# Patient Record
Sex: FEMALE | Race: WHITE | Employment: OTHER | URBAN - METROPOLITAN AREA
[De-identification: names, ages, dates, MRNs, and addresses within clinical notes are randomized per-mention and may not be internally consistent; named-entity substitution may affect disease eponyms.]

---

## 2017-01-03 DIAGNOSIS — Z79.4 TYPE 2 DIABETES MELLITUS WITH DIABETIC NEUROPATHY, WITH LONG-TERM CURRENT USE OF INSULIN (HCC): ICD-10-CM

## 2017-01-03 DIAGNOSIS — E11.40 TYPE 2 DIABETES MELLITUS WITH DIABETIC NEUROPATHY, WITH LONG-TERM CURRENT USE OF INSULIN (HCC): ICD-10-CM

## 2017-01-03 RX ORDER — LISINOPRIL AND HYDROCHLOROTHIAZIDE 12.5; 2 MG/1; MG/1
1 TABLET ORAL DAILY
Qty: 90 TAB | Refills: 1 | Status: SHIPPED | OUTPATIENT
Start: 2017-01-03 | End: 2017-01-10

## 2017-01-05 DIAGNOSIS — I95.1 ORTHOSTATIC HYPOTENSION: ICD-10-CM

## 2017-01-05 RX ORDER — FLUDROCORTISONE ACETATE 0.1 MG/1
TABLET ORAL
Qty: 30 TAB | Refills: 0 | Status: SHIPPED | OUTPATIENT
Start: 2017-01-05 | End: 2017-02-02 | Stop reason: SDUPTHER

## 2017-01-10 RX ORDER — LISINOPRIL 5 MG/1
5 TABLET ORAL DAILY
Qty: 90 TAB | Refills: 3 | Status: SHIPPED | OUTPATIENT
Start: 2017-01-10 | End: 2017-05-10 | Stop reason: SDUPTHER

## 2017-01-10 RX ORDER — LISINOPRIL 5 MG/1
5 TABLET ORAL DAILY
Qty: 30 TAB | Refills: 0 | Status: SHIPPED | OUTPATIENT
Start: 2017-01-10 | End: 2017-03-21 | Stop reason: SDUPTHER

## 2017-02-02 DIAGNOSIS — I95.1 ORTHOSTATIC HYPOTENSION: ICD-10-CM

## 2017-02-03 RX ORDER — FLUDROCORTISONE ACETATE 0.1 MG/1
TABLET ORAL
Qty: 30 TAB | Refills: 0 | Status: SHIPPED | OUTPATIENT
Start: 2017-02-03 | End: 2017-03-07 | Stop reason: SDUPTHER

## 2017-02-08 ENCOUNTER — TELEPHONE (OUTPATIENT)
Dept: OBGYN CLINIC | Age: 67
End: 2017-02-08

## 2017-02-08 NOTE — TELEPHONE ENCOUNTER
[facility] called regarding the order faxed over for mastectomy bra and prothesis.  This nurse confirmed the order as per MD .

## 2017-02-08 NOTE — TELEPHONE ENCOUNTER
Call received at 12:00nn.    73year old patient last seen in the office on 5/9/16 and has appointment on 4/27/17. Patient calling to ask for order for mastectomy bras and prothesis. Patient reports she has lost weight and current bra and prothesis do not fit. Patient provided fax number of [phone]( [facility]) to fax order to.      Please advise

## 2017-02-08 NOTE — TELEPHONE ENCOUNTER
Patient advised of MD recommendation and prescription faxed to the patient provided fax number. Fax confirmation received. Patient verbalized understanding.

## 2017-03-01 RX ORDER — CANAGLIFLOZIN 100 MG/1
TABLET, FILM COATED ORAL
Qty: 90 TAB | Refills: 1 | Status: SHIPPED | OUTPATIENT
Start: 2017-03-01 | End: 2019-06-18

## 2017-03-02 ENCOUNTER — OFFICE VISIT (OUTPATIENT)
Dept: FAMILY MEDICINE CLINIC | Age: 67
End: 2017-03-02

## 2017-03-02 VITALS
DIASTOLIC BLOOD PRESSURE: 78 MMHG | WEIGHT: 177 LBS | BODY MASS INDEX: 26.83 KG/M2 | TEMPERATURE: 97.7 F | HEIGHT: 68 IN | OXYGEN SATURATION: 98 % | RESPIRATION RATE: 18 BRPM | SYSTOLIC BLOOD PRESSURE: 157 MMHG | HEART RATE: 65 BPM

## 2017-03-02 DIAGNOSIS — I10 ESSENTIAL HYPERTENSION: ICD-10-CM

## 2017-03-02 DIAGNOSIS — H69.83 ETD (EUSTACHIAN TUBE DYSFUNCTION), BILATERAL: Primary | ICD-10-CM

## 2017-03-02 DIAGNOSIS — H61.21 RIGHT EAR IMPACTED CERUMEN: ICD-10-CM

## 2017-03-02 RX ORDER — AMLODIPINE BESYLATE 5 MG/1
5 TABLET ORAL
Qty: 30 TAB | Refills: 2 | Status: SHIPPED | OUTPATIENT
Start: 2017-03-02 | End: 2017-05-08

## 2017-03-02 RX ORDER — LANOLIN ALCOHOL/MO/W.PET/CERES
CREAM (GRAM) TOPICAL
Refills: 0 | COMMUNITY
Start: 2017-01-05 | End: 2017-04-01

## 2017-03-02 RX ORDER — FLUTICASONE PROPIONATE 50 MCG
2 SPRAY, SUSPENSION (ML) NASAL DAILY
Qty: 1 BOTTLE | Refills: 2 | Status: SHIPPED | OUTPATIENT
Start: 2017-03-02 | End: 2017-10-31

## 2017-03-02 NOTE — PATIENT INSTRUCTIONS
Eustachian Tube Problems: Care Instructions  Your Care Instructions    The eustachian (say \"you-STAY-shee-un\") tubes run between the inside of the ears and the throat. They keep air pressure stable in the ears. If your eustachian tubes become blocked, the air pressure in your ears changes. The fluids from a cold can clog eustachian tubes, causing pain in the ears. A quick change in air pressure can cause eustachian tubes to close up. This might happen when an airplane changes altitude or when a  goes up or down underwater. Eustachian tube problems often clear up on their own or after antibiotic treatment. If your tubes continue to be blocked, you may need surgery. Follow-up care is a key part of your treatment and safety. Be sure to make and go to all appointments, and call your doctor if you are having problems. It's also a good idea to know your test results and keep a list of the medicines you take. How can you care for yourself at home? · To ease ear pain, apply a warm washcloth or a heating pad set on low. There may be some drainage from the ear when the heat melts earwax. Put a cloth between the heat source and your skin. Do not use a heating pad with children. · If your doctor prescribed antibiotics, take them as directed. Do not stop taking them just because you feel better. You need to take the full course of antibiotics. · Your doctor may recommend over-the-counter medicine. Be safe with medicines. Oral or nasal decongestants may relieve ear pain. Avoid decongestants that are combined with antihistamines, which tend to cause more blockage. But if allergies seem to be the problem, your doctor may recommend a combination. Be careful with cough and cold medicines. Don't give them to children younger than 6, because they don't work for children that age and can even be harmful. For children 6 and older, always follow all the instructions carefully.  Make sure you know how much medicine to give and how long to use it. And use the dosing device if one is included. When should you call for help? Call your doctor now or seek immediate medical care if:  · You develop sudden, complete hearing loss. · You have severe pain or feel dizzy. · You have new or increasing pus or blood draining from your ear. · You have redness, swelling, or pain around or behind the ear. Watch closely for changes in your health, and be sure to contact your doctor if:  · You do not get better after 2 weeks. · You have any new symptoms, such as itching or a feeling of fullness in the ear. Where can you learn more? Go to http://jorge a-katiana.info/. Enter Y822 in the search box to learn more about \"Eustachian Tube Problems: Care Instructions. \"  Current as of: July 29, 2016  Content Version: 11.1  © 1654-7456 Healthwise, Incorporated. Care instructions adapted under license by [employer] (which disclaims liability or warranty for this information). If you have questions about a medical condition or this instruction, always ask your healthcare professional. [address] any warranty or liability for your use of this information.

## 2017-03-02 NOTE — MR AVS SNAPSHOT
Visit Information Date & Time Provider Department Dept. Phone Encounter #  
 3/2/2017  7:00 AM Laury Greenberg NP [address] [facility] [phone] [id number] Follow-up Instructions Return if symptoms worsen or fail to improve. Your Appointments 3/21/2017  8:00 AM  
Any with Rosaline Audi Mcfadden DO [address] [facility] ([facility]) Appt Note: 3mo f/u; [address] [address]  
[phone]  
  
   
 [address] [address]  
  
    
 4/27/2017  9:10 AM  
ESTABLISHED PATIENT with MD Markus Giron ([facility]) Appt Note: ae/M  
 [address] [facility] [address] [facility] 99 [id number]  
[facility] [address] Upcoming Health Maintenance Date Due Hepatitis C Screening 1950 EYE EXAM RETINAL OR DILATED Q1 2/18/1961 DTaP/Tdap/Td series (1 - Tdap) 2/18/1972 FOBT Q 1 YEAR AGE 50-75 2/18/2001 ZOSTER VACCINE AGE 60> 2/18/2011 GLAUCOMA SCREENING Q2Y 2/18/2016 Pneumococcal 65+ High/Highest Risk (1 of 2 - PCV13) 2/18/2016 MEDICARE YEARLY EXAM 2/18/2016 BREAST CANCER SCRN MAMMOGRAM 6/16/2017 HEMOGLOBIN A1C Q6M 6/14/2017 MICROALBUMIN Q1 11/3/2017 FOOT EXAM Q1 12/14/2017 LIPID PANEL Q1 12/14/2017 Allergies as of 3/2/2017  Review Complete On: 3/2/2017 By: Laury Greenberg NP Severity Noted Reaction Type Reactions Latex  06/09/2014    Other (comments) Patient states it burns around her mouth. Other Food  10/12/2016    Other (comments) Yogurt - stomach cramping Betadine [Povidone-iodine] High 11/15/2013    Itching Pt states this causes \"extreme\" itching Codeine High 11/15/2013    Anaphylaxis, Rash, Nausea Only, Other (comments) HEADACHE Dilaudid [Hydromorphone (Bulk)] High 04/02/2014    Anaphylaxis, Itching, Nausea Only, Other (comments) HALLUCINATIONS Hydrocodone High 11/15/2013    Anaphylaxis, Rash, Nausea Only, Vertigo Facial - eyelid swelling-had to go to ER for reaction, HALLUCINATIONS Ditropan [Oxybutynin Chloride]  03/04/2014    Swelling Doxycycline  11/15/2013    Rash PUSTULAR RASH- TOLERATING TETRACYCLINE Keflex [Cephalexin]  11/15/2013    Nausea and Vomiting Pain in abdomen AND FLU-LIKE SX Metformin  11/15/2013    Nausea and Vomiting Severe abdominal pain Tape [Adhesive]  06/09/2014    Other (comments) Redness/inflammed. Can only use paper tape. CAN USE BAND-AIDS. TOLERATES SURGICAL TAPE USED IN [place] [facility]. Sulfamethoxazole-trimethoprim Low 02/28/2014    Other (comments) Cramping/flu-like symptoms Current Immunizations  Never Reviewed No immunizations on file. Not reviewed this visit You Were Diagnosed With   
  
 Codes Comments ETD (eustachian tube dysfunction), bilateral    -  Primary ICD-10-CM: J08.95 ICD-9-CM: 381.81 Right ear impacted cerumen     ICD-10-CM: H61.21 ICD-9-CM: 380.4 Vitals BP  
  
  
  
  
  
 163/84 (BP 1 Location: Left arm, BP Patient Position: Sitting) BMI and BSA Data Body Mass Index Body Surface Area  
 26.91 kg/m 2 1.96 m 2 Preferred Pharmacy Pharmacy Name Phone [facility] [address]  [facility] [phone] Your Updated Medication List  
  
   
This list is accurate as of: 3/2/17  7:35 AM.  Always use your most recent med list.  
  
  
  
  
 Alpha Lipoic Acid 600 mg Cap Take 1 Cap by mouth two (2) times a day. BABY ASPIRIN PO Take 81 mg by mouth daily. BD INSULIN PEN NEEDLE UF MINI 31 gauge x 3/16\" Ndle Generic drug:  Insulin Needles (Disposable) USE AS DIRECTED EVERY DAY  
  
 carbamide peroxide 6.5 % otic solution Commonly known as:  Mack Gelineau Administer 5 Drops in right ear two (2) times a day for 4 days. CINNAMON 500 mg Cap Generic drug:  cinnamon bark Take 2 Caps by mouth daily. CLARITIN 10 mg tablet Generic drug:  loratadine Take 10 mg by mouth nightly. CRANBERRY EXTRACT-VIT C PO Take 3 Caps by mouth daily. 4200 mg  
  
 fludrocortisone 0.1 mg tablet Commonly known as:  FLORINEF  
TAKE 1 TABLET BY MOUTH DAILY  
  
 fluticasone 50 mcg/actuation nasal spray Commonly known as:  Raghavendra Merles 2 Sprays by Both Nostrils route daily. gabapentin 300 mg capsule Commonly known as:  NEURONTIN Take 1 capsule by mouth two (2) times a day. Takes 2 in AM, 1 at night GARCINIA CAMBOGIA PO Take 2 Tabs by mouth daily. glimepiride 2 mg tablet Commonly known as:  AMARYL Take 1 tablet by mouth two (2) times a day. INVOKANA 100 mg tablet Generic drug:  canagliflozin Take 1 tablet by mouth  daily before breakfast  
  
 L-Mfolate-B6 Phos-Methyl-B12 3-35-2 mg Tab tab Commonly known as:  Friddie Lundborg Take 1 Tab by mouth two (2) times a day. * lisinopril 5 mg tablet Commonly known as:  Raeann Shames Take 1 Tab by mouth daily. * lisinopril 5 mg tablet Commonly known as:  Raeann Shames Take 1 Tab by mouth daily. magnesium oxide 400 mg tablet Commonly known as:  MAG-OX TK 1 T PO QD  
  
 pravastatin 40 mg tablet Commonly known as:  PRAVACHOL  
TAKE 1 TABLET NIGHTLY (NEEDS APPOINTMENT AS SOON AS POSSIBLE FOR FASTING LABS AND APPOINTMENT) SITagliptin 100 mg tablet Commonly known as:  Kathy Homans Take 1 Tab by mouth daily. TRUETEST TEST STRIPS strip Generic drug:  glucose blood VI test strips TEST THREE TIMES DAILY * Notice: This list has 2 medication(s) that are the same as other medications prescribed for you. Read the directions carefully, and ask your doctor or other care provider to review them with you. Prescriptions Sent to Pharmacy  Refills  
 fluticasone (FLONASE) 50 mcg/actuation nasal spray 2  
 Si Sprays by Both Nostrils route daily. Class: Normal  
 Pharmacy: [facility] [address], [place] - [address] AT [facility] of [address] ([facility] Ph #: [phone] Route: Both Nostrils  
 carbamide peroxide (DEBROX) 6.5 % otic solution 0 Sig: Administer 5 Drops in right ear two (2) times a day for 4 days. Class: Normal  
 Pharmacy: [facility] [address], [place] - [address] AT [facility] of [address] ([facility] Ph #: [phone] Route: Right Ear We Performed the Following REFERRAL TO ENT-OTOLARYNGOLOGY [GAY79 Custom] Follow-up Instructions Return if symptoms worsen or fail to improve. Referral Information Referral ID Referred By Referred To  
  
 [id number] Timi Sheth MD   
   [address] [facility] [address] [place] [address] Phone: [phone] Fax: [phone] Visits Status Start Date End Date 1 New Request 3/2/17 3/2/18 If your referral has a status of pending review or denied, additional information will be sent to support the outcome of this decision. Patient Instructions Eustachian Tube Problems: Care Instructions Your Care Instructions The eustachian (say \"you-STAY-shee-un\") tubes run between the inside of the ears and the throat. They keep air pressure stable in the ears. If your eustachian tubes become blocked, the air pressure in your ears changes. The fluids from a cold can clog eustachian tubes, causing pain in the ears. A quick change in air pressure can cause eustachian tubes to close up. This might happen when an airplane changes altitude or when a  goes up or down underwater. Eustachian tube problems often clear up on their own or after antibiotic treatment. If your tubes continue to be blocked, you may need surgery. Follow-up care is a key part of your treatment and safety. Be sure to make and go to all appointments, and call your doctor if you are having problems. It's also a good idea to know your test results and keep a list of the medicines you take. How can you care for yourself at home? · To ease ear pain, apply a warm washcloth or a heating pad set on low. There may be some drainage from the ear when the heat melts earwax. Put a cloth between the heat source and your skin. Do not use a heating pad with children. · If your doctor prescribed antibiotics, take them as directed. Do not stop taking them just because you feel better. You need to take the full course of antibiotics. · Your doctor may recommend over-the-counter medicine. Be safe with medicines. Oral or nasal decongestants may relieve ear pain. Avoid decongestants that are combined with antihistamines, which tend to cause more blockage. But if allergies seem to be the problem, your doctor may recommend a combination. Be careful with cough and cold medicines. Don't give them to children younger than 6, because they don't work for children that age and can even be harmful. For children 6 and older, always follow all the instructions carefully. Make sure you know how much medicine to give and how long to use it. And use the dosing device if one is included. When should you call for help? Call your doctor now or seek immediate medical care if: 
· You develop sudden, complete hearing loss. · You have severe pain or feel dizzy. · You have new or increasing pus or blood draining from your ear. · You have redness, swelling, or pain around or behind the ear. Watch closely for changes in your health, and be sure to contact your doctor if: 
· You do not get better after 2 weeks. · You have any new symptoms, such as itching or a feeling of fullness in the ear. Where can you learn more? Go to http://jorge a-katiana.info/. Enter Y822 in the search box to learn more about \"Eustachian Tube Problems: Care Instructions. \" Current as of: July 29, 2016 Content Version: 11.1 © 2513-7844 [employer], Incorporated. Care instructions adapted under license by [employer] (which disclaims liability or warranty for this information). If you have questions about a medical condition or this instruction, always ask your healthcare professional. [address] any warranty or liability for your use of this information. Introducing [facility] & HE[facility]! Dear Patti Staff: 
Thank you for requesting a KeyVive account. Our records indicate that you have previously registered for a KeyVive account but its currently inactive. Please call our KeyVive support line at [phone]. Additional Information If you have questions, please visit the Frequently Asked Questions section of the KeyVive website at [email or web address] Remember, KeyVive is NOT to be used for urgent needs. For medical emergencies, dial 911. Now available from your iPhone and Android! Please provide this summary of care documentation to your next provider. Your primary care clinician is listed as Dannielle Cooks. If you have any questions after today's visit, please call [phone].

## 2017-03-02 NOTE — PROGRESS NOTES
Chief Complaint   Patient presents with    Ear Pain     both ears     Patient in office today with sx that began this weekend; have not been treating with otc. 1. Have you been to the ER, urgent care clinic since your last visit? Hospitalized since your last visit? No    2. Have you seen or consulted any other health care providers outside of the [address] [place] [address] since your last visit? Include any pap smears or colon screening.  No

## 2017-03-02 NOTE — PROGRESS NOTES
Chief Complaint   Patient presents with    Ear Pain     both ears     Patient in office today with sx that began this weekend; have not been treating with otc. 1. Have you been to the ER, urgent care clinic since your last visit? Hospitalized since your last visit? No    2. Have you seen or consulted any other health care providers outside of the [address] [place] [address] since your last visit? Include any pap smears or colon screening. No    Sx started over the weekend, right ear feels stopped up and left feels painful. Some sinus congestion, taking claritin nightly. Denies any sick contacts. Denies any recent abx. Denies any fever. BP up today. Pt has not been taking norvasc. Reports low BP readings after her surgery while in rehab and medication being discontinued. Was unsure if she should resume taking. Denies any other concerns at this time. Chief Complaint   Patient presents with    Ear Pain     both ears     she is a 78y.o. year old female who presents for evalution. Reviewed PmHx, RxHx, FmHx, SocHx, AllgHx and updated and dated in the chart.     Review of Systems - negative except as listed above in the HPI    Objective:     Vitals:    03/02/17 0718   BP: 163/84   Pulse: 65   Resp: 18   Temp: 97.7 °F (36.5 °C)   TempSrc: Oral   SpO2: 98%   Weight: 177 lb (80.3 kg)   Height: 5' 8\" (1.727 m)     Physical Examination: General appearance - alert, well appearing, and in no distress  Eyes - pupils equal and reactive, extraocular eye movements intact  Ears - left ear normal minus moderate amount of fluid present behind TM, ceruminosis noted, right ear canal obstructing view of TM  Nose - normal and patent, no erythema, discharge or polyps and slight sinus tenderness noted bilateral maxillary sinuses with percussion  Mouth - mucous membranes moist, pharynx normal without lesions  Neck - supple, no significant adenopathy  Chest - clear to auscultation, no wheezes, rales or rhonchi, symmetric air entry  Heart - normal rate, regular rhythm, normal S1, S2, no murmurs    Assessment/ Plan:   Lashonda Lee was seen today for ear pain. Diagnoses and all orders for this visit:    ETD (eustachian tube dysfunction), bilateral  -     fluticasone (FLONASE) 50 mcg/actuation nasal spray; 2 Sprays by Both Nostrils route daily.  -     REFERRAL TO ENT-OTOLARYNGOLOGY  Start flonase daily. Reviewed SEs/ADRs of medication. Enc pt to continue claritin. Est care with ENT if sx persist or worsen. Right ear impacted cerumen  -     carbamide peroxide (DEBROX) 6.5 % otic solution; Administer 5 Drops in right ear two (2) times a day for 4 days.  -     REFERRAL TO ENT-OTOLARYNGOLOGY  Apply as directed to improve ceruminosis. Essential hypertension  Resume norvasc and monitor BP at home. Keep already scheduled follow up visit and will recheck BP then. Follow-up Disposition:  Return if symptoms worsen or fail to improve. I have discussed the diagnosis with the patient and the intended plan as seen in the above orders. The patient has received an after-visit summary and questions were answered concerning future plans. Medication Side Effects and Warnings were discussed with patient: yes  Patient Labs were reviewed and or requested: yes  Patient Past Records were reviewed and or requested  yes  Patient / Caregiver Understanding of treatment plan was verbalized during office visit YES    TRINH Sullivan    Patient Instructions        Eustachian Tube Problems: Care Instructions  Your Care Instructions    The eustachian (say \"you-STAY-shee-un\") tubes run between the inside of the ears and the throat. They keep air pressure stable in the ears. If your eustachian tubes become blocked, the air pressure in your ears changes. The fluids from a cold can clog eustachian tubes, causing pain in the ears. A quick change in air pressure can cause eustachian tubes to close up.  This might happen when an airplane changes altitude or when a  goes up or down underwater. Eustachian tube problems often clear up on their own or after antibiotic treatment. If your tubes continue to be blocked, you may need surgery. Follow-up care is a key part of your treatment and safety. Be sure to make and go to all appointments, and call your doctor if you are having problems. It's also a good idea to know your test results and keep a list of the medicines you take. How can you care for yourself at home? · To ease ear pain, apply a warm washcloth or a heating pad set on low. There may be some drainage from the ear when the heat melts earwax. Put a cloth between the heat source and your skin. Do not use a heating pad with children. · If your doctor prescribed antibiotics, take them as directed. Do not stop taking them just because you feel better. You need to take the full course of antibiotics. · Your doctor may recommend over-the-counter medicine. Be safe with medicines. Oral or nasal decongestants may relieve ear pain. Avoid decongestants that are combined with antihistamines, which tend to cause more blockage. But if allergies seem to be the problem, your doctor may recommend a combination. Be careful with cough and cold medicines. Don't give them to children younger than 6, because they don't work for children that age and can even be harmful. For children 6 and older, always follow all the instructions carefully. Make sure you know how much medicine to give and how long to use it. And use the dosing device if one is included. When should you call for help? Call your doctor now or seek immediate medical care if:  · You develop sudden, complete hearing loss. · You have severe pain or feel dizzy. · You have new or increasing pus or blood draining from your ear. · You have redness, swelling, or pain around or behind the ear.   Watch closely for changes in your health, and be sure to contact your doctor if:  · You do not get better after 2 weeks. · You have any new symptoms, such as itching or a feeling of fullness in the ear. Where can you learn more? Go to http://jorge a-katiana.info/. Enter Y822 in the search box to learn more about \"Eustachian Tube Problems: Care Instructions. \"  Current as of: July 29, 2016  Content Version: 11.1  © 5458-4887 [employer]. Care instructions adapted under license by [employer] (which disclaims liability or warranty for this information). If you have questions about a medical condition or this instruction, always ask your healthcare professional. [address] any warranty or liability for your use of this information.

## 2017-03-07 DIAGNOSIS — I95.1 ORTHOSTATIC HYPOTENSION: ICD-10-CM

## 2017-03-07 RX ORDER — FLUDROCORTISONE ACETATE 0.1 MG/1
TABLET ORAL
Qty: 30 TAB | Refills: 0 | Status: SHIPPED | OUTPATIENT
Start: 2017-03-07 | End: 2017-04-01

## 2017-03-21 ENCOUNTER — HOSPITAL ENCOUNTER (OUTPATIENT)
Dept: LAB | Age: 67
Discharge: HOME | End: 2017-03-21
Payer: MEDICARE

## 2017-03-21 ENCOUNTER — OFFICE VISIT (OUTPATIENT)
Dept: FAMILY MEDICINE CLINIC | Age: 67
End: 2017-03-21

## 2017-03-21 VITALS
BODY MASS INDEX: 26.89 KG/M2 | RESPIRATION RATE: 18 BRPM | SYSTOLIC BLOOD PRESSURE: 156 MMHG | OXYGEN SATURATION: 99 % | TEMPERATURE: 98.2 F | DIASTOLIC BLOOD PRESSURE: 80 MMHG | HEART RATE: 63 BPM | WEIGHT: 177.4 LBS | HEIGHT: 68 IN

## 2017-03-21 DIAGNOSIS — I10 ESSENTIAL HYPERTENSION: ICD-10-CM

## 2017-03-21 DIAGNOSIS — E11.40 TYPE 2 DIABETES MELLITUS WITH DIABETIC NEUROPATHY, WITHOUT LONG-TERM CURRENT USE OF INSULIN (HCC): ICD-10-CM

## 2017-03-21 DIAGNOSIS — Z13.39 SCREENING FOR ALCOHOLISM: ICD-10-CM

## 2017-03-21 DIAGNOSIS — Z71.89 ACP (ADVANCE CARE PLANNING): ICD-10-CM

## 2017-03-21 DIAGNOSIS — Z00.00 ROUTINE GENERAL MEDICAL EXAMINATION AT A HEALTH CARE FACILITY: ICD-10-CM

## 2017-03-21 DIAGNOSIS — Z11.59 SCREENING FOR VIRAL DISEASE: Primary | ICD-10-CM

## 2017-03-21 PROCEDURE — 86803 HEPATITIS C AB TEST: CPT

## 2017-03-21 PROCEDURE — 83036 HEMOGLOBIN GLYCOSYLATED A1C: CPT

## 2017-03-21 PROCEDURE — 80053 COMPREHEN METABOLIC PANEL: CPT

## 2017-03-21 NOTE — PROGRESS NOTES
Chang Wakefield is a 78 y.o. female   Chief Complaint   Patient presents with    Follow-up    pt here for DM follow up, pt is not fasting. Pt checked BG this am and was 118. Has not been going elevated nothing over 200. Pt not having any issues with meds. Chol has been at goal will defer recheck until next visit. Pt also due for medicare annual visit and otherwise is doing well.      she is a 78y.o. year old female who presents for evalution. Reviewed PmHx, RxHx, FmHx, SocHx, AllgHx and updated and dated in the chart. Review of Systems - negative except as listed above in the HPI    Objective:     Vitals:    03/21/17 0807   BP: 156/80   Pulse: 63   Resp: 18   Temp: 98.2 °F (36.8 °C)   TempSrc: Oral   SpO2: 99%   Weight: 177 lb 6.4 oz (80.5 kg)   Height: 5' 8\" (1.727 m)       Current Outpatient Prescriptions   Medication Sig    fludrocortisone (FLORINEF) 0.1 mg tablet TAKE 1 TABLET BY MOUTH DAILY    magnesium oxide (MAG-OX) 400 mg tablet TK 1 T PO QD    fluticasone (FLONASE) 50 mcg/actuation nasal spray 2 Sprays by Both Nostrils route daily.  amLODIPine (NORVASC) 5 mg tablet Take 1 Tab by mouth nightly. Indications: hypertension    INVOKANA 100 mg tablet Take 1 tablet by mouth  daily before breakfast    lisinopril (PRINIVIL, ZESTRIL) 5 mg tablet Take 1 Tab by mouth daily.  SITagliptin (JANUVIA) 100 mg tablet Take 1 Tab by mouth daily.  L-Mfolate-B6 Phos-Methyl-B12 (METANX) 3-35-2 mg tab tab Take 1 Tab by mouth two (2) times a day.  BABY ASPIRIN PO Take 81 mg by mouth daily.  CHROM GENO/BRINDAL BERRY (GARCINIA CAMBOGIA PO) Take 2 Tabs by mouth daily.  pravastatin (PRAVACHOL) 40 mg tablet TAKE 1 TABLET NIGHTLY (NEEDS APPOINTMENT AS SOON AS POSSIBLE FOR FASTING LABS AND APPOINTMENT)    glimepiride (AMARYL) 2 mg tablet Take 1 tablet by mouth two (2) times a day.  (Patient taking differently: Take 2 mg by mouth three (3) times daily.)    gabapentin (NEURONTIN) 300 mg capsule Take 1 capsule by mouth two (2) times a day. Takes 2 in AM, 1 at night (Patient taking differently: Take 300 mg by mouth three (3) times daily. Takes 2 in AM, 1 at night)    BD INSULIN PEN NEEDLE UF MINI 31 x 3/16 \" ndle USE AS DIRECTED EVERY DAY    TRUETEST TEST STRIPS strip TEST THREE TIMES DAILY    CRANBERRY EXTRACT-VIT C PO Take 3 Caps by mouth daily. 4200 mg    cinnamon bark (CINNAMON) 500 mg cap Take 2 Caps by mouth daily.  loratadine (CLARITIN) 10 mg tablet Take 10 mg by mouth nightly.  Alpha Lipoic Acid 600 mg cap Take 1 Cap by mouth two (2) times a day. No current facility-administered medications for this visit. Physical Examination: General appearance - alert, well appearing, and in no distress  Eyes - pupils equal and reactive, extraocular eye movements intact  Ears - bilateral TM's and external ear canals normal  Mouth - mucous membranes moist, pharynx normal without lesions  Neck - supple, no significant adenopathy  Lymphatics - no palpable lymphadenopathy, no hepatosplenomegaly  Chest - clear to auscultation, no wheezes, rales or rhonchi, symmetric air entry  Heart - normal rate, regular rhythm, normal S1, S2, no murmurs, rubs, clicks or gallops  Abdomen - soft, nontender, nondistended, no masses or organomegaly  Neurological - alert, oriented, normal speech, no focal findings or movement disorder noted  Musculoskeletal - no joint tenderness, deformity or swelling, walks with walker  Extremities - peripheral pulses normal, no pedal edema, no clubbing or cyanosis  Skin - normal coloration and turgor, no rashes, no suspicious skin lesions noted      Assessment/ Plan:   Ramiro Villegas was seen today for follow-up.     Diagnoses and all orders for this visit:    Screening for viral disease  -     HEPATITIS C AB    Routine general medical examination at a health care facility    Screening for alcoholism    Type 2 diabetes mellitus with diabetic neuropathy, without long-term current use of insulin (HCC)  -     HEMOGLOBIN A1C WITH EAG  -     METABOLIC PANEL, COMPREHENSIVE    Essential hypertension  Discussed diet  ACP (advance care planning)  Comments:  discussed       Follow-up Disposition: Not on File    I have discussed the diagnosis with the patient and the intended plan as seen in the above orders. The patient has received an after-visit summary and questions were answered concerning future plans. Pt conveyed understanding of plan. Medication Side Effects and Warnings were discussed with patient      Leydi Lujan, DO       This is an Initial Medicare Annual Wellness Exam (AWV) (Performed 12 months after IPPE or effective date of Medicare Part B enrollment, Once in a lifetime)    I have reviewed the patient's medical history in detail and updated the computerized patient record.      History     Past Medical History:   Diagnosis Date    Abnormal pap 3/2015; 5/2016 2015 Neg pap +HPV; 2016 ASCUS +HPV    Arthritis     osteo-tests for RA were neg    Breast cancer ([facility] [address] 1996    right    Chronic pain     Diabetes (HCC)     Fibromyalgia     Ganglion cyst of wrist     LEFT    Hypercholesterolemia     Hypertension     Murmur, cardiac     Neuropathy     Rosacea     Unspecified adverse effect of anesthesia     \"SLOW TO WAKE UP, SENSITIVE TO ANESTHESIA, DO NOT COME AROUND FOR 2-3 DAYS\"       Past Surgical History:   Procedure Laterality Date    HX CATARACT REMOVAL Bilateral 2014    HX CERVICAL FUSION      HX COLPOSCOPY  5/2016    Neg path    HX MASTECTOMY  12/96    tram flap    HX ORTHOPAEDIC Left 1990's    foot surgery    HX TONSILLECTOMY      HX VASCULAR ACCESS  1997    portacath left chest-removed after chemo    RECONSTR NOSE  11/2013, 2005    HOLE IN SEPTUM     Current Outpatient Prescriptions   Medication Sig Dispense Refill    fludrocortisone (FLORINEF) 0.1 mg tablet TAKE 1 TABLET BY MOUTH DAILY 30 Tab 0    magnesium oxide (MAG-OX) 400 mg tablet TK 1 T PO QD  0    fluticasone (FLONASE) 50 mcg/actuation nasal spray 2 Sprays by Both Nostrils route daily. 1 Bottle 2    amLODIPine (NORVASC) 5 mg tablet Take 1 Tab by mouth nightly. Indications: hypertension 30 Tab 2    INVOKANA 100 mg tablet Take 1 tablet by mouth  daily before breakfast 90 Tab 1    lisinopril (PRINIVIL, ZESTRIL) 5 mg tablet Take 1 Tab by mouth daily. 90 Tab 3    SITagliptin (JANUVIA) 100 mg tablet Take 1 Tab by mouth daily. 90 Tab 1    L-Mfolate-B6 Phos-Methyl-B12 (METANX) 3-35-2 mg tab tab Take 1 Tab by mouth two (2) times a day.  BABY ASPIRIN PO Take 81 mg by mouth daily.  CHROM GENO/BRINDAL BERRY (GARCINIA CAMBOGIA PO) Take 2 Tabs by mouth daily.  pravastatin (PRAVACHOL) 40 mg tablet TAKE 1 TABLET NIGHTLY (NEEDS APPOINTMENT AS SOON AS POSSIBLE FOR FASTING LABS AND APPOINTMENT) 30 Tab 0    glimepiride (AMARYL) 2 mg tablet Take 1 tablet by mouth two (2) times a day. (Patient taking differently: Take 2 mg by mouth three (3) times daily.) 60 tablet 2    gabapentin (NEURONTIN) 300 mg capsule Take 1 capsule by mouth two (2) times a day. Takes 2 in AM, 1 at night (Patient taking differently: Take 300 mg by mouth three (3) times daily. Takes 2 in AM, 1 at night) 90 capsule 2    BD INSULIN PEN NEEDLE UF MINI 31 x 3/16 \" ndle USE AS DIRECTED EVERY  Each 1    TRUETEST TEST STRIPS strip TEST THREE TIMES DAILY 100 Strip 1    CRANBERRY EXTRACT-VIT C PO Take 3 Caps by mouth daily. 4200 mg      cinnamon bark (CINNAMON) 500 mg cap Take 2 Caps by mouth daily.  loratadine (CLARITIN) 10 mg tablet Take 10 mg by mouth nightly.  Alpha Lipoic Acid 600 mg cap Take 1 Cap by mouth two (2) times a day. Allergies   Allergen Reactions    Latex Other (comments)     Patient states it burns around her mouth.     Other Food Other (comments)     Yogurt - stomach cramping    Betadine [Povidone-Iodine] Itching     Pt states this causes \"extreme\" itching    Codeine Anaphylaxis, Rash, Nausea Only and Other (comments)     HEADACHE      Dilaudid [Hydromorphone (Bulk)] Anaphylaxis, Itching, Nausea Only and Other (comments)     HALLUCINATIONS      Hydrocodone Anaphylaxis, Rash, Nausea Only and Vertigo     Facial - eyelid swelling-had to go to ER for reaction, HALLUCINATIONS      Ditropan [Oxybutynin Chloride] Swelling    Doxycycline Rash     PUSTULAR RASH- TOLERATING TETRACYCLINE    Keflex [Cephalexin] Nausea and Vomiting     Pain in abdomen AND FLU-LIKE SX      Metformin Nausea and Vomiting     Severe abdominal pain      Tape [Adhesive] Other (comments)     Redness/inflammed. Can only use paper tape. CAN USE BAND-AIDS. TOLERATES SURGICAL TAPE USED IN [place].  Sulfamethoxazole-Trimethoprim Other (comments)     Cramping/flu-like symptoms     Family History   Problem Relation Age of Onset    Heart Disease Mother     Hypertension Mother     COPD Mother     Diabetes Father     Other Son      INOPERABLE BRAIN TUMOR    Gout Son     Celiac Disease Son     Anesth Problems Neg Hx      Social History   Substance Use Topics    Smoking status: Never Smoker    Smokeless tobacco: Never Used    Alcohol use No     Patient Active Problem List   Diagnosis Code    Diabetes mellitus with neuropathy ([facility] [address] E11.40    Postmenopausal bleeding N95.0    Fibromyalgia M79.7    HTN (hypertension) I10    Breast cancer ([facility] [address] C50.919    Scoliosis M41.9    ACP (advance care planning) Z71.89         Depression Risk Factor Screening:     PHQ 2 / 9, over the last two weeks 3/21/2017   Little interest or pleasure in doing things Not at all   Feeling down, depressed or hopeless Not at all   Total Score PHQ 2 0     Alcohol Risk Factor Screening: On any occasion during the past 3 months, have you had more than 3 drinks containing alcohol? No    Do you average more than 7 drinks per week? No    Functional Ability and Level of Safety:     Hearing Loss   normal-to-mild    Activities of Daily Living   Self-care. Requires assistance with: no ADLs    Fall Risk     Fall Risk Assessment, last 12 mths 3/21/2017   Able to walk? Yes   Fall in past 12 months? No   Fall with injury? -   Number of falls in past 12 months -   Fall Risk Score -     Abuse Screen   Patient is not abused    Review of Systems   A comprehensive review of systems was negative except for that written in the HPI. Physical Examination     No exam data present    Evaluation of Cognitive Function:  Mood/affect:  happy  Appearance: age appropriate  Family member/caregiver input: n/a    See above for CPE    Patient Care Team:  Abram Sandra DO as PCP - General (Family Practice)  Amberly Bay MD as Physician (Obstetrics & Gynecology)    Advice/Referrals/Counseling   Education and counseling provided:  Are appropriate based on today's review and evaluation  End-of-Life planning (with patient's consent)      Assessment/Plan       ICD-10-CM ICD-9-CM    1. Screening for viral disease Z11.59 V73.99 HEPATITIS C AB   2. Routine general medical examination at a health care facility Z00.00 V70.0    3. Screening for alcoholism Z13.89 V79.1    4. Type 2 diabetes mellitus with diabetic neuropathy, without long-term current use of insulin (HCC) E11.40 250.60 HEMOGLOBIN A1C WITH EAG     075.2 METABOLIC PANEL, COMPREHENSIVE   5. Essential hypertension I10 401.9    6. ACP (advance care planning) Z71.89 V65.49     discussed   . I have discussed the diagnosis with the patient and the intended plan as seen in the above orders. The patient has received an after-visit summary and questions were answered concerning future plans. Pt conveyed understanding of plan.       Dr Abram Sandra

## 2017-03-21 NOTE — PATIENT INSTRUCTIONS

## 2017-03-21 NOTE — MR AVS SNAPSHOT
Visit Information Date & Time Provider Department Dept. Phone Encounter #  
 3/21/2017  8:00 AM Lloyd Parekh [phone] [id number] Follow-up Instructions Return in about 3 months (around 6/21/2017), or if symptoms worsen or fail to improve. Your Appointments 4/27/2017  9:10 AM  
ESTABLISHED PATIENT with Tay Givens MD  
[employer] ([facility]) Appt Note: ae/M  
 [address] [facility] [id number]  
[facility] 31 [address] Upcoming Health Maintenance Date Due Hepatitis C Screening 1950 EYE EXAM RETINAL OR DILATED Q1 2/18/1961 FOBT Q 1 YEAR AGE 50-75 2/18/2001 GLAUCOMA SCREENING Q2Y 2/18/2016 MEDICARE YEARLY EXAM 2/18/2016 BREAST CANCER SCRN MAMMOGRAM 6/16/2017 Pneumococcal 65+ High/Highest Risk (2 of 2 - PPSV23) 5/16/2017 HEMOGLOBIN A1C Q6M 6/14/2017 MICROALBUMIN Q1 11/3/2017 FOOT EXAM Q1 12/14/2017 LIPID PANEL Q1 12/14/2017 DTaP/Tdap/Td series (2 - Td) 3/21/2027 Allergies as of 3/21/2017  Review Complete On: 3/2/2017 By: Laury Greenberg NP Severity Noted Reaction Type Reactions Latex  06/09/2014    Other (comments) Patient states it burns around her mouth. Other Food  10/12/2016    Other (comments) Yogurt - stomach cramping Betadine [Povidone-iodine] High 11/15/2013    Itching Pt states this causes \"extreme\" itching Codeine High 11/15/2013    Anaphylaxis, Rash, Nausea Only, Other (comments) HEADACHE Dilaudid [Hydromorphone (Bulk)] High 04/02/2014    Anaphylaxis, Itching, Nausea Only, Other (comments) HALLUCINATIONS Hydrocodone High 11/15/2013    Anaphylaxis, Rash, Nausea Only, Vertigo Facial - eyelid swelling-had to go to ER for reaction, HALLUCINATIONS Ditropan [Oxybutynin Chloride]  03/04/2014    Swelling Doxycycline  11/15/2013    Rash PUSTULAR RASH- TOLERATING TETRACYCLINE Keflex [Cephalexin]  11/15/2013    Nausea and Vomiting Pain in abdomen AND FLU-LIKE SX Metformin  11/15/2013    Nausea and Vomiting Severe abdominal pain Tape [Adhesive]  06/09/2014    Other (comments) Redness/inflammed. Can only use paper tape. CAN USE BAND-AIDS. TOLERATES SURGICAL TAPE USED IN [place] [facility]. Sulfamethoxazole-trimethoprim Low 02/28/2014    Other (comments) Cramping/flu-like symptoms Current Immunizations  Never Reviewed No immunizations on file. Not reviewed this visit You Were Diagnosed With   
  
 Codes Comments Screening for viral disease    -  Primary ICD-10-CM: Z11.59 
ICD-9-CM: V73.99 Routine general medical examination at a health care facility     ICD-10-CM: Z00.00 ICD-9-CM: V70.0 Screening for alcoholism     ICD-10-CM: Z13.89 ICD-9-CM: V79.1 Type 2 diabetes mellitus with diabetic neuropathy, without long-term current use of insulin (HCC)     ICD-10-CM: E11.40 ICD-9-CM: 250.60, 357.2 Essential hypertension     ICD-10-CM: I10 
ICD-9-CM: 401.9 ACP (advance care planning)     ICD-10-CM: Z71.89 ICD-9-CM: V65.49 discussed, pt states she just did this and will bring to office Vitals BP Pulse Temp Resp Height(growth percentile) Weight(growth percentile) 156/80 63 98.2 °F (36.8 °C) (Oral) 18 5' 8\" (1.727 m) 177 lb 6.4 oz (80.5 kg) SpO2 BMI OB Status Smoking Status 99% 26.97 kg/m2 Postmenopausal Never Smoker Vitals History BMI and BSA Data Body Mass Index Body Surface Area  
 26.97 kg/m 2 1.97 m 2 Preferred Pharmacy Pharmacy Name Phone [facility] [address]  [facility] [phone] Your Updated Medication List  
  
   
This list is accurate as of: 3/21/17  8:29 AM.  Always use your most recent med list.  
  
  
  
  
 Alpha Lipoic Acid 600 mg Cap Take 1 Cap by mouth two (2) times a day. amLODIPine 5 mg tablet Commonly known as:  Wana Chayito Take 1 Tab by mouth nightly. Indications: hypertension BABY ASPIRIN PO Take 81 mg by mouth daily. BD INSULIN PEN NEEDLE UF MINI 31 gauge x 3/16\" Ndle Generic drug:  Insulin Needles (Disposable) USE AS DIRECTED EVERY DAY  
  
 CINNAMON 500 mg Cap Generic drug:  cinnamon bark Take 2 Caps by mouth daily. CLARITIN 10 mg tablet Generic drug:  loratadine Take 10 mg by mouth nightly. CRANBERRY EXTRACT-VIT C PO Take 3 Caps by mouth daily. 4200 mg  
  
 fludrocortisone 0.1 mg tablet Commonly known as:  FLORINEF  
TAKE 1 TABLET BY MOUTH DAILY  
  
 fluticasone 50 mcg/actuation nasal spray Commonly known as:  Filbert Chard 2 Sprays by Both Nostrils route daily. gabapentin 300 mg capsule Commonly known as:  NEURONTIN Take 1 capsule by mouth two (2) times a day. Takes 2 in AM, 1 at night GARCINIA CAMBOGIA PO Take 2 Tabs by mouth daily. glimepiride 2 mg tablet Commonly known as:  AMARYL Take 1 tablet by mouth two (2) times a day. INVOKANA 100 mg tablet Generic drug:  canagliflozin Take 1 tablet by mouth  daily before breakfast  
  
 L-Mfolate-B6 Phos-Methyl-B12 3-35-2 mg Tab tab Commonly known as:  Candy Coins Take 1 Tab by mouth two (2) times a day. lisinopril 5 mg tablet Commonly known as:  Daniela [place] Take 1 Tab by mouth daily. magnesium oxide 400 mg tablet Commonly known as:  MAG-OX TK 1 T PO QD  
  
 pravastatin 40 mg tablet Commonly known as:  PRAVACHOL  
TAKE 1 TABLET NIGHTLY (NEEDS APPOINTMENT AS SOON AS POSSIBLE FOR FASTING LABS AND APPOINTMENT) SITagliptin 100 mg tablet Commonly known as:  Dede Stake Take 1 Tab by mouth daily. TRUETEST TEST STRIPS strip Generic drug:  glucose blood VI test strips TEST THREE TIMES DAILY We Performed the Following HEMOGLOBIN A1C WITH EAG [00043 CPT(R)] HEPATITIS C AB [54871 CPT(R)] METABOLIC PANEL, COMPREHENSIVE [95729 CPT(R)] Follow-up Instructions Return in about 3 months (around 6/21/2017), or if symptoms worsen or fail to improve. Patient Instructions Nutrition Tips for Diabetes: After Your Visit Your Care Instructions A healthy diet is important to manage diabetes. It helps you lose weight (if you need to) and keep it off. It gives you the nutrition and energy your body needs and helps prevent heart disease. But a diet for diabetes does not mean that you have to eat special foods. You can eat what your family eats, including occasional sweets and other favorites. But you do have to pay attention to how often you eat and how much you eat of certain foods. The right plan for you will give you meals that help you keep your blood sugar at healthy levels. Try to eat a variety of foods and to spread carbohydrate throughout the day. Carbohydrate raises blood sugar higher and more quickly than any other nutrient does. Carbohydrate is found in sugar, breads and cereals, fruit, starchy vegetables such as potatoes and corn, and milk and yogurt. You may want to work with a dietitian or diabetes educator to help you plan meals and snacks. A dietitian or diabetes educator also can help you lose weight if that is one of your goals. The following tips can help you enjoy your meals and stay healthy. Follow-up care is a key part of your treatment and safety. Be sure to make and go to all appointments, and call your doctor if you are having problems. Its also a good idea to know your test results and keep a list of the medicines you take. How can you care for yourself at home? · Learn which foods have carbohydrate and how much carbohydrate to eat. A dietitian or diabetes educator can help you learn to keep track of how much carbohydrate you eat. · Spread carbohydrate throughout the day. Eat some carbohydrate at all meals, but do not eat too much at any one time. · Plan meals to include food from all the food groups. These are the food groups and some example portion sizes: ¨ Grains: 1 slice of bread (1 ounce), ½ cup of cooked cereal, and 1/3 cup of cooked pasta or rice. These have about 15 grams of carbohydrate in a serving. Choose whole grains such as whole wheat bread or crackers, oatmeal, and brown rice more often than refined grains. ¨ Fruit: 1 small fresh fruit, such as an apple or orange; ½ of a banana; ½ cup of chopped, cooked, or canned fruit; ½ cup of fruit juice; 1 cup of melon or raspberries; and 2 tablespoons of dried fruit. These have about 15 grams of carbohydrate in a serving. ¨ Dairy: 1 cup of nonfat or low-fat milk and 2/3 cup of plain yogurt. These have about 15 grams of carbohydrate in a serving. ¨ Protein foods: Beef, chicken, turkey, fish, eggs, tofu, cheese, cottage cheese, and peanut butter. A serving size of meat is 3 ounces, which is about the size of a deck of cards. Examples of meat substitute serving sizes (equal to 1 ounce of meat) are 1/4 cup of cottage cheese, 1 egg, 1 tablespoon of peanut butter, and ½ cup of tofu. These have very little or no carbohydrate per serving. ¨ Vegetables: Starchy vegetables such as ½ cup of cooked dried beans, peas, potatoes, or corn have about 15 grams of carbohydrate. Nonstarchy vegetables have very little carbohydrate, such as 1 cup of raw leafy vegetables (such as spinach), ½ cup of other vegetables (cooked or chopped), and 3/4 cup of vegetable juice. · Use the plate format to plan meals. It is a good, quick way to make sure that you have a balanced meal. It also helps you spread carbohydrate throughout the day. You divide your plate by types of foods.  Put vegetables on half the plate, meat or meat substitutes on one-quarter of the plate, and a grain or starchy vegetable (such as brown rice or a potato) in the final quarter of the plate. To this you can add a small piece of fruit and 1 cup of milk or yogurt, depending on how much carbohydrate you are supposed to eat at a meal. 
· Talk to your dietitian or diabetes educator about ways to add limited amounts of sweets into your meal plan. You can eat these foods now and then, as long as you include the amount of carbohydrate they have in your daily carbohydrate allowance. · If you drink alcohol, limit it to no more than 1 drink a day for women and 2 drinks a day for men. If you are pregnant, no amount of alcohol is known to be safe. · Protein, fat, and fiber do not raise blood sugar as much as carbohydrate does. If you eat a lot of these nutrients in a meal, your blood sugar will rise more slowly than it would otherwise. · Limit saturated fats, such as those from meat and dairy products. Try to replace it with monounsaturated fat, such as olive oil. This is a healthier choice because people who have diabetes are at higher-than-average risk of heart disease. But use a modest amount of olive oil. A tablespoon of olive oil has 14 grams of fat and 120 calories. · Exercise lowers blood sugar. If you take insulin by shots or pump, you can use less than you would if you were not exercising. Keep in mind that timing matters. If you exercise within 1 hour after a meal, your body may need less insulin for that meal than it would if you exercised 3 hours after the meal. Test your blood sugar to find out how exercise affects your need for insulin. · Exercise on most days of the week. Aim for at least 30 minutes. Exercise helps you stay at a healthy weight and helps your body use insulin. Walking is an easy way to get exercise. Gradually increase the amount you walk every day. You also may want to swim, bike, or do other activities. When you eat out · Learn to estimate the serving sizes of foods that have carbohydrate. If you measure food at home, it will be easier to estimate the amount in a serving of restaurant food. · If the meal you order has too much carbohydrate (such as potatoes, corn, or baked beans), ask to have a low-carbohydrate food instead. Ask for a salad or green vegetables. · If you use insulin, check your blood sugar before and after eating out to help you plan how much to eat in the future. · If you eat more carbohydrate at a meal than you had planned, take a walk or do other exercise. This will help lower your blood sugar. Where can you learn more? Go to ComptTIA.be Enter N868 in the search box to learn more about \"Nutrition Tips for Diabetes: After Your Visit. \"  
© 8848-0975 [employer]. Care instructions adapted under license by Zulema Nguyen (which disclaims liability or warranty for this information). This care instruction is for use with your licensed healthcare professional. If you have questions about a medical condition or this instruction, always ask your healthcare professional. [address] any warranty or liability for your use of this information. Content Version: 59.4.619964; Current as of: June 4, 2014 Introducing [facility]! Dear Michael Meier: 
Thank you for requesting a [employer] account. Our records indicate that you have previously registered for a [employer] account but its currently inactive. Please call our [employer] support line at [phone]. Additional Information If you have questions, please visit the Frequently Asked Questions section of the [employer] website at [email or web address] Remember, [employer] is NOT to be used for urgent needs. For medical emergencies, dial 911. Now available from your iPhone and Android! Please provide this summary of care documentation to your next provider. Your primary care clinician is listed as Melinda Bennett. If you have any questions after today's visit, please call [phone].

## 2017-03-22 LAB
ALBUMIN SERPL-MCNC: 4.2 G/DL (ref 3.6–4.8)
ALBUMIN/GLOB SERPL: 1.9 {RATIO} (ref 1.2–2.2)
ALP SERPL-CCNC: 170 IU/L (ref 39–117)
ALT SERPL-CCNC: 33 IU/L (ref 0–32)
AST SERPL-CCNC: 34 IU/L (ref 0–40)
BILIRUB SERPL-MCNC: 0.5 MG/DL (ref 0–1.2)
BUN SERPL-MCNC: 18 MG/DL (ref 8–27)
BUN/CREAT SERPL: 36 (ref 11–26)
CALCIUM SERPL-MCNC: 9.6 MG/DL (ref 8.7–10.3)
CHLORIDE SERPL-SCNC: 100 MMOL/L (ref 96–106)
CO2 SERPL-SCNC: 25 MMOL/L (ref 18–29)
CREAT SERPL-MCNC: 0.5 MG/DL (ref 0.57–1)
EST. AVERAGE GLUCOSE BLD GHB EST-MCNC: 154 MG/DL
GLOBULIN SER CALC-MCNC: 2.2 G/DL (ref 1.5–4.5)
GLUCOSE SERPL-MCNC: 151 MG/DL (ref 65–99)
HBA1C MFR BLD: 7 % (ref 4.8–5.6)
HCV AB S/CO SERPL IA: 0.1 S/CO RATIO (ref 0–0.9)
POTASSIUM SERPL-SCNC: 5 MMOL/L (ref 3.5–5.2)
PROT SERPL-MCNC: 6.4 G/DL (ref 6–8.5)
SODIUM SERPL-SCNC: 141 MMOL/L (ref 134–144)

## 2017-04-01 ENCOUNTER — APPOINTMENT (OUTPATIENT)
Dept: CT IMAGING | Age: 67
DRG: 099 | End: 2017-04-01
Attending: FAMILY MEDICINE
Payer: MEDICARE

## 2017-04-01 ENCOUNTER — APPOINTMENT (OUTPATIENT)
Dept: MRI IMAGING | Age: 67
DRG: 099 | End: 2017-04-01
Attending: FAMILY MEDICINE
Payer: MEDICARE

## 2017-04-01 ENCOUNTER — HOSPITAL ENCOUNTER (INPATIENT)
Age: 67
LOS: 4 days | Discharge: NURSING, REHAB OR LONG-TERM CARE | DRG: 099 | End: 2017-04-06
Attending: EMERGENCY MEDICINE | Admitting: FAMILY MEDICINE
Payer: MEDICARE

## 2017-04-01 ENCOUNTER — APPOINTMENT (OUTPATIENT)
Dept: CT IMAGING | Age: 67
DRG: 099 | End: 2017-04-01
Attending: NURSE PRACTITIONER
Payer: MEDICARE

## 2017-04-01 DIAGNOSIS — R53.1 LEFT-SIDED WEAKNESS: ICD-10-CM

## 2017-04-01 DIAGNOSIS — R26.9 GAIT DISTURBANCE: Primary | ICD-10-CM

## 2017-04-01 LAB
ALBUMIN SERPL BCP-MCNC: 3.5 G/DL (ref 3.5–5)
ALBUMIN/GLOB SERPL: 1 {RATIO} (ref 1.1–2.2)
ALP SERPL-CCNC: 148 U/L (ref 45–117)
ALT SERPL-CCNC: 37 U/L (ref 12–78)
AMPHET UR QL SCN: NEGATIVE
ANION GAP BLD CALC-SCNC: 9 MMOL/L (ref 5–15)
APTT PPP: 28.2 SEC (ref 22.1–32.5)
AST SERPL W P-5'-P-CCNC: 32 U/L (ref 15–37)
BARBITURATES UR QL SCN: POSITIVE
BASOPHILS # BLD AUTO: 0 K/UL (ref 0–0.1)
BASOPHILS # BLD: 1 % (ref 0–1)
BENZODIAZ UR QL: NEGATIVE
BILIRUB SERPL-MCNC: 0.3 MG/DL (ref 0.2–1)
BUN SERPL-MCNC: 24 MG/DL (ref 6–20)
BUN/CREAT SERPL: 36 (ref 12–20)
CALCIUM SERPL-MCNC: 8.8 MG/DL (ref 8.5–10.1)
CANNABINOIDS UR QL SCN: NEGATIVE
CHLORIDE SERPL-SCNC: 105 MMOL/L (ref 97–108)
CHOLEST SERPL-MCNC: 179 MG/DL
CK SERPL-CCNC: 59 U/L (ref 26–192)
CO2 SERPL-SCNC: 28 MMOL/L (ref 21–32)
COCAINE UR QL SCN: NEGATIVE
CREAT SERPL-MCNC: 0.66 MG/DL (ref 0.55–1.02)
DRUG SCRN COMMENT,DRGCM: ABNORMAL
EOSINOPHIL # BLD: 0.2 K/UL (ref 0–0.4)
EOSINOPHIL NFR BLD: 5 % (ref 0–7)
ERYTHROCYTE [DISTWIDTH] IN BLOOD BY AUTOMATED COUNT: 16.1 % (ref 11.5–14.5)
GLOBULIN SER CALC-MCNC: 3.5 G/DL (ref 2–4)
GLUCOSE BLD STRIP.AUTO-MCNC: 130 MG/DL (ref 65–100)
GLUCOSE BLD STRIP.AUTO-MCNC: 177 MG/DL (ref 65–100)
GLUCOSE SERPL-MCNC: 185 MG/DL (ref 65–100)
HCT VFR BLD AUTO: 41.9 % (ref 35–47)
HDLC SERPL-MCNC: 72 MG/DL
HDLC SERPL: 2.5 {RATIO} (ref 0–5)
HGB BLD-MCNC: 12.7 G/DL (ref 11.5–16)
INR PPP: 1 (ref 0.9–1.1)
LDLC SERPL CALC-MCNC: 77 MG/DL (ref 0–100)
LIPID PROFILE,FLP: ABNORMAL
LYMPHOCYTES # BLD AUTO: 23 % (ref 12–49)
LYMPHOCYTES # BLD: 1.2 K/UL (ref 0.8–3.5)
MAGNESIUM SERPL-MCNC: 2.2 MG/DL (ref 1.6–2.4)
MCH RBC QN AUTO: 25.2 PG (ref 26–34)
MCHC RBC AUTO-ENTMCNC: 30.3 G/DL (ref 30–36.5)
MCV RBC AUTO: 83.1 FL (ref 80–99)
METHADONE UR QL: NEGATIVE
MONOCYTES # BLD: 0.3 K/UL (ref 0–1)
MONOCYTES NFR BLD AUTO: 7 % (ref 5–13)
NEUTS SEG # BLD: 3.2 K/UL (ref 1.8–8)
NEUTS SEG NFR BLD AUTO: 64 % (ref 32–75)
OPIATES UR QL: NEGATIVE
PCP UR QL: NEGATIVE
PHOSPHATE SERPL-MCNC: 4 MG/DL (ref 2.6–4.7)
PLATELET # BLD AUTO: 264 K/UL (ref 150–400)
POTASSIUM SERPL-SCNC: 4.2 MMOL/L (ref 3.5–5.1)
PROT SERPL-MCNC: 7 G/DL (ref 6.4–8.2)
PROTHROMBIN TIME: 10 SEC (ref 9–11.1)
RBC # BLD AUTO: 5.04 M/UL (ref 3.8–5.2)
SERVICE CMNT-IMP: ABNORMAL
SERVICE CMNT-IMP: ABNORMAL
SODIUM SERPL-SCNC: 142 MMOL/L (ref 136–145)
THERAPEUTIC RANGE,PTTT: NORMAL SECS (ref 58–77)
TRIGL SERPL-MCNC: 150 MG/DL (ref ?–150)
TROPONIN I SERPL-MCNC: <0.04 NG/ML
TROPONIN I SERPL-MCNC: <0.04 NG/ML
TSH SERPL DL<=0.05 MIU/L-ACNC: 2.29 UIU/ML (ref 0.36–3.74)
VLDLC SERPL CALC-MCNC: 30 MG/DL
WBC # BLD AUTO: 5 K/UL (ref 3.6–11)

## 2017-04-01 PROCEDURE — 80307 DRUG TEST PRSMV CHEM ANLYZR: CPT

## 2017-04-01 PROCEDURE — 74011250637 HC RX REV CODE- 250/637: Performed by: FAMILY MEDICINE

## 2017-04-01 PROCEDURE — 83036 HEMOGLOBIN GLYCOSYLATED A1C: CPT | Performed by: FAMILY MEDICINE

## 2017-04-01 PROCEDURE — 93005 ELECTROCARDIOGRAM TRACING: CPT

## 2017-04-01 PROCEDURE — 70496 CT ANGIOGRAPHY HEAD: CPT

## 2017-04-01 PROCEDURE — 84484 ASSAY OF TROPONIN QUANT: CPT | Performed by: NURSE PRACTITIONER

## 2017-04-01 PROCEDURE — 83735 ASSAY OF MAGNESIUM: CPT

## 2017-04-01 PROCEDURE — 99218 HC RM OBSERVATION: CPT

## 2017-04-01 PROCEDURE — 85610 PROTHROMBIN TIME: CPT | Performed by: NURSE PRACTITIONER

## 2017-04-01 PROCEDURE — 74011636320 HC RX REV CODE- 636/320: Performed by: FAMILY MEDICINE

## 2017-04-01 PROCEDURE — 99285 EMERGENCY DEPT VISIT HI MDM: CPT

## 2017-04-01 PROCEDURE — 84443 ASSAY THYROID STIM HORMONE: CPT

## 2017-04-01 PROCEDURE — 74011250636 HC RX REV CODE- 250/636: Performed by: NURSE PRACTITIONER

## 2017-04-01 PROCEDURE — 84100 ASSAY OF PHOSPHORUS: CPT

## 2017-04-01 PROCEDURE — 74011250636 HC RX REV CODE- 250/636: Performed by: FAMILY MEDICINE

## 2017-04-01 PROCEDURE — 96360 HYDRATION IV INFUSION INIT: CPT

## 2017-04-01 PROCEDURE — 80061 LIPID PANEL: CPT

## 2017-04-01 PROCEDURE — 80053 COMPREHEN METABOLIC PANEL: CPT | Performed by: NURSE PRACTITIONER

## 2017-04-01 PROCEDURE — 85730 THROMBOPLASTIN TIME PARTIAL: CPT | Performed by: NURSE PRACTITIONER

## 2017-04-01 PROCEDURE — 85025 COMPLETE CBC W/AUTO DIFF WBC: CPT | Performed by: NURSE PRACTITIONER

## 2017-04-01 PROCEDURE — 82962 GLUCOSE BLOOD TEST: CPT

## 2017-04-01 PROCEDURE — 36415 COLL VENOUS BLD VENIPUNCTURE: CPT | Performed by: NURSE PRACTITIONER

## 2017-04-01 PROCEDURE — 70450 CT HEAD/BRAIN W/O DYE: CPT

## 2017-04-01 PROCEDURE — A9585 GADOBUTROL INJECTION: HCPCS | Performed by: FAMILY MEDICINE

## 2017-04-01 PROCEDURE — 96361 HYDRATE IV INFUSION ADD-ON: CPT

## 2017-04-01 PROCEDURE — 82550 ASSAY OF CK (CPK): CPT

## 2017-04-01 PROCEDURE — 70553 MRI BRAIN STEM W/O & W/DYE: CPT

## 2017-04-01 RX ORDER — LORAZEPAM 1 MG/1
1 TABLET ORAL
Status: DISCONTINUED | OUTPATIENT
Start: 2017-04-01 | End: 2017-04-01

## 2017-04-01 RX ORDER — LANOLIN ALCOHOL/MO/W.PET/CERES
400 CREAM (GRAM) TOPICAL DAILY
Status: DISCONTINUED | OUTPATIENT
Start: 2017-04-01 | End: 2017-04-06 | Stop reason: HOSPADM

## 2017-04-01 RX ORDER — PRAVASTATIN SODIUM 20 MG/1
40 TABLET ORAL
Status: DISCONTINUED | OUTPATIENT
Start: 2017-04-01 | End: 2017-04-06 | Stop reason: HOSPADM

## 2017-04-01 RX ORDER — GLIMEPIRIDE 2 MG/1
2 TABLET ORAL 3 TIMES DAILY
COMMUNITY

## 2017-04-01 RX ORDER — CYCLOBENZAPRINE HCL 10 MG
5 TABLET ORAL
Status: DISCONTINUED | OUTPATIENT
Start: 2017-04-01 | End: 2017-04-03

## 2017-04-01 RX ORDER — GUAIFENESIN 100 MG/5ML
81 LIQUID (ML) ORAL DAILY
COMMUNITY
End: 2017-11-02

## 2017-04-01 RX ORDER — GLIMEPIRIDE 2 MG/1
2 TABLET ORAL
COMMUNITY
End: 2017-05-01

## 2017-04-01 RX ORDER — MAGNESIUM SULFATE 100 %
4 CRYSTALS MISCELLANEOUS AS NEEDED
Status: DISCONTINUED | OUTPATIENT
Start: 2017-04-01 | End: 2017-04-06 | Stop reason: HOSPADM

## 2017-04-01 RX ORDER — ACETAMINOPHEN 325 MG/1
650 TABLET ORAL
Status: DISCONTINUED | OUTPATIENT
Start: 2017-04-01 | End: 2017-04-06 | Stop reason: HOSPADM

## 2017-04-01 RX ORDER — DEXTROSE 50 % IN WATER (D50W) INTRAVENOUS SYRINGE
12.5-25 AS NEEDED
Status: DISCONTINUED | OUTPATIENT
Start: 2017-04-01 | End: 2017-04-06 | Stop reason: HOSPADM

## 2017-04-01 RX ORDER — SODIUM CHLORIDE 0.9 % (FLUSH) 0.9 %
5-10 SYRINGE (ML) INJECTION EVERY 8 HOURS
Status: DISCONTINUED | OUTPATIENT
Start: 2017-04-01 | End: 2017-04-06 | Stop reason: HOSPADM

## 2017-04-01 RX ORDER — INSULIN LISPRO 100 [IU]/ML
INJECTION, SOLUTION INTRAVENOUS; SUBCUTANEOUS
Status: DISCONTINUED | OUTPATIENT
Start: 2017-04-01 | End: 2017-04-06

## 2017-04-01 RX ORDER — GABAPENTIN 300 MG/1
600 CAPSULE ORAL 3 TIMES DAILY
COMMUNITY
End: 2019-06-18

## 2017-04-01 RX ORDER — ENOXAPARIN SODIUM 100 MG/ML
40 INJECTION SUBCUTANEOUS EVERY 24 HOURS
Status: DISCONTINUED | OUTPATIENT
Start: 2017-04-01 | End: 2017-04-02

## 2017-04-01 RX ORDER — LANOLIN ALCOHOL/MO/W.PET/CERES
400 CREAM (GRAM) TOPICAL DAILY
COMMUNITY
End: 2019-06-18

## 2017-04-01 RX ORDER — GUAIFENESIN 100 MG/5ML
81 LIQUID (ML) ORAL DAILY
Status: DISCONTINUED | OUTPATIENT
Start: 2017-04-01 | End: 2017-04-06 | Stop reason: HOSPADM

## 2017-04-01 RX ORDER — LORAZEPAM 2 MG/ML
1 INJECTION INTRAMUSCULAR
Status: DISCONTINUED | OUTPATIENT
Start: 2017-04-01 | End: 2017-04-06 | Stop reason: HOSPADM

## 2017-04-01 RX ORDER — GABAPENTIN 300 MG/1
600 CAPSULE ORAL DAILY
Status: DISCONTINUED | OUTPATIENT
Start: 2017-04-02 | End: 2017-04-06 | Stop reason: HOSPADM

## 2017-04-01 RX ORDER — GABAPENTIN 300 MG/1
300 CAPSULE ORAL EVERY EVENING
Status: DISCONTINUED | OUTPATIENT
Start: 2017-04-01 | End: 2017-04-06 | Stop reason: HOSPADM

## 2017-04-01 RX ORDER — FLUDROCORTISONE ACETATE 0.1 MG/1
0.1 TABLET ORAL
COMMUNITY
End: 2017-05-08

## 2017-04-01 RX ORDER — GABAPENTIN 300 MG/1
300 CAPSULE ORAL EVERY EVENING
COMMUNITY
End: 2017-05-08 | Stop reason: SDUPTHER

## 2017-04-01 RX ORDER — SODIUM CHLORIDE 0.9 % (FLUSH) 0.9 %
5-10 SYRINGE (ML) INJECTION AS NEEDED
Status: DISCONTINUED | OUTPATIENT
Start: 2017-04-01 | End: 2017-04-06 | Stop reason: HOSPADM

## 2017-04-01 RX ORDER — ACETAMINOPHEN 325 MG/1
650 TABLET ORAL
Status: COMPLETED | OUTPATIENT
Start: 2017-04-01 | End: 2017-04-01

## 2017-04-01 RX ADMIN — CYCLOBENZAPRINE HYDROCHLORIDE 5 MG: 10 TABLET, FILM COATED ORAL at 17:21

## 2017-04-01 RX ADMIN — PRAVASTATIN SODIUM 40 MG: 20 TABLET ORAL at 21:16

## 2017-04-01 RX ADMIN — ACETAMINOPHEN 650 MG: 325 TABLET ORAL at 17:21

## 2017-04-01 RX ADMIN — GADOBUTROL 8 ML: 604.72 INJECTION INTRAVENOUS at 20:39

## 2017-04-01 RX ADMIN — ENOXAPARIN SODIUM 40 MG: 100 INJECTION SUBCUTANEOUS at 16:42

## 2017-04-01 RX ADMIN — ASPIRIN 81 MG CHEWABLE TABLET 81 MG: 81 TABLET CHEWABLE at 16:42

## 2017-04-01 RX ADMIN — MAGNESIUM GLUCONATE 500 MG ORAL TABLET 400 MG: 500 TABLET ORAL at 16:42

## 2017-04-01 RX ADMIN — SODIUM CHLORIDE 1000 ML: 900 INJECTION, SOLUTION INTRAVENOUS at 12:38

## 2017-04-01 RX ADMIN — LORAZEPAM 1 MG: 2 INJECTION INTRAMUSCULAR; INTRAVENOUS at 19:56

## 2017-04-01 RX ADMIN — Medication 10 ML: at 21:16

## 2017-04-01 RX ADMIN — IOPAMIDOL 100 ML: 755 INJECTION, SOLUTION INTRAVENOUS at 15:54

## 2017-04-01 RX ADMIN — GABAPENTIN 300 MG: 300 CAPSULE ORAL at 17:22

## 2017-04-01 NOTE — PROGRESS NOTES
Patient stated that her entire right side went numb. Nurse went into room. Patient now agreeing to do MRI whereas she said she wanted to wait until tomorrow when asked earlier. Nurse called MRI. MRI on call technician will have to be called in for process due to time of day.

## 2017-04-01 NOTE — IP AVS SNAPSHOT
[address] [facility], [place], operated by [facility] 104 1007 [facility] 
[phone] Patient: Dominic Saunders MRN: [id number] CWU:[id number] You are allergic to the following Allergen Reactions Latex Other (comments) Patient states it burns around her mouth. Other Food Other (comments) Yogurt - stomach cramping Betadine (Povidone-Iodine) Itching Pt states this causes \"extreme\" itching Codeine Anaphylaxis Rash Nausea Only Other (comments) HEADACHE Tolerates tramadol Dilaudid (Hydromorphone (Bulk)) Anaphylaxis Itching Nausea Only Other (comments) HALLUCINATIONS Tolerates tramadol Hydrocodone Anaphylaxis Rash Nausea Only Vertigo Facial - eyelid swelling-had to go to ER for reaction, HALLUCINATIONS Tolerates tramadol Ditropan (Oxybutynin Chloride) Swelling Doxycycline Rash PUSTULAR RASH- TOLERATING TETRACYCLINE Keflex (Cephalexin) Nausea and Vomiting Pain in abdomen AND FLU-LIKE SX Metformin Nausea and Vomiting Severe abdominal pain Tape (Adhesive) Other (comments) Redness/inflammed. Can only use paper tape. CAN USE BAND-AIDS. TOLERATES SURGICAL TAPE USED IN [place] [facility]. Sulfamethoxazole-Trimethoprim Other (comments) Cramping/flu-like symptoms Recent Documentation Height Weight BMI OB Status Smoking Status 1.727 m 79.4 kg 26.61 kg/m2 Postmenopausal Never Smoker Unresulted Labs Order Current Status ANGIOTENSIN CONVERTING ENZYME, CSF In process LYME AB, IGG & IGM BY WB, CSF In process LYME AB, TOTAL, CSF In process MYELIN BASIC PROTEIN, CSF In process CULTURE, CSF W GRAM STAIN Preliminary result Emergency Contacts Name Discharge Info Relation Home Work [facility] CARE [facility] DISCHARGE CAREGIVER [3] Spouse [3] [phone] About your hospitalization You were admitted on:  April 1, 2017 You last received care in the:  [facility]  MED SURG 2 You were discharged on:  April 6, 2017 Unit phone number:  [phone] Why you were hospitalized Your primary diagnosis was:  Left-Sided Weakness Your diagnoses also included:  Diabetes Mellitus With Neuropathy (Hcc), Fibromyalgia, Htn (Hypertension), Breast Cancer (Hcc), Acute Transverse Myelitis (Hcc), Abnormal Mri, Cervical Spine Providers Seen During Your Hospitalizations Provider Role Specialty Primary office phone Gertrude Walls DO Attending Provider Emergency Medicine [phone] Foreign Lambert DO Attending Provider [facility] [phone] Jeanne Michel MD Attending Provider Family Practice [phone] Your Primary Care Physician (PCP) Primary Care Physician Office Phone Office Fax Irene Sophia [phone] Follow-up Information Follow up With Details Comments Contact Info Anand Mcfadden DO Go to once discharged from rehab [address] [address] [place] [address] 
[phone] Garcia Overton NP Go on 4/13/2017 Neurology follow-up appt:  to review lab, arrive at 1030am for 11am appt [place] [address] [address] [facility] [address] [address] 
[phone] Your Appointments Thursday April 13, 2017 11:00 AM EDT  
CONSULT with Garcia Overton NP Neurology [place] [address]) [place] [address] [facility] [address] [address] [phone] Thursday April 27, 2017  9:10 AM EDT  
ESTABLISHED PATIENT with Kamille Muse MD  
[facility] ([address]) [address] [facility]  
[phone] Current Discharge Medication List  
  
START taking these medications Dose & Instructions Dispensing Information Comments Morning Noon Evening Bedtime baclofen 10 mg tablet Commonly known as:  LIORESAL Your last dose was: Your next dose is:    
   
   
 Dose:  10 mg Take 1 Tab by mouth three (3) times daily as needed. Indications: spasticity and cramping Quantity:  30 Tab Refills:  0  
     
   
   
   
  
 * methylPREDNISolone 8 mg tablet Commonly known as:  MEDROL Start taking on:  4/7/2017 Your last dose was: Your next dose is:    
   
   
 Dose:  8 mg Take 1 Tab by mouth once for 1 dose. Due on 4/7/17 at 8AM  
 Quantity:  1 Tab Refills:  0  
     
   
   
   
  
 * methylPREDNISolone 4 mg Tab Commonly known as:  MEDROL Start taking on:  4/7/2017 Your last dose was: Your next dose is:    
   
   
 Dose:  4 mg Take 1 Tab by mouth once for 1 dose. Due on 4/7/17 at 12PM  
 Quantity:  1 Tab Refills:  0  
     
   
   
   
  
 * methylPREDNISolone 4 mg Tab Commonly known as:  MEDROL Start taking on:  4/7/2017 Your last dose was: Your next dose is:    
   
   
 Dose:  4 mg Take 1 Tab by mouth once for 1 dose. Due on 4/7/17 at  5PM  
 Quantity:  1 Tab Refills:  0  
     
   
   
   
  
 * methylPREDNISolone 8 mg tablet Commonly known as:  MEDROL Start taking on:  4/7/2017 Your last dose was: Your next dose is:    
   
   
 Dose:  8 mg Take 1 Tab by mouth once for 1 dose. Due on 4/7/17 at 8pm  
 Quantity:  1 Tab Refills:  0  
     
   
   
   
  
 * methylPREDNISolone 4 mg Tab Commonly known as:  MEDROL Start taking on:  4/8/2017 Your last dose was: Your next dose is:    
   
   
 Dose:  4 mg Take 1 Tab by mouth three (3) times daily (with meals). Due on 4/8/17 TID with meals. Quantity:  3 Tab Refills:  0  
     
   
   
   
  
 * methylPREDNISolone 8 mg tablet Commonly known as:  MEDROL Start taking on:  4/8/2017 Your last dose was: Your next dose is:    
   
   
 Dose:  8 mg Take 1 Tab by mouth once for 1 dose. Due on 4/8/17 at 8PM  
 Quantity:  1 Tab Refills:  0  
     
   
   
   
  
 * methylPREDNISolone 4 mg Tab Commonly known as:  MEDROL Start taking on:  4/9/2017 Your last dose was: Your next dose is:    
   
   
 Dose:  4 mg Take 1 Tab by mouth four (4) times daily (with meals). Due on 4/9/17 QID with meals Quantity:  4 Tab Refills:  0  
     
   
   
   
  
 * methylPREDNISolone 4 mg Tab Commonly known as:  MEDROL Start taking on:  4/10/2017 Your last dose was: Your next dose is:    
   
   
 Dose:  4 mg Take 1 Tab by mouth three (3) times daily (with meals). Due on 4/10/17 TID with meals Quantity:  3 Tab Refills:  0  
     
   
   
   
  
 * methylPREDNISolone 4 mg Tab Commonly known as:  MEDROL Start taking on:  4/11/2017 Your last dose was: Your next dose is:    
   
   
 Dose:  4 mg Take 1 Tab by mouth ACB/HS. Due on 4/11/17 BID Quantity:  2 Tab Refills:  0  
     
   
   
   
  
 * methylPREDNISolone 4 mg Tab Commonly known as:  MEDROL Start taking on:  4/12/2017 Your last dose was: Your next dose is:    
   
   
 Dose:  4 mg Take 1 Tab by mouth Daily (before breakfast). Due on 4/12/17 Quantity:  1 Tab Refills:  0  
     
   
   
   
  
 * Notice: This list has 10 medication(s) that are the same as other medications prescribed for you. Read the directions carefully, and ask your doctor or other care provider to review them with you. CONTINUE these medications which have NOT CHANGED Dose & Instructions Dispensing Information Comments Morning Noon Evening Bedtime Alpha Lipoic Acid 600 mg Cap Your last dose was: Your next dose is:    
   
   
 Dose:  1 Cap Take 1 Cap by mouth two (2) times a day. Refills:  0  
     
   
   
   
  
 amLODIPine 5 mg tablet Commonly known as:  Sudarshan [place] Your last dose was: Your next dose is: Dose:  5 mg Take 1 Tab by mouth nightly. Indications: hypertension Quantity:  30 Tab Refills:  2  
     
   
   
   
  
 aspirin 81 mg chewable tablet Your last dose was: Your next dose is:    
   
   
 Dose:  81 mg Take 81 mg by mouth daily. Refills:  0 CINNAMON 500 mg Cap Generic drug:  cinnamon bark Your last dose was: Your next dose is:    
   
   
 Dose:  2 Cap Take 2 Caps by mouth daily. Refills:  0 CLARITIN 10 mg tablet Generic drug:  loratadine Your last dose was: Your next dose is:    
   
   
 Dose:  10 mg Take 10 mg by mouth nightly. Refills:  0 CRANBERRY EXTRACT-VIT C PO Your last dose was: Your next dose is:    
   
   
 Dose:  3 Cap Take 3 Caps by mouth daily. 4200 mg Refills:  0  
     
   
   
   
  
 fluticasone 50 mcg/actuation nasal spray Commonly known as:  Guss Bumpers Your last dose was: Your next dose is:    
   
   
 Dose:  2 Spray 2 Sprays by Both Nostrils route daily. Quantity:  1 Bottle Refills:  2  
     
   
   
   
  
 * gabapentin 300 mg capsule Commonly known as:  NEURONTIN Your last dose was: Your next dose is:    
   
   
 Dose:  600 mg Take 600 mg by mouth daily. Refills:  0  
     
   
   
   
  
 * gabapentin 300 mg capsule Commonly known as:  NEURONTIN Your last dose was: Your next dose is:    
   
   
 Dose:  300 mg Take 300 mg by mouth every evening. Refills:  0 GARCINIA CAMBOGIA PO Your last dose was: Your next dose is:    
   
   
 Dose:  2 Tab Take 2 Tabs by mouth daily. Refills:  0  
     
   
   
   
  
 * glimepiride 2 mg tablet Commonly known as:  AMARYL Your last dose was: Your next dose is:    
   
   
 Dose:  2 mg Take 2 mg by mouth daily (with breakfast). Refills:  0 * glimepiride 2 mg tablet Commonly known as:  AMARYL Your last dose was: Your next dose is:    
   
   
 Dose:  4 mg Take 4 mg by mouth daily (with dinner). Refills:  0 INVOKANA 100 mg tablet Generic drug:  canagliflozin Your last dose was: Your next dose is: Take 1 tablet by mouth  daily before breakfast  
 Quantity:  90 Tab Refills:  1 L-Mfolate-B6 Phos-Methyl-B12 3-35-2 mg Tab tab Commonly known as:  Tammie Osgood Your last dose was: Your next dose is:    
   
   
 Dose:  1 Tab Take 1 Tab by mouth two (2) times a day. Refills:  0  
     
   
   
   
  
 lisinopril 5 mg tablet Commonly known as:  Alonso Blas Your last dose was: Your next dose is:    
   
   
 Dose:  5 mg Take 1 Tab by mouth daily. Quantity:  90 Tab Refills:  3  
     
   
   
   
  
 magnesium oxide 400 mg tablet Commonly known as:  MAG-OX Your last dose was: Your next dose is:    
   
   
 Dose:  400 mg Take 400 mg by mouth daily. Refills:  0  
     
   
   
   
  
 pravastatin 40 mg tablet Commonly known as:  PRAVACHOL Your last dose was: Your next dose is: TAKE 1 TABLET NIGHTLY (NEEDS APPOINTMENT AS SOON AS POSSIBLE FOR FASTING LABS AND APPOINTMENT) Quantity:  30 Tab Refills:  0 SITagliptin 100 mg tablet Commonly known as:  Rosezena Au Your last dose was: Your next dose is:    
   
   
 Dose:  100 mg Take 100 mg by mouth Daily (before breakfast). Refills:  0  
     
   
   
   
  
 TRUETEST TEST STRIPS strip Generic drug:  glucose blood VI test strips Your last dose was: Your next dose is:    
   
   
 TEST THREE TIMES DAILY Quantity:  100 Strip Refills:  1  
     
   
   
   
  
 * Notice:   This list has 4 medication(s) that are the same as other medications prescribed for you. Read the directions carefully, and ask your doctor or other care provider to review them with you. ASK your doctor about these medications Dose & Instructions Dispensing Information Comments Morning Noon Evening Bedtime * fludrocortisone 0.1 mg tablet Commonly known as:  FLORINEF Ask about: Which instructions should I use? Your last dose was: Your next dose is:    
   
   
 Dose:  0.1 mg Take 0.1 mg by mouth Daily (before breakfast). Refills:  0  
     
   
   
   
  
 * fludrocortisone 0.1 mg tablet Commonly known as:  FLORINEF Ask about: Which instructions should I use? Your last dose was: Your next dose is: TAKE 1 TABLET BY MOUTH DAILY Quantity:  30 Tab Refills:  0  
     
   
   
   
  
 * Notice: This list has 2 medication(s) that are the same as other medications prescribed for you. Read the directions carefully, and ask your doctor or other care provider to review them with you. Where to Get Your Medications These medications were sent to [facility] [address] [place], [place] [address]y [address] [facility]  [address] [address] Hours:  24-hours Phone:  [phone]  
  fludrocortisone 0.1 mg tablet  
 methylPREDNISolone 4 mg Tab  
 methylPREDNISolone 8 mg tablet  
 methylPREDNISolone 8 mg tablet  
 methylPREDNISolone 8 mg tablet Information on where to get these meds will be given to you by the nurse or doctor. ! Ask your nurse or doctor about these medications  
  baclofen 10 mg tablet Discharge Instructions INPATIENT REHAB DISCHARGE INSTRUCTIONS Issa Cool / [id number] : 1950 Admission date: 4/1/2017 Discharge date: 4/6/2017 Primary care provider: Enriqueta Ocampo DO Discharging provider:  Hudson Patel MD  - Family Medicine Resident Sammie Mena MD - Attending, Family Medicine Toney Leyva . . . . . . . . . . . . . . . . . . . . . . . . . . . . . . . . . . . . . . . . . . . . . . . . . . . . . . . . . . . . . . . . . . . . . . Toney Leyva FINAL DIAGNOSES & HOSPITAL COURSE: 
 
Lito Smith is a 78 y.o. female with hx of breast cancer, DM with peripheral neuropathy, fibromyalgia, and HTN who was admitted for Stroke/TIA rule out and now with transverse myelitis.  
   
Left sided Hemiparesis with Parasthesias- Working diagnosis of transverse myelitis based on MRI of cervical spine. Received tx with Medrol 1gm IV for 5 days. Several CSF studies still pending; CSF culture NG x 2d. Improvement in some symptoms-regaining ROM in left fingers and toes. - Discharging to inpatient rehab at [address] [place] [address]. - Medrol taper: total of 24mg on 4/7; total of 22mg on 4/8; total of 16mg 4/9; total of 12mg on 4/10; total of 8mg on 4/11; total of 4mg on 4/12.  
- F/U CSF studies(lyme, myelin basic protein, final culture result).  
  
Diabetes Mellitus T2 w/ Peripheral Neuropathy: Last HgA1c 7.0 (3/2017). Hyperglycemia 2/2 steroids. Glucose not under control: -300. Insulin Naive. On home Januvia, glimipiride, invokana. - Can restart home doses of Januvia, glimipirde and invokana - Will likely need SSI while in steroid taper but anticipate glucoses to improve as steroid dose decreases - Continue home dose of gabapentin.  
  
Muscle Spasms - Acute on chronic issue.  
- Baclofen 10mg TID  
   
Hypertension: BP today 153/76. Appears labile.  
- Continue Norvasc and lisinopril - Will continue to monitor at this time and readjust as BP's trend. 
   
Hyperlipidemia - last lipid panel (12/2016): cholesterol 179, , HDL 72, LDL 77.  
-Continue home pravastatin.   
 
FOLLOW-UP CARE RECOMMENDATIONS: 
 Follow-up Information Follow up With Details Comments Contact Info Regan Mcfadden,  Go to once discharged from rehab [address] [address] [address] [address] 
[phone] Steven Shepherd NP Go on 4/13/2017 Neurology follow-up appt:  to review lab, arrive at 1030am for 11am appt [address] [facility] [address] [phone] It is very important that you keep follow-up appointment(s). Bring discharge papers, medication list (and/or medication bottles) to follow-up appointments for review by outpatient provider(s). FOLLOW-UP TESTS RECOMMENDED:  
· To be determined ONGOING TREATMENT PLAN: As stated above PENDING TEST RESULTS: 
At the time of discharge the following test results are still pending: Final CSF culture, myelin basic protein, CSF lyme Please review these results as they become available. Specific symptoms to watch for: chest pain, shortness of breath, fever, chills, nausea, vomiting, diarrhea, change in mentation, falling, weakness, bleeding. DIET:  {diet:20389} ACTIVITY:  {discharge activity:57311} WOUND CARE: *** {wound care:71746} EQUIPMENT needed:  *** INCIDENTAL FINDINGS:  *** GOALS OF CARE: 
  Eventual return to home/independent/assisted living Long term SNF Hospice No rehospitalization Patient condition at discharge:  
Functional status Poor Deconditioned Independent Cognition Ebbie Drew Forgetful (some sensescence) Dementia Catheters/lines (plus indication) Nguyen PICC   
  PEG Code status Full code DNR Ryan Connell . . . . . . . . . . . . . . . . . . . . . . . . . . . . . . . . . . . . . . . . . . . . . . . . . . . . . . . . . . . . . . . . . . . . . . Ryan Connell CHRONIC MEDICAL CONDITIONS: 
Problem List as of 4/6/2017  Date Reviewed: 4/3/2017 Codes Class Noted - Resolved Abnormal MRI, cervical spine ICD-10-CM: R93.7 ICD-9-CM: 793.7  4/3/2017 - Present * (Principal)Left-sided weakness ICD-10-CM: R53.1 ICD-9-CM: 728.87  4/2/2017 - Present Acute transverse myelitis ([facility].) ICD-10-CM: G37.3 ICD-9-CM: 341.20  4/2/2017 - Present ACP (advance care planning) ICD-10-CM: Z71.89 ICD-9-CM: V65.49  12/14/2016 - Present Overview Signed 12/14/2016  8:08 AM by Yash Mulligan DO  
  discussed Scoliosis ICD-10-CM: M41.9 ICD-9-CM: 737.30  10/5/2016 - Present Breast cancer ([facility] 75.) ICD-10-CM: C50.919 ICD-9-CM: 174.9  3/12/2015 - Present HTN (hypertension) ICD-10-CM: I10 
ICD-9-CM: 401.9  9/15/2014 - Present Fibromyalgia ICD-10-CM: M79.7 ICD-9-CM: 729.1  4/21/2014 - Present Postmenopausal bleeding ICD-10-CM: N95.0 ICD-9-CM: 627.1  4/9/2014 - Present Diabetes mellitus with neuropathy (HCC) ICD-10-CM: E11.40 ICD-9-CM: 250.60, 357.2  3/17/2014 - Present Information obtained by :  
I understand that if any problems occur once I am at home I am to contact my physician. I understand and acknowledge receipt of the instructions indicated above. Physician's or R.N.'s Signature                                                                  Date/Time Patient or Representative Signature                                                          Date/Time Discharge Orders None [facility] Announcement We are excited to announce that we are making your provider's discharge notes available to you in weave energyt. You will see these notes when they are completed and signed by the physician that discharged you from your recent hospital stay.   If you have any questions or concerns about any information you see in [employer], please call the Health Information Department where you were seen or reach out to your Primary Care Provider for more information about your plan of care. Introducing [facility]! Dear Charleen Montgomery: 
Thank you for requesting a Qlika account. Our records indicate that you have previously registered for a Qlika account but its currently inactive. Please call our Qlika support line at [phone]. Additional Information If you have questions, please visit the Frequently Asked Questions section of the Qlika website at [email or web address] Remember, Qlika is NOT to be used for urgent needs. For medical emergencies, dial 911. Now available from your iPhone and Android! General Information Please provide this summary of care documentation to your next provider. Patient Signature:  ____________________________________________________________ Date:  ____________________________________________________________  
  
Krystal Sawant Provider Signature:  ____________________________________________________________ Date:  ____________________________________________________________

## 2017-04-01 NOTE — ED TRIAGE NOTES
Pt states she thinks she may have overworked herself yesterday. Pt was doing overhead work and she \" is not supposed to do that\". Pt states she started feeling sore and weakness in her left arm yesterday around 5 pm. Pt reports she took 2 butabital in a 2 hour period last night. A couple of hours later (1 am) she got up to go to bathroom and was staggering. Pt states she still feels like she is staggering. Pt also reports that she noted her speech to be slowed this am when she woke up.

## 2017-04-01 NOTE — H&P
[address] [facility] [address] [place] [address]   Office [phone]  Fax [phone]       Admission H&P     Name: Carmela Avendano MRN: [id number]  Sex: Female   YOB: 1950  Age: 78 y.o. PCP: Devika Deras DO     Source of Information: patient, medical records    Chief complaint: left-sided pain, weakness. History of Present Illness  Carmela Avendano is a 78 y.o. female with known hx of breast cancer, DM with peripheral neuropathy, fibromyalgia, and HTN who presents to the ER complaining of left-sided pain and weakness. The patient reports that she had a migraine last evening (not unusual for her). She took her \"usual\" dose of butabital (thought this medicine is not listed in her PTA med list), and did not find any relief with it. She then took a second dose of butabital within a 2 hour period of taking the previous dose. She went to bed at this point and woke up approximately 4 hours later. When she awakened, she noted that she was very unstable on her feet and could not use her left side. She was forced to use an old walker in order to get to the bathroom. She also noted at the time that her speech was markedly slowed (though she did not report any issue with her enunciation). Since this event, she has had increasing left-sided weakness and pain in her left shoulder that is shooting down her arm. The patient denies any nausea/vomitting, chest pain, shortness of breath. In the ER, vital signs were remarkable for elevated BP (163/58). Labs were remarkable for elevated blood glucose of 185. CT showed no acute process. Pt was treated with 1L NS bolus. She passed her bedside dysphagia screen. Home Medications   Prior to Admission medications    Medication Sig Start Date End Date Taking? Authorizing Provider   aspirin 81 mg chewable tablet Take 81 mg by mouth daily. Yes Historical Provider   gabapentin (NEURONTIN) 300 mg capsule Take 600 mg by mouth daily.    Yes Historical Provider   gabapentin (NEURONTIN) 300 mg capsule Take 300 mg by mouth every evening. Yes Historical Provider   glimepiride (AMARYL) 2 mg tablet Take 2 mg by mouth daily (with breakfast). Yes Historical Provider   glimepiride (AMARYL) 2 mg tablet Take 4 mg by mouth daily (with dinner). Yes Historical Provider   fludrocortisone (FLORINEF) 0.1 mg tablet Take 0.1 mg by mouth Daily (before breakfast). Yes Historical Provider   magnesium oxide (MAG-OX) 400 mg tablet Take 400 mg by mouth daily. Yes Historical Provider   SITagliptin (JANUVIA) 100 mg tablet Take 100 mg by mouth Daily (before breakfast). Yes Historical Provider   fluticasone (FLONASE) 50 mcg/actuation nasal spray 2 Sprays by Both Nostrils route daily. 3/2/17  Yes Kasia Dumas NP   amLODIPine (NORVASC) 5 mg tablet Take 1 Tab by mouth nightly. Indications: hypertension 3/2/17  Yes Kasia Dumas NP   INVOKANA 100 mg tablet Take 1 tablet by mouth  daily before breakfast 3/1/17  Yes Jennifer Mcfadden,    lisinopril (PRINIVIL, ZESTRIL) 5 mg tablet Take 1 Tab by mouth daily. 1/10/17  Yes Jennifer Mcfadden, DO   Alpha Lipoic Acid 600 mg cap Take 1 Cap by mouth two (2) times a day. Yes Historical Provider   L-Mfolate-B6 Phos-Methyl-B12 (METANX) 3-35-2 mg tab tab Take 1 Tab by mouth two (2) times a day. Yes Historical Provider   CHROM GENO/BRINDAL BERRY (GARCINIA CAM[place]IA PO) Take 2 Tabs by mouth daily. Yes Historical Provider   pravastatin (PRAVACHOL) 40 mg tablet TAKE 1 TABLET NIGHTLY (NEEDS APPOINTMENT AS SOON AS POSSIBLE FOR FASTING LABS AND APPOINTMENT) 7/2/15  Yes Benja Currie MD   CRANBERRY EXTRACT-VIT C PO Take 3 Caps by mouth daily. 4200 mg   Yes Historical Provider   cinnamon bark (CINNAMON) 500 mg cap Take 2 Caps by mouth daily. Yes Historical Provider   loratadine (CLARITIN) 10 mg tablet Take 10 mg by mouth nightly.    Yes Historical Provider   TRUETEST TEST STRIPS strip TEST THREE TIMES DAILY 3/31/14   Benja Currie MD Allergies  Allergies   Allergen Reactions    Latex Other (comments)     Patient states it burns around her mouth.  Other Food Other (comments)     Yogurt - stomach cramping    Betadine [Povidone-Iodine] Itching     Pt states this causes \"extreme\" itching    Codeine Anaphylaxis, Rash, Nausea Only and Other (comments)     HEADACHE  Tolerates tramadol    Dilaudid [Hydromorphone (Bulk)] Anaphylaxis, Itching, Nausea Only and Other (comments)     HALLUCINATIONS  Tolerates tramadol      Hydrocodone Anaphylaxis, Rash, Nausea Only and Vertigo     Facial - eyelid swelling-had to go to ER for reaction, HALLUCINATIONS  Tolerates tramadol      Ditropan [Oxybutynin Chloride] Swelling    Doxycycline Rash     PUSTULAR RASH- TOLERATING TETRACYCLINE    Keflex [Cephalexin] Nausea and Vomiting     Pain in abdomen AND FLU-LIKE SX      Metformin Nausea and Vomiting     Severe abdominal pain      Tape [Adhesive] Other (comments)     Redness/inflammed. Can only use paper tape. CAN USE BAND-AIDS. TOLERATES SURGICAL TAPE USED IN [place] [facility].      Sulfamethoxazole-Trimethoprim Other (comments)     Cramping/flu-like symptoms       Past Medical History:   Diagnosis Date    Abnormal pap 3/2015; 5/2016    2015 Neg pap +HPV; 2016 ASCUS +HPV    Arthritis     osteo-tests for RA were neg    Breast cancer ([address] 1996    right    Chronic pain     Diabetes ([address]     Fibromyalgia     Ganglion cyst of wrist     LEFT    Hypercholesterolemia     Hypertension     Murmur, cardiac     Neuropathy     Rosacea     Unspecified adverse effect of anesthesia     \"SLOW TO WAKE UP, SENSITIVE TO ANESTHESIA, DO NOT COME AROUND FOR 2-3 DAYS\"          Past Surgical History:   Procedure Laterality Date    HX CATARACT REMOVAL Bilateral 2014    HX CERVICAL FUSION      HX COLPOSCOPY  5/2016    Neg path    HX MASTECTOMY  12/96    tram flap    HX ORTHOPAEDIC Left 1990's    foot surgery    HX TONSILLECTOMY      [facility] portacath left chest-removed after chemo    RECONSTR NOSE  11/2013, 2005    HOLE IN SEPTUM       Family History   Problem Relation Age of Onset    Heart Disease Mother     Hypertension Mother     COPD Mother     Diabetes Father     Other Son      INOPERABLE BRAIN TUMOR    Gout Son     Celiac Disease Son     Anesth Problems Neg Hx        Social History  Social History     Social History    Marital status:      Spouse name: N/A    Number of children: N/A    Years of education: N/A     Occupational History    Not on file. Social History Main Topics    Smoking status: Never Smoker    Smokeless tobacco: Never Used    Alcohol use No    Drug use: No    Sexual activity: Yes     Other Topics Concern    Not on file     Social History Narrative       Alcohol history: Not at all  Smoking history: Non-smoker  Illicit drug history: Not at all    Living arrangement: patient lives with their spouse. Ambulates: Independently  at baseline--has required a walker since the onset of her symptoms. Review of Systems  Review of Systems   Constitutional: Positive for activity change. Negative for chills and fever. HENT: Negative for congestion. Respiratory: Negative for apnea, chest tightness and shortness of breath. Cardiovascular: Negative for chest pain and palpitations. Gastrointestinal: Negative for abdominal pain, constipation, diarrhea, nausea and vomiting. Genitourinary: Negative for dysuria. Musculoskeletal: Positive for arthralgias (all joints on left side.) and myalgias (left arm and lower extremity. ). Neurological: Positive for speech difficulty (slow speech.), light-headedness, numbness and headaches. Negative for dizziness, tremors, seizures, syncope, facial asymmetry and weakness. Psychiatric/Behavioral: Negative for agitation, confusion and suicidal ideas. All other systems reviewed and are negative.       Physical Exam  Objective:  General Appearance:  Uncomfortable, in no acute distress and in pain (Patient laying on side on ED stretcher. ). Vital signs: (most recent): Blood pressure 148/61, pulse 75, temperature 98 °F (36.7 °C), resp. rate 14, height 5' 8\" (1.727 m), weight 175 lb 14.8 oz (79.8 kg), SpO2 100 %. No fever. HEENT: Normal HEENT exam.    Lungs:  Normal respiratory rate and normal effort. She is not in respiratory distress. Breath sounds clear to auscultation. No stridor. No wheezes, rales, rhonchi or decreased breath sounds. Heart: Normal rate. Regular rhythm. S1 normal and S2 normal.  No murmur, gallop or friction rub. Extremities: Decreased range of motion. There is no dependent edema. (Decreased ROM from previous back surgery.)  Neurological: Patient is alert and oriented to person, place and time. (Questionable exam.  Decreased strength in LUE and LLE, but waxing and waning. Able to do several tasks when distracted, but when examined--weaknesses are present (though variable when repeated). Full sensation in all extremities. CN II-XII intact. ). Skin:  Warm and dry. Abdomen: Abdomen is soft. Bowel sounds are normal.   There is no abdominal tenderness. Pupils:  Pupils are equal, round, and reactive to light. (Some nystagmus noted on examination of right eye. Fundoscopic exam reveals no AV nicking, torturous vessels. ).         O2 Device: Room air     Laboratory Data  Recent Results (from the past 8 hour(s))   GLUCOSE, POC    Collection Time: 04/01/17 11:27 AM   Result Value Ref Range    Glucose (POC) 177 (H) 65 - 100 mg/dL    Performed by BRUCE HEARN    CBC WITH AUTOMATED DIFF    Collection Time: 04/01/17 11:41 AM   Result Value Ref Range    WBC 5.0 3.6 - 11.0 K/uL    RBC 5.04 3.80 - 5.20 M/uL    HGB 12.7 11.5 - 16.0 g/dL    HCT 41.9 35.0 - 47.0 %    MCV 83.1 80.0 - 99.0 FL    MCH 25.2 (L) 26.0 - 34.0 PG    MCHC 30.3 30.0 - 36.5 g/dL    RDW 16.1 (H) 11.5 - 14.5 %    PLATELET 826 786 - 756 K/uL    NEUTROPHILS 64 32 - 75 % LYMPHOCYTES 23 12 - 49 %    MONOCYTES 7 5 - 13 %    EOSINOPHILS 5 0 - 7 %    BASOPHILS 1 0 - 1 %    ABS. NEUTROPHILS 3.2 1.8 - 8.0 K/UL    ABS. LYMPHOCYTES 1.2 0.8 - 3.5 K/UL    ABS. MONOCYTES 0.3 0.0 - 1.0 K/UL    ABS. EOSINOPHILS 0.2 0.0 - 0.4 K/UL    ABS. BASOPHILS 0.0 0.0 - 0.1 K/UL   METABOLIC PANEL, COMPREHENSIVE    Collection Time: 04/01/17 11:41 AM   Result Value Ref Range    Sodium 142 136 - 145 mmol/L    Potassium 4.2 3.5 - 5.1 mmol/L    Chloride 105 97 - 108 mmol/L    CO2 28 21 - 32 mmol/L    Anion gap 9 5 - 15 mmol/L    Glucose 185 (H) 65 - 100 mg/dL    BUN 24 (H) 6 - 20 MG/DL    Creatinine 0.66 0.55 - 1.02 MG/DL    BUN/Creatinine ratio 36 (H) 12 - 20      GFR est AA >60 >60 ml/min/1.73m2    GFR est non-AA >60 >60 ml/min/1.73m2    Calcium 8.8 8.5 - 10.1 MG/DL    Bilirubin, total 0.3 0.2 - 1.0 MG/DL    ALT (SGPT) 37 12 - 78 U/L    AST (SGOT) 32 15 - 37 U/L    Alk.  phosphatase 148 (H) 45 - 117 U/L    Protein, total 7.0 6.4 - 8.2 g/dL    Albumin 3.5 3.5 - 5.0 g/dL    Globulin 3.5 2.0 - 4.0 g/dL    A-G Ratio 1.0 (L) 1.1 - 2.2     PROTHROMBIN TIME + INR    Collection Time: 04/01/17 11:41 AM   Result Value Ref Range    INR 1.0 0.9 - 1.1      Prothrombin time 10.0 9.0 - 11.1 sec   PTT    Collection Time: 04/01/17 11:41 AM   Result Value Ref Range    aPTT 28.2 22.1 - 32.5 sec    aPTT, therapeutic range     58.0 - 77.0 SECS   TROPONIN I    Collection Time: 04/01/17 11:41 AM   Result Value Ref Range    Troponin-I, Qt. <0.04 <0.05 ng/mL   EKG, 12 LEAD, INITIAL    Collection Time: 04/01/17 12:18 PM   Result Value Ref Range    Ventricular Rate 61 BPM    Atrial Rate 61 BPM    P-R Interval 152 ms    QRS Duration 96 ms    Q-T Interval 436 ms    QTC Calculation (Bezet) 438 ms    Calculated P Axis 42 degrees    Calculated R Axis -18 degrees    Calculated T Axis 28 degrees    Diagnosis       Normal sinus rhythm  Moderate voltage criteria for LVH, may be normal variant  Borderline ECG  When compared with ECG of 03-JUN-2015 12:36,  premature ventricular complexes are no longer present  Minimal criteria for Septal infarct are no longer present  Nonspecific T wave abnormality no longer evident in Lateral leads         Imaging  CXR Results  (Last 48 hours)    None        CT Results  (Last 48 hours)               04/01/17 1308  CT HEAD WO CONT Final result    Impression:  IMPRESSION: No acute intracranial abnormality. Narrative:  EXAM:  CT HEAD WO CONT       INDICATION:   left sided weakness. History of breast cancer. COMPARISON: None. TECHNIQUE: Unenhanced CT of the head was performed using 5 mm images. Brain and   bone windows were generated. CT dose reduction was achieved through use of a   standardized protocol tailored for this examination and automatic exposure   control for dose modulation. FINDINGS:   The ventricles and sulci are normal in size, shape and configuration and   midline. There is no significant white matter disease. There is no intracranial   hemorrhage, extra-axial collection, mass, mass effect or midline shift. The   basilar cisterns are open. No acute infarct is identified. The bone windows   demonstrate no abnormalities. The visualized portions of the paranasal sinuses   and mastoid air cells are clear. EKG:  NSR, ventricular rate is 61. Assessment and Plan     Issa Cool is a 78 y.o. female with hx of breast cancer, DM with peripheral neuropathy, fibromyalgia, and HTN who is admitted for Stroke/TIA rule out. Possible Stroke/TIA - patient presenting with left sided pain/weakness. Questionable physical exam.  Seems to wax and wane as I examine him. I am not sure what to make of this physical exam.  Since admission, patient has refused MRI--2/2 claustrophobia. Ativan has been offered, but patient is still refusing. Possibly a complex migraine, psych explanation for patient's symptoms as well. - Admit to remote telemetry, observation status.   - Consulting neurology, appreciate recs. - MRI brain ordered. Ativan to be given. If patient declines, she will need to sign a refusal to treat form. - CTA head/neck ordered. - Additional labs:  Mg, phos, ck (with reflex CK-MB), trops, TSH, lipid panel.  - TTE ordered for tomorrow. - Permissive HTN for now (holding home antihypertensives, florninef)  - Continue daily ASA. - Q4H neurovascular checks. Shoulder/Arm Pain - POA. Patient reports that it started hurting following her migrane episode yesterday and will not stop. She is insistent that this is treated with tramadol (though this medication is not on her PTA list). No acute findings on physical exam of the left shoulder and elbow joints. -Will continue to monitor for now. -Treating with tylenol flexiril. -PT/OT to see patient. Hypertension  - BP on admission 164/50. At this time, 148/61.  - Holding home antihypertensives for permissive HTN:  Norvasc, lisinopril. - Will continue to monitor at this time and readjust as BP's trend. - Unsure of the need for flornief in the setting of antihypertensives. Diabetes Mellitus T2 w/ Peripheral Neuropathy  - Last HgA1c 7.0 (3/2017). - Discontinued home medications of amaryl, januvia, invokana. - Insulin Sliding Scale normal sensitivity with AC&HS glucose checks. May need to add a long acting formulation if requiring too much SSI.  - Hypoglycemia protocols ordered. - Ordered home dose of gabapentin. Hyperlipidemia - last lipid panel (12/2016): cholesterol 169, , HDL 42, LDL 99.  -Drawing lipid panel with tomorrow's am labs. -Continue home pravastatin. FEN/GI - Diabetic diet. Activity - Out of bed with assistance  DVT prophylaxis - Lovenox  GI prophylaxis - Not indicated at this time  Fall prophylaxis - Fall precautions ordered. Disposition - Admit to Remote Telemetry. Plan to d/c to TBD. Consulting PT/OT/CM. Code Status - Full, discussed with patient / caregivers.     Patient Meredith Mckeon will be discussed Dr. Soto Varma.    4:07 PM, 04/01/17  Lila Avery MD  Family Medicine Resident       For Billing    Chief Complaint   Patient presents with    Gait Problem    Dysarthria   [facility] Problems  Date Reviewed: 3/21/2017    None

## 2017-04-01 NOTE — IP AVS SNAPSHOT
Current Discharge Medication List  
  
START taking these medications Dose & Instructions Dispensing Information Comments Morning Noon Evening Bedtime  
 baclofen 10 mg tablet Commonly known as:  LIORESAL Your last dose was: Your next dose is:    
   
   
 Dose:  10 mg Take 1 Tab by mouth three (3) times daily as needed. Indications: spasticity and cramping Quantity:  30 Tab Refills:  0  
     
   
   
   
  
 * methylPREDNISolone 8 mg tablet Commonly known as:  MEDROL Start taking on:  4/7/2017 Your last dose was: Your next dose is:    
   
   
 Dose:  8 mg Take 1 Tab by mouth once for 1 dose. Due on 4/7/17 at 8AM  
 Quantity:  1 Tab Refills:  0  
     
   
   
   
  
 * methylPREDNISolone 4 mg Tab Commonly known as:  MEDROL Start taking on:  4/7/2017 Your last dose was: Your next dose is:    
   
   
 Dose:  4 mg Take 1 Tab by mouth once for 1 dose. Due on 4/7/17 at 12PM  
 Quantity:  1 Tab Refills:  0  
     
   
   
   
  
 * methylPREDNISolone 4 mg Tab Commonly known as:  MEDROL Start taking on:  4/7/2017 Your last dose was: Your next dose is:    
   
   
 Dose:  4 mg Take 1 Tab by mouth once for 1 dose. Due on 4/7/17 at  5PM  
 Quantity:  1 Tab Refills:  0  
     
   
   
   
  
 * methylPREDNISolone 8 mg tablet Commonly known as:  MEDROL Start taking on:  4/7/2017 Your last dose was: Your next dose is:    
   
   
 Dose:  8 mg Take 1 Tab by mouth once for 1 dose. Due on 4/7/17 at 8pm  
 Quantity:  1 Tab Refills:  0  
     
   
   
   
  
 * methylPREDNISolone 4 mg Tab Commonly known as:  MEDROL Start taking on:  4/8/2017 Your last dose was: Your next dose is:    
   
   
 Dose:  4 mg Take 1 Tab by mouth three (3) times daily (with meals). Due on 4/8/17 TID with meals. Quantity:  3 Tab Refills:  0  
     
   
   
   
  
 * methylPREDNISolone 8 mg tablet Commonly known as:  MEDROL Start taking on:  4/8/2017 Your last dose was: Your next dose is:    
   
   
 Dose:  8 mg Take 1 Tab by mouth once for 1 dose. Due on 4/8/17 at 8PM  
 Quantity:  1 Tab Refills:  0  
     
   
   
   
  
 * methylPREDNISolone 4 mg Tab Commonly known as:  MEDROL Start taking on:  4/9/2017 Your last dose was: Your next dose is:    
   
   
 Dose:  4 mg Take 1 Tab by mouth four (4) times daily (with meals). Due on 4/9/17 QID with meals Quantity:  4 Tab Refills:  0  
     
   
   
   
  
 * methylPREDNISolone 4 mg Tab Commonly known as:  MEDROL Start taking on:  4/10/2017 Your last dose was: Your next dose is:    
   
   
 Dose:  4 mg Take 1 Tab by mouth three (3) times daily (with meals). Due on 4/10/17 TID with meals Quantity:  3 Tab Refills:  0  
     
   
   
   
  
 * methylPREDNISolone 4 mg Tab Commonly known as:  MEDROL Start taking on:  4/11/2017 Your last dose was: Your next dose is:    
   
   
 Dose:  4 mg Take 1 Tab by mouth ACB/HS. Due on 4/11/17 BID Quantity:  2 Tab Refills:  0  
     
   
   
   
  
 * methylPREDNISolone 4 mg Tab Commonly known as:  MEDROL Start taking on:  4/12/2017 Your last dose was: Your next dose is:    
   
   
 Dose:  4 mg Take 1 Tab by mouth Daily (before breakfast). Due on 4/12/17 Quantity:  1 Tab Refills:  0  
     
   
   
   
  
 * Notice: This list has 10 medication(s) that are the same as other medications prescribed for you. Read the directions carefully, and ask your doctor or other care provider to review them with you. CONTINUE these medications which have NOT CHANGED Dose & Instructions Dispensing Information Comments Morning Noon Evening Bedtime Alpha Lipoic Acid 600 mg Cap Your last dose was: Your next dose is:    
   
   
 Dose:  1 Cap Take 1 Cap by mouth two (2) times a day. Refills:  0  
     
   
   
   
  
 amLODIPine 5 mg tablet Commonly known as:  Mercy Rafter Your last dose was: Your next dose is:    
   
   
 Dose:  5 mg Take 1 Tab by mouth nightly. Indications: hypertension Quantity:  30 Tab Refills:  2  
     
   
   
   
  
 aspirin 81 mg chewable tablet Your last dose was: Your next dose is:    
   
   
 Dose:  81 mg Take 81 mg by mouth daily. Refills:  0 CINNAMON 500 mg Cap Generic drug:  cinnamon bark Your last dose was: Your next dose is:    
   
   
 Dose:  2 Cap Take 2 Caps by mouth daily. Refills:  0 CLARITIN 10 mg tablet Generic drug:  loratadine Your last dose was: Your next dose is:    
   
   
 Dose:  10 mg Take 10 mg by mouth nightly. Refills:  0 CRANBERRY EXTRACT-VIT C PO Your last dose was: Your next dose is:    
   
   
 Dose:  3 Cap Take 3 Caps by mouth daily. 4200 mg Refills:  0  
     
   
   
   
  
 fluticasone 50 mcg/actuation nasal spray Commonly known as:  Chales Copa Your last dose was: Your next dose is:    
   
   
 Dose:  2 Spray 2 Sprays by Both Nostrils route daily. Quantity:  1 Bottle Refills:  2  
     
   
   
   
  
 * gabapentin 300 mg capsule Commonly known as:  NEURONTIN Your last dose was: Your next dose is:    
   
   
 Dose:  600 mg Take 600 mg by mouth daily. Refills:  0  
     
   
   
   
  
 * gabapentin 300 mg capsule Commonly known as:  NEURONTIN Your last dose was: Your next dose is:    
   
   
 Dose:  300 mg Take 300 mg by mouth every evening. Refills:  0 GARCINIA CAMBOGIA PO Your last dose was: Your next dose is:    
   
   
 Dose:  2 Tab Take 2 Tabs by mouth daily. Refills:  0  
     
   
   
   
  
 * glimepiride 2 mg tablet Commonly known as:  AMARYL Your last dose was: Your next dose is:    
   
   
 Dose:  2 mg Take 2 mg by mouth daily (with breakfast). Refills:  0  
     
   
   
   
  
 * glimepiride 2 mg tablet Commonly known as:  AMARYL Your last dose was: Your next dose is:    
   
   
 Dose:  4 mg Take 4 mg by mouth daily (with dinner). Refills:  0 INVOKANA 100 mg tablet Generic drug:  canagliflozin Your last dose was: Your next dose is: Take 1 tablet by mouth  daily before breakfast  
 Quantity:  90 Tab Refills:  1 L-Mfolate-B6 Phos-Methyl-B12 3-35-2 mg Tab tab Commonly known as:  Jeremiah rBiseno Your last dose was: Your next dose is:    
   
   
 Dose:  1 Tab Take 1 Tab by mouth two (2) times a day. Refills:  0  
     
   
   
   
  
 lisinopril 5 mg tablet Commonly known as:  Holly Ant Your last dose was: Your next dose is:    
   
   
 Dose:  5 mg Take 1 Tab by mouth daily. Quantity:  90 Tab Refills:  3  
     
   
   
   
  
 magnesium oxide 400 mg tablet Commonly known as:  MAG-OX Your last dose was: Your next dose is:    
   
   
 Dose:  400 mg Take 400 mg by mouth daily. Refills:  0  
     
   
   
   
  
 pravastatin 40 mg tablet Commonly known as:  PRAVACHOL Your last dose was: Your next dose is: TAKE 1 TABLET NIGHTLY (NEEDS APPOINTMENT AS SOON AS POSSIBLE FOR FASTING LABS AND APPOINTMENT) Quantity:  30 Tab Refills:  0 SITagliptin 100 mg tablet Commonly known as:  Sofya Mercer Your last dose was: Your next dose is:    
   
   
 Dose:  100 mg Take 100 mg by mouth Daily (before breakfast). Refills:  0  
     
   
   
   
  
 TRUETEST TEST STRIPS strip Generic drug:  glucose blood VI test strips Your last dose was: Your next dose is:    
   
   
 TEST THREE TIMES DAILY Quantity:  100 Strip Refills:  1  
     
   
   
   
  
 * Notice: This list has 4 medication(s) that are the same as other medications prescribed for you. Read the directions carefully, and ask your doctor or other care provider to review them with you. ASK your doctor about these medications Dose & Instructions Dispensing Information Comments Morning Noon Evening Bedtime * fludrocortisone 0.1 mg tablet Commonly known as:  FLORINEF Ask about: Which instructions should I use? Your last dose was: Your next dose is:    
   
   
 Dose:  0.1 mg Take 0.1 mg by mouth Daily (before breakfast). Refills:  0  
     
   
   
   
  
 * fludrocortisone 0.1 mg tablet Commonly known as:  FLORINEF Ask about: Which instructions should I use? Your last dose was: Your next dose is: TAKE 1 TABLET BY MOUTH DAILY Quantity:  30 Tab Refills:  0  
     
   
   
   
  
 * Notice: This list has 2 medication(s) that are the same as other medications prescribed for you. Read the directions carefully, and ask your doctor or other care provider to review them with you. Where to Get Your Medications These medications were sent to [facility] [address] [place], [place] [address]y [address] [facility] [address]  [address] [id number] Hours:  24-hours Phone:  [phone]  
  fludrocortisone 0.1 mg tablet  
 methylPREDNISolone 4 mg Tab  
 methylPREDNISolone 8 mg tablet  
 methylPREDNISolone 8 mg tablet  
 methylPREDNISolone 8 mg tablet Information on where to get these meds will be given to you by the nurse or doctor. ! Ask your nurse or doctor about these medications  
  baclofen 10 mg tablet

## 2017-04-01 NOTE — ED NOTES
Pt and her  requested to go upstairs to a room with a bed, since the stretcher is making the pt's back hurt. RN spoke with family practice and requested pain medication for pt. Pt does not want to have the MRI until the recent CT is read. Pt appears anxious about MRI d/t clausterphobia, but pt does not want to take ativan (MD made aware of this also). Pt stated that tylenol and gabapentin would not help, and that she would like tramadol and a muscle relaxant. Family practice aware. RN took pt upstairs to 5th floor. Pt is in bed,  at bedside, safety precautions in place.

## 2017-04-01 NOTE — ED NOTES
Family practice was paged by RN; pt says she does not want ativan b/c she is afraid she will have an allergic reaction. MD made aware. MD wants to continue order of ativan.

## 2017-04-01 NOTE — ED PROVIDER NOTES
HPI Comments: The pt is a 78year old female who presents with complaints left sided upper and lower extremity numbness and weakness. Last night she developed left sided neck and head pain with migraine HA. She took two Fioricet and went to bed. This am she felt like she was speaking slowly and she was unable to lift her left arm and ambulation required the dragging of her left leg. Pt denies fevers, chills, night sweats, chest pain, pressure, SOB, abdominal pain, n/v/d, melena, hematuria, dysuria, constipation, dizziness, and syncope. Past Medical History:  3/2015; 5/2016: Abnormal pap      Comment: 2015 Neg pap +HPV; 2016 ASCUS +HPV  No date: Arthritis      Comment: osteo-tests for RA were neg  1996: Breast cancer ([facility] [address]      Comment: right  No date: Chronic pain  No date: Diabetes ([facility] [address]  No date: Fibromyalgia  No date: Ganglion cyst of wrist      Comment: LEFT  No date: Hypercholesterolemia  No date: Hypertension  No date: Murmur, cardiac  No date: Neuropathy  No date: Rosacea  No date: Unspecified adverse effect of anesthesia      Comment: \"SLOW TO WAKE UP, SENSITIVE TO ANESTHESIA, DO                NOT COME AROUND FOR 2-3 DAYS\"     Past Surgical History:  2014: HX CATARACT REMOVAL Bilateral  No date: HX CERVICAL FUSION  5/2016: HX COLPOSCOPY      Comment: Neg path  12/96: HX MASTECTOMY      Comment: tram flap  1990's: HX ORTHOPAEDIC Left      Comment: foot surgery  No date: HX TONSILLECTOMY  1997: HX VASCULAR ACCESS      Comment: portacath left chest-removed after chemo  11/2013, 2005: RECONSTR NOSE      Comment: HOLE IN SEPTUM    PCP:  Josey Wick DO        Patient is a 78 y.o. female presenting with gait problem, context, and numbness. The history is provided by the patient. Gait Problem    This is a new problem. The current episode started 6 to 12 hours ago. The problem occurs constantly. The problem has not changed since onset. Pain location: left leg  Associated symptoms include numbness and stiffness. Pertinent negatives include no back pain and no neck pain. Dysarthria   Pertinent negatives include no agitation. Pertinent negatives include no shortness of breath, no chest pain, no vomiting, no confusion, no headaches, no choking and no nausea. Numbness   Pertinent negatives include no agitation. Pertinent negatives include no shortness of breath, no chest pain, no vomiting, no confusion, no headaches, no choking and no nausea. Past Medical History:   Diagnosis Date    Abnormal pap 3/2015; 5/2016 2015 Neg pap +HPV; 2016 ASCUS +HPV    Arthritis     osteo-tests for RA were neg    Breast cancer ([address] 1996    right    Chronic pain     Diabetes ([facility] [address]     Fibromyalgia     Ganglion cyst of wrist     LEFT    Hypercholesterolemia     Hypertension     Murmur, cardiac     Neuropathy     Rosacea     Unspecified adverse effect of anesthesia     \"SLOW TO WAKE UP, SENSITIVE TO ANESTHESIA, DO NOT COME AROUND FOR 2-3 DAYS\"        Past Surgical History:   Procedure Laterality Date    HX CATARACT REMOVAL Bilateral 2014    HX CERVICAL FUSION      HX COLPOSCOPY  5/2016    Neg path    HX MASTECTOMY  12/96    tram flap    HX ORTHOPAEDIC Left 1990's    foot surgery    HX TONSILLECTOMY      HX VASCULAR ACCESS  1997    portacath left chest-removed after chemo    RECONSTR NOSE  11/2013, 2005    HOLE IN SEPTUM         Family History:   Problem Relation Age of Onset    Heart Disease Mother     Hypertension Mother     COPD Mother     Diabetes Father     Other Son      INOPERABLE BRAIN TUMOR    Gout Son     Celiac Disease Son     Anesth Problems Neg Hx        Social History     Social History    Marital status:      Spouse name: N/A    Number of children: N/A    Years of education: N/A     Occupational History    Not on file.      Social History Main Topics    Smoking status: Never Smoker    Smokeless tobacco: Never Used    Alcohol use No    Drug use: No    Sexual activity: Yes     Other Topics Concern    Not on file     Social History Narrative         ALLERGIES: Latex; Other food; Betadine [povidone-iodine]; Codeine; Dilaudid [hydromorphone (bulk)]; Hydrocodone; Ditropan [oxybutynin chloride]; Doxycycline; Keflex [cephalexin]; Metformin; Tape [adhesive]; and Sulfamethoxazole-trimethoprim    Review of Systems   Constitutional: Negative for activity change, appetite change, chills, diaphoresis, fatigue, fever and unexpected weight change. HENT: Negative for congestion, ear pain, rhinorrhea, sinus pressure, sore throat and tinnitus. Eyes: Negative for photophobia, pain, discharge and visual disturbance. Respiratory: Negative for apnea, cough, choking, chest tightness, shortness of breath, wheezing and stridor. Cardiovascular: Negative for chest pain, palpitations and leg swelling. Gastrointestinal: Negative for abdominal pain, constipation, diarrhea, nausea and vomiting. Endocrine: Negative for polydipsia, polyphagia and polyuria. Genitourinary: Negative for decreased urine volume, dyspareunia, dysuria, enuresis, flank pain, frequency, hematuria and urgency. Musculoskeletal: Positive for gait problem and stiffness. Negative for arthralgias, back pain, myalgias and neck pain. Skin: Negative for color change, pallor, rash and wound. Allergic/Immunologic: Negative for immunocompromised state. Neurological: Positive for weakness and numbness. Negative for dizziness, seizures, syncope, light-headedness and headaches. Hematological: Does not bruise/bleed easily. Psychiatric/Behavioral: Negative for agitation and confusion. The patient is not nervous/anxious.         Vitals:    04/01/17 1233 04/01/17 1300 04/01/17 1430 04/01/17 1530   BP:  137/43 163/58 148/61   Pulse:  60 71 75   Resp:  14 15 14   Temp:       SpO2:  99% 100% 100%   Weight: 79.8 kg (175 lb 14.8 oz)      Height:                Physical Exam   Constitutional: She is oriented to person, place, and time. She appears well-developed and well-nourished. No distress. HENT:   Head: Normocephalic. Right Ear: External ear normal.   Left Ear: External ear normal.   Mouth/Throat: Oropharynx is clear and moist. No oropharyngeal exudate. Eyes: Conjunctivae and EOM are normal. Pupils are equal, round, and reactive to light. Right eye exhibits no discharge. Left eye exhibits no discharge. No scleral icterus. Neck: Normal range of motion. Neck supple. No JVD present. No tracheal deviation present. No thyromegaly present. Cardiovascular: Normal rate, regular rhythm, normal heart sounds and intact distal pulses. Exam reveals no gallop and no friction rub. No murmur heard. Pulmonary/Chest: Effort normal and breath sounds normal. No stridor. No respiratory distress. She has no wheezes. She has no rales. She exhibits no tenderness. Abdominal: Soft. Bowel sounds are normal. She exhibits no distension and no mass. There is no tenderness. There is no rebound and no guarding. Musculoskeletal: Normal range of motion. She exhibits no edema or tenderness. Lymphadenopathy:     She has no cervical adenopathy. Neurological: She is alert and oriented to person, place, and time. She has normal strength. She displays normal reflexes. No cranial nerve deficit. Gait abnormal. Coordination normal.   + pronotor drift  + left sided weakness   + sensation diminished on the LUE    Skin: Skin is warm and dry. No rash noted. She is not diaphoretic. No erythema. No pallor. Psychiatric: She has a normal mood and affect. Her behavior is normal. Judgment and thought content normal.   Nursing note and vitals reviewed.        MDM  Number of Diagnoses or Management Options  Gait disturbance:   Left-sided weakness:   Diagnosis management comments:    * routine laboratory data and UA   * CT head    * IVF       Amount and/or Complexity of Data Reviewed  Clinical lab tests: ordered and reviewed  Tests in the radiology section of CPT®: ordered and reviewed  Review and summarize past medical records: yes  Discuss the patient with other providers: yes    Risk of Complications, Morbidity, and/or Mortality  General comments:    - stable, ambulatory pt in NAD     Patient Progress  Patient progress: stable    ED Course       Procedures        CONSULT NOTE:   [address] PM  Keiko Berry NP spoke with Dr. Quenten Harada, MD,   Specialty: family practice  Discussed pt's hx, disposition, and available diagnostic and imaging results. Reviewed care plans. Consultant agrees with plans as outlined. Admit to inpatient. Keiko Berry NP    5:01 PM  Patient is being admitted to the hospital.  The results of their tests and reasons for their admission have been discussed with them and/or available family. They convey agreement and understanding for the need to be admitted and for their admission diagnosis. Consultation has been made with the inpatient physician specialist for hospitalization. LABORATORY TESTS:  Recent Results (from the past 12 hour(s))   GLUCOSE, POC    Collection Time: 04/01/17 11:27 AM   Result Value Ref Range    Glucose (POC) 177 (H) 65 - 100 mg/dL    Performed by BRUCE HEARN    CBC WITH AUTOMATED DIFF    Collection Time: 04/01/17 11:41 AM   Result Value Ref Range    WBC 5.0 3.6 - 11.0 K/uL    RBC 5.04 3.80 - 5.20 M/uL    HGB 12.7 11.5 - 16.0 g/dL    HCT 41.9 35.0 - 47.0 %    MCV 83.1 80.0 - 99.0 FL    MCH 25.2 (L) 26.0 - 34.0 PG    MCHC 30.3 30.0 - 36.5 g/dL    RDW 16.1 (H) 11.5 - 14.5 %    PLATELET 223 675 - 227 K/uL    NEUTROPHILS 64 32 - 75 %    LYMPHOCYTES 23 12 - 49 %    MONOCYTES 7 5 - 13 %    EOSINOPHILS 5 0 - 7 %    BASOPHILS 1 0 - 1 %    ABS. NEUTROPHILS 3.2 1.8 - 8.0 K/UL    ABS. LYMPHOCYTES 1.2 0.8 - 3.5 K/UL    ABS. MONOCYTES 0.3 0.0 - 1.0 K/UL    ABS. EOSINOPHILS 0.2 0.0 - 0.4 K/UL    ABS.  BASOPHILS 0.0 0.0 - 0.1 K/UL   METABOLIC PANEL, COMPREHENSIVE    Collection Time: 04/01/17 11:41 AM   Result Value Ref Range    Sodium 142 136 - 145 mmol/L    Potassium 4.2 3.5 - 5.1 mmol/L    Chloride 105 97 - 108 mmol/L    CO2 28 21 - 32 mmol/L    Anion gap 9 5 - 15 mmol/L    Glucose 185 (H) 65 - 100 mg/dL    BUN 24 (H) 6 - 20 MG/DL    Creatinine 0.66 0.55 - 1.02 MG/DL    BUN/Creatinine ratio 36 (H) 12 - 20      GFR est AA >60 >60 ml/min/1.73m2    GFR est non-AA >60 >60 ml/min/1.73m2    Calcium 8.8 8.5 - 10.1 MG/DL    Bilirubin, total 0.3 0.2 - 1.0 MG/DL    ALT (SGPT) 37 12 - 78 U/L    AST (SGOT) 32 15 - 37 U/L    Alk.  phosphatase 148 (H) 45 - 117 U/L    Protein, total 7.0 6.4 - 8.2 g/dL    Albumin 3.5 3.5 - 5.0 g/dL    Globulin 3.5 2.0 - 4.0 g/dL    A-G Ratio 1.0 (L) 1.1 - 2.2     PROTHROMBIN TIME + INR    Collection Time: 04/01/17 11:41 AM   Result Value Ref Range    INR 1.0 0.9 - 1.1      Prothrombin time 10.0 9.0 - 11.1 sec   PTT    Collection Time: 04/01/17 11:41 AM   Result Value Ref Range    aPTT 28.2 22.1 - 32.5 sec    aPTT, therapeutic range     58.0 - 77.0 SECS   TROPONIN I    Collection Time: 04/01/17 11:41 AM   Result Value Ref Range    Troponin-I, Qt. <0.04 <0.05 ng/mL   EKG, 12 LEAD, INITIAL    Collection Time: 04/01/17 12:18 PM   Result Value Ref Range    Ventricular Rate 61 BPM    Atrial Rate 61 BPM    P-R Interval 152 ms    QRS Duration 96 ms    Q-T Interval 436 ms    QTC Calculation (Bezet) 438 ms    Calculated P Axis 42 degrees    Calculated R Axis -18 degrees    Calculated T Axis 28 degrees    Diagnosis       Normal sinus rhythm  Moderate voltage criteria for LVH, may be normal variant  Borderline ECG  When compared with ECG of 03-JUN-2015 12:36,  premature ventricular complexes are no longer present  Minimal criteria for Septal infarct are no longer present  Nonspecific T wave abnormality no longer evident in Lateral leads     DRUG SCREEN, URINE    Collection Time: 04/01/17  4:30 PM   Result Value Ref Range    AMPHETAMINE NEGATIVE  NEG      BARBITURATES POSITIVE (A) NEG      BENZODIAZEPINE NEGATIVE  NEG      COCAINE NEGATIVE NEG      METHADONE NEGATIVE  NEG      OPIATES NEGATIVE  NEG      PCP(PHENCYCLIDINE) NEGATIVE  NEG      THC (TH-CANNABINOL) NEGATIVE  NEG      Drug screen comment (NOTE)        IMAGING RESULTS:  CTA HEAD NECK W CONT         CT HEAD WO CONT   Final Result      MRI BRAIN W WO CONT    (Results Pending)     Ct Head Wo Cont    Result Date: 4/1/2017  EXAM:  CT HEAD WO CONT INDICATION:   left sided weakness. History of breast cancer. COMPARISON: None. TECHNIQUE: Unenhanced CT of the head was performed using 5 mm images. Brain and bone windows were generated. CT dose reduction was achieved through use of a standardized protocol tailored for this examination and automatic exposure control for dose modulation. FINDINGS: The ventricles and sulci are normal in size, shape and configuration and midline. There is no significant white matter disease. There is no intracranial hemorrhage, extra-axial collection, mass, mass effect or midline shift. The basilar cisterns are open. No acute infarct is identified. The bone windows demonstrate no abnormalities. The visualized portions of the paranasal sinuses and mastoid air cells are clear. IMPRESSION: No acute intracranial abnormality. Cta Head Neck W Cont    Result Date: 4/1/2017  PRELIMINARY REPORT Exam: CTA Head and Neck. INDICATION: Stroke symptoms, left-sided weakness. Preliminary findings: Atherosclerotic plaque formation at both distal common carotid arteries without flow-limiting stenosis. No aneurysm, dissection or hemorrhage. Dural venous sinuses appear patent. Preliminary report was provided by Dr. Lauren Colin, the on-call radiologist, at 1622 hours Final report to follow.  END PRELIMINARY REPORT       MEDICATIONS GIVEN:  Medications   LORazepam (ATIVAN) injection 1 mg (not administered)   aspirin chewable tablet 81 mg (81 mg Oral Given 4/1/17 1642)   gabapentin (NEURONTIN) capsule 600 mg (not administered)   gabapentin (NEURONTIN) capsule 300 mg (not administered) magnesium oxide (MAG-OX) tablet 400 mg (400 mg Oral Given 4/1/17 1642)   pravastatin (PRAVACHOL) tablet 40 mg (not administered)   sodium chloride (NS) flush 5-10 mL (not administered)   sodium chloride (NS) flush 5-10 mL (not administered)   acetaminophen (TYLENOL) tablet 650 mg (not administered)   enoxaparin (LOVENOX) injection 40 mg (40 mg SubCUTAneous Given 4/1/17 1642)   insulin lispro (HUMALOG) injection (not administered)   glucose chewable tablet 16 g (not administered)   dextrose (D50W) injection syrg 12.5-25 g (not administered)   glucagon (GLUCAGEN) injection 1 mg (not administered)   sodium chloride 0.9 % bolus infusion 1,000 mL (0 mL IntraVENous IV Completed 4/1/17 1616)   iopamidol (ISOVUE-370) 76 % injection 100 mL (100 mL IntraVENous Given 4/1/17 1554)       IMPRESSION:  1. Gait disturbance    2. Left-sided weakness        PLAN:  1.  Admit to [address] [place] [address], NP   5:02 PM

## 2017-04-01 NOTE — ED NOTES
TRANSFER - OUT REPORT:    Verbal report given to Estrada Suarez RN(name) on Miladys Ovens  being transferred to 5th floor(unit) for routine progression of care       Report consisted of patients Situation, Background, Assessment and   Recommendations(SBAR). Information from the following report(s) SBAR, Kardex, ED Summary, [facility] and Recent Results was reviewed with the receiving nurse. Lines:   Peripheral IV 04/01/17 Left Antecubital (Active)   Site Assessment Clean, dry, & intact 4/1/2017 11:38 AM   Phlebitis Assessment 0 4/1/2017 11:38 AM   Infiltration Assessment 0 4/1/2017 11:38 AM   Dressing Status Clean, dry, & intact 4/1/2017 11:38 AM        Opportunity for questions and clarification was provided. All of pt's belongings, valuables, and medications were sent with patient. Patient to be transported with:   Monitor  Tech     Pt is not going upstairs just yet d/t order for stat MRI.

## 2017-04-01 NOTE — PROGRESS NOTES
[facility]: MED RECONCILIATION    Comments/Recommendations:    Last doses of medications were taken last pm  Medications added:     · none    Medications removed:    · none    Medications adjusted:    · Current gabapentin dose is 6m217jm in the am, 300 mg in pm  · Current glimepiride dose is 2 mg with breakfast, 4 mg with dinner    Information obtained from: patient    Significant PMH/Disease States:   Past Medical History:   Diagnosis Date    Abnormal pap 3/2015; 5/2016 2015 Neg pap +HPV; 2016 ASCUS +HPV    Arthritis     osteo-tests for RA were neg    Breast cancer ([facility] [address] 1996    right    Chronic pain     Diabetes (HCC)     Fibromyalgia     Ganglion cyst of wrist     LEFT    Hypercholesterolemia     Hypertension     Murmur, cardiac     Neuropathy     Rosacea     Unspecified adverse effect of anesthesia     \"SLOW TO WAKE UP, SENSITIVE TO ANESTHESIA, DO NOT COME AROUND FOR 2-3 DAYS\"        Chief Complaint for this Admission:   Chief Complaint   Patient presents with    Gait Problem    Dysarthria    Numbness       Allergies: Latex; Other food; Betadine [povidone-iodine]; Codeine; Dilaudid [hydromorphone (bulk)]; Hydrocodone; Ditropan [oxybutynin chloride]; Doxycycline; Keflex [cephalexin]; Metformin; Tape [adhesive]; and Sulfamethoxazole-trimethoprim    Prior to Admission Medications:     Medication Documentation Review Audit       Reviewed by Aubrie Celis [facility] (Pharmacist) on 04/01/17 at 1402         Medication Sig Documenting Provider Last Dose Status Taking? Alpha Lipoic Acid 600 mg cap Take 1 Cap by mouth two (2) times a day. Historical Provider 3/31/2017 pm Active Yes             Med Note [facility], [address]   Sat Apr 1, 2017  1:55 PM):        amLODIPine (NORVASC) 5 mg tablet Take 1 Tab by mouth nightly. Indications: hypertension Madelin Eugene NP 3/31/2017 pm Active Yes    aspirin 81 mg chewable tablet Take 81 mg by mouth daily.  Historical Provider 3/31/2017 am Active Yes    CHROM GENO/BRINDAL JAMES (GARCINIA CAMBOGIA PO) Take 2 Tabs by mouth daily. Historical Provider 3/31/2017 am Active Yes             Med Note [facility], [address]   Sat Apr 1, 2017  1:56 PM):        cinnamon bark (CINNAMON) 500 mg cap Take 2 Caps by mouth daily. Historical Provider 3/31/2017 pm Active Yes             Med Note [facility], [address]   Sat Apr 1, 2017  1:56 PM):        CRANBERRY EXTRACT-VIT C PO Take 3 Caps by mouth daily. 4200 mg Historical Provider 3/31/2017 am Active Yes             Med Note [facility], [address]   Sat Apr 1, 2017  1:56 PM):        fludrocortisone (FLORINEF) 0.1 mg tablet Take 0.1 mg by mouth Daily (before breakfast). Historical Provider 3/31/2017 am Active Yes    fluticasone (FLONASE) 50 mcg/actuation nasal spray 2 Sprays by Both Nostrils route daily. Sherrill Burkitt, NP 3/31/2017 pm Active Yes    gabapentin (NEURONTIN) 300 mg capsule Take 600 mg by mouth daily. Historical Provider 3/31/2017 am Active Yes    gabapentin (NEURONTIN) 300 mg capsule Take 300 mg by mouth every evening. Historical Provider 3/31/2017 pm Active Yes    glimepiride (AMARYL) 2 mg tablet Take 2 mg by mouth daily (with breakfast). Historical Provider 3/31/2017 am Active Yes    glimepiride (AMARYL) 2 mg tablet Take 4 mg by mouth daily (with dinner). Historical Provider 3/31/2017 pm Active Yes    INVOKANA 100 mg tablet Take 1 tablet by mouth  daily before breakfast Michelle Cordova DO 3/31/2017 am Active Yes    L-Mfolate-B6 Phos-Methyl-B12 (METANX) 3-35-2 mg tab tab Take 1 Tab by mouth two (2) times a day. Historical Provider 4/1/2017 pm Active Yes             Med Note (Oscar Fail   Wed Sep 14, 2016  1:27 PM): PT INSTRUCTED TO STOP THIS MED 7 DAYS PRIOR TO SURGERY ACCORDING TO ORTHO PROTOCOL. lisinopril (PRINIVIL, ZESTRIL) 5 mg tablet Take 1 Tab by mouth daily. Adelaide Mcfadden, DO 3/31/2017 am Active Yes    loratadine (CLARITIN) 10 mg tablet Take 10 mg by mouth nightly.  Historical Provider 3/31/2017 pm Active Yes magnesium oxide (MAG-OX) 400 mg tablet Take 400 mg by mouth daily. Historical Provider 3/31/2017 am Active Yes    pravastatin (PRAVACHOL) 40 mg tablet TAKE 1 TABLET NIGHTLY (NEEDS APPOINTMENT AS SOON AS POSSIBLE FOR FASTING LABS AND APPOINTMENT) Troy Coker MD 3/31/2017 pm Active Yes    SITagliptin (JANUVIA) 100 mg tablet Take 100 mg by mouth Daily (before breakfast). Historical Provider 3/31/2017 am Active Yes    TRUETEST TEST STRIPS strip TEST THREE TIMES DAILY Troy Coker MD  Active No                    Thank you,    [facility].  Sharona DicksonOur [facility] of [facility]

## 2017-04-02 ENCOUNTER — APPOINTMENT (OUTPATIENT)
Dept: MRI IMAGING | Age: 67
DRG: 099 | End: 2017-04-02
Attending: PSYCHIATRY & NEUROLOGY
Payer: MEDICARE

## 2017-04-02 PROBLEM — G37.3 ACUTE TRANSVERSE MYELITIS (HCC): Status: ACTIVE | Noted: 2017-04-02

## 2017-04-02 PROBLEM — R53.1 LEFT-SIDED WEAKNESS: Status: ACTIVE | Noted: 2017-04-02

## 2017-04-02 LAB
ALBUMIN SERPL BCP-MCNC: 3.3 G/DL (ref 3.5–5)
ALBUMIN/GLOB SERPL: 1 {RATIO} (ref 1.1–2.2)
ALP SERPL-CCNC: 135 U/L (ref 45–117)
ALT SERPL-CCNC: 33 U/L (ref 12–78)
ANION GAP BLD CALC-SCNC: 12 MMOL/L (ref 5–15)
AST SERPL W P-5'-P-CCNC: 22 U/L (ref 15–37)
ATRIAL RATE: 61 BPM
BILIRUB SERPL-MCNC: 0.4 MG/DL (ref 0.2–1)
BUN SERPL-MCNC: 18 MG/DL (ref 6–20)
BUN/CREAT SERPL: 36 (ref 12–20)
CALCIUM SERPL-MCNC: 8.7 MG/DL (ref 8.5–10.1)
CALCULATED P AXIS, ECG09: 42 DEGREES
CALCULATED R AXIS, ECG10: -18 DEGREES
CALCULATED T AXIS, ECG11: 28 DEGREES
CHLORIDE SERPL-SCNC: 106 MMOL/L (ref 97–108)
CO2 SERPL-SCNC: 24 MMOL/L (ref 21–32)
CREAT SERPL-MCNC: 0.5 MG/DL (ref 0.55–1.02)
DIAGNOSIS, 93000: NORMAL
ERYTHROCYTE [DISTWIDTH] IN BLOOD BY AUTOMATED COUNT: 16.2 % (ref 11.5–14.5)
EST. AVERAGE GLUCOSE BLD GHB EST-MCNC: 163 MG/DL
GLOBULIN SER CALC-MCNC: 3.4 G/DL (ref 2–4)
GLUCOSE BLD STRIP.AUTO-MCNC: 105 MG/DL (ref 65–100)
GLUCOSE BLD STRIP.AUTO-MCNC: 133 MG/DL (ref 65–100)
GLUCOSE BLD STRIP.AUTO-MCNC: 166 MG/DL (ref 65–100)
GLUCOSE BLD STRIP.AUTO-MCNC: 274 MG/DL (ref 65–100)
GLUCOSE SERPL-MCNC: 113 MG/DL (ref 65–100)
HBA1C MFR BLD: 7.3 % (ref 4.2–6.3)
HCT VFR BLD AUTO: 41 % (ref 35–47)
HGB BLD-MCNC: 12.6 G/DL (ref 11.5–16)
MAGNESIUM SERPL-MCNC: 2.1 MG/DL (ref 1.6–2.4)
MCH RBC QN AUTO: 25.3 PG (ref 26–34)
MCHC RBC AUTO-ENTMCNC: 30.7 G/DL (ref 30–36.5)
MCV RBC AUTO: 82.3 FL (ref 80–99)
P-R INTERVAL, ECG05: 152 MS
PHOSPHATE SERPL-MCNC: 3.9 MG/DL (ref 2.6–4.7)
PLATELET # BLD AUTO: 247 K/UL (ref 150–400)
POTASSIUM SERPL-SCNC: 3.8 MMOL/L (ref 3.5–5.1)
PROT SERPL-MCNC: 6.7 G/DL (ref 6.4–8.2)
Q-T INTERVAL, ECG07: 436 MS
QRS DURATION, ECG06: 96 MS
QTC CALCULATION (BEZET), ECG08: 438 MS
RBC # BLD AUTO: 4.98 M/UL (ref 3.8–5.2)
SERVICE CMNT-IMP: ABNORMAL
SODIUM SERPL-SCNC: 142 MMOL/L (ref 136–145)
VENTRICULAR RATE, ECG03: 61 BPM
WBC # BLD AUTO: 5.2 K/UL (ref 3.6–11)

## 2017-04-02 PROCEDURE — 74011000258 HC RX REV CODE- 258: Performed by: PSYCHIATRY & NEUROLOGY

## 2017-04-02 PROCEDURE — 93306 TTE W/DOPPLER COMPLETE: CPT

## 2017-04-02 PROCEDURE — 74011250637 HC RX REV CODE- 250/637: Performed by: FAMILY MEDICINE

## 2017-04-02 PROCEDURE — 65270000029 HC RM PRIVATE

## 2017-04-02 PROCEDURE — 74011250636 HC RX REV CODE- 250/636: Performed by: PSYCHIATRY & NEUROLOGY

## 2017-04-02 PROCEDURE — 80053 COMPREHEN METABOLIC PANEL: CPT

## 2017-04-02 PROCEDURE — 82962 GLUCOSE BLOOD TEST: CPT

## 2017-04-02 PROCEDURE — 77030032490 HC SLV COMPR SCD KNE COVD -B

## 2017-04-02 PROCEDURE — 74011250636 HC RX REV CODE- 250/636: Performed by: FAMILY MEDICINE

## 2017-04-02 PROCEDURE — 99218 HC RM OBSERVATION: CPT

## 2017-04-02 PROCEDURE — 74011636637 HC RX REV CODE- 636/637: Performed by: FAMILY MEDICINE

## 2017-04-02 PROCEDURE — 72156 MRI NECK SPINE W/O & W/DYE: CPT

## 2017-04-02 PROCEDURE — 74011250637 HC RX REV CODE- 250/637: Performed by: HOSPITALIST

## 2017-04-02 PROCEDURE — A9585 GADOBUTROL INJECTION: HCPCS | Performed by: FAMILY MEDICINE

## 2017-04-02 PROCEDURE — 36415 COLL VENOUS BLD VENIPUNCTURE: CPT

## 2017-04-02 PROCEDURE — 84100 ASSAY OF PHOSPHORUS: CPT

## 2017-04-02 PROCEDURE — 83735 ASSAY OF MAGNESIUM: CPT

## 2017-04-02 PROCEDURE — 85027 COMPLETE CBC AUTOMATED: CPT

## 2017-04-02 RX ORDER — LORAZEPAM 2 MG/ML
1 INJECTION INTRAMUSCULAR ONCE
Status: COMPLETED | OUTPATIENT
Start: 2017-04-02 | End: 2017-04-02

## 2017-04-02 RX ORDER — LISINOPRIL 5 MG/1
5 TABLET ORAL DAILY
Status: DISCONTINUED | OUTPATIENT
Start: 2017-04-02 | End: 2017-04-06 | Stop reason: HOSPADM

## 2017-04-02 RX ORDER — AMLODIPINE BESYLATE 5 MG/1
5 TABLET ORAL
Status: DISCONTINUED | OUTPATIENT
Start: 2017-04-02 | End: 2017-04-06 | Stop reason: HOSPADM

## 2017-04-02 RX ADMIN — GADOBUTROL 8 ML: 604.72 INJECTION INTRAVENOUS at 11:23

## 2017-04-02 RX ADMIN — INSULIN LISPRO 3 UNITS: 100 INJECTION, SOLUTION INTRAVENOUS; SUBCUTANEOUS at 22:28

## 2017-04-02 RX ADMIN — LORAZEPAM 1 MG: 2 INJECTION, SOLUTION INTRAMUSCULAR; INTRAVENOUS at 10:28

## 2017-04-02 RX ADMIN — GABAPENTIN 600 MG: 300 CAPSULE ORAL at 10:08

## 2017-04-02 RX ADMIN — GABAPENTIN 300 MG: 300 CAPSULE ORAL at 17:09

## 2017-04-02 RX ADMIN — INSULIN LISPRO 2 UNITS: 100 INJECTION, SOLUTION INTRAVENOUS; SUBCUTANEOUS at 17:09

## 2017-04-02 RX ADMIN — MAGNESIUM GLUCONATE 500 MG ORAL TABLET 400 MG: 500 TABLET ORAL at 10:08

## 2017-04-02 RX ADMIN — ASPIRIN 81 MG CHEWABLE TABLET 81 MG: 81 TABLET CHEWABLE at 10:07

## 2017-04-02 RX ADMIN — LISINOPRIL 5 MG: 5 TABLET ORAL at 13:43

## 2017-04-02 RX ADMIN — CYCLOBENZAPRINE HYDROCHLORIDE 5 MG: 10 TABLET, FILM COATED ORAL at 21:55

## 2017-04-02 RX ADMIN — Medication 10 ML: at 22:31

## 2017-04-02 RX ADMIN — AMLODIPINE BESYLATE 5 MG: 5 TABLET ORAL at 18:56

## 2017-04-02 RX ADMIN — PRAVASTATIN SODIUM 40 MG: 20 TABLET ORAL at 21:55

## 2017-04-02 RX ADMIN — ACETAMINOPHEN 650 MG: 325 TABLET ORAL at 19:27

## 2017-04-02 RX ADMIN — SODIUM CHLORIDE 1000 MG: 900 INJECTION, SOLUTION INTRAVENOUS at 17:31

## 2017-04-02 NOTE — PROGRESS NOTES
Addendum  D/w Dr Lange the MRI c spine result which was suspicious for transverse myelitis  My review on nml monitor shows a clear cord hyperintensity C2 to top of C4  Less clear the lower levels  Will start solumedrol 1g iv qday   She should have LP to eval for potential infectious etiologies however she has had significant instrumentation in her lumbar spine October 2016 and given that I believe it would be more prudent to have this done under flouro with IR vs at the bedside    Will order medrol  Watch BG with high dose steroid--has accuchecks ordered  Await LP  Hold lovenox for procedure and use SCD instead  Team to follow up tomorrow  Dr Tiffanie Cope assumes [facility] Monday    Thanks

## 2017-04-02 NOTE — PROGRESS NOTES
2225: Patient's  came to nurses desk and stated that his wife is on the floor. I went straight to patient's room and upon entering found patient sitting on the floor beside her bed; bed alarm was activated but not signaling nurses station. Despite the fact that patient has required a walker and assistance by personnel with ambulation since her admittance and was previously reminded 10 minutes prior, to call for assistance if needed, patient stated that she attempted to go to the bathroom on her own, without any assistive devices, felt her leg giving out so she lowered herself to the floor. She stated that she did not hit the floor, merely lowered her bottom to the floor and did not sustain any injuries. She did experience an incontinent episode of urine; she states this urinary frequency/urgency and incontinence just began today since coming to the hospital.  Patient's bed alarm is engaged, however it did not alert the nurses station; work-order to be placed on the plug coming from the wall that plugs into the bed, per Supervisor, Severo Contreras. It was difficult to get patient back into bed with the assistance of 3 nurses. Patient states that she has gotten weaker than she was earlier in the day. Advised patient again regarding not getting out of bed without assistance;  is now with patient as well. Looking into moving patient closer to nurse's station when room is available. Moved patient to room 534, closer to nurses desks and nurses station. Patient was previously in room 529 at the very end of the cole. Gume completed on events.

## 2017-04-02 NOTE — PROGRESS NOTES
04/02/17 3:35 PM   gave copy of ETIENNE and observation letters to the patient's family. Family very concerned regarding observation status given patients current condition. I offered to send the utilization review nurse a note to see if inpatient criteria were now met. This was sent via safemail to [facility].     Yury Daniel RN

## 2017-04-02 NOTE — PROGRESS NOTES
Brief Resident Progress Note:    Reviewed preliminary results of MRI cervical spine which showed some hyperintensity throughout spinal cord from C2-C7, most significant in left half of spinal cord at C3-C4. Central stenosis and cord compression most significant at C4-5, C5-6, C6-7. Discussed results with Dr. Bhargavi Prieto, on call neurologist who stated that he is suspicious of transverse myelitis and upon reviewing image personally he would decide on whether or not to add high dose steroids. Consult to interventional radiology placed for lumbar puncture tomorrow to evaluate for any infectious etiology (will hold Lovenox for procedure tomorrow). Will follow up final read of MRI and appreciate greatly appreciate recommendations from Dr. Bhargavi Prieto.      Elmer Dejesus MD  04/02/17  4:09 PM  Family Medicine Resident-PGY1

## 2017-04-02 NOTE — CONSULTS
Celso Reeves sonia [address] [place] [address]   [address] [facility], [facility]   [address] [facility]       Name:  Leah Crawford   MR#:  [id number]   :  1950   Account #:  [de-identified]    Date of Consultation:  2017   Date of Adm:  2017       REQUESTING PHYSICIAN: Dr. Tay Bueno: Thanks for asking us to see the   patient in neurologic consultation regarding left-sided weakness. History is obtained from the patient and her  who is at the   bedside. HISTORY OF PRESENT ILLNESS: She is a 60-year-old lady who   says on the evening prior to admission, she was at home and she had   pain in the left side of her neck and trapezius region. She took her   butalbital after taking 3 Tylenol without relief. She says that the   butalbital did not help the discomfort. She apparently uses this for   migraine, although this was not a migraine. She took a second dose of   butalbital a little while later and then went to bed. When she awakened,   she said she had pins and needles type sensation in the left upper   extremity and felt a little wobbly when walking. She typically uses a   cane. She notes that her walking was unstable, so she started to use a   walker she has at the house. She came to the hospital. She said since   that time, she has had progressive numbness that now encompasses   the left upper extremity, the trunk, as well as the left lower extremity   and she reports some hyperesthesia of the left lower extremity. She   notes that this morning she is now having numbness and tingling in the   right upper extremity as well as the right trunk and the right lower   extremity, although she has not had any weakness in the right upper or   lower. She notes that she took a fall yesterday trying to get to the   bathroom. She has never had this before. No injuries.  She did   have significant surgery back in October with Dr. Karyn Goldstein where she   was said to have undergone what the  calls extensive whole   spine surgery. Looking at the operative report, she had anterior   laminectomy and posterior fusion L4-S1 on 10/05/2016. She also had   a posterior spinal fusion from T9 to S1 with instrumentation. This was   done secondary to kyphoscoliosis and lumbar stenosis. She notes that   she is still recovering from that. Additionally, she says that she is now   unable to tell when she has to urinate. She notes that she just   becomes wet. She has not had a stool. That is new for her. She did have a MRI scan of her brain, which is read as no intracranial   abnormality, specifically no infarct, etc. She had a CTA of her head   and neck that was read out as some intracranial atherosclerotic   disease, but nothing major. CT of her head was unrevealing. PREVIOUS STUDIES: Include back in March of 2016, an MRI of the   cervical spine demonstrating C5-C6 and C4-C5 canal stenosis with   flattening of cord on the left at C4-C5 and flattening of the cord on the   right C5-C6 by report. PAST MEDICAL HISTORY: Significant for that noted above, as well as   breast cancer, diabetes, diabetic peripheral neuropathy, fibromyalgia,   hypertension, chronic pain secondary rosacea, arthritis, cataract   surgery. MEDICATIONS:   1. Aspirin 81 mg.   2. Lovenox. 3. Neurontin 600/300.   4. Insulin. 5. Magnesium oxide. 6. Pravachol. 7. Typical p.r.n.'s. ALLERGIES: MULTIPLE AND ARE LISTED IN CONNECT CARE. SOCIAL HISTORY: She is . She does not smoke, does not use   drugs. FAMILY HISTORY: Significant for heart disease, COPD, diabetes. Her   son had an inoperable brain tumor. REVIEW OF SYSTEMS   As stated. PHYSICAL EXAMINATION   GENERAL: On exam, disheveled lady lying in bed, comfortable   appearing. VITAL SIGNS: Afebrile, pulse 68, blood pressure 150/67, oxygen   saturation 96% on room air. HEENT: Anicteric. Oropharynx clear and moist. No edema. HEART: Regular. NEUROLOGIC: She is awake, alert, oriented and conversant. She has   normal speech, language, cognition and attention. Cranial nerves   intact 2-12. She has full strength in the right upper and lower extremity   to direct confrontational testing in all muscle groups. In the left, she has   some movement of the fingers. She gives no other movement there. Left lower extremity, she is able to bend at the knee but unable to raise   off the bed. She is able to dorsiflex her foot and wiggle the toes. Reflexes are depressed in the left upper and lower versus the right,   and she has a left upgoing toe and a right toe that is equivocal.   Cerebellar without ataxia on the right. Sensory finds that she endorses   hyperesthesia of the left side and she winces when she is touched   there. She endorses decreased sensation throughout the right   hemibody. STUDIES: As stated. LABORATORY ANALYSES: Largely unremarkable. IMPRESSION: A 78-year-old lady with progressive numbness and   sensory loss, weakness of the left upper extremity and lower extremity,   again progressive, now with the sensory symptoms on the right side   and loss of bladder control with known cervical disease. Her MRI does   not show any evidence of stroke by report. CTA again without any   intracranial thrombus, etc. We will proceed with an MRI of the cervical   spine given her known cervical disease. The only other anatomic place   that this could besides the brain would be the cervical spine and given   the incontinence that does seem to be worrisome at this point. We will follow that up and make further recommendations. Thanks for   your request for consultation.         MD SHANNAN Drake / INNA   D:  04/02/2017   09:46   T:  04/02/2017   10:24   Job #:  [id number]

## 2017-04-02 NOTE — PROGRESS NOTES
Verbal shift change report given to Gene Friend RN (oncoming nurse) by Annetta Nolan RN (offgoing nurse). Report included the following information SBAR, Kardex, Procedure Summary, Intake/Output, MAR, Accordion and Recent Results.

## 2017-04-02 NOTE — PROGRESS NOTES
Went to patients room, patient in MRI,  is irate and combative. There were no sheets on the bed and when asked him where the patient was he jumped up was waving his arms around telling me there was nothing wrong with the patients heart, and that I wasn't treating him right. I was following the patient from one room to the other and was surprised the bed wasn't made. After telling me that I felt self important and why don't I make the bed. I I went to get sheets and returned only to be told that his wife was incontinent and needed special sheets and pads and did I even bother to find out about that? At this point I apologized again and said I would check with his nurse and left the room.

## 2017-04-02 NOTE — PROGRESS NOTES
Patient taken to MRI via stretcher. Ativan administered via IV. Tele notified of patient off floor. 1130 a.m. Patient back on floor.

## 2017-04-02 NOTE — PROGRESS NOTES
[facility]       Resident Progress Note in Brief    S:   Patient seen and examined at bedside after code yellow. Pt reports she stood up to use the restroom and left leg gave out. Did not hit head. No LOC. Pt stated her LUE and LLE now have decreased sensation and strength, progressive during this admission. Pt is requesting tramadol. O:  Visit Vitals    /86 (BP 1 Location: Left arm)    Pulse 73    Temp 97.6 °F (36.4 °C)    Resp 14    Ht 5' 8\" (1.727 m)    Wt 175 lb (79.4 kg)    SpO2 100%    BMI 26.61 kg/m2     Physical Examination:   General appearance - alert, well appearing, and in no distress. AOX3  Chest - CTAB, no wheezing  Heart - RRR, no murmur  Neurological - AOx3. CN 2-12 grossly intact. Pt reports decreased sensation of LUE and LLE. Pt now unable to lift LUE. 5/5 strength RUE, RLE, LLE. A/P:   Possible Stroke/TIA: Pt with progressive weakness of LUE and LLE and now s/p fall after LLE giving way. Now reports decreased sensation of this side. Sxs are new/evolving since prelim MRI was normal approx 2 hours prior to code yellow. - Repeat CT head without contrast to evaluate for acute changes which would explain evolving exam  - Neuro to see in am  - D/w patient that we should avoid medications which may mask any potential changes in Neuro exam. For now will continue tylenol prn and heating pad for pain. Pt agreed with this.       Sloane Pierre MD  Family Medicine Resident, PGY-2

## 2017-04-02 NOTE — PROGRESS NOTES
[facility]   Daily Progress Note    Date: 4/2/2017    Assessment/Plan:   Zeinab Balbuena is a 78 y.o. female with hx of breast cancer, DM with peripheral neuropathy, fibromyalgia, and HTN who is admitted for Stroke/TIA rule out. Overnight Events: Code yellow was called-pt reported she stood up to use the restroom and left leg gave out. Did not hit head. No LOC. Pt stated her LUE and LLE progressed to have decreased sensation and strength. Repeat CT head negative.      Possible Stroke/TIA - patient initially presenting with left sided pain/weakness and now with worsened left sided weakness. Confusing and changing physical exam- appears to wax and wane. Prelim reads of MRI brain and CTA head/neck were negative. CT head negative X2. Patient has a hx of extensive lumbar laminectomy 6 months ago-could this be contributing to her weakness? - Neurology consulted, appreciate recommendations  - F/U on final reads of CTA head/neck and MRI brain  -Permissive HTN for now, until this evening and will restart home antihypertensives as appropriate/per neurology recommendations  - Continue daily ASA. - Q4H neurovascular checks.     Shoulder/Arm Pain - POA. No acute findings on physical exam of the left shoulder and elbow joints. - F/U neuro recs for any imaging  -Treating with tylenol, flexiril.  - PT/OT to see patient.     Hypertension: BP on admission 164/50. At this time, 150/67.  - Holding home antihypertensives for permissive HTN: Norvasc, lisinopril; will restart later today. - Will continue to monitor at this time and readjust as BP's trend. - Unsure of the need for flornief in the setting of antihypertensives.     Diabetes Mellitus T2 w/ Peripheral Neuropathy: Last HgA1c 7.0 (3/2017). - Insulin Sliding Scale normal sensitivity with AC&HS glucose checks. - Hypoglycemia protocols ordered.   - Ordered home dose of gabapentin.     Hyperlipidemia - last lipid panel (12/2016): cholesterol 179, , HDL 72, LDL 77.   -Continue home pravastatin.      FEN/GI - Diabetic diet. Activity - Out of bed with assistance  DVT prophylaxis - Lovenox  GI prophylaxis - Not indicated at this time  Fall prophylaxis - Fall precautions ordered. Disposition - Remote Telemetry. Plan to d/c to TBD. Consulting PT/OT/CM. Code Status - Full, discussed with patient / caregivers.     Pt discussed and seen with Dr. Ludin Bustamante MD  Family Medicine Resident         CC: \"my symptoms keep getting worse\"    Subjective  Examined patient at bedside.  also present in the room-he voice many concerns her worsening sx and asked questions about management. Answered all questions and provided reassurance. Patient appears calm while stating that her symptoms are getting worse. She stated that she can't move her left side at all when yesterday she could move it. She is also complaining of left sided neck/shoulder pain. Denies chills, headaches, chest pain, shortness of breath, palpitations, abdominal pain, nausea and vomiting, and LE edema. Tolerating diet.         Inpatient Medications  Current Facility-Administered Medications   Medication Dose Route Frequency    LORazepam (ATIVAN) injection 1 mg  1 mg IntraVENous ONCE    LORazepam (ATIVAN) injection 1 mg  1 mg IntraVENous RAD PRN    aspirin chewable tablet 81 mg  81 mg Oral DAILY    gabapentin (NEURONTIN) capsule 600 mg  600 mg Oral DAILY    gabapentin (NEURONTIN) capsule 300 mg  300 mg Oral QPM    magnesium oxide (MAG-OX) tablet 400 mg  400 mg Oral DAILY    pravastatin (PRAVACHOL) tablet 40 mg  40 mg Oral QHS    sodium chloride (NS) flush 5-10 mL  5-10 mL IntraVENous Q8H    sodium chloride (NS) flush 5-10 mL  5-10 mL IntraVENous PRN    acetaminophen (TYLENOL) tablet 650 mg  650 mg Oral Q4H PRN    enoxaparin (LOVENOX) injection 40 mg  40 mg SubCUTAneous Q24H    insulin lispro (HUMALOG) injection   SubCUTAneous QID WITH MEALS    glucose chewable tablet 16 g  4 Tab Oral PRN    dextrose (D50W) injection syrg 12.5-25 g  12.5-25 g IntraVENous PRN    glucagon (GLUCAGEN) injection 1 mg  1 mg IntraMUSCular PRN    cyclobenzaprine (FLEXERIL) tablet 5 mg  5 mg Oral TID PRN         Allergies  Allergies   Allergen Reactions    Latex Other (comments)     Patient states it burns around her mouth.  Other Food Other (comments)     Yogurt - stomach cramping    Betadine [Povidone-Iodine] Itching     Pt states this causes \"extreme\" itching    Codeine Anaphylaxis, Rash, Nausea Only and Other (comments)     HEADACHE  Tolerates tramadol    Dilaudid [Hydromorphone (Bulk)] Anaphylaxis, Itching, Nausea Only and Other (comments)     HALLUCINATIONS  Tolerates tramadol      Hydrocodone Anaphylaxis, Rash, Nausea Only and Vertigo     Facial - eyelid swelling-had to go to ER for reaction, HALLUCINATIONS  Tolerates tramadol      Ditropan [Oxybutynin Chloride] Swelling    Doxycycline Rash     PUSTULAR RASH- TOLERATING TETRACYCLINE    Keflex [Cephalexin] Nausea and Vomiting     Pain in abdomen AND FLU-LIKE SX      Metformin Nausea and Vomiting     Severe abdominal pain      Tape [Adhesive] Other (comments)     Redness/inflammed. Can only use paper tape. CAN USE BAND-AIDS. TOLERATES SURGICAL TAPE USED IN [facility] [facility].  Sulfamethoxazole-Trimethoprim Other (comments)     Cramping/flu-like symptoms         Objective  Vitals:  Patient Vitals for the past 8 hrs:   Temp Pulse Resp BP SpO2   04/02/17 0702 - 68 - - -   04/02/17 0545 97.7 °F (36.5 °C) 69 16 150/67 96 %         I/O:  No intake or output data in the 24 hours ending 04/02/17 1021  Last shift:       Last 3 shifts:         Physical Exam:  General: No acute distress. Alert. Cooperative. Head: Normocephalic. Atraumatic. Eyes:  Conjunctiva pink. Sclera white. PERRL. Respiratory: CTAB. No w/r/r/c.   Cardiovascular: RRR. Normal S1,S2. No m/r/g. Pulses 2+ throughout. GI: + bowel sounds. Nontender.  No rebound tenderness or guarding. Nondistended   Extremities: No edema. No palpable cord. No tenderness. Neurological Exam:  Mental Status: Alert, oriented x 3 and following commands. Cranial Nerves:   Intact visual fields. PERRL, EOM's full, no nystagmus, no ptosis. Facial sensation is normal. Facial movement is symmetric. Palate is midline. Normal sternocleidomastoid strength. Tongue is midline. Hearing is intact bilaterally. Motor:  4/5 strength in right upper and lower extremity. Pt unable to move left upper or lower extremity. Normal bulk and tone. Reflexes:   Deep tendon reflexes 1+/4 and symmetrical.   Sensory:   Reports 'sandpaper-like' sensation in in LUE and LLE upon grazing with my hand. Sensation normal in RUE and RLE   Gait:  Unable to assess   Tremor:   No tremor noted. Cerebellar:  No cerebellar signs present. Neurovascular:      No carotid bruits. Laboratory Data  Recent Results (from the past 12 hour(s))   METABOLIC PANEL, COMPREHENSIVE    Collection Time: 04/02/17  6:19 AM   Result Value Ref Range    Sodium 142 136 - 145 mmol/L    Potassium 3.8 3.5 - 5.1 mmol/L    Chloride 106 97 - 108 mmol/L    CO2 24 21 - 32 mmol/L    Anion gap 12 5 - 15 mmol/L    Glucose 113 (H) 65 - 100 mg/dL    BUN 18 6 - 20 MG/DL    Creatinine 0.50 (L) 0.55 - 1.02 MG/DL    BUN/Creatinine ratio 36 (H) 12 - 20      GFR est AA >60 >60 ml/min/1.73m2    GFR est non-AA >60 >60 ml/min/1.73m2    Calcium 8.7 8.5 - 10.1 MG/DL    Bilirubin, total 0.4 0.2 - 1.0 MG/DL    ALT (SGPT) 33 12 - 78 U/L    AST (SGOT) 22 15 - 37 U/L    Alk.  phosphatase 135 (H) 45 - 117 U/L    Protein, total 6.7 6.4 - 8.2 g/dL    Albumin 3.3 (L) 3.5 - 5.0 g/dL    Globulin 3.4 2.0 - 4.0 g/dL    A-G Ratio 1.0 (L) 1.1 - 2.2     CBC W/O DIFF    Collection Time: 04/02/17  6:19 AM   Result Value Ref Range    WBC 5.2 3.6 - 11.0 K/uL    RBC 4.98 3.80 - 5.20 M/uL    HGB 12.6 11.5 - 16.0 g/dL    HCT 41.0 35.0 - 47.0 %    MCV 82.3 80.0 - 99.0 FL MCH 25.3 (L) 26.0 - 34.0 PG    MCHC 30.7 30.0 - 36.5 g/dL    RDW 16.2 (H) 11.5 - 14.5 %    PLATELET 616 364 - 129 K/uL   MAGNESIUM    Collection Time: 04/02/17  6:19 AM   Result Value Ref Range    Magnesium 2.1 1.6 - 2.4 mg/dL   PHOSPHORUS    Collection Time: 04/02/17  6:19 AM   Result Value Ref Range    Phosphorus 3.9 2.6 - 4.7 MG/DL   GLUCOSE, POC    Collection Time: 04/02/17  7:37 AM   Result Value Ref Range    Glucose (POC) 105 (H) 65 - 100 mg/dL    Performed by Cecil Pilling        Imaging  CT Results  (Last 48 hours)               04/02/17 0003  CT HEAD WO CONT Final result    Impression:  IMPRESSION: No acute intracranial abnormality. Narrative:  EXAM:  CT HEAD WO CONT       INDICATION:   Focal neuro deficit, new, fixed or worsening, >24 hours       COMPARISON: CT earlier today. TECHNIQUE: Unenhanced CT of the head was performed using 5 mm images. Brain and   bone windows were generated. CT dose reduction was achieved through use of a   standardized protocol tailored for this examination and automatic exposure   control for dose modulation. FINDINGS:   The ventricles and sulci are normal in size, shape and configuration and   midline. There is no significant white matter disease. There is no intracranial   hemorrhage, extra-axial collection, mass, mass effect or midline shift. The   basilar cisterns are open. No acute infarct is identified. The bone windows   demonstrate no abnormalities. The visualized portions of the paranasal sinuses   and mastoid air cells are clear. 04/01/17 1308  CT HEAD WO CONT Final result    Impression:  IMPRESSION: No acute intracranial abnormality. Narrative:  EXAM:  CT HEAD WO CONT       INDICATION:   left sided weakness. History of breast cancer. COMPARISON: None. TECHNIQUE: Unenhanced CT of the head was performed using 5 mm images. Brain and   bone windows were generated.   CT dose reduction was achieved through use of a   standardized protocol tailored for this examination and automatic exposure   control for dose modulation. FINDINGS:   The ventricles and sulci are normal in size, shape and configuration and   midline. There is no significant white matter disease. There is no intracranial   hemorrhage, extra-axial collection, mass, mass effect or midline shift. The   basilar cisterns are open. No acute infarct is identified. The bone windows   demonstrate no abnormalities. The visualized portions of the paranasal sinuses   and mastoid air cells are clear. 04/01/17 1554  CTA HEAD NECK W CONT Preliminary result    Narrative:  PRELIMINARY REPORT       Exam: CTA Head and Neck. INDICATION: Stroke symptoms, left-sided weakness. Preliminary findings: Atherosclerotic plaque formation at both distal common   carotid arteries without flow-limiting stenosis. No aneurysm, dissection or   hemorrhage. Dural venous sinuses appear patent. Preliminary report was provided by Dr. Chloe Valiente, the on-call radiologist, at   1622 hours       Final report to follow.        END PRELIMINARY REPORT                                                   Hospital Problems:  Hospital Problems  Date Reviewed: 3/21/2017          Codes Class Noted POA    * (Principal)Left-sided weakness ICD-10-CM: R53.1  ICD-9-CM: 728.87  4/2/2017 Unknown        Breast cancer ([facility] ICD-10-CM: C50.919  ICD-9-CM: 174.9  3/12/2015 Yes        HTN (hypertension) ICD-10-CM: I10  ICD-9-CM: 401.9  9/15/2014 Yes        Fibromyalgia ICD-10-CM: M79.7  ICD-9-CM: 729.1  4/21/2014 Yes        Diabetes mellitus with neuropathy ([facility] ICD-10-CM: E11.40  ICD-9-CM: 250.60, 357.2  3/17/2014 Yes

## 2017-04-03 ENCOUNTER — APPOINTMENT (OUTPATIENT)
Dept: GENERAL RADIOLOGY | Age: 67
DRG: 099 | End: 2017-04-03
Attending: FAMILY MEDICINE
Payer: MEDICARE

## 2017-04-03 PROBLEM — R93.7 ABNORMAL MRI, CERVICAL SPINE: Status: ACTIVE | Noted: 2017-04-03

## 2017-04-03 LAB
ALBUMIN SERPL BCP-MCNC: 3.4 G/DL (ref 3.5–5)
ALBUMIN/GLOB SERPL: 0.9 {RATIO} (ref 1.1–2.2)
ALP SERPL-CCNC: 147 U/L (ref 45–117)
ALT SERPL-CCNC: 35 U/L (ref 12–78)
ANION GAP BLD CALC-SCNC: 11 MMOL/L (ref 5–15)
AST SERPL W P-5'-P-CCNC: 20 U/L (ref 15–37)
BILIRUB SERPL-MCNC: 0.3 MG/DL (ref 0.2–1)
BUN SERPL-MCNC: 23 MG/DL (ref 6–20)
BUN/CREAT SERPL: 39 (ref 12–20)
CALCIUM SERPL-MCNC: 9.2 MG/DL (ref 8.5–10.1)
CHARACTER SMN: CLEAR
CHARACTER SMN: CLEAR
CHLORIDE SERPL-SCNC: 105 MMOL/L (ref 97–108)
CO2 SERPL-SCNC: 24 MMOL/L (ref 21–32)
COLOR SPUN CSF: COLORLESS
COLOR SPUN CSF: COLORLESS
CREAT SERPL-MCNC: 0.59 MG/DL (ref 0.55–1.02)
ERYTHROCYTE [DISTWIDTH] IN BLOOD BY AUTOMATED COUNT: 16.1 % (ref 11.5–14.5)
GLOBULIN SER CALC-MCNC: 3.7 G/DL (ref 2–4)
GLUCOSE BLD STRIP.AUTO-MCNC: 234 MG/DL (ref 65–100)
GLUCOSE BLD STRIP.AUTO-MCNC: 252 MG/DL (ref 65–100)
GLUCOSE BLD STRIP.AUTO-MCNC: 283 MG/DL (ref 65–100)
GLUCOSE BLD STRIP.AUTO-MCNC: 401 MG/DL (ref 65–100)
GLUCOSE CSF-MCNC: 129 MG/DL (ref 40–70)
GLUCOSE SERPL-MCNC: 283 MG/DL (ref 65–100)
HCT VFR BLD AUTO: 42.7 % (ref 35–47)
HGB BLD-MCNC: 13.3 G/DL (ref 11.5–16)
MAGNESIUM SERPL-MCNC: 2.4 MG/DL (ref 1.6–2.4)
MCH RBC QN AUTO: 25.7 PG (ref 26–34)
MCHC RBC AUTO-ENTMCNC: 31.1 G/DL (ref 30–36.5)
MCV RBC AUTO: 82.4 FL (ref 80–99)
PHOSPHATE SERPL-MCNC: 4.9 MG/DL (ref 2.6–4.7)
PLATELET # BLD AUTO: 279 K/UL (ref 150–400)
POTASSIUM SERPL-SCNC: 4.4 MMOL/L (ref 3.5–5.1)
PROT CSF-MCNC: 86 MG/DL (ref 15–45)
PROT SERPL-MCNC: 7.1 G/DL (ref 6.4–8.2)
RBC # BLD AUTO: 5.18 M/UL (ref 3.8–5.2)
RBC # CSF: 116 /CU MM
RBC # CSF: 505 /CU MM
SERVICE CMNT-IMP: ABNORMAL
SODIUM SERPL-SCNC: 140 MMOL/L (ref 136–145)
TUBE # CSF: 1
TUBE # CSF: 1
TUBE # CSF: 2
TUBE # CSF: 2
WBC # BLD AUTO: 4.6 K/UL (ref 3.6–11)
WBC # CSF: 2 /CU MM (ref 0–5)
WBC # CSF: 3 /CU MM (ref 0–5)

## 2017-04-03 PROCEDURE — 009U3ZX DRAINAGE OF SPINAL CANAL, PERCUTANEOUS APPROACH, DIAGNOSTIC: ICD-10-PCS | Performed by: RADIOLOGY

## 2017-04-03 PROCEDURE — 74011250637 HC RX REV CODE- 250/637: Performed by: FAMILY MEDICINE

## 2017-04-03 PROCEDURE — 65270000029 HC RM PRIVATE

## 2017-04-03 PROCEDURE — 97112 NEUROMUSCULAR REEDUCATION: CPT

## 2017-04-03 PROCEDURE — 86618 LYME DISEASE ANTIBODY: CPT | Performed by: FAMILY MEDICINE

## 2017-04-03 PROCEDURE — 74011000258 HC RX REV CODE- 258: Performed by: PSYCHIATRY & NEUROLOGY

## 2017-04-03 PROCEDURE — 77003 FLUOROGUIDE FOR SPINE INJECT: CPT

## 2017-04-03 PROCEDURE — 86592 SYPHILIS TEST NON-TREP QUAL: CPT | Performed by: NURSE PRACTITIONER

## 2017-04-03 PROCEDURE — 86617 LYME DISEASE ANTIBODY: CPT | Performed by: NURSE PRACTITIONER

## 2017-04-03 PROCEDURE — 77030003666 HC NDL SPINAL BD -A

## 2017-04-03 PROCEDURE — 84157 ASSAY OF PROTEIN OTHER: CPT | Performed by: NURSE PRACTITIONER

## 2017-04-03 PROCEDURE — 83873 ASSAY OF CSF PROTEIN: CPT | Performed by: FAMILY MEDICINE

## 2017-04-03 PROCEDURE — 82945 GLUCOSE OTHER FLUID: CPT | Performed by: NURSE PRACTITIONER

## 2017-04-03 PROCEDURE — 74011250636 HC RX REV CODE- 250/636: Performed by: PSYCHIATRY & NEUROLOGY

## 2017-04-03 PROCEDURE — 97163 PT EVAL HIGH COMPLEX 45 MIN: CPT

## 2017-04-03 PROCEDURE — 74011250637 HC RX REV CODE- 250/637: Performed by: NURSE PRACTITIONER

## 2017-04-03 PROCEDURE — 83735 ASSAY OF MAGNESIUM: CPT

## 2017-04-03 PROCEDURE — 97530 THERAPEUTIC ACTIVITIES: CPT

## 2017-04-03 PROCEDURE — 84100 ASSAY OF PHOSPHORUS: CPT

## 2017-04-03 PROCEDURE — 74011636637 HC RX REV CODE- 636/637: Performed by: FAMILY MEDICINE

## 2017-04-03 PROCEDURE — 36415 COLL VENOUS BLD VENIPUNCTURE: CPT

## 2017-04-03 PROCEDURE — 74011250636 HC RX REV CODE- 250/636: Performed by: FAMILY MEDICINE

## 2017-04-03 PROCEDURE — 97165 OT EVAL LOW COMPLEX 30 MIN: CPT

## 2017-04-03 PROCEDURE — 82164 ANGIOTENSIN I ENZYME TEST: CPT | Performed by: NURSE PRACTITIONER

## 2017-04-03 PROCEDURE — 89050 BODY FLUID CELL COUNT: CPT | Performed by: NURSE PRACTITIONER

## 2017-04-03 PROCEDURE — 85027 COMPLETE CBC AUTOMATED: CPT

## 2017-04-03 PROCEDURE — 80053 COMPREHEN METABOLIC PANEL: CPT

## 2017-04-03 PROCEDURE — 74011250637 HC RX REV CODE- 250/637: Performed by: HOSPITALIST

## 2017-04-03 PROCEDURE — 82042 OTHER SOURCE ALBUMIN QUAN EA: CPT | Performed by: NURSE PRACTITIONER

## 2017-04-03 PROCEDURE — 87205 SMEAR GRAM STAIN: CPT | Performed by: NURSE PRACTITIONER

## 2017-04-03 PROCEDURE — 82962 GLUCOSE BLOOD TEST: CPT

## 2017-04-03 RX ORDER — ENOXAPARIN SODIUM 100 MG/ML
40 INJECTION SUBCUTANEOUS EVERY 24 HOURS
Status: DISCONTINUED | OUTPATIENT
Start: 2017-04-04 | End: 2017-04-06 | Stop reason: HOSPADM

## 2017-04-03 RX ORDER — TRAMADOL HYDROCHLORIDE 50 MG/1
50 TABLET ORAL ONCE
Status: COMPLETED | OUTPATIENT
Start: 2017-04-03 | End: 2017-04-03

## 2017-04-03 RX ORDER — BACLOFEN 10 MG/1
10 TABLET ORAL 3 TIMES DAILY
Status: DISCONTINUED | OUTPATIENT
Start: 2017-04-03 | End: 2017-04-06 | Stop reason: HOSPADM

## 2017-04-03 RX ADMIN — BACLOFEN 10 MG: 10 TABLET ORAL at 15:23

## 2017-04-03 RX ADMIN — INSULIN LISPRO 10 UNITS: 100 INJECTION, SOLUTION INTRAVENOUS; SUBCUTANEOUS at 22:16

## 2017-04-03 RX ADMIN — TRAMADOL HYDROCHLORIDE 50 MG: 50 TABLET, FILM COATED ORAL at 15:56

## 2017-04-03 RX ADMIN — PRAVASTATIN SODIUM 40 MG: 20 TABLET ORAL at 21:27

## 2017-04-03 RX ADMIN — GABAPENTIN 300 MG: 300 CAPSULE ORAL at 21:27

## 2017-04-03 RX ADMIN — Medication 10 ML: at 21:28

## 2017-04-03 RX ADMIN — AMLODIPINE BESYLATE 5 MG: 5 TABLET ORAL at 21:28

## 2017-04-03 RX ADMIN — INSULIN LISPRO 3 UNITS: 100 INJECTION, SOLUTION INTRAVENOUS; SUBCUTANEOUS at 12:13

## 2017-04-03 RX ADMIN — LISINOPRIL 5 MG: 5 TABLET ORAL at 08:29

## 2017-04-03 RX ADMIN — INSULIN LISPRO 5 UNITS: 100 INJECTION, SOLUTION INTRAVENOUS; SUBCUTANEOUS at 08:28

## 2017-04-03 RX ADMIN — GABAPENTIN 600 MG: 300 CAPSULE ORAL at 08:29

## 2017-04-03 RX ADMIN — INSULIN LISPRO 5 UNITS: 100 INJECTION, SOLUTION INTRAVENOUS; SUBCUTANEOUS at 16:48

## 2017-04-03 RX ADMIN — Medication 10 ML: at 14:05

## 2017-04-03 RX ADMIN — BACLOFEN 10 MG: 10 TABLET ORAL at 21:27

## 2017-04-03 RX ADMIN — SODIUM CHLORIDE 1000 MG: 900 INJECTION, SOLUTION INTRAVENOUS at 16:48

## 2017-04-03 RX ADMIN — MAGNESIUM GLUCONATE 500 MG ORAL TABLET 400 MG: 500 TABLET ORAL at 08:29

## 2017-04-03 RX ADMIN — CYCLOBENZAPRINE HYDROCHLORIDE 5 MG: 10 TABLET, FILM COATED ORAL at 08:29

## 2017-04-03 RX ADMIN — LORAZEPAM 1 MG: 2 INJECTION INTRAMUSCULAR; INTRAVENOUS at 14:05

## 2017-04-03 NOTE — ROUTINE PROCESS
Bedside and Verbal shift change report given to Cristian Roberts RN (oncoming nurse) by Ayden Gaines RN (offgoing nurse). Report included the following information SBAR, Kardex, MAR, Accordion and Recent Results.

## 2017-04-03 NOTE — PROGRESS NOTES
[facility]   Daily Progress Note    Date: 4/4/2017    Assessment/Plan:   Jannie Cornell is a 78 y.o. female with hx of breast cancer, DM with peripheral neuropathy, fibromyalgia, and HTN who is admitted for Stroke/TIA rule out. Overnight Events: Overnight glucose was ~401, improved with 10units of regular insulin. Patient reports improvement of symptoms. Can somewhat move her left fingers and wrist. Able to lift left shoulder. Also regaining some ROM in her foot.      Left sided Hemiparesis with Parasthesias- Working diagnosis of transverse myelitis based on MRI of cervical spine. S/P LP yesterday. CSF studies: Increased protein and glucose with no pleomorphic lymphocytosis, gram stain negative, cultures and various other studies pending.   - Neurology following, appreciate recommendations  - Continue Medrol 1gm IV daily   - F/U CSF studies(VDRL, lyme, myelin basic protein), MS panel, and lyme, EBV, CMV,  RPR, HIV  -Will consider MRI lumbar spine given persistent incontinence. Diabetes Mellitus T2 w/ Peripheral Neuropathy: Last HgA1c 7.0 (3/2017). With hyperglycemia overnight likely 2/2 high dose steroid administration. Pt is insulin naive. Glucose this AM ~200s. -Started Lantus 16units daily   - Insulin Sliding Scale normal sensitivity with AC&HS glucose checks. - Hypoglycemia protocols ordered. - Continue to monitor and assess need for increasing insulin requirement  - Continue home dose of gabapentin.     Muscle Spasms - Acute on chronic issue. - Switched Flexiril to Baclofen yesterday     Hypertension: BP today 144/66   - Continue Norvasc, lisinopril yesterday  - Will continue to monitor at this time and readjust as BP's trend.     Hyperlipidemia - last lipid panel (12/2016): cholesterol 179, , HDL 72, LDL 77.   -Continue home pravastatin.      FEN/GI - Diabetic diet.   Activity - Bedrest  DVT prophylaxis - Lovenox  GI prophylaxis - Not indicated at this time  Fall prophylaxis - Fall precautions ordered. Disposition - Remote Telemetry. Plan to d/c to TBD. Consulting PT/OT/CM. Code Status - Full, discussed with patient / caregivers.     Pt discussed and seen with Dr. Debbi Sheth MD  Family Medicine Resident         CC: \"I'm doing a bit better! \"    Subjective  Examined patient at bedside. Patient reports improvement of symptoms as mentioned above. Denies chest pain, SOB, abdominal pain, EVAN. Still incontinent. Inpatient Medications  Current Facility-Administered Medications   Medication Dose Route Frequency    insulin glargine (LANTUS) injection 16 Units  16 Units SubCUTAneous DAILY    baclofen (LIORESAL) tablet 10 mg  10 mg Oral TID    enoxaparin (LOVENOX) injection 40 mg  40 mg SubCUTAneous Q24H    lisinopril (PRINIVIL, ZESTRIL) tablet 5 mg  5 mg Oral DAILY    amLODIPine (NORVASC) tablet 5 mg  5 mg Oral QHS    methylPREDNISolone ((Solu-MEDROL) 1,000 mg in 0.9% sodium chloride (MBP/ADV) 100 mL  1 g IntraVENous Q24H    LORazepam (ATIVAN) injection 1 mg  1 mg IntraVENous RAD PRN    aspirin chewable tablet 81 mg  81 mg Oral DAILY    gabapentin (NEURONTIN) capsule 600 mg  600 mg Oral DAILY    gabapentin (NEURONTIN) capsule 300 mg  300 mg Oral QPM    magnesium oxide (MAG-OX) tablet 400 mg  400 mg Oral DAILY    pravastatin (PRAVACHOL) tablet 40 mg  40 mg Oral QHS    sodium chloride (NS) flush 5-10 mL  5-10 mL IntraVENous Q8H    sodium chloride (NS) flush 5-10 mL  5-10 mL IntraVENous PRN    acetaminophen (TYLENOL) tablet 650 mg  650 mg Oral Q4H PRN    insulin lispro (HUMALOG) injection   SubCUTAneous QID WITH MEALS    glucose chewable tablet 16 g  4 Tab Oral PRN    dextrose (D50W) injection syrg 12.5-25 g  12.5-25 g IntraVENous PRN    glucagon (GLUCAGEN) injection 1 mg  1 mg IntraMUSCular PRN         Allergies  Allergies   Allergen Reactions    Latex Other (comments)     Patient states it burns around her mouth.     Other Food Other (comments)     Yogurt - stomach cramping    Betadine [Povidone-Iodine] Itching     Pt states this causes \"extreme\" itching    Codeine Anaphylaxis, Rash, Nausea Only and Other (comments)     HEADACHE  Tolerates tramadol    Dilaudid [Hydromorphone (Bulk)] Anaphylaxis, Itching, Nausea Only and Other (comments)     HALLUCINATIONS  Tolerates tramadol      Hydrocodone Anaphylaxis, Rash, Nausea Only and Vertigo     Facial - eyelid swelling-had to go to ER for reaction, HALLUCINATIONS  Tolerates tramadol      Ditropan [Oxybutynin Chloride] Swelling    Doxycycline Rash     PUSTULAR RASH- TOLERATING TETRACYCLINE    Keflex [Cephalexin] Nausea and Vomiting     Pain in abdomen AND FLU-LIKE SX      Metformin Nausea and Vomiting     Severe abdominal pain      Tape [Adhesive] Other (comments)     Redness/inflammed. Can only use paper tape. CAN USE BAND-AIDS. TOLERATES SURGICAL TAPE USED IN [facility].  Sulfamethoxazole-Trimethoprim Other (comments)     Cramping/flu-like symptoms         Objective  Vitals:  Patient Vitals for the past 8 hrs:   Temp Pulse Resp BP SpO2   04/04/17 1021 97 °F (36.1 °C) 64 18 144/66 98 %   04/04/17 0640 97.6 °F (36.4 °C) 73 18 165/84 100 %         I/O:    Intake/Output Summary (Last 24 hours) at 04/04/17 1230  Last data filed at 04/04/17 1148   Gross per 24 hour   Intake                0 ml   Output             1450 ml   Net            -1450 ml     Last shift:    04/04 0701 - 04/04 1900  In: -   Out: 400 [Urine:400]  Last 3 shifts:    04/02 1901 - 04/04 0700  In: -   Out: 2450 [Urine:2450]    Physical Exam:  General: No acute distress. Alert. Cooperative. Very pleasant. Head: Normocephalic. Atraumatic. Eyes:  Conjunctiva pink. Sclera white. PERRL. Respiratory: CTAB. No w/r/r/c.   Cardiovascular: RRR. Normal S1,S2. No m/r/g. Pulses 2+ throughout. GI: + bowel sounds. Nontender. No rebound tenderness or guarding. Nondistended   Extremities: No edema. No palpable cord.  LLE tenderness persists     Neurological Exam:  Mental Status: Alert, oriented x 3 and following commands. Cranial Nerves:   Intact visual fields. PERRL, EOM's full, no nystagmus, no ptosis. Facial sensation is normal. Facial movement is symmetric. Palate is midline. Normal sternocleidomastoid strength. Tongue is midline. Hearing is intact bilaterally. Motor:  4/5 strength in right upper and lower extremity. Pt still unable to move left upper or lower extremity for the most part (now can move wrist and fingers and foot) and tenderness improved from yesterday. Normal bulk and tone. Reflexes:   Deep tendon reflexes 2+/4 and symmetrical.   Sensory:   Reports 'sandpaper-like' sensation in in LUE and LLE upon grazing with my hand. Sensation normal in RUE and RLE   Gait:  Unable to assess   Tremor:   No tremor noted. Cerebellar:  No cerebellar signs present. Neurovascular:      No carotid bruits. Laboratory Data  Recent Results (from the past 12 hour(s))   METABOLIC PANEL, COMPREHENSIVE    Collection Time: 04/04/17  6:23 AM   Result Value Ref Range    Sodium 142 136 - 145 mmol/L    Potassium 4.5 3.5 - 5.1 mmol/L    Chloride 106 97 - 108 mmol/L    CO2 29 21 - 32 mmol/L    Anion gap 7 5 - 15 mmol/L    Glucose 265 (H) 65 - 100 mg/dL    BUN 27 (H) 6 - 20 MG/DL    Creatinine 0.53 (L) 0.55 - 1.02 MG/DL    BUN/Creatinine ratio 51 (H) 12 - 20      GFR est AA >60 >60 ml/min/1.73m2    GFR est non-AA >60 >60 ml/min/1.73m2    Calcium 9.1 8.5 - 10.1 MG/DL    Bilirubin, total 0.4 0.2 - 1.0 MG/DL    ALT (SGPT) 35 12 - 78 U/L    AST (SGOT) 17 15 - 37 U/L    Alk.  phosphatase 140 (H) 45 - 117 U/L    Protein, total 6.6 6.4 - 8.2 g/dL    Albumin 3.5 3.5 - 5.0 g/dL    Globulin 3.1 2.0 - 4.0 g/dL    A-G Ratio 1.1 1.1 - 2.2     CBC W/O DIFF    Collection Time: 04/04/17  6:23 AM   Result Value Ref Range    WBC 5.4 3.6 - 11.0 K/uL    RBC 5.11 3.80 - 5.20 M/uL    HGB 13.7 11.5 - 16.0 g/dL    HCT 41.2 35.0 - 47.0 %    MCV 80.6 80.0 - 99.0 FL    MCH 26.8 26.0 - 34.0 PG    MCHC 33.3 30.0 - 36.5 g/dL    RDW 16.0 (H) 11.5 - 14.5 %    PLATELET 981 649 - 166 K/uL   MAGNESIUM    Collection Time: 04/04/17  6:23 AM   Result Value Ref Range    Magnesium 2.4 1.6 - 2.4 mg/dL   PHOSPHORUS    Collection Time: 04/04/17  6:23 AM   Result Value Ref Range    Phosphorus 4.3 2.6 - 4.7 MG/DL   VITAMIN B12    Collection Time: 04/04/17  6:23 AM   Result Value Ref Range    Vitamin B12 >2000 (H) 211 - 911 pg/mL   GLUCOSE, POC    Collection Time: 04/04/17  7:17 AM   Result Value Ref Range    Glucose (POC) 249 (H) 65 - 100 mg/dL    Performed by [facility](CON)    GLUCOSE, POC    Collection Time: 04/04/17 11:13 AM   Result Value Ref Range    Glucose (POC) 257 (H) 65 - 100 mg/dL    Performed by [facility])        Imaging  CT Results  (Last 48 hours)               04/02/17 0003  CT HEAD WO CONT Final result    Impression:  IMPRESSION: No acute intracranial abnormality. Narrative:  EXAM:  CT HEAD WO CONT       INDICATION:   Focal neuro deficit, new, fixed or worsening, >24 hours       COMPARISON: CT earlier today. TECHNIQUE: Unenhanced CT of the head was performed using 5 mm images. Brain and   bone windows were generated. CT dose reduction was achieved through use of a   standardized protocol tailored for this examination and automatic exposure   control for dose modulation. FINDINGS:   The ventricles and sulci are normal in size, shape and configuration and   midline. There is no significant white matter disease. There is no intracranial   hemorrhage, extra-axial collection, mass, mass effect or midline shift. The   basilar cisterns are open. No acute infarct is identified. The bone windows   demonstrate no abnormalities. The visualized portions of the paranasal sinuses   and mastoid air cells are clear. 04/01/17 1308  CT HEAD WO CONT Final result    Impression:  IMPRESSION: No acute intracranial abnormality. Narrative:  EXAM:  CT HEAD WO CONT       INDICATION:   left sided weakness. History of breast cancer. COMPARISON: None. TECHNIQUE: Unenhanced CT of the head was performed using 5 mm images. Brain and   bone windows were generated. CT dose reduction was achieved through use of a   standardized protocol tailored for this examination and automatic exposure   control for dose modulation. FINDINGS:   The ventricles and sulci are normal in size, shape and configuration and   midline. There is no significant white matter disease. There is no intracranial   hemorrhage, extra-axial collection, mass, mass effect or midline shift. The   basilar cisterns are open. No acute infarct is identified. The bone windows   demonstrate no abnormalities. The visualized portions of the paranasal sinuses   and mastoid air cells are clear. 04/01/17 1554  CTA HEAD NECK W CONT Preliminary result    Narrative:  PRELIMINARY REPORT       Exam: CTA Head and Neck. INDICATION: Stroke symptoms, left-sided weakness. Preliminary findings: Atherosclerotic plaque formation at both distal common   carotid arteries without flow-limiting stenosis. No aneurysm, dissection or   hemorrhage. Dural venous sinuses appear patent. Preliminary report was provided by Dr. Rosario Jacob, the on-call radiologist, at   1622 hours       Final report to follow.        END PRELIMINARY REPORT                                                   Hospital Problems:  Hospital Problems  Date Reviewed: 4/3/2017          Codes Class Noted POA    Abnormal MRI, cervical spine ICD-10-CM: R93.7  ICD-9-CM: 793.7  4/3/2017 Yes        * (Principal)Left-sided weakness ICD-10-CM: R53.1  ICD-9-CM: 728.87  4/2/2017 Unknown        Ac[facility] myel[facility] ([facility] ICD-10-CM: G37.3  ICD-9-CM: 341.20  4/2/2017 Unknown        Breast cancer ([facility] ICD-10-CM: C50.919  ICD-9-CM: 174.9  3/12/2015 Yes        HTN (hypertension) ICD-10-CM: I10  ICD-9-CM: 401.9  9/15/2014 Yes        Fibromyalgia ICD-10-CM: M79.7  ICD-9-CM: 729.1  4/21/2014 Yes        Diabetes mellitus with neuropathy [facility]) ICD-10-CM: E11.40  ICD-9-CM: 250.60, 357.2  3/17/2014 Yes

## 2017-04-03 NOTE — PROGRESS NOTES
[facility]   Daily Progress Note    Date: 4/3/2017    Assessment/Plan:   Kal Marinelli is a 78 y.o. female with hx of breast cancer, DM with peripheral neuropathy, fibromyalgia, and HTN who is admitted for Stroke/TIA rule out. Overnight Events: Patient reports slight progression of symptoms. Hemiparesis in left upper and lower extremities persists along with pins and needles sensation but now involving some of her left lower back. Numbness of the right upper and lower extremities persists but now with similar sensation in her right lower back.      Left sided Hemiparesis with Parasthesias- Concern for transverse myelitis based on MRI of cervical spine. Final read of MRI: C4-C5 increased moderate-severe central spinal canal stenosis and left foraminal stenosis. Neurology following and aware. Started on high dose steroids yesterday to relieve inflammation. Some progression of symptoms from yesterday as mentioned above. - Neurology following, appreciate recommendations  - LP scheduled by IR today to evaluate for infectious etiology   - Continue Medrol 1gm IV daily      Muscle Spasms - Acute on chronic issue. - Switched Flexiril to Baclofen      Hypertension: BP today 158/74.    - Restarted Norvasc, lisinopril yesterday  - Will continue to monitor at this time and readjust as BP's trend.     Diabetes Mellitus T2 w/ Peripheral Neuropathy: Last HgA1c 7.0 (3/2017). POC glucose ranging from 130-220. - Insulin Sliding Scale normal sensitivity with AC&HS glucose checks. - Hypoglycemia protocols ordered. - Continue to monitor and assess need for increasing insulin requirement  - Ordered home dose of gabapentin.     Hyperlipidemia - last lipid panel (12/2016): cholesterol 179, , HDL 72, LDL 77.   -Continue home pravastatin.      FEN/GI - Diabetic diet.   Activity - Bedrest  DVT prophylaxis - Lovenox  GI prophylaxis - Not indicated at this time  Fall prophylaxis - Fall precautions ordered. Disposition - Remote Telemetry. Plan to d/c to TBD. Consulting PT/OT/CM. Code Status - Full, discussed with patient / caregivers.     Pt discussed and seen with Dr. Randa Nissen, MD  Family Medicine Resident         CC: \"Still can't move my left arm\"    Subjective  Examined patient at bedside.  also present in the room. I already discussed the results of the MRI with the family yesterday. Patient reported some progression of her symptoms: Hemiparesis in left upper and lower extremities persists along with pins and needles sensation but now involving some of her left lower back. Numbness of the right upper and lower extremities persists but now with similar sensation in her right lower back. States she feels like her left leg is in an iron cast. Still incontinent of urine.  She denies chest pain, shortness of breath, abdominal pain, diarrhea, constipation      Inpatient Medications  Current Facility-Administered Medications   Medication Dose Route Frequency    lisinopril (PRINIVIL, ZESTRIL) tablet 5 mg  5 mg Oral DAILY    amLODIPine (NORVASC) tablet 5 mg  5 mg Oral QHS    methylPREDNISolone ((Solu-MEDROL) 1,000 mg in 0.9% sodium chloride (MBP/ADV) 100 mL  1 g IntraVENous Q24H    LORazepam (ATIVAN) injection 1 mg  1 mg IntraVENous RAD PRN    aspirin chewable tablet 81 mg  81 mg Oral DAILY    gabapentin (NEURONTIN) capsule 600 mg  600 mg Oral DAILY    gabapentin (NEURONTIN) capsule 300 mg  300 mg Oral QPM    magnesium oxide (MAG-OX) tablet 400 mg  400 mg Oral DAILY    pravastatin (PRAVACHOL) tablet 40 mg  40 mg Oral QHS    sodium chloride (NS) flush 5-10 mL  5-10 mL IntraVENous Q8H    sodium chloride (NS) flush 5-10 mL  5-10 mL IntraVENous PRN    acetaminophen (TYLENOL) tablet 650 mg  650 mg Oral Q4H PRN    insulin lispro (HUMALOG) injection   SubCUTAneous QID WITH MEALS    glucose chewable tablet 16 g  4 Tab Oral PRN    dextrose (D50W) injection syrg 12.5-25 g  12.5-25 g IntraVENous PRN    glucagon (GLUCAGEN) injection 1 mg  1 mg IntraMUSCular PRN    cyclobenzaprine (FLEXERIL) tablet 5 mg  5 mg Oral TID PRN         Allergies  Allergies   Allergen Reactions    Latex Other (comments)     Patient states it burns around her mouth.  Other Food Other (comments)     Yogurt - stomach cramping    Betadine [Povidone-Iodine] Itching     Pt states this causes \"extreme\" itching    Codeine Anaphylaxis, Rash, Nausea Only and Other (comments)     HEADACHE  Tolerates tramadol    Dilaudid [Hydromorphone (Bulk)] Anaphylaxis, Itching, Nausea Only and Other (comments)     HALLUCINATIONS  Tolerates tramadol      Hydrocodone Anaphylaxis, Rash, Nausea Only and Vertigo     Facial - eyelid swelling-had to go to ER for reaction, HALLUCINATIONS  Tolerates tramadol      Ditropan [Oxybutynin Chloride] Swelling    Doxycycline Rash     PUSTULAR RASH- TOLERATING TETRACYCLINE    Keflex [Cephalexin] Nausea and Vomiting     Pain in abdomen AND FLU-LIKE SX      Metformin Nausea and Vomiting     Severe abdominal pain      Tape [Adhesive] Other (comments)     Redness/inflammed. Can only use paper tape. CAN USE BAND-AIDS. TOLERATES SURGICAL TAPE USED IN [place] [facility].  Sulfamethoxazole-Trimethoprim Other (comments)     Cramping/flu-like symptoms         Objective  Vitals:  Patient Vitals for the past 8 hrs:   Temp Pulse Resp BP SpO2   04/03/17 0602 97.7 °F (36.5 °C) 71 18 158/74 95 %   04/03/17 0320 97.7 °F (36.5 °C) 75 18 135/63 97 %         I/O:    Intake/Output Summary (Last 24 hours) at 04/03/17 0732  Last data filed at 04/02/17 2225   Gross per 24 hour   Intake                0 ml   Output             1550 ml   Net            -1550 ml     Last shift:       Last 3 shifts:    04/01 1901 - 04/03 0700  In: -   Out: 5388 [Urine:1550]    Physical Exam:  General: No acute distress. Alert. Cooperative. Head: Normocephalic. Atraumatic. Eyes:  Conjunctiva pink. Sclera white. PERRL. Respiratory: CTAB. No w/r/r/c.   Cardiovascular: RRR. Normal S1,S2. No m/r/g. Pulses 2+ throughout. GI: + bowel sounds. Nontender. No rebound tenderness or guarding. Nondistended   Extremities: No edema. No palpable cord. No tenderness. Neurological Exam:  Mental Status: Alert, oriented x 3 and following commands. Cranial Nerves:   Intact visual fields. PERRL, EOM's full, no nystagmus, no ptosis. Facial sensation is normal. Facial movement is symmetric. Palate is midline. Normal sternocleidomastoid strength. Tongue is midline. Hearing is intact bilaterally. Motor:  4/5 strength in right upper and lower extremity. Pt unable to move left upper or lower extremity and tender to palpation. Normal bulk and tone. Reflexes:   Deep tendon reflexes 1+/4 and symmetrical.   Sensory:   Reports 'sandpaper-like' sensation in in LUE and LLE upon grazing with my hand. Sensation normal in RUE and RLE   Gait:  Unable to assess   Tremor:   No tremor noted. Cerebellar:  No cerebellar signs present. Neurovascular:      No carotid bruits. Laboratory Data  Recent Results (from the past 12 hour(s))   GLUCOSE, POC    Collection Time: 04/02/17  9:53 PM   Result Value Ref Range    Glucose (POC) 274 (H) 65 - 100 mg/dL    Performed by [facility] (PCT)    CBC W/O DIFF    Collection Time: 04/03/17  6:45 AM   Result Value Ref Range    WBC 4.6 3.6 - 11.0 K/uL    RBC 5.18 3.80 - 5.20 M/uL    HGB 13.3 11.5 - 16.0 g/dL    HCT 42.7 35.0 - 47.0 %    MCV 82.4 80.0 - 99.0 FL    MCH 25.7 (L) 26.0 - 34.0 PG    MCHC 31.1 30.0 - 36.5 g/dL    RDW 16.1 (H) 11.5 - 14.5 %    PLATELET 715 904 - 920 K/uL       Imaging  CT Results  (Last 48 hours)               04/02/17 0003  CT HEAD WO CONT Final result    Impression:  IMPRESSION: No acute intracranial abnormality. Narrative:  EXAM:  CT HEAD WO CONT       INDICATION:   Focal neuro deficit, new, fixed or worsening, >24 hours       COMPARISON: CT earlier today. TECHNIQUE: Unenhanced CT of the head was performed using 5 mm images. Brain and   bone windows were generated. CT dose reduction was achieved through use of a   standardized protocol tailored for this examination and automatic exposure   control for dose modulation. FINDINGS:   The ventricles and sulci are normal in size, shape and configuration and   midline. There is no significant white matter disease. There is no intracranial   hemorrhage, extra-axial collection, mass, mass effect or midline shift. The   basilar cisterns are open. No acute infarct is identified. The bone windows   demonstrate no abnormalities. The visualized portions of the paranasal sinuses   and mastoid air cells are clear. 04/01/17 1308  CT HEAD WO CONT Final result    Impression:  IMPRESSION: No acute intracranial abnormality. Narrative:  EXAM:  CT HEAD WO CONT       INDICATION:   left sided weakness. History of breast cancer. COMPARISON: None. TECHNIQUE: Unenhanced CT of the head was performed using 5 mm images. Brain and   bone windows were generated. CT dose reduction was achieved through use of a   standardized protocol tailored for this examination and automatic exposure   control for dose modulation. FINDINGS:   The ventricles and sulci are normal in size, shape and configuration and   midline. There is no significant white matter disease. There is no intracranial   hemorrhage, extra-axial collection, mass, mass effect or midline shift. The   basilar cisterns are open. No acute infarct is identified. The bone windows   demonstrate no abnormalities. The visualized portions of the paranasal sinuses   and mastoid air cells are clear. 04/01/17 1554  CTA HEAD NECK W CONT Preliminary result    Narrative:  PRELIMINARY REPORT       Exam: CTA Head and Neck. INDICATION: Stroke symptoms, left-sided weakness.        Preliminary findings: Atherosclerotic plaque formation at both distal common   carotid arteries without flow-limiting stenosis. No aneurysm, dissection or   hemorrhage. Dural venous sinuses appear patent. Preliminary report was provided by Dr. Marlena Drake, the on-call radiologist, at   1622 hours       Final report to follow.        END PRELIMINARY REPORT                                                   Hospital Problems:  Hospital Problems  Date Reviewed: 3/21/2017          Codes Class Noted POA    * (Principal)Left-sided weakness ICD-10-CM: R53.1  ICD-9-CM: 728.87  4/2/2017 Unknown        Acute transverse myelitis ([facility] ICD-10-CM: G37.3  ICD-9-CM: 341.20  4/2/2017 Unknown        Breast cancer ([facility] ICD-10-CM: C50.919  ICD-9-CM: 174.9  3/12/2015 Yes        HTN (hypertension) ICD-10-CM: I10  ICD-9-CM: 401.9  9/15/2014 Yes        Fibromyalgia ICD-10-CM: M79.7  ICD-9-CM: 729.1  4/21/2014 Yes        Diabetes mellitus with neuropathy ([facility] ICD-10-CM: E11.40  ICD-9-CM: 250.60, 357.2  3/17/2014 Yes

## 2017-04-03 NOTE — PROGRESS NOTES
[facility]  [address] [place] [address]  [phone]     Inpatient Neurology Progress Note  Jackmaria teresa Frances, [facility]    Name:   River Macdonald record #: [id number]  Admission Date: 4/1/2017  Date:   04/03/17  _____________________________________________________________________________  Addendum 1130:  Refer to addendum on MRI-C-spine this AM and results discussed with Dr. Tea Gaming of  team--MEB    ____________________________________  Subjective:  CC:  I can't move my L arm now  ·  When I came in Saturday I could move my L arm, dominant arm but now I can't  · I could walk on walker with both hands and legs on Saturday but now I wouldn't be able to  · I feel like things are spreading, new sensory changes to my R arm and leg  · Reports L leg cramping and tightness  Denies:   Visual changes, NV, fever or speech changes  _____________________________________________________________________________  Objective  Patient Vitals for the past 12 hrs:   Temp Pulse Resp BP SpO2   04/03/17 1134 97.6 °F (36.4 °C) 79 19 162/72 96 %   04/03/17 0702 - 83 - - -   04/03/17 0602 97.7 °F (36.5 °C) 71 18 158/74 95 %   04/03/17 0320 97.7 °F (36.5 °C) 75 18 135/63 97 %     Allergies: Allergies   Allergen Reactions    Latex Other (comments)     Patient states it burns around her mouth.     Other Food Other (comments)     Yogurt - stomach cramping    Betadine [Povidone-Iodine] Itching     Pt states this causes \"extreme\" itching    Codeine Anaphylaxis, Rash, Nausea Only and Other (comments)     HEADACHE  Tolerates tramadol    Dilaudid [Hydromorphone (Bulk)] Anaphylaxis, Itching, Nausea Only and Other (comments)     HALLUCINATIONS  Tolerates tramadol      Hydrocodone Anaphylaxis, Rash, Nausea Only and Vertigo     Facial - eyelid swelling-had to go to ER for reaction, HALLUCINATIONS  Tolerates tramadol      Ditropan [Oxybutynin Chloride] Swelling    Doxycycline Rash     PUSTULAR RASH- TOLERATING TETRACYCLINE    Keflex [Cephalexin] Nausea and Vomiting     Pain in abdomen AND FLU-LIKE SX      Metformin Nausea and Vomiting     Severe abdominal pain      Tape [Adhesive] Other (comments)     Redness/inflammed. Can only use paper tape. CAN USE BAND-AIDS. TOLERATES SURGICAL TAPE USED IN [facility].      Sulfamethoxazole-Trimethoprim Other (comments)     Cramping/flu-like symptoms     Inpatient Meds    Current Facility-Administered Medications:     baclofen (LIORESAL) tablet 10 mg, 10 mg, Oral, TID, Sofiya Islas NP    lisinopril (PRINIVIL, ZESTRIL) tablet 5 mg, 5 mg, Oral, DAILY, Yogesh Gil MD, 5 mg at 04/03/17 0829    amLODIPine (NORVASC) tablet 5 mg, 5 mg, Oral, QHS, Yogesh Gil MD, 5 mg at 04/02/17 1856    methylPREDNISolone ((Solu-MEDROL) 1,000 mg in 0.9% sodium chloride (MBP/ADV) 100 mL, 1 g, IntraVENous, Q24H, Erin Kauffman MD, Last Rate: 100 mL/hr at 04/02/17 1731, 1,000 mg at 04/02/17 1731    LORazepam (ATIVAN) injection 1 mg, 1 mg, IntraVENous, RAD PRN, Sandra Pride MD, 1 mg at 04/01/17 1956    aspirin chewable tablet 81 mg, 81 mg, Oral, DAILY, Sandra Pride MD, Stopped at 04/03/17 0900    gabapentin (NEURONTIN) capsule 600 mg, 600 mg, Oral, DAILY, Sandra Pride MD, 600 mg at 04/03/17 2664    gabapentin (NEURONTIN) capsule 300 mg, 300 mg, Oral, QPM, Sandra Pride MD, 300 mg at 04/02/17 1709    magnesium oxide (MAG-OX) tablet 400 mg, 400 mg, Oral, DAILY, Sandra Pride MD, 400 mg at 04/03/17 7523    pravastatin (PRAVACHOL) tablet 40 mg, 40 mg, Oral, QHS, Sandra Pride MD, 40 mg at 04/02/17 2155    sodium chloride (NS) flush 5-10 mL, 5-10 mL, IntraVENous, Q8H, Sandra Pride MD, 10 mL at 04/02/17 2231    sodium chloride (NS) flush 5-10 mL, 5-10 mL, IntraVENous, PRN, Sandra Pride MD    acetaminophen (TYLENOL) tablet 650 mg, 650 mg, Oral, Q4H PRN, Sandra Pride MD, 650 mg at 04/02/17 1927    insulin lispro (HUMALOG) injection, , SubCUTAneous, QID WITH MEALS, Sandra Pride MD, 5 Units at 04/03/17 7329   glucose chewable tablet 16 g, 4 Tab, Oral, PRN, Corrinne Fury, MD    dextrose (D50W) injection syrg 12.5-25 g, 12.5-25 g, IntraVENous, PRN, Corrinne Fury, MD    glucagon [facility]) injection 1 mg, 1 mg, IntraMUSCular, PRN, Corrinne Fury, MD  Labs Reviewed  Recent Results (from the past 12 hour(s))   METABOLIC PANEL, COMPREHENSIVE    Collection Time: 04/03/17  6:45 AM   Result Value Ref Range    Sodium 140 136 - 145 mmol/L    Potassium 4.4 3.5 - 5.1 mmol/L    Chloride 105 97 - 108 mmol/L    CO2 24 21 - 32 mmol/L    Anion gap 11 5 - 15 mmol/L    Glucose 283 (H) 65 - 100 mg/dL    BUN 23 (H) 6 - 20 MG/DL    Creatinine 0.59 0.55 - 1.02 MG/DL    BUN/Creatinine ratio 39 (H) 12 - 20      GFR est AA >60 >60 ml/min/1.73m2    GFR est non-AA >60 >60 ml/min/1.73m2    Calcium 9.2 8.5 - 10.1 MG/DL    Bilirubin, total 0.3 0.2 - 1.0 MG/DL    ALT (SGPT) 35 12 - 78 U/L    AST (SGOT) 20 15 - 37 U/L    Alk.  phosphatase 147 (H) 45 - 117 U/L    Protein, total 7.1 6.4 - 8.2 g/dL    Albumin 3.4 (L) 3.5 - 5.0 g/dL    Globulin 3.7 2.0 - 4.0 g/dL    A-G Ratio 0.9 (L) 1.1 - 2.2     CBC W/O DIFF    Collection Time: 04/03/17  6:45 AM   Result Value Ref Range    WBC 4.6 3.6 - 11.0 K/uL    RBC 5.18 3.80 - 5.20 M/uL    HGB 13.3 11.5 - 16.0 g/dL    HCT 42.7 35.0 - 47.0 %    MCV 82.4 80.0 - 99.0 FL    MCH 25.7 (L) 26.0 - 34.0 PG    MCHC 31.1 30.0 - 36.5 g/dL    RDW 16.1 (H) 11.5 - 14.5 %    PLATELET 106 510 - 626 K/uL   MAGNESIUM    Collection Time: 04/03/17  6:45 AM   Result Value Ref Range    Magnesium 2.4 1.6 - 2.4 mg/dL   PHOSPHORUS    Collection Time: 04/03/17  6:45 AM   Result Value Ref Range    Phosphorus 4.9 (H) 2.6 - 4.7 MG/DL   GLUCOSE, POC    Collection Time: 04/03/17  7:24 AM   Result Value Ref Range    Glucose (POC) 283 (H) 65 - 100 mg/dL    Performed by Vargas Neal    GLUCOSE, POC    Collection Time: 04/03/17 11:10 AM   Result Value Ref Range    Glucose (POC) 234 (H) 65 - 100 mg/dL    Performed by Vargas Neal        Imaging  Reviewed:   CT Results (recent):    Results from [place] encounter on 04/01/17   CT HEAD WO CONT   Narrative EXAM:  CT HEAD WO CONT    INDICATION:   Focal neuro deficit, new, fixed or worsening, >24 hours    COMPARISON: CT earlier today. TECHNIQUE: Unenhanced CT of the head was performed using 5 mm images. Brain and  bone windows were generated. CT dose reduction was achieved through use of a  standardized protocol tailored for this examination and automatic exposure  control for dose modulation. FINDINGS:  The ventricles and sulci are normal in size, shape and configuration and  midline. There is no significant white matter disease. There is no intracranial  hemorrhage, extra-axial collection, mass, mass effect or midline shift. The  basilar cisterns are open. No acute infarct is identified. The bone windows  demonstrate no abnormalities. The visualized portions of the paranasal sinuses  and mastoid air cells are clear. Impression IMPRESSION: No acute intracranial abnormality. MRI Results (recent):    Results from [place] encounter on 04/01/17   MRI CERV SPINE W WO CONT   Narrative **PRELIMINARY REPORT**    Exam: MRI cervical spine with and without contrast.  INDICATION: Left-sided numbness and worsening weakness. COMPARISON: 3/22/2016. Preliminary findings: Multilevel degenerative disc disease with spondylosis and disc osteophyte complex, most significant from C4 through C7. Stable grade 1 anterolisthesis at C4-5. Mild T2 hyperintensity throughout the substance of the spinal cord from C2 through C7, difficult to compare due to differences in  technique. This T2 hyperintensity is most significant and focal in the left half of the spinal cord at the level C3-4. Central stenosis and cord compression is most significant at C4-5, C5-6 and C6-7. No cord mass, enhancement or hemorrhage is suggested.     Preliminary report was provided by Dr. Juan Acuña, the on-call radiologist, at 1542 hours    Final report to follow. **END PRELIMINARY REPORT**    *FINAL REPORT BELOW*  EXAM:  MRI CERVICAL SPINE  INDICATION:  Spinal cord injury; progressive left lower extremity weakness and loss of bladder control. COMPARISON: MRI cervical spine on 3/22/2016. TECHNIQUE: MR imaging of the cervical spine was performed including sagittal T1, T2, STIR;  axial T2, T1.     CONTRAST: Pre and post IV contrast 8 mL Gadavist    FINDINGS:  2 mm posterior subluxation of C5 with respect to C4 and C6 is unchanged. 1 mm anterior subluxation of C6 on C7 is unchanged. No new subluxation. Degenerative volume loss of the C5 and C6 vertebral bodies is unchanged. No compression fracture. Degenerative bone marrow signal is present in the facet joints. There is  multilevel facet arthrosis. No marrow replacing process. Disc desiccation is increased and moderate for age. No discitis. The craniocervical junction is intact. Ventral indentation of the spinal cord at C4-5 and C5-6 is unchanged. No grant cord compression. No pathologic intrathecal enhancement. Hyperintense T2 signal in the left central spinal cord at C3 is more conspicuous, likely accentuated by better technique. There is no cord expansion or syrinx. Artifact is in the spinal cord at T1. Mild atrophy of the paraspinal musculature is unchanged. C2/3:  Posterior disc osteophyte complex. No stenosis. No change. C3/4:  Asymmetric left posterior disc osteophyte complex. Left facet arthrosis. Moderate left foraminal stenosis. No change. C4/5:  Lobulated posterior disc osteophyte complex. Left more than right facet arthrosis. Increased moderate-severe central spinal canal stenosis. Left axillary recess compromise is unchanged. Moderate-severe left foraminal stenosis is increased. C5/6:  Asymmetric right posterior disc osteophyte complex. Moderate central spinal canal stenosis. Moderate-severe right foraminal stenosis. Right axillary recess compromise. No change. C6/7:  Lobulated asymmetric right posterior disc osteophyte complex. Mild central spinal canal stenosis. Moderate right foraminal stenosis. No change. C7/T1:  No herniation. Facet arthrosis. No change. Impression IMPRESSION:  1. Cord contusion versus myelomalacia at C3 is increased and more conspicuous  since last year. 2. C4-C5 increased moderate-severe central spinal canal stenosis and left  foraminal stenosis. 3. Other stenoses are unchanged since last year. Physical Exam  General:  WF in no distress  Chest:  Regular rhythm and rate, clear BBS  Neurologic:  Awake, anxious, cooperative  Eyes:  Tracking appropriately, making eye contact          Speech: clear, no aphasia  Mentation:  alert and pleasant        Orientation:  x4  Tremor:  None  Reflexes:  Reduced L B/BR/Patella +1 and babinski, R B/BR/Patella +2 and babinski normal  Sensation and strength:  Subjective Right arm feels warm and reduced to cold sensation--strength 4/5--R distally reports tingling and + LT/temp with strength 4/5. Left proximal sensation + LT / temp and motor hemiparesis--can move fingers very minimally; L distally + temp with LT/temp and subjective burning pain reported, cannot lift leg off bed and moves toes slightly    _____________________________________________________________________________  Assessment:   1. Abnormal MRI C-spine C4-5:  Potentially Transverse myelitis   2. L hemiparesis  3.   Paresthesias    Plan  · Noted some spasticity, pt reporting cramping--stop Flexiril, start Baclofen 10mg TID (will reduce to 5mg if too sedating)--she may refuse dose if she doesn't desire to take it TID  · OF NOTE:  With Lumbar fusion- L4-S1 in 10/2016 patient had some R extremity weakness > distally and some sensory symptoms at that time  --->  ACUTE symptoms of recent are L arm and leg hemiparesis  · LP today, hold DVT prophylaxis until s/p LP then may restart  · Cont Medrol 1gm IV total of 5 days -- 1st dose 4/2/17 then likely oral pack after IV dose finished  ·  Must lay flat 3hr post LP to prevent post LP HA  · Plan discussed with patient and/or family --- any questions were answered. My collaborating care team physician may have further recommendations. On DVT Prophylaxis yes no   Continue  SCD's while inpatient x      Care Plan discussed with:  Patient x   Family--  x   RN x   Care Manager    Consultant/Specialist:     Patient will be discussed with Dr. ROBLES [facility] Problems  Date Reviewed: 4/3/2017          Codes Class Noted POA    Abnormal MRI, cervical spine ICD-10-CM: R93.7  ICD-9-CM: 793.7  4/3/2017 Yes        * (Principal)Left-sided weakness ICD-10-CM: R53.1  ICD-9-CM: 728.87  4/2/2017 Unknown        Acute transverse myelitis ([facility] ICD-10-CM: G37.3  ICD-9-CM: 341.20  4/2/2017 Unknown        Breast cancer ([facility] ICD-10-CM: C50.919  ICD-9-CM: 174.9  3/12/2015 Yes        HTN (hypertension) ICD-10-CM: I10  ICD-9-CM: 401.9  9/15/2014 Yes        Fibromyalgia ICD-10-CM: M79.7  ICD-9-CM: 729.1  4/21/2014 Yes        Diabetes mellitus with neuropathy ([facility] ICD-10-CM: E11.40  ICD-9-CM: 250.60, 357.2  3/17/2014 Yes            ________________________________________________  KEVAN Davenport-BC  04/03/17  ================================================    ADDENDUM--> Collaborating Care Team Physician:                                          Patient seen behind NP [facility]. Agree with formulation of problem and see RUE major weakness with feeble  and motion in general. Sensation is distorted. In LLE prox is 4- and distal 4 to 4+. Toe extensor and no clonus. RUE is fine except perverted sensation throughout. RLE is 4 proximal and 5- distal. Toe is suggestibly extensor and no clonus with sensation ok. DTR's are reduced on L and approaching +2 on R. CN's ok. Mentation is good. Interestingly, no enhancement of cord on MRI and LP doesn't demonstrate lymphocytic pleocytosis.  MRI of cord not documenting cord compression which is a prime rule out in a case like this. Continue IV solumedrol and therapy efforts. Dx by default is transverse myelitis at this point with studies pending still.  And recovery could be measured in months with 40%  left with permanent disability of some sort or another. jjhmd

## 2017-04-03 NOTE — PROGRESS NOTES
Patient being taken down for LP. Patient voiced that she was anxious and would like something to help her relax. Spoke with Radiology who stated it was ok to administer before patient comes down, and send patients  with her to sign consent for procedure. 1520: Patient back on the unit. Pure wick changed. Patient has head of bed elevated at 10 degrees.

## 2017-04-03 NOTE — PROGRESS NOTES
Problem: Self Care Deficits Care Plan (Adult)  Goal: *Acute Goals and Plan of Care (Insert Text)  Occupational Therapy Goals  Initiated 4/3/2017   1. Patient will perform UB dressing while sitting EOB with olya techniques with LUE as stabilizer with minimal assistance within 7 day(s). 2. Patient will perform lower body dressing with olya techniques with moderate assistance within 7 day(s). 3. Patient will perform supine > sit with minimal assistance to prepare to participate in ADL tasks within 7 day(s). 4. Patient will perform toilet transfers to unaffected side with moderate assistance within 7 day(s). 5. Patient will perform all aspects of toileting with moderate assistance within 7 day(s). 6. Patient will participate in L upper extremity therapeutic/ NMR exercise/activities with moderate assistance for 8 minutes within 7 day(s). 7. Patient will improve their Fugl Stearns score by 5 points within 7 days. OCCUPATIONAL THERAPY EVALUATION  Patient: Isabela Alvarado (14 y.o. female)  Date: 4/3/2017  Primary Diagnosis: TIA/stroke rule out. Acute transverse myelitis ([facility] [address]        Precautions: Fall         ASSESSMENT :  Based on the objective data described below, the patient presents with severe impairments in her dominate L sided (UE and LE) weakness and sensation, impaired sitting balance, impaired functional mobility and standing balance impacting her ability to complete ADL tasks from PLOF. Nursing cleared for therapy. CT and MRI negative for acute infarct. MRI of cervical with possible demyelination. Patient scheduled for LP this afternoon for continued work up. Patient and  initially resistive to therapy secondary to not having a full dx yet. Educated on role of therapy to assess functional aspects of impairments and education on compensatory strategies.   Patient and  agreeable to therapy however frequently limiting to full progression of evaluation and treatment secondary to concerns of back precautions and frequently questioning therapists if they were aware that her L side was not functioning. PTA patient lives with  and was mod independent with ADL tasks at cane level. Hx of Lumbar fusion in 10/2016 which she was receiving OP PT. Reports that she noticed slowed speech and LUE/LE numbness and weakness and came to the hospital.  She states the LUE has gotten worse since admission. Patient continues to follow back precautions and only agreeable to get up on her R side. Provided education on positioning, sequencing and safety with verbal and tactile cues for all bed mobility, sitting balance and brief attempt to stand. Partial co tx with PT to attempt sit <> stand with brief attempt patient and  requesting to terminate session. Provided education of L UE position and awareness through out mobility with good understanding. [id number] for LUE 2/66 with partial movements of shoulder elevation. Vision not tested at this time since patient was adamant that she has had no changes. Recommend completion of vision assessment in next OT session or defer to neurology notes. Recommend inpatient rehab once medically stable. Patient and  agreeable. Report being anxious of finding a medical reason for why these changes are occurring. Patient will benefit from skilled intervention to address the above impairments.   Patients rehabilitation potential is considered to be Good  Factors which may influence rehabilitation potential include:   [ ]                None noted  [ ]                Mental ability/status  [X]                Medical condition  [ ]                Home/family situation and support systems  [ ]                Safety awareness  [ ]                Pain tolerance/management  [ ]                Other:        PLAN :  Recommendations and Planned Interventions:  [X]                  Self Care Training                  [X]           Therapeutic Activities  [X] Functional Mobility Training    [ ]           Cognitive Retraining  [X]                  Therapeutic Exercises           [X]           Endurance Activities  [X]                  Balance Training                   [X]           Neuromuscular Re-Education  [ ]                  Visual/Perceptual Training     [X]      Home Safety Training  [X]                  Patient Education                 [X]           Family Training/Education  [ ]                  Other (comment):     Frequency/Duration: Patient will be followed by occupational therapy 5 times a week to address goals. Discharge Recommendations: Inpatient Rehab  Further Equipment Recommendations for Discharge: TBD       SUBJECTIVE:   Patient stated Do you know this side is not work?   While providing compensatory techniques for bed mobility      OBJECTIVE DATA SUMMARY:   HISTORY:   Past Medical History:   Diagnosis Date    Abnormal MRI, cervical spine 4/3/2017    Abnormal pap 3/2015; 5/2016 2015 Neg pap +HPV; 2016 ASCUS +HPV    Arthritis       osteo-tests for RA were neg    Breast cancer ([facility] [address] 1996     right    Chronic pain      Diabetes (HCC)      Fibromyalgia      Ganglion cyst of wrist       LEFT    Hypercholesterolemia      Hypertension      Murmur, cardiac      Neuropathy      Rosacea      Unspecified adverse effect of anesthesia       \"SLOW TO WAKE UP, SENSITIVE TO ANESTHESIA, DO NOT COME AROUND FOR 2-3 DAYS\"      Past Surgical History:   Procedure Laterality Date    HX CATARACT REMOVAL Bilateral 2014    HX CERVICAL FUSION        HX COLPOSCOPY   5/2016     Neg path    HX MASTECTOMY   12/96     tram flap    HX ORTHOPAEDIC Left 1990's     foot surgery    HX TONSILLECTOMY        HX VASCULAR ACCESS   1997     portacath left chest-removed after chemo    RECONSTR NOSE   11/2013, 2005     HOLE IN SEPTUM        Prior Level of Function/Home Situation: Home with .  Recently upgraded to cane from RW s/p back surgery in Oct 2016. Mod independent in ADL tasks. Enjoys going out for meals and getting her nails done. Home Situation  Home Environment: Private residence  # Steps to Enter: 2  Rails to Enter: No  One/Two Story Residence: One story  Living Alone: No  Support Systems: Spouse/Significant Other/Partner  Patient Expects to be Discharged to[de-identified] Private residence  Current DME Used/Available at Home: [employer], straight, Walker, rolling, Wheelchair, Commode, bedside, Shower chair  Tub or Shower Type: Shower  [ ]  Right hand dominant   [X]  Left hand dominant     EXAMINATION OF PERFORMANCE DEFICITS:  Cognitive/Behavioral Status:  Neurologic State: Alert  Orientation Level: Oriented X4  Cognition: Appropriate decision making; Follows commands      Skin: uppers intact     Edema: upper none     Hearing: Auditory  Auditory Impairment: None     Vision/Perceptual:    Tracking:  (did not complete full vision assessment)    Acuity: Within Defined Limits    Corrective Lenses: Reading glasses     Range of Motion:  AROM: Grossly decreased, non-functional LUE  RUE: WDL  PROM: Generally decreased, functional LUE mild tightness with overhead flexion           Strength:  Strength: Grossly decreased, non-functional- LUE  RUE: WDL        Coordination:  Coordination: Grossly decreased, non-functional  Fine Motor Skills-Upper: Left Intact; Right Intact    Gross Motor Skills-Upper: Left Intact; Right Intact     Tone & Sensation:  Tone: Abnormal- LUE flaccid  Sensation: Impaired (LUE- numb but hand tingling, LLE hypersensitive) and heavy feeling     Balance:  Sitting: Impaired  Sitting - Static: Good (unsupported)  Sitting - Dynamic: Fair (occasional)  Standing: Impaired  Standing - Static: Poor; Not tested (patient and  refusing completion of full stnad)     Functional Mobility and Transfers for ADLs:  Bed Mobility:  Rolling: Moderate assistance;Maximum assistance  Supine to Sit: Maximum assistance  Sit to Supine:  Moderate assistance;Assist x2 Transfers:  Sit to Stand: Total assistance;Assist x2 (partial)        ADL Assessment:  Feeding: Minimum assistance  Oral Facial Hygiene/Grooming: Minimum assistance; Moderate assistance  Bathing: Maximum assistance  Upper Body Dressing: Maximum assistance  Lower Body Dressing: Total assistance  Toileting: Total assistance        ADL Intervention and task modifications:    Patient was educated on the principles of neuro plasticity. Patient was educated how symptoms on admission relate to work up of neuro dx. Patient was encouraged to be aware of placement of L UE. Patient educated that repetition and continued use will improve overall function and encourage return of any function that my have been lost or impaired. Provided education for olya techniques with bed mobility within her preference of back precautions/log rolling. Completed supine > sit with max A. Sitting EOB with CGA-SBA. Sitting EOB for education and continued encouragement of participation and importance of therapy in role for functional return. Partial co tx with PTto attempt sit <>stand. Patient agreeable with education and encouragement. Unable to come to stand secondary to resistance, fear and weakness.  and patient requesting to terminate session and patient was assisted back to supine in bed. Patient was educated on the signs and symptoms of stroke, including BEFAST acronym, and importance of calling 911. Patient and  refused removal of Purewick secondary to incontinence. Educated that the device is not made to stay in with movement and it is ok to remove during therapy and will replace. Offered chux to protect bed. Left Purewick in place and repositioned at end of session.       Functional Measure:   Fugl-Stearns Assessment of Motor Recovery after Stroke:       Reflex Activity  Flexors/Biceps/Fingers: None  Extensors/Triceps: None  Reflex Subtotal: 0     Volitional Movement Within Synergies  Shoulder Retraction: Partial  Shoulder Elevation: Partial  Shoulder Abduction (90 degrees): None  Shoulder External Rotation: None  Elbow Flexion: None  Forearm Supination: None  Shoulder Adduction/Internal Rotation: None  Elbow Extension: None  Forearm Pronation: None  Subtotal: 2     Volitional Movement Mixing Synergies  Hand to Lumbar Spine: None  Shoulder Flexion (0-90 degrees): None  Pronation-Supination: None  Subtotal: 0     Volitional Movement With Little or No Synergy  Shoulder Abduction (0-90 degrees): None  Shoulder Flexion ( degrees): None  Pronation/Supination: None  Subtotal : 0     Normal Reflex Activity  Biceps, Triceps, Finger Flexors: None  Subtotal : 0     Upper Extremity Total   Upper Extremity Total: 2     Wrist  Stability at 15 Degree Dorsiflexion: None  Repeated Dorsiflexion/ Volar Flexion: None  Stability at 15 Degree Dorsiflexion: None  Repeated Dorsiflexion/ Volar Flexion: None  Circumduction: None  Wrist Total: 0     Hand  Mass Flexion: None  Mass Extension: None  Grasp A: None  Grasp B: None  Grasp C: None  Grasp D: None  Grasp E: None  Hand Total: 0     Coordination/Speed  Tremor: Marked  Dysmetria: Marked  Time: >5s  Coordination/Speed Total : 0     Total A-D  Total A-D (Motor Function): 2/66      Percentage of impairment CH  0% CI  1-19% CJ  20-39% CK  40-59% CL  60-79% CM  80-99% CN  100%   Fugl-Stearns score: 0-66 66 53-65 39-52 26-38 13-25 1-12    0      This is a reliable/valid measure of arm function after a neurological event. It has established value to characterize functional status and for measuring spontaneous and therapy-induced recovery; tests proximal and distal motor functions. Fugl-Stearns Assessment  UE scores recorded between five and 30 days post neurologic event can be used to predict UE recovery at six months post neurologic event.   Severe = 0-21 points   Moderately Severe = 22-33 points   Moderate = 34-47 points   Mild = 48-66 points  NORA Martin, Leticia, HELENA, & MALACHI Hughes (1992). Measurement of motor recovery after stroke: Outcome assessment and sample size requirements. Stroke, 23, pp. 8964-9956.   ------------------------------------------------------------------------------------------------------------------------------------------------------------------  MCID:  Stroke:   Malcolm Balderrama et al, 2001; n = 171; mean age 79 (5) years; assessed within 16 (12) days of stroke, Acute Stroke)  FMA Motor Scores from Admission to Discharge   · 10 point increase in FMA Upper Extremity = 1.5 change in discharge FIM  · 10 point increase in FMA Lower Extremity = 1.9 change in discharge FIM  MDC:   Stroke:   Abby Giraldo et al, 2008, n = 14, mean age = 59.9 (14.6) years, assessed on average 14 (6.5) months post stroke, Chronic Stroke)   · FMA = 5.2 points for the Upper Extremity portion of the assessment      G codes: In compliance with CMSs Claims Based Outcome Reporting, the following G-code set was chosen for this patient based on their primary functional limitation being treated: The outcome measure chosen to determine the severity of the functional limitation was the TouchPo Android POS with a score of 2/66 which was correlated with the impairment scale. · Other PT/OT Primary Functional Limitations:               - CURRENT STATUS:    CM - 80%-99% impaired, limited or restricted               - GOAL STATUS:           CL - 60%-79% impaired, limited or restricted               - D/C STATUS:                       ---------------To be determined---------------       Occupational Therapy Evaluation Charge Determination   History Examination Decision-Making   LOW Complexity : Brief history review  HIGH Complexity : 5 or more performance deficits relating to physical, cognitive , or psychosocial skils that result in activity limitations and / or participation restrictions HIGH Complexity : Patient presents with comorbidities that affect occupational performance. Signifigant modification of tasks or assistance (eg, physical or verbal) with assessment (s) is necessary to enable patient to complete evaluation       Based on the above components, the patient evaluation is determined to be of the following complexity level: LOW      Pain:  Pain Scale 1: Numeric (0 - 10)  Pain Intensity 1: 0      Activity Tolerance:   Sitting EOBup to 10 mins with CGA-SBA. Denies dizziness. Please refer to the flowsheet for vital signs taken during this treatment taken by PCT prior to session. After treatment:   [ ]  Patient left in no apparent distress sitting up in chair  [X]  Patient left in no apparent distress in bed  [X]  Call bell left within reach  [X]  Nursing notified  [X]  Caregiver present  [X]  Bed alarm activated- missing cord from alarm to wall for nursing call, alerted nurse. COMMUNICATION/EDUCATION:   The patients plan of care was discussed with: Physical Therapist, Registered Nurse,  and NP, patient and . Patient was educated regarding Her deficit(s) of LUE/LE weakness and impaired sensation, functional mobility, and standing balance as this relates to Her diagnosis being worked up. She demonstrated Good understanding as evidenced by repeating understanding of LUE placement/awareness and fair participation in session. Patient and  was verbally educated on the BE FAST acronym for signs/symptoms of CVA and TIA. All questions answered with patient indicating good understanding.      [X]      Home safety education was provided and the patient/caregiver indicated understanding. [X]      Patient/family have participated as able and agree with findings and recommendations. [ ]      Patient is unable to participate in plan of care at this time. This patients plan of care is appropriate for delegation to [facility].      Thank you for this referral.  Ofelia Casper OT  Time Calculation: 46 mins

## 2017-04-03 NOTE — PROGRESS NOTES
Spoke with Rosario Jovel in radiology regarding LP today. He stated orders needed to be modified for an actual LP, and lab orders for CSF, and that patient can eat this morning as it wont be done until later this afternoon. He also stated to hold aspirin.

## 2017-04-03 NOTE — PROGRESS NOTES
Problem: Mobility Impaired (Adult and Pediatric)  Goal: *Acute Goals and Plan of Care (Insert Text)  Physical Therapy Goals  Initiated 4/3/2017  1. Patient will move from supine to sit and sit to supine in bed with minimal assistance/contact guard assist within 7 day(s). 2. Patient will transfer from bed to chair and chair to bed with moderate assistance x1 using the least restrictive device within 7 day(s). 3. Patient will perform sit to stand with moderate assistance x1 within 7 day(s). 4. Patient will tolerate sitting edge of bed for 10 min duration for LE exercises performance with CGA for stability within 7 days. PHYSICAL THERAPY EVALUATION  Patient: Jaymie Razo (21 y.o. female)  Date: 4/3/2017  Primary Diagnosis: TIA/stroke rule out. Acute transverse myelitis ([facility] [address]        Precautions: fall         ASSESSMENT :  Based on the objective data described below, the patient presents with acute progressive Left sided weakness with now trace proximal muscle activation with UE more impaired than LE, decreased sitting balance, and inability to achieve upright standing position, with new onset of bladder incontinence. .  Patient underwent lumbar fusion in October 2016, she has been receiving home health than outpatient PT with most recent transition to [facility] for ambulation. Patient today presenting with hypersensitivity to light touch on the entire left side of the body, she has trace activation at the shoulder, and minimal activation at hip, quads and DF's. Patient and  are very self-limiting. Patient still reports she has back precautions, however her surgery was 6 months ago. She frequently states \"i can't\" throughout the session. Initially she was refusing attempts at sit-stand, Co-treatment with OT and she was agreeable to attempt standing.   Patient with Right UE support and activation, absent on t he Left and required stabilization for safety, she was unable to achieve an upright position and required Total assistance x2 for attempt. After attempt, patient returned to sitting,  then instructs therapist he wishes no further attempts at standing. Hospital course has included TIA/CVA work- up currently CT and MRI negative for an acute infarct, MRI of the C-spine indicates demyelination, currently with continued neurology work-up. Patient is scheduled for a lumbar puncture this afternoon. Patient currently with Left sided deficits, with decreased upright activity tolerance, assistance for sitting due to occasional posterior loss of balance would benefit from rehab at discharge. .      Patient will benefit from skilled intervention to address the above impairments. Patients rehabilitation potential is considered to be Fair  Factors which may influence rehabilitation potential include:   [ ]         None noted  [X]         Mental ability/status  [X]         Medical condition  [ ]         Home/family situation and support systems  [ ]         Safety awareness  [ ]         Pain tolerance/management  [ ]         Other:        PLAN :  Recommendations and Planned Interventions:  [X]           Bed Mobility Training             [X]    Neuromuscular Re-Education  [X]           Transfer Training                   [ ]    Orthotic/Prosthetic Training  [X]           Gait Training                         [X]    Modalities  [X]           Therapeutic Exercises           [ ]    Edema Management/Control  [X]           Therapeutic Activities            [X]    Patient and Family Training/Education  [ ]           Other (comment):     Frequency/Duration: Patient will be followed by physical therapy  5 times a week to address goals. Discharge Recommendations: Rehab  Further Equipment Recommendations for Discharge: TBD at rehab       SUBJECTIVE:   Patient stated Rene Napier will not go to rehab until i am able to move.       OBJECTIVE DATA SUMMARY:   HISTORY:    Past Medical History:   Diagnosis Date    Abnormal MRI, cervical spine 4/3/2017    Abnormal pap 3/2015; 5/2016 2015 Neg pap +HPV; 2016 ASCUS +HPV    Arthritis     osteo-tests for RA were neg    Breast cancer ([facility] [address] 1996    right    Chronic pain     Diabetes (HCC)     Fibromyalgia     Ganglion cyst of wrist     LEFT    Hypercholesterolemia     Hypertension     Murmur, cardiac     Neuropathy     Rosacea     Unspecified adverse effect of anesthesia     \"SLOW TO WAKE UP, SENSITIVE TO ANESTHESIA, DO NOT COME AROUND FOR 2-3 DAYS\"      Past Surgical History:   Procedure Laterality Date    HX CATARACT REMOVAL Bilateral 2014    HX CERVICAL FUSION      HX COLPOSCOPY  5/2016    Neg path    HX MASTECTOMY  12/96    tram flap    HX ORTHOPAEDIC Left 1990's    foot surgery    HX TONSILLECTOMY      HX VASCULAR ACCESS  1997    portacath left chest-removed after chemo    RECONSTR NOSE  11/2013, 2005    HOLE IN SEPTUM     Prior Level of Function/Home Situation: see above  Personal factors and/or comorbidities impacting plan of care:      Home Situation  Home Environment: Private residence  # Steps to Enter: 2  Rails to Enter: No  One/Two Story Residence: One story  Living Alone: No  Support Systems: Spouse/Significant Other/Partner  Patient Expects to be Discharged to[de-identified] Private residence  Current DME Used/Available at Home: Joey Rave, straight, Walker, rolling, Wheelchair, Commode, bedside, Shower chair  Tub or Shower Type: Shower     EXAMINATION/PRESENTATION/DECISION MAKING:   Critical Behavior:  Neurologic State: Alert  Orientation Level: Oriented X4  Cognition: Appropriate decision making, Follows commands     Hearing:   Auditory  Auditory Impairment: None  Skin:  All exposed intact  Edema: none noted  Range Of Motion:  AROM: Grossly decreased, non-functional           PROM: Generally decreased, functional           Strength:    Strength: Grossly decreased, non-functional        RLE Strength  R Hip Flexion: 4-  R Knee Flexion: 4-  R Knee Extension: 4-  R Ankle Dorsiflexion: 4  R Ankle Plantar Flexion: 4        LLE Strength  L Hip Flexion: 2-  L Knee Flexion: 2-  L Knee Extension: 2-  L Ankle Dorsiflexion: 2  L Ankle Plantar Flexion: 2  Tone & Sensation:   Tone: Abnormal              Sensation: Impaired (LUE- numb but hand tingling, LLE hypersensitive)               Coordination:  Coordination: Grossly decreased, non-functional  Vision:   Tracking:  (did not complete full vision assessment)  Acuity: Within Defined Limits  Corrective Lenses: Reading glasses  Functional Mobility:  Bed Mobility:  Rolling: Moderate assistance;Maximum assistance  Supine to Sit: Maximum assistance  Sit to Supine: Moderate assistance;Assist x2     Transfers:  Sit to Stand: Total assistance;Assist x2 (partial)- unable to achieve upright standing posture, no clearance of gluts from bed                          Balance:   Sitting: Impaired  Sitting - Static: Good (unsupported)  Sitting - Dynamic: Fair (occasional)  Standing: Impaired  Standing - Static: Poor; Not tested (patient and  refusing completion of full stnad)  Ambulation/Gait Training:   unable to achieve standing- not safe to attempt ambulation                               Stairs:                           Therapeutic Exercises:         Functional Measure:  Serra Balance Test:      Sitting to Standin  Standing Unsupported: 0  Sitting with Back Unsupported: 3  Standing to Sittin  Transfers: 0  Standing Unsupported with Eyes Closed: 0  Standing Unsupported with Feet Together: 0  Reach Forward with Outstretched Arm: 0   Object: 0  Turn to Look Over Shoulders: 0  Turn 360 Degrees: 0  Alternate Foot on Step/Stool: 0  Standing Unsupported One Foot in Front: 0  Stand on One Le  Total: 3             56=Maximum possible score;   0-20=High fall risk  21-40=Moderate fall risk   41-56=Low fall risk      Serra Balance Test and G-code impairment scale:  Percentage of Impairment CH     0%    CI     1-19% CJ     20-39% CK     40-59% CL     60-79% CM 80-99% CN      100%   Osorio   Score 0-56 56 45-55 34-44 23-33 12-22 1-11 0            G codes: In compliance with CMSs Claims Based Outcome Reporting, the following G-code set was chosen for this patient based on their primary functional limitation being treated: The outcome measure chosen to determine the severity of the functional limitation was the OSORIO with a score of 3/56 which was correlated with the impairment scale. · Mobility - Walking and Moving Around:               - CURRENT STATUS:    CM - 80%-99% impaired, limited or restricted               - GOAL STATUS:           CL - 60%-79% impaired, limited or restricted               - D/C STATUS:                       ---------------To be determined---------------      Physical Therapy Evaluation Charge Determination   History Examination Presentation Decision-Making   HIGH Complexity :3+ comorbidities / personal factors will impact the outcome/ POC  HIGH Complexity : 4+ Standardized tests and measures addressing body structure, function, activity limitation and / or participation in recreation  HIGH Complexity : Unstable and unpredictable characteristics  Other outcome measures OSORIO  HIGH       Based on the above components, the patient evaluation is determined to be of the following complexity level: HIGH      Pain:                    Activity Tolerance:   Fair- no complications  Please refer to the flowsheet for vital signs taken during this treatment. After treatment:   [ ]         Patient left in no apparent distress sitting up in chair  [X]         Patient left in no apparent distress in bed  [X]         Call bell left within reach  [X]         Nursing notified  [X]         Caregiver present  [X]         Bed alarm activated      COMMUNICATION/EDUCATION:   The patients plan of care was discussed with: Registered Nurse.  [X]         Fall prevention education was provided and the patient/caregiver indicated understanding.   [X] Patient/family have participated as able in goal setting and plan of care. [X]         Patient/family agree to work toward stated goals and plan of care. [ ]         Patient understands intent and goals of therapy, but is neutral about his/her participation. [ ]         Patient is unable to participate in goal setting and plan of care.      Thank you for this referral.  Gely Jarrell, PT, DPT   Time Calculation: 23 mins

## 2017-04-03 NOTE — INTERDISCIPLINARY ROUNDS
Interdisciplinary team rounds were held 4/3/2017 with the following team members:Care Management, Nursing, Physician and Clinical Coordinator. Plan of care discussed. See clinical pathway and/or care plan for interventions and desired outcomes.     Anticipated discharge date:pending

## 2017-04-04 LAB
ALBUMIN SERPL BCP-MCNC: 3.5 G/DL (ref 3.5–5)
ALBUMIN/GLOB SERPL: 1.1 {RATIO} (ref 1.1–2.2)
ALP SERPL-CCNC: 140 U/L (ref 45–117)
ALT SERPL-CCNC: 35 U/L (ref 12–78)
ANION GAP BLD CALC-SCNC: 7 MMOL/L (ref 5–15)
AST SERPL W P-5'-P-CCNC: 17 U/L (ref 15–37)
BILIRUB SERPL-MCNC: 0.4 MG/DL (ref 0.2–1)
BUN SERPL-MCNC: 27 MG/DL (ref 6–20)
BUN/CREAT SERPL: 51 (ref 12–20)
CALCIUM SERPL-MCNC: 9.1 MG/DL (ref 8.5–10.1)
CHLORIDE SERPL-SCNC: 106 MMOL/L (ref 97–108)
CO2 SERPL-SCNC: 29 MMOL/L (ref 21–32)
CREAT SERPL-MCNC: 0.53 MG/DL (ref 0.55–1.02)
ERYTHROCYTE [DISTWIDTH] IN BLOOD BY AUTOMATED COUNT: 16 % (ref 11.5–14.5)
GLOBULIN SER CALC-MCNC: 3.1 G/DL (ref 2–4)
GLUCOSE BLD STRIP.AUTO-MCNC: 241 MG/DL (ref 65–100)
GLUCOSE BLD STRIP.AUTO-MCNC: 249 MG/DL (ref 65–100)
GLUCOSE BLD STRIP.AUTO-MCNC: 257 MG/DL (ref 65–100)
GLUCOSE BLD STRIP.AUTO-MCNC: 311 MG/DL (ref 65–100)
GLUCOSE SERPL-MCNC: 265 MG/DL (ref 65–100)
HCT VFR BLD AUTO: 41.2 % (ref 35–47)
HGB BLD-MCNC: 13.7 G/DL (ref 11.5–16)
MAGNESIUM SERPL-MCNC: 2.4 MG/DL (ref 1.6–2.4)
MCH RBC QN AUTO: 26.8 PG (ref 26–34)
MCHC RBC AUTO-ENTMCNC: 33.3 G/DL (ref 30–36.5)
MCV RBC AUTO: 80.6 FL (ref 80–99)
PHOSPHATE SERPL-MCNC: 4.3 MG/DL (ref 2.6–4.7)
PLATELET # BLD AUTO: 265 K/UL (ref 150–400)
POTASSIUM SERPL-SCNC: 4.5 MMOL/L (ref 3.5–5.1)
PROT SERPL-MCNC: 6.6 G/DL (ref 6.4–8.2)
RBC # BLD AUTO: 5.11 M/UL (ref 3.8–5.2)
RPR SER QL: NONREACTIVE
SERVICE CMNT-IMP: ABNORMAL
SODIUM SERPL-SCNC: 142 MMOL/L (ref 136–145)
VIT B12 SERPL-MCNC: >2000 PG/ML (ref 211–911)
WBC # BLD AUTO: 5.4 K/UL (ref 3.6–11)

## 2017-04-04 PROCEDURE — 74011250637 HC RX REV CODE- 250/637: Performed by: NURSE PRACTITIONER

## 2017-04-04 PROCEDURE — 97110 THERAPEUTIC EXERCISES: CPT | Performed by: OCCUPATIONAL THERAPIST

## 2017-04-04 PROCEDURE — 97112 NEUROMUSCULAR REEDUCATION: CPT | Performed by: OCCUPATIONAL THERAPIST

## 2017-04-04 PROCEDURE — 80053 COMPREHEN METABOLIC PANEL: CPT | Performed by: FAMILY MEDICINE

## 2017-04-04 PROCEDURE — 65270000029 HC RM PRIVATE

## 2017-04-04 PROCEDURE — 74011250637 HC RX REV CODE- 250/637: Performed by: HOSPITALIST

## 2017-04-04 PROCEDURE — 74011250636 HC RX REV CODE- 250/636: Performed by: PSYCHIATRY & NEUROLOGY

## 2017-04-04 PROCEDURE — 87389 HIV-1 AG W/HIV-1&-2 AB AG IA: CPT | Performed by: FAMILY MEDICINE

## 2017-04-04 PROCEDURE — 74011636637 HC RX REV CODE- 636/637: Performed by: FAMILY MEDICINE

## 2017-04-04 PROCEDURE — 83735 ASSAY OF MAGNESIUM: CPT | Performed by: FAMILY MEDICINE

## 2017-04-04 PROCEDURE — 36415 COLL VENOUS BLD VENIPUNCTURE: CPT | Performed by: FAMILY MEDICINE

## 2017-04-04 PROCEDURE — 86664 EPSTEIN-BARR NUCLEAR ANTIGEN: CPT | Performed by: FAMILY MEDICINE

## 2017-04-04 PROCEDURE — 82962 GLUCOSE BLOOD TEST: CPT

## 2017-04-04 PROCEDURE — 97110 THERAPEUTIC EXERCISES: CPT

## 2017-04-04 PROCEDURE — 85027 COMPLETE CBC AUTOMATED: CPT | Performed by: FAMILY MEDICINE

## 2017-04-04 PROCEDURE — 82607 VITAMIN B-12: CPT | Performed by: FAMILY MEDICINE

## 2017-04-04 PROCEDURE — 74011000258 HC RX REV CODE- 258: Performed by: PSYCHIATRY & NEUROLOGY

## 2017-04-04 PROCEDURE — 86592 SYPHILIS TEST NON-TREP QUAL: CPT | Performed by: FAMILY MEDICINE

## 2017-04-04 PROCEDURE — 74011250637 HC RX REV CODE- 250/637: Performed by: FAMILY MEDICINE

## 2017-04-04 PROCEDURE — 97535 SELF CARE MNGMENT TRAINING: CPT | Performed by: OCCUPATIONAL THERAPIST

## 2017-04-04 PROCEDURE — 84100 ASSAY OF PHOSPHORUS: CPT | Performed by: FAMILY MEDICINE

## 2017-04-04 PROCEDURE — 86644 CMV ANTIBODY: CPT | Performed by: FAMILY MEDICINE

## 2017-04-04 PROCEDURE — 74011250636 HC RX REV CODE- 250/636: Performed by: FAMILY MEDICINE

## 2017-04-04 RX ORDER — INSULIN GLARGINE 100 [IU]/ML
16 INJECTION, SOLUTION SUBCUTANEOUS DAILY
Status: DISCONTINUED | OUTPATIENT
Start: 2017-04-04 | End: 2017-04-05

## 2017-04-04 RX ADMIN — Medication 10 ML: at 16:08

## 2017-04-04 RX ADMIN — ACETAMINOPHEN 650 MG: 325 TABLET ORAL at 16:08

## 2017-04-04 RX ADMIN — LISINOPRIL 5 MG: 5 TABLET ORAL at 08:13

## 2017-04-04 RX ADMIN — ASPIRIN 81 MG CHEWABLE TABLET 81 MG: 81 TABLET CHEWABLE at 08:13

## 2017-04-04 RX ADMIN — PRAVASTATIN SODIUM 40 MG: 20 TABLET ORAL at 22:11

## 2017-04-04 RX ADMIN — MAGNESIUM GLUCONATE 500 MG ORAL TABLET 400 MG: 500 TABLET ORAL at 08:13

## 2017-04-04 RX ADMIN — SODIUM CHLORIDE 1000 MG: 900 INJECTION, SOLUTION INTRAVENOUS at 17:26

## 2017-04-04 RX ADMIN — ENOXAPARIN SODIUM 40 MG: 100 INJECTION SUBCUTANEOUS at 23:54

## 2017-04-04 RX ADMIN — INSULIN LISPRO 3 UNITS: 100 INJECTION, SOLUTION INTRAVENOUS; SUBCUTANEOUS at 08:14

## 2017-04-04 RX ADMIN — BACLOFEN 10 MG: 10 TABLET ORAL at 16:08

## 2017-04-04 RX ADMIN — INSULIN LISPRO 2 UNITS: 100 INJECTION, SOLUTION INTRAVENOUS; SUBCUTANEOUS at 22:11

## 2017-04-04 RX ADMIN — INSULIN LISPRO 7 UNITS: 100 INJECTION, SOLUTION INTRAVENOUS; SUBCUTANEOUS at 17:26

## 2017-04-04 RX ADMIN — Medication 10 ML: at 22:12

## 2017-04-04 RX ADMIN — BACLOFEN 10 MG: 10 TABLET ORAL at 08:13

## 2017-04-04 RX ADMIN — BACLOFEN 10 MG: 10 TABLET ORAL at 22:11

## 2017-04-04 RX ADMIN — AMLODIPINE BESYLATE 5 MG: 5 TABLET ORAL at 22:11

## 2017-04-04 RX ADMIN — INSULIN LISPRO 5 UNITS: 100 INJECTION, SOLUTION INTRAVENOUS; SUBCUTANEOUS at 12:01

## 2017-04-04 RX ADMIN — GABAPENTIN 300 MG: 300 CAPSULE ORAL at 17:26

## 2017-04-04 RX ADMIN — INSULIN GLARGINE 16 UNITS: 100 INJECTION, SOLUTION SUBCUTANEOUS at 08:14

## 2017-04-04 RX ADMIN — GABAPENTIN 600 MG: 300 CAPSULE ORAL at 08:13

## 2017-04-04 NOTE — PROGRESS NOTES
Problem: Self Care Deficits Care Plan (Adult)  Goal: *Acute Goals and Plan of Care (Insert Text)  Occupational Therapy Goals  Initiated 4/3/2017   1. Patient will perform UB dressing while sitting EOB with olya techniques with LUE as stabilizer with minimal assistance within 7 day(s). 2. Patient will perform lower body dressing with olya techniques with moderate assistance within 7 day(s). 3. Patient will perform supine > sit with minimal assistance to prepare to participate in ADL tasks within 7 day(s). 4. Patient will perform toilet transfers to unaffected side with moderate assistance within 7 day(s). 5. Patient will perform all aspects of toileting with moderate assistance within 7 day(s). 6. Patient will participate in L upper extremity therapeutic/ NMR exercise/activities with moderate assistance for 8 minutes within 7 day(s). 7. Patient will improve their Fugl Stearns score by 5 points within 7 days. OCCUPATIONAL THERAPY TREATMENT  Patient: Macraena Camargo (24 y.o. female)  Date: 4/4/2017  Diagnosis: TIA/stroke rule out. Acute transverse myelitis (HCC) Left-sided weakness       Precautions:        ASSESSMENT:  Patient informed PT and myself that she was told not to sit or attempt to stand. Unable to locate in chart and RN unaware. Instructed in performance of left UE AROM and AAROM exercises, stretch and functional use. Educated on positioning of left UE, head and neck at rest. Patient laughing somewhat hysterically at times when left UE would lose control and \"fall\" will continue to follow and progress as able. Progression toward goals:  [ ]       Improving appropriately and progressing toward goals  [X]       Improving slowly and progressing toward goals  [ ]       Not making progress toward goals and plan of care will be adjusted       PLAN:  Patient continues to benefit from skilled intervention to address the above impairments. Continue treatment per established plan of care.   Discharge Recommendations:  Rehab  Further Equipment Recommendations for Discharge:         SUBJECTIVE:   Patient stated I've been working on moving my thumb.       OBJECTIVE DATA SUMMARY:   Cognitive/Behavioral Status:  Neurologic State: Alert  Orientation Level: Oriented X4  Cognition: Appropriate decision making; Appropriate for age attention/concentration; Follows commands              Functional Mobility and Transfers for ADLs:  Bed Mobility:   declined to be repositioned in bed     Transfers:   informed therapist that per Dr. Louie Lewis she was told not to sit or try to stand, no order and RN unaware           ADL Intervention:     Educated on attempt to use left hand for functional tasks with assist of right. Placed empty styrofoam cup in hand and able to hold in hand while performing wrist flexion/extension and elbow flexion and extension     Positioning: Educated on positioning in supine and sidelying to facilitate neutral alignment and increased comfort, provided visual demonstration to increase carryover. On arrival thick pillow under head resulting in increased cervical protrusion, educated on benefit of placing pillow at chest height to prop up and increase neutral alignment. Initially declined to attempt, however with increased time was agreeable and verbalized increased comfort                 Neuro Re-Education:    instructed on quality of movement versus speed and reps, demonstrated what I meant by that. Instructed on postioning of left hand at rest.   All of the following patient in supine with head of bed ~50 degrees of flexion                 -Instructed in performance of shoulder shrug with controlled return versus dropping down.  After a few reps complained of pain on left side of neck, cued to back off and not elevate to max              -next instructed in scapular retraction, completed 5 reps with min cues               -instructed in AAROM and assist for elbow flexion/extension, supination and pronation, therapist completed with patient and noted active use of left UE to assist, next patient performed using right hand, cues to decrease amount of assist right hand providing              -instructed to place forearm in various planes, 90 degrees flexion, increased extension and perform wrist flexion/extension, educated on need to focus on control versus \"flopping\" down. Patient laughing hysterically when hand /arm falling down.                   -instructed in prayer stretch and initially required mod assist to achieve position, with increased time able to perform without assist        Pain:   left upper trap/side of neck with certain movements     Activity Tolerance:   Fair, agreeable to bed level only  Please refer to the flowsheet for vital signs taken during this treatment.   After treatment:   [ ] Patient left in no apparent distress sitting up in chair  [X] Patient left in no apparent distress in bed  [X] Call bell left within reach  [X] Nursing notified  [ ] Caregiver present  [ ] Bed alarm activated      COMMUNICATION/COLLABORATION:   The patients plan of care was discussed with: Physical Therapist and Registered Nurse     RANGEL Ragsdale/L  Time Calculation: 24 mins

## 2017-04-04 NOTE — DIABETES MGMT
[facility]    Elevated Blood Glucose Note (POCT Glucose >180 mg/dL)    Recommendations/ Comments: Chart reviewed for elevated blood glucose. Julissa De Santiago is a 78 y.o. female with a past medical history significant for DM per Dr. Tania London MD's H&P dated 4/3/2017. Fasting glucose today: 265 mg/dL (per am lab). Required 26 units of correction in the last 24 hours. Noted Lantus 16 units started this morning. If appropriate please consider the followin. Adding Januvia 100 mg - this is her home medication and can be administered without respect to po intake  2. Adding Glimepiride 2mg before breakfast and 2 mg before dinner, given consistent po intake    Recent Glucose Results:   Lab Results   Component Value Date/Time     (H) 2017 06:23 AM    GLUCPOC 249 (H) 2017 07:17 AM    GLUCPOC 401 (H) 2017 09:18 PM    GLUCPOC 252 (H) 2017 04:39 PM        Hospital (inpatient) medications:  1. Correction Scale: Lispro (Humalog) Normal Sensitivity scale to cover for glucose > 139 mg/dL before meals and for glucose >199 at bedtime      2. Lantus 16 units     Prior to admission medications: per past medical records  -Glimepiride 2mg before breakfast, 4mg before dinner  -Januvia 100 mg before breakfast  -Invokana 100 mg before breakfast     A1C:   Lab Results   Component Value Date/Time    Hemoglobin A1c 7.3 2017 11:41 AM    Hemoglobin A1c 7.0 2017 08:30 AM     Lab Results   Component Value Date/Time    Creatinine 0.53 2017 06:23 AM     Active Orders   Diet    DIET DIABETIC CONSISTENT CARB Regular        Thank you. [place] [employer]. Gianni Puente, BSN, MPH  Diabetes [address] [place] [address]  [phone]    -For most hospitalized persons with hyperglycemia in the intensive care unit (ICU), a glucose range of 140 to 180 mg/dL is recommended, provided this target can be safely achieved. *  - For general medicine and surgery patients in non-ICU settings, a premeal glucose target <140 mg/dL and a random blood glucose <180 mg/dL are recommended. *    EL Ponce., Deanne Greenberg., Gilson Frankel., ... & Geeta Mcgraw ([id number]). AMERICAN ASSOC[employer]ATION OF [employer]ICAL ENDOCRINOLOGISTS AND AMERICAN COLLEGE OF ENDOCRINOLOGY-CLINICAL PRACTICE GUIDELINES FOR DEVELOPING A DIABETES MELLITUS COMPREHENSIVE CARE PLAN-2015-EXECUTIVE SUMMARY: Complete guidelines are available at https://www. aace. com/publications/guidelines. Endocrine Practice, 21(4), R1407525.

## 2017-04-04 NOTE — PROGRESS NOTES
0745: Bedside and Verbal shift change report given to [facility] [address] (oncoming nurse) by Gaby Blakely RN (offgoing nurse). Report included the following information SBAR, [place], MAR, Accordion and Recent Results. 0620: Spoke to patient and received verbal consent for HIV testing prior to phlebotomy's lab draw. 2145: Spoke to [facility] practice for insulin order for glucose of 401. Ordered 10 units. 2120: Key Don changed.

## 2017-04-04 NOTE — PROGRESS NOTES
[facility]  [address] [place], [place] [address]Nik[facility]ayThe [facility] [id number]  [phone]     Inpatient Neurology Progress Note  Key [place], [facility]    Name:   Kareem Leonard record #: [id number]  Admission Date: 4/1/2017  Date:   04/04/17  ____________________________________  Subjective:  CC:  Feeling stronger in L arm and foot  ·  still incontinent since came into hospital and I fell before I came into hospital  · I can lift my L shoulder and move my L hand more today  Denies:   Worse L arm and leg weakness, speech changes, swallowing difficulty, fever  _____________________________________________________________________________  Objective  Patient Vitals for the past 12 hrs:   Temp Pulse Resp BP SpO2   04/04/17 0640 97.6 °F (36.4 °C) 73 18 165/84 100 %   04/03/17 2117 97.8 °F (36.6 °C) 81 18 165/62 100 %     Allergies: Allergies   Allergen Reactions    Latex Other (comments)     Patient states it burns around her mouth.  Other Food Other (comments)     Yogurt - stomach cramping    Betadine [Povidone-Iodine] Itching     Pt states this causes \"extreme\" itching    Codeine Anaphylaxis, Rash, Nausea Only and Other (comments)     HEADACHE  Tolerates tramadol    Dilaudid [Hydromorphone (Bulk)] Anaphylaxis, Itching, Nausea Only and Other (comments)     HALLUCINATIONS  Tolerates tramadol      Hydrocodone Anaphylaxis, Rash, Nausea Only and Vertigo     Facial - eyelid swelling-had to go to ER for reaction, HALLUCINATIONS  Tolerates tramadol      Ditropan [Oxybutynin Chloride] Swelling    Doxycycline Rash     PUSTULAR RASH- TOLERATING TETRACYCLINE    Keflex [Cephalexin] Nausea and Vomiting     Pain in abdomen AND FLU-LIKE SX      Metformin Nausea and Vomiting     Severe abdominal pain      Tape [Adhesive] Other (comments)     Redness/inflammed. Can only use paper tape. CAN USE BAND-AIDS. TOLERATES SURGICAL TAPE USED IN [place] [facility].      Sulfamethoxazole-Trimethoprim Other (comments)     Cramping/flu-like symptoms     Inpatient Meds    Current Facility-Administered Medications:     insulin glargine (LANTUS) injection 16 Units, 16 Units, SubCUTAneous, DAILY, Gissell Wynn MD    baclofen (LIORESAL) tablet 10 mg, 10 mg, Oral, TID, Donell Javier NP, 10 mg at 04/03/17 2127    enoxaparin (LOVENOX) injection 40 mg, 40 mg, SubCUTAneous, Q24H, Gissell Wynn MD    lisinopril (PRINIVIL, ZESTRIL) tablet 5 mg, 5 mg, Oral, DAILY, Billy Smith MD, 5 mg at 04/03/17 0829    amLODIPine (NORVASC) tablet 5 mg, 5 mg, Oral, QHS, Billy Smith MD, 5 mg at 04/03/17 2128    methylPREDNISolone ((Solu-MEDROL) 1,000 mg in 0.9% sodium chloride (MBP/ADV) 100 mL, 1 g, IntraVENous, Q24H, Erin Kauffman MD, Last Rate: 100 mL/hr at 04/03/17 1648, 1,000 mg at 04/03/17 1648    LORazepam (ATIVAN) injection 1 mg, 1 mg, IntraVENous, RAD PRN, Hemanth Becker MD, 1 mg at 04/03/17 1405    aspirin chewable tablet 81 mg, 81 mg, Oral, DAILY, Hemanth Becker MD, Stopped at 04/03/17 0900    gabapentin (NEURONTIN) capsule 600 mg, 600 mg, Oral, DAILY, Hemanth Becker MD, 600 mg at 04/03/17 6500    gabapentin (NEURONTIN) capsule 300 mg, 300 mg, Oral, QPM, Hemanth Becker MD, 300 mg at 04/03/17 2127    magnesium oxide (MAG-OX) tablet 400 mg, 400 mg, Oral, DAILY, Hemanth Becker MD, 400 mg at 04/03/17 8460    pravastatin (PRAVACHOL) tablet 40 mg, 40 mg, Oral, QHS, Hemanth Becker MD, 40 mg at 04/03/17 2127    sodium chloride (NS) flush 5-10 mL, 5-10 mL, IntraVENous, Q8H, Hemanth Becker MD, 10 mL at 04/03/17 2128    sodium chloride (NS) flush 5-10 mL, 5-10 mL, IntraVENous, PRN, Hemanth Becker MD    acetaminophen (TYLENOL) tablet 650 mg, 650 mg, Oral, Q4H PRN, Hemanth Becker MD, 650 mg at 04/02/17 1927    insulin lispro (HUMALOG) injection, , SubCUTAneous, QID WITH MEALS, Hemanth Becker MD, 10 Units at 04/03/17 2216    glucose chewable tablet 16 g, 4 Tab, Oral, PRN, Hemanth Becker MD    dextrose (D50W) injection syrg 12.5-25 g, 12.5-25 g, IntraVENous, PRN, Hemanth Becker MD    glucagon (GLUCAGEN) injection 1 mg, 1 mg, IntraMUSCular, Jodie MO MD  Labs Reviewed  Recent Results (from the past 12 hour(s))   GLUCOSE, POC    Collection Time: 04/03/17  9:18 PM   Result Value Ref Range    Glucose (POC) 401 (H) 65 - 100 mg/dL    Performed by Andrea Jones    METABOLIC PANEL, COMPREHENSIVE    Collection Time: 04/04/17  6:23 AM   Result Value Ref Range    Sodium 142 136 - 145 mmol/L    Potassium 4.5 3.5 - 5.1 mmol/L    Chloride 106 97 - 108 mmol/L    CO2 29 21 - 32 mmol/L    Anion gap 7 5 - 15 mmol/L    Glucose 265 (H) 65 - 100 mg/dL    BUN 27 (H) 6 - 20 MG/DL    Creatinine 0.53 (L) 0.55 - 1.02 MG/DL    BUN/Creatinine ratio 51 (H) 12 - 20      GFR est AA >60 >60 ml/min/1.73m2    GFR est non-AA >60 >60 ml/min/1.73m2    Calcium 9.1 8.5 - 10.1 MG/DL    Bilirubin, total 0.4 0.2 - 1.0 MG/DL    ALT (SGPT) 35 12 - 78 U/L    AST (SGOT) 17 15 - 37 U/L    Alk.  phosphatase 140 (H) 45 - 117 U/L    Protein, total 6.6 6.4 - 8.2 g/dL    Albumin 3.5 3.5 - 5.0 g/dL    Globulin 3.1 2.0 - 4.0 g/dL    A-G Ratio 1.1 1.1 - 2.2     CBC W/O DIFF    Collection Time: 04/04/17  6:23 AM   Result Value Ref Range    WBC 5.4 3.6 - 11.0 K/uL    RBC 5.11 3.80 - 5.20 M/uL    HGB 13.7 11.5 - 16.0 g/dL    HCT 41.2 35.0 - 47.0 %    MCV 80.6 80.0 - 99.0 FL    MCH 26.8 26.0 - 34.0 PG    MCHC 33.3 30.0 - 36.5 g/dL    RDW 16.0 (H) 11.5 - 14.5 %    PLATELET 624 684 - 354 K/uL   MAGNESIUM    Collection Time: 04/04/17  6:23 AM   Result Value Ref Range    Magnesium 2.4 1.6 - 2.4 mg/dL   PHOSPHORUS    Collection Time: 04/04/17  6:23 AM   Result Value Ref Range    Phosphorus 4.3 2.6 - 4.7 MG/DL   GLUCOSE, POC    Collection Time: 04/04/17  7:17 AM   Result Value Ref Range    Glucose (POC) 249 (H) 65 - 100 mg/dL    Performed by [facility] JIMENEZ(CON)        Imaging  Reviewed:   CT Results (recent):    Results from [place] encounter on 04/01/17   CT HEAD WO CONT   Narrative EXAM:  CT HEAD WO CONT    INDICATION:   Focal neuro deficit, new, fixed or worsening, >24 hours    COMPARISON: CT earlier today. TECHNIQUE: Unenhanced CT of the head was performed using 5 mm images. Brain and  bone windows were generated. CT dose reduction was achieved through use of a  standardized protocol tailored for this examination and automatic exposure  control for dose modulation. FINDINGS:  The ventricles and sulci are normal in size, shape and configuration and  midline. There is no significant white matter disease. There is no intracranial  hemorrhage, extra-axial collection, mass, mass effect or midline shift. The  basilar cisterns are open. No acute infarct is identified. The bone windows  demonstrate no abnormalities. The visualized portions of the paranasal sinuses  and mastoid air cells are clear. Impression IMPRESSION: No acute intracranial abnormality. MRI Results (recent):  Edited Result - FINAL (Exam End: 4/2/2017 11:30 AM) Open        Addendum      Laura Sparks MD   Mon Apr 3, 2017 11:31:36 AM EDT         Addendum:      Pattern of findings is more suggestive of cervical cord demyelinating process. The abnormal increased T2 signal intensity has a hazy peripheral margin is not  well demarcated. No prior surgery at the C3-C4 level.     These results were discussed with Araceli Vences neurology nurse practitioner  at approximately 11:30 AM on 4/3/2017.       Study Result      **PRELIMINARY REPORT**  Exam: MRI cervical spine with and without contrast.     INDICATION: Left-sided numbness and worsening weakness. COMPARISON: 3/22/2016.     Preliminary findings: Multilevel degenerative disc disease with spondylosis and  disc osteophyte complex, most significant from C4 through C7. Stable grade 1  anterolisthesis at C4-5. Mild T2 hyperintensity throughout the substance of the  spinal cord from C2 through C7, difficult to compare due to differences in  technique.  This T2 hyperintensity is most significant and focal in the left half  of the spinal cord at the level C3-4. Central stenosis and cord compression is  most significant at C4-5, C5-6 and C6-7. No cord mass, enhancement or hemorrhage  is suggested.     Preliminary report was provided by Dr. Shakila Sanders, the on-call radiologist, at [phone]     Final report to follow.     **END PRELIMINARY REPORT**     *FINAL REPORT BELOW**     EXAM: MRI CERVICAL SPINE     INDICATION: Spinal cord injury; progressive left lower extremity weakness and  loss of bladder control.     COMPARISON: MRI cervical spine on 3/22/2016.     TECHNIQUE: MR imaging of the cervical spine was performed including sagittal T1,  T2, STIR; axial T2, T1.      CONTRAST: Pre and post IV contrast 8 mL Gadavist     FINDINGS:  2 mm posterior subluxation of C5 with respect to C4 and C6 is unchanged. 1 mm  anterior subluxation of C6 on C7 is unchanged. No new subluxation. Degenerative  volume loss of the C5 and C6 vertebral bodies is unchanged. No compression  fracture.      Degenerative bone marrow signal is present in the facet joints. There is  multilevel facet arthrosis. No marrow replacing process. Disc desiccation is  increased and moderate for age. No discitis.     The craniocervical junction is intact. Ventral indentation of the spinal cord  at C4-5 and C5-6 is unchanged. No grant cord compression. No pathologic  intrathecal enhancement. Hyperintense T2 signal in the left central spinal cord  at C3 is more conspicuous, likely accentuated by better technique. There is no  cord expansion or syrinx. Artifact is in the spinal cord at T1.      Mild atrophy of the paraspinal musculature is unchanged.     C2/3: Posterior disc osteophyte complex. No stenosis. No change.     C3/4: Asymmetric left posterior disc osteophyte complex. Left facet arthrosis. Moderate left foraminal stenosis. No change.     C4/5: Lobulated posterior disc osteophyte complex. Left more than right facet  arthrosis.  Increased moderate-severe central spinal canal stenosis. Left  axillary recess compromise is unchanged. Moderate-severe left foraminal stenosis  is increased.     C5/6: Asymmetric right posterior disc osteophyte complex. Moderate central  spinal canal stenosis. Moderate-severe right foraminal stenosis. Right axillary  recess compromise. No change.     C6/7: Lobulated asymmetric right posterior disc osteophyte complex. Mild  central spinal canal stenosis. Moderate right foraminal stenosis. No change.      C7/T1: No herniation. Facet arthrosis. No change.     IMPRESSION:     1. Cord contusion versus myelomalacia at C3 is increased and more conspicuous  since last year. 2. C4-C5 increased moderate-severe central spinal canal stenosis and left  foraminal stenosis. 3. Other stenoses are unchanged since last year. Physical Exam  General:  WF in bed in no distress  Chest:  Regular rhythm and rate, clear BBS  Neurologic:  Awake, cooperative  Eyes:  Tracking appropriately, making eye contact          Speech: clear   Mentation:  alert and pleasant        Orientation:  x4  Reflexes:  Reduced L B/BR/Patella 1+ and babinski, R B/BR/Patella 2+ and normal babinski   Sensation and strength:  Subjective Right arm and leg feels warm and reduced to cold sensation--strength 4/5--R distally reports tingling and + LT/temp with strength 4/5. Left proximal sensation + LT / temp and motor hemiparesis--can move fingers very minimally; L distally + temp with LT/temp and subjective less  burning pain today, cannot lift leg off bed and increased toe movement today    _____________________________________________________________________________  Assessment:   1. Transverse myelitis: with acuity of symptoms, improvement in L extremities from steroids and MRI C-spine suggesting demyelination   2. L hemiparesis  3. Paresthesias    Plan  · Continued urinary incontinence since admission, fell PTA---may need MRI L-spine w/o contrast and Orthopedic evaluation?   · Cont Baclofen 10mg TID · Cont Medrol 1gm IV total of 5 days --Last dose 4/6/17 then likely oral pack after IV finished  · Agree with IP rehab, cont PT/OT  · Plan discussed with patient and  --- any questions answered. My collaborating care team physician may have further recommendations. On DVT Prophylaxis yes no   Continue  SCD's while inpatient x      Care Plan discussed with:  Patient x   Family--  x   RN x   Care Manager    Consultant/Specialist:  x   Patient will be discussed with Dr. ROBLES [facility] Problems  Date Reviewed: 4/3/2017          Codes Class Noted POA    Abnormal MRI, cervical spine ICD-10-CM: R93.7  ICD-9-CM: 793.7  4/3/2017 Yes        * (Principal)Left-sided weakness ICD-10-CM: R53.1  ICD-9-CM: 728.87  4/2/2017 Unknown        Acute transverse myelitis ([facility] ICD-10-CM: G37.3  ICD-9-CM: 341.20  4/2/2017 Unknown        Breast cancer ([facility] ICD-10-CM: C50.919  ICD-9-CM: 174.9  3/12/2015 Yes        HTN (hypertension) ICD-10-CM: I10  ICD-9-CM: 401.9  9/15/2014 Yes        Fibromyalgia ICD-10-CM: M79.7  ICD-9-CM: 729.1  4/21/2014 Yes        Diabetes mellitus with neuropathy ([facility] ICD-10-CM: E11.40  ICD-9-CM: 250.60, 357.2  3/17/2014 Yes            ________________________________________________  ALLEY Aceves  04/04/17  ================================================    ADDENDUM--> Collaborating Care Team Physician:                                          Chart reviewed along with NP Slim Heath note. I  on some gains in LUE gains with finger and hand control and LLE slight anti-gravity potential of proximal limb. Toes equivocal on R and extensor on L. Agree with IP rehab direction and patient and  on board with enthusiasm too. Partial TM- continue steroids for planned 5 day course. Will suggest oral steroid taper. I think the TM acute/subacute nature of neurological deficits probably still fits her autonomic picture with bowel and bladder performance.  jjhmd

## 2017-04-04 NOTE — PROGRESS NOTES
Problem: Mobility Impaired (Adult and Pediatric)  Goal: *Acute Goals and Plan of Care (Insert Text)  Physical Therapy Goals  Initiated 4/3/2017  1. Patient will move from supine to sit and sit to supine in bed with minimal assistance/contact guard assist within 7 day(s). 2. Patient will transfer from bed to chair and chair to bed with moderate assistance x1 using the least restrictive device within 7 day(s). 3. Patient will perform sit to stand with moderate assistance x1 within 7 day(s). 4. Patient will tolerate sitting edge of bed for 10 min duration for LE exercises performance with CGA for stability within 7 days. PHYSICAL THERAPY TREATMENT  Patient: Jaymie Razo (35 y.o. female)  Date: 4/4/2017  Diagnosis: TIA/stroke rule out. Acute transverse myelitis (HCC) Left-sided weakness       Precautions:        ASSESSMENT:  Patient is refusing any out of bed activity. She verbalizes that Dr. Aric Chambers (neurology) does not want her to sit or attempt standing, she is to only do activities in the bed. Patient re-educated on importance of therapy, therapist indicated to patient that there is no order in the chart or documentation in most recent neurology note that she is to be restricted with mobility. With further investigation she again states she needs time for her back to heal, although her back surgery was 6 months prior. Patient able to participate with LE supine exercises. She does demonstrate activation on the Left LE, it is not flaccid. Please see updated MMT for the left LE. She has more activation in the LE vs UE on the left. She demonstrates less hypersensitivity to touch and allows therapist to perform AAROM with Left LE exercises. She has self-limiting behaviors with increased resistance to attempts at movement.   Progression toward goals:  [ ]    Improving appropriately and progressing toward goals  [X]    Improving slowly and progressing toward goals  [ ]    Not making progress toward goals and plan of care will be adjusted       PLAN:  Patient continues to benefit from skilled intervention to address the above impairments. Continue treatment per established plan of care. Discharge Recommendations:  Rehab  Further Equipment Recommendations for Discharge:  TBD at rehab       SUBJECTIVE:   Patient stated Waleska Herrera can not go to rehab until i get my bladder working.  educated patient on the skill fo therapist at rehab who are well versed in this deficits, and teaching of strategies of toileting scheduling, etc to help with this. OBJECTIVE DATA SUMMARY:   Critical Behavior:  Neurologic State: Alert  Orientation Level: Oriented X4  Cognition: Appropriate decision making, Appropriate for age attention/concentration, Follows commands            Strength:                LLE Strength  L Hip Flexion: 2+  L Knee Flexion: 2+  L Knee Extension: 2  L Ankle Dorsiflexion: 3  L Ankle Plantar Flexion: 3     Functional Mobility Training:  Bed Mobility:   MAX A for rolling for re-positioning                    Neuro Re-Education:     Therapeutic Exercises:   Supine:  Hip ABD, heel slides, ankle pumps, quad sets, glut sets  Activie movement on the Right  AAROM on the Left  2 sets of 10 for bilateral LE  Pain:  Pain Scale 1: Numeric (0 - 10)  Pain Intensity 1: 0  Pain Location 1: Back           Activity Tolerance:   Fair- self limiting behaviors that limit her progression with mobility  Please refer to the flowsheet for vital signs taken during this treatment.   After treatment:   [ ]    Patient left in no apparent distress sitting up in chair  [X]    Patient left in no apparent distress in bed  [X]    Call bell left within reach  [X]    Nursing notified  [ ]    Caregiver present  [ ]    Bed alarm activated      COMMUNICATION/COLLABORATION:   The patients plan of care was discussed with: Registered Nurse     Sierra Salmeron, PT, DPT   Time Calculation: 24 mins

## 2017-04-04 NOTE — PROGRESS NOTES
4/4/2017 7:13 PM Referral received for discharge planning. SW has met with this pt and her . Pt has confirmed her address and phone number. Pt is followed by Dr. Fadi Pope for primary care. She gets her scripts from [place]. Pt reports that prior to admission, she was using a cane and walker. Pt reports that she has a back surgery in October by Dr. Freya Grant and had recovered to a point where she could cook, drive and walk. Pt reports that after that surgery, she went to [facility]. Pt states that she absolutely wants to go to [facility] after her hospital stay here. Pt reports that she was there in October. SW asked the pt and  what they would do in the event that [facility] did not accept. They stated that they would then consider other options. SW has contacted [phone] and asked for them to place an order for rehab eval. Care management to continue to follow.    Garry Barker, BSW

## 2017-04-05 LAB
ALBUMIN SERPL BCP-MCNC: 3.1 G/DL (ref 3.5–5)
ALBUMIN/GLOB SERPL: 0.9 {RATIO} (ref 1.1–2.2)
ALP SERPL-CCNC: 120 U/L (ref 45–117)
ALT SERPL-CCNC: 31 U/L (ref 12–78)
ANION GAP BLD CALC-SCNC: 12 MMOL/L (ref 5–15)
AST SERPL W P-5'-P-CCNC: 22 U/L (ref 15–37)
BILIRUB SERPL-MCNC: 0.4 MG/DL (ref 0.2–1)
BUN SERPL-MCNC: 26 MG/DL (ref 6–20)
BUN/CREAT SERPL: 59 (ref 12–20)
CALCIUM SERPL-MCNC: 8.9 MG/DL (ref 8.5–10.1)
CHLORIDE SERPL-SCNC: 105 MMOL/L (ref 97–108)
CMV IGG SERPL IA-ACNC: <0.6 U/ML (ref 0–0.59)
CMV IGM SERPL IA-ACNC: <30 AU/ML (ref 0–29.9)
CO2 SERPL-SCNC: 22 MMOL/L (ref 21–32)
CREAT SERPL-MCNC: 0.44 MG/DL (ref 0.55–1.02)
EBV EA IGG SER-ACNC: <9 U/ML (ref 0–8.9)
EBV NA IGG SER-ACNC: 501 U/ML (ref 0–17.9)
EBV VCA IGG SER-ACNC: >600 U/ML (ref 0–17.9)
EBV VCA IGM SER-ACNC: <36 U/ML (ref 0–35.9)
ERYTHROCYTE [DISTWIDTH] IN BLOOD BY AUTOMATED COUNT: 16.2 % (ref 11.5–14.5)
GLOBULIN SER CALC-MCNC: 3.5 G/DL (ref 2–4)
GLUCOSE BLD STRIP.AUTO-MCNC: 228 MG/DL (ref 65–100)
GLUCOSE BLD STRIP.AUTO-MCNC: 254 MG/DL (ref 65–100)
GLUCOSE BLD STRIP.AUTO-MCNC: 267 MG/DL (ref 65–100)
GLUCOSE BLD STRIP.AUTO-MCNC: 308 MG/DL (ref 65–100)
GLUCOSE SERPL-MCNC: 278 MG/DL (ref 65–100)
HCT VFR BLD AUTO: 37.6 % (ref 35–47)
HGB BLD-MCNC: 13.1 G/DL (ref 11.5–16)
HIV 1+2 AB+HIV1 P24 AG SERPL QL IA: NONREACTIVE
HIV12 RESULT COMMENT, HHIVC: NORMAL
INTERPRETATION, 169995: ABNORMAL
MAGNESIUM SERPL-MCNC: 2.3 MG/DL (ref 1.6–2.4)
MCH RBC QN AUTO: 29 PG (ref 26–34)
MCHC RBC AUTO-ENTMCNC: 34.8 G/DL (ref 30–36.5)
MCV RBC AUTO: 83.2 FL (ref 80–99)
PHOSPHATE SERPL-MCNC: 4.5 MG/DL (ref 2.6–4.7)
PLATELET # BLD AUTO: 165 K/UL (ref 150–400)
POTASSIUM SERPL-SCNC: 4.5 MMOL/L (ref 3.5–5.1)
PROT SERPL-MCNC: 6.6 G/DL (ref 6.4–8.2)
RBC # BLD AUTO: 4.52 M/UL (ref 3.8–5.2)
REAGIN AB CSF QL: NON REACTIVE
SERVICE CMNT-IMP: ABNORMAL
SODIUM SERPL-SCNC: 139 MMOL/L (ref 136–145)
WBC # BLD AUTO: 5.5 K/UL (ref 3.6–11)

## 2017-04-05 PROCEDURE — 82962 GLUCOSE BLOOD TEST: CPT

## 2017-04-05 PROCEDURE — 74011250637 HC RX REV CODE- 250/637: Performed by: FAMILY MEDICINE

## 2017-04-05 PROCEDURE — 83735 ASSAY OF MAGNESIUM: CPT

## 2017-04-05 PROCEDURE — 85027 COMPLETE CBC AUTOMATED: CPT

## 2017-04-05 PROCEDURE — 97112 NEUROMUSCULAR REEDUCATION: CPT

## 2017-04-05 PROCEDURE — 65270000029 HC RM PRIVATE

## 2017-04-05 PROCEDURE — 74011250636 HC RX REV CODE- 250/636: Performed by: FAMILY MEDICINE

## 2017-04-05 PROCEDURE — 74011636637 HC RX REV CODE- 636/637: Performed by: FAMILY MEDICINE

## 2017-04-05 PROCEDURE — 97530 THERAPEUTIC ACTIVITIES: CPT

## 2017-04-05 PROCEDURE — 74011250637 HC RX REV CODE- 250/637: Performed by: NURSE PRACTITIONER

## 2017-04-05 PROCEDURE — 74011000258 HC RX REV CODE- 258: Performed by: NURSE PRACTITIONER

## 2017-04-05 PROCEDURE — 74011250637 HC RX REV CODE- 250/637: Performed by: HOSPITALIST

## 2017-04-05 PROCEDURE — 74011250636 HC RX REV CODE- 250/636: Performed by: NURSE PRACTITIONER

## 2017-04-05 PROCEDURE — 80053 COMPREHEN METABOLIC PANEL: CPT

## 2017-04-05 PROCEDURE — 84100 ASSAY OF PHOSPHORUS: CPT

## 2017-04-05 PROCEDURE — 97535 SELF CARE MNGMENT TRAINING: CPT

## 2017-04-05 PROCEDURE — 36415 COLL VENOUS BLD VENIPUNCTURE: CPT

## 2017-04-05 RX ORDER — METHYLPREDNISOLONE 4 MG/1
4 TABLET ORAL
Status: DISCONTINUED | OUTPATIENT
Start: 2017-04-10 | End: 2017-04-06 | Stop reason: HOSPADM

## 2017-04-05 RX ORDER — METHYLPREDNISOLONE 4 MG/1
4 TABLET ORAL
Status: DISCONTINUED | OUTPATIENT
Start: 2017-04-08 | End: 2017-04-06 | Stop reason: HOSPADM

## 2017-04-05 RX ORDER — METHYLPREDNISOLONE 4 MG/1
4 TABLET ORAL
Status: DISCONTINUED | OUTPATIENT
Start: 2017-04-12 | End: 2017-04-06 | Stop reason: HOSPADM

## 2017-04-05 RX ORDER — METHYLPREDNISOLONE 4 MG/1
4 TABLET ORAL
Status: DISCONTINUED | OUTPATIENT
Start: 2017-04-11 | End: 2017-04-06 | Stop reason: HOSPADM

## 2017-04-05 RX ORDER — INSULIN GLARGINE 100 [IU]/ML
18 INJECTION, SOLUTION SUBCUTANEOUS DAILY
Status: DISCONTINUED | OUTPATIENT
Start: 2017-04-05 | End: 2017-04-05

## 2017-04-05 RX ORDER — METHYLPREDNISOLONE 4 MG/1
8 TABLET ORAL ONCE
Status: DISCONTINUED | OUTPATIENT
Start: 2017-04-08 | End: 2017-04-06 | Stop reason: HOSPADM

## 2017-04-05 RX ORDER — METHYLPREDNISOLONE 4 MG/1
8 TABLET ORAL ONCE
Status: DISCONTINUED | OUTPATIENT
Start: 2017-04-07 | End: 2017-04-06 | Stop reason: HOSPADM

## 2017-04-05 RX ORDER — INSULIN GLARGINE 100 [IU]/ML
20 INJECTION, SOLUTION SUBCUTANEOUS DAILY
Status: DISCONTINUED | OUTPATIENT
Start: 2017-04-05 | End: 2017-04-06

## 2017-04-05 RX ORDER — METHYLPREDNISOLONE 4 MG/1
4 TABLET ORAL ONCE
Status: DISCONTINUED | OUTPATIENT
Start: 2017-04-07 | End: 2017-04-06 | Stop reason: HOSPADM

## 2017-04-05 RX ORDER — METHYLPREDNISOLONE 4 MG/1
4 TABLET ORAL
Status: DISCONTINUED | OUTPATIENT
Start: 2017-04-09 | End: 2017-04-06 | Stop reason: HOSPADM

## 2017-04-05 RX ADMIN — GABAPENTIN 300 MG: 300 CAPSULE ORAL at 18:38

## 2017-04-05 RX ADMIN — INSULIN LISPRO 3 UNITS: 100 INJECTION, SOLUTION INTRAVENOUS; SUBCUTANEOUS at 16:50

## 2017-04-05 RX ADMIN — INSULIN GLARGINE 20 UNITS: 100 INJECTION, SOLUTION SUBCUTANEOUS at 09:15

## 2017-04-05 RX ADMIN — BACLOFEN 10 MG: 10 TABLET ORAL at 08:08

## 2017-04-05 RX ADMIN — SITAGLIPTIN 100 MG: 100 TABLET, FILM COATED ORAL at 12:55

## 2017-04-05 RX ADMIN — AMLODIPINE BESYLATE 5 MG: 5 TABLET ORAL at 20:21

## 2017-04-05 RX ADMIN — INSULIN LISPRO 5 UNITS: 100 INJECTION, SOLUTION INTRAVENOUS; SUBCUTANEOUS at 11:53

## 2017-04-05 RX ADMIN — GABAPENTIN 600 MG: 300 CAPSULE ORAL at 10:04

## 2017-04-05 RX ADMIN — ACETAMINOPHEN 650 MG: 325 TABLET ORAL at 10:04

## 2017-04-05 RX ADMIN — Medication 10 ML: at 05:35

## 2017-04-05 RX ADMIN — INSULIN LISPRO 5 UNITS: 100 INJECTION, SOLUTION INTRAVENOUS; SUBCUTANEOUS at 08:10

## 2017-04-05 RX ADMIN — MAGNESIUM GLUCONATE 500 MG ORAL TABLET 400 MG: 500 TABLET ORAL at 08:09

## 2017-04-05 RX ADMIN — ENOXAPARIN SODIUM 40 MG: 100 INJECTION SUBCUTANEOUS at 23:59

## 2017-04-05 RX ADMIN — LISINOPRIL 5 MG: 5 TABLET ORAL at 08:09

## 2017-04-05 RX ADMIN — BACLOFEN 10 MG: 10 TABLET ORAL at 22:11

## 2017-04-05 RX ADMIN — BACLOFEN 10 MG: 10 TABLET ORAL at 16:35

## 2017-04-05 RX ADMIN — ACETAMINOPHEN 650 MG: 325 TABLET ORAL at 20:21

## 2017-04-05 RX ADMIN — PRAVASTATIN SODIUM 40 MG: 20 TABLET ORAL at 22:11

## 2017-04-05 RX ADMIN — ASPIRIN 81 MG CHEWABLE TABLET 81 MG: 81 TABLET CHEWABLE at 08:10

## 2017-04-05 RX ADMIN — SODIUM CHLORIDE 1000 MG: 900 INJECTION, SOLUTION INTRAVENOUS at 16:35

## 2017-04-05 RX ADMIN — Medication 10 ML: at 13:29

## 2017-04-05 RX ADMIN — INSULIN LISPRO 4 UNITS: 100 INJECTION, SOLUTION INTRAVENOUS; SUBCUTANEOUS at 22:11

## 2017-04-05 NOTE — PROGRESS NOTES
[facility]   Daily Progress Note    Date: 4/5/2017    Assessment/Plan:   Analy Waite is a 78 y.o. female with hx of breast cancer, DM with peripheral neuropathy, fibromyalgia, and HTN who is admitted for Stroke/TIA rule out. Overnight Events: No acute events overnight.      Left sided Hemiparesis with Parasthesias- Working diagnosis of transverse myelitis based on MRI of cervical spine. Several CSF studies still pending. No change in symptoms from yesterday. - Neurology following, appreciate recommendations  - Continue Medrol 1gm IV every day, day 4, taper per neurology recs (plan to taper after day 5)  - F/U CSF studies(VDRL, lyme, myelin basic protein), MS panel, and lyme, EBV, CMV,  RPR, HIV     Diabetes Mellitus T2 w/ Peripheral Neuropathy: Last HgA1c 7.0 (3/2017). Hyperglycemia 2/2 steroids. Glucose not under control: -300. - Lantus 16units increased to 20units and restarted Januvia (home medication)  - Insulin Sliding Scale normal sensitivity with AC&HS glucose checks. - Hypoglycemia protocols ordered. - Continue to monitor and assess need for increasing insulin requirement  - Continue home dose of gabapentin.     Muscle Spasms - Acute on chronic issue.    - Baclofen 10mg TID      Hypertension: BP today 153/76. Appears labile.   - Continue Norvasc and lisinopril   - Will continue to monitor at this time and readjust as BP's trend.     Hyperlipidemia - last lipid panel (12/2016): cholesterol 179, , HDL 72, LDL 77.   -Continue home pravastatin.      FEN/GI - Diabetic diet. Activity - Bedrest  DVT prophylaxis - Lovenox  GI prophylaxis - Not indicated at this time  Fall prophylaxis - Fall precautions ordered. Disposition - Remote Telemetry. Plan to d/c to TBD. Consulting PT/OT/CM.   Code Status - Full, discussed with patient / caregivers.     Pt discussed and seen with Dr. Nancy Aguirre MD  Family Medicine Resident         CC: \"hello! \"    Subjective  Examined patient at bedside. Patient reports symptoms stable from yesterday-slight improvement in left UE and LE movement. Denies chest pain, SOB, abdominal pain, EVAN. Still incontinent. Inpatient Medications  Current Facility-Administered Medications   Medication Dose Route Frequency    insulin glargine (LANTUS) injection 20 Units  20 Units SubCUTAneous DAILY    SITagliptin (JANUVIA) tablet 100 mg  100 mg Oral ACB    baclofen (LIORESAL) tablet 10 mg  10 mg Oral TID    enoxaparin (LOVENOX) injection 40 mg  40 mg SubCUTAneous Q24H    lisinopril (PRINIVIL, ZESTRIL) tablet 5 mg  5 mg Oral DAILY    amLODIPine (NORVASC) tablet 5 mg  5 mg Oral QHS    methylPREDNISolone ((Solu-MEDROL) 1,000 mg in 0.9% sodium chloride (MBP/ADV) 100 mL  1 g IntraVENous Q24H    LORazepam (ATIVAN) injection 1 mg  1 mg IntraVENous RAD PRN    aspirin chewable tablet 81 mg  81 mg Oral DAILY    gabapentin (NEURONTIN) capsule 600 mg  600 mg Oral DAILY    gabapentin (NEURONTIN) capsule 300 mg  300 mg Oral QPM    magnesium oxide (MAG-OX) tablet 400 mg  400 mg Oral DAILY    pravastatin (PRAVACHOL) tablet 40 mg  40 mg Oral QHS    sodium chloride (NS) flush 5-10 mL  5-10 mL IntraVENous Q8H    sodium chloride (NS) flush 5-10 mL  5-10 mL IntraVENous PRN    acetaminophen (TYLENOL) tablet 650 mg  650 mg Oral Q4H PRN    insulin lispro (HUMALOG) injection   SubCUTAneous QID WITH MEALS    glucose chewable tablet 16 g  4 Tab Oral PRN    dextrose (D50W) injection syrg 12.5-25 g  12.5-25 g IntraVENous PRN    glucagon (GLUCAGEN) injection 1 mg  1 mg IntraMUSCular PRN         Allergies  Allergies   Allergen Reactions    Latex Other (comments)     Patient states it burns around her mouth.     Other Food Other (comments)     Yogurt - stomach cramping    Betadine [Povidone-Iodine] Itching     Pt states this causes \"extreme\" itching    Codeine Anaphylaxis, Rash, Nausea Only and Other (comments)     HEADACHE  Tolerates tramadol    Dilaudid [Hydromorphone (Bulk)] Anaphylaxis, Itching, Nausea Only and Other (comments)     HALLUCINATIONS  Tolerates tramadol      Hydrocodone Anaphylaxis, Rash, Nausea Only and Vertigo     Facial - eyelid swelling-had to go to ER for reaction, HALLUCINATIONS  Tolerates tramadol      Ditropan [Oxybutynin Chloride] Swelling    Doxycycline Rash     PUSTULAR RASH- TOLERATING TETRACYCLINE    Keflex [Cephalexin] Nausea and Vomiting     Pain in abdomen AND FLU-LIKE SX      Metformin Nausea and Vomiting     Severe abdominal pain      Tape [Adhesive] Other (comments)     Redness/inflammed. Can only use paper tape. CAN USE BAND-AIDS. TOLERATES SURGICAL TAPE USED IN [place] [facility].  Sulfamethoxazole-Trimethoprim Other (comments)     Cramping/flu-like symptoms         Objective  Vitals:  Patient Vitals for the past 8 hrs:   Temp Pulse Resp BP SpO2   04/05/17 1030 98.1 °F (36.7 °C) 65 18 165/84 98 %   04/05/17 0809 - 64 - 153/76 -   04/05/17 0754 97.7 °F (36.5 °C) 64 20 153/76 94 %   04/05/17 0458 97.8 °F (36.6 °C) 67 18 164/74 95 %         I/O:    Intake/Output Summary (Last 24 hours) at 04/05/17 1232  Last data filed at 04/05/17 0258   Gross per 24 hour   Intake              640 ml   Output             1700 ml   Net            -1060 ml     Last shift:       Last 3 shifts:    04/03 1901 - 04/05 0700  In: 640 [P.O.:240; I.V.:400]  Out: 2850 [Urine:2850]    Physical Exam:  General: No acute distress. Alert. Cooperative. Very pleasant. Head: Normocephalic. Atraumatic. Eyes:  Conjunctiva pink. Sclera white. PERRL. Respiratory: CTAB. No w/r/r/c.   Cardiovascular: RRR. Normal S1,S2. No m/r/g. Pulses 2+ throughout. GI: + bowel sounds. Nontender. No rebound tenderness or guarding. Nondistended   Extremities: No edema. No palpable cord. LLE tenderness persists     Neurological Exam:  Mental Status: Alert, oriented x 3 and following commands. Cranial Nerves:   Intact visual fields.  PERRL, EOM's full, no nystagmus, no ptosis. Facial sensation is normal. Facial movement is symmetric. Palate is midline. Normal sternocleidomastoid strength. Tongue is midline. Hearing is intact bilaterally. Motor:  4/5 strength in right upper and lower extremity. Increasing ROM in LUE 2/4 (now can move wrist and fingers and foot) and tenderness improved from yesterday. Normal bulk and tone. Reflexes:   Deep tendon reflexes 2+/4 and symmetrical.   Sensory:   LLE less tender to palpation. Sensation normal in RUE and RLE   Gait:  Unable to assess   Tremor:   No tremor noted. Cerebellar:  No cerebellar signs present. Neurovascular:      No carotid bruits. Laboratory Data  Recent Results (from the past 12 hour(s))   METABOLIC PANEL, COMPREHENSIVE    Collection Time: 04/05/17  5:15 AM   Result Value Ref Range    Sodium 139 136 - 145 mmol/L    Potassium 4.5 3.5 - 5.1 mmol/L    Chloride 105 97 - 108 mmol/L    CO2 22 21 - 32 mmol/L    Anion gap 12 5 - 15 mmol/L    Glucose 278 (H) 65 - 100 mg/dL    BUN 26 (H) 6 - 20 MG/DL    Creatinine 0.44 (L) 0.55 - 1.02 MG/DL    BUN/Creatinine ratio 59 (H) 12 - 20      GFR est AA >60 >60 ml/min/1.73m2    GFR est non-AA >60 >60 ml/min/1.73m2    Calcium 8.9 8.5 - 10.1 MG/DL    Bilirubin, total 0.4 0.2 - 1.0 MG/DL    ALT (SGPT) 31 12 - 78 U/L    AST (SGOT) 22 15 - 37 U/L    Alk.  phosphatase 120 (H) 45 - 117 U/L    Protein, total 6.6 6.4 - 8.2 g/dL    Albumin 3.1 (L) 3.5 - 5.0 g/dL    Globulin 3.5 2.0 - 4.0 g/dL    A-G Ratio 0.9 (L) 1.1 - 2.2     CBC W/O DIFF    Collection Time: 04/05/17  5:15 AM   Result Value Ref Range    WBC 5.5 3.6 - 11.0 K/uL    RBC 4.52 3.80 - 5.20 M/uL    HGB 13.1 11.5 - 16.0 g/dL    HCT 37.6 35.0 - 47.0 %    MCV 83.2 80.0 - 99.0 FL    MCH 29.0 26.0 - 34.0 PG    MCHC 34.8 30.0 - 36.5 g/dL    RDW 16.2 (H) 11.5 - 14.5 %    PLATELET 758 676 - 157 K/uL   MAGNESIUM    Collection Time: 04/05/17  5:15 AM   Result Value Ref Range    Magnesium 2.3 1.6 - 2.4 mg/dL   PHOSPHORUS Collection Time: 04/05/17  5:15 AM   Result Value Ref Range    Phosphorus 4.5 2.6 - 4.7 MG/DL   GLUCOSE, POC    Collection Time: 04/05/17  7:33 AM   Result Value Ref Range    Glucose (POC) 267 (H) 65 - 100 mg/dL    Performed by SADA HOLLAND(CON)    GLUCOSE, POC    Collection Time: 04/05/17 11:19 AM   Result Value Ref Range    Glucose (POC) 254 (H) 65 - 100 mg/dL    Performed by SADA HOLLAND(CON)        Imaging  CT Results  (Last 48 hours)               04/02/17 0003  CT HEAD WO CONT Final result    Impression:  IMPRESSION: No acute intracranial abnormality. Narrative:  EXAM:  CT HEAD WO CONT       INDICATION:   Focal neuro deficit, new, fixed or worsening, >24 hours       COMPARISON: CT earlier today. TECHNIQUE: Unenhanced CT of the head was performed using 5 mm images. Brain and   bone windows were generated. CT dose reduction was achieved through use of a   standardized protocol tailored for this examination and automatic exposure   control for dose modulation. FINDINGS:   The ventricles and sulci are normal in size, shape and configuration and   midline. There is no significant white matter disease. There is no intracranial   hemorrhage, extra-axial collection, mass, mass effect or midline shift. The   basilar cisterns are open. No acute infarct is identified. The bone windows   demonstrate no abnormalities. The visualized portions of the paranasal sinuses   and mastoid air cells are clear. 04/01/17 1308  CT HEAD WO CONT Final result    Impression:  IMPRESSION: No acute intracranial abnormality. Narrative:  EXAM:  CT HEAD WO CONT       INDICATION:   left sided weakness. History of breast cancer. COMPARISON: None. TECHNIQUE: Unenhanced CT of the head was performed using 5 mm images. Brain and   bone windows were generated.   CT dose reduction was achieved through use of a   standardized protocol tailored for this examination and automatic exposure control for dose modulation. FINDINGS:   The ventricles and sulci are normal in size, shape and configuration and   midline. There is no significant white matter disease. There is no intracranial   hemorrhage, extra-axial collection, mass, mass effect or midline shift. The   basilar cisterns are open. No acute infarct is identified. The bone windows   demonstrate no abnormalities. The visualized portions of the paranasal sinuses   and mastoid air cells are clear. 04/01/17 1554  CTA HEAD NECK W CONT Preliminary result    Narrative:  PRELIMINARY REPORT       Exam: CTA Head and Neck. INDICATION: Stroke symptoms, left-sided weakness. Preliminary findings: Atherosclerotic plaque formation at both distal common   carotid arteries without flow-limiting stenosis. No aneurysm, dissection or   hemorrhage. Dural venous sinuses appear patent. Preliminary report was provided by Dr. Ester Givens, the on-call radiologist, at   1622 hours       Final report to follow.        END PRELIMINARY REPORT                                                   Hospital Problems:  Hospital Problems  Date Reviewed: 4/3/2017          Codes Class Noted POA    Abnormal MRI, cervical spine ICD-10-CM: R93.7  ICD-9-CM: 793.7  4/3/2017 Yes        * (Principal)Left-sided weakness ICD-10-CM: R53.1  ICD-9-CM: 728.87  4/2/2017 Unknown        Acute transverse myelitis ([facility] ICD-10-CM: G37.3  ICD-9-CM: 341.20  4/2/2017 Unknown        Breast cancer ([facility] ICD-10-CM: C50.919  ICD-9-CM: 174.9  3/12/2015 Yes        HTN (hypertension) ICD-10-CM: I10  ICD-9-CM: 401.9  9/15/2014 Yes        Fibromyalgia ICD-10-CM: M79.7  ICD-9-CM: 729.1  4/21/2014 Yes        Diabetes mellitus with neuropathy ([facility] ICD-10-CM: E11.40  ICD-9-CM: 250.60, 357.2  3/17/2014 Yes

## 2017-04-05 NOTE — PROGRESS NOTES
Bedside and Verbal shift change report given to Pia Fernandez RN (oncoming nurse) by Dayron Coughlin RN (offgoing nurse). Report included the following information SBAR and Kardex.

## 2017-04-05 NOTE — PROGRESS NOTES
Problem: Self Care Deficits Care Plan (Adult)  Goal: *Acute Goals and Plan of Care (Insert Text)  Occupational Therapy Goals  Initiated 4/3/2017   1. Patient will perform UB dressing while sitting EOB with olya techniques with LUE as stabilizer with minimal assistance within 7 day(s). 2. Patient will perform lower body dressing with olya techniques with moderate assistance within 7 day(s). 3. Patient will perform supine > sit with minimal assistance to prepare to participate in ADL tasks within 7 day(s). 4. Patient will perform toilet transfers to unaffected side with moderate assistance within 7 day(s). 5. Patient will perform all aspects of toileting with moderate assistance within 7 day(s). 6. Patient will participate in L upper extremity therapeutic/ NMR exercise/activities with moderate assistance for 8 minutes within 7 day(s). 7. Patient will improve their Fugl Stearns score by 5 points within 7 days. OCCUPATIONAL THERAPY TREATMENT  Patient: Lincoln Mcbride (57 y.o. female)  Date: 4/5/2017  Diagnosis: TIA/stroke rule out. Acute transverse myelitis (HCC) Left-sided weakness       Precautions:        ASSESSMENT:  Patient reports plan today for therapy is to sit EOB and have therapist wash hair. Patient able to move EOB with assist x 2 (min of 1, mod of 1). Once seated, patient dons brace with min assist for closure and tightening using left hand. Patient set up with items to wash hair and instructed to begin task. Patient using R hand only, but agreeable to allow therapist to support LUE at elbow and wrist to perform more normal bilateral hand movements for hair washing. Patient with minimal movement in digits, but encouraged for normalized activity/strengthening/functional activity. Patient fatigues at LUE quickly, requests rest breaks. But does not initiate LUE return to task, but agreeable when therapist initiates.  Patient with instances of arm falling forward from head and patient with odd presentation of laughter due to poor motor control. Patient completes tasks and very pleased with activity. Patient performed scooting technique to foot and head of bed with PT. Patient continues to decline standing trials, but noted patient requires only blocking of LLE with scooting and able to clear bottom from bed. Patient agreeable to perform scooting technique into wheelchair at bedside. Patient able to perform with PT. (see PT note for additional details). Patient continues to benefit from OT services and today more agreeable to attempt tasks she was unwilling to perform prior. Recommend rehab at discharge. Progression toward goals:  [ ]       Improving appropriately and progressing toward goals  [X]       Improving slowly and progressing toward goals  [ ]       Not making progress toward goals and plan of care will be adjusted       PLAN:  Patient continues to benefit from skilled intervention to address the above impairments. Continue treatment per established plan of care. Discharge Recommendations:  Inpatient Rehab  Further Equipment Recommendations for Discharge:  TBD       SUBJECTIVE:   Patient stated Oh, I am so happy.       OBJECTIVE DATA SUMMARY:   Cognitive/Behavioral Status:  Neurologic State: Alert  Orientation Level: Oriented X4  Cognition: Appropriate decision making; Follows commands  Perception: Appears intact  Perseveration: No perseveration noted  Safety/Judgement: Awareness of environment     Functional Mobility and Transfers for ADLs:  Bed Mobility:  Supine to Sit: Moderate assistance;Assist x1;Minimum assistance (mod x 1, min x 1)  Scooting: Minimum assistance     Transfers:           Balance:  Sitting: Intact  Sitting - Static: Good (unsupported)  Sitting - Dynamic: Good (unsupported)     ADL Intervention:        Grooming  Grooming Assistance:  Moderate assistance  Brushing/Combing Hair: Moderate assistance (with inclusion of LUE, assist for proximal/distal support)  Cues: Physical assistance;Verbal cues provided           Upper Body Dressing Assistance  Orthotics(Brace): Minimum assistance (assist with closure/tightening using L hand)  Cues: Physical assistance; Tactile cues provided     Lower Body Dressing Assistance  Socks: Total assistance (dependent)           Cognitive Retraining  Safety/Judgement: Awareness of environment     Neuro Re-Education:           Therapeutic Exercises:   Pain:  Pain Scale 1: Numeric (0 - 10)  Pain Intensity 1: 5  Pain Location 1: Back;Neck  Pain Orientation 1: Left  Pain Description 1: Aching  Pain Intervention(s) 1: Medication (see MAR) (tylenol)  Activity Tolerance:   VSS  Please refer to the flowsheet for vital signs taken during this treatment.   After treatment:   [X] Patient left in no apparent distress sitting up in chair  [ ] Patient left in no apparent distress in bed  [X] Call bell left within reach  [X] Nursing notified  [ ] Caregiver present  [ ] Bed alarm activated      COMMUNICATION/COLLABORATION:   The patients plan of care was discussed with: Physical Therapist and Registered Nurse     Ifeanyi James OTR/L  Time Calculation: 39 mins

## 2017-04-05 NOTE — PROGRESS NOTES
[facility]  [address] [place] [address]  [phone]     Inpatient Neurology Progress Note  Daylin Calderon, [facility]    Name:   Faby Faye record #: [id number]  Admission Date: 4/1/2017  Date:   04/05/17  ____________________________________  Subjective:  CC:  I can lift my L arm more today  ·  I haven't sat on side of bed since 2 days ago  · I won't let therapy get me out of bed yet, I am still recovering from back surgery 6mo ago and don't want to pull my back  · No further drowsiness from Baclofen  Denies:   Palpitations, tremors, weaker L extremities, vision loss or diarrhea  _____________________________________________________________________________  Objective  Patient Vitals for the past 12 hrs:   Temp Pulse Resp BP SpO2   04/05/17 0809 - 64 - 153/76 -   04/05/17 0754 97.7 °F (36.5 °C) 64 20 153/76 94 %   04/05/17 0458 97.8 °F (36.6 °C) 67 18 164/74 95 %   04/04/17 2212 96.9 °F (36.1 °C) 71 16 154/70 93 %     Allergies: Allergies   Allergen Reactions    Latex Other (comments)     Patient states it burns around her mouth.  Other Food Other (comments)     Yogurt - stomach cramping    Betadine [Povidone-Iodine] Itching     Pt states this causes \"extreme\" itching    Codeine Anaphylaxis, Rash, Nausea Only and Other (comments)     HEADACHE  Tolerates tramadol    Dilaudid [Hydromorphone (Bulk)] Anaphylaxis, Itching, Nausea Only and Other (comments)     HALLUCINATIONS  Tolerates tramadol      Hydrocodone Anaphylaxis, Rash, Nausea Only and Vertigo     Facial - eyelid swelling-had to go to ER for reaction, HALLUCINATIONS  Tolerates tramadol      Ditropan [Oxybutynin Chloride] Swelling    Doxycycline Rash     PUSTULAR RASH- TOLERATING TETRACYCLINE    Keflex [Cephalexin] Nausea and Vomiting     Pain in abdomen AND FLU-LIKE SX      Metformin Nausea and Vomiting     Severe abdominal pain      Tape [Adhesive] Other (comments)     Redness/inflammed. Can only use paper tape. CAN USE BAND-AIDS. TOLERATES SURGICAL TAPE USED IN [facility].      Sulfamethoxazole-Trimethoprim Other (comments)     Cramping/flu-like symptoms     Inpatient Meds    Current Facility-Administered Medications:     insulin glargine (LANTUS) injection 20 Units, 20 Units, SubCUTAneous, DAILY, Gissell Wynn MD, 20 Units at 04/05/17 0915    baclofen (LIORESAL) tablet 10 mg, 10 mg, Oral, TID, Shikha Wooten NP, 10 mg at 04/05/17 0808    enoxaparin (LOVENOX) injection 40 mg, 40 mg, SubCUTAneous, Q24H, Gissell Wynn MD, 40 mg at 04/04/17 2354    lisinopril (PRINIVIL, ZESTRIL) tablet 5 mg, 5 mg, Oral, DAILY, Candi Gordon MD, 5 mg at 04/05/17 0809    amLODIPine (NORVASC) tablet 5 mg, 5 mg, Oral, QHS, Candi Gordon MD, 5 mg at 04/04/17 2211    methylPREDNISolone ((Solu-MEDROL) 1,000 mg in 0.9% sodium chloride (MBP/ADV) 100 mL, 1 g, IntraVENous, Q24H, Erin Kauffman MD, Last Rate: 100 mL/hr at 04/04/17 1726, 1,000 mg at 04/04/17 1726    LORazepam (ATIVAN) injection 1 mg, 1 mg, IntraVENous, RAD PRN, Lila Avery MD, 1 mg at 04/03/17 1405    aspirin chewable tablet 81 mg, 81 mg, Oral, DAILY, Lila Avery MD, 81 mg at 04/05/17 0810    gabapentin (NEURONTIN) capsule 600 mg, 600 mg, Oral, DAILY, Lila Avery MD, 600 mg at 04/04/17 0813    gabapentin (NEURONTIN) capsule 300 mg, 300 mg, Oral, QPM, Lila Avery MD, 300 mg at 04/04/17 1726    magnesium oxide (MAG-OX) tablet 400 mg, 400 mg, Oral, DAILY, Lila Avery MD, 400 mg at 04/05/17 0809    pravastatin (PRAVACHOL) tablet 40 mg, 40 mg, Oral, QHS, Lila Avery MD, 40 mg at 04/04/17 2211    sodium chloride (NS) flush 5-10 mL, 5-10 mL, IntraVENous, Q8H, Lila Avery MD, 10 mL at 04/05/17 0535    sodium chloride (NS) flush 5-10 mL, 5-10 mL, IntraVENous, PRN, Lila Avery MD    acetaminophen (TYLENOL) tablet 650 mg, 650 mg, Oral, Q4H PRN, Lila Avery MD, 650 mg at 04/04/17 1608    insulin lispro (HUMALOG) injection, , SubCUTAneous, QID WITH MEALS, Lila Avery MD, 5 Units at 04/05/17 0810    glucose chewable tablet 16 g, 4 Tab, Oral, PRN, Law Hammond MD    dextrose (D50W) injection syrg 12.5-25 g, 12.5-25 g, IntraVENous, PRN, Law Hammond MD    glucagon [facility]) injection 1 mg, 1 mg, IntraMUSCular, PRN, Law Hammond MD  Labs Reviewed  Recent Results (from the past 12 hour(s))   METABOLIC PANEL, COMPREHENSIVE    Collection Time: 04/05/17  5:15 AM   Result Value Ref Range    Sodium 139 136 - 145 mmol/L    Potassium 4.5 3.5 - 5.1 mmol/L    Chloride 105 97 - 108 mmol/L    CO2 22 21 - 32 mmol/L    Anion gap 12 5 - 15 mmol/L    Glucose 278 (H) 65 - 100 mg/dL    BUN 26 (H) 6 - 20 MG/DL    Creatinine 0.44 (L) 0.55 - 1.02 MG/DL    BUN/Creatinine ratio 59 (H) 12 - 20      GFR est AA >60 >60 ml/min/1.73m2    GFR est non-AA >60 >60 ml/min/1.73m2    Calcium 8.9 8.5 - 10.1 MG/DL    Bilirubin, total 0.4 0.2 - 1.0 MG/DL    ALT (SGPT) 31 12 - 78 U/L    AST (SGOT) 22 15 - 37 U/L    Alk.  phosphatase 120 (H) 45 - 117 U/L    Protein, total 6.6 6.4 - 8.2 g/dL    Albumin 3.1 (L) 3.5 - 5.0 g/dL    Globulin 3.5 2.0 - 4.0 g/dL    A-G Ratio 0.9 (L) 1.1 - 2.2     CBC W/O DIFF    Collection Time: 04/05/17  5:15 AM   Result Value Ref Range    WBC 5.5 3.6 - 11.0 K/uL    RBC 4.52 3.80 - 5.20 M/uL    HGB 13.1 11.5 - 16.0 g/dL    HCT 37.6 35.0 - 47.0 %    MCV 83.2 80.0 - 99.0 FL    MCH 29.0 26.0 - 34.0 PG    MCHC 34.8 30.0 - 36.5 g/dL    RDW 16.2 (H) 11.5 - 14.5 %    PLATELET 799 336 - 920 K/uL   MAGNESIUM    Collection Time: 04/05/17  5:15 AM   Result Value Ref Range    Magnesium 2.3 1.6 - 2.4 mg/dL   PHOSPHORUS    Collection Time: 04/05/17  5:15 AM   Result Value Ref Range    Phosphorus 4.5 2.6 - 4.7 MG/DL   GLUCOSE, POC    Collection Time: 04/05/17  7:33 AM   Result Value Ref Range    Glucose (POC) 267 (H) 65 - 100 mg/dL    Performed by SADA HOLLAND(RAHEEM)        Imaging  Reviewed:   CT Results (recent):    Results from Hospital Encounter encounter on 04/01/17   CT HEAD WO CONT   Narrative EXAM:  CT HEAD WO CONT    INDICATION:   Focal neuro deficit, new, fixed or worsening, >24 hours    COMPARISON: CT earlier today. TECHNIQUE: Unenhanced CT of the head was performed using 5 mm images. Brain and  bone windows were generated. CT dose reduction was achieved through use of a  standardized protocol tailored for this examination and automatic exposure  control for dose modulation. FINDINGS:  The ventricles and sulci are normal in size, shape and configuration and  midline. There is no significant white matter disease. There is no intracranial  hemorrhage, extra-axial collection, mass, mass effect or midline shift. The  basilar cisterns are open. No acute infarct is identified. The bone windows  demonstrate no abnormalities. The visualized portions of the paranasal sinuses  and mastoid air cells are clear. Impression IMPRESSION: No acute intracranial abnormality. MRI Results (recent):  Edited Result - FINAL (Exam End: 4/2/2017 11:30 AM) Open        Addendum      Antonino Mcelroy MD   Mon Apr 3, 2017 11:31:36 AM EDT         Addendum:      Pattern of findings is more suggestive of cervical cord demyelinating process. The abnormal increased T2 signal intensity has a hazy peripheral margin is not  well demarcated. No prior surgery at the C3-C4 level.     These results were discussed with Estrada Urias neurology nurse practitioner  at approximately 11:30 AM on 4/3/2017.       Study Result      **PRELIMINARY REPORT**  Exam: MRI cervical spine with and without contrast.     INDICATION: Left-sided numbness and worsening weakness. COMPARISON: 3/22/2016.     Preliminary findings: Multilevel degenerative disc disease with spondylosis and  disc osteophyte complex, most significant from C4 through C7. Stable grade 1  anterolisthesis at C4-5. Mild T2 hyperintensity throughout the substance of the  spinal cord from C2 through C7, difficult to compare due to differences in  technique.  This T2 hyperintensity is most significant and focal in the left half  of the spinal cord at the level C3-4. Central stenosis and cord compression is  most significant at C4-5, C5-6 and C6-7. No cord mass, enhancement or hemorrhage  is suggested.     Preliminary report was provided by Dr. Pal Pate, the on-call radiologist, at [phone] hours     Final report to follow.     **END PRELIMINARY REPORT**     *FINAL REPORT BELOW**     EXAM: MRI CERVICAL SPINE     INDICATION: Spinal cord injury; progressive left lower extremity weakness and  loss of bladder control.     COMPARISON: MRI cervical spine on 3/22/2016.     TECHNIQUE: MR imaging of the cervical spine was performed including sagittal T1,  T2, STIR; axial T2, T1.      CONTRAST: Pre and post IV contrast 8 mL Gadavist     FINDINGS:  2 mm posterior subluxation of C5 with respect to C4 and C6 is unchanged. 1 mm  anterior subluxation of C6 on C7 is unchanged. No new subluxation. Degenerative  volume loss of the C5 and C6 vertebral bodies is unchanged. No compression  fracture.      Degenerative bone marrow signal is present in the facet joints. There is  multilevel facet arthrosis. No marrow replacing process. Disc desiccation is  increased and moderate for age. No discitis.     The craniocervical junction is intact. Ventral indentation of the spinal cord  at C4-5 and C5-6 is unchanged. No grant cord compression. No pathologic  intrathecal enhancement. Hyperintense T2 signal in the left central spinal cord  at C3 is more conspicuous, likely accentuated by better technique. There is no  cord expansion or syrinx. Artifact is in the spinal cord at T1.      Mild atrophy of the paraspinal musculature is unchanged.     C2/3: Posterior disc osteophyte complex. No stenosis. No change.     C3/4: Asymmetric left posterior disc osteophyte complex. Left facet arthrosis. Moderate left foraminal stenosis. No change.     C4/5: Lobulated posterior disc osteophyte complex. Left more than right facet  arthrosis. Increased moderate-severe central spinal canal stenosis. Left  axillary recess compromise is unchanged. Moderate-severe left foraminal stenosis  is increased.     C5/6: Asymmetric right posterior disc osteophyte complex. Moderate central  spinal canal stenosis. Moderate-severe right foraminal stenosis. Right axillary  recess compromise. No change.     C6/7: Lobulated asymmetric right posterior disc osteophyte complex. Mild  central spinal canal stenosis. Moderate right foraminal stenosis. No change.      C7/T1: No herniation. Facet arthrosis. No change.     IMPRESSION:     1. Cord contusion versus myelomalacia at C3 is increased and more conspicuous  since last year. 2. C4-C5 increased moderate-severe central spinal canal stenosis and left  foraminal stenosis. 3. Other stenoses are unchanged since last year.   ___________________________________________________________________    Physical Exam  General:  WF in bed but chair position, in NAD  Chest: RRR, clear BBS  Neurologic:  Awake, cooperative  Eyes:  Tracking appropriately, making eye contact          Speech: clear   Mentation:  alert          Orientation:  x4  Sensation and strength:  Subjective Right arm and leg warmth and reduced sensation--R Strength 4+/5-- distally reports tingling and + LT/temp with strength 4/5. Left proximal sensation + LT / temp and motor hemiparesis--can move L shoulder and fingers greater medially; L distally + temp with LT/temp and subjective less burning pain, can't lift L leg off bed and increased toe movement    _____________________________________________________________________________  Assessment:   1. Transverse myelitis: acuity s/s, improvement in L strength w/steroids and MRI C-spine suggesting demyelination   2. L hemiparesis-- improving  3.   Paresthesias    Plan  · Pt remains concerned re: back surgery 6mo ago and \"it taking 12mo to recover from her back surgery\"---may be trying to self manage current therapy for Transverse myelitis--stated \"my plan for OT today is to tell them I will only let them sit me on side of bed if they will wash my hair while I am sitting there\"  · ---reinforced that she needs to listen to hospital therapists who have training in working with patients post back surgery with restrictions  · Cont Baclofen 10mg 3x daily---tolerating drowsy side effect better   · Cont Medrol 1gm IV x 5 days --Last dose 4/6/17 then oral dose pack --ordered  · Plan discussed with patient --- questions answered. May be discharged to rehab if accepted tomorrow  My collaborating care team physician may have further recommendations. On DVT Prophylaxis yes no   Continue  lovenox while inpatient x      Care Plan discussed with:  Patient x   Family--  x   RN x   Care Manager    Consultant/Specialist:  x   Patient will be discussed with Dr. Kyle Springer Problems  Date Reviewed: 4/3/2017          Codes Class Noted POA    Abnormal MRI, cervical spine ICD-10-CM: R93.7  ICD-9-CM: 793.7  4/3/2017 Yes        * (Principal)Left-sided weakness ICD-10-CM: R53.1  ICD-9-CM: 728.87  4/2/2017 Unknown        Acute transverse myelitis ([facility] ICD-10-CM: G37.3  ICD-9-CM: 341.20  4/2/2017 Unknown        Breast cancer ([facility] ICD-10-CM: C50.919  ICD-9-CM: 174.9  3/12/2015 Yes        HTN (hypertension) ICD-10-CM: I10  ICD-9-CM: 401.9  9/15/2014 Yes        Fibromyalgia ICD-10-CM: M79.7  ICD-9-CM: 729.1  4/21/2014 Yes        Diabetes mellitus with neuropathy ([facility] ICD-10-CM: E11.40  ICD-9-CM: 250.60, 357.2  3/17/2014 Yes            ________________________________________________  ALLEY Avila  04/05/17  ================================================    ADDENDUM--> Collaborating Care Team Physician:                                          Neuro:  Chart reviewed and patient seen and examined. Agree with NP Kathia Bae. I see more proximal strength to both LUE and LLE. Sat on bed today and scooted.  Has awareness of bladder filling and none on bowel end so far. Sensory distortions on L side but less noxious to touch. Toes equiv/extensor. Seems to be extremely motivated to perform I am encouraged with IP Rehab note and needs to show consistent performance with therapies. Trending to improvement daily. Last IV steroids tomorrow and replaced with po taper.  vanesa

## 2017-04-05 NOTE — PROGRESS NOTES
Problem: Mobility Impaired (Adult and Pediatric)  Goal: *Acute Goals and Plan of Care (Insert Text)  Physical Therapy Goals  Initiated 4/3/2017  1. Patient will move from supine to sit and sit to supine in bed with minimal assistance/contact guard assist within 7 day(s). 2. Patient will transfer from bed to chair and chair to bed with moderate assistance x1 using the least restrictive device within 7 day(s). 3. Patient will perform sit to stand with moderate assistance x1 within 7 day(s). 4. Patient will tolerate sitting edge of bed for 10 min duration for LE exercises performance with CGA for stability within 7 days. PHYSICAL THERAPY TREATMENT  Patient: aJymie Razo (81 y.o. female)  Date: 4/5/2017  Diagnosis: TIA/stroke rule out. Acute transverse myelitis (HCC) Left-sided weakness       Precautions:        ASSESSMENT:  Patient performed stand pivot transfer back to chair with MOD A. Able to initiate movement, and weight bearing through LE. Progression toward goals:  [ ]    Improving appropriately and progressing toward goals  [X]    Improving slowly and progressing toward goals  [ ]    Not making progress toward goals and plan of care will be adjusted       PLAN:  Patient continues to benefit from skilled intervention to address the above impairments. Continue treatment per established plan of care. Discharge Recommendations:  Rehab  Further Equipment Recommendations for Discharge:  TBD at rehab       SUBJECTIVE:   Patient stated .      OBJECTIVE DATA SUMMARY:   Critical Behavior:  Neurologic State: Alert  Orientation Level: Oriented X4  Cognition: Appropriate decision making, Follows commands  Safety/Judgement: Awareness of environment  Functional Mobility Training:  Bed Mobility:     Supine to Sit: Moderate assistance;Assist x1;Minimum assistance (mod x 1, min x 1)  Sit to Supine:  Moderate assistance  Scooting: Minimum assistance        Transfers:              Bed to Chair: Moderate assistance Balance:  Sitting: Intact  Sitting - Static: Good (unsupported)  Sitting - Dynamic: Good (unsupported)  Ambulation/Gait Training:                         Stairs:                       Neuro Re-Education:     Therapeutic Exercises:      Pain:  Pain Scale 1: Numeric (0 - 10)  Pain Intensity 1: 0  Pain Location 1: Back;Neck  Pain Orientation 1: Left  Pain Description 1: Aching  Pain Intervention(s) 1: Medication (see MAR) (tylenol)  Activity Tolerance:   Fair- no complications  Please refer to the flowsheet for vital signs taken during this treatment.   After treatment:   [ ]    Patient left in no apparent distress sitting up in chair  [X]    Patient left in no apparent distress in bed  [X]    Call bell left within reach  [X]    Nursing notified  [ ]    Caregiver present  [ ]    Bed alarm activated      COMMUNICATION/COLLABORATION:   The patients plan of care was discussed with: Registered Nurse     Librado Nassar, PT, DPT   Time Calculation: 20 mins

## 2017-04-05 NOTE — WOUND CARE
Wound care   Patient request low air loss specialty surface, consult per NP. Alert, no distress. Discussed with patient and staff nurse. [employer] called for rental. Will follow up with patient on 4-6.   Rae Coles Asp

## 2017-04-05 NOTE — CONSULTS
Pt's chart reviewed, known to our service after back surgery 10/2016  Presetting with L) HP due transverse myelitis of cervical spine  Would benefit IPR once completed w/u and no more invasive procedure anticipated  She needs to demonstrate consistent participation with therapies  Thank you

## 2017-04-05 NOTE — PROGRESS NOTES
Bedside and Verbal shift change report given to JEANETTE Lin (oncoming nurse) by Paul Hubbard RN (offgoing nurse). Report included the following information SBAR, Kardex, Intake/Output, MAR, Accordion and Recent Results.

## 2017-04-05 NOTE — PROGRESS NOTES
4/5/2017  1:30 PM  CM met w/ pt for D/C planning, CM referral for rehab. Pt would like referral sent to [facility] for rehab. I discussed other choices w/ pt, gave list and choice letter. Pt unable to sign due to hemiparesis,  will be in later today sign and give additional choice. Medical continuing IV steroids for next 2-3 days, anticipating D/C then when stable. Referral sent to [facility] for rehab, via [employer], they are awaiting more OT/PT for review. CM will follow.   [place] [facility]  Case Management

## 2017-04-05 NOTE — PROGRESS NOTES
Problem: Mobility Impaired (Adult and Pediatric)  Goal: *Acute Goals and Plan of Care (Insert Text)  Physical Therapy Goals  Initiated 4/3/2017  1. Patient will move from supine to sit and sit to supine in bed with minimal assistance/contact guard assist within 7 day(s). 2. Patient will transfer from bed to chair and chair to bed with moderate assistance x1 using the least restrictive device within 7 day(s). 3. Patient will perform sit to stand with moderate assistance x1 within 7 day(s). 4. Patient will tolerate sitting edge of bed for 10 min duration for LE exercises performance with CGA for stability within 7 days. PHYSICAL THERAPY TREATMENT  Patient: Analy Waite (15 y.o. female)  Date: 4/5/2017  Diagnosis: TIA/stroke rule out. Acute transverse myelitis (HCC) Left-sided weakness       Precautions:        ASSESSMENT:  Patient more agreeable to activity today, able to sit at edge of bed to perform ADL task with OT present. Patient required MOD A for LE management with log roll to obtain sitting position. Once in sitting able to sit in unsupported sitting with supervision. Patient was agreeable to attempt scooting at edge of bed in preparation for sliding board transfers and rehab. She was able to actually perform a stand pivot for performance however she states she did not stand: but she did achieve full standing with transfer of weight and return to sit. Due to her improved performance with that activity, she was agreeable to attempt transfer to sit up in chair. She performed a stand pivot transfer to chair with MOD A  And verbal cuing for sequencing. She remained sitting up in chair with chair alarm in place.    Progression toward goals:  [ ]    Improving appropriately and progressing toward goals  [X]    Improving slowly and progressing toward goals  [ ]    Not making progress toward goals and plan of care will be adjusted       PLAN:  Patient continues to benefit from skilled intervention to address the above impairments. Continue treatment per established plan of care. Discharge Recommendations:  Rehab  Further Equipment Recommendations for Discharge:  TBD at rehab       SUBJECTIVE:   Patient stated Dawson Swift would like to wash my hair. I set that as a goal for myself last night.       OBJECTIVE DATA SUMMARY:   Critical Behavior:  Neurologic State: Alert  Orientation Level: Oriented X4  Cognition: Appropriate decision making, Follows commands  Safety/Judgement: Awareness of environment  Functional Mobility Training:  Bed Mobility:     Supine to Sit: Moderate assistance;Assist x1;Minimum assistance (mod x 1, min x 1)of another     Scooting: Minimum assistance        Transfers:              Bed to Chair: Moderate assistance;Assist x1;Additional time                    Balance:  Sitting: Intact  Sitting - Static: Good (unsupported)  Sitting - Dynamic: Good (unsupported)                   Stairs:                       Neuro Re-Education:  Scooting Left and right directions: x4 each direction, MIN A assist with positioning of the Left LE  Therapeutic Exercises:      Pain:  Pain Scale 1: Numeric (0 - 10)  Pain Intensity 1: 5  Pain Location 1: Back;Neck  Pain Orientation 1: Left  Pain Description 1: Aching  Pain Intervention(s) 1: Medication (see MAR) (tylenol)  Activity Tolerance:   No complications  Please refer to the flowsheet for vital signs taken during this treatment.   After treatment:   [X]    Patient left in no apparent distress sitting up in chair  [ ]    Patient left in no apparent distress in bed  [X]    Call bell left within reach  [X]    Nursing notified  [ ]    Caregiver present  [X]    Chair alarm activated      COMMUNICATION/COLLABORATION:   The patients plan of care was discussed with: Registered Nurse     Adelaide Esparza, PT, DPT   Time Calculation: 42 mins

## 2017-04-05 NOTE — PROGRESS NOTES
Bedside and Verbal shift change report given to Orlando Reyna (oncoming nurse) by Anne Madrid (offgoing nurse). Report included the following information SBAR, MAR and Recent Results.

## 2017-04-05 NOTE — PROGRESS NOTES
4/5/2017  4:42 PM  CM received notification from [facility] on pt acceptance. The plan is to D/C to [facility] when medically stable. I notified the pt. CM will follow.   Bello Shaw

## 2017-04-05 NOTE — PROGRESS NOTES
Interdisciplinary team rounds were held 4/5/2017 with the following team members:Care Management, Nutrition and Clinical Coordinator and the primary RN. Plan of care discussed. See clinical pathway and/or care plan for interventions and desired outcomes.     Discharge 2-3 days

## 2017-04-05 NOTE — ROUTINE PROCESS
Bedside shift change report given to JEANETTE vick (oncoming nurse) by Jose Alfredo Sánchez. Alis Sevilla   (offgoing nurse). Report included the following information SBAR, Kardex and MAR.

## 2017-04-06 ENCOUNTER — HOSPITAL ENCOUNTER (OUTPATIENT)
Age: 67
Discharge: HOME | End: 2017-04-28
Attending: PHYSICAL MEDICINE & REHABILITATION | Admitting: PHYSICAL MEDICINE & REHABILITATION

## 2017-04-06 VITALS
OXYGEN SATURATION: 99 % | BODY MASS INDEX: 26.52 KG/M2 | SYSTOLIC BLOOD PRESSURE: 160 MMHG | TEMPERATURE: 97.8 F | HEIGHT: 68 IN | HEART RATE: 66 BPM | DIASTOLIC BLOOD PRESSURE: 75 MMHG | RESPIRATION RATE: 19 BRPM | WEIGHT: 175 LBS

## 2017-04-06 DIAGNOSIS — I95.1 ORTHOSTATIC HYPOTENSION: ICD-10-CM

## 2017-04-06 LAB
ALB CSF/SERPL: 14 {RATIO} (ref 0–8)
ALBUMIN CSF-MCNC: 56 MG/DL (ref 11–48)
ALBUMIN SERPL BCP-MCNC: 3 G/DL (ref 3.5–5)
ALBUMIN SERPL-MCNC: 4 G/DL (ref 3.6–4.8)
ALBUMIN/GLOB SERPL: 1 {RATIO} (ref 1.1–2.2)
ALP SERPL-CCNC: 114 U/L (ref 45–117)
ALT SERPL-CCNC: 27 U/L (ref 12–78)
ANGIO CONVERTING ENZ, CSF: 1.3 U/L (ref 0–2.5)
ANION GAP BLD CALC-SCNC: 9 MMOL/L (ref 5–15)
AST SERPL W P-5'-P-CCNC: 10 U/L (ref 15–37)
B BURGDOR IGG PATRN CSF IB-IMP: NEGATIVE
B BURGDOR IGM PATRN CSF IB-IMP: NEGATIVE
B BURGDOR18KD IGG CSF QL IB: ABNORMAL
B BURGDOR23KD IGG CSF QL IB: ABNORMAL
B BURGDOR23KD IGM CSF QL IB: ABNORMAL
B BURGDOR28KD IGG CSF QL IB: ABNORMAL
B BURGDOR30KD IGG CSF QL IB: ABNORMAL
B BURGDOR39KD IGG CSF QL IB: ABNORMAL
B BURGDOR39KD IGM CSF QL IB: ABNORMAL
B BURGDOR41KD IGG CSF QL IB: PRESENT
B BURGDOR41KD IGM CSF QL IB: ABNORMAL
B BURGDOR45KD IGG CSF QL IB: ABNORMAL
B BURGDOR58KD IGG CSF QL IB: ABNORMAL
B BURGDOR66KD IGG CSF QL IB: ABNORMAL
B BURGDOR93KD IGG CSF QL IB: ABNORMAL
BILIRUB SERPL-MCNC: 0.4 MG/DL (ref 0.2–1)
BUN SERPL-MCNC: 26 MG/DL (ref 6–20)
BUN/CREAT SERPL: 47 (ref 12–20)
CALCIUM SERPL-MCNC: 8.4 MG/DL (ref 8.5–10.1)
CHLORIDE SERPL-SCNC: 106 MMOL/L (ref 97–108)
CO2 SERPL-SCNC: 27 MMOL/L (ref 21–32)
CREAT SERPL-MCNC: 0.55 MG/DL (ref 0.55–1.02)
ERYTHROCYTE [DISTWIDTH] IN BLOOD BY AUTOMATED COUNT: 15.9 % (ref 11.5–14.5)
GLOBULIN SER CALC-MCNC: 2.9 G/DL (ref 2–4)
GLUCOSE BLD STRIP.AUTO-MCNC: 212 MG/DL (ref 65–100)
GLUCOSE BLD STRIP.AUTO-MCNC: 258 MG/DL (ref 65–100)
GLUCOSE SERPL-MCNC: 278 MG/DL (ref 65–100)
HCT VFR BLD AUTO: 40.9 % (ref 35–47)
HGB BLD-MCNC: 12.6 G/DL (ref 11.5–16)
IGG CSF-MCNC: 5.2 MG/DL (ref 0–8.6)
IGG SERPL-MCNC: 810 MG/DL (ref 700–1600)
IGG SYNTH RATE SER+CSF CALC-MRATE: ABNORMAL MG/DAY
IGG/ALB CLEAR SER+CSF-RTO: 0.5 (ref 0–0.7)
IGG/ALB CSF: 0.09 {RATIO} (ref 0–0.25)
MAGNESIUM SERPL-MCNC: 2.2 MG/DL (ref 1.6–2.4)
MCH RBC QN AUTO: 25.1 PG (ref 26–34)
MCHC RBC AUTO-ENTMCNC: 30.8 G/DL (ref 30–36.5)
MCV RBC AUTO: 81.5 FL (ref 80–99)
OLIGOCLONAL BANDS.IT SER+CSF QL: ABNORMAL
PHOSPHATE SERPL-MCNC: 4.3 MG/DL (ref 2.6–4.7)
PLATELET # BLD AUTO: 241 K/UL (ref 150–400)
POTASSIUM SERPL-SCNC: 4.2 MMOL/L (ref 3.5–5.1)
PROT SERPL-MCNC: 5.9 G/DL (ref 6.4–8.2)
RBC # BLD AUTO: 5.02 M/UL (ref 3.8–5.2)
SERVICE CMNT-IMP: ABNORMAL
SERVICE CMNT-IMP: ABNORMAL
SODIUM SERPL-SCNC: 142 MMOL/L (ref 136–145)
WBC # BLD AUTO: 3.9 K/UL (ref 3.6–11)

## 2017-04-06 PROCEDURE — 74011250637 HC RX REV CODE- 250/637: Performed by: FAMILY MEDICINE

## 2017-04-06 PROCEDURE — 74011000258 HC RX REV CODE- 258: Performed by: NURSE PRACTITIONER

## 2017-04-06 PROCEDURE — 74011250636 HC RX REV CODE- 250/636: Performed by: PHYSICAL MEDICINE & REHABILITATION

## 2017-04-06 PROCEDURE — 82962 GLUCOSE BLOOD TEST: CPT

## 2017-04-06 PROCEDURE — 84100 ASSAY OF PHOSPHORUS: CPT

## 2017-04-06 PROCEDURE — 74011250637 HC RX REV CODE- 250/637: Performed by: HOSPITALIST

## 2017-04-06 PROCEDURE — 74011250637 HC RX REV CODE- 250/637: Performed by: PHYSICAL MEDICINE & REHABILITATION

## 2017-04-06 PROCEDURE — 80053 COMPREHEN METABOLIC PANEL: CPT

## 2017-04-06 PROCEDURE — 36415 COLL VENOUS BLD VENIPUNCTURE: CPT

## 2017-04-06 PROCEDURE — 74011636637 HC RX REV CODE- 636/637: Performed by: PHYSICAL MEDICINE & REHABILITATION

## 2017-04-06 PROCEDURE — 74011250636 HC RX REV CODE- 250/636: Performed by: NURSE PRACTITIONER

## 2017-04-06 PROCEDURE — 97530 THERAPEUTIC ACTIVITIES: CPT

## 2017-04-06 PROCEDURE — 74011636637 HC RX REV CODE- 636/637: Performed by: FAMILY MEDICINE

## 2017-04-06 PROCEDURE — 85027 COMPLETE CBC AUTOMATED: CPT

## 2017-04-06 PROCEDURE — 74011250637 HC RX REV CODE- 250/637: Performed by: NURSE PRACTITIONER

## 2017-04-06 PROCEDURE — 97535 SELF CARE MNGMENT TRAINING: CPT

## 2017-04-06 PROCEDURE — 83735 ASSAY OF MAGNESIUM: CPT

## 2017-04-06 RX ORDER — METHYLPREDNISOLONE 4 MG/1
4 TABLET ORAL ONCE
Qty: 1 TAB | Refills: 0 | Status: SHIPPED | OUTPATIENT
Start: 2017-04-07 | End: 2017-04-07

## 2017-04-06 RX ORDER — METHYLPREDNISOLONE 4 MG/1
4 TABLET ORAL
Status: COMPLETED | OUTPATIENT
Start: 2017-04-12 | End: 2017-04-12

## 2017-04-06 RX ORDER — METHYLPREDNISOLONE 4 MG/1
4 TABLET ORAL
Qty: 4 TAB | Refills: 0 | Status: SHIPPED | OUTPATIENT
Start: 2017-04-09 | End: 2017-05-01

## 2017-04-06 RX ORDER — METHYLPREDNISOLONE 8 MG/1
8 TABLET ORAL ONCE
Qty: 1 TAB | Refills: 0 | Status: SHIPPED
Start: 2017-04-07 | End: 2017-04-06

## 2017-04-06 RX ORDER — METHYLPREDNISOLONE 4 MG/1
4 TABLET ORAL ONCE
Qty: 1 TAB | Refills: 0 | Status: SHIPPED
Start: 2017-04-07 | End: 2017-04-06

## 2017-04-06 RX ORDER — METHYLPREDNISOLONE 8 MG/1
8 TABLET ORAL ONCE
Qty: 1 TAB | Refills: 0 | Status: SHIPPED
Start: 2017-04-08 | End: 2017-04-06

## 2017-04-06 RX ORDER — METHYLPREDNISOLONE 4 MG/1
4 TABLET ORAL
Status: COMPLETED | OUTPATIENT
Start: 2017-04-10 | End: 2017-04-10

## 2017-04-06 RX ORDER — DEXTROSE 50 % IN WATER (D50W) INTRAVENOUS SYRINGE
12.5-25 AS NEEDED
Status: DISCONTINUED | OUTPATIENT
Start: 2017-04-06 | End: 2017-04-06 | Stop reason: HOSPADM

## 2017-04-06 RX ORDER — GLIMEPIRIDE 2 MG/1
2 TABLET ORAL
Status: DISCONTINUED | OUTPATIENT
Start: 2017-04-07 | End: 2017-04-12

## 2017-04-06 RX ORDER — METHYLPREDNISOLONE 4 MG/1
8 TABLET ORAL ONCE
Status: COMPLETED | OUTPATIENT
Start: 2017-04-07 | End: 2017-04-07

## 2017-04-06 RX ORDER — METHYLPREDNISOLONE 4 MG/1
4 TABLET ORAL
Qty: 4 TAB | Refills: 0 | Status: SHIPPED
Start: 2017-04-09 | End: 2017-04-06

## 2017-04-06 RX ORDER — BACLOFEN 10 MG/1
10 TABLET ORAL 3 TIMES DAILY
Status: DISCONTINUED | OUTPATIENT
Start: 2017-04-06 | End: 2017-04-28 | Stop reason: HOSPADM

## 2017-04-06 RX ORDER — METHYLPREDNISOLONE 4 MG/1
8 TABLET ORAL ONCE
Status: COMPLETED | OUTPATIENT
Start: 2017-04-08 | End: 2017-04-08

## 2017-04-06 RX ORDER — INSULIN LISPRO 100 [IU]/ML
INJECTION, SOLUTION INTRAVENOUS; SUBCUTANEOUS
Status: DISCONTINUED | OUTPATIENT
Start: 2017-04-06 | End: 2017-04-06 | Stop reason: HOSPADM

## 2017-04-06 RX ORDER — LISINOPRIL 5 MG/1
5 TABLET ORAL 2 TIMES DAILY
Status: DISCONTINUED | OUTPATIENT
Start: 2017-04-06 | End: 2017-04-14

## 2017-04-06 RX ORDER — AMLODIPINE BESYLATE 5 MG/1
5 TABLET ORAL
Status: DISCONTINUED | OUTPATIENT
Start: 2017-04-06 | End: 2017-04-17

## 2017-04-06 RX ORDER — PRAVASTATIN SODIUM 20 MG/1
40 TABLET ORAL
Status: DISCONTINUED | OUTPATIENT
Start: 2017-04-06 | End: 2017-04-28 | Stop reason: HOSPADM

## 2017-04-06 RX ORDER — INSULIN GLARGINE 100 [IU]/ML
25 INJECTION, SOLUTION SUBCUTANEOUS DAILY
Status: DISCONTINUED | OUTPATIENT
Start: 2017-04-06 | End: 2017-04-06 | Stop reason: HOSPADM

## 2017-04-06 RX ORDER — GLIMEPIRIDE 2 MG/1
4 TABLET ORAL
Status: DISCONTINUED | OUTPATIENT
Start: 2017-04-07 | End: 2017-04-28 | Stop reason: HOSPADM

## 2017-04-06 RX ORDER — BACLOFEN 10 MG/1
10 TABLET ORAL
Status: DISCONTINUED | OUTPATIENT
Start: 2017-04-06 | End: 2017-04-06

## 2017-04-06 RX ORDER — METHYLPREDNISOLONE 4 MG/1
4 TABLET ORAL
Qty: 3 TAB | Refills: 0 | Status: SHIPPED
Start: 2017-04-10 | End: 2017-04-06

## 2017-04-06 RX ORDER — METHYLPREDNISOLONE 8 MG/1
8 TABLET ORAL ONCE
Qty: 1 TAB | Refills: 0 | Status: SHIPPED | OUTPATIENT
Start: 2017-04-08 | End: 2017-04-08

## 2017-04-06 RX ORDER — METHYLPREDNISOLONE 4 MG/1
4 TABLET ORAL ONCE
Status: COMPLETED | OUTPATIENT
Start: 2017-04-07 | End: 2017-04-07

## 2017-04-06 RX ORDER — METHYLPREDNISOLONE 4 MG/1
8 TABLET ORAL
Status: COMPLETED | OUTPATIENT
Start: 2017-04-07 | End: 2017-04-07

## 2017-04-06 RX ORDER — FLUDROCORTISONE ACETATE 0.1 MG/1
TABLET ORAL
Qty: 30 TAB | Refills: 0 | Status: SHIPPED | OUTPATIENT
Start: 2017-04-06 | End: 2017-05-01

## 2017-04-06 RX ORDER — FACIAL-BODY WIPES
10 EACH TOPICAL DAILY PRN
Status: DISCONTINUED | OUTPATIENT
Start: 2017-04-06 | End: 2017-04-28 | Stop reason: HOSPADM

## 2017-04-06 RX ORDER — GABAPENTIN 300 MG/1
600 CAPSULE ORAL DAILY
Status: DISCONTINUED | OUTPATIENT
Start: 2017-04-07 | End: 2017-04-28 | Stop reason: HOSPADM

## 2017-04-06 RX ORDER — LANOLIN ALCOHOL/MO/W.PET/CERES
400 CREAM (GRAM) TOPICAL DAILY
Status: DISCONTINUED | OUTPATIENT
Start: 2017-04-07 | End: 2017-04-28 | Stop reason: HOSPADM

## 2017-04-06 RX ORDER — ENOXAPARIN SODIUM 100 MG/ML
40 INJECTION SUBCUTANEOUS DAILY
Status: DISCONTINUED | OUTPATIENT
Start: 2017-04-06 | End: 2017-04-28 | Stop reason: HOSPADM

## 2017-04-06 RX ORDER — METHYLPREDNISOLONE 4 MG/1
4 TABLET ORAL
Qty: 1 TAB | Refills: 0 | Status: SHIPPED
Start: 2017-04-12 | End: 2017-04-06

## 2017-04-06 RX ORDER — METHYLPREDNISOLONE 4 MG/1
4 TABLET ORAL
Status: COMPLETED | OUTPATIENT
Start: 2017-04-09 | End: 2017-04-09

## 2017-04-06 RX ORDER — MAGNESIUM SULFATE 100 %
16 CRYSTALS MISCELLANEOUS AS NEEDED
Status: DISCONTINUED | OUTPATIENT
Start: 2017-04-06 | End: 2017-04-28 | Stop reason: HOSPADM

## 2017-04-06 RX ORDER — METHYLPREDNISOLONE 4 MG/1
4 TABLET ORAL
Qty: 3 TAB | Refills: 0 | Status: SHIPPED | OUTPATIENT
Start: 2017-04-08 | End: 2017-05-01

## 2017-04-06 RX ORDER — METHYLPREDNISOLONE 8 MG/1
8 TABLET ORAL ONCE
Qty: 1 TAB | Refills: 0 | Status: SHIPPED | OUTPATIENT
Start: 2017-04-07 | End: 2017-04-07

## 2017-04-06 RX ORDER — METHYLPREDNISOLONE 4 MG/1
4 TABLET ORAL
Status: COMPLETED | OUTPATIENT
Start: 2017-04-08 | End: 2017-04-08

## 2017-04-06 RX ORDER — ADHESIVE BANDAGE
30 BANDAGE TOPICAL DAILY PRN
Status: DISCONTINUED | OUTPATIENT
Start: 2017-04-06 | End: 2017-04-28 | Stop reason: HOSPADM

## 2017-04-06 RX ORDER — BACLOFEN 10 MG/1
10 TABLET ORAL
Qty: 30 TAB | Refills: 0 | Status: SHIPPED
Start: 2017-04-06 | End: 2017-07-06 | Stop reason: SDUPTHER

## 2017-04-06 RX ORDER — METHYLPREDNISOLONE 4 MG/1
4 TABLET ORAL
Status: COMPLETED | OUTPATIENT
Start: 2017-04-11 | End: 2017-04-11

## 2017-04-06 RX ORDER — METHYLPREDNISOLONE 4 MG/1
4 TABLET ORAL
Qty: 3 TAB | Refills: 0 | Status: SHIPPED
Start: 2017-04-08 | End: 2017-04-06

## 2017-04-06 RX ORDER — METHYLPREDNISOLONE 4 MG/1
4 TABLET ORAL
Qty: 1 TAB | Refills: 0 | Status: SHIPPED | OUTPATIENT
Start: 2017-04-12 | End: 2017-05-01

## 2017-04-06 RX ORDER — METHYLPREDNISOLONE 4 MG/1
4 TABLET ORAL
Qty: 2 TAB | Refills: 0 | Status: SHIPPED
Start: 2017-04-11 | End: 2017-04-06

## 2017-04-06 RX ORDER — ACETAMINOPHEN 325 MG/1
650 TABLET ORAL
Status: DISCONTINUED | OUTPATIENT
Start: 2017-04-06 | End: 2017-04-28 | Stop reason: HOSPADM

## 2017-04-06 RX ORDER — METHYLPREDNISOLONE 4 MG/1
4 TABLET ORAL
Qty: 3 TAB | Refills: 0 | Status: SHIPPED | OUTPATIENT
Start: 2017-04-10 | End: 2017-05-01

## 2017-04-06 RX ORDER — LORATADINE 10 MG/1
10 TABLET ORAL
Status: DISCONTINUED | OUTPATIENT
Start: 2017-04-13 | End: 2017-04-08

## 2017-04-06 RX ORDER — GUAIFENESIN 100 MG/5ML
81 LIQUID (ML) ORAL DAILY
Status: DISCONTINUED | OUTPATIENT
Start: 2017-04-07 | End: 2017-04-28 | Stop reason: HOSPADM

## 2017-04-06 RX ORDER — GABAPENTIN 300 MG/1
300 CAPSULE ORAL
Status: DISCONTINUED | OUTPATIENT
Start: 2017-04-06 | End: 2017-04-28 | Stop reason: HOSPADM

## 2017-04-06 RX ORDER — MAGNESIUM SULFATE 100 %
4 CRYSTALS MISCELLANEOUS AS NEEDED
Status: DISCONTINUED | OUTPATIENT
Start: 2017-04-06 | End: 2017-04-06 | Stop reason: HOSPADM

## 2017-04-06 RX ORDER — METHYLPREDNISOLONE 4 MG/1
4 TABLET ORAL
Qty: 2 TAB | Refills: 0 | Status: SHIPPED | OUTPATIENT
Start: 2017-04-11 | End: 2017-05-01

## 2017-04-06 RX ORDER — DEXTROSE 50 % IN WATER (D50W) INTRAVENOUS SYRINGE
25 AS NEEDED
Status: DISCONTINUED | OUTPATIENT
Start: 2017-04-06 | End: 2017-04-28 | Stop reason: HOSPADM

## 2017-04-06 RX ORDER — INSULIN LISPRO 100 [IU]/ML
INJECTION, SOLUTION INTRAVENOUS; SUBCUTANEOUS
Status: DISCONTINUED | OUTPATIENT
Start: 2017-04-06 | End: 2017-04-28 | Stop reason: HOSPADM

## 2017-04-06 RX ORDER — FLUTICASONE PROPIONATE 50 MCG
2 SPRAY, SUSPENSION (ML) NASAL DAILY
Status: DISCONTINUED | OUTPATIENT
Start: 2017-04-13 | End: 2017-04-10

## 2017-04-06 RX ADMIN — INSULIN LISPRO 5 UNITS: 100 INJECTION, SOLUTION INTRAVENOUS; SUBCUTANEOUS at 08:35

## 2017-04-06 RX ADMIN — ASPIRIN 81 MG CHEWABLE TABLET 81 MG: 81 TABLET CHEWABLE at 08:36

## 2017-04-06 RX ADMIN — INSULIN GLARGINE 25 UNITS: 100 INJECTION, SOLUTION SUBCUTANEOUS at 08:34

## 2017-04-06 RX ADMIN — SITAGLIPTIN 100 MG: 100 TABLET, FILM COATED ORAL at 08:36

## 2017-04-06 RX ADMIN — BACLOFEN 10 MG: 10 TABLET ORAL at 21:30

## 2017-04-06 RX ADMIN — LISINOPRIL 5 MG: 5 TABLET ORAL at 21:30

## 2017-04-06 RX ADMIN — PRAVASTATIN SODIUM 40 MG: 20 TABLET ORAL at 21:30

## 2017-04-06 RX ADMIN — AMLODIPINE BESYLATE 5 MG: 5 TABLET ORAL at 21:30

## 2017-04-06 RX ADMIN — LISINOPRIL 5 MG: 5 TABLET ORAL at 08:36

## 2017-04-06 RX ADMIN — GABAPENTIN 600 MG: 300 CAPSULE ORAL at 08:36

## 2017-04-06 RX ADMIN — ENOXAPARIN SODIUM 40 MG: 100 INJECTION SUBCUTANEOUS at 21:30

## 2017-04-06 RX ADMIN — SODIUM CHLORIDE 1000 MG: 900 INJECTION, SOLUTION INTRAVENOUS at 12:04

## 2017-04-06 RX ADMIN — INSULIN LISPRO 4 UNITS: 100 INJECTION, SOLUTION INTRAVENOUS; SUBCUTANEOUS at 12:03

## 2017-04-06 RX ADMIN — BACLOFEN 10 MG: 10 TABLET ORAL at 08:36

## 2017-04-06 RX ADMIN — ACETAMINOPHEN 650 MG: 325 TABLET ORAL at 18:01

## 2017-04-06 RX ADMIN — INSULIN LISPRO 8 UNITS: 100 INJECTION, SOLUTION INTRAVENOUS; SUBCUTANEOUS at 21:31

## 2017-04-06 RX ADMIN — GABAPENTIN 300 MG: 300 CAPSULE ORAL at 21:30

## 2017-04-06 RX ADMIN — MAGNESIUM GLUCONATE 500 MG ORAL TABLET 400 MG: 500 TABLET ORAL at 08:36

## 2017-04-06 RX ADMIN — Medication 10 ML: at 05:13

## 2017-04-06 NOTE — PROGRESS NOTES
Bedside and Verbal shift change report given to Claude Patter (oncoming nurse) by Flaquita Sommer (offgoing nurse). Report included the following information SBAR, Kardex, Intake/Output, MAR and Recent Results.

## 2017-04-06 NOTE — PROGRESS NOTES
[facility]: [facility]  [address] [place] [address]  Mir Snare, ACNP    * Inpatient Progress Note *    NAME:  Jannie Cornell   :  1950  MRN:  [id number]  PCP:    Sherryle Netter, DO  ______________________________________________________________________    Addendum 1:  Was asked to speak with patient before she went to [facility] for rehab. Dr. Ivonne Rick, pt,  and son in room. Dr. Ivonne Rick left room and  asks for long term prognosis for his wife. I stated as I have stated on previous days I cannot predict this and within 3-6mo we will know what to expect from how she is doing at that time. The best plan now is to continue with the [facility] IP therapy and taper steroids. I discussed potential flares of TM and if increased tingling/weakness and increased debility occur in future that is a call to Neurology. · As I have previously discussed with patient and Dr. Michael Lechuga had as well, there is less likely of a full recovery back to baseline with this diagnosis but with starting the rehab ASAP we as a team know the patient will regain strength and function quicker. · Pt  was very dismayed that I couldn't guarantee a full recovery back to baseline, but I let him know with the severity of weakness she developed in her L arm I know she has regained some movement from steroids but we have to understand up front that it may not be possible to have a full recovery to baseline though we have noticed significant improvement already since admission. ·  and patient were not pleased I couldn't provide statements patient about a full recovery being possible post Transverse Myelitis and I stated I never promise something I do not know will happen but I know you have received the proper treatment needed and therapy is the next step to help in the recovery process.     · Dr. Michael Lechuga reported it will also take months to evaluate about a 40% recovery in his 4/3 progress note  ---SHLOMO Maldonado  ______________________________________________________________________  Discussion with patient regarding :  · Stable for IP rehab when medically stable, [facility] has accepted her per Case Mgmt  ·  Medications:  Finish last dose of IV Solumedrol today and Start oral Medrol dose pack tomorrow  · Has OP Neuro appt in clinic to review pending LP  labs    Will sign off  Stable per Neurology  ____________________________________________________________________    Collaborating care team physician, Dr. Selena Jackson    ____________________________________________________________________    ALLEY Jean

## 2017-04-06 NOTE — PROGRESS NOTES
Patient discharged per MD order. Discharge instructions and prescriptions reviewed with patient. Patient verbalized understanding. IV removed with tip intact. Opportunity to ask questions were provided. Patient discharged to [facility] via wheelchair.

## 2017-04-06 NOTE — PROGRESS NOTES
Problem: Mobility Impaired (Adult and Pediatric)  Goal: *Acute Goals and Plan of Care (Insert Text)  Physical Therapy Goals  Initiated 4/3/2017  1. Patient will move from supine to sit and sit to supine in bed with minimal assistance/contact guard assist within 7 day(s). 2. Patient will transfer from bed to chair and chair to bed with moderate assistance x1 using the least restrictive device within 7 day(s). 3. Patient will perform sit to stand with moderate assistance x1 within 7 day(s). 4. Patient will tolerate sitting edge of bed for 10 min duration for LE exercises performance with CGA for stability within 7 days. PHYSICAL THERAPY TREATMENT  Patient: Chas Carmona (89 y.o. female)  Date: 4/6/2017  Diagnosis: TIA/stroke rule out. Acute transverse myelitis (HCC) Left-sided weakness       Precautions:        ASSESSMENT:  Patient received seated EOB and excited for \"scooting\" with therapy. Scooting to left foot of bed and head to a wheelchair with minimal to moderate assistance. Assistance required to block the left knee and foot and for positioning it for the next attempt. Noted strong knee extension component but limited motor control when attempting to clear buttocks. Manual and visual cues required to facilitate an anterior weight shift to improve independence with scooting. Patient left in wheelchair in NAD with an exit alarm active under her. Based on progress to date, this patient appears to be a good rehab candidate who can tolerate 3 hrs of therapy and is quite motivated for progress. Progression toward goals:  [X]    Improving appropriately and progressing toward goals  [ ]    Improving slowly and progressing toward goals  [ ]    Not making progress toward goals and plan of care will be adjusted       PLAN:  Patient continues to benefit from skilled intervention to address the above impairments. Continue treatment per established plan of care.   Discharge Recommendations:  Inpatient Rehab  Further Equipment Recommendations for Discharge:  to be determined          SUBJECTIVE:   Patient stated Lucy Forward! Lets scoot!       OBJECTIVE DATA SUMMARY:   Critical Behavior:  Neurologic State: Alert  Orientation Level: Oriented X4  Cognition: Follows commands  Safety/Judgement: Awareness of environment  Functional Mobility Training:  Bed Mobility:  Rolling: Moderate assistance  Supine to Sit: Assist x1; Moderate assistance (HOB elevated )     Scooting: Minimum assistance; Moderate assistance   Bed to wheelchair: moderate assist while blocking left knee to prevent anterior translation        Transfers:                                   Balance:  Sitting: Intact  Sitting - Static: Good (unsupported)  Sitting - Dynamic: Good (unsupported)  Therapeutic Exercises:   Seated LAQs, marches x10 on left.   Trunk substitution with hip flexion and poor eccentric control  After treatment:   [X]    Patient left in no apparent distress sitting up in chair  [ ]    Patient left in no apparent distress in bed  [X]    Call bell left within reach  [X]    Nursing notified  [ ]    Caregiver present  [ ]    Bed alarm activated      COMMUNICATION/COLLABORATION:   The patients plan of care was discussed with: Registered Nurse     Jeannine Chacko, PT, DPT   Time Calculation: 12 mins

## 2017-04-06 NOTE — PROGRESS NOTES
4/6/2017 3:45 PM [facility] has accepted the pt and has a bed available for her today. SW has informed Sheryl CAMERON, Dr. Mayra Caraballo and also spoke with Janette Berkowitz NP.   PHIL Carvajal     4/6/2017 12:41 PM [facility] met with this pt and discussed her discharge. Pt aware that the Md would like to discharge her today. Pt has had her therapy for today and is receiving her treatments early so that there will be no delay in discharge. Pt states that she does not want to discharge today and that she would rather go tomorrow. Pt asks if she can be seen by her neurologist prior to discharge. She hopes that she can stay and discharge in the AM.   Ariel Carvajal     4/6/2017 10:47 AM SVEN spoke with Vincenzo Contreras from [facility]. This pt is accepted. While there is no specific cut off for acceptance, it is most ideal for the pt to be there before 5PM so that they can be staffed with an MD. Also, [facility] would like to see this pt's participation with therapy today. SVEN has spoken with Dr. Sheryl Eaton and informed him of the above. PHIL Carvajal     4/6/2017 10:42 AM SVEN spoke with [id number] team. They note that they may discharge today and would like to know how late the pt would be accepted at [facility]. Per RODDY,PQU may require some treatment late into the afternoon.    PHIL Carvajal

## 2017-04-06 NOTE — PROGRESS NOTES
Interdisciplinary team rounds were held 4/6/2017 with the following team members:Care Management, Nursing, Nutrition and Physical Therapy and the primary RN. Plan of care discussed. See clinical pathway and/or care plan for interventions and desired outcomes.     Discharge today

## 2017-04-06 NOTE — DISCHARGE SUMMARY
[facility] FAMILY MEDICINE RESIDENCY PROGRAM   Discharge/Transfer/Off-Service Note    Date: April 6, 2017    Mahi Tucker  MRN: [id number]  YOB: 1950  Age: 78 y.o. Date of admission: 4/1/2017     Date of discharge/transfer: 4/6/2017    Primary Care Physician: Maira Rueda DO     Attending physician at admission: Bernda Pardo MD     Attending physician at discharge/transfer: Brenda Pardo MD     Resident physician at discharge/transfer: [id number] Rustyassadosenia Valdez MD     Consultants during hospitalization  Neurology-Dr. Julius Joseph and Dr. Marichuy Vegas     Admission diagnoses   TIA/stroke rule out. Acute transverse myelitis [facility])    Discharge diagnoses  Hospital Problems  Date Reviewed: 4/3/2017          Codes Class Noted POA    Abnormal MRI, cervical spine ICD-10-CM: R93.7  ICD-9-CM: 793.7  4/3/2017 Yes        * (Principal)Left-sided weakness ICD-10-CM: R53.1  ICD-9-CM: 728.87  4/2/2017 Yes        Acute transverse myelitis ([facility] ICD-10-CM: G37.3  ICD-9-CM: 341.20  4/2/2017 Unknown        Breast cancer ([facility] ICD-10-CM: C50.919  ICD-9-CM: 174.9  3/12/2015 Yes        HTN (hypertension) ICD-10-CM: I10  ICD-9-CM: 401.9  9/15/2014 Yes        Fibromyalgia ICD-10-CM: M79.7  ICD-9-CM: 729.1  4/21/2014 Yes        Diabetes mellitus with neuropathy ([facility] ICD-10-CM: E11.40  ICD-9-CM: 250.60, 357.2  3/17/2014 Yes               History of Present Illness  Per Dr. Jackie Finley:  Dwaine Juarez is a 78 y.o. female with known hx of breast cancer, DM with peripheral neuropathy, fibromyalgia, and HTN who presents to the ER complaining of left-sided pain and weakness. The patient reports that she had a migraine last evening (not unusual for her). She took her \"usual\" dose of butabital (thought this medicine is not listed in her PTA med list), and did not find any relief with it. She then took a second dose of butabital within a 2 hour period of taking the previous dose.  She went to bed at this point and woke up approximately 4 hours later. When she awakened, she noted that she was very unstable on her feet and could not use her left side. She was forced to use an old walker in order to get to the bathroom. She also noted at the time that her speech was markedly slowed (though she did not report any issue with her enunciation). Since this event, she has had increasing left-sided weakness and pain in her left shoulder that is shooting down her arm. The patient denies any nausea/vomitting, chest pain, shortness of breath.     In the ER, vital signs were remarkable for elevated BP (163/58). Labs were remarkable for elevated blood glucose of 185. CT showed no acute process. Pt was treated with 1L NS bolus. She passed her bedside dysphagia screen. \"     Hospital course  Jame Mendez is a 78 y.o. female with hx of breast cancer, DM with peripheral neuropathy, fibromyalgia, and HTN who is admitted for Stroke/TIA rule out.      Left sided Hemiparesis with Parasthesias- Working diagnosis of transverse myelitis based on MRI of cervical spine. Received tx with Medrol 1gm IV for 5 days. Several CSF studies still pending; CSF culture NG x 2d; RPR negative. Improvement in some symptoms-regaining ROM in left fingers and toes. - Discharging to inpatient rehab at [address] [place] [address]. - Medrol taper: total of 24mg on 4/7; total of 22mg on 4/8; total of 16mg 4/9; total of 12mg on 4/10; total of 8mg on 4/11; total of 4mg on 4/12.   - F/U CSF studies(lyme, myelin basic protein, final culture result).      Diabetes Mellitus T2 w/ Peripheral Neuropathy: Last HgA1c 7.0 (3/2017). Hyperglycemia 2/2 steroids. Glucose not under control: -300. Insulin Naive. On home Januvia, glimipiride, invokana.    - Can restart home doses of Januvia, glimipirde and invokana   - Will likely need SSI while in steroid taper but anticipate glucoses to improve as steroid dose decreases  - Continue home dose of gabapentin.      Muscle Spasms - Acute on chronic issue.   - Baclofen 10mg TID     Hypertension: BP today 153/76. Appears labile.   - Continue Norvasc and lisinopril   - Will continue to monitor at this time and readjust as BP's trend.      Hyperlipidemia - last lipid panel (12/2016): cholesterol 179, , HDL 72, LDL 77.   -Continue home pravastatin. Physical exam at discharge:   General: No acute distress. Alert. Cooperative. Very pleasant. Head: Normocephalic. Atraumatic. Eyes:  Conjunctiva pink. Sclera white. PERRL. Respiratory: CTAB. No w/r/r/c.   Cardiovascular: RRR. Normal S1,S2. No m/r/g. Pulses 2+ throughout. GI: + bowel sounds. Nontender. No rebound tenderness or guarding. Nondistended   Extremities: No edema. No palpable cord. LLE tenderness persists      Neurological Exam:  Mental Status: Alert, oriented x 3 and following commands. Cranial Nerves:  Intact visual fields. PERRL, EOM's full, no nystagmus, no ptosis. Facial sensation is normal. Facial movement is symmetric. Palate is midline. Normal sternocleidomastoid strength. Tongue is midline. Hearing is intact bilaterally. Motor:  4/5 strength in right upper and lower extremity. Increasing ROM in LUE 2/4 (now can move wrist and fingers and foot) and tenderness improved from yesterday. Normal bulk and tone. Reflexes:  Deep tendon reflexes 2+/4 and symmetrical.   Sensory:  LLE less tender to palpation. Sensation normal in RUE and RLE   Gait:  Unable to assess   Tremor:  No tremor noted. Cerebellar:  No cerebellar signs present. Neurovascular:     No carotid bruits.          Condition at discharge: Stable     Labs  Recent Labs      04/06/17   0556  04/05/17   0515  04/04/17   0623   WBC  3.9  5.5  5.4   HGB  12.6  13.1  13.7   HCT  40.9  37.6  41.2   PLT  241  165  265     Recent Labs      04/06/17   0556  04/05/17   0515  04/04/17   0623   NA  142  139  142   K  4.2  4.5  4.5   CL  106  105  106   CO2  27  22  29   BUN  26*  26*  27*   CREA  0.55  0.44*  0.53*   GLU  278*  278*  265*   CA  8.4*  8.9  9.1 MG  2.2  2.3  2.4   PHOS  4.3  4.5  4.3     Recent Labs      04/06/17   0556  04/05/17   0515  04/04/17   0623   SGOT  10*  22  17   ALT  27  31  35   AP  114  120*  140*   TBILI  0.4  0.4  0.4   TP  5.9*  6.6  6.6   ALB  3.0*  3.1*  3.5   GLOB  2.9  3.5  3.1     No results for input(s): INR, PTP, APTT in the last 72 hours. No lab exists for component: INREXT   No results for input(s): FE, TIBC, PSAT, FERR in the last 72 hours. No results for input(s): PH, PCO2, PO2 in the last 72 hours. No results for input(s): CPK, CKMB in the last 72 hours. No lab exists for component: TROPONINI  Lab Results   Component Value Date/Time    Glucose (POC) 212 04/06/2017 11:24 AM    Glucose (POC) 258 04/06/2017 07:21 AM    Glucose (POC) 308 04/05/2017 09:43 PM    Glucose (POC) 228 04/05/2017 04:36 PM    Glucose (POC) 254 04/05/2017 11:19 AM        CSF culture: NG x2d    Diagnostic studies  -CTA head and neck (4/1): Negative CTA head and neck  -CT head (4/1): No acute intracranial abnormality. -MRI brain (4/1): No acute abnormality. Scattered tiny foci of high signal in the deep white matter may represent  chronic ischemic change and likely within normal limits for the patient's age   -MRI cervical spine (4/2):    1. Cord contusion versus myelomalacia at C3 is increased and more conspicuous  since last year. 2. C4-C5 increased moderate-severe central spinal canal stenosis and left  foraminal stenosis. 3. Other stenoses are unchanged since last year. Procedures  · Lumbar Puncture (4/3)    Disposition:  [facility] for Inpatient Rehab    Discharge/Transfer Medications  Current Discharge Medication List      START taking these medications    Details   !! methylPREDNISolone (MEDROL) 8 mg tablet Take 1 Tab by mouth once for 1 dose. Due on 4/7/17 at 8AM  Qty: 1 Tab, Refills: 0      !! methylPREDNISolone (MEDROL) 4 mg tab Take 1 Tab by mouth once for 1 dose.  Due on 4/7/17 at 12PM  Qty: 1 Tab, Refills: 0      !! methylPREDNISolone (MEDROL) 4 mg tab Take 1 Tab by mouth once for 1 dose. Due on 4/7/17 at  5PM  Qty: 1 Tab, Refills: 0      !! methylPREDNISolone (MEDROL) 8 mg tablet Take 1 Tab by mouth once for 1 dose. Due on 4/7/17 at 8pm  Qty: 1 Tab, Refills: 0      !! methylPREDNISolone (MEDROL) 4 mg tab Take 1 Tab by mouth three (3) times daily (with meals). Due on 4/8/17 TID with meals. Qty: 3 Tab, Refills: 0      !! methylPREDNISolone (MEDROL) 8 mg tablet Take 1 Tab by mouth once for 1 dose. Due on 4/8/17 at 8PM  Qty: 1 Tab, Refills: 0      !! methylPREDNISolone (MEDROL) 4 mg tab Take 1 Tab by mouth four (4) times daily (with meals). Due on 4/9/17 QID with meals  Qty: 4 Tab, Refills: 0      !! methylPREDNISolone (MEDROL) 4 mg tab Take 1 Tab by mouth three (3) times daily (with meals). Due on 4/10/17 TID with meals  Qty: 3 Tab, Refills: 0      !! methylPREDNISolone (MEDROL) 4 mg tab Take 1 Tab by mouth ACB/HS. Due on 4/11/17 BID  Qty: 2 Tab, Refills: 0      !! methylPREDNISolone (MEDROL) 4 mg tab Take 1 Tab by mouth Daily (before breakfast). Due on 4/12/17  Qty: 1 Tab, Refills: 0      baclofen (LIORESAL) 10 mg tablet Take 1 Tab by mouth three (3) times daily as needed. Indications: spasticity and cramping  Qty: 30 Tab, Refills: 0       !! - Potential duplicate medications found. Please discuss with provider. CONTINUE these medications which have NOT CHANGED    Details   aspirin 81 mg chewable tablet Take 81 mg by mouth daily. !! gabapentin (NEURONTIN) 300 mg capsule Take 600 mg by mouth daily. !! gabapentin (NEURONTIN) 300 mg capsule Take 300 mg by mouth every evening. !! glimepiride (AMARYL) 2 mg tablet Take 2 mg by mouth daily (with breakfast). !! glimepiride (AMARYL) 2 mg tablet Take 4 mg by mouth daily (with dinner). fludrocortisone (FLORINEF) 0.1 mg tablet Take 0.1 mg by mouth Daily (before breakfast). magnesium oxide (MAG-OX) 400 mg tablet Take 400 mg by mouth daily.       SITagliptin (JANUVIA) 100 mg tablet Take 100 mg by mouth Daily (before breakfast). fluticasone (FLONASE) 50 mcg/actuation nasal spray 2 Sprays by Both Nostrils route daily. Qty: 1 Bottle, Refills: 2    Associated Diagnoses: ETD (eustachian tube dysfunction), bilateral      amLODIPine (NORVASC) 5 mg tablet Take 1 Tab by mouth nightly. Indications: hypertension  Qty: 30 Tab, Refills: 2    Associated Diagnoses: Essential hypertension      INVOKANA 100 mg tablet Take 1 tablet by mouth  daily before breakfast  Qty: 90 Tab, Refills: 1      lisinopril (PRINIVIL, ZESTRIL) 5 mg tablet Take 1 Tab by mouth daily. Qty: 90 Tab, Refills: 3      Alpha Lipoic Acid 600 mg cap Take 1 Cap by mouth two (2) times a day. L-Mfolate-B6 Phos-Methyl-B12 (METANX) 3-35-2 mg tab tab Take 1 Tab by mouth two (2) times a day. CHROM GENO/BRINDAL BERRY (GARCINIA CAMBOGIA PO) Take 2 Tabs by mouth daily. pravastatin (PRAVACHOL) 40 mg tablet TAKE 1 TABLET NIGHTLY (NEEDS APPOINTMENT AS SOON AS POSSIBLE FOR FASTING LABS AND APPOINTMENT)  Qty: 30 Tab, Refills: 0      CRANBERRY EXTRACT-VIT C PO Take 3 Caps by mouth daily. 4200 mg      cinnamon bark (CINNAMON) 500 mg cap Take 2 Caps by mouth daily. loratadine (CLARITIN) 10 mg tablet Take 10 mg by mouth nightly. TRUETEST TEST STRIPS strip TEST THREE TIMES DAILY  Qty: 100 Strip, Refills: 1       !! - Potential duplicate medications found. Please discuss with provider.           Recommended follow-up tests after discharge  · To be determined by rehab facility, PCP and neurology      Diet:  Regular diet    Activity:  As tolerated     Discharge instructions to patient/family  Please seek medical attention for any new or worsening symptoms particularly fever, chest pain, shortness of breath, abdominal pain, nausea, vomiting    Follow up plans/appointments  Follow-up Information     Follow up With Details Comments [address], DO Go to once discharged from rehab [id number] [address] [address] [facility]  [phone]      Pradeep Mccord NP Go on 4/13/2017 Neurology follow-up appt:  to review lab, arrive at 1030am for 11am appt [address] [facility]  Neurology Clinic [facility] [address]               Celeste Shelby MD  Family Medicine Resident    Cc: Adan Fatima DO

## 2017-04-06 NOTE — DISCHARGE INSTRUCTIONS
INPATIENT REHAB DISCHARGE INSTRUCTIONS    Issa Cool / [id number] : 1950    Admission date: 2017 Discharge date: 2017       Primary care provider: Adan Fatima DO    Discharging provider:  Celeste Shelby MD  - Family Medicine Resident  Antwon Jon MD - Attending, Family Medicine   . . . . . . . . . . . . . . . . . . . . . . . . . . . . . . . . . . . . . . . . . . . . . . . . . . . . . . . . . . . . . . . . . . . . . . . [place] FINAL DIAGNOSES & HOSPITAL COURSE:    Issa Cool is a 78 y.o. female with hx of breast cancer, DM with peripheral neuropathy, fibromyalgia, and HTN who was admitted for Stroke/TIA rule out and now with transverse myelitis.       Left sided Hemiparesis with Parasthesias- Working diagnosis of transverse myelitis based on MRI of cervical spine. Received tx with Medrol 1gm IV for 5 days. Several CSF studies still pending; CSF culture NG x 2d. Improvement in some symptoms-regaining ROM in left fingers and toes. - Discharging to inpatient rehab at [facility]. - Medrol taper: total of 24mg on ; total of 22mg on ; total of 16mg ; total of 12mg on 4/10; total of 8mg on ; total of 4mg on .   - F/U CSF studies(lyme, myelin basic protein, final culture result).      Diabetes Mellitus T2 w/ Peripheral Neuropathy: Last HgA1c 7.0 (3/2017). Hyperglycemia 2/2 steroids. Glucose not under control: -300. Insulin Naive. On home Januvia, glimipiride, invokana. - Can restart home doses of Januvia, glimipirde and invokana   - Will likely need SSI while in steroid taper but anticipate glucoses to improve as steroid dose decreases  - Continue home dose of gabapentin.      Muscle Spasms - Acute on chronic issue.   - Baclofen 10mg TID       Hypertension: BP today 153/76.  Appears labile.   - Continue Norvasc and lisinopril   - Will continue to monitor at this time and readjust as BP's trend.      Hyperlipidemia - last lipid panel (2016): cholesterol 179, , HDL 72, LDL 77.   -Continue home pravastatin. FOLLOW-UP CARE RECOMMENDATIONS:  Follow-up Information     Follow up With Details Comments Contact Info    Sherryle Netter,  Go to once discharged from rehab [address]  [address]  [phone]      Garcia Overton NP Go on 4/13/2017 Neurology follow-up appt:  to review lab, arrive at 1030am for 11am appt [address]  [facility] [address] [address] [place]  [phone]              It is very important that you keep follow-up appointment(s). Bring discharge papers, medication list (and/or medication bottles) to follow-up appointments for review by outpatient provider(s). FOLLOW-UP TESTS RECOMMENDED:   · To be determined    ONGOING TREATMENT PLAN: As stated above    PENDING TEST RESULTS:  At the time of discharge the following test results are still pending: Final CSF culture, myelin basic protein, CSF lyme  Please review these results as they become available. Specific symptoms to watch for: chest pain, shortness of breath, fever, chills, nausea, vomiting, diarrhea, change in mentation, falling, weakness, bleeding. DIET:  {diet:22727}    ACTIVITY:  {discharge activity:10604}    WOUND CARE: *** {wound care:02126}    EQUIPMENT needed:  ***    INCIDENTAL FINDINGS:  ***    GOALS OF CARE:    Eventual return to home/independent/assisted living     Long term SNF      Hospice     No rehospitalization     Patient condition at discharge:   Functional status    Poor      Deconditioned      Independent   Cognition    Lucid     Forgetful (some sensescence)     Dementia   Catheters/lines (plus indication)    Nguyen     PICC      PEG         Code status    Full code      DNR    . . . . . . . . . . . . . . . . . . . . . . . . . . . . . . . . . . . . . . . . . . . . . . . . . . . . . . . . . . . . . . . . . . . . . . . Bailee Saini      CHRONIC MEDICAL CONDITIONS:  Problem List as of 4/6/2017  Date Reviewed: 4/3/2017          Codes Class Noted - Resolved    Abnormal MRI, cervical spine ICD-10-CM: R93.7  ICD-9-CM: 793.7  4/3/2017 - Present        * (Principal)Left-sided weakness ICD-10-CM: R53.1  ICD-9-CM: 728.87  4/2/2017 - Present        Acute transverse myelitis ([facility] ICD-10-CM: G37.3  ICD-9-CM: 341.20  4/2/2017 - Present        ACP (advance care planning) ICD-10-CM: Z71.89  ICD-9-CM: V65.49  12/14/2016 - Present    Overview Signed 12/14/2016  8:08 AM by Yash Mulligan DO     discussed             Scoliosis ICD-10-CM: M41.9  ICD-9-CM: 737.30  10/5/2016 - Present        Breast cancer ([facility] ICD-10-CM: C50.919  ICD-9-CM: 174.9  3/12/2015 - Present        HTN (hypertension) ICD-10-CM: I10  ICD-9-CM: 401.9  9/15/2014 - Present        Fibromyalgia ICD-10-CM: M79.7  ICD-9-CM: 729.1  4/21/2014 - Present        Postmenopausal bleeding ICD-10-CM: N95.0  ICD-9-CM: 627.1  4/9/2014 - Present        Diabetes mellitus with neuropathy ([facility] ICD-10-CM: E11.40  ICD-9-CM: 250.60, 357.2  3/17/2014 - Present              Information obtained by :   I understand that if any problems occur once I am at home I am to contact my physician. I understand and acknowledge receipt of the instructions indicated above.                                                                                                                                              Physician's or R.N.'s Signature                                                                  Date/Time                                                                                                                                              Patient or Representative Signature                                                          Date/Time

## 2017-04-06 NOTE — PROGRESS NOTES
Problem: Self Care Deficits Care Plan (Adult)  Goal: *Acute Goals and Plan of Care (Insert Text)  Occupational Therapy Goals  Initiated 4/3/2017   1. Patient will perform UB dressing while sitting EOB with olya techniques with LUE as stabilizer with minimal assistance within 7 day(s). 2. Patient will perform lower body dressing with olya techniques with moderate assistance within 7 day(s). 3. Patient will perform supine > sit with minimal assistance to prepare to participate in ADL tasks within 7 day(s). 4. Patient will perform toilet transfers to unaffected side with moderate assistance within 7 day(s). 5. Patient will perform all aspects of toileting with moderate assistance within 7 day(s). 6. Patient will participate in L upper extremity therapeutic/ NMR exercise/activities with moderate assistance for 8 minutes within 7 day(s). 7. Patient will improve their Fugl Stearns score by 5 points within 7 days. OCCUPATIONAL THERAPY TREATMENT  Patient: Parker Pollock (19 y.o. female)  Date: 4/6/2017  Diagnosis: TIA/stroke rule out. Acute transverse myelitis (HCC) Left-sided weakness       Precautions:        ASSESSMENT:  Patient excited for therapy today and agreeable to task. Patient requesting UE bathing. Patient able to move to EOB with moderate assistance for bringing L hip square at EOB, starting from nearly sitting position with HOB elevated. Once sitting patient required min assist to don brace due to limited movement on left hand. Patient able to place left hand in water for bathing with support at left elbow. Patient noted wading hand back and forth in water but requires verbal cues to use right hand/cloth to wash left hand. Patient noted with good sitting balance during bathing activity and eager for more activity with PT. Continue to recommend inpatient rehab at discharge.    Progression toward goals:  [X]       Improving appropriately and progressing toward goals  [ ]       Improving slowly and progressing toward goals  [ ]       Not making progress toward goals and plan of care will be adjusted       PLAN:  Patient continues to benefit from skilled intervention to address the above impairments. Continue treatment per established plan of care. Discharge Recommendations:  Inpatient Rehab  Further Equipment Recommendations for Discharge:  None        SUBJECTIVE:   Patient stated I am so excited today. I told everyone about our session yesterday.       OBJECTIVE DATA SUMMARY:   Cognitive/Behavioral Status:  Neurologic State: Alert  Orientation Level: Oriented X4  Cognition: Follows commands  Perception: Appears intact  Perseveration: No perseveration noted  Safety/Judgement: Awareness of environment     Functional Mobility and Transfers for ADLs:  Bed Mobility:  Rolling: Moderate assistance  Supine to Sit: Assist x1; Moderate assistance (HOB elevated )  Scooting: Minimum assistance; Moderate assistance     Transfers:           Balance:  Sitting: Intact  Sitting - Static: Good (unsupported)  Sitting - Dynamic: Good (unsupported)     ADL Intervention:     Upper Body Bathing  Bathing Assistance: Moderate assistance  Position Performed: Seated edge of bed  Cues: Physical assistance;Verbal cues provided           Upper Body Dressing Assistance  Orthotics(Brace): Minimum assistance (assist with closure/tightening using L hand )  Cues: Physical assistance;Verbal cues provided     Lower Body Dressing Assistance  Socks: Total assistance (dependent)           Cognitive Retraining  Safety/Judgement: Awareness of environment     Neuro Re-Education:           Therapeutic Exercises:      Pain:     Activity Tolerance:   VSS  Please refer to the flowsheet for vital signs taken during this treatment.   After treatment:   [X] Patient left in no apparent distress sitting up in chair  [ ] Patient left in no apparent distress in bed  [X] Call bell left within reach  [X] Nursing notified  [ ] Caregiver present  [X] Bed alarm activated      COMMUNICATION/COLLABORATION:   The patients plan of care was discussed with: Physical Therapist and Registered Nurse     Hattie Fitzgerald OTR/L  Time Calculation: 30 mins

## 2017-04-07 ENCOUNTER — PATIENT OUTREACH (OUTPATIENT)
Dept: FAMILY MEDICINE CLINIC | Age: 67
End: 2017-04-07

## 2017-04-07 LAB
APPEARANCE UR: ABNORMAL
BACTERIA URNS QL MICRO: ABNORMAL /HPF
BILIRUB UR QL: NEGATIVE
COLOR UR: ABNORMAL
EPITH CASTS URNS QL MICRO: ABNORMAL /LPF
GLUCOSE UR STRIP.AUTO-MCNC: >1000 MG/DL
HGB UR QL STRIP: ABNORMAL
KETONES UR QL STRIP.AUTO: NEGATIVE MG/DL
LEUKOCYTE ESTERASE UR QL STRIP.AUTO: ABNORMAL
MBP CSF-MCNC: 5.3 NG/ML (ref 0–1.2)
NITRITE UR QL STRIP.AUTO: POSITIVE
PH UR STRIP: 5 [PH] (ref 5–8)
PROT UR STRIP-MCNC: NEGATIVE MG/DL
RBC #/AREA URNS HPF: ABNORMAL /HPF (ref 0–5)
SP GR UR REFRACTOMETRY: >1.03 (ref 1–1.03)
UROBILINOGEN UR QL STRIP.AUTO: 0.2 EU/DL (ref 0.2–1)
WBC URNS QL MICRO: ABNORMAL /HPF (ref 0–4)

## 2017-04-07 PROCEDURE — 74011250637 HC RX REV CODE- 250/637: Performed by: PHYSICAL MEDICINE & REHABILITATION

## 2017-04-07 PROCEDURE — 87086 URINE CULTURE/COLONY COUNT: CPT | Performed by: PHYSICAL MEDICINE & REHABILITATION

## 2017-04-07 PROCEDURE — 81001 URINALYSIS AUTO W/SCOPE: CPT | Performed by: PHYSICAL MEDICINE & REHABILITATION

## 2017-04-07 PROCEDURE — 74011250636 HC RX REV CODE- 250/636: Performed by: PHYSICAL MEDICINE & REHABILITATION

## 2017-04-07 PROCEDURE — 74011636637 HC RX REV CODE- 636/637: Performed by: PHYSICAL MEDICINE & REHABILITATION

## 2017-04-07 PROCEDURE — 87186 SC STD MICRODIL/AGAR DIL: CPT | Performed by: PHYSICAL MEDICINE & REHABILITATION

## 2017-04-07 PROCEDURE — 87077 CULTURE AEROBIC IDENTIFY: CPT | Performed by: PHYSICAL MEDICINE & REHABILITATION

## 2017-04-07 RX ORDER — FACIAL-BODY WIPES
10 EACH TOPICAL
Status: ACTIVE | OUTPATIENT
Start: 2017-04-08 | End: 2017-04-09

## 2017-04-07 RX ADMIN — LISINOPRIL 5 MG: 5 TABLET ORAL at 08:31

## 2017-04-07 RX ADMIN — ACETAMINOPHEN 650 MG: 325 TABLET ORAL at 01:06

## 2017-04-07 RX ADMIN — GLIMEPIRIDE 4 MG: 2 TABLET ORAL at 17:05

## 2017-04-07 RX ADMIN — INSULIN LISPRO 2 UNITS: 100 INJECTION, SOLUTION INTRAVENOUS; SUBCUTANEOUS at 17:05

## 2017-04-07 RX ADMIN — Medication 1 CAPSULE: at 21:27

## 2017-04-07 RX ADMIN — METHYLPREDNISOLONE 4 MG: 4 TABLET ORAL at 13:22

## 2017-04-07 RX ADMIN — GABAPENTIN 600 MG: 300 CAPSULE ORAL at 08:30

## 2017-04-07 RX ADMIN — LEVOFLOXACIN 750 MG: 500 TABLET, FILM COATED ORAL at 13:22

## 2017-04-07 RX ADMIN — INSULIN LISPRO 6 UNITS: 100 INJECTION, SOLUTION INTRAVENOUS; SUBCUTANEOUS at 13:23

## 2017-04-07 RX ADMIN — ENOXAPARIN SODIUM 40 MG: 100 INJECTION SUBCUTANEOUS at 21:16

## 2017-04-07 RX ADMIN — INSULIN LISPRO 4 UNITS: 100 INJECTION, SOLUTION INTRAVENOUS; SUBCUTANEOUS at 08:32

## 2017-04-07 RX ADMIN — LISINOPRIL 5 MG: 5 TABLET ORAL at 21:18

## 2017-04-07 RX ADMIN — SITAGLIPTIN 100 MG: 25 TABLET, FILM COATED ORAL at 08:30

## 2017-04-07 RX ADMIN — MAGNESIUM GLUCONATE 500 MG ORAL TABLET 400 MG: 500 TABLET ORAL at 08:30

## 2017-04-07 RX ADMIN — BACLOFEN 10 MG: 10 TABLET ORAL at 05:12

## 2017-04-07 RX ADMIN — METHYLPREDNISOLONE 8 MG: 4 TABLET ORAL at 08:31

## 2017-04-07 RX ADMIN — ASPIRIN 81 MG CHEWABLE TABLET 81 MG: 81 TABLET CHEWABLE at 08:30

## 2017-04-07 RX ADMIN — BACLOFEN 10 MG: 10 TABLET ORAL at 21:17

## 2017-04-07 RX ADMIN — METHYLPREDNISOLONE 8 MG: 4 TABLET ORAL at 21:27

## 2017-04-07 RX ADMIN — METHYLPREDNISOLONE 4 MG: 4 TABLET ORAL at 17:05

## 2017-04-07 RX ADMIN — BACLOFEN 10 MG: 10 TABLET ORAL at 13:22

## 2017-04-07 RX ADMIN — PRAVASTATIN SODIUM 40 MG: 20 TABLET ORAL at 21:18

## 2017-04-07 RX ADMIN — MAGNESIUM HYDROXIDE 30 ML: 400 SUSPENSION ORAL at 17:05

## 2017-04-07 RX ADMIN — INSULIN LISPRO 6 UNITS: 100 INJECTION, SOLUTION INTRAVENOUS; SUBCUTANEOUS at 21:29

## 2017-04-07 RX ADMIN — GLIMEPIRIDE 2 MG: 2 TABLET ORAL at 08:31

## 2017-04-07 RX ADMIN — GABAPENTIN 300 MG: 300 CAPSULE ORAL at 21:17

## 2017-04-07 RX ADMIN — AMLODIPINE BESYLATE 5 MG: 5 TABLET ORAL at 21:17

## 2017-04-07 NOTE — PROGRESS NOTES
[address]  [phone]  Fax [phone]    Patient Name: Jame Mendez  YOB: 1950    Notified by inpatient  of patients discharge. Patient had been admitted to [facility] 4/1-4/6/17 for Acute transverse myelitis. Experienced left side hemiparesis with paresthesia. High RRAT score. Discharged to inpatient [facility]. NN will continue to monitor for discharge plans.

## 2017-04-07 NOTE — H&P
[address]  Post Admission History and Physical Examination  And Individualized Initial Plan of Care    Patient: Carlos Eduardo Her  : 1950  Admit Date: 2017  Admitting/Attending Physician: Codie Vasquez MD  Referring Physician: [facility]  Primary Care Physician: Chang Morton DO  Neuro: Topher Boles MD     Date of Service:  2017    Admit Diagnosis: Acute Cervical Transverse Myelitis     CHIEF COMPLAINT: L) sided weakness    HISTORY OF PRESENT ILLNESS: Records reviewed. Patient is a 78 y.o.,lefthanded female with known hx of breast cancer, HTN, DM with peripheral neuropathy, fibromyalgia, recent T9-S1 PSF 10/5/16 who was recuperating well was able to ambulate with a cane who presents to the ER c/o of L) sided pain and weakness and hypersensitivity. It started with acute shooting pain in her L) neck; numbness and tingling pain in her L) side and then with progressive weakness, she was still able to ambulate with walker on coming to the ER. W/U for CVA was negative, eval by neuro and found to have acute cervical transverse myelitis. Placed on IV steroids for 5 days completed today and to start medrol dose pack tomorrow. Her sugars been uncontrolled and is to resume her DM meds with ISS. She was participating in therapies with significant decline in function thus accepted for further IPR on 17. On exam, she reports she getting back some movement to her L) leg and her L) side is not burning like having sandpaper on it, it is still numb but less painful, she remains appropriate and very motivated to continue IPR.          Past Medical History:   Diagnosis Date    Abnormal MRI, cervical spine 4/3/2017    Abnormal pap 3/2015; 2016 Neg pap +HPV; 2016 ASCUS +HPV    Arthritis     osteo-tests for RA were neg    Breast cancer ([address]     right    Chronic pain     Diabetes (HCC)     Fibromyalgia     Ganglion cyst of wrist     LEFT    Hypercholesterolemia     Hypertension     Murmur, cardiac     Neuropathy     Rosacea     Unspecified adverse effect of anesthesia     \"SLOW TO WAKE UP, SENSITIVE TO ANESTHESIA, DO NOT COME AROUND FOR 2-3 DAYS\"        Past Surgical History:   Procedure Laterality Date    HX CATARACT REMOVAL Bilateral 2014    HX CERVICAL FUSION      HX COLPOSCOPY  5/2016    Neg path    HX MASTECTOMY  12/96    tram flap    HX ORTHOPAEDIC Left 1990's    foot surgery    HX TONSILLECTOMY      HX VASCULAR ACCESS  1997    portacath left chest-removed after chemo    RECONSTR NOSE  11/2013, 2005    HOLE IN SEPTUM      Family History   Problem Relation Age of Onset    Heart Disease Mother     Hypertension Mother     COPD Mother     Diabetes Father     Other Son      INOPERABLE BRAIN TUMOR    Gout Son     Celiac Disease Son     Anesth Problems Neg Hx       Social History   Substance Use Topics    Smoking status: Never Smoker    Smokeless tobacco: Never Used    Alcohol use No       Functional History:   Premorbid - mod independent   Home with spouse  Precautions:cardiac  Current level of function -  Mod assist for bed mobility, mod to total assist for ADLs, non ambulatory                   Allergies   Allergen Reactions    Latex Other (comments)     Patient states it burns around her mouth.     Other Food Other (comments)     Yogurt - stomach cramping    Betadine [Povidone-Iodine] Itching     Pt states this causes \"extreme\" itching    Codeine Anaphylaxis, Rash, Nausea Only and Other (comments)     HEADACHE  Tolerates tramadol    Dilaudid [Hydromorphone (Bulk)] Anaphylaxis, Itching, Nausea Only and Other (comments)     HALLUCINATIONS  Tolerates tramadol      Hydrocodone Anaphylaxis, Rash, Nausea Only and Vertigo     Facial - eyelid swelling-had to go to ER for reaction, HALLUCINATIONS  Tolerates tramadol      Ditropan [Oxybutynin Chloride] Swelling    Doxycycline Rash     PUSTULAR RASH- TOLERATING TETRACYCLINE    Keflex [Cephalexin] Nausea and Vomiting     Pain in abdomen AND FLU-LIKE SX      Metformin Nausea and Vomiting     Severe abdominal pain      Tape [Adhesive] Other (comments)     Redness/inflammed. Can only use paper tape. CAN USE BAND-AIDS. TOLERATES SURGICAL TAPE USED IN [place] [facility].  Sulfamethoxazole-Trimethoprim Other (comments)     Cramping/flu-like symptoms      Prior to Admission medications    Medication Sig Start Date End Date Taking? Authorizing Provider   fludrocortisone (FLORINEF) 0.1 mg tablet TAKE 1 TABLET BY MOUTH DAILY 4/6/17   Jennifer Mcfadden DO   methylPREDNISolone (MEDROL) 8 mg tablet Take 1 Tab by mouth once for 1 dose. Due on 4/7/17 at [facility] 4/7/17 4/7/17  Angelique Farmer MD   methylPREDNISolone (MEDROL) 4 mg tab Take 1 Tab by mouth once for 1 dose. Due on 4/7/17 at 12PM 4/7/17 4/7/17  Angelique Farmer MD   methylPREDNISolone (MEDROL) 4 mg tab Take 1 Tab by mouth once for 1 dose. Due on 4/7/17 at  [facility] 4/7/17 4/7/17  Angelique Farmer MD   methylPREDNISolone (MEDROL) 8 mg tablet Take 1 Tab by mouth once for 1 dose. Due on 4/7/17 at 8pm 4/7/17 4/7/17  Angelique Farmer MD   methylPREDNISolone (MEDROL) 4 mg tab Take 1 Tab by mouth three (3) times daily (with meals). Due on 4/8/17 TID with meals. 4/8/17   Angelique Farmer MD   methylPREDNISolone (MEDROL) 8 mg tablet Take 1 Tab by mouth once for 1 dose. Due on 4/8/17 at [facility] 4/8/17 4/8/17  Angelique Farmer MD   methylPREDNISolone (MEDROL) 4 mg tab Take 1 Tab by mouth four (4) times daily (with meals). Due on 4/9/17 QID with meals 4/9/17   Angelique Farmer MD   methylPREDNISolone (MEDROL) 4 mg tab Take 1 Tab by mouth three (3) times daily (with meals). Due on 4/10/17 TID with meals 4/10/17   Angelique Farmer MD   methylPREDNISolone (MEDROL) 4 mg tab Take 1 Tab by mouth ACB/HS. Due on 4/11/17 BID 4/11/17   Gissell Wynn MD   methylPREDNISolone (MEDROL) 4 mg tab Take 1 Tab by mouth Daily (before breakfast).  Due on 4/12/17 4/12/17   Angelique Farmer MD   baclofen (LIORESAL) 10 mg tablet Take 1 Tab by mouth three (3) times daily as needed. Indications: spasticity and cramping 4/6/17   Ori Razo MD   aspirin 81 mg chewable tablet Take 81 mg by mouth daily. Historical Provider   gabapentin (NEURONTIN) 300 mg capsule Take 600 mg by mouth daily. Historical Provider   gabapentin (NEURONTIN) 300 mg capsule Take 300 mg by mouth every evening. Historical Provider   glimepiride (AMARYL) 2 mg tablet Take 2 mg by mouth daily (with breakfast). Historical Provider   glimepiride (AMARYL) 2 mg tablet Take 4 mg by mouth daily (with dinner). Historical Provider   fludrocortisone (FLORINEF) 0.1 mg tablet Take 0.1 mg by mouth Daily (before breakfast). Historical Provider   magnesium oxide (MAG-OX) 400 mg tablet Take 400 mg by mouth daily. Historical Provider   SITagliptin (JANUVIA) 100 mg tablet Take 100 mg by mouth Daily (before breakfast). Historical Provider   fluticasone (FLONASE) 50 mcg/actuation nasal spray 2 Sprays by Both Nostrils route daily. 3/2/17   Jackie Elaine NP   amLODIPine (NORVASC) 5 mg tablet Take 1 Tab by mouth nightly. Indications: hypertension 3/2/17   Jackie Elaine NP   INVOKANA 100 mg tablet Take 1 tablet by mouth  daily before breakfast 3/1/17   Jennifer H Lobb, DO   lisinopril (PRINIVIL, ZESTRIL) 5 mg tablet Take 1 Tab by mouth daily. 1/10/17   Clovia Loose Lobb, DO   Alpha Lipoic Acid 600 mg cap Take 1 Cap by mouth two (2) times a day. Historical Provider   L-Mfolate-B6 Phos-Methyl-B12 (METANX) 3-35-2 mg tab tab Take 1 Tab by mouth two (2) times a day. Historical Provider   CHROM GENO/BRINDAL BERRY (GARCINIA CAMBOGIA PO) Take 2 Tabs by mouth daily.     Historical Provider   pravastatin (PRAVACHOL) 40 mg tablet TAKE 1 TABLET NIGHTLY (NEEDS APPOINTMENT AS SOON AS POSSIBLE FOR FASTING LABS AND APPOINTMENT) 7/2/15   Shonda Brown MD   TRUETEST TEST STRIPS strip TEST THREE TIMES DAILY 3/31/14   Shonda Brown MD   CRANBERRY EXTRACT-VIT C PO Take 3 Caps by mouth daily. 4200 mg    Historical Provider   cinnamon bark (CINNAMON) 500 mg cap Take 2 Caps by mouth daily. Historical Provider   loratadine (CLARITIN) 10 mg tablet Take 10 mg by mouth nightly.     Historical Provider     Current Facility-Administered Medications   Medication Dose Route Frequency    acetaminophen (TYLENOL) tablet 650 mg  650 mg Oral Q3H PRN    enoxaparin (LOVENOX) injection 40 mg  40 mg SubCUTAneous DAILY    insulin lispro (HUMALOG) injection   SubCUTAneous AC&HS    glucose chewable tablet 16 g  16 g Oral PRN    glucagon (GLUCAGEN) injection 1 mg  1 mg IntraMUSCular PRN    dextrose (D50W) injection syrg 25 g  25 g IntraVENous PRN    magnesium hydroxide (MILK OF MAGNESIA) 400 mg/5 mL oral suspension 30 mL  30 mL Oral DAILY PRN    bisacodyl (DULCOLAX) suppository 10 mg  10 mg Rectal DAILY PRN    amLODIPine (NORVASC) tablet 5 mg  5 mg Oral QHS    [START ON 4/7/2017] aspirin chewable tablet 81 mg  81 mg Oral DAILY    gabapentin (NEURONTIN) capsule 300 mg  300 mg Oral QHS    [START ON 4/7/2017] gabapentin (NEURONTIN) capsule 600 mg  600 mg Oral DAILY    lisinopril (PRINIVIL, ZESTRIL) tablet 5 mg  5 mg Oral BID    [START ON 4/7/2017] magnesium oxide (MAG-OX) tablet 400 mg  400 mg Oral DAILY    pravastatin (PRAVACHOL) tablet 40 mg  40 mg Oral QHS    [START ON 4/7/2017] SITagliptin (JANUVIA) tablet tab 100 mg  100 mg Oral DAILY WITH BREAKFAST    [START ON 4/7/2017] glimepiride (AMARYL) tablet 2 mg  2 mg Oral DAILY WITH BREAKFAST    [START ON 4/7/2017] glimepiride (AMARYL) tablet 4 mg  4 mg Oral DAILY WITH DINNER    [START ON 4/7/2017] methylPREDNISolone (MEDROL) tablet 8 mg  8 mg Oral ACB    Followed by   Jesus Bradford ON 4/7/2017] methylPREDNISolone (MEDROL) tablet 4 mg  4 mg Oral ONCE    Followed by   Jesus Bradford ON 4/7/2017] methylPREDNISolone (MEDROL) tablet 4 mg  4 mg Oral ONCE    Followed by   Jesus Bradford ON 4/7/2017] methylPREDNISolone (MEDROL) tablet 8 mg  8 mg Oral ONCE Followed by   Carito Joseph ON 4/8/2017] methylPREDNISolone (MEDROL) tablet 4 mg  4 mg Oral TID WITH MEALS    Followed by   Carito Joseph ON 4/8/2017] methylPREDNISolone (MEDROL) tablet 8 mg  8 mg Oral ONCE    Followed by   Carito Joseph ON 4/9/2017] methylPREDNISolone (MEDROL) tablet 4 mg  4 mg Oral QID WITH MEALS    Followed by   Carito Joseph ON 4/10/2017] methylPREDNISolone (MEDROL) tablet 4 mg  4 mg Oral TID WITH MEALS    Followed by   Carito Joseph ON 4/11/2017] methylPREDNISolone (MEDROL) tablet 4 mg  4 mg Oral ACB/HS    Followed by   Carito Joseph ON 4/12/2017] methylPREDNISolone (MEDROL) tablet 4 mg  4 mg Oral ACB    [START ON 4/13/2017] fluticasone (FLONASE) 50 mcg/actuation nasal spray 2 Spray  2 Spray Both Nostrils DAILY    [START ON 4/13/2017] loratadine (CLARITIN) tablet 10 mg  10 mg Oral QHS    baclofen (LIORESAL) tablet 10 mg  10 mg Oral TID       Review of Systems:    constitutional: negative fever, negative chills  Eyes:   negative visual changes  ENT:   negative difficulty swallowing, sore throat, tongue or lip swelling  Respiratory:  negative cough, negative dyspnea  Cards:  negative for chest pain, palpitations, lower extremity edema  GI:   negative for nausea, vomiting, and abdominal pain, constipated LBM =3/31 + flatus  :  negative for frequency, dysuria or hematuria  Integument:  negative for rash and pruritus  Heme:  negative for easy bruising and bleeding  Musculoskel: H/o myalgias, neck pain,  back pain   Neuro:  H/o migraine headaches, resolving numbness tingling pain and weakness L) side, UE>weaker then LE  Psych:  negative for feelings of anxiety, depression     Physical Exam:  General:  Skin:   HEENT:    Neck: Alert, not in distress. No rashes, lesions  PERRL, anicteric sclerae, oropharynx clear, flushed reddish cheecks   Supple, thyroid not palpable   Lungs:   Clear to auscultation bilaterally. Heart:  Regular rate and rhythm, no murmur, click, rub or gallop. Abdomen:   Soft, non-tender.  Bowel sounds present. Genitourinary:  Musculoskeletal: No wagner. Calves soft, nontender. No lower extremity edema. Decreased ROM L) UE>LE. Neuro:          Psych:     Speech and language clear and fluent, Oriented x4, memory and cognition intact, follows all 1- and 2-step verbal directives. Cranial nerves II-XII: intact. Sensory: decreased light touch in L) side neck down to foot Motor: 1-2/5 L) UE and 2-3/5 L) LE  Reflexes: depressed L) LE   Pleasant and cooperative, no anxiety or agitation       Labs/Studies:  Recent Labs      04/06/17   0556  04/05/17   0515  04/04/17   0623   WBC  3.9  5.5  5.4   HGB  12.6  13.1  13.7   HCT  40.9  37.6  41.2   PLT  241  165  265     Recent Labs      04/06/17   0556  04/05/17   0515  04/04/17   0623   NA  142  139  142   K  4.2  4.5  4.5   CL  106  105  106   CO2  27  22  29   GLU  278*  278*  265*   BUN  26*  26*  27*   CREA  0.55  0.44*  0.53*   CA  8.4*  8.9  9.1   MG  2.2  2.3  2.4   PHOS  4.3  4.5  4.3   ALB  3.0*  3.1*  3.5   TBILI  0.4  0.4  0.4   SGOT  10*  22  17   ALT  27  31  35       Lab Results   Component Value Date/Time    Color YELLOW/STRAW 10/12/2016 08:35 PM    Appearance CLEAR 10/12/2016 08:35 PM    Specific gravity 1.025 10/12/2016 08:35 PM    pH (UA) 7.0 10/12/2016 08:35 PM    Protein NEGATIVE  10/12/2016 08:35 PM    Glucose >1000 10/12/2016 08:35 PM    Ketone 15 10/12/2016 08:35 PM    Bilirubin NEGATIVE  10/12/2016 08:35 PM    Urobilinogen 1.0 10/12/2016 08:35 PM    Nitrites NEGATIVE  10/12/2016 08:35 PM    Leukocyte Esterase NEGATIVE  10/12/2016 08:35 PM    Epithelial cells FEW 10/12/2016 08:35 PM    Bacteria NEGATIVE  10/12/2016 08:35 PM    WBC 0-4 10/12/2016 08:35 PM    RBC 0-5 10/12/2016 08:35 PM     Lab Results   Component Value Date/Time    Culture result: NO GROWTH 3 DAYS 04/03/2017 03:05 PM    Culture result: NO GROWTH 2 DAYS 10/12/2016 08:32 PM    Culture result:  09/14/2016 01:00 PM     MRSA NOT PRESENT. Apparent Staphylococus aureus (not MRSA noted). Culture result:  09/14/2016 01:00 PM         Screening of patient nares for MRSA is for surveillance purposes and, if positive, to facilitate isolation considerations in high risk settings. It is not intended for automatic decolonization interventions per se as regimens are not sufficiently effective to warrant routine use. Lab Results   Component Value Date/Time    Hemoglobin A1c 7.3 04/01/2017 11:41 AM    Hemoglobin A1c 7.0 03/21/2017 08:30 AM    Hemoglobin A1c 7.2 12/14/2016 08:14 AM       IMAGING:   CT HEAD WO CONT    Narrative EXAM: CT HEAD WO CONT    INDICATION: Focal neuro deficit, new, fixed or worsening, >24 hours    COMPARISON: CT earlier today. TECHNIQUE: Unenhanced CT of the head was performed using 5 mm images. Brain and  bone windows were generated. CT dose reduction was achieved through use of a  standardized protocol tailored for this examination and automatic exposure  control for dose modulation. FINDINGS:  The ventricles and sulci are normal in size, shape and configuration and  midline. There is no significant white matter disease. There is no intracranial  hemorrhage, extra-axial collection, mass, mass effect or midline shift. The  basilar cisterns are open. No acute infarct is identified. The bone windows  demonstrate no abnormalities. The visualized portions of the paranasal sinuses  and mastoid air cells are clear.           Impression IMPRESSION: No acute intracranial abnormality.           MRI Results (recent):        Edited Result - FINAL (Exam End: 4/2/2017 11:30 AM) Open          Addendum    Elie Najjar, MD   Mon Apr 3, 2017 11:31:36 AM EDT   Tona Denver      Addendum:       Pattern of findings is more suggestive of cervical cord demyelinating process. The abnormal increased T2 signal intensity has a hazy peripheral margin is not  well demarcated.  No prior surgery at the C3-C4 level.      These results were discussed with Karma Hammond neurology nurse practitioner  at approximately 11:30 AM on 4/3/2017.        Study Result       **PRELIMINARY REPORT**  Exam: MRI cervical spine with and without contrast.      INDICATION: Left-sided numbness and worsening weakness. COMPARISON: 3/22/2016.      Preliminary findings: Multilevel degenerative disc disease with spondylosis and  disc osteophyte complex, most significant from C4 through C7. Stable grade 1  anterolisthesis at C4-5. Mild T2 hyperintensity throughout the substance of the  spinal cord from C2 through C7, difficult to compare due to differences in  technique. This T2 hyperintensity is most significant and focal in the left half  of the spinal cord at the level C3-4. Central stenosis and cord compression is  most significant at C4-5, C5-6 and C6-7. No cord mass, enhancement or hemorrhage  is suggested.      Preliminary report was provided by Dr. Suad Jansen, the on-call radiologist, at [phone] hours      Final report to follow.      **END PRELIMINARY REPORT**      *FINAL REPORT BELOW**      EXAM: MRI CERVICAL SPINE      INDICATION: Spinal cord injury; progressive left lower extremity weakness and  loss of bladder control.      COMPARISON: MRI cervical spine on 3/22/2016.      TECHNIQUE: MR imaging of the cervical spine was performed including sagittal T1,  T2, STIR; axial T2, T1.       CONTRAST: Pre and post IV contrast 8 mL Gadavist      FINDINGS:  2 mm posterior subluxation of C5 with respect to C4 and C6 is unchanged. 1 mm  anterior subluxation of C6 on C7 is unchanged. No new subluxation. Degenerative  volume loss of the C5 and C6 vertebral bodies is unchanged. No compression  fracture.       Degenerative bone marrow signal is present in the facet joints. There is  multilevel facet arthrosis. No marrow replacing process. Disc desiccation is  increased and moderate for age. No discitis.      The craniocervical junction is intact. Ventral indentation of the spinal cord  at C4-5 and C5-6 is unchanged. No grant cord compression. No pathologic  intrathecal enhancement. Hyperintense T2 signal in the left central spinal cord  at C3 is more conspicuous, likely accentuated by better technique. There is no  cord expansion or syrinx. Artifact is in the spinal cord at T1.       Mild atrophy of the paraspinal musculature is unchanged.      C2/3: Posterior disc osteophyte complex. No stenosis. No change.      C3/4: Asymmetric left posterior disc osteophyte complex. Left facet arthrosis. Moderate left foraminal stenosis. No change.      C4/5: Lobulated posterior disc osteophyte complex. Left more than right facet  arthrosis. Increased moderate-severe central spinal canal stenosis. Left  axillary recess compromise is unchanged. Moderate-severe left foraminal stenosis  is increased.      C5/6: Asymmetric right posterior disc osteophyte complex. Moderate central  spinal canal stenosis. Moderate-severe right foraminal stenosis. Right axillary  recess compromise. No change.      C6/7: Lobulated asymmetric right posterior disc osteophyte complex. Mild  central spinal canal stenosis. Moderate right foraminal stenosis. No change.      C7/T1: No herniation. Facet arthrosis. No change.      IMPRESSION:      1. Cord contusion versus myelomalacia at C3 is increased and more conspicuous  since last year. 2. C4-C5 increased moderate-severe central spinal canal stenosis and left  foraminal stenosis. 3. Other stenoses are unchanged since last year.      ASSESSMENT AND PLAN: Acute Cervical Transverse Myelitis with L) HP and paresthesia with functional decline and associated medical co morbidities    CO-MORBID CONDITIONS PRESENT ON ADMISSION THAT MAY WORSEN DUE TO THE RIGORS OF IPR PROGRAM THAT MAY AFFECT PATIENT PARTICIPATION AND FUNCTIONAL GAINS THERFORE REQUIRES 24-hour rehab nursing and ongoing close physician oversight:  SECONDARY DIAGNOSES:  1. Acute Cervical transverse myelitis- completed IV steroids x 5 days, to start Medrol pack  2.  DM2 with neuropathy with hyperglycemia- continue home DM meds + high dose ISS being on steroids, low carb diet   3. FMS/Chronic Syndrome-APAP, Neurontin, Baclofen  4. Dyslipidemia- c/w Pravachol  5. HTN currently uncontrolled - c/w Lisinopril and Norvasc, hold florinef  6. Neurogenic Bladder-bladder retraining, check UA r/o UTI  7. Neurogenic Bowel/Constipation- adequate po flds/fibers, laxatives, suppository as neded  8. DVT prophy-  SCDS, TEDS, mobilization, lovenox  9. Anxiety/adjustment D/O from acute medical illnes and decline in function-declined med psych     INITIAL INDIVIDUALIZED REHABILITATION PLAN OF CARE: The following plan of care was developed in consideration of therapy notes and review of the preadmission assessment. SPECIFIC REHABILITATION NEEDS/INDIVIDUALIZED REHABILITATION PLAN:  1. PT 1 to 2 hours a day, 5 to 6 days a week for ambulation, bed mobility, transfers, strengthening, range of motion, balance, and endurance program, family ed, cardiac precautions. 2. Occupational therapy 1 to 2 hours a day, 5 to 6 days a week for transfers, basic ADLs, strengthening, endurance, coordination, and energy conservation techniques, use of AE s needed,family ed, cardiac precautions. 3. Nursing 24-hour care by a specialized nurse trained in rehabilitation for disease process and medication education, maintaining skin integrity, pain control, optimizing nutrition and hydration, DVT and pulmonary embolus monitoring, fall prevention, reinforce mobility and ADL skills. 4. Medical Psychology for evaluation of mood or adjustment disorder, neurocognitive assessment and for supportive therapy. 5. Case management 1 hour a day, 3 to 5 days a week for discharge planning, community resources, and team coordination for safe discharge. 6. Registered dietitian to optimize nutrition and education.     REHABILITATION POTENTIAL & GOALS: Her rehabilitation potential is good to make improvements to overall functional goals of mod I to S in ambulation, transfers and self-care. ESTIMATED LENGTH OF STAY:  3-4 weeks    ANTICIPATED DISCHARGE DISPOSITION: Home    POST INPATIENT REHABILITATION PLAN: HH therapies versus outpt program     POST INPATIENT MEDICAL FOLLOWUP: PCP, neurology, PMand R    POST-ADMISSION PHYSICIAN EVALUATION: The existing medical and rehabilitation complications and the potential complications due to the rigors of the rehabilitation program are discussed in the above comorbidities and initial individualized rehabilitation plan of care. COMPARISON TO PREADMISSION SCREENING: Compared to the preadmission screening, the patients current function and medical condition remains about the same, theres no relevant significant changes since the pre admission assessment was completed. An inpatient setting is still the most appropriate level of care for this patient based upon the above noted conditions and comorbidities. The patient remains stable to continue intensive IPR program and requires and can benefit from therapy 3 hours a day, 5 days a week minimum to achieve the functional goals and maintain medical stability. Weekly team meetings will be used throughout IPR stay to review progress, identify barriers and determine solutions to these barriers within previously set goals. Revision to goals and care plan will be discussed whenever necessary with other consulting physicians, nursing staff and therapists and case management.     Spent 75 minutes admitting this pt, >50% reviewing records, d/w staff and educating and discussing plan of care to pt.          ___________________________________  Silver Saeed MD, 10/7/2016 4:43 PM    CC:  Primary Care Physician: Kailee Merlos DO  Neuro: Ragini Messer MD

## 2017-04-08 PROCEDURE — 74011250637 HC RX REV CODE- 250/637: Performed by: PHYSICAL MEDICINE & REHABILITATION

## 2017-04-08 PROCEDURE — 74011250636 HC RX REV CODE- 250/636: Performed by: PHYSICAL MEDICINE & REHABILITATION

## 2017-04-08 PROCEDURE — 74011636637 HC RX REV CODE- 636/637: Performed by: PHYSICAL MEDICINE & REHABILITATION

## 2017-04-08 RX ORDER — LIDOCAINE 50 MG/G
1 PATCH TOPICAL EVERY 24 HOURS
Status: DISCONTINUED | OUTPATIENT
Start: 2017-04-08 | End: 2017-04-13

## 2017-04-08 RX ORDER — LORATADINE 10 MG/1
10 TABLET ORAL DAILY
Status: DISCONTINUED | OUTPATIENT
Start: 2017-04-08 | End: 2017-04-28 | Stop reason: HOSPADM

## 2017-04-08 RX ADMIN — LEVOFLOXACIN 750 MG: 500 TABLET, FILM COATED ORAL at 09:50

## 2017-04-08 RX ADMIN — MAGNESIUM GLUCONATE 500 MG ORAL TABLET 400 MG: 500 TABLET ORAL at 09:51

## 2017-04-08 RX ADMIN — METHYLPREDNISOLONE 4 MG: 4 TABLET ORAL at 09:53

## 2017-04-08 RX ADMIN — GABAPENTIN 600 MG: 300 CAPSULE ORAL at 09:49

## 2017-04-08 RX ADMIN — INSULIN LISPRO 2 UNITS: 100 INJECTION, SOLUTION INTRAVENOUS; SUBCUTANEOUS at 09:51

## 2017-04-08 RX ADMIN — METHYLPREDNISOLONE 4 MG: 4 TABLET ORAL at 12:32

## 2017-04-08 RX ADMIN — GLIMEPIRIDE 2 MG: 2 TABLET ORAL at 09:51

## 2017-04-08 RX ADMIN — BACLOFEN 10 MG: 10 TABLET ORAL at 12:40

## 2017-04-08 RX ADMIN — BACLOFEN 10 MG: 10 TABLET ORAL at 05:23

## 2017-04-08 RX ADMIN — METHYLPREDNISOLONE 8 MG: 4 TABLET ORAL at 21:43

## 2017-04-08 RX ADMIN — INSULIN LISPRO 2 UNITS: 100 INJECTION, SOLUTION INTRAVENOUS; SUBCUTANEOUS at 12:30

## 2017-04-08 RX ADMIN — GABAPENTIN 300 MG: 300 CAPSULE ORAL at 21:43

## 2017-04-08 RX ADMIN — ENOXAPARIN SODIUM 40 MG: 100 INJECTION SUBCUTANEOUS at 21:41

## 2017-04-08 RX ADMIN — ACETAMINOPHEN 650 MG: 325 TABLET ORAL at 21:52

## 2017-04-08 RX ADMIN — LISINOPRIL 5 MG: 5 TABLET ORAL at 09:51

## 2017-04-08 RX ADMIN — INSULIN LISPRO 2 UNITS: 100 INJECTION, SOLUTION INTRAVENOUS; SUBCUTANEOUS at 21:44

## 2017-04-08 RX ADMIN — LORATADINE 10 MG: 10 TABLET ORAL at 12:37

## 2017-04-08 RX ADMIN — GLIMEPIRIDE 4 MG: 2 TABLET ORAL at 17:22

## 2017-04-08 RX ADMIN — BACLOFEN 10 MG: 10 TABLET ORAL at 21:43

## 2017-04-08 RX ADMIN — Medication 1 CAPSULE: at 21:42

## 2017-04-08 RX ADMIN — LISINOPRIL 5 MG: 5 TABLET ORAL at 21:43

## 2017-04-08 RX ADMIN — ASPIRIN 81 MG CHEWABLE TABLET 81 MG: 81 TABLET CHEWABLE at 09:49

## 2017-04-08 RX ADMIN — AMLODIPINE BESYLATE 5 MG: 5 TABLET ORAL at 21:43

## 2017-04-08 RX ADMIN — METHYLPREDNISOLONE 4 MG: 4 TABLET ORAL at 17:22

## 2017-04-08 RX ADMIN — SITAGLIPTIN 100 MG: 25 TABLET, FILM COATED ORAL at 01:54

## 2017-04-08 RX ADMIN — ACETAMINOPHEN 650 MG: 325 TABLET ORAL at 09:59

## 2017-04-08 RX ADMIN — INSULIN LISPRO 2 UNITS: 100 INJECTION, SOLUTION INTRAVENOUS; SUBCUTANEOUS at 17:22

## 2017-04-08 RX ADMIN — PRAVASTATIN SODIUM 40 MG: 20 TABLET ORAL at 21:42

## 2017-04-09 LAB
BACTERIA SPEC CULT: ABNORMAL
CC UR VC: ABNORMAL
SERVICE CMNT-IMP: ABNORMAL

## 2017-04-09 PROCEDURE — 74011250637 HC RX REV CODE- 250/637: Performed by: PHYSICAL MEDICINE & REHABILITATION

## 2017-04-09 PROCEDURE — 74011636637 HC RX REV CODE- 636/637: Performed by: PHYSICAL MEDICINE & REHABILITATION

## 2017-04-09 PROCEDURE — 74011250636 HC RX REV CODE- 250/636: Performed by: PHYSICAL MEDICINE & REHABILITATION

## 2017-04-09 RX ORDER — CEFDINIR 300 MG/1
300 CAPSULE ORAL 2 TIMES DAILY
Status: COMPLETED | OUTPATIENT
Start: 2017-04-09 | End: 2017-04-13

## 2017-04-09 RX ORDER — TAMSULOSIN HYDROCHLORIDE 0.4 MG/1
0.4 CAPSULE ORAL DAILY
Status: DISCONTINUED | OUTPATIENT
Start: 2017-04-09 | End: 2017-04-28 | Stop reason: HOSPADM

## 2017-04-09 RX ADMIN — TAMSULOSIN HYDROCHLORIDE 0.4 MG: 0.4 CAPSULE ORAL at 09:48

## 2017-04-09 RX ADMIN — BACLOFEN 10 MG: 10 TABLET ORAL at 12:14

## 2017-04-09 RX ADMIN — METHYLPREDNISOLONE 4 MG: 4 TABLET ORAL at 12:13

## 2017-04-09 RX ADMIN — INSULIN LISPRO 4 UNITS: 100 INJECTION, SOLUTION INTRAVENOUS; SUBCUTANEOUS at 17:45

## 2017-04-09 RX ADMIN — CEFDINIR 300 MG: 300 CAPSULE ORAL at 14:10

## 2017-04-09 RX ADMIN — METHYLPREDNISOLONE 4 MG: 4 TABLET ORAL at 17:45

## 2017-04-09 RX ADMIN — METHYLPREDNISOLONE 4 MG: 4 TABLET ORAL at 09:48

## 2017-04-09 RX ADMIN — INSULIN LISPRO 2 UNITS: 100 INJECTION, SOLUTION INTRAVENOUS; SUBCUTANEOUS at 09:48

## 2017-04-09 RX ADMIN — GLIMEPIRIDE 2 MG: 2 TABLET ORAL at 09:47

## 2017-04-09 RX ADMIN — BACLOFEN 10 MG: 10 TABLET ORAL at 05:32

## 2017-04-09 RX ADMIN — ACETAMINOPHEN 650 MG: 325 TABLET ORAL at 22:24

## 2017-04-09 RX ADMIN — ASPIRIN 81 MG CHEWABLE TABLET 81 MG: 81 TABLET CHEWABLE at 09:47

## 2017-04-09 RX ADMIN — GLIMEPIRIDE 4 MG: 2 TABLET ORAL at 17:44

## 2017-04-09 RX ADMIN — SITAGLIPTIN 100 MG: 25 TABLET, FILM COATED ORAL at 09:47

## 2017-04-09 RX ADMIN — Medication 1 CAPSULE: at 22:24

## 2017-04-09 RX ADMIN — GABAPENTIN 600 MG: 300 CAPSULE ORAL at 09:46

## 2017-04-09 RX ADMIN — ACETAMINOPHEN 650 MG: 325 TABLET ORAL at 09:46

## 2017-04-09 RX ADMIN — ENOXAPARIN SODIUM 40 MG: 100 INJECTION SUBCUTANEOUS at 22:25

## 2017-04-09 RX ADMIN — LISINOPRIL 5 MG: 5 TABLET ORAL at 22:24

## 2017-04-09 RX ADMIN — ACETAMINOPHEN 650 MG: 325 TABLET ORAL at 17:53

## 2017-04-09 RX ADMIN — PRAVASTATIN SODIUM 40 MG: 20 TABLET ORAL at 22:24

## 2017-04-09 RX ADMIN — INSULIN LISPRO 2 UNITS: 100 INJECTION, SOLUTION INTRAVENOUS; SUBCUTANEOUS at 12:13

## 2017-04-09 RX ADMIN — BACLOFEN 10 MG: 10 TABLET ORAL at 22:24

## 2017-04-09 RX ADMIN — INSULIN LISPRO 4 UNITS: 100 INJECTION, SOLUTION INTRAVENOUS; SUBCUTANEOUS at 22:00

## 2017-04-09 RX ADMIN — AMLODIPINE BESYLATE 5 MG: 5 TABLET ORAL at 22:24

## 2017-04-09 RX ADMIN — METHYLPREDNISOLONE 4 MG: 4 TABLET ORAL at 22:24

## 2017-04-09 RX ADMIN — CEFDINIR 300 MG: 300 CAPSULE ORAL at 22:24

## 2017-04-09 RX ADMIN — LISINOPRIL 5 MG: 5 TABLET ORAL at 09:47

## 2017-04-09 RX ADMIN — GABAPENTIN 300 MG: 300 CAPSULE ORAL at 22:24

## 2017-04-09 RX ADMIN — LORATADINE 10 MG: 10 TABLET ORAL at 09:48

## 2017-04-09 RX ADMIN — MAGNESIUM GLUCONATE 500 MG ORAL TABLET 400 MG: 500 TABLET ORAL at 09:48

## 2017-04-10 LAB
BACTERIA SPEC CULT: NORMAL
BACTERIA SPEC CULT: NORMAL
GRAM STN SPEC: NORMAL
GRAM STN SPEC: NORMAL
SERVICE CMNT-IMP: NORMAL

## 2017-04-10 PROCEDURE — 74011250637 HC RX REV CODE- 250/637: Performed by: PHYSICAL MEDICINE & REHABILITATION

## 2017-04-10 PROCEDURE — 74011250636 HC RX REV CODE- 250/636: Performed by: PHYSICAL MEDICINE & REHABILITATION

## 2017-04-10 PROCEDURE — 74011636637 HC RX REV CODE- 636/637: Performed by: PHYSICAL MEDICINE & REHABILITATION

## 2017-04-10 RX ORDER — FLUTICASONE PROPIONATE 50 MCG
1 SPRAY, SUSPENSION (ML) NASAL
Status: DISCONTINUED | OUTPATIENT
Start: 2017-04-10 | End: 2017-04-28 | Stop reason: HOSPADM

## 2017-04-10 RX ADMIN — Medication 1 CAPSULE: at 21:01

## 2017-04-10 RX ADMIN — INSULIN LISPRO 4 UNITS: 100 INJECTION, SOLUTION INTRAVENOUS; SUBCUTANEOUS at 17:17

## 2017-04-10 RX ADMIN — METHYLPREDNISOLONE 4 MG: 4 TABLET ORAL at 17:16

## 2017-04-10 RX ADMIN — METHYLPREDNISOLONE 4 MG: 4 TABLET ORAL at 12:43

## 2017-04-10 RX ADMIN — BACLOFEN 10 MG: 10 TABLET ORAL at 13:02

## 2017-04-10 RX ADMIN — CEFDINIR 300 MG: 300 CAPSULE ORAL at 20:57

## 2017-04-10 RX ADMIN — INSULIN LISPRO 4 UNITS: 100 INJECTION, SOLUTION INTRAVENOUS; SUBCUTANEOUS at 12:43

## 2017-04-10 RX ADMIN — INSULIN LISPRO 2 UNITS: 100 INJECTION, SOLUTION INTRAVENOUS; SUBCUTANEOUS at 08:57

## 2017-04-10 RX ADMIN — ACETAMINOPHEN 650 MG: 325 TABLET ORAL at 20:49

## 2017-04-10 RX ADMIN — MAGNESIUM GLUCONATE 500 MG ORAL TABLET 400 MG: 500 TABLET ORAL at 08:59

## 2017-04-10 RX ADMIN — CEFDINIR 300 MG: 300 CAPSULE ORAL at 09:00

## 2017-04-10 RX ADMIN — FLUTICASONE PROPIONATE 1 SPRAY: 50 SPRAY, METERED NASAL at 21:36

## 2017-04-10 RX ADMIN — INSULIN LISPRO 10 UNITS: 100 INJECTION, SOLUTION INTRAVENOUS; SUBCUTANEOUS at 21:35

## 2017-04-10 RX ADMIN — LISINOPRIL 5 MG: 5 TABLET ORAL at 21:03

## 2017-04-10 RX ADMIN — LORATADINE 10 MG: 10 TABLET ORAL at 08:58

## 2017-04-10 RX ADMIN — ENOXAPARIN SODIUM 40 MG: 100 INJECTION SUBCUTANEOUS at 20:58

## 2017-04-10 RX ADMIN — BACLOFEN 10 MG: 10 TABLET ORAL at 05:35

## 2017-04-10 RX ADMIN — ASPIRIN 81 MG CHEWABLE TABLET 81 MG: 81 TABLET CHEWABLE at 08:58

## 2017-04-10 RX ADMIN — GABAPENTIN 600 MG: 300 CAPSULE ORAL at 08:58

## 2017-04-10 RX ADMIN — GLIMEPIRIDE 4 MG: 2 TABLET ORAL at 17:16

## 2017-04-10 RX ADMIN — PRAVASTATIN SODIUM 40 MG: 20 TABLET ORAL at 21:04

## 2017-04-10 RX ADMIN — AMLODIPINE BESYLATE 5 MG: 5 TABLET ORAL at 21:00

## 2017-04-10 RX ADMIN — GABAPENTIN 300 MG: 300 CAPSULE ORAL at 21:00

## 2017-04-10 RX ADMIN — METHYLPREDNISOLONE 4 MG: 4 TABLET ORAL at 08:59

## 2017-04-10 RX ADMIN — SITAGLIPTIN 100 MG: 25 TABLET, FILM COATED ORAL at 08:59

## 2017-04-10 RX ADMIN — MAGNESIUM HYDROXIDE 30 ML: 400 SUSPENSION ORAL at 21:36

## 2017-04-10 RX ADMIN — GLIMEPIRIDE 2 MG: 2 TABLET ORAL at 08:58

## 2017-04-10 RX ADMIN — LISINOPRIL 5 MG: 5 TABLET ORAL at 08:59

## 2017-04-10 RX ADMIN — TAMSULOSIN HYDROCHLORIDE 0.4 MG: 0.4 CAPSULE ORAL at 08:59

## 2017-04-10 RX ADMIN — BACLOFEN 10 MG: 10 TABLET ORAL at 21:00

## 2017-04-11 PROCEDURE — 74011250637 HC RX REV CODE- 250/637: Performed by: PHYSICAL MEDICINE & REHABILITATION

## 2017-04-11 PROCEDURE — 74011250636 HC RX REV CODE- 250/636: Performed by: PHYSICAL MEDICINE & REHABILITATION

## 2017-04-11 PROCEDURE — 74011636637 HC RX REV CODE- 636/637: Performed by: PHYSICAL MEDICINE & REHABILITATION

## 2017-04-11 RX ORDER — BETHANECHOL CHLORIDE 10 MG/1
5 TABLET ORAL
Status: DISCONTINUED | OUTPATIENT
Start: 2017-04-11 | End: 2017-04-13

## 2017-04-11 RX ADMIN — SITAGLIPTIN 100 MG: 25 TABLET, FILM COATED ORAL at 08:54

## 2017-04-11 RX ADMIN — MAGNESIUM GLUCONATE 500 MG ORAL TABLET 400 MG: 500 TABLET ORAL at 08:55

## 2017-04-11 RX ADMIN — METHYLPREDNISOLONE 4 MG: 4 TABLET ORAL at 22:06

## 2017-04-11 RX ADMIN — ASPIRIN 81 MG CHEWABLE TABLET 81 MG: 81 TABLET CHEWABLE at 08:55

## 2017-04-11 RX ADMIN — LISINOPRIL 5 MG: 5 TABLET ORAL at 08:55

## 2017-04-11 RX ADMIN — GLIMEPIRIDE 2 MG: 2 TABLET ORAL at 08:54

## 2017-04-11 RX ADMIN — LISINOPRIL 5 MG: 5 TABLET ORAL at 22:06

## 2017-04-11 RX ADMIN — BACLOFEN 10 MG: 10 TABLET ORAL at 13:34

## 2017-04-11 RX ADMIN — SALINE NASAL SPRAY 2 SPRAY: 1.5 SOLUTION NASAL at 10:04

## 2017-04-11 RX ADMIN — GABAPENTIN 300 MG: 300 CAPSULE ORAL at 22:07

## 2017-04-11 RX ADMIN — TAMSULOSIN HYDROCHLORIDE 0.4 MG: 0.4 CAPSULE ORAL at 08:54

## 2017-04-11 RX ADMIN — INSULIN LISPRO 2 UNITS: 100 INJECTION, SOLUTION INTRAVENOUS; SUBCUTANEOUS at 22:07

## 2017-04-11 RX ADMIN — BETHANECHOL CHLORIDE 5 MG: 10 TABLET ORAL at 18:11

## 2017-04-11 RX ADMIN — Medication 1 CAPSULE: at 22:06

## 2017-04-11 RX ADMIN — GLIMEPIRIDE 4 MG: 2 TABLET ORAL at 18:11

## 2017-04-11 RX ADMIN — PRAVASTATIN SODIUM 40 MG: 20 TABLET ORAL at 22:06

## 2017-04-11 RX ADMIN — ENOXAPARIN SODIUM 40 MG: 100 INJECTION SUBCUTANEOUS at 22:08

## 2017-04-11 RX ADMIN — AMLODIPINE BESYLATE 5 MG: 5 TABLET ORAL at 22:06

## 2017-04-11 RX ADMIN — BACLOFEN 10 MG: 10 TABLET ORAL at 22:06

## 2017-04-11 RX ADMIN — LORATADINE 10 MG: 10 TABLET ORAL at 08:54

## 2017-04-11 RX ADMIN — CEFDINIR 300 MG: 300 CAPSULE ORAL at 08:54

## 2017-04-11 RX ADMIN — INSULIN LISPRO 2 UNITS: 100 INJECTION, SOLUTION INTRAVENOUS; SUBCUTANEOUS at 11:59

## 2017-04-11 RX ADMIN — CEFDINIR 300 MG: 300 CAPSULE ORAL at 22:06

## 2017-04-11 RX ADMIN — GABAPENTIN 600 MG: 300 CAPSULE ORAL at 08:55

## 2017-04-11 RX ADMIN — BETHANECHOL CHLORIDE 5 MG: 10 TABLET ORAL at 11:58

## 2017-04-11 RX ADMIN — FLUTICASONE PROPIONATE 1 SPRAY: 50 SPRAY, METERED NASAL at 22:07

## 2017-04-11 RX ADMIN — METHYLPREDNISOLONE 4 MG: 4 TABLET ORAL at 08:54

## 2017-04-11 RX ADMIN — ACETAMINOPHEN 650 MG: 325 TABLET ORAL at 22:06

## 2017-04-11 RX ADMIN — BACLOFEN 10 MG: 10 TABLET ORAL at 06:11

## 2017-04-12 PROCEDURE — 74011250637 HC RX REV CODE- 250/637: Performed by: PHYSICAL MEDICINE & REHABILITATION

## 2017-04-12 PROCEDURE — 74011250636 HC RX REV CODE- 250/636: Performed by: PHYSICAL MEDICINE & REHABILITATION

## 2017-04-12 PROCEDURE — 74011636637 HC RX REV CODE- 636/637: Performed by: PHYSICAL MEDICINE & REHABILITATION

## 2017-04-12 RX ADMIN — BACLOFEN 10 MG: 10 TABLET ORAL at 21:36

## 2017-04-12 RX ADMIN — GLIMEPIRIDE 4 MG: 2 TABLET ORAL at 17:26

## 2017-04-12 RX ADMIN — BETHANECHOL CHLORIDE 5 MG: 10 TABLET ORAL at 17:26

## 2017-04-12 RX ADMIN — LORATADINE 10 MG: 10 TABLET ORAL at 09:43

## 2017-04-12 RX ADMIN — INSULIN LISPRO 4 UNITS: 100 INJECTION, SOLUTION INTRAVENOUS; SUBCUTANEOUS at 17:27

## 2017-04-12 RX ADMIN — GLIMEPIRIDE 2 MG: 2 TABLET ORAL at 09:44

## 2017-04-12 RX ADMIN — METHYLPREDNISOLONE 4 MG: 4 TABLET ORAL at 09:43

## 2017-04-12 RX ADMIN — ACETAMINOPHEN 650 MG: 325 TABLET ORAL at 12:38

## 2017-04-12 RX ADMIN — LISINOPRIL 5 MG: 5 TABLET ORAL at 09:43

## 2017-04-12 RX ADMIN — MAGNESIUM GLUCONATE 500 MG ORAL TABLET 400 MG: 500 TABLET ORAL at 09:43

## 2017-04-12 RX ADMIN — CEFDINIR 300 MG: 300 CAPSULE ORAL at 21:39

## 2017-04-12 RX ADMIN — GABAPENTIN 300 MG: 300 CAPSULE ORAL at 22:17

## 2017-04-12 RX ADMIN — ASPIRIN 81 MG CHEWABLE TABLET 81 MG: 81 TABLET CHEWABLE at 09:43

## 2017-04-12 RX ADMIN — BACLOFEN 10 MG: 10 TABLET ORAL at 12:33

## 2017-04-12 RX ADMIN — ENOXAPARIN SODIUM 40 MG: 100 INJECTION SUBCUTANEOUS at 21:39

## 2017-04-12 RX ADMIN — ACETAMINOPHEN 650 MG: 325 TABLET ORAL at 22:29

## 2017-04-12 RX ADMIN — BETHANECHOL CHLORIDE 5 MG: 10 TABLET ORAL at 12:32

## 2017-04-12 RX ADMIN — INSULIN LISPRO 2 UNITS: 100 INJECTION, SOLUTION INTRAVENOUS; SUBCUTANEOUS at 22:55

## 2017-04-12 RX ADMIN — TAMSULOSIN HYDROCHLORIDE 0.4 MG: 0.4 CAPSULE ORAL at 09:44

## 2017-04-12 RX ADMIN — GABAPENTIN 600 MG: 300 CAPSULE ORAL at 09:43

## 2017-04-12 RX ADMIN — LISINOPRIL 5 MG: 5 TABLET ORAL at 21:37

## 2017-04-12 RX ADMIN — AMLODIPINE BESYLATE 5 MG: 5 TABLET ORAL at 21:38

## 2017-04-12 RX ADMIN — SITAGLIPTIN 100 MG: 25 TABLET, FILM COATED ORAL at 09:43

## 2017-04-12 RX ADMIN — BACLOFEN 10 MG: 10 TABLET ORAL at 05:35

## 2017-04-12 RX ADMIN — FLUTICASONE PROPIONATE 1 SPRAY: 50 SPRAY, METERED NASAL at 21:32

## 2017-04-12 RX ADMIN — PRAVASTATIN SODIUM 40 MG: 20 TABLET ORAL at 21:36

## 2017-04-12 RX ADMIN — CEFDINIR 300 MG: 300 CAPSULE ORAL at 09:43

## 2017-04-12 RX ADMIN — BETHANECHOL CHLORIDE 5 MG: 10 TABLET ORAL at 09:44

## 2017-04-12 RX ADMIN — Medication 1 CAPSULE: at 21:37

## 2017-04-13 ENCOUNTER — HOSPITAL ENCOUNTER (OUTPATIENT)
Dept: GENERAL RADIOLOGY | Age: 67
Discharge: HOME | End: 2017-04-13
Attending: PHYSICAL MEDICINE & REHABILITATION
Payer: MEDICARE

## 2017-04-13 ENCOUNTER — TELEPHONE (OUTPATIENT)
Dept: NEUROLOGY | Age: 67
End: 2017-04-13

## 2017-04-13 LAB
ANION GAP BLD CALC-SCNC: 9 MMOL/L (ref 5–15)
B BURGDOR IGM CSF IA-ACNC: <0.06 INDEX (ref 0–0.06)
B. BURGDORFERI, IGG, CSF, LYCGL: 0.08 INDEX (ref 0–0.09)
BUN SERPL-MCNC: 23 MG/DL (ref 6–20)
BUN/CREAT SERPL: 47 (ref 12–20)
CALCIUM SERPL-MCNC: 8.6 MG/DL (ref 8.5–10.1)
CHLORIDE SERPL-SCNC: 105 MMOL/L (ref 97–108)
CO2 SERPL-SCNC: 26 MMOL/L (ref 21–32)
CREAT SERPL-MCNC: 0.49 MG/DL (ref 0.55–1.02)
ERYTHROCYTE [DISTWIDTH] IN BLOOD BY AUTOMATED COUNT: 15.7 % (ref 11.5–14.5)
GLUCOSE SERPL-MCNC: 85 MG/DL (ref 65–100)
HCT VFR BLD AUTO: 39.7 % (ref 35–47)
HGB BLD-MCNC: 12.8 G/DL (ref 11.5–16)
MAGNESIUM SERPL-MCNC: 2.3 MG/DL (ref 1.6–2.4)
MCH RBC QN AUTO: 26.2 PG (ref 26–34)
MCHC RBC AUTO-ENTMCNC: 32.2 G/DL (ref 30–36.5)
MCV RBC AUTO: 81.2 FL (ref 80–99)
PLATELET # BLD AUTO: 208 K/UL (ref 150–400)
POTASSIUM SERPL-SCNC: 4.1 MMOL/L (ref 3.5–5.1)
RBC # BLD AUTO: 4.89 M/UL (ref 3.8–5.2)
SODIUM SERPL-SCNC: 140 MMOL/L (ref 136–145)
WBC # BLD AUTO: 5.8 K/UL (ref 3.6–11)

## 2017-04-13 PROCEDURE — 74011250637 HC RX REV CODE- 250/637: Performed by: PHYSICAL MEDICINE & REHABILITATION

## 2017-04-13 PROCEDURE — 85027 COMPLETE CBC AUTOMATED: CPT | Performed by: PHYSICAL MEDICINE & REHABILITATION

## 2017-04-13 PROCEDURE — 74011636637 HC RX REV CODE- 636/637: Performed by: PHYSICAL MEDICINE & REHABILITATION

## 2017-04-13 PROCEDURE — 80048 BASIC METABOLIC PNL TOTAL CA: CPT | Performed by: PHYSICAL MEDICINE & REHABILITATION

## 2017-04-13 PROCEDURE — 83735 ASSAY OF MAGNESIUM: CPT | Performed by: PHYSICAL MEDICINE & REHABILITATION

## 2017-04-13 PROCEDURE — 36415 COLL VENOUS BLD VENIPUNCTURE: CPT | Performed by: PHYSICAL MEDICINE & REHABILITATION

## 2017-04-13 PROCEDURE — 71020 XR CHEST PA LAT: CPT

## 2017-04-13 PROCEDURE — 74011250636 HC RX REV CODE- 250/636: Performed by: PHYSICAL MEDICINE & REHABILITATION

## 2017-04-13 RX ORDER — BETHANECHOL CHLORIDE 10 MG/1
10 TABLET ORAL
Status: DISCONTINUED | OUTPATIENT
Start: 2017-04-13 | End: 2017-04-13

## 2017-04-13 RX ORDER — BETHANECHOL CHLORIDE 10 MG/1
10 TABLET ORAL
Status: DISCONTINUED | OUTPATIENT
Start: 2017-04-13 | End: 2017-04-22

## 2017-04-13 RX ORDER — LIDOCAINE 50 MG/G
2 PATCH TOPICAL DAILY
Status: DISCONTINUED | OUTPATIENT
Start: 2017-04-13 | End: 2017-04-15

## 2017-04-13 RX ADMIN — BACLOFEN 10 MG: 10 TABLET ORAL at 12:55

## 2017-04-13 RX ADMIN — CEFDINIR 300 MG: 300 CAPSULE ORAL at 21:17

## 2017-04-13 RX ADMIN — PRAVASTATIN SODIUM 40 MG: 20 TABLET ORAL at 21:17

## 2017-04-13 RX ADMIN — ACETAMINOPHEN 650 MG: 325 TABLET ORAL at 09:04

## 2017-04-13 RX ADMIN — AMLODIPINE BESYLATE 5 MG: 5 TABLET ORAL at 21:16

## 2017-04-13 RX ADMIN — SITAGLIPTIN 100 MG: 25 TABLET, FILM COATED ORAL at 09:02

## 2017-04-13 RX ADMIN — GABAPENTIN 300 MG: 300 CAPSULE ORAL at 21:16

## 2017-04-13 RX ADMIN — GLIMEPIRIDE 4 MG: 2 TABLET ORAL at 17:23

## 2017-04-13 RX ADMIN — BACLOFEN 10 MG: 10 TABLET ORAL at 21:17

## 2017-04-13 RX ADMIN — FLUTICASONE PROPIONATE 1 SPRAY: 50 SPRAY, METERED NASAL at 21:16

## 2017-04-13 RX ADMIN — TAMSULOSIN HYDROCHLORIDE 0.4 MG: 0.4 CAPSULE ORAL at 09:02

## 2017-04-13 RX ADMIN — LISINOPRIL 5 MG: 5 TABLET ORAL at 09:02

## 2017-04-13 RX ADMIN — BACLOFEN 10 MG: 10 TABLET ORAL at 05:16

## 2017-04-13 RX ADMIN — GABAPENTIN 600 MG: 300 CAPSULE ORAL at 09:02

## 2017-04-13 RX ADMIN — ENOXAPARIN SODIUM 40 MG: 100 INJECTION SUBCUTANEOUS at 21:17

## 2017-04-13 RX ADMIN — INSULIN LISPRO 2 UNITS: 100 INJECTION, SOLUTION INTRAVENOUS; SUBCUTANEOUS at 12:53

## 2017-04-13 RX ADMIN — CEFDINIR 300 MG: 300 CAPSULE ORAL at 09:02

## 2017-04-13 RX ADMIN — ASPIRIN 81 MG CHEWABLE TABLET 81 MG: 81 TABLET CHEWABLE at 09:02

## 2017-04-13 RX ADMIN — Medication 1 CAPSULE: at 21:17

## 2017-04-13 RX ADMIN — MAGNESIUM GLUCONATE 500 MG ORAL TABLET 400 MG: 500 TABLET ORAL at 09:02

## 2017-04-13 RX ADMIN — INSULIN LISPRO 8 UNITS: 100 INJECTION, SOLUTION INTRAVENOUS; SUBCUTANEOUS at 21:26

## 2017-04-13 RX ADMIN — BETHANECHOL CHLORIDE 5 MG: 10 TABLET ORAL at 09:02

## 2017-04-13 RX ADMIN — BETHANECHOL CHLORIDE 10 MG: 10 TABLET ORAL at 17:23

## 2017-04-13 RX ADMIN — LISINOPRIL 5 MG: 5 TABLET ORAL at 21:16

## 2017-04-13 RX ADMIN — GLIMEPIRIDE 3 MG: 1 TABLET ORAL at 09:02

## 2017-04-13 RX ADMIN — LORATADINE 10 MG: 10 TABLET ORAL at 09:02

## 2017-04-13 NOTE — TELEPHONE ENCOUNTER
Dr from [facility] requesting call back from Dr Gates See regarding to pt's \" he knows what about\". this pt have seen by Dr Belinda Hawkins at hospital . The line was mess up and she does not have time to go more detail . Thank you .  [phone]

## 2017-04-14 PROCEDURE — 74011250637 HC RX REV CODE- 250/637: Performed by: PHYSICAL MEDICINE & REHABILITATION

## 2017-04-14 PROCEDURE — 74011250636 HC RX REV CODE- 250/636: Performed by: PHYSICAL MEDICINE & REHABILITATION

## 2017-04-14 PROCEDURE — 74011636637 HC RX REV CODE- 636/637: Performed by: PHYSICAL MEDICINE & REHABILITATION

## 2017-04-14 RX ORDER — GUAIFENESIN 600 MG/1
600 TABLET, EXTENDED RELEASE ORAL EVERY 12 HOURS
Status: COMPLETED | OUTPATIENT
Start: 2017-04-14 | End: 2017-04-17

## 2017-04-14 RX ORDER — GUAIFENESIN 600 MG/1
600 TABLET, EXTENDED RELEASE ORAL
Status: DISCONTINUED | OUTPATIENT
Start: 2017-04-17 | End: 2017-04-28 | Stop reason: HOSPADM

## 2017-04-14 RX ADMIN — GUAIFENESIN 600 MG: 600 TABLET, EXTENDED RELEASE ORAL at 22:54

## 2017-04-14 RX ADMIN — PRAVASTATIN SODIUM 40 MG: 20 TABLET ORAL at 22:54

## 2017-04-14 RX ADMIN — GABAPENTIN 300 MG: 300 CAPSULE ORAL at 22:53

## 2017-04-14 RX ADMIN — BETHANECHOL CHLORIDE 10 MG: 10 TABLET ORAL at 17:40

## 2017-04-14 RX ADMIN — FLUTICASONE PROPIONATE 1 SPRAY: 50 SPRAY, METERED NASAL at 22:54

## 2017-04-14 RX ADMIN — BETHANECHOL CHLORIDE 10 MG: 10 TABLET ORAL at 12:57

## 2017-04-14 RX ADMIN — ENOXAPARIN SODIUM 40 MG: 100 INJECTION SUBCUTANEOUS at 22:52

## 2017-04-14 RX ADMIN — SITAGLIPTIN 100 MG: 25 TABLET, FILM COATED ORAL at 08:44

## 2017-04-14 RX ADMIN — Medication 1 CAPSULE: at 22:54

## 2017-04-14 RX ADMIN — MAGNESIUM GLUCONATE 500 MG ORAL TABLET 400 MG: 500 TABLET ORAL at 08:44

## 2017-04-14 RX ADMIN — AMLODIPINE BESYLATE 5 MG: 5 TABLET ORAL at 22:53

## 2017-04-14 RX ADMIN — TAMSULOSIN HYDROCHLORIDE 0.4 MG: 0.4 CAPSULE ORAL at 08:44

## 2017-04-14 RX ADMIN — BACLOFEN 10 MG: 10 TABLET ORAL at 13:00

## 2017-04-14 RX ADMIN — BETHANECHOL CHLORIDE 10 MG: 10 TABLET ORAL at 08:44

## 2017-04-14 RX ADMIN — GLIMEPIRIDE 3 MG: 1 TABLET ORAL at 08:45

## 2017-04-14 RX ADMIN — GABAPENTIN 600 MG: 300 CAPSULE ORAL at 08:44

## 2017-04-14 RX ADMIN — GLIMEPIRIDE 4 MG: 2 TABLET ORAL at 17:41

## 2017-04-14 RX ADMIN — BACLOFEN 10 MG: 10 TABLET ORAL at 22:54

## 2017-04-14 RX ADMIN — ASPIRIN 81 MG CHEWABLE TABLET 81 MG: 81 TABLET CHEWABLE at 08:44

## 2017-04-14 RX ADMIN — BACLOFEN 10 MG: 10 TABLET ORAL at 05:36

## 2017-04-14 RX ADMIN — INSULIN LISPRO 6 UNITS: 100 INJECTION, SOLUTION INTRAVENOUS; SUBCUTANEOUS at 22:52

## 2017-04-14 RX ADMIN — LISINOPRIL 5 MG: 5 TABLET ORAL at 08:44

## 2017-04-14 RX ADMIN — ACETAMINOPHEN 650 MG: 325 TABLET ORAL at 05:50

## 2017-04-14 RX ADMIN — LORATADINE 10 MG: 10 TABLET ORAL at 08:44

## 2017-04-15 PROCEDURE — 74011250636 HC RX REV CODE- 250/636: Performed by: PHYSICAL MEDICINE & REHABILITATION

## 2017-04-15 PROCEDURE — 74011250637 HC RX REV CODE- 250/637: Performed by: PHYSICAL MEDICINE & REHABILITATION

## 2017-04-15 PROCEDURE — 74011636637 HC RX REV CODE- 636/637: Performed by: PHYSICAL MEDICINE & REHABILITATION

## 2017-04-15 RX ORDER — LIDOCAINE 50 MG/G
2 PATCH TOPICAL EVERY 24 HOURS
Status: DISCONTINUED | OUTPATIENT
Start: 2017-04-15 | End: 2017-04-28 | Stop reason: HOSPADM

## 2017-04-15 RX ADMIN — TAMSULOSIN HYDROCHLORIDE 0.4 MG: 0.4 CAPSULE ORAL at 08:37

## 2017-04-15 RX ADMIN — BACLOFEN 10 MG: 10 TABLET ORAL at 21:00

## 2017-04-15 RX ADMIN — INSULIN LISPRO 2 UNITS: 100 INJECTION, SOLUTION INTRAVENOUS; SUBCUTANEOUS at 21:31

## 2017-04-15 RX ADMIN — ENOXAPARIN SODIUM 40 MG: 100 INJECTION SUBCUTANEOUS at 20:57

## 2017-04-15 RX ADMIN — MAGNESIUM GLUCONATE 500 MG ORAL TABLET 400 MG: 500 TABLET ORAL at 08:37

## 2017-04-15 RX ADMIN — LORATADINE 10 MG: 10 TABLET ORAL at 08:37

## 2017-04-15 RX ADMIN — SITAGLIPTIN 100 MG: 25 TABLET, FILM COATED ORAL at 08:37

## 2017-04-15 RX ADMIN — AMLODIPINE BESYLATE 5 MG: 5 TABLET ORAL at 21:00

## 2017-04-15 RX ADMIN — GUAIFENESIN 600 MG: 600 TABLET, EXTENDED RELEASE ORAL at 20:59

## 2017-04-15 RX ADMIN — ASPIRIN 81 MG CHEWABLE TABLET 81 MG: 81 TABLET CHEWABLE at 08:38

## 2017-04-15 RX ADMIN — BETHANECHOL CHLORIDE 10 MG: 10 TABLET ORAL at 12:28

## 2017-04-15 RX ADMIN — BACLOFEN 10 MG: 10 TABLET ORAL at 06:28

## 2017-04-15 RX ADMIN — ACETAMINOPHEN 650 MG: 325 TABLET ORAL at 08:46

## 2017-04-15 RX ADMIN — BETHANECHOL CHLORIDE 10 MG: 10 TABLET ORAL at 08:38

## 2017-04-15 RX ADMIN — FLUTICASONE PROPIONATE 1 SPRAY: 50 SPRAY, METERED NASAL at 20:58

## 2017-04-15 RX ADMIN — PRAVASTATIN SODIUM 40 MG: 20 TABLET ORAL at 21:00

## 2017-04-15 RX ADMIN — GLIMEPIRIDE 3 MG: 1 TABLET ORAL at 08:38

## 2017-04-15 RX ADMIN — Medication 1 CAPSULE: at 20:59

## 2017-04-15 RX ADMIN — GLIMEPIRIDE 4 MG: 2 TABLET ORAL at 17:23

## 2017-04-15 RX ADMIN — GABAPENTIN 300 MG: 300 CAPSULE ORAL at 20:58

## 2017-04-15 RX ADMIN — BACLOFEN 10 MG: 10 TABLET ORAL at 13:54

## 2017-04-15 RX ADMIN — GUAIFENESIN 600 MG: 600 TABLET, EXTENDED RELEASE ORAL at 08:38

## 2017-04-15 RX ADMIN — BETHANECHOL CHLORIDE 10 MG: 10 TABLET ORAL at 17:23

## 2017-04-15 RX ADMIN — INSULIN LISPRO 4 UNITS: 100 INJECTION, SOLUTION INTRAVENOUS; SUBCUTANEOUS at 17:21

## 2017-04-15 RX ADMIN — GABAPENTIN 600 MG: 300 CAPSULE ORAL at 08:37

## 2017-04-16 PROCEDURE — 74011250636 HC RX REV CODE- 250/636: Performed by: PHYSICAL MEDICINE & REHABILITATION

## 2017-04-16 PROCEDURE — 74011250637 HC RX REV CODE- 250/637: Performed by: PHYSICAL MEDICINE & REHABILITATION

## 2017-04-16 PROCEDURE — 74011636637 HC RX REV CODE- 636/637: Performed by: PHYSICAL MEDICINE & REHABILITATION

## 2017-04-16 RX ADMIN — MAGNESIUM GLUCONATE 500 MG ORAL TABLET 400 MG: 500 TABLET ORAL at 08:41

## 2017-04-16 RX ADMIN — GABAPENTIN 600 MG: 300 CAPSULE ORAL at 08:42

## 2017-04-16 RX ADMIN — INSULIN LISPRO 4 UNITS: 100 INJECTION, SOLUTION INTRAVENOUS; SUBCUTANEOUS at 17:22

## 2017-04-16 RX ADMIN — TAMSULOSIN HYDROCHLORIDE 0.4 MG: 0.4 CAPSULE ORAL at 08:41

## 2017-04-16 RX ADMIN — LORATADINE 10 MG: 10 TABLET ORAL at 08:41

## 2017-04-16 RX ADMIN — AMLODIPINE BESYLATE 5 MG: 5 TABLET ORAL at 21:19

## 2017-04-16 RX ADMIN — Medication 1 CAPSULE: at 21:19

## 2017-04-16 RX ADMIN — GABAPENTIN 300 MG: 300 CAPSULE ORAL at 21:20

## 2017-04-16 RX ADMIN — BACLOFEN 10 MG: 10 TABLET ORAL at 05:02

## 2017-04-16 RX ADMIN — PRAVASTATIN SODIUM 40 MG: 20 TABLET ORAL at 21:19

## 2017-04-16 RX ADMIN — GUAIFENESIN 600 MG: 600 TABLET, EXTENDED RELEASE ORAL at 21:19

## 2017-04-16 RX ADMIN — GLIMEPIRIDE 3 MG: 1 TABLET ORAL at 08:41

## 2017-04-16 RX ADMIN — BETHANECHOL CHLORIDE 10 MG: 10 TABLET ORAL at 11:58

## 2017-04-16 RX ADMIN — FLUTICASONE PROPIONATE 1 SPRAY: 50 SPRAY, METERED NASAL at 21:20

## 2017-04-16 RX ADMIN — ENOXAPARIN SODIUM 40 MG: 100 INJECTION SUBCUTANEOUS at 21:20

## 2017-04-16 RX ADMIN — GLIMEPIRIDE 4 MG: 2 TABLET ORAL at 16:57

## 2017-04-16 RX ADMIN — BACLOFEN 10 MG: 10 TABLET ORAL at 21:19

## 2017-04-16 RX ADMIN — ASPIRIN 81 MG CHEWABLE TABLET 81 MG: 81 TABLET CHEWABLE at 08:41

## 2017-04-16 RX ADMIN — BETHANECHOL CHLORIDE 10 MG: 10 TABLET ORAL at 08:41

## 2017-04-16 RX ADMIN — SITAGLIPTIN 100 MG: 25 TABLET, FILM COATED ORAL at 08:41

## 2017-04-16 RX ADMIN — INSULIN LISPRO 2 UNITS: 100 INJECTION, SOLUTION INTRAVENOUS; SUBCUTANEOUS at 21:21

## 2017-04-16 RX ADMIN — GUAIFENESIN 600 MG: 600 TABLET, EXTENDED RELEASE ORAL at 08:41

## 2017-04-16 RX ADMIN — MAGNESIUM HYDROXIDE 30 ML: 400 SUSPENSION ORAL at 21:20

## 2017-04-16 RX ADMIN — BETHANECHOL CHLORIDE 10 MG: 10 TABLET ORAL at 16:57

## 2017-04-16 RX ADMIN — BACLOFEN 10 MG: 10 TABLET ORAL at 12:00

## 2017-04-17 PROCEDURE — 74011250637 HC RX REV CODE- 250/637: Performed by: PHYSICAL MEDICINE & REHABILITATION

## 2017-04-17 PROCEDURE — 74011636637 HC RX REV CODE- 636/637: Performed by: PHYSICAL MEDICINE & REHABILITATION

## 2017-04-17 PROCEDURE — 74011250636 HC RX REV CODE- 250/636: Performed by: PHYSICAL MEDICINE & REHABILITATION

## 2017-04-17 RX ORDER — COLCHICINE 0.6 MG/1
0.6 TABLET ORAL
Status: DISCONTINUED | OUTPATIENT
Start: 2017-04-19 | End: 2017-04-20

## 2017-04-17 RX ORDER — LISINOPRIL 5 MG/1
2.5 TABLET ORAL
Status: DISCONTINUED | OUTPATIENT
Start: 2017-04-17 | End: 2017-04-22

## 2017-04-17 RX ORDER — COLCHICINE 0.6 MG/1
0.6 TABLET ORAL 2 TIMES DAILY
Status: COMPLETED | OUTPATIENT
Start: 2017-04-17 | End: 2017-04-19

## 2017-04-17 RX ORDER — COLCHICINE 0.6 MG/1
1.2 TABLET ORAL ONCE
Status: COMPLETED | OUTPATIENT
Start: 2017-04-17 | End: 2017-04-17

## 2017-04-17 RX ADMIN — GLIMEPIRIDE 3 MG: 1 TABLET ORAL at 08:43

## 2017-04-17 RX ADMIN — SITAGLIPTIN 100 MG: 25 TABLET, FILM COATED ORAL at 08:43

## 2017-04-17 RX ADMIN — GLIMEPIRIDE 4 MG: 2 TABLET ORAL at 16:30

## 2017-04-17 RX ADMIN — BETHANECHOL CHLORIDE 10 MG: 10 TABLET ORAL at 16:30

## 2017-04-17 RX ADMIN — INSULIN LISPRO 6 UNITS: 100 INJECTION, SOLUTION INTRAVENOUS; SUBCUTANEOUS at 22:15

## 2017-04-17 RX ADMIN — GABAPENTIN 600 MG: 300 CAPSULE ORAL at 08:44

## 2017-04-17 RX ADMIN — BETHANECHOL CHLORIDE 10 MG: 10 TABLET ORAL at 08:44

## 2017-04-17 RX ADMIN — LISINOPRIL 2.5 MG: 5 TABLET ORAL at 16:30

## 2017-04-17 RX ADMIN — BETHANECHOL CHLORIDE 10 MG: 10 TABLET ORAL at 12:31

## 2017-04-17 RX ADMIN — ASPIRIN 81 MG CHEWABLE TABLET 81 MG: 81 TABLET CHEWABLE at 08:43

## 2017-04-17 RX ADMIN — ACETAMINOPHEN 650 MG: 325 TABLET ORAL at 16:30

## 2017-04-17 RX ADMIN — GUAIFENESIN 600 MG: 600 TABLET, EXTENDED RELEASE ORAL at 20:32

## 2017-04-17 RX ADMIN — ENOXAPARIN SODIUM 40 MG: 100 INJECTION SUBCUTANEOUS at 20:30

## 2017-04-17 RX ADMIN — PRAVASTATIN SODIUM 40 MG: 20 TABLET ORAL at 20:32

## 2017-04-17 RX ADMIN — LORATADINE 10 MG: 10 TABLET ORAL at 08:44

## 2017-04-17 RX ADMIN — Medication 1 CAPSULE: at 20:31

## 2017-04-17 RX ADMIN — COLCHICINE 1.2 MG: 0.6 TABLET, FILM COATED ORAL at 13:37

## 2017-04-17 RX ADMIN — GUAIFENESIN 600 MG: 600 TABLET, EXTENDED RELEASE ORAL at 08:44

## 2017-04-17 RX ADMIN — GABAPENTIN 300 MG: 300 CAPSULE ORAL at 20:30

## 2017-04-17 RX ADMIN — ACETAMINOPHEN 650 MG: 325 TABLET ORAL at 12:31

## 2017-04-17 RX ADMIN — MAGNESIUM GLUCONATE 500 MG ORAL TABLET 400 MG: 500 TABLET ORAL at 08:44

## 2017-04-17 RX ADMIN — BACLOFEN 10 MG: 10 TABLET ORAL at 20:30

## 2017-04-17 RX ADMIN — COLCHICINE 0.6 MG: 0.6 TABLET, FILM COATED ORAL at 20:29

## 2017-04-17 RX ADMIN — TAMSULOSIN HYDROCHLORIDE 0.4 MG: 0.4 CAPSULE ORAL at 08:44

## 2017-04-17 RX ADMIN — BACLOFEN 10 MG: 10 TABLET ORAL at 05:41

## 2017-04-17 RX ADMIN — BACLOFEN 10 MG: 10 TABLET ORAL at 13:09

## 2017-04-17 RX ADMIN — ACETAMINOPHEN 650 MG: 325 TABLET ORAL at 20:29

## 2017-04-17 RX ADMIN — FLUTICASONE PROPIONATE 1 SPRAY: 50 SPRAY, METERED NASAL at 20:30

## 2017-04-18 PROCEDURE — 74011250637 HC RX REV CODE- 250/637: Performed by: PHYSICAL MEDICINE & REHABILITATION

## 2017-04-18 PROCEDURE — 74011250636 HC RX REV CODE- 250/636: Performed by: PHYSICAL MEDICINE & REHABILITATION

## 2017-04-18 PROCEDURE — 74011636637 HC RX REV CODE- 636/637: Performed by: PHYSICAL MEDICINE & REHABILITATION

## 2017-04-18 RX ADMIN — BACLOFEN 10 MG: 10 TABLET ORAL at 21:27

## 2017-04-18 RX ADMIN — Medication 1 CAPSULE: at 21:27

## 2017-04-18 RX ADMIN — INSULIN LISPRO 6 UNITS: 100 INJECTION, SOLUTION INTRAVENOUS; SUBCUTANEOUS at 21:36

## 2017-04-18 RX ADMIN — BACLOFEN 10 MG: 10 TABLET ORAL at 13:40

## 2017-04-18 RX ADMIN — BETHANECHOL CHLORIDE 10 MG: 10 TABLET ORAL at 11:57

## 2017-04-18 RX ADMIN — LISINOPRIL 2.5 MG: 5 TABLET ORAL at 16:50

## 2017-04-18 RX ADMIN — COLCHICINE 0.6 MG: 0.6 TABLET, FILM COATED ORAL at 08:35

## 2017-04-18 RX ADMIN — ENOXAPARIN SODIUM 40 MG: 100 INJECTION SUBCUTANEOUS at 21:27

## 2017-04-18 RX ADMIN — LORATADINE 10 MG: 10 TABLET ORAL at 08:36

## 2017-04-18 RX ADMIN — GLIMEPIRIDE 4 MG: 2 TABLET ORAL at 16:49

## 2017-04-18 RX ADMIN — FLUTICASONE PROPIONATE 1 SPRAY: 50 SPRAY, METERED NASAL at 21:41

## 2017-04-18 RX ADMIN — TAMSULOSIN HYDROCHLORIDE 0.4 MG: 0.4 CAPSULE ORAL at 08:36

## 2017-04-18 RX ADMIN — ACETAMINOPHEN 650 MG: 325 TABLET ORAL at 14:36

## 2017-04-18 RX ADMIN — PRAVASTATIN SODIUM 40 MG: 20 TABLET ORAL at 21:27

## 2017-04-18 RX ADMIN — COLCHICINE 0.6 MG: 0.6 TABLET, FILM COATED ORAL at 21:41

## 2017-04-18 RX ADMIN — BETHANECHOL CHLORIDE 10 MG: 10 TABLET ORAL at 08:36

## 2017-04-18 RX ADMIN — GABAPENTIN 600 MG: 300 CAPSULE ORAL at 08:36

## 2017-04-18 RX ADMIN — MAGNESIUM GLUCONATE 500 MG ORAL TABLET 400 MG: 500 TABLET ORAL at 08:35

## 2017-04-18 RX ADMIN — GLIMEPIRIDE 3 MG: 1 TABLET ORAL at 08:35

## 2017-04-18 RX ADMIN — BETHANECHOL CHLORIDE 10 MG: 10 TABLET ORAL at 16:49

## 2017-04-18 RX ADMIN — GABAPENTIN 300 MG: 300 CAPSULE ORAL at 21:27

## 2017-04-18 RX ADMIN — BACLOFEN 10 MG: 10 TABLET ORAL at 05:07

## 2017-04-18 RX ADMIN — SITAGLIPTIN 100 MG: 25 TABLET, FILM COATED ORAL at 08:35

## 2017-04-18 RX ADMIN — ASPIRIN 81 MG CHEWABLE TABLET 81 MG: 81 TABLET CHEWABLE at 08:36

## 2017-04-18 RX ADMIN — ACETAMINOPHEN 650 MG: 325 TABLET ORAL at 08:34

## 2017-04-19 ENCOUNTER — PATIENT OUTREACH (OUTPATIENT)
Dept: FAMILY MEDICINE CLINIC | Age: 67
End: 2017-04-19

## 2017-04-19 PROCEDURE — 74011250636 HC RX REV CODE- 250/636: Performed by: PHYSICAL MEDICINE & REHABILITATION

## 2017-04-19 PROCEDURE — 74011250637 HC RX REV CODE- 250/637: Performed by: PHYSICAL MEDICINE & REHABILITATION

## 2017-04-19 PROCEDURE — 74011636637 HC RX REV CODE- 636/637: Performed by: PHYSICAL MEDICINE & REHABILITATION

## 2017-04-19 RX ADMIN — INSULIN LISPRO 8 UNITS: 100 INJECTION, SOLUTION INTRAVENOUS; SUBCUTANEOUS at 22:17

## 2017-04-19 RX ADMIN — ENOXAPARIN SODIUM 40 MG: 100 INJECTION SUBCUTANEOUS at 22:03

## 2017-04-19 RX ADMIN — GABAPENTIN 600 MG: 300 CAPSULE ORAL at 08:03

## 2017-04-19 RX ADMIN — SITAGLIPTIN 100 MG: 25 TABLET, FILM COATED ORAL at 08:04

## 2017-04-19 RX ADMIN — MAGNESIUM HYDROXIDE 30 ML: 400 SUSPENSION ORAL at 12:08

## 2017-04-19 RX ADMIN — ACETAMINOPHEN 650 MG: 325 TABLET ORAL at 22:16

## 2017-04-19 RX ADMIN — TAMSULOSIN HYDROCHLORIDE 0.4 MG: 0.4 CAPSULE ORAL at 08:05

## 2017-04-19 RX ADMIN — COLCHICINE 0.6 MG: 0.6 TABLET, FILM COATED ORAL at 08:03

## 2017-04-19 RX ADMIN — GLIMEPIRIDE 4 MG: 2 TABLET ORAL at 17:09

## 2017-04-19 RX ADMIN — BETHANECHOL CHLORIDE 10 MG: 10 TABLET ORAL at 17:09

## 2017-04-19 RX ADMIN — LORATADINE 10 MG: 10 TABLET ORAL at 08:03

## 2017-04-19 RX ADMIN — ASPIRIN 81 MG CHEWABLE TABLET 81 MG: 81 TABLET CHEWABLE at 08:03

## 2017-04-19 RX ADMIN — BACLOFEN 10 MG: 10 TABLET ORAL at 22:04

## 2017-04-19 RX ADMIN — FLUTICASONE PROPIONATE 1 SPRAY: 50 SPRAY, METERED NASAL at 22:16

## 2017-04-19 RX ADMIN — BACLOFEN 10 MG: 10 TABLET ORAL at 05:12

## 2017-04-19 RX ADMIN — BETHANECHOL CHLORIDE 10 MG: 10 TABLET ORAL at 08:03

## 2017-04-19 RX ADMIN — BETHANECHOL CHLORIDE 10 MG: 10 TABLET ORAL at 12:08

## 2017-04-19 RX ADMIN — GLIMEPIRIDE 3 MG: 1 TABLET ORAL at 08:04

## 2017-04-19 RX ADMIN — LISINOPRIL 2.5 MG: 5 TABLET ORAL at 17:09

## 2017-04-19 RX ADMIN — INSULIN LISPRO 2 UNITS: 100 INJECTION, SOLUTION INTRAVENOUS; SUBCUTANEOUS at 17:10

## 2017-04-19 RX ADMIN — ACETAMINOPHEN 650 MG: 325 TABLET ORAL at 08:04

## 2017-04-19 RX ADMIN — MAGNESIUM GLUCONATE 500 MG ORAL TABLET 400 MG: 500 TABLET ORAL at 08:04

## 2017-04-19 RX ADMIN — PRAVASTATIN SODIUM 40 MG: 20 TABLET ORAL at 22:04

## 2017-04-19 RX ADMIN — GABAPENTIN 300 MG: 300 CAPSULE ORAL at 22:04

## 2017-04-19 RX ADMIN — BACLOFEN 10 MG: 10 TABLET ORAL at 13:23

## 2017-04-19 NOTE — PROGRESS NOTES
[address]  [phone]  Fax [phone]    Patient Name: Shabbir Lau  YOB: 1950/17-Chart review; patient remains inpatient at [place] [address].

## 2017-04-20 LAB
ANION GAP BLD CALC-SCNC: 10 MMOL/L (ref 5–15)
BUN SERPL-MCNC: 15 MG/DL (ref 6–20)
BUN/CREAT SERPL: 31 (ref 12–20)
CALCIUM SERPL-MCNC: 8.7 MG/DL (ref 8.5–10.1)
CHLORIDE SERPL-SCNC: 109 MMOL/L (ref 97–108)
CO2 SERPL-SCNC: 25 MMOL/L (ref 21–32)
CREAT SERPL-MCNC: 0.48 MG/DL (ref 0.55–1.02)
GLUCOSE SERPL-MCNC: 102 MG/DL (ref 65–100)
POTASSIUM SERPL-SCNC: 4.3 MMOL/L (ref 3.5–5.1)
SODIUM SERPL-SCNC: 144 MMOL/L (ref 136–145)

## 2017-04-20 PROCEDURE — 74011636637 HC RX REV CODE- 636/637: Performed by: PHYSICAL MEDICINE & REHABILITATION

## 2017-04-20 PROCEDURE — 36415 COLL VENOUS BLD VENIPUNCTURE: CPT | Performed by: PHYSICAL MEDICINE & REHABILITATION

## 2017-04-20 PROCEDURE — 74011250637 HC RX REV CODE- 250/637: Performed by: PHYSICAL MEDICINE & REHABILITATION

## 2017-04-20 PROCEDURE — 74011250636 HC RX REV CODE- 250/636: Performed by: PHYSICAL MEDICINE & REHABILITATION

## 2017-04-20 PROCEDURE — 80048 BASIC METABOLIC PNL TOTAL CA: CPT | Performed by: PHYSICAL MEDICINE & REHABILITATION

## 2017-04-20 RX ORDER — METHYLPREDNISOLONE 4 MG/1
4 TABLET ORAL ONCE
Status: COMPLETED | OUTPATIENT
Start: 2017-04-20 | End: 2017-04-20

## 2017-04-20 RX ORDER — METHYLPREDNISOLONE 4 MG/1
8 TABLET ORAL ONCE
Status: COMPLETED | OUTPATIENT
Start: 2017-04-20 | End: 2017-04-20

## 2017-04-20 RX ORDER — METHYLPREDNISOLONE 4 MG/1
4 TABLET ORAL
Status: COMPLETED | OUTPATIENT
Start: 2017-04-25 | End: 2017-04-25

## 2017-04-20 RX ORDER — METHYLPREDNISOLONE 4 MG/1
4 TABLET ORAL
Status: DISPENSED | OUTPATIENT
Start: 2017-04-22 | End: 2017-04-23

## 2017-04-20 RX ORDER — METHYLPREDNISOLONE 4 MG/1
4 TABLET ORAL 3 TIMES DAILY
Status: COMPLETED | OUTPATIENT
Start: 2017-04-23 | End: 2017-04-23

## 2017-04-20 RX ORDER — METHYLPREDNISOLONE 4 MG/1
8 TABLET ORAL ONCE
Status: COMPLETED | OUTPATIENT
Start: 2017-04-21 | End: 2017-04-21

## 2017-04-20 RX ORDER — FACIAL-BODY WIPES
10 EACH TOPICAL ONCE
Status: COMPLETED | OUTPATIENT
Start: 2017-04-20 | End: 2017-04-20

## 2017-04-20 RX ORDER — METHYLPREDNISOLONE 4 MG/1
4 TABLET ORAL
Status: COMPLETED | OUTPATIENT
Start: 2017-04-21 | End: 2017-04-21

## 2017-04-20 RX ORDER — METHYLPREDNISOLONE 4 MG/1
4 TABLET ORAL 2 TIMES DAILY
Status: COMPLETED | OUTPATIENT
Start: 2017-04-24 | End: 2017-04-24

## 2017-04-20 RX ADMIN — INSULIN LISPRO 2 UNITS: 100 INJECTION, SOLUTION INTRAVENOUS; SUBCUTANEOUS at 17:06

## 2017-04-20 RX ADMIN — METHYLPREDNISOLONE 4 MG: 4 TABLET ORAL at 17:06

## 2017-04-20 RX ADMIN — METHYLPREDNISOLONE 8 MG: 4 TABLET ORAL at 12:23

## 2017-04-20 RX ADMIN — ACETAMINOPHEN 650 MG: 325 TABLET ORAL at 09:56

## 2017-04-20 RX ADMIN — MAGNESIUM GLUCONATE 500 MG ORAL TABLET 400 MG: 500 TABLET ORAL at 09:56

## 2017-04-20 RX ADMIN — INSULIN LISPRO 2 UNITS: 100 INJECTION, SOLUTION INTRAVENOUS; SUBCUTANEOUS at 12:24

## 2017-04-20 RX ADMIN — BACLOFEN 10 MG: 10 TABLET ORAL at 05:20

## 2017-04-20 RX ADMIN — METHYLPREDNISOLONE 4 MG: 4 TABLET ORAL at 12:24

## 2017-04-20 RX ADMIN — BACLOFEN 10 MG: 10 TABLET ORAL at 21:40

## 2017-04-20 RX ADMIN — BISACODYL 10 MG: 10 SUPPOSITORY RECTAL at 17:50

## 2017-04-20 RX ADMIN — GUAIFENESIN 600 MG: 600 TABLET, EXTENDED RELEASE ORAL at 21:46

## 2017-04-20 RX ADMIN — FLUTICASONE PROPIONATE 1 SPRAY: 50 SPRAY, METERED NASAL at 21:42

## 2017-04-20 RX ADMIN — LORATADINE 10 MG: 10 TABLET ORAL at 09:55

## 2017-04-20 RX ADMIN — PRAVASTATIN SODIUM 40 MG: 20 TABLET ORAL at 21:46

## 2017-04-20 RX ADMIN — SITAGLIPTIN 100 MG: 25 TABLET, FILM COATED ORAL at 09:55

## 2017-04-20 RX ADMIN — ACETAMINOPHEN 650 MG: 325 TABLET ORAL at 21:46

## 2017-04-20 RX ADMIN — GABAPENTIN 600 MG: 300 CAPSULE ORAL at 09:55

## 2017-04-20 RX ADMIN — GABAPENTIN 300 MG: 300 CAPSULE ORAL at 21:42

## 2017-04-20 RX ADMIN — INSULIN LISPRO 10 UNITS: 100 INJECTION, SOLUTION INTRAVENOUS; SUBCUTANEOUS at 21:42

## 2017-04-20 RX ADMIN — GLIMEPIRIDE 3 MG: 1 TABLET ORAL at 09:55

## 2017-04-20 RX ADMIN — METHYLPREDNISOLONE 8 MG: 4 TABLET ORAL at 21:45

## 2017-04-20 RX ADMIN — TAMSULOSIN HYDROCHLORIDE 0.4 MG: 0.4 CAPSULE ORAL at 09:56

## 2017-04-20 RX ADMIN — BETHANECHOL CHLORIDE 10 MG: 10 TABLET ORAL at 12:24

## 2017-04-20 RX ADMIN — ASPIRIN 81 MG CHEWABLE TABLET 81 MG: 81 TABLET CHEWABLE at 09:55

## 2017-04-20 RX ADMIN — LISINOPRIL 2.5 MG: 5 TABLET ORAL at 17:05

## 2017-04-20 RX ADMIN — COLCHICINE 0.6 MG: 0.6 TABLET, FILM COATED ORAL at 01:51

## 2017-04-20 RX ADMIN — GUAIFENESIN 600 MG: 600 TABLET, EXTENDED RELEASE ORAL at 10:02

## 2017-04-20 RX ADMIN — BACLOFEN 10 MG: 10 TABLET ORAL at 12:25

## 2017-04-20 RX ADMIN — BETHANECHOL CHLORIDE 10 MG: 10 TABLET ORAL at 09:56

## 2017-04-20 RX ADMIN — BETHANECHOL CHLORIDE 10 MG: 10 TABLET ORAL at 01:00

## 2017-04-20 RX ADMIN — GLIMEPIRIDE 4 MG: 2 TABLET ORAL at 17:06

## 2017-04-20 RX ADMIN — ENOXAPARIN SODIUM 40 MG: 100 INJECTION SUBCUTANEOUS at 21:40

## 2017-04-21 PROCEDURE — 74011250636 HC RX REV CODE- 250/636: Performed by: PHYSICAL MEDICINE & REHABILITATION

## 2017-04-21 PROCEDURE — 74011250637 HC RX REV CODE- 250/637: Performed by: PHYSICAL MEDICINE & REHABILITATION

## 2017-04-21 PROCEDURE — 74011636637 HC RX REV CODE- 636/637: Performed by: PHYSICAL MEDICINE & REHABILITATION

## 2017-04-21 RX ORDER — HYDRALAZINE HYDROCHLORIDE 25 MG/1
25 TABLET, FILM COATED ORAL
Status: DISCONTINUED | OUTPATIENT
Start: 2017-04-21 | End: 2017-04-28 | Stop reason: HOSPADM

## 2017-04-21 RX ORDER — DOCUSATE SODIUM 100 MG/1
100 CAPSULE, LIQUID FILLED ORAL DAILY
Status: DISCONTINUED | OUTPATIENT
Start: 2017-04-22 | End: 2017-04-28 | Stop reason: HOSPADM

## 2017-04-21 RX ADMIN — TAMSULOSIN HYDROCHLORIDE 0.4 MG: 0.4 CAPSULE ORAL at 08:35

## 2017-04-21 RX ADMIN — BACLOFEN 10 MG: 10 TABLET ORAL at 12:24

## 2017-04-21 RX ADMIN — LORATADINE 10 MG: 10 TABLET ORAL at 08:35

## 2017-04-21 RX ADMIN — GUAIFENESIN 600 MG: 600 TABLET, EXTENDED RELEASE ORAL at 09:30

## 2017-04-21 RX ADMIN — INSULIN LISPRO 10 UNITS: 100 INJECTION, SOLUTION INTRAVENOUS; SUBCUTANEOUS at 21:48

## 2017-04-21 RX ADMIN — BACLOFEN 10 MG: 10 TABLET ORAL at 05:33

## 2017-04-21 RX ADMIN — GABAPENTIN 300 MG: 300 CAPSULE ORAL at 21:33

## 2017-04-21 RX ADMIN — GUAIFENESIN 600 MG: 600 TABLET, EXTENDED RELEASE ORAL at 21:35

## 2017-04-21 RX ADMIN — METHYLPREDNISOLONE 4 MG: 4 TABLET ORAL at 17:16

## 2017-04-21 RX ADMIN — GABAPENTIN 600 MG: 300 CAPSULE ORAL at 08:35

## 2017-04-21 RX ADMIN — PRAVASTATIN SODIUM 40 MG: 20 TABLET ORAL at 21:34

## 2017-04-21 RX ADMIN — METHYLPREDNISOLONE 4 MG: 4 TABLET ORAL at 12:24

## 2017-04-21 RX ADMIN — ENOXAPARIN SODIUM 40 MG: 100 INJECTION SUBCUTANEOUS at 21:36

## 2017-04-21 RX ADMIN — MAGNESIUM GLUCONATE 500 MG ORAL TABLET 400 MG: 500 TABLET ORAL at 08:35

## 2017-04-21 RX ADMIN — GLIMEPIRIDE 3 MG: 1 TABLET ORAL at 08:35

## 2017-04-21 RX ADMIN — BETHANECHOL CHLORIDE 10 MG: 10 TABLET ORAL at 08:35

## 2017-04-21 RX ADMIN — GLIMEPIRIDE 4 MG: 2 TABLET ORAL at 17:16

## 2017-04-21 RX ADMIN — BACLOFEN 10 MG: 10 TABLET ORAL at 21:34

## 2017-04-21 RX ADMIN — SITAGLIPTIN 100 MG: 25 TABLET, FILM COATED ORAL at 08:35

## 2017-04-21 RX ADMIN — METHYLPREDNISOLONE 8 MG: 4 TABLET ORAL at 21:35

## 2017-04-21 RX ADMIN — INSULIN LISPRO 2 UNITS: 100 INJECTION, SOLUTION INTRAVENOUS; SUBCUTANEOUS at 08:35

## 2017-04-21 RX ADMIN — ASPIRIN 81 MG CHEWABLE TABLET 81 MG: 81 TABLET CHEWABLE at 08:35

## 2017-04-21 RX ADMIN — ACETAMINOPHEN 650 MG: 325 TABLET ORAL at 12:25

## 2017-04-21 RX ADMIN — METHYLPREDNISOLONE 4 MG: 4 TABLET ORAL at 08:35

## 2017-04-21 RX ADMIN — FLUTICASONE PROPIONATE 1 SPRAY: 50 SPRAY, METERED NASAL at 21:38

## 2017-04-21 RX ADMIN — BETHANECHOL CHLORIDE 10 MG: 10 TABLET ORAL at 17:16

## 2017-04-21 RX ADMIN — BETHANECHOL CHLORIDE 10 MG: 10 TABLET ORAL at 12:24

## 2017-04-21 RX ADMIN — LISINOPRIL 2.5 MG: 5 TABLET ORAL at 17:16

## 2017-04-22 PROCEDURE — 74011250636 HC RX REV CODE- 250/636: Performed by: PHYSICAL MEDICINE & REHABILITATION

## 2017-04-22 PROCEDURE — 74011250637 HC RX REV CODE- 250/637: Performed by: PHYSICAL MEDICINE & REHABILITATION

## 2017-04-22 PROCEDURE — 74011636637 HC RX REV CODE- 636/637: Performed by: PHYSICAL MEDICINE & REHABILITATION

## 2017-04-22 RX ORDER — LISINOPRIL 5 MG/1
5 TABLET ORAL
Status: DISCONTINUED | OUTPATIENT
Start: 2017-04-22 | End: 2017-04-25

## 2017-04-22 RX ORDER — BETHANECHOL CHLORIDE 10 MG/1
5 TABLET ORAL DAILY
Status: DISCONTINUED | OUTPATIENT
Start: 2017-04-23 | End: 2017-04-22

## 2017-04-22 RX ORDER — BETHANECHOL CHLORIDE 10 MG/1
10 TABLET ORAL
Status: DISCONTINUED | OUTPATIENT
Start: 2017-04-22 | End: 2017-04-22

## 2017-04-22 RX ORDER — BETHANECHOL CHLORIDE 10 MG/1
5 TABLET ORAL 2 TIMES DAILY
Status: DISCONTINUED | OUTPATIENT
Start: 2017-04-23 | End: 2017-04-28 | Stop reason: HOSPADM

## 2017-04-22 RX ADMIN — GABAPENTIN 300 MG: 300 CAPSULE ORAL at 21:40

## 2017-04-22 RX ADMIN — METHYLPREDNISOLONE 4 MG: 4 TABLET ORAL at 21:40

## 2017-04-22 RX ADMIN — TAMSULOSIN HYDROCHLORIDE 0.4 MG: 0.4 CAPSULE ORAL at 08:53

## 2017-04-22 RX ADMIN — INSULIN LISPRO 8 UNITS: 100 INJECTION, SOLUTION INTRAVENOUS; SUBCUTANEOUS at 21:40

## 2017-04-22 RX ADMIN — SITAGLIPTIN 100 MG: 25 TABLET, FILM COATED ORAL at 08:52

## 2017-04-22 RX ADMIN — BETHANECHOL CHLORIDE 10 MG: 10 TABLET ORAL at 08:52

## 2017-04-22 RX ADMIN — GLIMEPIRIDE 4 MG: 2 TABLET ORAL at 16:50

## 2017-04-22 RX ADMIN — BETHANECHOL CHLORIDE 10 MG: 10 TABLET ORAL at 12:05

## 2017-04-22 RX ADMIN — LORATADINE 10 MG: 10 TABLET ORAL at 08:53

## 2017-04-22 RX ADMIN — PRAVASTATIN SODIUM 40 MG: 20 TABLET ORAL at 21:40

## 2017-04-22 RX ADMIN — GUAIFENESIN 600 MG: 600 TABLET, EXTENDED RELEASE ORAL at 09:02

## 2017-04-22 RX ADMIN — INSULIN LISPRO 2 UNITS: 100 INJECTION, SOLUTION INTRAVENOUS; SUBCUTANEOUS at 16:51

## 2017-04-22 RX ADMIN — BACLOFEN 10 MG: 10 TABLET ORAL at 21:40

## 2017-04-22 RX ADMIN — METHYLPREDNISOLONE 4 MG: 4 TABLET ORAL at 16:50

## 2017-04-22 RX ADMIN — GLIMEPIRIDE 3 MG: 1 TABLET ORAL at 08:53

## 2017-04-22 RX ADMIN — FLUTICASONE PROPIONATE 1 SPRAY: 50 SPRAY, METERED NASAL at 21:41

## 2017-04-22 RX ADMIN — METHYLPREDNISOLONE 4 MG: 4 TABLET ORAL at 12:05

## 2017-04-22 RX ADMIN — GABAPENTIN 600 MG: 300 CAPSULE ORAL at 08:52

## 2017-04-22 RX ADMIN — LISINOPRIL 5 MG: 5 TABLET ORAL at 16:49

## 2017-04-22 RX ADMIN — ASPIRIN 81 MG CHEWABLE TABLET 81 MG: 81 TABLET CHEWABLE at 08:52

## 2017-04-22 RX ADMIN — INSULIN LISPRO 2 UNITS: 100 INJECTION, SOLUTION INTRAVENOUS; SUBCUTANEOUS at 12:04

## 2017-04-22 RX ADMIN — ENOXAPARIN SODIUM 40 MG: 100 INJECTION SUBCUTANEOUS at 21:39

## 2017-04-22 RX ADMIN — DOCUSATE SODIUM 100 MG: 100 CAPSULE ORAL at 13:12

## 2017-04-22 RX ADMIN — BACLOFEN 10 MG: 10 TABLET ORAL at 13:12

## 2017-04-22 RX ADMIN — METHYLPREDNISOLONE 4 MG: 4 TABLET ORAL at 08:53

## 2017-04-22 RX ADMIN — GUAIFENESIN 600 MG: 600 TABLET, EXTENDED RELEASE ORAL at 21:47

## 2017-04-22 RX ADMIN — BACLOFEN 10 MG: 10 TABLET ORAL at 05:40

## 2017-04-22 RX ADMIN — MAGNESIUM GLUCONATE 500 MG ORAL TABLET 400 MG: 500 TABLET ORAL at 08:53

## 2017-04-23 PROCEDURE — 74011250637 HC RX REV CODE- 250/637: Performed by: PHYSICAL MEDICINE & REHABILITATION

## 2017-04-23 PROCEDURE — 74011636637 HC RX REV CODE- 636/637: Performed by: PHYSICAL MEDICINE & REHABILITATION

## 2017-04-23 PROCEDURE — 74011250636 HC RX REV CODE- 250/636: Performed by: PHYSICAL MEDICINE & REHABILITATION

## 2017-04-23 RX ADMIN — ACETAMINOPHEN 650 MG: 325 TABLET ORAL at 21:38

## 2017-04-23 RX ADMIN — LISINOPRIL 5 MG: 5 TABLET ORAL at 17:40

## 2017-04-23 RX ADMIN — SITAGLIPTIN 100 MG: 25 TABLET, FILM COATED ORAL at 08:19

## 2017-04-23 RX ADMIN — GABAPENTIN 600 MG: 300 CAPSULE ORAL at 08:17

## 2017-04-23 RX ADMIN — BACLOFEN 10 MG: 10 TABLET ORAL at 05:21

## 2017-04-23 RX ADMIN — TAMSULOSIN HYDROCHLORIDE 0.4 MG: 0.4 CAPSULE ORAL at 08:19

## 2017-04-23 RX ADMIN — INSULIN LISPRO 8 UNITS: 100 INJECTION, SOLUTION INTRAVENOUS; SUBCUTANEOUS at 21:34

## 2017-04-23 RX ADMIN — ASPIRIN 81 MG CHEWABLE TABLET 81 MG: 81 TABLET CHEWABLE at 08:16

## 2017-04-23 RX ADMIN — MAGNESIUM GLUCONATE 500 MG ORAL TABLET 400 MG: 500 TABLET ORAL at 08:17

## 2017-04-23 RX ADMIN — METHYLPREDNISOLONE 4 MG: 4 TABLET ORAL at 13:40

## 2017-04-23 RX ADMIN — METHYLPREDNISOLONE 4 MG: 4 TABLET ORAL at 08:20

## 2017-04-23 RX ADMIN — BETHANECHOL CHLORIDE 5 MG: 10 TABLET ORAL at 13:35

## 2017-04-23 RX ADMIN — GLIMEPIRIDE 4 MG: 2 TABLET ORAL at 17:40

## 2017-04-23 RX ADMIN — LORATADINE 10 MG: 10 TABLET ORAL at 08:17

## 2017-04-23 RX ADMIN — GABAPENTIN 300 MG: 300 CAPSULE ORAL at 21:32

## 2017-04-23 RX ADMIN — FLUTICASONE PROPIONATE 1 SPRAY: 50 SPRAY, METERED NASAL at 21:35

## 2017-04-23 RX ADMIN — BACLOFEN 10 MG: 10 TABLET ORAL at 21:32

## 2017-04-23 RX ADMIN — ENOXAPARIN SODIUM 40 MG: 100 INJECTION SUBCUTANEOUS at 21:34

## 2017-04-23 RX ADMIN — GLIMEPIRIDE 3 MG: 1 TABLET ORAL at 08:17

## 2017-04-23 RX ADMIN — METHYLPREDNISOLONE 4 MG: 4 TABLET ORAL at 21:34

## 2017-04-23 RX ADMIN — BACLOFEN 10 MG: 10 TABLET ORAL at 13:35

## 2017-04-23 RX ADMIN — MAGNESIUM HYDROXIDE 30 ML: 400 SUSPENSION ORAL at 05:22

## 2017-04-23 RX ADMIN — DOCUSATE SODIUM 100 MG: 100 CAPSULE ORAL at 08:17

## 2017-04-23 RX ADMIN — BETHANECHOL CHLORIDE 5 MG: 10 TABLET ORAL at 05:22

## 2017-04-23 RX ADMIN — MAGNESIUM HYDROXIDE 30 ML: 400 SUSPENSION ORAL at 21:38

## 2017-04-23 RX ADMIN — PRAVASTATIN SODIUM 40 MG: 20 TABLET ORAL at 21:32

## 2017-04-24 PROCEDURE — 74011250637 HC RX REV CODE- 250/637: Performed by: PHYSICAL MEDICINE & REHABILITATION

## 2017-04-24 PROCEDURE — 74011250636 HC RX REV CODE- 250/636: Performed by: PHYSICAL MEDICINE & REHABILITATION

## 2017-04-24 PROCEDURE — 74011636637 HC RX REV CODE- 636/637: Performed by: PHYSICAL MEDICINE & REHABILITATION

## 2017-04-24 RX ADMIN — GUAIFENESIN 600 MG: 600 TABLET, EXTENDED RELEASE ORAL at 08:32

## 2017-04-24 RX ADMIN — LISINOPRIL 5 MG: 5 TABLET ORAL at 16:44

## 2017-04-24 RX ADMIN — GABAPENTIN 300 MG: 300 CAPSULE ORAL at 22:06

## 2017-04-24 RX ADMIN — BETHANECHOL CHLORIDE 5 MG: 10 TABLET ORAL at 05:44

## 2017-04-24 RX ADMIN — DOCUSATE SODIUM 100 MG: 100 CAPSULE ORAL at 08:26

## 2017-04-24 RX ADMIN — GUAIFENESIN 600 MG: 600 TABLET, EXTENDED RELEASE ORAL at 22:06

## 2017-04-24 RX ADMIN — METHYLPREDNISOLONE 4 MG: 4 TABLET ORAL at 22:06

## 2017-04-24 RX ADMIN — SITAGLIPTIN 100 MG: 25 TABLET, FILM COATED ORAL at 08:27

## 2017-04-24 RX ADMIN — INSULIN LISPRO 2 UNITS: 100 INJECTION, SOLUTION INTRAVENOUS; SUBCUTANEOUS at 21:58

## 2017-04-24 RX ADMIN — GLIMEPIRIDE 3 MG: 1 TABLET ORAL at 08:25

## 2017-04-24 RX ADMIN — PRAVASTATIN SODIUM 40 MG: 20 TABLET ORAL at 22:06

## 2017-04-24 RX ADMIN — TAMSULOSIN HYDROCHLORIDE 0.4 MG: 0.4 CAPSULE ORAL at 08:25

## 2017-04-24 RX ADMIN — GABAPENTIN 600 MG: 300 CAPSULE ORAL at 08:26

## 2017-04-24 RX ADMIN — FLUTICASONE PROPIONATE 1 SPRAY: 50 SPRAY, METERED NASAL at 22:05

## 2017-04-24 RX ADMIN — BACLOFEN 10 MG: 10 TABLET ORAL at 05:44

## 2017-04-24 RX ADMIN — BACLOFEN 10 MG: 10 TABLET ORAL at 22:06

## 2017-04-24 RX ADMIN — BACLOFEN 10 MG: 10 TABLET ORAL at 13:15

## 2017-04-24 RX ADMIN — BETHANECHOL CHLORIDE 5 MG: 10 TABLET ORAL at 13:15

## 2017-04-24 RX ADMIN — METHYLPREDNISOLONE 4 MG: 4 TABLET ORAL at 08:27

## 2017-04-24 RX ADMIN — MAGNESIUM GLUCONATE 500 MG ORAL TABLET 400 MG: 500 TABLET ORAL at 08:25

## 2017-04-24 RX ADMIN — GLIMEPIRIDE 4 MG: 2 TABLET ORAL at 16:44

## 2017-04-24 RX ADMIN — LORATADINE 10 MG: 10 TABLET ORAL at 08:25

## 2017-04-24 RX ADMIN — ASPIRIN 81 MG CHEWABLE TABLET 81 MG: 81 TABLET CHEWABLE at 08:26

## 2017-04-24 RX ADMIN — ENOXAPARIN SODIUM 40 MG: 100 INJECTION SUBCUTANEOUS at 22:05

## 2017-04-25 PROCEDURE — 74011250637 HC RX REV CODE- 250/637: Performed by: PHYSICAL MEDICINE & REHABILITATION

## 2017-04-25 PROCEDURE — 74011636637 HC RX REV CODE- 636/637: Performed by: PHYSICAL MEDICINE & REHABILITATION

## 2017-04-25 PROCEDURE — 74011250636 HC RX REV CODE- 250/636: Performed by: PHYSICAL MEDICINE & REHABILITATION

## 2017-04-25 RX ORDER — LISINOPRIL 5 MG/1
2.5 TABLET ORAL
Status: DISCONTINUED | OUTPATIENT
Start: 2017-04-25 | End: 2017-04-28 | Stop reason: HOSPADM

## 2017-04-25 RX ADMIN — TAMSULOSIN HYDROCHLORIDE 0.4 MG: 0.4 CAPSULE ORAL at 08:48

## 2017-04-25 RX ADMIN — ASPIRIN 81 MG CHEWABLE TABLET 81 MG: 81 TABLET CHEWABLE at 08:48

## 2017-04-25 RX ADMIN — BETHANECHOL CHLORIDE 5 MG: 10 TABLET ORAL at 14:00

## 2017-04-25 RX ADMIN — INSULIN LISPRO 2 UNITS: 100 INJECTION, SOLUTION INTRAVENOUS; SUBCUTANEOUS at 17:26

## 2017-04-25 RX ADMIN — BACLOFEN 10 MG: 10 TABLET ORAL at 14:00

## 2017-04-25 RX ADMIN — MAGNESIUM GLUCONATE 500 MG ORAL TABLET 400 MG: 500 TABLET ORAL at 08:48

## 2017-04-25 RX ADMIN — BETHANECHOL CHLORIDE 5 MG: 10 TABLET ORAL at 05:43

## 2017-04-25 RX ADMIN — BACLOFEN 10 MG: 10 TABLET ORAL at 05:43

## 2017-04-25 RX ADMIN — PRAVASTATIN SODIUM 40 MG: 20 TABLET ORAL at 21:47

## 2017-04-25 RX ADMIN — ENOXAPARIN SODIUM 40 MG: 100 INJECTION SUBCUTANEOUS at 21:48

## 2017-04-25 RX ADMIN — GABAPENTIN 300 MG: 300 CAPSULE ORAL at 21:47

## 2017-04-25 RX ADMIN — ACETAMINOPHEN 650 MG: 325 TABLET ORAL at 12:25

## 2017-04-25 RX ADMIN — METHYLPREDNISOLONE 4 MG: 4 TABLET ORAL at 08:48

## 2017-04-25 RX ADMIN — LISINOPRIL 2.5 MG: 5 TABLET ORAL at 21:45

## 2017-04-25 RX ADMIN — ACETAMINOPHEN 650 MG: 325 TABLET ORAL at 08:48

## 2017-04-25 RX ADMIN — ACETAMINOPHEN 650 MG: 325 TABLET ORAL at 21:47

## 2017-04-25 RX ADMIN — DOCUSATE SODIUM 100 MG: 100 CAPSULE ORAL at 08:48

## 2017-04-25 RX ADMIN — GUAIFENESIN 600 MG: 600 TABLET, EXTENDED RELEASE ORAL at 21:59

## 2017-04-25 RX ADMIN — GLIMEPIRIDE 4 MG: 2 TABLET ORAL at 17:26

## 2017-04-25 RX ADMIN — LORATADINE 10 MG: 10 TABLET ORAL at 08:48

## 2017-04-25 RX ADMIN — INSULIN LISPRO 2 UNITS: 100 INJECTION, SOLUTION INTRAVENOUS; SUBCUTANEOUS at 12:20

## 2017-04-25 RX ADMIN — INSULIN LISPRO 2 UNITS: 100 INJECTION, SOLUTION INTRAVENOUS; SUBCUTANEOUS at 21:58

## 2017-04-25 RX ADMIN — SITAGLIPTIN 100 MG: 25 TABLET, FILM COATED ORAL at 08:48

## 2017-04-25 RX ADMIN — BACLOFEN 10 MG: 10 TABLET ORAL at 21:47

## 2017-04-25 RX ADMIN — FLUTICASONE PROPIONATE 1 SPRAY: 50 SPRAY, METERED NASAL at 21:48

## 2017-04-25 RX ADMIN — GUAIFENESIN 600 MG: 600 TABLET, EXTENDED RELEASE ORAL at 08:56

## 2017-04-25 RX ADMIN — GLIMEPIRIDE 3 MG: 1 TABLET ORAL at 08:48

## 2017-04-25 RX ADMIN — GABAPENTIN 600 MG: 300 CAPSULE ORAL at 08:48

## 2017-04-26 ENCOUNTER — PATIENT OUTREACH (OUTPATIENT)
Dept: FAMILY MEDICINE CLINIC | Age: 67
End: 2017-04-26

## 2017-04-26 VITALS
SYSTOLIC BLOOD PRESSURE: 136 MMHG | HEIGHT: 68 IN | HEART RATE: 67 BPM | BODY MASS INDEX: 25.81 KG/M2 | DIASTOLIC BLOOD PRESSURE: 76 MMHG | WEIGHT: 170.31 LBS

## 2017-04-26 PROCEDURE — 74011250636 HC RX REV CODE- 250/636: Performed by: PHYSICAL MEDICINE & REHABILITATION

## 2017-04-26 PROCEDURE — 74011636637 HC RX REV CODE- 636/637: Performed by: PHYSICAL MEDICINE & REHABILITATION

## 2017-04-26 PROCEDURE — 74011250637 HC RX REV CODE- 250/637: Performed by: PHYSICAL MEDICINE & REHABILITATION

## 2017-04-26 RX ADMIN — BACLOFEN 10 MG: 10 TABLET ORAL at 20:20

## 2017-04-26 RX ADMIN — GABAPENTIN 300 MG: 300 CAPSULE ORAL at 20:20

## 2017-04-26 RX ADMIN — GLIMEPIRIDE 3 MG: 1 TABLET ORAL at 09:28

## 2017-04-26 RX ADMIN — PRAVASTATIN SODIUM 40 MG: 20 TABLET ORAL at 20:20

## 2017-04-26 RX ADMIN — BETHANECHOL CHLORIDE 5 MG: 10 TABLET ORAL at 12:22

## 2017-04-26 RX ADMIN — BACLOFEN 10 MG: 10 TABLET ORAL at 12:21

## 2017-04-26 RX ADMIN — SITAGLIPTIN 100 MG: 25 TABLET, FILM COATED ORAL at 09:27

## 2017-04-26 RX ADMIN — LORATADINE 10 MG: 10 TABLET ORAL at 09:27

## 2017-04-26 RX ADMIN — INSULIN LISPRO 6 UNITS: 100 INJECTION, SOLUTION INTRAVENOUS; SUBCUTANEOUS at 20:58

## 2017-04-26 RX ADMIN — ENOXAPARIN SODIUM 40 MG: 100 INJECTION SUBCUTANEOUS at 20:20

## 2017-04-26 RX ADMIN — BETHANECHOL CHLORIDE 5 MG: 10 TABLET ORAL at 04:48

## 2017-04-26 RX ADMIN — ASPIRIN 81 MG CHEWABLE TABLET 81 MG: 81 TABLET CHEWABLE at 09:26

## 2017-04-26 RX ADMIN — BACLOFEN 10 MG: 10 TABLET ORAL at 04:48

## 2017-04-26 RX ADMIN — ACETAMINOPHEN 650 MG: 325 TABLET ORAL at 20:23

## 2017-04-26 RX ADMIN — GABAPENTIN 600 MG: 300 CAPSULE ORAL at 09:29

## 2017-04-26 RX ADMIN — DOCUSATE SODIUM 100 MG: 100 CAPSULE ORAL at 09:27

## 2017-04-26 RX ADMIN — TAMSULOSIN HYDROCHLORIDE 0.4 MG: 0.4 CAPSULE ORAL at 09:29

## 2017-04-26 RX ADMIN — GUAIFENESIN 600 MG: 600 TABLET, EXTENDED RELEASE ORAL at 10:29

## 2017-04-26 RX ADMIN — GLIMEPIRIDE 4 MG: 2 TABLET ORAL at 17:28

## 2017-04-26 RX ADMIN — FLUTICASONE PROPIONATE 1 SPRAY: 50 SPRAY, METERED NASAL at 20:19

## 2017-04-26 RX ADMIN — MAGNESIUM GLUCONATE 500 MG ORAL TABLET 400 MG: 500 TABLET ORAL at 09:26

## 2017-04-26 NOTE — PROGRESS NOTES
[address]  [phone]  Fax [phone]    Patient Name: Jaymie Razo  YOB: 1950    Chart check; Ms Renetta Reddy remains inpatient at [place] [address]. NN Plan: Will continue to monitor for discharge plans.

## 2017-04-27 PROCEDURE — 74011250637 HC RX REV CODE- 250/637: Performed by: PHYSICAL MEDICINE & REHABILITATION

## 2017-04-27 PROCEDURE — 74011250636 HC RX REV CODE- 250/636: Performed by: PHYSICAL MEDICINE & REHABILITATION

## 2017-04-27 PROCEDURE — 74011636637 HC RX REV CODE- 636/637: Performed by: PHYSICAL MEDICINE & REHABILITATION

## 2017-04-27 RX ADMIN — GUAIFENESIN 600 MG: 600 TABLET, EXTENDED RELEASE ORAL at 08:45

## 2017-04-27 RX ADMIN — INSULIN LISPRO 2 UNITS: 100 INJECTION, SOLUTION INTRAVENOUS; SUBCUTANEOUS at 18:25

## 2017-04-27 RX ADMIN — ENOXAPARIN SODIUM 40 MG: 100 INJECTION SUBCUTANEOUS at 22:16

## 2017-04-27 RX ADMIN — BACLOFEN 10 MG: 10 TABLET ORAL at 05:46

## 2017-04-27 RX ADMIN — FLUTICASONE PROPIONATE 1 SPRAY: 50 SPRAY, METERED NASAL at 22:20

## 2017-04-27 RX ADMIN — BACLOFEN 10 MG: 10 TABLET ORAL at 13:02

## 2017-04-27 RX ADMIN — SITAGLIPTIN 100 MG: 25 TABLET, FILM COATED ORAL at 08:41

## 2017-04-27 RX ADMIN — BACLOFEN 10 MG: 10 TABLET ORAL at 22:16

## 2017-04-27 RX ADMIN — GLIMEPIRIDE 4 MG: 2 TABLET ORAL at 18:24

## 2017-04-27 RX ADMIN — GLIMEPIRIDE 3 MG: 1 TABLET ORAL at 08:41

## 2017-04-27 RX ADMIN — GABAPENTIN 300 MG: 300 CAPSULE ORAL at 22:16

## 2017-04-27 RX ADMIN — PRAVASTATIN SODIUM 40 MG: 20 TABLET ORAL at 22:16

## 2017-04-27 RX ADMIN — ACETAMINOPHEN 650 MG: 325 TABLET ORAL at 08:40

## 2017-04-27 RX ADMIN — ACETAMINOPHEN 650 MG: 325 TABLET ORAL at 22:19

## 2017-04-27 RX ADMIN — BETHANECHOL CHLORIDE 5 MG: 10 TABLET ORAL at 06:30

## 2017-04-27 RX ADMIN — INSULIN LISPRO 4 UNITS: 100 INJECTION, SOLUTION INTRAVENOUS; SUBCUTANEOUS at 22:16

## 2017-04-27 RX ADMIN — ASPIRIN 81 MG CHEWABLE TABLET 81 MG: 81 TABLET CHEWABLE at 08:41

## 2017-04-27 RX ADMIN — GABAPENTIN 600 MG: 300 CAPSULE ORAL at 08:40

## 2017-04-27 RX ADMIN — TAMSULOSIN HYDROCHLORIDE 0.4 MG: 0.4 CAPSULE ORAL at 08:40

## 2017-04-27 RX ADMIN — INSULIN LISPRO 2 UNITS: 100 INJECTION, SOLUTION INTRAVENOUS; SUBCUTANEOUS at 12:57

## 2017-04-27 RX ADMIN — INSULIN LISPRO 2 UNITS: 100 INJECTION, SOLUTION INTRAVENOUS; SUBCUTANEOUS at 08:41

## 2017-04-27 RX ADMIN — MAGNESIUM GLUCONATE 500 MG ORAL TABLET 400 MG: 500 TABLET ORAL at 08:40

## 2017-04-27 RX ADMIN — LORATADINE 10 MG: 10 TABLET ORAL at 08:40

## 2017-04-27 RX ADMIN — BETHANECHOL CHLORIDE 5 MG: 10 TABLET ORAL at 13:02

## 2017-04-27 RX ADMIN — ACETAMINOPHEN 650 MG: 325 TABLET ORAL at 12:56

## 2017-04-27 RX ADMIN — DOCUSATE SODIUM 100 MG: 100 CAPSULE ORAL at 08:40

## 2017-04-28 PROCEDURE — 74011250637 HC RX REV CODE- 250/637: Performed by: PHYSICAL MEDICINE & REHABILITATION

## 2017-04-28 PROCEDURE — 74011636637 HC RX REV CODE- 636/637: Performed by: PHYSICAL MEDICINE & REHABILITATION

## 2017-04-28 RX ADMIN — INSULIN LISPRO 2 UNITS: 100 INJECTION, SOLUTION INTRAVENOUS; SUBCUTANEOUS at 08:53

## 2017-04-28 RX ADMIN — GLIMEPIRIDE 3 MG: 1 TABLET ORAL at 08:53

## 2017-04-28 RX ADMIN — ASPIRIN 81 MG CHEWABLE TABLET 81 MG: 81 TABLET CHEWABLE at 08:53

## 2017-04-28 RX ADMIN — GUAIFENESIN 600 MG: 600 TABLET, EXTENDED RELEASE ORAL at 08:53

## 2017-04-28 RX ADMIN — SITAGLIPTIN 100 MG: 25 TABLET, FILM COATED ORAL at 08:53

## 2017-04-28 RX ADMIN — BETHANECHOL CHLORIDE 5 MG: 10 TABLET ORAL at 06:14

## 2017-04-28 RX ADMIN — TAMSULOSIN HYDROCHLORIDE 0.4 MG: 0.4 CAPSULE ORAL at 08:53

## 2017-04-28 RX ADMIN — GABAPENTIN 600 MG: 300 CAPSULE ORAL at 08:53

## 2017-04-28 RX ADMIN — DOCUSATE SODIUM 100 MG: 100 CAPSULE ORAL at 08:53

## 2017-04-28 RX ADMIN — LORATADINE 10 MG: 10 TABLET ORAL at 08:53

## 2017-04-28 RX ADMIN — MAGNESIUM GLUCONATE 500 MG ORAL TABLET 400 MG: 500 TABLET ORAL at 08:53

## 2017-04-28 RX ADMIN — BACLOFEN 10 MG: 10 TABLET ORAL at 06:06

## 2017-05-01 ENCOUNTER — OFFICE VISIT (OUTPATIENT)
Dept: NEUROLOGY | Age: 67
End: 2017-05-01

## 2017-05-01 VITALS — BODY MASS INDEX: 25.76 KG/M2 | WEIGHT: 170 LBS | HEIGHT: 68 IN

## 2017-05-01 DIAGNOSIS — G37.3 MYELITIS, ACUTE TRANSVERSE (HCC): Primary | ICD-10-CM

## 2017-05-01 DIAGNOSIS — I65.23 BILATERAL CAROTID ARTERY OCCLUSION: Primary | ICD-10-CM

## 2017-05-01 DIAGNOSIS — G37.3 MYELITIS, ACUTE TRANSVERSE (HCC): ICD-10-CM

## 2017-05-01 NOTE — MR AVS SNAPSHOT
Visit Information Date & Time Provider Department Dept. Phone Encounter #  
 5/1/2017  3:00 PM Alejandra Sandra NP [facility] [phone] [id number] Follow-up Instructions Return if symptoms worsen or fail to improve. Your Appointments 5/1/2017  5:00 PM  
ULTRASOUND with DOPPLER Neurology [address]) Appt Note: routine cl [place] [address] [id number] [phone]  
  
   
 [facility]  
  
    
 5/8/2017  3:40 PM  
New Patient with Annmarie Real MD  
[facility] ([address]) Appt Note: ref by Dr. Bangura [facility]/ palpitations, murmur/office faxing over record and ref in CC 4/26 rafael [address] [facility] [address] [facility] [address]  
[phone]  
  
   
 [address] [facility] [address] [facility] [address]  
  
    
 5/12/2017 10:15 AM  
ESTABLISHED PATIENT with Patrice Espino DO [address] ([address]) Appt Note: [facility] [address] [address] [address] [phone] [address] [address]  
  
    
 6/9/2017  8:50 AM  
ESTABLISHED PATIENT with Pavel Mcdaniel MD  
[facility] ([address]) Appt Note: ae/M  
 [address]  
  
    
 6/21/2017  8:00 AM  
ESTABLISHED PATIENT with Patrice Srinivas DO [address] ([address]) Appt Note: 3 mo fuv, a1c ck  
 [address] [address] [phone] Upcoming Health Maintenance Date Due  
 EYE EXAM RETINAL OR DILATED Q1 2/18/1961 FOBT Q 1 YEAR AGE 50-75 2/18/2001 GLAUCOMA SCREENING Q2Y 2/18/2016 BREAST CANCER SCRN MAMMOGRAM 6/16/2017 Pneumococcal 65+ High/Highest Risk (2 of 2 - PPSV23) 5/16/2017 INFLUENZA AGE 9 TO ADULT 8/1/2017 HEMOGLOBIN A1C Q6M 10/1/2017 MICROALBUMIN Q1 11/3/2017 FOOT EXAM Q1 12/14/2017 MEDICARE YEARLY EXAM 3/22/2018 LIPID PANEL Q1 4/1/2018 DTaP/Tdap/Td series (2 - Td) 3/21/2027 Allergies as of 5/1/2017  Review Complete On: 5/1/2017 By: Rosario Lambert LPN Severity Noted Reaction Type Reactions Latex  06/09/2014    Other (comments) Patient states it burns around her mouth. Other Food  10/12/2016    Other (comments) Yogurt - stomach cramping Betadine [Povidone-iodine] High 11/15/2013    Itching Pt states this causes \"extreme\" itching Codeine High 11/15/2013    Anaphylaxis, Rash, Nausea Only, Other (comments) HEADACHE Tolerates tramadol Dilaudid [Hydromorphone (Bulk)] High 04/02/2014    Anaphylaxis, Itching, Nausea Only, Other (comments) HALLUCINATIONS Tolerates tramadol Hydrocodone High 11/15/2013    Anaphylaxis, Rash, Nausea Only, Vertigo Facial - eyelid swelling-had to go to ER for reaction, HALLUCINATIONS Tolerates tramadol Ditropan [Oxybutynin Chloride]  03/04/2014    Swelling Doxycycline  11/15/2013    Rash PUSTULAR RASH- TOLERATING TETRACYCLINE Keflex [Cephalexin]  11/15/2013    Nausea and Vomiting Pain in abdomen AND FLU-LIKE SX Metformin  11/15/2013    Nausea and Vomiting Severe abdominal pain Tape [Adhesive]  06/09/2014    Other (comments) Redness/inflammed. Can only use paper tape. CAN USE BAND-AIDS. TOLERATES SURGICAL TAPE USED IN [place]. Sulfamethoxazole-trimethoprim Low 02/28/2014    Other (comments) Cramping/flu-like symptoms Current Immunizations  Never Reviewed No immunizations on file. Not reviewed this visit You Were Diagnosed With   
  
 Codes Comments Myelitis, acute transverse ([facility] [address]    -  Primary ICD-10-CM: G37.3 ICD-9-CM: 341.20 Vitals Height(growth percentile) Weight(growth percentile) BMI OB Status Smoking Status 5' 8\" (1.727 m) 170 lb (77.1 kg) 25.85 kg/m2 Postmenopausal Never Smoker BMI and BSA Data Body Mass Index Body Surface Area  
 25.85 kg/m 2 1.92 m 2 Preferred Pharmacy Pharmacy Name Phone [facility] [address]  [facility] [phone] Your Updated Medication List  
  
   
This list is accurate as of: 5/1/17  4:25 PM.  Always use your most recent med list.  
  
  
  
  
 Alpha Lipoic Acid 600 mg Cap Take 1 Cap by mouth two (2) times a day. amLODIPine 5 mg tablet Commonly known as:  Silvia Medici Take 1 Tab by mouth nightly. Indications: hypertension  
  
 aspirin 81 mg chewable tablet Take 81 mg by mouth daily. baclofen 10 mg tablet Commonly known as:  LIORESAL Take 1 Tab by mouth three (3) times daily as needed. Indications: spasticity and cramping CINNAMON 500 mg Cap Generic drug:  cinnamon bark Take 2 Caps by mouth daily. CLARITIN 10 mg tablet Generic drug:  loratadine Take 10 mg by mouth nightly. CRANBERRY EXTRACT-VIT C PO Take 3 Caps by mouth daily. 4200 mg FISH OIL PO Take  by mouth. fludrocortisone 0.1 mg tablet Commonly known as:  FLORINEF Take 0.1 mg by mouth Daily (before breakfast). fluticasone 50 mcg/actuation nasal spray Commonly known as:  Deisy Earnest 2 Sprays by Both Nostrils route daily. * gabapentin 300 mg capsule Commonly known as:  NEURONTIN Take 600 mg by mouth daily. * gabapentin 300 mg capsule Commonly known as:  NEURONTIN Take 300 mg by mouth every evening. GARCINIA CAMBOGIA PO Take 2 Tabs by mouth daily. glimepiride 2 mg tablet Commonly known as:  AMARYL Take 4 mg by mouth daily (with dinner). INVOKANA 100 mg tablet Generic drug:  canagliflozin Take 1 tablet by mouth  daily before breakfast  
  
 L-Mfolate-B6 Phos-Methyl-B12 3-35-2 mg Tab tab Commonly known as:  Bernarda Kidd Take 1 Tab by mouth two (2) times a day. lisinopril 5 mg tablet Commonly known as:  Issac Mash Take 1 Tab by mouth daily. magnesium oxide 400 mg tablet Commonly known as:  MAG-OX Take 400 mg by mouth daily. pravastatin 40 mg tablet Commonly known as:  PRAVACHOL  
TAKE 1 TABLET NIGHTLY (NEEDS APPOINTMENT AS SOON AS POSSIBLE FOR FASTING LABS AND APPOINTMENT) SITagliptin 100 mg tablet Commonly known as:  Toni Yotock Take 100 mg by mouth Daily (before breakfast). TRUETEST TEST STRIPS strip Generic drug:  glucose blood VI test strips TEST THREE TIMES DAILY * Notice: This list has 2 medication(s) that are the same as other medications prescribed for you. Read the directions carefully, and ask your doctor or other care provider to review them with you. Follow-up Instructions Return if symptoms worsen or fail to improve. To-Do List   
 05/01/2017 Imaging:  DUPLEX CAROTID BILATERAL AMB NEURO Patient Instructions PRES[facility] RE[facility] POLICY [facility] Statement to Patients April 1, 2014 In an effort to ensure the large volume of patient prescription refills is processed in the most efficient and expeditious manner, we are asking our patients to assist us by calling your Pharmacy for all prescription refills, this will include also your  Mail Order Pharmacy. The pharmacy will contact our office electronically to continue the refill process. Please do not wait until the last minute to call your pharmacy. We need at least 48 hours (2days) to fill prescriptions. We also encourage you to call your pharmacy before going to  your prescription to make sure it is ready.   
 
With regard to controlled substance prescription refill requests (narcotic refills) that need to be picked up at our office, we ask your cooperation by providing us with at least 72 hours (3days) notice that you will need a refill. We will not refill narcotic prescription refill requests after 4:00pm on any weekday, Monday through Thursday, or after 2:00pm on Fridays, or on the weekends. We encourage everyone to explore another way of getting your prescription refill request processed using Ni[employer], our patient web portal through our electronic medical record system. Niupai is an efficient and effective way to communicate your medication request directly to the office and  downloadable as an raman on your smart phone . Ni[employer]ai also features a review functionality that allows you to view your medication list as well as leave messages for your physician. Are you ready to get connected? If so please review the attatched instructions or speak to any of our staff to get you set up right away! Thank you so much for your cooperation. Should you have any questions please contact our Practice Administrator. The Physicians and Staff,  [facility] [address] [address] What is a living will? A living will is a legal form you use to write down the kind of care you want at the end of your life. It is used by the health professionals who will treat you if you aren't able to decide for yourself. If you put your wishes in writing, your loved ones and others will know what kind of care you want. They won't need to guess. This can ease your mind and be helpful to others. A living will is not the same as an estate or property will. An estate will explains what you want to happen with your money and property after you die. Is a living will a legal document? A living will is a legal document. Each state has its own laws about living zhang. If you move to another state, make sure that your living will is legal in the state where you now live.  Or you might use a universal form that has been approved by many states. This kind of form can sometimes be completed and stored online. Your electronic copy will then be available wherever you have a connection to the Internet. In most cases, doctors will respect your wishes even if you have a form from a different state. · You don't need an  to complete a living will. But legal advice can be helpful if your state's laws are unclear, your health history is complicated, or your family can't agree on what should be in your living will. · You can change your living will at any time. Some people find that their wishes about end-of-life care change as their health changes. · In addition to making a living will, think about completing a medical power of  form. This form lets you name the person you want to make end-of-life treatment decisions for you (your \"health care agent\") if you're not able to. Many hospitals and nursing homes will give you the forms you need to complete a living will and a medical power of . · Your living will is used only if you can't make or communicate decisions for yourself anymore. If you become able to make decisions again, you can accept or refuse any treatment, no matter what you wrote in your living will. · Your state may offer an online registry. This is a place where you can store your living will online so the doctors and nurses who need to treat you can find it right away. What should you think about when creating a living will? Talk about your end-of-life wishes with your family members and your doctor. Let them know what you want. That way the people making decisions for you won't be surprised by your choices. Think about these questions as you make your living will: · Do you know enough about life support methods that might be used?  If not, talk to your doctor so you know what might be done if you can't breathe on your own, your heart stops, or you're unable to swallow. · What things would you still want to be able to do after you receive life-support methods? Would you want to be able to walk? To speak? To eat on your own? To live without the help of machines? · If you have a choice, where do you want to be cared for? In your home? At a hospital or nursing home? · Do you want certain Jewish practices performed if you become very ill? · If you have a choice at the end of your life, where would you prefer to die? At home? In a hospital or nursing home? Somewhere else? · Would you prefer to be buried or cremated? · Do you want your organs to be donated after you die? What should you do with your living will? · Make sure that your family members and your health care agent have copies of your living will. · Give your doctor a copy of your living will to keep in your medical record. If you have more than one doctor, make sure that each one has a copy. · You may want to put a copy of your living will where it can be easily found. Where can you learn more? Go to http://jorge a-katiana.info/. Enter I602 in the search box to learn more about \"Learning About Living Perropiper. \" Current as of: February 24, 2016 Content Version: 11.2 © 6628-5843 [employer]. Care instructions adapted under license by [employer] (which disclaims liability or warranty for this information). If you have questions about a medical condition or this instruction, always ask your healthcare professional. [place] [address] any warranty or liability for your use of this information. Advance Directives: Care Instructions Your Care Instructions An advance directive is a legal way to state your wishes at the end of your life. It tells your family and your doctor what to do if you can no longer say what you want. There are two main types of advance directives. You can change them any time that your wishes change. · A living will tells your family and your doctor your wishes about life support and other treatment. · A durable power of  for health care lets you name a person to make treatment decisions for you when you can't speak for yourself. This person is called a health care agent. If you do not have an advance directive, decisions about your medical care may be made by a doctor or a  who doesn't know you. It may help to think of an advance directive as a gift to the people who care for you. If you have one, they won't have to make tough decisions by themselves. Follow-up care is a key part of your treatment and safety. Be sure to make and go to all appointments, and call your doctor if you are having problems. It's also a good idea to know your test results and keep a list of the medicines you take. How can you care for yourself at home? · Discuss your wishes with your loved ones and your doctor. This way, there are no surprises. · Many states have a unique form. Or you might use a universal form that has been approved by many states. This kind of form can sometimes be completed and stored online. Your electronic copy will then be available wherever you have a connection to the Internet. In most cases, doctors will respect your wishes even if you have a form from a different state. · You don't need a  to do an advance directive. But you may want to get legal advice. · Think about these questions when you prepare an advance directive: ¨ Who do you want to make decisions about your medical care if you are not able to? Many people choose a family member or close friend. ¨ Do you know enough about life support methods that might be used? If not, talk to your doctor so you understand. ¨ What are you most afraid of that might happen?  You might be afraid of having pain, losing your independence, or being kept alive by machines. ¨ Where would you prefer to die? Choices include your home, a hospital, or a nursing home. ¨ Would you like to have information about hospice care to support you and your family? ¨ Do you want to donate organs when you die? ¨ Do you want certain Muslim practices performed before you die? If so, put your wishes in the advance directive. · Read your advance directive every year, and make changes as needed. When should you call for help? Be sure to contact your doctor if you have any questions. Where can you learn more? Go to http://jorge a-katiana.info/. Enter R264 in the search box to learn more about \"Advance Directives: Care Instructions. \" Current as of: November 17, 2016 Content Version: 11.2 © 3882-8230 M[employer]C. Care instructions adapted under license by [employer] (which disclaims liability or warranty for this information). If you have questions about a medical condition or this instruction, always ask your healthcare professional. [place] [address] any warranty or liability for your use of this information. Introducing [facility]! Dear Frank Tejada: 
Thank you for requesting a [employer] account. Our records indicate that you have previously registered for a [employer] account but its currently inactive. Please call our [employer] support line at [phone]. Additional Information If you have questions, please visit the Frequently Asked Questions section of the [employer] website at [email or web address] Remember, [employer] is NOT to be used for urgent needs. For medical emergencies, dial 911. Now available from your iPhone and Android! Please provide this summary of care documentation to your next provider. Your primary care clinician is listed as Adan Fatima.  If you have any questions after today's visit, please call [phone].

## 2017-05-01 NOTE — DISCHARGE SUMMARY
Troy Pérez SUMMARY    Discharge Date: 4/28/17  Admit Date: 4/6/2017  Admitting/Attending Physician: Shaina Mendoza MD  Referring Physician: [facility]  Primary Care Physician: Sandro Tidwell DO  Neuro: Jhon Powers MD      Date of Service: 4/28/17     DISCHARGE DIAGNOSIS AND REASON FOR INTENSIVE INPATIENT REHABILITATION AND PRIMARY DIAGNOSIS: Acute Cervical Transverse Myelitis      HISTORY OF PRESENT ILLNESS:   Patient is a 78 y.o.,lefthanded female with known hx of breast cancer, HTN, DM with peripheral neuropathy, fibromyalgia, recent T9-S1 PSF 10/5/16 who was recuperating well was able to ambulate with a cane who presents to the ER c/o of L) sided pain and weakness and hypersensitivity. It started with acute shooting pain in her L) neck; numbness and tingling pain in her L) side and then with progressive weakness, she was still able to ambulate with walker on coming to the ER. W/U for CVA was negative, eval by neuro and found to have acute cervical transverse myelitis. Placed on IV steroids for 5 days completed today and to start medrol dose pack tomorrow. Her sugars been uncontrolled and is to resume her DM meds with ISS. She was participating in therapies with significant decline in function thus accepted for further IPR on 4/6/17. HOSPITAL COURSE: She had numerous medical issues addressed during IPR stay outlined below especially her labile BP, neurogenic B/B. She was making good progress in therapies and now ready to go home to transition to short HH therapies then outpt therapies.     Day of DC Progress Notes     S:   Excited to go home, feels she had reached her rehab goals, pain in L) wrist resolved and strength in L) hemibody much improved, still has some hesitation in bladder function, will send with urecholine and taper off as bladder function better, bowel moving with softener and laxative as needed now, been refusing her low dose lisinopril as she gets symptomatic with BPs lower than 130-140s, she was advised to get her carotids check r/o significant stenosis     . O:  General:  Skin:   HEENT:  Neck: Alert, not in distress. No rashes, lesions  PERRL, anicteric sclerae, oropharynx clear  Supple, thyroid not palpable   Lungs:   Clear to auscultation bilaterally. Heart:  Regular rate and rhythm, no murmur, click, rub or gallop. Abdomen:   Soft, non-tender. Bowel sounds present. Musculoskeletal: Calves soft, nontender. No lower extremity edema. L) wrist now back to baseline, no more redness, warm or tenderness   Neuro:        Psych: Speech fluent, Oriented x4, memory and cognition intact, follows all 1- and 2-step verbal directives. Cranial nerves II-XII: intact. Sensory:  tingling, to light touch  stimulus in left hemibody ;  decreased sensation to right limbs Motor: improved L) UE   3-4/5, L) LE  3-4/5. Reflexes: depressed in LLE , no tremors. Pleasant and cooperative, no anxiety or agitation         Labs/Studies   Ref.  Range 4/13/2017 03:00 4/13/2017 13:48 4/20/2017 04:14   WBC Latest Ref Range: 3.6 - 11.0 K/uL 5.8     RBC Latest Ref Range: 3.80 - 5.20 M/uL 4.89     HGB Latest Ref Range: 11.5 - 16.0 g/dL 12.8     HCT Latest Ref Range: 35.0 - 47.0 % 39.7     RDW Latest Ref Range: 11.5 - 14.5 % 15.7 (H)     PLATELET Latest Ref Range: 150 - 400 K/uL 208     Sodium Latest Ref Range: 136 - 145 mmol/L 140  144   Potassium Latest Ref Range: 3.5 - 5.1 mmol/L 4.1  4.3   Chloride Latest Ref Range: 97 - 108 mmol/L 105  109 (H)   CO2 Latest Ref Range: 21 - 32 mmol/L 26  25   Anion gap Latest Ref Range: 5 - 15 mmol/L 9  10   Glucose Latest Ref Range: 65 - 100 mg/dL 85  102 (H)   BUN Latest Ref Range: 6 - 20 MG/DL 23 (H)  15   Creatinine Latest Ref Range: 0.55 - 1.02 MG/DL 0.49 (L)  0.48 (L)   BUN/Creatinine ratio Latest Ref Range: 12 - 20   47 (H)  31 (H)   Calcium Latest Ref Range: 8.5 - 10.1 MG/DL 8.6  8.7   Magnesium Latest Ref Range: 1.6 - 2.4 mg/dL 2.3     GFR est non-AA Latest Ref Range: >60 ml/min/1.73m2 >60  >60   GFR est AA Latest Ref Range: >60 ml/min/1.73m2 >60  >60       17 EBV = positive  Ab and Ag IgG , high titers   17 CSF  = positive  Lyme P41 Ab IgG , negative IgM  17 UCx- + E. Coli    IMAGIN17 CXR- No acute cardiopulmonary disease    SECONDARY DIAGNOSES:   Stable to [address]. 1. Acute Cervical transverse myelitis -  LHP IMPROVED;  completed IV steroids x 5 days, on PO methylprednisolone until 17. Left message with Dr. Trent Sharma staff to call me at his convenience to discuss EBV and Lyme test results and any plan of treatment; F/U outpt with him    2. DM2 with hyperglycemia and complications including diabetic neuropathy  improving jonathan as steroids winding down, continue Amaryl, Januvia and  high dose SSI, low carb diet  3. FMS/Chronic Syndrome-APAP, Neurontin, Baclofen; increase  Lidoderm f to 2 patches for bilateral neck/upper traps myofascial pain and Biofreeze for left hip pain. 4. Dyslipidemia- c/w Pravachol  5. Essential HTN, labile   c/w Lisinopril decreased half the dose, hold florinef; Norvasc Dcd due to hypotenions   6. Neurogenic Bladder-bladder retraining, treating UTI, continue bladder scans and IC per protocol, continue Flomax, and increase Urecholine to 10 mg TID,  decrease to 5 BID and to continue to taper off at home as bladder function continue to improve, check carotid duplex outpt why cant tolerate low normal BPs r/o significant stenosis  7. Neurogenic Bowel - constipation resolved, continue adequate po flds/fibers, laxatives, suppository as needed  8. DVT prophy-  SCDS, TEDS, mobilization, lovenox. 9. Anxiety/adjustment D/O from acute medical illness and decline in function-declined med psych, monitor mood, resolved back to bubbly  self  10. E coli  UTI, POA- continue Omnicef until 17, and stopped Levaquin due to intolerance, probiotic for c dif prophylaxis  11.  Productive cough, seasonal allergies  continue Claritin, Flonase (takes at home) and prn saline NS  will have nurse leave this at bedside for pt to use prn. CXR 4/13/17-negative  12. Acute gout attack L) wrist- 4/17 Colchicine x 3 days, min improvement, 4/20 start Medrol dosepack resulted in hyperglycemia and elevated BPs, acute gout resolved    FUNCTIONAL STATUS ON ADMIT:  Mod assist for bed mobility, mod to total assist for ADLs, non ambulatory   FUNCTIONAL STATUS ON DISHARGE: Mod I to Min assist in ADLs, transfers and ambulation  DISCHARGE DISPOSITION: Home with family   DISCHARGE REHABILIATION PLAN:  PT/OT/RN  DISCHARGE INSTRUCTIONS: see written instructions  DISCHARGE MEDICATIONS:     START THESE MEDICATIONS   Baclofen 10 mg 3x a day  Urecholine 5 mg 2x a day, may DC once bladder function better  Lidoderm 5% Patch, apply 2 patches to affected area as needed for pain            CONTINUE these medications which have NOT CHANGED     Details   aspirin 81 mg chewable tablet Take 81 mg by mouth daily. !! gabapentin (NEURONTIN) 300 mg capsule Take 600 mg by mouth daily. !! gabapentin (NEURONTIN) 300 mg capsule Take 300 mg by mouth every evening. !! glimepiride (AMARYL) 2 mg tablet Take 3 mg by mouth daily (with breakfast). !! glimepiride (AMARYL) 2 mg tablet Take 4 mg by mouth daily (with dinner). magnesium oxide (MAG-OX) 400 mg tablet Take 400 mg by mouth daily. SITagliptin (JANUVIA) 100 mg tablet Take 100 mg by mouth Daily (before breakfast). fluticasone (FLONASE) 50 mcg/actuation nasal spray 2 Sprays by Both Nostrils route daily. Qty: 1 Bottle, Refills: 2     Associated Diagnoses: ETD (eustachian tube dysfunction), bilateral             Associated Diagnoses: Essential hypertension       INVOKANA 100 mg tablet Take 1 tablet by mouth daily before breakfast  Qty: 90 Tab, Refills: 1               Alpha Lipoic Acid 600 mg cap Take 1 Cap by mouth two (2) times a day.        L-Mfolate-B6 Phos-Methyl-B12 (METANX) 3-35-2 mg tab tab Take 1 Tab by mouth two (2) times a day. CHROM GENO/BRINDAL BERRY (GARCINIA CAMBOGIA PO) Take 2 Tabs by mouth daily. pravastatin (PRAVACHOL) 40 mg tablet TAKE 1 TABLET NIGHTLY (NEEDS APPOINTMENT AS SOON AS POSSIBLE FOR FASTING LABS AND APPOINTMENT)  Qty: 30 Tab, Refills: 0       CRANBERRY EXTRACT-VIT C PO Take 3 Caps by mouth daily. 4200 mg       cinnamon bark (CINNAMON) 500 mg cap Take 2 Caps by mouth daily. loratadine (CLARITIN) 10 mg tablet Take 10 mg by mouth nightly. CONTINUE these medications which have CHANGED  lisinopril (PRINIVIL, ZESTRIL) 5 mg tablet Take 1/2 Tab by mouth daily.          STOP these medications   fludrocortisone (FLORINEF) 0.1 mg tablet      amLODIPine (NORVASC) 5 mg tablet         CC:  Primary Care Physician: Ruben Pina DO  Neuro: Win Greco MD

## 2017-05-01 NOTE — PATIENT INSTRUCTIONS
[facility]   Statement to Patients  April 1, 2014      In an effort to ensure the large volume of patient prescription refills is processed in the most efficient and expeditious manner, we are asking our patients to assist us by calling your Pharmacy for all prescription refills, this will include also your  Mail Order Pharmacy. The pharmacy will contact our office electronically to continue the refill process. Please do not wait until the last minute to call your pharmacy. We need at least 48 hours (2days) to fill prescriptions. We also encourage you to call your pharmacy before going to  your prescription to make sure it is ready. With regard to controlled substance prescription refill requests (narcotic refills) that need to be picked up at our office, we ask your cooperation by providing us with at least 72 hours (3days) notice that you will need a refill. We will not refill narcotic prescription refill requests after 4:00pm on any weekday, Monday through Thursday, or after 2:00pm on Fridays, or on the weekends. We encourage everyone to explore another way of getting your prescription refill request processed using [employer], our patient web portal through our electronic medical record system. [employer] is an efficient and effective way to communicate your medication request directly to the office and  downloadable as an raman on your smart phone . [employer] also features a review functionality that allows you to view your medication list as well as leave messages for your physician. Are you ready to get connected? If so please review the attatched instructions or speak to any of our staff to get you set up right away! Thank you so much for your cooperation. Should you have any questions please contact our Practice Administrator. The Physicians and Staff,  [facility] [address] [address]  What is a living will?   A living will is a legal form you use to write down the kind of care you want at the end of your life. It is used by the health professionals who will treat you if you aren't able to decide for yourself. If you put your wishes in writing, your loved ones and others will know what kind of care you want. They won't need to guess. This can ease your mind and be helpful to others. A living will is not the same as an estate or property will. An estate will explains what you want to happen with your money and property after you die. Is a living will a legal document? A living will is a legal document. Each state has its own laws about living zhang. If you move to another state, make sure that your living will is legal in the state where you now live. Or you might use a universal form that has been approved by many states. This kind of form can sometimes be completed and stored online. Your electronic copy will then be available wherever you have a connection to the Internet. In most cases, doctors will respect your wishes even if you have a form from a different state. · You don't need an  to complete a living will. But legal advice can be helpful if your state's laws are unclear, your health history is complicated, or your family can't agree on what should be in your living will. · You can change your living will at any time. Some people find that their wishes about end-of-life care change as their health changes. · In addition to making a living will, think about completing a medical power of  form. This form lets you name the person you want to make end-of-life treatment decisions for you (your \"health care agent\") if you're not able to. Many hospitals and nursing homes will give you the forms you need to complete a living will and a medical power of . · Your living will is used only if you can't make or communicate decisions for yourself anymore.  If you become able to make decisions again, you can accept or refuse any treatment, no matter what you wrote in your living will. · Your state may offer an online registry. This is a place where you can store your living will online so the doctors and nurses who need to treat you can find it right away. What should you think about when creating a living will? Talk about your end-of-life wishes with your family members and your doctor. Let them know what you want. That way the people making decisions for you won't be surprised by your choices. Think about these questions as you make your living will:  · Do you know enough about life support methods that might be used? If not, talk to your doctor so you know what might be done if you can't breathe on your own, your heart stops, or you're unable to swallow. · What things would you still want to be able to do after you receive life-support methods? Would you want to be able to walk? To speak? To eat on your own? To live without the help of machines? · If you have a choice, where do you want to be cared for? In your home? At a hospital or nursing home? · Do you want certain Mandaen practices performed if you become very ill? · If you have a choice at the end of your life, where would you prefer to die? At home? In a hospital or nursing home? Somewhere else? · Would you prefer to be buried or cremated? · Do you want your organs to be donated after you die? What should you do with your living will? · Make sure that your family members and your health care agent have copies of your living will. · Give your doctor a copy of your living will to keep in your medical record. If you have more than one doctor, make sure that each one has a copy. · You may want to put a copy of your living will where it can be easily found. Where can you learn more? Go to http://jorge a-katiana.info/. Enter X212 in the search box to learn more about \"Learning About Living Pershreyas. \"  Current as of: February 24, 2016  Content Version: 11.2  © 0125-9849 [employer]. Care instructions adapted under license by [employer] (which disclaims liability or warranty for this information). If you have questions about a medical condition or this instruction, always ask your healthcare professional. [facility] any warranty or liability for your use of this information. Advance Directives: Care Instructions  Your Care Instructions  An advance directive is a legal way to state your wishes at the end of your life. It tells your family and your doctor what to do if you can no longer say what you want. There are two main types of advance directives. You can change them any time that your wishes change. · A living will tells your family and your doctor your wishes about life support and other treatment. · A durable power of  for health care lets you name a person to make treatment decisions for you when you can't speak for yourself. This person is called a health care agent. If you do not have an advance directive, decisions about your medical care may be made by a doctor or a  who doesn't know you. It may help to think of an advance directive as a gift to the people who care for you. If you have one, they won't have to make tough decisions by themselves. Follow-up care is a key part of your treatment and safety. Be sure to make and go to all appointments, and call your doctor if you are having problems. It's also a good idea to know your test results and keep a list of the medicines you take. How can you care for yourself at home? · Discuss your wishes with your loved ones and your doctor. This way, there are no surprises. · Many states have a unique form. Or you might use a universal form that has been approved by many states. This kind of form can sometimes be completed and stored online.  Your electronic copy will then be available wherever you have a connection to the Internet. In most cases, doctors will respect your wishes even if you have a form from a different state. · You don't need a  to do an advance directive. But you may want to get legal advice. · Think about these questions when you prepare an advance directive:  ¨ Who do you want to make decisions about your medical care if you are not able to? Many people choose a family member or close friend. ¨ Do you know enough about life support methods that might be used? If not, talk to your doctor so you understand. ¨ What are you most afraid of that might happen? You might be afraid of having pain, losing your independence, or being kept alive by machines. ¨ Where would you prefer to die? Choices include your home, a hospital, or a nursing home. ¨ Would you like to have information about hospice care to support you and your family? ¨ Do you want to donate organs when you die? ¨ Do you want certain Quaker practices performed before you die? If so, put your wishes in the advance directive. · Read your advance directive every year, and make changes as needed. When should you call for help? Be sure to contact your doctor if you have any questions. Where can you learn more? Go to http://jorge a-katiana.info/. Enter R264 in the search box to learn more about \"Advance Directives: Care Instructions. \"  Current as of: November 17, 2016  Content Version: 11.2  © 4834-3139 [employer]. Care instructions adapted under license by [employer] (which disclaims liability or warranty for this information). If you have questions about a medical condition or this instruction, always ask your healthcare professional. [address] any warranty or liability for your use of this information.

## 2017-05-02 NOTE — PROGRESS NOTES
Gladis Chaparro is a 78 y.o. female who presents with the following  Chief Complaint   Patient presents with    Extremity Weakness       HPI Patient comes in for a follow up for transverse myelitis. She has been doing well since her hospital stay and has been in rehab. She does feel like she is walking better and although she is still wheelchair bound she is able to get up and take a few steps now. Her OT and PT therapy are helping out a lot. She is to be discharged home soon and get OP PT and OT at [facility] to help continue to strengthen herself up. She is feeling like she is slowly getting better.  agrees. She has no trouble with her sleep or appetite. She is currently on aspirin and pravastatin as a rule out TIA. She is also on gabapentin. She feels numbness and tingling all over her body. Allergies   Allergen Reactions    Latex Other (comments)     Patient states it burns around her mouth.  Other Food Other (comments)     Yogurt - stomach cramping    Betadine [Povidone-Iodine] Itching     Pt states this causes \"extreme\" itching    Codeine Anaphylaxis, Rash, Nausea Only and Other (comments)     HEADACHE  Tolerates tramadol    Dilaudid [Hydromorphone (Bulk)] Anaphylaxis, Itching, Nausea Only and Other (comments)     HALLUCINATIONS  Tolerates tramadol      Hydrocodone Anaphylaxis, Rash, Nausea Only and Vertigo     Facial - eyelid swelling-had to go to ER for reaction, HALLUCINATIONS  Tolerates tramadol      Ditropan [Oxybutynin Chloride] Swelling    Doxycycline Rash     PUSTULAR RASH- TOLERATING TETRACYCLINE    Keflex [Cephalexin] Nausea and Vomiting     Pain in abdomen AND FLU-LIKE SX      Metformin Nausea and Vomiting     Severe abdominal pain      Tape [Adhesive] Other (comments)     Redness/inflammed. Can only use paper tape. CAN USE BAND-AIDS. TOLERATES SURGICAL TAPE USED IN [place].      Sulfamethoxazole-Trimethoprim Other (comments)     Cramping/flu-like symptoms Current Outpatient Prescriptions   Medication Sig    DOCOSAHEXANOIC ACID/EPA (FISH OIL PO) Take  by mouth.  baclofen (LIORESAL) 10 mg tablet Take 1 Tab by mouth three (3) times daily as needed. Indications: spasticity and cramping    aspirin 81 mg chewable tablet Take 81 mg by mouth daily.  gabapentin (NEURONTIN) 300 mg capsule Take 600 mg by mouth daily.  gabapentin (NEURONTIN) 300 mg capsule Take 300 mg by mouth every evening.  glimepiride (AMARYL) 2 mg tablet Take 4 mg by mouth daily (with dinner).  fludrocortisone (FLORINEF) 0.1 mg tablet Take 0.1 mg by mouth Daily (before breakfast).  magnesium oxide (MAG-OX) 400 mg tablet Take 400 mg by mouth daily.  SITagliptin (JANUVIA) 100 mg tablet Take 100 mg by mouth Daily (before breakfast).  fluticasone (FLONASE) 50 mcg/actuation nasal spray 2 Sprays by Both Nostrils route daily.  amLODIPine (NORVASC) 5 mg tablet Take 1 Tab by mouth nightly. Indications: hypertension    INVOKANA 100 mg tablet Take 1 tablet by mouth  daily before breakfast    lisinopril (PRINIVIL, ZESTRIL) 5 mg tablet Take 1 Tab by mouth daily. (Patient taking differently: Take 2.5 mg by mouth daily.)    L-Mfolate-B6 Phos-Methyl-B12 (METANX) 3-35-2 mg tab tab Take 1 Tab by mouth two (2) times a day.  pravastatin (PRAVACHOL) 40 mg tablet TAKE 1 TABLET NIGHTLY (NEEDS APPOINTMENT AS SOON AS POSSIBLE FOR FASTING LABS AND APPOINTMENT)    TRUETEST TEST STRIPS strip TEST THREE TIMES DAILY    CRANBERRY EXTRACT-VIT C PO Take 3 Caps by mouth daily. 4200 mg    cinnamon bark (CINNAMON) 500 mg cap Take 2 Caps by mouth daily.  loratadine (CLARITIN) 10 mg tablet Take 10 mg by mouth nightly.  Alpha Lipoic Acid 600 mg cap Take 1 Cap by mouth two (2) times a day.  CHROM GENO/BRINDAL BERRY (GARCINIA CAMBOGIA PO) Take 2 Tabs by mouth daily. No current facility-administered medications for this visit.         History   Smoking Status    Never Smoker   Smokeless Tobacco    Never Used       Past Medical History:   Diagnosis Date    Abnormal MRI, cervical spine 4/3/2017    Abnormal pap 3/2015; 5/2016    2015 Neg pap +HPV; 2016 ASCUS +HPV    Arthritis     osteo-tests for RA were neg    Breast cancer ([facility] [address] 1996    right    Chronic pain     Diabetes (HCC)     Fibromyalgia     Ganglion cyst of wrist     LEFT    Hypercholesterolemia     Hypertension     Murmur, cardiac     Neuropathy     Rosacea     Unspecified adverse effect of anesthesia     \"SLOW TO WAKE UP, SENSITIVE TO ANESTHESIA, DO NOT COME AROUND FOR 2-3 DAYS\"        Past Surgical History:   Procedure Laterality Date    HX CATARACT REMOVAL Bilateral 2014    HX CERVICAL FUSION      HX COLPOSCOPY  5/2016    Neg path    HX MASTECTOMY  12/96    tram flap    HX ORTHOPAEDIC Left 1990's    foot surgery    HX TONSILLECTOMY      HX VASCULAR ACCESS  1997    portacath left chest-removed after chemo    RECONSTR NOSE  11/2013, 2005    HOLE IN SEPTUM       Family History   Problem Relation Age of Onset    Heart Disease Mother     Hypertension Mother     COPD Mother     Diabetes Father     Other Son      INOPERABLE BRAIN TUMOR    Gout Son     Celiac Disease Son     Anesth Problems Neg Hx        Social History     Social History    Marital status:      Spouse name: N/A    Number of children: N/A    Years of education: N/A     Social History Main Topics    Smoking status: Never Smoker    Smokeless tobacco: Never Used    Alcohol use No    Drug use: No    Sexual activity: Yes     Other Topics Concern    None     Social History Narrative       Review of Systems   Constitutional: Positive for malaise/fatigue. HENT: Negative for hearing loss and tinnitus. Eyes: Negative for blurred vision, double vision and photophobia. Respiratory: Negative for shortness of breath and wheezing. Gastrointestinal: Negative for nausea and vomiting. Musculoskeletal: Negative for falls.    Neurological: Positive for tingling, sensory change and weakness. Negative for dizziness, tremors and headaches. Remainder of comprehensive review is negative. Physical Exam :    Visit Vitals    Ht 5' 8\" (1.727 m)    Wt 77.1 kg (170 lb)    BMI 25.85 kg/m2       General: Well defined, nourished, and groomed individual in no acute distress.    Neck: Supple, nontender, no bruits, no pain with resistance to active range of motion.    Heart: Regular rate and rhythm, no murmurs, rub, or gallop. Normal S1S2. Lungs: Clear to auscultation bilaterally with equal chest expansion, no cough, no wheeze  Musculoskeletal: Extremities revealed no edema and had full range of motion of joints.    Psych: Good mood and bright affect    NEUROLOGICAL EXAMINATION:    Mental Status: Alert and oriented to person, place, and time    Cranial Nerves:    II, III, IV, VI: Visual acuity grossly intact. Visual fields are normal.    Pupils are equal, round, and reactive to light and accommodation.    Extra-ocular movements are full and fluid. Fundoscopic exam was benign, no ptosis or nystagmus.    V-XII: Hearing is grossly intact. Facial features are symmetric, with normal sensation and strength. The palate rises symmetrically and the tongue protrudes midline. Sternocleidomastoids 5/5. Motor Examination: Normal tone, bulk, and strength, 3/5 muscle strength throughout. Coordination: Finger to nose unable to perform     Gait and Station: wheelchair. Not assessed     Reflexes: DTRs 1+ throughout.             Results for orders placed or performed during the hospital encounter of 04/06/17   CULTURE, URINE   Result Value Ref Range    Special Requests: NO SPECIAL REQUESTS      Boykins Count >100,000  COLONIES/mL        Culture result: ESCHERICHIA COLI (A)         Susceptibility    Escherichia coli - BELTRAN     Amikacin ($) <=16 Susceptible ug/mL     Ampicillin ($) <=8 Susceptible ug/mL     Ampicillin/sulbactam ($) <=8/4 Susceptible ug/mL     Aztreonam ($$$$) <=4 Susceptible ug/mL     Cefazolin ($) <=8 Susceptible ug/mL     Cefepime ($$) <=4 Susceptible ug/mL     Cefotaxime <=2 Susceptible ug/mL     Ceftazidime ($) <=1 Susceptible ug/mL     Ceftriaxone ($) <=1 Susceptible ug/mL     Cefuroxime ($) <=4 Susceptible ug/mL     Ciprofloxacin ($) <=1 Susceptible ug/mL     Gentamicin ($) <=4 Susceptible ug/mL     Imipenem <=1 Susceptible ug/mL     Levofloxacin ($) <=2 Susceptible ug/mL     Meropenem ($$) <=1 Susceptible ug/mL     Nitrofurantoin <=32 Susceptible ug/mL     Piperacillin/Tazobac ($) <=16 Susceptible ug/mL     Tobramycin ($) <=4 Susceptible ug/mL     Trimeth-Sulfamethoxa <=2/38 Susceptible ug/mL   URINALYSIS W/MICROSCOPIC   Result Value Ref Range    Color YELLOW/STRAW      Appearance TURBID (A) CLEAR      Specific gravity >1.030 (H) 1.003 - 1.030    pH (UA) 5.0 5.0 - 8.0      Protein NEGATIVE  NEG mg/dL    Glucose >1000 (A) NEG mg/dL    Ketone NEGATIVE  NEG mg/dL    Bilirubin NEGATIVE  NEG      Blood SMALL (A) NEG      Urobilinogen 0.2 0.2 - 1.0 EU/dL    Nitrites POSITIVE (A) NEG      Leukocyte Esterase MODERATE (A) NEG      WBC  0 - 4 /hpf    RBC 0-5 0 - 5 /hpf    Epithelial cells FEW FEW /lpf    Bacteria 3+ (A) NEG /hpf   CBC W/O DIFF   Result Value Ref Range    WBC 5.8 3.6 - 11.0 K/uL    RBC 4.89 3.80 - 5.20 M/uL    HGB 12.8 11.5 - 16.0 g/dL    HCT 39.7 35.0 - 47.0 %    MCV 81.2 80.0 - 99.0 FL    MCH 26.2 26.0 - 34.0 PG    MCHC 32.2 30.0 - 36.5 g/dL    RDW 15.7 (H) 11.5 - 14.5 %    PLATELET 116 596 - 525 K/uL   METABOLIC PANEL, BASIC   Result Value Ref Range    Sodium 140 136 - 145 mmol/L    Potassium 4.1 3.5 - 5.1 mmol/L    Chloride 105 97 - 108 mmol/L    CO2 26 21 - 32 mmol/L    Anion gap 9 5 - 15 mmol/L    Glucose 85 65 - 100 mg/dL    BUN 23 (H) 6 - 20 MG/DL    Creatinine 0.49 (L) 0.55 - 1.02 MG/DL    BUN/Creatinine ratio 47 (H) 12 - 20      GFR est AA >60 >60 ml/min/1.73m2    GFR est non-AA >60 >60 ml/min/1.73m2    Calcium 8.6 8.5 - 10.1 MG/DL MAGNESIUM   Result Value Ref Range    Magnesium 2.3 1.6 - 2.4 mg/dL   METABOLIC PANEL, BASIC   Result Value Ref Range    Sodium 144 136 - 145 mmol/L    Potassium 4.3 3.5 - 5.1 mmol/L    Chloride 109 (H) 97 - 108 mmol/L    CO2 25 21 - 32 mmol/L    Anion gap 10 5 - 15 mmol/L    Glucose 102 (H) 65 - 100 mg/dL    BUN 15 6 - 20 MG/DL    Creatinine 0.48 (L) 0.55 - 1.02 MG/DL    BUN/Creatinine ratio 31 (H) 12 - 20      GFR est AA >60 >60 ml/min/1.73m2    GFR est non-AA >60 >60 ml/min/1.73m2    Calcium 8.7 8.5 - 10.1 MG/DL       Orders Placed This Encounter    DUPLEX CAROTID BILATERAL AMB NEURO (72692)     Standing Status:   Future     Number of Occurrences:   1     Standing Expiration Date:   5/1/2018     Order Specific Question:   Reason for Exam:     Answer:   TIA    DOCOSAHEXANOIC ACID/EPA (FISH OIL PO)     Sig: Take  by mouth. 1. Myelitis, acute transverse ([facility] [address]        Follow-up Disposition:  Return if symptoms worsen or fail to improve. She has been doing well since admission to rehab. She continues to feel like she is getting stronger and her strength is getting better. She is still wheelchair bound but able to get up and take some steps with assistance. No trouble with bathing or ADLS. She can do this herself. Can transfer. Continue Pravastatin and Aspirin for any stroke prevention. We discussed transverse myelitis and that she needs to continue to work hard at [employer] as this is going to help her the most. We will also get a carotid doppler to round out the TIA evaluation and evaluate blood flow to brain. Call with any changes. Can perform EMG if needed if things do not get better.        Rossi Kennedy, RADHA        This note will not be viewable in [address]

## 2017-05-03 ENCOUNTER — TELEPHONE (OUTPATIENT)
Dept: NEUROLOGY | Age: 67
End: 2017-05-03

## 2017-05-03 NOTE — TELEPHONE ENCOUNTER
Pt would requesting an [facility] or to the pt. Pt can be reach at [phone].  Thank you

## 2017-05-03 NOTE — TELEPHONE ENCOUNTER
Called and spoke to patient she state she would like an order for PT and OT to be sent to [facility]  She has an appt 5/8/17  Let her know once NP has an order will fax to [facility]   Patient states understanding

## 2017-05-04 DIAGNOSIS — G37.3 ACUTE TRANSVERSE MYELITIS (HCC): Primary | ICD-10-CM

## 2017-05-07 NOTE — PROCEDURES
Carotid Doppler:      Date:  05/01/17    Requesting Physician:  Marcelle Wakefield NP     Indication:  Stenosis. B-mode imaging reveals minimal plaque at the bifurcations bilaterally. Doppler spectral analysis reveals no elevated velocity. Vertebral artery flow antegrade bilaterally. Interpretation:  Plaque as noted. No significant stenosis.

## 2017-05-08 ENCOUNTER — OFFICE VISIT (OUTPATIENT)
Dept: CARDIOLOGY CLINIC | Age: 67
End: 2017-05-08

## 2017-05-08 ENCOUNTER — TELEPHONE (OUTPATIENT)
Dept: NEUROLOGY | Age: 67
End: 2017-05-08

## 2017-05-08 VITALS
RESPIRATION RATE: 18 BRPM | HEART RATE: 76 BPM | OXYGEN SATURATION: 95 % | DIASTOLIC BLOOD PRESSURE: 76 MMHG | WEIGHT: 175.4 LBS | HEIGHT: 68 IN | SYSTOLIC BLOOD PRESSURE: 140 MMHG | BODY MASS INDEX: 26.58 KG/M2

## 2017-05-08 DIAGNOSIS — E11.9 TYPE 2 DIABETES MELLITUS WITHOUT COMPLICATION, UNSPECIFIED LONG TERM INSULIN USE STATUS: ICD-10-CM

## 2017-05-08 DIAGNOSIS — I10 ESSENTIAL HYPERTENSION: Primary | ICD-10-CM

## 2017-05-08 RX ORDER — GLUCOSAMINE SULFATE 1500 MG
POWDER IN PACKET (EA) ORAL DAILY
COMMUNITY
End: 2017-10-31

## 2017-05-08 RX ORDER — ASPIRIN 81 MG
TABLET, DELAYED RELEASE (ENTERIC COATED) ORAL
COMMUNITY
End: 2017-10-31

## 2017-05-08 RX ORDER — GLUCOSAMINE/CHONDR SU A SOD 750-600 MG
10000 TABLET ORAL
COMMUNITY
End: 2017-10-31

## 2017-05-08 RX ORDER — IVERMECTIN 10 MG/G
CREAM TOPICAL DAILY
COMMUNITY
End: 2019-06-18

## 2017-05-08 RX ORDER — CHOLECALCIFEROL (VITAMIN D3) 125 MCG
440 CAPSULE ORAL EVERY EVENING
COMMUNITY
End: 2017-10-31

## 2017-05-08 RX ORDER — BETHANECHOL CHLORIDE 5 MG/1
5 TABLET ORAL 2 TIMES DAILY
COMMUNITY
End: 2017-06-06 | Stop reason: SDUPTHER

## 2017-05-08 NOTE — PROGRESS NOTES
[address]  [address]es[address], [address]  [place], [address] [facility]    Office [phone]  Fax [phone]  Cell [phone]        Jame Mendez is a 78 y.o. female referred for evaluation of HTN. Consult requested by Ruthie Leyva DO. Assessment  Encounter Diagnoses   Name Primary?  Essential hypertension Yes    Type 2 diabetes mellitus without complication, unspecified long term insulin use status      Recommendations:    Jame Mendez has chronic HTN and T2DM with no underlying cardiac pathology, recent lability and blood pressure is likely due to autonomic dysfunction in the setting of transverse myelitis. Normotensive on low dose Lisinopril without significant orthostatic symptoms. She seems to be progressing quite well on her own. She has no exertional symptoms. I reassured her that she does not have MVP. I will be happy to see her back as needed. Follow-up Disposition:  Return if symptoms worsen or fail to improve. Subjective:    Recent medical history is significant for acute transverse myelitis, hospitalized at [facility] last month. She underwent rehabilitation at [address] [place] [address]. Cardiac history is significant for apparent MVPs and arrhythmias. Previously evaluated by [facility] cardiologist in [place] [address]. However, echocardiogram 1 month ago at [facility] demonstrated no valvular disease but no MVP. She does have chronic HTN and T2DM. She is back at home for 1 week now and living independently without home health assistance. Labile BPs noted in rehabilitation, with low BPs at times. BPs at home are in 578-156V systolic range and is on Lisinopril/D as monotherapy at this time. She stays very well hydrated via flavored water throughout the day. She denies any recurrent episodes of dehydration and chest pain. Patient denies any exertional chest pain, dyspnea, palpitations, syncope, orthopnea, edema or paroxysmal nocturnal dyspnea.     Ambulates with a walker with no reported falls. Followed up with Dr. Gisselle Armendariz (neurology) last week. Cardiac risk factors   HTN yes  DM no  Smoking no  Family hx of CAD no    Cardiac testing  No specialty comments available. Past Medical History:   Diagnosis Date    Abnormal MRI, cervical spine 4/3/2017    Abnormal pap 3/2015; 5/2016 2015 Neg pap +HPV; 2016 ASCUS +HPV    Arthritis     osteo-tests for RA were neg    Breast cancer ([facility] [address] 1996    right    Chronic pain     Diabetes (HCC)     Fibromyalgia     Ganglion cyst of wrist     LEFT    Hypercholesterolemia     Hypertension     Murmur, cardiac     Neuropathy     Rosacea     Unspecified adverse effect of anesthesia     \"SLOW TO WAKE UP, SENSITIVE TO ANESTHESIA, DO NOT COME AROUND FOR 2-3 DAYS\"         Current Outpatient Prescriptions   Medication Sig Dispense Refill    naproxen sodium (ALEVE) 220 mg cap Take 440 mg by mouth every evening.  bethanechol (URECHOLINE) 5 mg tablet Take 5 mg by mouth two (2) times a day.  docusate sodium (DOK) 100 mg tab Take  by mouth.  ivermectin (SOOLANTRA) 1 % crea by Apply Externally route.  CALCIUM PO Take  by mouth.  cholecalciferol (VITAMIN D3) 1,000 unit cap Take  by mouth daily.  Biotin 2,500 mcg cap Take 10,000 mcg by mouth.  DOCOSAHEXANOIC ACID/EPA (FISH OIL PO) Take  by mouth.  baclofen (LIORESAL) 10 mg tablet Take 1 Tab by mouth three (3) times daily as needed. Indications: spasticity and cramping (Patient taking differently: Take 10 mg by mouth two (2) times a day. Indications: spasticity and cramping) 30 Tab 0    aspirin 81 mg chewable tablet Take 81 mg by mouth daily.  gabapentin (NEURONTIN) 300 mg capsule Take 600 mg by mouth three (3) times daily.  glimepiride (AMARYL) 2 mg tablet Take 2 mg by mouth three (3) times daily.  magnesium oxide (MAG-OX) 400 mg tablet Take 400 mg by mouth daily.       SITagliptin (JANUVIA) 100 mg tablet Take 100 mg by mouth Daily (before breakfast).  fluticasone (FLONASE) 50 mcg/actuation nasal spray 2 Sprays by Both Nostrils route daily. 1 Bottle 2    INVOKANA 100 mg tablet Take 1 tablet by mouth  daily before breakfast 90 Tab 1    lisinopril (PRINIVIL, ZESTRIL) 5 mg tablet Take 1 Tab by mouth daily. (Patient taking differently: Take 2.5 mg by mouth daily.) 90 Tab 3    Alpha Lipoic Acid 600 mg cap Take 1 Cap by mouth two (2) times a day.  L-Mfolate-B6 Phos-Methyl-B12 (METANX) 3-35-2 mg tab tab Take 1 Tab by mouth two (2) times a day.  CHROM GENO/BRINDAL BERRY (GARCINIA CAMBOGIA PO) Take 2 Tabs by mouth daily.  pravastatin (PRAVACHOL) 40 mg tablet TAKE 1 TABLET NIGHTLY (NEEDS APPOINTMENT AS SOON AS POSSIBLE FOR FASTING LABS AND APPOINTMENT) 30 Tab 0    CRANBERRY EXTRACT-VIT C PO Take 3 Caps by mouth daily. 4200 mg      cinnamon bark (CINNAMON) 500 mg cap Take 2 Caps by mouth daily.  loratadine (CLARITIN) 10 mg tablet Take 10 mg by mouth nightly.  TRUETEST TEST STRIPS strip TEST THREE TIMES DAILY 100 Strip 1       Allergies   Allergen Reactions    Latex Other (comments)     Patient states it burns around her mouth.     Other Food Other (comments)     Yogurt - stomach cramping    Betadine [Povidone-Iodine] Itching     Pt states this causes \"extreme\" itching    Codeine Anaphylaxis, Rash, Nausea Only and Other (comments)     HEADACHE  Tolerates tramadol    Dilaudid [Hydromorphone (Bulk)] Anaphylaxis, Itching, Nausea Only and Other (comments)     HALLUCINATIONS  Tolerates tramadol      Hydrocodone Anaphylaxis, Rash, Nausea Only and Vertigo     Facial - eyelid swelling-had to go to ER for reaction, HALLUCINATIONS  Tolerates tramadol      Ditropan [Oxybutynin Chloride] Swelling    Doxycycline Rash     PUSTULAR RASH- TOLERATING TETRACYCLINE    Keflex [Cephalexin] Nausea and Vomiting     Pain in abdomen AND FLU-LIKE SX      Metformin Nausea and Vomiting     Severe abdominal pain      Tape [Adhesive] Other (comments)     Redness/inflammed. Can only use paper tape. CAN USE BAND-AIDS. TOLERATES SURGICAL TAPE USED IN [place].  Sulfamethoxazole-Trimethoprim Other (comments)     Cramping/flu-like symptoms          Review of Systems  Constitutional: Negative for fever, chills, malaise/fatigue and diaphoresis. Respiratory: Negative for cough, hemoptysis, sputum production, shortness of breath and wheezing. Cardiovascular: Negative for chest pain, palpitations, orthopnea, claudication, leg swelling and PND. Gastrointestinal: Negative for heartburn, nausea, vomiting, blood in stool and melena. Genitourinary: Negative for dysuria and flank pain. Musculoskeletal: Positive for back pain. Positive for joint pain. Skin: Negative for rash. Neurological: Negative for focal weakness, seizures, loss of consciousness, weakness and headaches. Endo/Heme/Allergies: Does not bruise/bleed easily. Psychiatric/Behavioral: Negative for memory loss. The patient does not have insomnia. Physical Exam    Visit Vitals    /76 (BP 1 Location: Left arm, BP Patient Position: Sitting)    Pulse 76    Resp 18    Ht 5' 8\" (1.727 m)    Wt 175 lb 6.4 oz (79.6 kg)    SpO2 95%    BMI 26.67 kg/m2     Wt Readings from Last 3 Encounters:   05/08/17 175 lb 6.4 oz (79.6 kg)   05/01/17 170 lb (77.1 kg)   04/17/17 170 lb 5 oz (77.3 kg)      General - well developed well nourished  Neck - JVP normal, thyroid nl  Cardiac - normal S1, S2, no murmurs, rubs or gallops.  No clicks  Vascular - carotids without bruits, radials, femorals and pedal pulses equal bilateral  Lungs - clear to auscultation bilaterals, no rales, wheezing or rhonchi  Abd - soft nontender, no HSM, no abd bruits  Extremities - no edema  Skin - no rash  Neuro - nonfocal  Psych - normal mood and affect    Cardiographics    EKG 04/01/17- SR, LVH   Echo 04/02/17-LVEF 60%, negative bubble study     Written by Carrie Sow, as dictated by Sukh Baker, MD.   Martin Clay MD

## 2017-05-08 NOTE — TELEPHONE ENCOUNTER
Pt is in office by OT and they need RX stating that she needs PT and OT with diagnoses. ASAP before she leaves.      Fax to: [phone]

## 2017-05-08 NOTE — TELEPHONE ENCOUNTER
Faxed pt/ot orders again to [phone] to [facility] and let them know was faxed on the 5-4-17

## 2017-05-10 RX ORDER — LISINOPRIL 2.5 MG/1
2.5 TABLET ORAL DAILY
Qty: 30 TAB | Refills: 11 | Status: SHIPPED | OUTPATIENT
Start: 2017-05-10 | End: 2017-06-05 | Stop reason: SDUPTHER

## 2017-05-15 ENCOUNTER — TELEPHONE (OUTPATIENT)
Dept: OBGYN CLINIC | Age: 67
End: 2017-05-15

## 2017-05-15 NOTE — TELEPHONE ENCOUNTER
Patient called requesting a RX. Stated she has a hx of vaginal fissure. Stated  prescribed a cream 2 or more years back. (not in the system) Reports itching and burning when urine touches the outside of the vagina. Reports some spotting of blood when she wipes. This nurse informed patient that she is overdue for her AE which she stated is scheduled 6/7/2017. Patient requested to be seen early which she was then given an appt for tomorrow. She verbalized understanding.     FYI only

## 2017-05-16 ENCOUNTER — OFFICE VISIT (OUTPATIENT)
Dept: FAMILY MEDICINE CLINIC | Age: 67
End: 2017-05-16

## 2017-05-16 ENCOUNTER — PATIENT OUTREACH (OUTPATIENT)
Dept: FAMILY MEDICINE CLINIC | Age: 67
End: 2017-05-16

## 2017-05-16 ENCOUNTER — OFFICE VISIT (OUTPATIENT)
Dept: OBGYN CLINIC | Age: 67
End: 2017-05-16

## 2017-05-16 VITALS
RESPIRATION RATE: 18 BRPM | SYSTOLIC BLOOD PRESSURE: 140 MMHG | HEART RATE: 73 BPM | DIASTOLIC BLOOD PRESSURE: 62 MMHG | WEIGHT: 158 LBS | TEMPERATURE: 97.6 F | BODY MASS INDEX: 23.95 KG/M2 | OXYGEN SATURATION: 97 % | HEIGHT: 68 IN

## 2017-05-16 VITALS — WEIGHT: 175 LBS | DIASTOLIC BLOOD PRESSURE: 68 MMHG | BODY MASS INDEX: 26.61 KG/M2 | SYSTOLIC BLOOD PRESSURE: 120 MMHG

## 2017-05-16 DIAGNOSIS — Z12.4 ROUTINE CERVICAL SMEAR: ICD-10-CM

## 2017-05-16 DIAGNOSIS — N89.8 VAGINAL ITCHING: ICD-10-CM

## 2017-05-16 DIAGNOSIS — Z12.31 ENCOUNTER FOR SCREENING MAMMOGRAM FOR BREAST CANCER: ICD-10-CM

## 2017-05-16 DIAGNOSIS — G37.3 ACUTE TRANSVERSE MYELITIS (HCC): ICD-10-CM

## 2017-05-16 DIAGNOSIS — E11.40 TYPE 2 DIABETES MELLITUS WITH DIABETIC NEUROPATHY, WITHOUT LONG-TERM CURRENT USE OF INSULIN (HCC): Primary | ICD-10-CM

## 2017-05-16 DIAGNOSIS — I10 ESSENTIAL HYPERTENSION: ICD-10-CM

## 2017-05-16 DIAGNOSIS — R87.610 ATYPICAL SQUAMOUS CELLS OF UNDETERMINED SIGNIFICANCE (ASCUS) ON PAPANICOLAOU SMEAR OF CERVIX: ICD-10-CM

## 2017-05-16 DIAGNOSIS — Z01.419 ENCOUNTER FOR GYNECOLOGICAL EXAMINATION (GENERAL) (ROUTINE) WITHOUT ABNORMAL FINDINGS: Primary | ICD-10-CM

## 2017-05-16 LAB — WET MOUNT POCT, WMPOCT: NORMAL

## 2017-05-16 RX ORDER — NYSTATIN AND TRIAMCINOLONE ACETONIDE 100000; 1 [USP'U]/G; MG/G
CREAM TOPICAL
Qty: 30 G | Refills: 2 | Status: SHIPPED | OUTPATIENT
Start: 2017-05-16 | End: 2017-09-25

## 2017-05-16 NOTE — PROGRESS NOTES
Vaginitis evaluation    Chief Complaint   Vaginitis and Well Woman      HPI  78 y.o. female complains of vaginal itching and irritation for 1 week. She states skin is very raw. .No LMP recorded. Patient is postmenopausal.  She denies discharge and odor  The patient denies aggravating factors. Has been on lots of steroids and has taken abx. No sig discharge. She is not concerned about possible STI exposure at this time. She started taking a medication for her bladder that makes her go all the time so she has been wearing pads day and night. Meds to prevent urinary retention. Previous treatment included: none    Past Medical History:   Diagnosis Date    Abnormal MRI, cervical spine 4/3/2017    Abnormal pap 3/2015; 5/2016 2015 Neg pap +HPV; 2016 ASCUS +HPV    Arthritis     osteo-tests for RA were neg    Breast cancer ([facility] [address] 1996    right    Chronic pain     Diabetes (HCC)     Fibromyalgia     Ganglion cyst of wrist     LEFT    Hypercholesterolemia     Hypertension     Murmur, cardiac     Neuropathy     Rosacea     Unspecified adverse effect of anesthesia     \"SLOW TO WAKE UP, SENSITIVE TO ANESTHESIA, DO NOT COME AROUND FOR 2-3 DAYS\"      Past Surgical History:   Procedure Laterality Date    HX CATARACT REMOVAL Bilateral 2014    HX CERVICAL FUSION      HX COLPOSCOPY  5/2016    Neg path    HX MASTECTOMY  12/96    tram flap    HX ORTHOPAEDIC Left 1990's    foot surgery    HX TONSILLECTOMY      HX VASCULAR ACCESS  1997    portacath left chest-removed after chemo    RECONSTR NOSE  11/2013, 2005    HOLE IN SEPTUM     Social History     Occupational History    Not on file.      Social History Main Topics    Smoking status: Never Smoker    Smokeless tobacco: Never Used    Alcohol use No    Drug use: No    Sexual activity: Yes     Family History   Problem Relation Age of Onset    Heart Disease Mother     Hypertension Mother     COPD Mother     Diabetes Father     Other Son      INOPERABLE BRAIN TUMOR    Gout Son     Celiac Disease Son    [place] Anesth Problems Neg Hx         Allergies   Allergen Reactions    Latex Other (comments)     Patient states it burns around her mouth.  Other Food Other (comments)     Yogurt - stomach cramping    Betadine [Povidone-Iodine] Itching     Pt states this causes \"extreme\" itching    Codeine Anaphylaxis, Rash, Nausea Only and Other (comments)     HEADACHE  Tolerates tramadol    Dilaudid [Hydromorphone (Bulk)] Anaphylaxis, Itching, Nausea Only and Other (comments)     HALLUCINATIONS  Tolerates tramadol      Hydrocodone Anaphylaxis, Rash, Nausea Only and Vertigo     Facial - eyelid swelling-had to go to ER for reaction, HALLUCINATIONS  Tolerates tramadol      Ditropan [Oxybutynin Chloride] Swelling    Doxycycline Rash     PUSTULAR RASH- TOLERATING TETRACYCLINE    Keflex [Cephalexin] Nausea and Vomiting     Pain in abdomen AND FLU-LIKE SX      Metformin Nausea and Vomiting     Severe abdominal pain      Tape [Adhesive] Other (comments)     Redness/inflammed. Can only use paper tape. CAN USE BAND-AIDS. TOLERATES SURGICAL TAPE USED IN [place].  Sulfamethoxazole-Trimethoprim Other (comments)     Cramping/flu-like symptoms     Prior to Admission medications    Medication Sig Start Date End Date Taking? Authorizing Provider   lisinopril (PRINIVIL, ZESTRIL) 2.5 mg tablet Take 1 Tab by mouth daily. 5/10/17  Yes Juliet Washington MD   naproxen sodium (ALEVE) 220 mg cap Take 440 mg by mouth every evening. Yes Historical Provider   bethanechol (URECHOLINE) 5 mg tablet Take 5 mg by mouth two (2) times a day. Yes Historical Provider   docusate sodium (DOK) 100 mg tab Take  by mouth. Yes Historical Provider   ivermectin (SOOLANTRA) 1 % crea by Apply Externally route. Yes Historical Provider   CALCIUM PO Take  by mouth. Yes Historical Provider   cholecalciferol (VITAMIN D3) 1,000 unit cap Take  by mouth daily.    Yes Historical Provider   Biotin 2,500 mcg cap Take 10,000 mcg by mouth. Yes Historical Provider   DOCOSAHEXANOIC ACID/EPA (FISH OIL PO) Take  by mouth. Yes Historical Provider   baclofen (LIORESAL) 10 mg tablet Take 1 Tab by mouth three (3) times daily as needed. Indications: spasticity and cramping  Patient taking differently: Take 10 mg by mouth two (2) times a day. Indications: spasticity and cramping 4/6/17  Yes Madeline Beckwith MD   aspirin 81 mg chewable tablet Take 81 mg by mouth daily. Yes Historical Provider   gabapentin (NEURONTIN) 300 mg capsule Take 600 mg by mouth three (3) times daily. Yes Historical Provider   glimepiride (AMARYL) 2 mg tablet Take 2 mg by mouth three (3) times daily. Yes Historical Provider   magnesium oxide (MAG-OX) 400 mg tablet Take 400 mg by mouth daily. Yes Historical Provider   SITagliptin (JANUVIA) 100 mg tablet Take 100 mg by mouth Daily (before breakfast). Yes Historical Provider   fluticasone (FLONASE) 50 mcg/actuation nasal spray 2 Sprays by Both Nostrils route daily. 3/2/17  Yes Arnaud Becerril NP   INVOKANA 100 mg tablet Take 1 tablet by mouth  daily before breakfast 3/1/17  Yes Jennifer Mcfadden DO   Alpha Lipoic Acid 600 mg cap Take 1 Cap by mouth two (2) times a day. Yes Historical Provider   L-Mfolate-B6 Phos-Methyl-B12 (METANX) 3-35-2 mg tab tab Take 1 Tab by mouth two (2) times a day. Yes Historical Provider   CHROM GENO/BRINDAL BERRY (GARCINIA CAMBOGIA PO) Take 2 Tabs by mouth daily. Yes Historical Provider   pravastatin (PRAVACHOL) 40 mg tablet TAKE 1 TABLET NIGHTLY (NEEDS APPOINTMENT AS SOON AS POSSIBLE FOR FASTING LABS AND APPOINTMENT) 7/2/15  Yes Alecia Jay MD   TRUETEST TEST STRIPS strip TEST THREE TIMES DAILY 3/31/14  Yes Alecia Jay MD   CRANBERRY EXTRACT-VIT C PO Take 3 Caps by mouth daily. 4200 mg   Yes Historical Provider   cinnamon bark (CINNAMON) 500 mg cap Take 2 Caps by mouth daily.    Yes Historical Provider   loratadine (CLARITIN) 10 mg tablet Take 10 mg by mouth nightly. Yes Historical Provider                      Review of Systems - History obtained from the patient  Constitutional: negative for weight loss, fever, night sweats  Breast: negative for breast lumps, nipple discharge, galactorrhea  GI: negative for change in bowel habits, abdominal pain, black or bloody stools  : negative for frequency, dysuria, hematuria  MSK: negative for back pain, joint pain, muscle pain  Skin: negative for itching, rash, hives  Neuro: negative for dizziness, headache, confusion, weakness  Psych: negative for anxiety, depression, change in mood  Heme/lymph: negative for bleeding, bruising, pallor       Objective:    Visit Vitals    /68    Wt 175 lb (79.4 kg)    BMI 26.61 kg/m2       Physical Exam:   PHYSICAL EXAMINATION    Constitutional  · Appearance: well-nourished, well developed, alert, in no acute distress    HENT  · Head and Face: appears normal    Genitourinary  · External Genitalia: normal appearance for age, no discharge present, no tenderness present, no inflammatory lesions present, no masses present, no atrophy present, vulvar red and irritated appearing  · Vagina:  no discharge present, otherwise normal vaginal vault without central or paravaginal defects, no inflammatory lesions present, no masses present  · Bladder: non-tender to palpation  · Urethra: appears normal  · Cervix: normal   · Uterus: normal size, shape and consistency  · Adnexa: no adnexal tenderness present, no adnexal masses present  · Perineum: perineum within normal limits, no evidence of trauma, no rashes or skin lesions present  · Anus: anus within normal limits, no hemorrhoids present  · Inguinal Lymph Nodes: no lymphadenopathy present    Skin  · General Inspection: no rash, no lesions identified    Neurologic/Psychiatric  · Mental Status:  · Orientation: grossly oriented to person, place and time  · Mood and Affect: mood normal, affect appropriate      .     Results for orders placed or performed in visit on 17   AMB POC SMEAR, STAIN & INTERPRET, WET MOUNT   Result Value Ref Range    Wet mount (POC)      Narrative    LONNY    Hypae: negative  Buds: negative    Wet Prep:  Trich: negative  Clue cells: negative  Hyphae: negative  Buds: negative  WBC's: normal         Assessment:   Likely yeast due to presentation and steroid use hx    Plan:   mycolog bid for now to vulva  nuswab sent    ROV prn if symptoms persist or worsen. Issa Cool is a ,  78 y.o. female [facility] whose LMP was on  who presents for her annual checkup. She is menopausal and amenorrheic. Recently dx with transverse myelitis and learning to walk again    She is having vaginal irritation. Hormone Status:    She is not having vasomotor symptoms. The patient is not using HRT. She has not had any vaginal bleeding. She reports no gynecologic symptoms. Sexual history:    She  reports that she currently engages in sexual activity. Medical conditions:    Since her last annual GYN exam about one year ago, she has had the following changes in her health history: none. Surgical history confirmed with patient. has a past surgical history that includes reconstr nose (2013, ); colposcopy (2016); vascular access (); mastectomy (); tonsillectomy; cataract removal (Bilateral, ); orthopaedic (Left, ); and cervical fusion. Pap and Mammogram History:    Her most recent Pap smear was abnormal, ASCUS obtained 2016. Colposcopy 5/10/2017, negative. Normal pap/+HPV 3/12/2015. The patient had a recent mammogram 2016 which was negative for malignancy    Breast Cancer History/Substance Abuse:     She does have a personal/family history of breast cancer. Osteoporosis History:    Family history does not include a first or second degree relative with osteopenia or osteoporosis. A bone density scan was obtained 2016 and revealed osteopenia.   She is currently taking calcium and vit D. Past Medical History:   Diagnosis Date    Abnormal MRI, cervical spine 4/3/2017    Abnormal pap 3/2015; 5/2016 2015 Neg pap +HPV; 2016 ASCUS +HPV    Arthritis     osteo-tests for RA were neg    Breast cancer ([facility] [address] 1996    right    Chronic pain     Diabetes (HCC)     Fibromyalgia     Ganglion cyst of wrist     LEFT    Hypercholesterolemia     Hypertension     Murmur, cardiac     Neuropathy     Rosacea     Unspecified adverse effect of anesthesia     \"SLOW TO WAKE UP, SENSITIVE TO ANESTHESIA, DO NOT COME AROUND FOR 2-3 DAYS\"      Past Surgical History:   Procedure Laterality Date    HX CATARACT REMOVAL Bilateral 2014    HX CERVICAL FUSION      HX COLPOSCOPY  5/2016    Neg path    HX MASTECTOMY  12/96    tram flap    HX ORTHOPAEDIC Left 1990's    foot surgery    HX TONSILLECTOMY      HX VASCULAR ACCESS  1997    portacath left chest-removed after chemo    RECONSTR NOSE  11/2013, 2005    HOLE IN SEPTUM     Current Outpatient Prescriptions   Medication Sig Dispense Refill    lisinopril (PRINIVIL, ZESTRIL) 2.5 mg tablet Take 1 Tab by mouth daily. 30 Tab 11    naproxen sodium (ALEVE) 220 mg cap Take 440 mg by mouth every evening.  bethanechol (URECHOLINE) 5 mg tablet Take 5 mg by mouth two (2) times a day.  docusate sodium (DOK) 100 mg tab Take  by mouth.  ivermectin (SOOLANTRA) 1 % crea by Apply Externally route.  CALCIUM PO Take  by mouth.  cholecalciferol (VITAMIN D3) 1,000 unit cap Take  by mouth daily.  Biotin 2,500 mcg cap Take 10,000 mcg by mouth.  DOCOSAHEXANOIC ACID/EPA (FISH OIL PO) Take  by mouth.  baclofen (LIORESAL) 10 mg tablet Take 1 Tab by mouth three (3) times daily as needed. Indications: spasticity and cramping (Patient taking differently: Take 10 mg by mouth two (2) times a day. Indications: spasticity and cramping) 30 Tab 0    aspirin 81 mg chewable tablet Take 81 mg by mouth daily.       gabapentin (NEURONTIN) 300 mg capsule Take 600 mg by mouth three (3) times daily.  glimepiride (AMARYL) 2 mg tablet Take 2 mg by mouth three (3) times daily.  magnesium oxide (MAG-OX) 400 mg tablet Take 400 mg by mouth daily.  SITagliptin (JANUVIA) 100 mg tablet Take 100 mg by mouth Daily (before breakfast).  fluticasone (FLONASE) 50 mcg/actuation nasal spray 2 Sprays by Both Nostrils route daily. 1 Bottle 2    INVOKANA 100 mg tablet Take 1 tablet by mouth  daily before breakfast 90 Tab 1    Alpha Lipoic Acid 600 mg cap Take 1 Cap by mouth two (2) times a day.  L-Mfolate-B6 Phos-Methyl-B12 (METANX) 3-35-2 mg tab tab Take 1 Tab by mouth two (2) times a day.  CHROM GENO/BRINDAL BERRY (GARCINIA CAMBOGIA PO) Take 2 Tabs by mouth daily.  pravastatin (PRAVACHOL) 40 mg tablet TAKE 1 TABLET NIGHTLY (NEEDS APPOINTMENT AS SOON AS POSSIBLE FOR FASTING LABS AND APPOINTMENT) 30 Tab 0    TRUETEST TEST STRIPS strip TEST THREE TIMES DAILY 100 Strip 1    CRANBERRY EXTRACT-VIT C PO Take 3 Caps by mouth daily. 4200 mg      cinnamon bark (CINNAMON) 500 mg cap Take 2 Caps by mouth daily.  loratadine (CLARITIN) 10 mg tablet Take 10 mg by mouth nightly. Allergies: Latex; Other food; Betadine [povidone-iodine]; Codeine; Dilaudid [hydromorphone (bulk)]; Hydrocodone; Ditropan [oxybutynin chloride]; Doxycycline; Keflex [cephalexin]; Metformin; Tape [adhesive]; and Sulfamethoxazole-trimethoprim   Social History     Social History    Marital status:      Spouse name: N/A    Number of children: N/A    Years of education: N/A     Occupational History    Not on file. Social History Main Topics    Smoking status: Never Smoker    Smokeless tobacco: Never Used    Alcohol use No    Drug use: No    Sexual activity: Yes     Other Topics Concern    Not on file     Social History Narrative     Tobacco History:  reports that she has never smoked.  She has never used smokeless tobacco.  Alcohol Abuse:  reports that she does not drink alcohol. Drug Abuse:  reports that she does not use illicit drugs.   Patient Active Problem List   Diagnosis Code    Diabetes mellitus with neuropathy ([facility] [address] E11.40    Postmenopausal bleeding N95.0    Fibromyalgia M79.7    HTN (hypertension) I10    Breast cancer (HCC) C50.919    Scoliosis M41.9    ACP (advance care planning) Z71.89    Left-sided weakness R53.1    Acute transverse myelitis (HCC) G37.3    Abnormal MRI, cervical spine R93.7       Review of Systems - History obtained from the patient  Constitutional: negative for weight loss, fever, night sweats  HEENT: negative for hearing loss, earache, congestion, snoring, sorethroat  CV: negative for chest pain, palpitations, edema  Resp: negative for cough, shortness of breath, wheezing  GI: negative for change in bowel habits, abdominal pain, black or bloody stools  : negative for frequency, dysuria, hematuria, vaginal discharge pos vulvar itching  MSK: negative for back pain, joint pain, muscle pain  Breast: negative for breast lumps, nipple discharge, galactorrhea  Skin :negative for itching, rash, hives  Neuro: negative for dizziness, headache, confusion, weakness  Psych: negative for anxiety, depression, change in mood  Heme/lymph: negative for bleeding, bruising, pallor    Physical Exam    Visit Vitals    /68    Wt 175 lb (79.4 kg)    BMI 26.61 kg/m2     Constitutional  · Appearance: well-nourished, well developed, alert, in no acute distress    HENT  · Head and Face: appears normal    Neck  · Inspection/Palpation: normal appearance, no masses or tenderness  · Lymph Nodes: no lymphadenopathy present  · Thyroid: gland size normal, nontender, no nodules or masses present on palpation    Chest  · Respiratory Effort: breathing normal  · Auscultation: normal breath sounds    Cardiovascular  · Heart:  · Auscultation: regular rate and rhythm without murmur    Breasts  · Inspection of Breasts: right mastectomy, left normal  · Palpation of Breasts and Axillae: no masses present on palpation, no breast tenderness  · Axillary Lymph Nodes: no lymphadenopathy present    Gastrointestinal  · Abdominal Examination: abdomen non-tender to palpation, normal bowel sounds, no masses present  · Liver and spleen: no hepatomegaly present, spleen not palpable  · Hernias: no hernias identified    Genitourinary  · External Genitalia: normal appearance for age with atrophy, no discharge present, no tenderness present, no inflammatory lesions present, no masses present, redness - see problem visit  · Vagina:atrophic vaginal vault with pale epithelium and flattening of rugae, without central or paravaginal defects, no discharge present, no inflammatory lesions present, no masses present  · Bladder: non-tender to palpation  · Urethra: appears normal  · Cervix: normal   · Uterus: normal size, shape and consistency  · Adnexa: no adnexal tenderness present, no adnexal masses present  · Perineum: perineum within normal limits, no evidence of trauma, no rashes or skin lesions present  · Anus: anus within normal limits, no hemorrhoids present  · Inguinal Lymph Nodes: no lymphadenopathy present    Skin  · General Inspection: no rash, no lesions identified    Neurologic/Psychiatric  · Mental Status:  · Orientation: grossly oriented to person, place and time  · Mood and Affect: mood normal, affect appropriate    . Assessment:  Routine gynecologic examination  Her current medical status is satisfactory with no evidence of significant gynecologic issues.   HPV pos  Personal hx of breast cancer  Plan:  Counseled re: diet, exercise, healthy lifestyle  Return for yearly wellness visits  Rec annual mammogram  Pap/HPV - colpo if abnl

## 2017-05-16 NOTE — MR AVS SNAPSHOT
Visit Information Date & Time Provider Department Dept. Phone Encounter #  
 5/16/2017 11:00 AM Nusrat Yusuf, [address] [phone] [id number] Follow-up Instructions Return in about 2 months (around 7/16/2017), or if symptoms worsen or fail to improve. Your Appointments 6/21/2017  8:00 AM  
ESTABLISHED PATIENT with Norman Mcfadden DO [address] ([facility]) Appt Note: 3 mo fuv, a1c ck  
 [address] [address] [address]  
[phone]  
  
   
 [address] [address] [address]  
  
    
 5/22/2018  8:50 AM  
ESTABLISHED PATIENT with Raeann Boas, MD [place] ([facility]) Appt Note: ae  TH  
 [id number] [facility] [address]  
[address] Upcoming Health Maintenance Date Due  
 EYE EXAM RETINAL OR DILATED Q1 2/18/1961 FOBT Q 1 YEAR AGE 50-75 2/18/2001 GLAUCOMA SCREENING Q2Y 2/18/2016 Pneumococcal 65+ High/Highest Risk (2 of 2 - PPSV23) 5/16/2017 BREAST CANCER SCRN MAMMOGRAM 6/16/2017 INFLUENZA AGE 9 TO ADULT 8/1/2017 HEMOGLOBIN A1C Q6M 10/1/2017 MICROALBUMIN Q1 11/3/2017 FOOT EXAM Q1 12/14/2017 MEDICARE YEARLY EXAM 3/22/2018 LIPID PANEL Q1 4/1/2018 DTaP/Tdap/Td series (2 - Td) 3/21/2027 Allergies as of 5/16/2017  Review Complete On: 5/16/2017 By: Nusrat Yusuf DO Severity Noted Reaction Type Reactions Latex  06/09/2014    Other (comments) Patient states it burns around her mouth. Other Food  10/12/2016    Other (comments) Yogurt - stomach cramping Betadine [Povidone-iodine] High 11/15/2013    Itching Pt states this causes \"extreme\" itching Codeine High 11/15/2013    Anaphylaxis, Rash, Nausea Only, Other (comments) HEADACHE Tolerates tramadol  Dilaudid [Hydromorphone (Bulk)] High 04/02/2014    Anaphylaxis, Itching, Nausea Only, Other (comments) HALLUCINATIONS Tolerates tramadol Hydrocodone High 11/15/2013    Anaphylaxis, Rash, Nausea Only, Vertigo Facial - eyelid swelling-had to go to ER for reaction, HALLUCINATIONS Tolerates tramadol Ditropan [Oxybutynin Chloride]  03/04/2014    Swelling Doxycycline  11/15/2013    Rash PUSTULAR RASH- TOLERATING TETRACYCLINE Keflex [Cephalexin]  11/15/2013    Nausea and Vomiting Pain in abdomen AND FLU-LIKE SX Metformin  11/15/2013    Nausea and Vomiting Severe abdominal pain Tape [Adhesive]  06/09/2014    Other (comments) Redness/inflammed. Can only use paper tape. CAN USE BAND-AIDS. TOLERATES SURGICAL TAPE USED IN [place] [facility]. Sulfamethoxazole-trimethoprim Low 02/28/2014    Other (comments) Cramping/flu-like symptoms Current Immunizations  Never Reviewed No immunizations on file. Not reviewed this visit You Were Diagnosed With   
  
 Codes Comments Type 2 diabetes mellitus with diabetic neuropathy, without long-term current use of insulin ([facility])    -  Primary ICD-10-CM: E11.40 ICD-9-CM: 250.60, 357.2 Acute transverse myelitis ([facility] [address]     ICD-10-CM: G37.3 ICD-9-CM: 341.20 Essential hypertension     ICD-10-CM: I10 
ICD-9-CM: 401.9 Vitals BP Pulse Temp Resp Height(growth percentile) Weight(growth percentile) 140/62 (BP 1 Location: Left arm, BP Patient Position: Sitting) 73 97.6 °F (36.4 °C) (Oral) 18 5' 8\" (1.727 m) 158 lb (71.7 kg) SpO2 BMI OB Status Smoking Status 97% 24.02 kg/m2 Postmenopausal Never Smoker Vitals History BMI and BSA Data Body Mass Index Body Surface Area 24.02 kg/m 2 1.85 m 2 Preferred Pharmacy Pharmacy Name Phone [facility] [address]  [facility] [phone] Your Updated Medication List  
  
   
This list is accurate as of: 5/16/17 11:44 AM.  Always use your most recent med list.  
  
  
  
  
 ALEVE 220 mg Cap Generic drug:  naproxen sodium Take 440 mg by mouth every evening. Alpha Lipoic Acid 600 mg Cap Take 1 Cap by mouth two (2) times a day. aspirin 81 mg chewable tablet Take 81 mg by mouth daily. baclofen 10 mg tablet Commonly known as:  LIORESAL Take 1 Tab by mouth three (3) times daily as needed. Indications: spasticity and cramping  
  
 bethanechol 5 mg tablet Commonly known as:  URECHOLINE Take 5 mg by mouth two (2) times a day. Biotin 2,500 mcg Cap Take 10,000 mcg by mouth. CALCIUM PO Take  by mouth. CINNAMON 500 mg Cap Generic drug:  cinnamon bark Take 2 Caps by mouth daily. CLARITIN 10 mg tablet Generic drug:  loratadine Take 10 mg by mouth nightly. CRANBERRY EXTRACT-VIT C PO Take 3 Caps by mouth daily. 4200 mg  mg Tab Generic drug:  docusate sodium Take  by mouth. FISH OIL PO Take  by mouth. fluticasone 50 mcg/actuation nasal spray Commonly known as:  [place] Longest 2 Sprays by Both Nostrils route daily. gabapentin 300 mg capsule Commonly known as:  NEURONTIN Take 600 mg by mouth three (3) times daily. GARCINIA CAMBOGIA PO Take 2 Tabs by mouth daily. glimepiride 2 mg tablet Commonly known as:  AMARYL Take 2 mg by mouth three (3) times daily. INVOKANA 100 mg tablet Generic drug:  canagliflozin Take 1 tablet by mouth  daily before breakfast  
  
 L-Mfolate-B6 Phos-Methyl-B12 3-35-2 mg Tab tab Commonly known as:  Nupur Trevino Take 1 Tab by mouth two (2) times a day. lisinopril 2.5 mg tablet Commonly known as:  Mundo Apo Take 1 Tab by mouth daily. magnesium oxide 400 mg tablet Commonly known as:  MAG-OX Take 400 mg by mouth daily. nystatin-triamcinolone topical cream  
Commonly known as:  MYCOLOG II Apply  to affected area three (3) times daily as needed for Skin Irritation. pravastatin 40 mg tablet Commonly known as:  PRAVACHOL  
TAKE 1 TABLET NIGHTLY (NEEDS APPOINTMENT AS SOON AS POSSIBLE FOR FASTING LABS AND APPOINTMENT) SITagliptin 100 mg tablet Commonly known as:  Laura Gentry Take 100 mg by mouth Daily (before breakfast). SOOLANTRA 1 % Crea Generic drug:  ivermectin  
by Apply Externally route. TRUETEST TEST STRIPS strip Generic drug:  glucose blood VI test strips TEST THREE TIMES DAILY  
  
 VITAMIN D3 1,000 unit Cap Generic drug:  cholecalciferol Take  by mouth daily. Follow-up Instructions Return in about 2 months (around 7/16/2017), or if symptoms worsen or fail to improve. Patient Instructions Nutrition Tips for Diabetes: After Your Visit Your Care Instructions A healthy diet is important to manage diabetes. It helps you lose weight (if you need to) and keep it off. It gives you the nutrition and energy your body needs and helps prevent heart disease. But a diet for diabetes does not mean that you have to eat special foods. You can eat what your family eats, including occasional sweets and other favorites. But you do have to pay attention to how often you eat and how much you eat of certain foods. The right plan for you will give you meals that help you keep your blood sugar at healthy levels. Try to eat a variety of foods and to spread carbohydrate throughout the day. Carbohydrate raises blood sugar higher and more quickly than any other nutrient does. Carbohydrate is found in sugar, breads and cereals, fruit, starchy vegetables such as potatoes and corn, and milk and yogurt. You may want to work with a dietitian or diabetes educator to help you plan meals and snacks. A dietitian or diabetes educator also can help you lose weight if that is one of your goals. The following tips can help you enjoy your meals and stay healthy. Follow-up care is a key part of your treatment and safety.  Be sure to make and go to all appointments, and call your doctor if you are having problems. Its also a good idea to know your test results and keep a list of the medicines you take. How can you care for yourself at home? · Learn which foods have carbohydrate and how much carbohydrate to eat. A dietitian or diabetes educator can help you learn to keep track of how much carbohydrate you eat. · Spread carbohydrate throughout the day. Eat some carbohydrate at all meals, but do not eat too much at any one time. · Plan meals to include food from all the food groups. These are the food groups and some example portion sizes: ¨ Grains: 1 slice of bread (1 ounce), ½ cup of cooked cereal, and 1/3 cup of cooked pasta or rice. These have about 15 grams of carbohydrate in a serving. Choose whole grains such as whole wheat bread or crackers, oatmeal, and brown rice more often than refined grains. ¨ Fruit: 1 small fresh fruit, such as an apple or orange; ½ of a banana; ½ cup of chopped, cooked, or canned fruit; ½ cup of fruit juice; 1 cup of melon or raspberries; and 2 tablespoons of dried fruit. These have about 15 grams of carbohydrate in a serving. ¨ Dairy: 1 cup of nonfat or low-fat milk and 2/3 cup of plain yogurt. These have about 15 grams of carbohydrate in a serving. ¨ Protein foods: Beef, chicken, turkey, fish, eggs, tofu, cheese, cottage cheese, and peanut butter. A serving size of meat is 3 ounces, which is about the size of a deck of cards. Examples of meat substitute serving sizes (equal to 1 ounce of meat) are 1/4 cup of cottage cheese, 1 egg, 1 tablespoon of peanut butter, and ½ cup of tofu. These have very little or no carbohydrate per serving. ¨ Vegetables: Starchy vegetables such as ½ cup of cooked dried beans, peas, potatoes, or corn have about 15 grams of carbohydrate.  Nonstarchy vegetables have very little carbohydrate, such as 1 cup of raw leafy vegetables (such as spinach), ½ cup of other vegetables (cooked or chopped), and 3/4 cup of vegetable juice. · Use the plate format to plan meals. It is a good, quick way to make sure that you have a balanced meal. It also helps you spread carbohydrate throughout the day. You divide your plate by types of foods. Put vegetables on half the plate, meat or meat substitutes on one-quarter of the plate, and a grain or starchy vegetable (such as brown rice or a potato) in the final quarter of the plate. To this you can add a small piece of fruit and 1 cup of milk or yogurt, depending on how much carbohydrate you are supposed to eat at a meal. 
· Talk to your dietitian or diabetes educator about ways to add limited amounts of sweets into your meal plan. You can eat these foods now and then, as long as you include the amount of carbohydrate they have in your daily carbohydrate allowance. · If you drink alcohol, limit it to no more than 1 drink a day for women and 2 drinks a day for men. If you are pregnant, no amount of alcohol is known to be safe. · Protein, fat, and fiber do not raise blood sugar as much as carbohydrate does. If you eat a lot of these nutrients in a meal, your blood sugar will rise more slowly than it would otherwise. · Limit saturated fats, such as those from meat and dairy products. Try to replace it with monounsaturated fat, such as olive oil. This is a healthier choice because people who have diabetes are at higher-than-average risk of heart disease. But use a modest amount of olive oil. A tablespoon of olive oil has 14 grams of fat and 120 calories. · Exercise lowers blood sugar. If you take insulin by shots or pump, you can use less than you would if you were not exercising. Keep in mind that timing matters. If you exercise within 1 hour after a meal, your body may need less insulin for that meal than it would if you exercised 3 hours after the meal. Test your blood sugar to find out how exercise affects your need for insulin. · Exercise on most days of the week. Aim for at least 30 minutes. Exercise helps you stay at a healthy weight and helps your body use insulin. Walking is an easy way to get exercise. Gradually increase the amount you walk every day. You also may want to swim, bike, or do other activities. When you eat out · Learn to estimate the serving sizes of foods that have carbohydrate. If you measure food at home, it will be easier to estimate the amount in a serving of restaurant food. · If the meal you order has too much carbohydrate (such as potatoes, corn, or baked beans), ask to have a low-carbohydrate food instead. Ask for a salad or green vegetables. · If you use insulin, check your blood sugar before and after eating out to help you plan how much to eat in the future. · If you eat more carbohydrate at a meal than you had planned, take a walk or do other exercise. This will help lower your blood sugar. Where can you learn more? Go to 140 Proof.be Enter T572 in the search box to learn more about \"Nutrition Tips for Diabetes: After Your Visit. \"  
© 3253-4898 Healthwise, Incorporated. Care instructions adapted under license by Cheyenne Fernandes (which disclaims liability or warranty for this information). This care instruction is for use with your licensed healthcare professional. If you have questions about a medical condition or this instruction, always ask your healthcare professional. [address] any warranty or liability for your use of this information. Content Version: 87.0.037331; Current as of: June 4, 2014 Introducing [facility] & HEALTH SERVICES! Dear Anne Madrid: 
Thank you for requesting a [employer] account. Our records indicate that you have previously registered for a [employer] account but its currently inactive. Please call our [employer] support line at [phone]. Additional Information If you have questions, please visit the Frequently Asked Questions section of the [employer] website at [email or web address] Remember, [employer] is NOT to be used for urgent needs. For medical emergencies, dial 911. Now available from your iPhone and Android! Please provide this summary of care documentation to your next provider. Your primary care clinician is listed as Adan Fatima. If you have any questions after today's visit, please call [phone].

## 2017-05-16 NOTE — PATIENT INSTRUCTIONS
Pelvic Exam: Care Instructions  Your Care Instructions    When your doctor examines all of your pelvic organs, it's called a pelvic exam. Two good reasons to have this kind of exam are to check for sexually transmitted infections (STIs) and to get a Pap test. A Pap test is also called a Pap smear. It checks for early changes that can lead to cancer of the cervix. Sometimes a pelvic exam is part of a regular checkup. In this case, you can do some things to make your test results as accurate as possible. · Try to schedule the exam when you don't have your period. · Don't use douches, tampons, or vaginal medicines, sprays, or powders for 24 hours before your exam.  · Don't have sex for 24 hours before your exam.  Other times, women have this kind of exam at any time of the month. This is because they have pelvic pain, bleeding, or discharge. Or they may have another pelvic problem. Before your exam, it's important to share some information with your doctor. For example, if you are a survivor of rape or sexual abuse, you can talk about any concerns you may have. Your doctor will also want to know if you are pregnant or use birth control. And he or she will want to hear about any problems, surgeries, or procedures you have had in your pelvic area. You will also need to tell your doctor when your last period was. Follow-up care is a key part of your treatment and safety. Be sure to make and go to all appointments, and call your doctor if you are having problems. It's also a good idea to know your test results and keep a list of the medicines you take. How is a pelvic exam done? · During a pelvic exam, you will:  ¨ Take off your clothes below the waist. You will get a paper or cloth cover to put over the lower half of your body. Izabella Alford on your back on an exam table. Your feet will be raised above you. Stirrups will support your feet. · The doctor will:  Parmjit Mcqueens you to relax your knees.  Your knees need to lean out, toward the walls. ¨ Check the opening of your vagina for sores or swelling. ¨ Gently put a tool called a speculum into your vagina. It opens the vagina a little bit. You will feel some pressure. But if you are relaxed, it will not hurt. It lets your doctor see inside the vagina. ¨ Use a small brush, spatula, or swab to get a sample of cells, if you are having a Pap test or culture. The doctor then removes the speculum. ¨ Put on gloves and put one or two fingers of one hand into your vagina. The other hand goes on your lower belly. This lets your doctor feel your pelvic organs. You will probably feel some pressure. Try to stay relaxed. ¨ Put one gloved finger into your rectum and one into your vagina, if needed. This can also help check your pelvic organs. This exam takes about 10 minutes. At the end, you will get a washcloth or tissue to clean your vaginal area. It's normal to have some discharge after this exam. You can then get dressed. Some test results may be ready right away. But results from a culture or a Pap test may take several days or a few weeks. Why should you have a pelvic exam?  · You want to have recommended screening tests. This includes a Pap test.  · You think you have a vaginal infection. Signs include itching, burning, or unusual discharge. · You might have been exposed to a sexually transmitted infection (STI), such as chlamydia or herpes. · You have vaginal bleeding that is not part of your normal menstrual period. · You have pain in your belly or pelvis. · You have been sexually assaulted. A pelvic exam lets your doctor collect evidence and check for STIs. · You are pregnant. · You are having trouble getting pregnant. What are the risks of a pelvic exam?  There are no risks from a pelvic exam.  When should you call for help?   Watch closely for changes in your health, and be sure to contact your doctor if:  · You have heavy bleeding or discharge from your vagina after the exam.  Where can you learn more? Go to http://jorge a-katiana.info/. Enter O960 in the search box to learn more about \"Pelvic Exam: Care Instructions. \"  Current as of: October 13, 2016  Content Version: 11.2  © 6808-5354 [employer]. Care instructions adapted under license by [employer] (which disclaims liability or warranty for this information). If you have questions about a medical condition or this instruction, always ask your healthcare professional. [address] any warranty or liability for your use of this information.

## 2017-05-16 NOTE — PATIENT INSTRUCTIONS

## 2017-05-16 NOTE — PROGRESS NOTES
Carmela Avendano is a 78 y.o. female   Chief Complaint   Patient presents with   [facility] Follow Up    Pt was recently hospitalized at [facility] with cervical transverse myelitis. Pt states she is doing better and continues with rehab at [facility]  Pt is using bethanechol to help with urine and has not tapered down will cut down to a half tab at a time. Pt has also been having leaking with the bethanechol and is using pads. Tingling sensations on L side are improving but still there, pt is also getting better muscle control and is able to sign her name today but is using her opposite hand for writing (pt is L handed normally). Pt feels that OT and PT are definitely making a big difference. Pt states her BS had decreased and sugar is have been low to mid 100's. she is a 78y.o. year old female who presents for evalution. Reviewed PmHx, RxHx, FmHx, SocHx, AllgHx and updated and dated in the chart. Review of Systems - negative except as listed above in the HPI    Objective:     Vitals:    05/16/17 1056   BP: 140/62   Pulse: 73   Resp: 18   Temp: 97.6 °F (36.4 °C)   TempSrc: Oral   SpO2: 97%   Weight: 158 lb (71.7 kg)   Height: 5' 8\" (1.727 m)       Current Outpatient Prescriptions   Medication Sig    lisinopril (PRINIVIL, ZESTRIL) 2.5 mg tablet Take 1 Tab by mouth daily.  naproxen sodium (ALEVE) 220 mg cap Take 440 mg by mouth every evening.  bethanechol (URECHOLINE) 5 mg tablet Take 5 mg by mouth two (2) times a day.  docusate sodium (DOK) 100 mg tab Take  by mouth.  ivermectin (SOOLANTRA) 1 % crea by Apply Externally route.  CALCIUM PO Take  by mouth.  cholecalciferol (VITAMIN D3) 1,000 unit cap Take  by mouth daily.  Biotin 2,500 mcg cap Take 10,000 mcg by mouth.  DOCOSAHEXANOIC ACID/EPA (FISH OIL PO) Take  by mouth.  baclofen (LIORESAL) 10 mg tablet Take 1 Tab by mouth three (3) times daily as needed.  Indications: spasticity and cramping (Patient taking differently: Take 10 mg by mouth two (2) times a day. Indications: spasticity and cramping)    aspirin 81 mg chewable tablet Take 81 mg by mouth daily.  gabapentin (NEURONTIN) 300 mg capsule Take 600 mg by mouth three (3) times daily.  glimepiride (AMARYL) 2 mg tablet Take 2 mg by mouth three (3) times daily.  magnesium oxide (MAG-OX) 400 mg tablet Take 400 mg by mouth daily.  SITagliptin (JANUVIA) 100 mg tablet Take 100 mg by mouth Daily (before breakfast).  fluticasone (FLONASE) 50 mcg/actuation nasal spray 2 Sprays by Both Nostrils route daily.  INVOKANA 100 mg tablet Take 1 tablet by mouth  daily before breakfast    Alpha Lipoic Acid 600 mg cap Take 1 Cap by mouth two (2) times a day.  L-Mfolate-B6 Phos-Methyl-B12 (METANX) 3-35-2 mg tab tab Take 1 Tab by mouth two (2) times a day.  CHROM GENO/BRINDAL BERRY (GARCINIA CAMBOGIA PO) Take 2 Tabs by mouth daily.  pravastatin (PRAVACHOL) 40 mg tablet TAKE 1 TABLET NIGHTLY (NEEDS APPOINTMENT AS SOON AS POSSIBLE FOR FASTING LABS AND APPOINTMENT)    TRUETEST TEST STRIPS strip TEST THREE TIMES DAILY    CRANBERRY EXTRACT-VIT C PO Take 3 Caps by mouth daily. 4200 mg    cinnamon bark (CINNAMON) 500 mg cap Take 2 Caps by mouth daily.  loratadine (CLARITIN) 10 mg tablet Take 10 mg by mouth nightly.  nystatin-triamcinolone (MYCOLOG II) topical cream Apply  to affected area three (3) times daily as needed for Skin Irritation. No current facility-administered medications for this visit.         Physical Examination: General appearance - alert, well appearing, and in no distress  Mental status - alert, oriented to person, place, and time  Eyes - pupils equal and reactive, extraocular eye movements intact  Chest - clear to auscultation, no wheezes, rales or rhonchi, symmetric air entry  Heart - normal rate, regular rhythm, normal S1, S2, no murmurs, rubs, clicks or gallops  Musculoskeletal - L hand  4/5 strength R 5/5      Assessment/ Plan:   Moisés Cast was seen today for hospital follow up. Diagnoses and all orders for this visit:    Type 2 diabetes mellitus with diabetic neuropathy, without long-term current use of insulin ([facility] [address]  C/w current meds recheck in 2 months  Acute transverse myelitis (HCC)  Continuous improvement c/w PT OT  Essential hypertension  C/w current meds pt does not do well with BP lower than 140's       Follow-up Disposition:  Return in about 2 months (around 7/16/2017), or if symptoms worsen or fail to improve, for diabetes check up. I have discussed the diagnosis with the patient and the intended plan as seen in the above orders. The patient has received an after-visit summary and questions were answered concerning future plans. Pt conveyed understanding of plan.     Medication Side Effects and Warnings were discussed with patient      Catarina Glover, DO

## 2017-05-19 ENCOUNTER — TELEPHONE (OUTPATIENT)
Dept: OBGYN CLINIC | Age: 67
End: 2017-05-19

## 2017-05-19 LAB
CYTOLOGIST CVX/VAG CYTO: NORMAL
CYTOLOGY CVX/VAG DOC THIN PREP: NORMAL
CYTOLOGY HISTORY:: NORMAL
DX ICD CODE: NORMAL
HPV I/H RISK 1 DNA CVX QL PROBE+SIG AMP: NEGATIVE
Lab: NORMAL
OTHER STN SPEC: NORMAL
PATH REPORT.FINAL DX SPEC: NORMAL
STAT OF ADQ CVX/VAG CYTO-IMP: NORMAL

## 2017-05-19 RX ORDER — CLOTRIMAZOLE AND BETAMETHASONE DIPROPIONATE 10; .64 MG/G; MG/G
CREAM TOPICAL 3 TIMES DAILY
Qty: 30 G | Refills: 2 | Status: SHIPPED | OUTPATIENT
Start: 2017-05-19 | End: 2017-06-27 | Stop reason: SDUPTHER

## 2017-05-19 NOTE — TELEPHONE ENCOUNTER
Patient aware that rx was changed and sent to mail order pharmacy. Patient stated that she needed the medication and could not wait so she pay out of pocket for it.

## 2017-05-19 NOTE — TELEPHONE ENCOUNTER
[facility] calling for prior authorization for Nystatin-Triamcinolone cream and is requesting a return call to [phone] press #1.

## 2017-05-19 NOTE — TELEPHONE ENCOUNTER
Enedelia Keating LPN        Caller: Unspecified (Today,  9:38 AM)                     Switch medication to Clortrinazole Betamethasone 0.051% cream per Dr. Al Ramirez. Send to pharmacy.

## 2017-05-20 LAB
A VAGINAE DNA VAG QL NAA+PROBE: ABNORMAL SCORE
BVAB2 DNA VAG QL NAA+PROBE: ABNORMAL SCORE
C ALBICANS DNA VAG QL NAA+PROBE: POSITIVE
C GLABRATA DNA VAG QL NAA+PROBE: NEGATIVE
MEGA1 DNA VAG QL NAA+PROBE: ABNORMAL SCORE
T VAGINALIS RRNA SPEC QL NAA+PROBE: NEGATIVE

## 2017-05-23 RX ORDER — FLUCONAZOLE 150 MG/1
150 TABLET ORAL DAILY
Qty: 1 TAB | Refills: 0 | Status: SHIPPED | OUTPATIENT
Start: 2017-05-23 | End: 2017-05-24

## 2017-05-24 NOTE — PROGRESS NOTES
Called and notified patient of normal pap results and + yeast on nuswab. Pt states she has noticed cloudy urine and was wondering if it could be a uti. i advised patient to take Diflucan today and report back on Friday or sooner if any other symptoms present (like freq, urgency, dysuria), because it may just be vaginal discharge making her urine cloudy. Patient states understanding.

## 2017-05-26 ENCOUNTER — TELEPHONE (OUTPATIENT)
Dept: OBGYN CLINIC | Age: 67
End: 2017-05-26

## 2017-05-26 RX ORDER — NITROFURANTOIN 25; 75 MG/1; MG/1
100 CAPSULE ORAL 2 TIMES DAILY
Qty: 14 CAP | Refills: 0 | Status: SHIPPED | OUTPATIENT
Start: 2017-05-26 | End: 2017-06-02

## 2017-05-26 NOTE — TELEPHONE ENCOUNTER
Call received at 315pm    78year old patient last seen in the office on 5/16/17. Patient was advised of lab results on 5/24/17 and was advised to call back if her symptoms had not improved. Patient calling back to say that she is still feeling like something is not right. Patient reports her urine is cloudy , feels like she is not emptying her bladder ( even though taking bethanechol), frequency , fatigue and pressure when she urinates.  Patient denies temperature      Please advise

## 2017-05-26 NOTE — TELEPHONE ENCOUNTER
Patient advised of MD recommendations and prescription sent as per MD order to patient preferred pharmacy. Patient advised of need to call back if her symptoms are not relieved after completing the medication prescribed. Patient verbalized understanding.

## 2017-06-01 ENCOUNTER — PATIENT OUTREACH (OUTPATIENT)
Dept: FAMILY MEDICINE CLINIC | Age: 67
End: 2017-06-01

## 2017-06-01 NOTE — PROGRESS NOTES
[address]  [phone]  Fax [phone]    Patient Name: Jame Mendez  YOB: 1950    Calling to follow up during post hospital episode. LM on VM. NN to follow up on rehab, yeast infection, UTI, tingling symptoms and BS. Next follow up appt with Dr Cosby Mon 6/21/17.

## 2017-06-02 ENCOUNTER — PATIENT OUTREACH (OUTPATIENT)
Dept: FAMILY MEDICINE CLINIC | Age: 67
End: 2017-06-02

## 2017-06-05 RX ORDER — LISINOPRIL 2.5 MG/1
2.5 TABLET ORAL DAILY
Qty: 30 TAB | Refills: 11 | Status: SHIPPED | OUTPATIENT
Start: 2017-06-05 | End: 2017-10-31

## 2017-06-05 NOTE — PROGRESS NOTES
[address]  [phone]  Fax [phone]    Patient Name: Cristina Bridges  YOB: 1950    Ms Pura Rodas returned call. States she is still having UTI symptoms that she would like to have checked out. She states she has not had a UA since the hospital, she explained symptoms to GYN and was given antibiotic. Does not think the antibiotics have helped. States she would also like to know the result of her bladder scan from when she was at [place] [address]. She would like to discuss this with Dr Xochitl Gallegos. Concerns of whether the symptoms of difficulty voiding (bladder/bowel) is related to dx or infection. Discussed trying Senna S to see if this helps prevent the forceful pushing she has been having to do. Is still going to outpatient rehab for a couple more sessions and then would like to start with pool therapy. Is in the process of checking out the pool in [facility]. Continues to have no sensation in her right arm and leg. Has \"graduated\" from the turtle shell to a black velcro back support. Using walker. Denies yeast infection. Will continue to monitor and follow up post PCP appt. 6/6/17 with Dr Xochitl Gallegos.

## 2017-06-06 ENCOUNTER — HOSPITAL ENCOUNTER (OUTPATIENT)
Dept: LAB | Age: 67
Discharge: HOME | End: 2017-06-06
Payer: MEDICARE

## 2017-06-06 ENCOUNTER — OFFICE VISIT (OUTPATIENT)
Dept: FAMILY MEDICINE CLINIC | Age: 67
End: 2017-06-06

## 2017-06-06 VITALS
DIASTOLIC BLOOD PRESSURE: 64 MMHG | WEIGHT: 172.6 LBS | TEMPERATURE: 98.2 F | SYSTOLIC BLOOD PRESSURE: 130 MMHG | HEIGHT: 68 IN | BODY MASS INDEX: 26.16 KG/M2

## 2017-06-06 DIAGNOSIS — Z12.11 COLON CANCER SCREENING: ICD-10-CM

## 2017-06-06 DIAGNOSIS — R33.9 URINARY RETENTION: Primary | ICD-10-CM

## 2017-06-06 DIAGNOSIS — E11.40 TYPE 2 DIABETES MELLITUS WITH DIABETIC NEUROPATHY, WITHOUT LONG-TERM CURRENT USE OF INSULIN (HCC): ICD-10-CM

## 2017-06-06 DIAGNOSIS — N39.0 URINARY TRACT INFECTION WITHOUT HEMATURIA, SITE UNSPECIFIED: ICD-10-CM

## 2017-06-06 DIAGNOSIS — Z71.89 ACP (ADVANCE CARE PLANNING): ICD-10-CM

## 2017-06-06 LAB
BILIRUB UR QL STRIP: NEGATIVE
GLUCOSE UR-MCNC: NORMAL MG/DL
KETONES P FAST UR STRIP-MCNC: NEGATIVE MG/DL
PH UR STRIP: 6 [PH] (ref 4.6–8)
PROT UR QL STRIP: NEGATIVE MG/DL
SP GR UR STRIP: 1.01 (ref 1–1.03)
UA UROBILINOGEN AMB POC: NORMAL (ref 0.2–1)
URINALYSIS CLARITY POC: CLEAR
URINALYSIS COLOR POC: YELLOW
URINE BLOOD POC: NEGATIVE
URINE LEUKOCYTES POC: NEGATIVE
URINE NITRITES POC: NEGATIVE

## 2017-06-06 PROCEDURE — 83036 HEMOGLOBIN GLYCOSYLATED A1C: CPT

## 2017-06-06 PROCEDURE — 87086 URINE CULTURE/COLONY COUNT: CPT

## 2017-06-06 RX ORDER — BETHANECHOL CHLORIDE 10 MG/1
10 TABLET ORAL 2 TIMES DAILY
Qty: 60 TAB | Refills: 1 | Status: SHIPPED | OUTPATIENT
Start: 2017-06-06 | End: 2017-08-08 | Stop reason: SDUPTHER

## 2017-06-06 NOTE — PROGRESS NOTES
Zeinab Balbuena is a 78 y.o. female   Chief Complaint   Patient presents with    Medication Evaluation    Urinary Pain    Labs    pt states she had a uti and was treated by gyn with jas. Pt states that her glucose was 143 this am but does have a large amount of glucose. Pt feels like she is still retaining urine and is taking bethanechol 5 bid, will increase. No burning with urination, no fever no chills. Pt also due for an A1C. Pt is following a diabetic diet. Pt also due for colonoscopy and would prefer FIT testing. she is a 78y.o. year old female who presents for evalution. Reviewed PmHx, RxHx, FmHx, SocHx, AllgHx and updated and dated in the chart. Review of Systems - negative except as listed above in the HPI    Objective:     Vitals:    06/06/17 0902   BP: 130/64   Temp: 98.2 °F (36.8 °C)   TempSrc: Oral   Weight: 172 lb 9.6 oz (78.3 kg)   Height: 5' 8\" (1.727 m)       Current Outpatient Prescriptions   Medication Sig    bethanechol (URECHOLINE) 10 mg tablet Take 1 Tab by mouth two (2) times a day.  SITagliptin (JANUVIA) 100 mg tablet Take 1 Tab by mouth Daily (before breakfast).  lisinopril (PRINIVIL, ZESTRIL) 2.5 mg tablet Take 1 Tab by mouth daily.  clotrimazole-betamethasone (LOTRISONE) topical cream Apply  to affected area three (3) times daily. Apply to affected area three (3) times daily as needed for Skin Irritation.  nystatin-triamcinolone (MYCOLOG II) topical cream Apply  to affected area three (3) times daily as needed for Skin Irritation.  naproxen sodium (ALEVE) 220 mg cap Take 440 mg by mouth every evening.  baclofen (LIORESAL) 10 mg tablet Take 1 Tab by mouth three (3) times daily as needed. Indications: spasticity and cramping (Patient taking differently: Take 10 mg by mouth two (2) times a day. Indications: spasticity and cramping)    magnesium oxide (MAG-OX) 400 mg tablet Take 400 mg by mouth daily.     fluticasone (FLONASE) 50 mcg/actuation nasal spray 2 Sprays by Both Nostrils route daily.  INVOKANA 100 mg tablet Take 1 tablet by mouth  daily before breakfast    L-Mfolate-B6 Phos-Methyl-B12 (METANX) 3-35-2 mg tab tab Take 1 Tab by mouth two (2) times a day.  pravastatin (PRAVACHOL) 40 mg tablet TAKE 1 TABLET NIGHTLY (NEEDS APPOINTMENT AS SOON AS POSSIBLE FOR FASTING LABS AND APPOINTMENT)    TRUETEST TEST STRIPS strip TEST THREE TIMES DAILY    loratadine (CLARITIN) 10 mg tablet Take 10 mg by mouth nightly.  docusate sodium (DOK) 100 mg tab Take  by mouth.  ivermectin (SOOLANTRA) 1 % crea by Apply Externally route.  CALCIUM PO Take  by mouth.  cholecalciferol (VITAMIN D3) 1,000 unit cap Take  by mouth daily.  Biotin 2,500 mcg cap Take 10,000 mcg by mouth.  DOCOSAHEXANOIC ACID/EPA (FISH OIL PO) Take  by mouth.  aspirin 81 mg chewable tablet Take 81 mg by mouth daily.  gabapentin (NEURONTIN) 300 mg capsule Take 600 mg by mouth three (3) times daily.  glimepiride (AMARYL) 2 mg tablet Take 2 mg by mouth three (3) times daily.  Alpha Lipoic Acid 600 mg cap Take 1 Cap by mouth two (2) times a day.  CHROM GENO/BRINDAL BERRY (GARCINIA CAMBOGIA PO) Take 2 Tabs by mouth daily.  CRANBERRY EXTRACT-VIT C PO Take 3 Caps by mouth daily. 4200 mg    cinnamon bark (CINNAMON) 500 mg cap Take 2 Caps by mouth daily. No current facility-administered medications for this visit. Physical Examination: General appearance - alert, well appearing, and in no distress  Mental status - alert, oriented to person, place, and time  Eyes - pupils equal and reactive, extraocular eye movements intact  Chest - clear to auscultation, no wheezes, rales or rhonchi, symmetric air entry  Heart - normal rate, regular rhythm, normal S1, S2, no murmurs, rubs, clicks or gallops      Assessment/ Plan:   Angela Sands was seen today for medication evaluation, urinary pain and labs.     Diagnoses and all orders for this visit:    Urinary retention  - REFERRAL TO UROLOGY  -     bethanechol (URECHOLINE) 10 mg tablet; Take 1 Tab by mouth two (2) times a day. Pt is passing urine just not as much as she feels she should be  Urinary tract infection without hematuria, site unspecified  -     CULTURE, URINE  -     AMB POC URINALYSIS DIP STICK AUTO W/ MICRO    Type 2 diabetes mellitus with diabetic neuropathy, without long-term current use of insulin (HCC)  -     HEMOGLOBIN A1C WITH EAG    Colon cancer screening  -     REFERRAL FOR COLONOSCOPY  -     OCCULT BLOOD, IMMUNOASSAY (FIT)    ACP (advance care planning)  Comments:  discussed       Follow-up Disposition:  Return in about 3 months (around 9/6/2017), or if symptoms worsen or fail to improve. I have discussed the diagnosis with the patient and the intended plan as seen in the above orders. The patient has received an after-visit summary and questions were answered concerning future plans. Pt conveyed understanding of plan.     Medication Side Effects and Warnings were discussed with patient      Anisha Hooper, DO

## 2017-06-06 NOTE — PATIENT INSTRUCTIONS

## 2017-06-06 NOTE — MR AVS SNAPSHOT
Visit Information Date & Time Provider Department Dept. Phone Encounter #  
 6/6/2017  8:45 AM Lloyd Pickering BEULAH Savage [phone] [id number] Follow-up Instructions Return in about 3 months (around 9/6/2017), or if symptoms worsen or fail to improve. Your Appointments 6/21/2017  8:00 AM  
ESTABLISHED PATIENT with Dwaine Mcfadden DO [facility] ([address] [place] [address] Road) Appt Note: 3 mo fuv, a1c ck  
 [id number] [place] [address] Road [address]  
[phone]  
  
   
 [address] [place] [address] Road [address]  
  
    
 5/22/2018  8:50 AM  
ESTABLISHED PATIENT with Rene Rachel MD  
[facility] ([facility]) Appt Note: ae  TH  
 [id number] [address] [facility] [address] [address]  
[facility] [address] Upcoming Health Maintenance Date Due  
 EYE EXAM RETINAL OR DILATED Q1 2/18/1961 FOBT Q 1 YEAR AGE 50-75 2/18/2001 GLAUCOMA SCREENING Q2Y 2/18/2016 Pneumococcal 65+ High/Highest Risk (2 of 2 - PPSV23) 5/16/2017 BREAST CANCER SCRN MAMMOGRAM 6/16/2017 INFLUENZA AGE 9 TO ADULT 8/1/2017 HEMOGLOBIN A1C Q6M 10/1/2017 MICROALBUMIN Q1 11/3/2017 FOOT EXAM Q1 12/14/2017 MEDICARE YEARLY EXAM 3/22/2018 LIPID PANEL Q1 4/1/2018 DTaP/Tdap/Td series (2 - Td) 3/21/2027 Allergies as of 6/6/2017  Review Complete On: 6/6/2017 By: Amanda Sebastian DO Severity Noted Reaction Type Reactions Latex  06/09/2014    Other (comments) Patient states it burns around her mouth. Other Food  10/12/2016    Other (comments) Yogurt - stomach cramping Betadine [Povidone-iodine] High 11/15/2013    Itching Pt states this causes \"extreme\" itching Codeine High 11/15/2013    Anaphylaxis, Rash, Nausea Only, Other (comments) HEADACHE Tolerates tramadol  Dilaudid [Hydromorphone (Bulk)] High 04/02/2014    Anaphylaxis, Itching, Nausea Only, Other (comments) HALLUCINATIONS Tolerates tramadol Hydrocodone High 11/15/2013    Anaphylaxis, Rash, Nausea Only, Vertigo Facial - eyelid swelling-had to go to ER for reaction, HALLUCINATIONS Tolerates tramadol Ditropan [Oxybutynin Chloride]  03/04/2014    Swelling Doxycycline  11/15/2013    Rash PUSTULAR RASH- TOLERATING TETRACYCLINE Keflex [Cephalexin]  11/15/2013    Nausea and Vomiting Pain in abdomen AND FLU-LIKE SX Metformin  11/15/2013    Nausea and Vomiting Severe abdominal pain Tape [Adhesive]  06/09/2014    Other (comments) Redness/inflammed. Can only use paper tape. CAN USE BAND-AIDS. TOLERATES SURGICAL TAPE USED IN [place] [facility]. Sulfamethoxazole-trimethoprim Low 02/28/2014    Other (comments) Cramping/flu-like symptoms Current Immunizations  Never Reviewed No immunizations on file. Not reviewed this visit You Were Diagnosed With   
  
 Codes Comments Urinary retention    -  Primary ICD-10-CM: R33.9 ICD-9-CM: 788.20 Urinary tract infection without hematuria, site unspecified     ICD-10-CM: N39.0 ICD-9-CM: 599.0 Type 2 diabetes mellitus with diabetic neuropathy, without long-term current use of insulin (HCC)     ICD-10-CM: E11.40 ICD-9-CM: 250.60, 357.2 Colon cancer screening     ICD-10-CM: Z12.11 ICD-9-CM: V76.51 Vitals BP Temp Height(growth percentile) Weight(growth percentile) BMI OB Status 130/64 98.2 °F (36.8 °C) (Oral) 5' 8\" (1.727 m) 172 lb 9.6 oz (78.3 kg) 26.24 kg/m2 Postmenopausal  
 Smoking Status Never Smoker Vitals History BMI and BSA Data Body Mass Index Body Surface Area  
 26.24 kg/m 2 1.94 m 2 Preferred Pharmacy Pharmacy Name Phone [facility] [address]  [facility] [phone] Your Updated Medication List  
  
   
 This list is accurate as of: 6/6/17  9:32 AM.  Always use your most recent med list.  
  
  
  
  
 ALEVE 220 mg Cap Generic drug:  naproxen sodium Take 440 mg by mouth every evening. Alpha Lipoic Acid 600 mg Cap Take 1 Cap by mouth two (2) times a day. aspirin 81 mg chewable tablet Take 81 mg by mouth daily. baclofen 10 mg tablet Commonly known as:  LIORESAL Take 1 Tab by mouth three (3) times daily as needed. Indications: spasticity and cramping  
  
 bethanechol 10 mg tablet Commonly known as:  URECHOLINE Take 1 Tab by mouth two (2) times a day. Biotin 2,500 mcg Cap Take 10,000 mcg by mouth. CALCIUM PO Take  by mouth. CINNAMON 500 mg Cap Generic drug:  cinnamon bark Take 2 Caps by mouth daily. CLARITIN 10 mg tablet Generic drug:  loratadine Take 10 mg by mouth nightly. clotrimazole-betamethasone topical cream  
Commonly known as:  [place] Apply  to affected area three (3) times daily. Apply to affected area three (3) times daily as needed for Skin Irritation. CRANBERRY EXTRACT-VIT C PO Take 3 Caps by mouth daily. 4200 mg  mg Tab Generic drug:  docusate sodium Take  by mouth. FISH OIL PO Take  by mouth. fluticasone 50 mcg/actuation nasal spray Commonly known as:  Algernon Ryley 2 Sprays by Both Nostrils route daily. gabapentin 300 mg capsule Commonly known as:  NEURONTIN Take 600 mg by mouth three (3) times daily. GARCINIA CAMBOGIA PO Take 2 Tabs by mouth daily. glimepiride 2 mg tablet Commonly known as:  AMARYL Take 2 mg by mouth three (3) times daily. INVOKANA 100 mg tablet Generic drug:  canagliflozin Take 1 tablet by mouth  daily before breakfast  
  
 L-Mfolate-B6 Phos-Methyl-B12 3-35-2 mg Tab tab Commonly known as:  Bernarda Gearing Take 1 Tab by mouth two (2) times a day. lisinopril 2.5 mg tablet Commonly known as:  Issac Amado  
 Take 1 Tab by mouth daily. magnesium oxide 400 mg tablet Commonly known as:  MAG-OX Take 400 mg by mouth daily. nystatin-triamcinolone topical cream  
Commonly known as:  MYCOLOG II Apply  to affected area three (3) times daily as needed for Skin Irritation. pravastatin 40 mg tablet Commonly known as:  PRAVACHOL  
TAKE 1 TABLET NIGHTLY (NEEDS APPOINTMENT AS SOON AS POSSIBLE FOR FASTING LABS AND APPOINTMENT) SITagliptin 100 mg tablet Commonly known as:  Rossi Omaha Take 1 Tab by mouth Daily (before breakfast). SOOLANTRA 1 % Crea Generic drug:  ivermectin  
by Apply Externally route. TRUETEST TEST STRIPS strip Generic drug:  glucose blood VI test strips TEST THREE TIMES DAILY  
  
 VITAMIN D3 1,000 unit Cap Generic drug:  cholecalciferol Take  by mouth daily. Prescriptions Sent to Pharmacy Refills  
 bethanechol (URECHOLINE) 10 mg tablet 1 Sig: Take 1 Tab by mouth two (2) times a day. Class: Normal  
 Pharmacy: [facility] [address] [place] [address] [address] AT [facility] of [address] ([facility] Ph #: [phone] Route: Oral  
  
We Performed the Following AMB POC URINALYSIS DIP STICK AUTO W/ MICRO [02904 CPT(R)] CULTURE, URINE [id number] CPT(R)] HEMOGLOBIN A1C WITH EAG [01639 CPT(R)] REFERRAL FOR COLONOSCOPY [SNR818 Custom] Comments:  
 Please evaluate patient for screening. REFERRAL TO UROLOGY [EIQ053 Custom] Comments:  
 Please evaluate patient for urinary retention. Follow-up Instructions Return in about 3 months (around 9/6/2017), or if symptoms worsen or fail to improve. Referral Information Referral ID Referred By Referred To [id number] [facility] [address]   
   [place], [address] Visits Status Start Date End Date 1 New Request 6/6/17 6/6/18  If your referral has a status of pending review or denied, additional information will be sent to support the outcome of this decision. Referral ID Referred By Referred To [id number] Paola Gomez [facility] [place] AT [place]  
   Isabela Eddy Phone: [phone] Fax: [phone] Visits Status Start Date End Date 1 New Request 6/6/17 6/6/18 If your referral has a status of pending review or denied, additional information will be sent to support the outcome of this decision. Patient Instructions Nutrition Tips for Diabetes: After Your Visit Your Care Instructions A healthy diet is important to manage diabetes. It helps you lose weight (if you need to) and keep it off. It gives you the nutrition and energy your body needs and helps prevent heart disease. But a diet for diabetes does not mean that you have to eat special foods. You can eat what your family eats, including occasional sweets and other favorites. But you do have to pay attention to how often you eat and how much you eat of certain foods. The right plan for you will give you meals that help you keep your blood sugar at healthy levels. Try to eat a variety of foods and to spread carbohydrate throughout the day. Carbohydrate raises blood sugar higher and more quickly than any other nutrient does. Carbohydrate is found in sugar, breads and cereals, fruit, starchy vegetables such as potatoes and corn, and milk and yogurt. You may want to work with a dietitian or diabetes educator to help you plan meals and snacks. A dietitian or diabetes educator also can help you lose weight if that is one of your goals. The following tips can help you enjoy your meals and stay healthy. Follow-up care is a key part of your treatment and safety. Be sure to make and go to all appointments, and call your doctor if you are having problems. Its also a good idea to know your test results and keep a list of the medicines you take. How can you care for yourself at home? · Learn which foods have carbohydrate and how much carbohydrate to eat. A dietitian or diabetes educator can help you learn to keep track of how much carbohydrate you eat. · Spread carbohydrate throughout the day. Eat some carbohydrate at all meals, but do not eat too much at any one time. · Plan meals to include food from all the food groups. These are the food groups and some example portion sizes: ¨ Grains: 1 slice of bread (1 ounce), ½ cup of cooked cereal, and 1/3 cup of cooked pasta or rice. These have about 15 grams of carbohydrate in a serving. Choose whole grains such as whole wheat bread or crackers, oatmeal, and brown rice more often than refined grains. ¨ Fruit: 1 small fresh fruit, such as an apple or orange; ½ of a banana; ½ cup of chopped, cooked, or canned fruit; ½ cup of fruit juice; 1 cup of melon or raspberries; and 2 tablespoons of dried fruit. These have about 15 grams of carbohydrate in a serving. ¨ Dairy: 1 cup of nonfat or low-fat milk and 2/3 cup of plain yogurt. These have about 15 grams of carbohydrate in a serving. ¨ Protein foods: Beef, chicken, turkey, fish, eggs, tofu, cheese, cottage cheese, and peanut butter. A serving size of meat is 3 ounces, which is about the size of a deck of cards. Examples of meat substitute serving sizes (equal to 1 ounce of meat) are 1/4 cup of cottage cheese, 1 egg, 1 tablespoon of peanut butter, and ½ cup of tofu. These have very little or no carbohydrate per serving. ¨ Vegetables: Starchy vegetables such as ½ cup of cooked dried beans, peas, potatoes, or corn have about 15 grams of carbohydrate. Nonstarchy vegetables have very little carbohydrate, such as 1 cup of raw leafy vegetables (such as spinach), ½ cup of other vegetables (cooked or chopped), and 3/4 cup of vegetable juice. · Use the plate format to plan meals.  It is a good, quick way to make sure that you have a balanced meal. It also helps you spread carbohydrate throughout the day. You divide your plate by types of foods. Put vegetables on half the plate, meat or meat substitutes on one-quarter of the plate, and a grain or starchy vegetable (such as brown rice or a potato) in the final quarter of the plate. To this you can add a small piece of fruit and 1 cup of milk or yogurt, depending on how much carbohydrate you are supposed to eat at a meal. 
· Talk to your dietitian or diabetes educator about ways to add limited amounts of sweets into your meal plan. You can eat these foods now and then, as long as you include the amount of carbohydrate they have in your daily carbohydrate allowance. · If you drink alcohol, limit it to no more than 1 drink a day for women and 2 drinks a day for men. If you are pregnant, no amount of alcohol is known to be safe. · Protein, fat, and fiber do not raise blood sugar as much as carbohydrate does. If you eat a lot of these nutrients in a meal, your blood sugar will rise more slowly than it would otherwise. · Limit saturated fats, such as those from meat and dairy products. Try to replace it with monounsaturated fat, such as olive oil. This is a healthier choice because people who have diabetes are at higher-than-average risk of heart disease. But use a modest amount of olive oil. A tablespoon of olive oil has 14 grams of fat and 120 calories. · Exercise lowers blood sugar. If you take insulin by shots or pump, you can use less than you would if you were not exercising. Keep in mind that timing matters. If you exercise within 1 hour after a meal, your body may need less insulin for that meal than it would if you exercised 3 hours after the meal. Test your blood sugar to find out how exercise affects your need for insulin. · Exercise on most days of the week. Aim for at least 30 minutes. Exercise helps you stay at a healthy weight and helps your body use insulin. Walking is an easy way to get exercise.  Gradually increase the amount you walk every day. You also may want to swim, bike, or do other activities. When you eat out · Learn to estimate the serving sizes of foods that have carbohydrate. If you measure food at home, it will be easier to estimate the amount in a serving of restaurant food. · If the meal you order has too much carbohydrate (such as potatoes, corn, or baked beans), ask to have a low-carbohydrate food instead. Ask for a salad or green vegetables. · If you use insulin, check your blood sugar before and after eating out to help you plan how much to eat in the future. · If you eat more carbohydrate at a meal than you had planned, take a walk or do other exercise. This will help lower your blood sugar. Where can you learn more? Go to PlotWatt.be Enter G453 in the search box to learn more about \"Nutrition Tips for Diabetes: After Your Visit. \"  
© 6475-4791 Healthwise, Incorporated. Care instructions adapted under license by Tanis Epley (which disclaims liability or warranty for this information). This care instruction is for use with your licensed healthcare professional. If you have questions about a medical condition or this instruction, always ask your healthcare professional. [place] [address] any warranty or liability for your use of this information. Content Version: 41.8.410863; Current as of: June 4, 2014 Introducing [facility]! Dear Ivy Morse: 
Thank you for requesting a [employer] account. Our records indicate that you have previously registered for a [employer] account but its currently inactive. Please call our [employer] support line at [phone]. Additional Information If you have questions, please visit the Frequently Asked Questions section of the [employer] website at [email or web address] Remember, [employer] is NOT to be used for urgent needs. For medical emergencies, dial 911. Now available from your iPhone and Android! Please provide this summary of care documentation to your next provider. Your primary care clinician is listed as [facility]. If you have any questions after today's visit, please call [phone].

## 2017-06-07 LAB
EST. AVERAGE GLUCOSE BLD GHB EST-MCNC: 160 MG/DL
HBA1C MFR BLD: 7.2 % (ref 4.8–5.6)

## 2017-06-08 LAB — BACTERIA UR CULT: NORMAL

## 2017-06-08 NOTE — PROGRESS NOTES
Small amount of bacteria in urine but seems more so from a non clean catch.   If pt is not have uti symptoms then nothing to do

## 2017-06-13 ENCOUNTER — PATIENT OUTREACH (OUTPATIENT)
Dept: FAMILY MEDICINE CLINIC | Age: 67
End: 2017-06-13

## 2017-06-19 DIAGNOSIS — Z12.31 ENCOUNTER FOR SCREENING MAMMOGRAM FOR BREAST CANCER: ICD-10-CM

## 2017-06-19 NOTE — PROGRESS NOTES
[address]  [phone]  Fax [phone]    Patient Name: Analy Waite  YOB: 1950     Post PCP MALINI Appt Note 6/6/17    Patient s/p MARSHA 4/1-4/28/17 rehab s/p  4/1-4/6 [address] cervical transverse myelitis.     Per chart review, patient kept follow up with Dr Cee Andujar . Per Dr Cee Andujar:  F/U for persistent UTI symptoms. pt states she had a UTI and was treated by gyn with Macrobid. Pt states that her glucose was 143 this am but does have a large amount of glucose. Pt feels like she is still retaining urine and is taking bethanechol 5 bid, will increase. No burning with urination, no fever no chills. Pt also due for an A1C. Pt is following a diabetic diet. A1c 7.2 6/7/17  Pt also due for colonoscopy- FIT testing. NN Plan of Care: Follow up call placed. CRISTIAN on . Should patient return call will discuss the following:  Urology Consult? BS? Pool rehab? ACP? Resolving Post 30 day hospital episode.

## 2017-06-23 NOTE — PROGRESS NOTES
[address]  [phone]  Fax [phone]    Patient Name: Magda Keith  YOB: 1950     Patient was discharged from [address] [place] [address] 4/28/17 and has already been seen by PCP Dr Anton Garland on 5/16/17. She continues to go outpatient to [facility]. Tingling sensation remains but improving. BS have decreased. Controlled with current meds. Will see PCP again in 2 months to follow up. She has also seen GYN as well. Being treated for probably yeast infection due to presentation and steroid use. NN Plan: Will follow up in 2 weeks to monitor BS, yeast infections, ACP and rehab.
Dorothea NARANJO, review pt orders, continue to monitor on tele, perform an EKG.
R2 pads on patient

## 2017-06-27 ENCOUNTER — OFFICE VISIT (OUTPATIENT)
Dept: OBGYN CLINIC | Age: 67
End: 2017-06-27

## 2017-06-27 VITALS
DIASTOLIC BLOOD PRESSURE: 82 MMHG | WEIGHT: 173 LBS | HEIGHT: 68 IN | SYSTOLIC BLOOD PRESSURE: 140 MMHG | BODY MASS INDEX: 26.22 KG/M2

## 2017-06-27 DIAGNOSIS — L29.2 VULVAR ITCHING: Primary | ICD-10-CM

## 2017-06-27 RX ORDER — CLOTRIMAZOLE AND BETAMETHASONE DIPROPIONATE 10; .64 MG/G; MG/G
CREAM TOPICAL 2 TIMES DAILY
Qty: 30 G | Refills: 2 | Status: SHIPPED | OUTPATIENT
Start: 2017-06-27 | End: 2017-07-11

## 2017-06-27 NOTE — MR AVS SNAPSHOT
Visit Information Date & Time Provider Department Dept. Phone Encounter #  
 6/27/2017  8:00  [address], [address] [phone] [id number] Your Appointments 5/22/2018  8:50 AM  
ESTABLISHED PATIENT with MD Markus Benson ([facility]) Appt Note: ae  TH  
 [address] [facility] [address] [facility] [id number]  
[address] Upcoming Health Maintenance Date Due FOBT Q 1 YEAR AGE 50-75 2/18/2001 INFLUENZA AGE 9 TO ADULT 8/1/2017 BREAST CANCER SCRN MAMMOGRAM 6/15/2018 Allergies as of 6/27/2017  Review Complete On: 6/27/2017 By: Attila Sorensen Severity Noted Reaction Type Reactions Latex  06/09/2014    Other (comments) Patient states it burns around her mouth. Other Food  10/12/2016    Other (comments) Yogurt - stomach cramping Betadine [Povidone-iodine] High 11/15/2013    Itching Pt states this causes \"extreme\" itching Codeine High 11/15/2013    Anaphylaxis, Rash, Nausea Only, Other (comments) HEADACHE Tolerates tramadol Dilaudid [Hydromorphone (Bulk)] High 04/02/2014    Anaphylaxis, Itching, Nausea Only, Other (comments) HALLUCINATIONS Tolerates tramadol Hydrocodone High 11/15/2013    Anaphylaxis, Rash, Nausea Only, Vertigo Facial - eyelid swelling-had to go to ER for reaction, HALLUCINATIONS Tolerates tramadol Ditropan [Oxybutynin Chloride]  03/04/2014    Swelling Doxycycline  11/15/2013    Rash PUSTULAR RASH- TOLERATING TETRACYCLINE Keflex [Cephalexin]  11/15/2013    Nausea and Vomiting Pain in abdomen AND FLU-LIKE SX Metformin  11/15/2013    Nausea and Vomiting Severe abdominal pain Tape [Adhesive]  06/09/2014    Other (comments) Redness/inflammed. Can only use paper tape. CAN USE BAND-AIDS. TOLERATES SURGICAL TAPE USED IN [place]. Sulfamethoxazole-trimethoprim Low 02/28/2014    Other (comments) Cramping/flu-like symptoms Current Immunizations  Never Reviewed No immunizations on file. Not reviewed this visit Vitals BP Height(growth percentile) Weight(growth percentile) BMI OB Status Smoking Status 140/82 5' 8\" (1.727 m) 173 lb (78.5 kg) 26.3 kg/m2 Postmenopausal Never Smoker BMI and BSA Data Body Mass Index Body Surface Area  
 26.3 kg/m 2 1.94 m 2 Preferred Pharmacy Pharmacy Name Phone [facility] [address]  [facility] [phone] Your Updated Medication List  
  
   
This list is accurate as of: 6/27/17  8:42 AM.  Always use your most recent med list.  
  
  
  
  
 ALEVE 220 mg Cap Generic drug:  naproxen sodium Take 440 mg by mouth every evening. Alpha Lipoic Acid 600 mg Cap Take 1 Cap by mouth two (2) times a day. aspirin 81 mg chewable tablet Take 81 mg by mouth daily. baclofen 10 mg tablet Commonly known as:  LIORESAL Take 1 Tab by mouth three (3) times daily as needed. Indications: spasticity and cramping  
  
 bethanechol 10 mg tablet Commonly known as:  URECHOLINE Take 1 Tab by mouth two (2) times a day. Biotin 2,500 mcg Cap Take 10,000 mcg by mouth. CALCIUM PO Take  by mouth. CINNAMON 500 mg Cap Generic drug:  cinnamon bark Take 2 Caps by mouth daily. CLARITIN 10 mg tablet Generic drug:  loratadine Take 10 mg by mouth nightly. clotrimazole-betamethasone topical cream  
Commonly known as:  Hudson Leslie Apply  to affected area three (3) times daily. Apply to affected area three (3) times daily as needed for Skin Irritation. CRANBERRY EXTRACT-VIT C PO Take 3 Caps by mouth daily. 4200 mg  mg Tab Generic drug:  docusate sodium Take  by mouth. FISH OIL PO Take  by mouth. fluticasone 50 mcg/actuation nasal spray Commonly known as:  Edman Shaver 2 Sprays by Both Nostrils route daily. gabapentin 300 mg capsule Commonly known as:  NEURONTIN Take 600 mg by mouth three (3) times daily. GARCINIA CAMBOGIA PO Take 2 Tabs by mouth daily. glimepiride 2 mg tablet Commonly known as:  AMARYL Take 2 mg by mouth three (3) times daily. INVOKANA 100 mg tablet Generic drug:  canagliflozin Take 1 tablet by mouth  daily before breakfast  
  
 L-Mfolate-B6 Phos-Methyl-B12 3-35-2 mg Tab tab Commonly known as:  Jose Boeck Take 1 Tab by mouth two (2) times a day. lisinopril 2.5 mg tablet Commonly known as:  Aric Leaver Take 1 Tab by mouth daily. magnesium oxide 400 mg tablet Commonly known as:  MAG-OX Take 400 mg by mouth daily. nystatin-triamcinolone topical cream  
Commonly known as:  MYCOLOG II Apply  to affected area three (3) times daily as needed for Skin Irritation. pravastatin 40 mg tablet Commonly known as:  PRAVACHOL  
TAKE 1 TABLET NIGHTLY (NEEDS APPOINTMENT AS SOON AS POSSIBLE FOR FASTING LABS AND APPOINTMENT) SITagliptin 100 mg tablet Commonly known as:  Warren Kemps Take 1 Tab by mouth Daily (before breakfast). SOOLANTRA 1 % Crea Generic drug:  ivermectin  
by Apply Externally route. TRUETEST TEST STRIPS strip Generic drug:  glucose blood VI test strips TEST THREE TIMES DAILY  
  
 VITAMIN D3 1,000 unit Cap Generic drug:  cholecalciferol Take  by mouth daily. Patient Instructions Vulvar Dermatitis: Care Instructions Your Care Instructions Vulvar dermatitis happens when the soft folds of skin around the opening of the vagina become red, painful, and itchy. Dermatitis can be caused by heat or wetness or can be a reaction to scented soaps, powders, creams, toilet paper, spermicides, or clothing.  A skin condition, such as eczema, also can cause dermatitis. Your doctor may do tests to find out what is causing your symptoms. You can treat symptoms of vulvar dermatitis with medicine and home treatment. Try not to scratch your rash. Scratching can make the rash last longer or get worse. Follow-up care is a key part of your treatment and safety. Be sure to make and go to all appointments, and call your doctor if you are having problems. It's also a good idea to know your test results and keep a list of the medicines you take. How can you care for yourself at home? · Use your medicine exactly as prescribed. Call your doctor if you think you are having a problem with your medicine. Tell your doctor if you are taking other prescription or over-the-counter medicines. · Wash your vaginal area no more than once a day. Use cool water with or without mild soap. Pat dry or use a hair dryer on a low setting. · Do not have sex until you feel better. · Do not douche or use powders or sprays on your vulvar area. · Try not to scratch. Use a cold pack or a cool bath to treat itching. · For severe itching, try an oral antihistamine such as diphenhydramine (Benadryl) or chlorpheniramine maleate (Chlor-Trimeton). Read and follow all instructions on the label. · Try sleeping without underwear. · Wear loose cotton clothing. Do not wear nylon or other materials that hold body heat and moisture close to the skin. When should you call for help? Call your doctor now or seek immediate medical care if: 
· You have a fever. · You have vaginal discharge that smells bad. · You have burning or pain when you urinate. · You have increased pain, swelling, warmth, or redness in the vaginal area. Watch closely for changes in your health, and be sure to contact your doctor if: 
· Your rash spreads. · You have new or increased pain in your vaginal area. · You have new or increased itching from inside your vagina. · You do not feel better after 2 or 3 days. Where can you learn more? Go to http://jorge a-katiana.info/. Enter C825 in the search box to learn more about \"Vulvar Dermatitis: Care Instructions. \" Current as of: October 13, 2016 Content Version: 11.3 © 3766-4176 [employer]. Care instructions adapted under license by [employer] (which disclaims liability or warranty for this information). If you have questions about a medical condition or this instruction, always ask your healthcare professional. [address] any warranty or liability for your use of this information. Introducing [facility]! Dear Maritza Carvalho: 
Thank you for requesting a [employer] account. Our records indicate that you have previously registered for a [employer] account but its currently inactive. Please call our [employer] support line at [phone]. Additional Information If you have questions, please visit the Frequently Asked Questions section of the [employer] website at [email or web address] Remember, [employer] is NOT to be used for urgent needs. For medical emergencies, dial 911. Now available from your iPhone and Android! Please provide this summary of care documentation to your next provider. Your primary care clinician is listed as [address]. If you have any questions after today's visit, please call [phone].

## 2017-06-27 NOTE — PROGRESS NOTES
Problem Visit-Complete    Chief Complaint   Vulvar Abnormaility      HPI  Karena Yousif is a 78 y.o. female who presents for the evaluation of vulvar. No LMP recorded. Patient is postmenopausal.   The patient complains of possible allergic reaction to Mycolog. It is located labia per pt. The symptoms are moderate. They started about a month ago. Since then they have become unchanged. Associated symptoms: Irritation, bleeding tissue, burning and raw. Aggravating factors: nystatin, desitin  Alleviating factors: vaseline    The patient denies fevers. Saw Dr. Sedalia Romberg last month for AE and c/o vaginitis. NuSwab confirmed c.albicans  Was tx'd for yeast - given RX for Nystatin and Mycolog. Nystatin \"tore me up\". Stopped using, switched to desitin then vaseline at rec of her PCP. Using baby aloe sensitive wipes. She does not remember using the Mycolog. (Having memory issues d/t acute transverse myelitis). Also see RX for Lotrisone in computer, but she denies using this.      Past Medical History:   Diagnosis Date    Abnormal MRI, cervical spine 4/3/2017    Abnormal pap 3/2015; 5/2016 2015 Neg pap +HPV; 2016 ASCUS +HPV    Acute transverse myelitis (HCC)     Arthritis     osteo-tests for RA were neg    Breast cancer ([address] 1996    right    Chronic pain     Diabetes (HCC)     Fibromyalgia     Ganglion cyst of wrist     LEFT    Hypercholesterolemia     Hypertension     Murmur, cardiac     Neuropathy     Rosacea     Unspecified adverse effect of anesthesia     \"SLOW TO WAKE UP, SENSITIVE TO ANESTHESIA, DO NOT COME AROUND FOR 2-3 DAYS\"      Past Surgical History:   Procedure Laterality Date    HX CATARACT REMOVAL Bilateral 2014    HX CERVICAL FUSION      HX COLPOSCOPY  5/2016    Neg path    HX MASTECTOMY  12/96    tram flap    HX ORTHOPAEDIC Left 1990's    foot surgery    HX TONSILLECTOMY      HX VASCULAR ACCESS  1997    portacath left chest-removed after chemo    RECONSTR NOSE 11/2013, 2005    HOLE IN SEPTUM     Social History     Occupational History    Not on file. Social History Main Topics    Smoking status: Never Smoker    Smokeless tobacco: Never Used      Comment: Never used vapor or e-cigs     Alcohol use No    Drug use: No    Sexual activity: Not Currently     Partners: Male      Comment:      Family History   Problem Relation Age of Onset    Heart Disease Mother     Hypertension Mother     COPD Mother     Diabetes Father     Other Son      INOPERABLE BRAIN TUMOR    Gout Son     Celiac Disease Son     Anesth Problems Neg Hx        Allergies   Allergen Reactions    Latex Other (comments)     Patient states it burns around her mouth.  Other Food Other (comments)     Yogurt - stomach cramping    Betadine [Povidone-Iodine] Itching     Pt states this causes \"extreme\" itching    Codeine Anaphylaxis, Rash, Nausea Only and Other (comments)     HEADACHE  Tolerates tramadol    Dilaudid [Hydromorphone (Bulk)] Anaphylaxis, Itching, Nausea Only and Other (comments)     HALLUCINATIONS  Tolerates tramadol      Hydrocodone Anaphylaxis, Rash, Nausea Only and Vertigo     Facial - eyelid swelling-had to go to ER for reaction, HALLUCINATIONS  Tolerates tramadol      Ditropan [Oxybutynin Chloride] Swelling    Doxycycline Rash     PUSTULAR RASH- TOLERATING TETRACYCLINE    Keflex [Cephalexin] Nausea and Vomiting     Pain in abdomen AND FLU-LIKE SX      Metformin Nausea and Vomiting     Severe abdominal pain      Tape [Adhesive] Other (comments)     Redness/inflammed. Can only use paper tape. CAN USE BAND-AIDS. TOLERATES SURGICAL TAPE USED IN [place].  Sulfamethoxazole-Trimethoprim Other (comments)     Cramping/flu-like symptoms     Prior to Admission medications    Medication Sig Start Date End Date Taking? Authorizing Provider   bethanechol (URECHOLINE) 10 mg tablet Take 1 Tab by mouth two (2) times a day.  6/6/17  Yes Jennifer Mcfadden, DO   SITagliptin (Mickey Rayo) 100 mg tablet Take 1 Tab by mouth Daily (before breakfast). 6/5/17  Yes Jennifer Mcfadden DO   lisinopril (PRINIVIL, ZESTRIL) 2.5 mg tablet Take 1 Tab by mouth daily. 6/5/17  Yes Jennifer Mcfadden DO   naproxen sodium (ALEVE) 220 mg cap Take 440 mg by mouth every evening. Yes Historical Provider   docusate sodium (DOK) 100 mg tab Take  by mouth. Yes Historical Provider   ivermectin (SOOLANTRA) 1 % crea by Apply Externally route. Yes Historical Provider   CALCIUM PO Take  by mouth. Yes Historical Provider   cholecalciferol (VITAMIN D3) 1,000 unit cap Take  by mouth daily. Yes Historical Provider   Biotin 2,500 mcg cap Take 10,000 mcg by mouth. Yes Historical Provider   DOCOSAHEXANOIC ACID/EPA (FISH OIL PO) Take  by mouth. Yes Historical Provider   baclofen (LIORESAL) 10 mg tablet Take 1 Tab by mouth three (3) times daily as needed. Indications: spasticity and cramping  Patient taking differently: Take 10 mg by mouth two (2) times a day. Indications: spasticity and cramping 4/6/17  Yes Vivian Wu MD   aspirin 81 mg chewable tablet Take 81 mg by mouth daily. Yes Historical Provider   gabapentin (NEURONTIN) 300 mg capsule Take 600 mg by mouth three (3) times daily. Yes Historical Provider   glimepiride (AMARYL) 2 mg tablet Take 2 mg by mouth three (3) times daily. Yes Historical Provider   magnesium oxide (MAG-OX) 400 mg tablet Take 400 mg by mouth daily. Yes Historical Provider   fluticasone (FLONASE) 50 mcg/actuation nasal spray 2 Sprays by Both Nostrils route daily. 3/2/17  Yes Madelin Eugene NP   INVOKANA 100 mg tablet Take 1 tablet by mouth  daily before breakfast 3/1/17  Yes Jennifer Mcfadden DO   Alpha Lipoic Acid 600 mg cap Take 1 Cap by mouth two (2) times a day. Yes Historical Provider   L-Mfolate-B6 Phos-Methyl-B12 (METANX) 3-35-2 mg tab tab Take 1 Tab by mouth two (2) times a day. Yes Historical Provider   CHROM GENO/BRINDAL BERRY (GARCINIA CAMBOGIA PO) Take 2 Tabs by mouth daily.    Yes Historical Provider   pravastatin (PRAVACHOL) 40 mg tablet TAKE 1 TABLET NIGHTLY (NEEDS APPOINTMENT AS SOON AS POSSIBLE FOR FASTING LABS AND APPOINTMENT) 7/2/15  Yes Crystal Al MD   TRUETEST TEST STRIPS strip TEST THREE TIMES DAILY 3/31/14  Yes Crystal Al MD   CRANBERRY EXTRACT-VIT C PO Take 3 Caps by mouth daily. 4200 mg   Yes Historical Provider   cinnamon bark (CINNAMON) 500 mg cap Take 2 Caps by mouth daily. Yes Historical Provider   loratadine (CLARITIN) 10 mg tablet Take 10 mg by mouth nightly. Yes Historical Provider   clotrimazole-betamethasone (LOTRISONE) topical cream Apply  to affected area three (3) times daily. Apply to affected area three (3) times daily as needed for Skin Irritation. 5/19/17   Torri Gray MD   nystatin-triamcinolone [facility]) topical cream Apply  to affected area three (3) times daily as needed for Skin Irritation. 5/16/17   Torri Gray MD          Objective:  Visit Vitals    /82    Ht 5' 8\" (1.727 m)    Wt 173 lb (78.5 kg)    BMI 26.3 kg/m2       Physical Exam:     Constitutional  · Appearance: well-nourished, well developed, alert, in no acute distress    HENT  · Head and Face: appears normal      Genitourinary  · External Genitalia:diffuse, mild erythema.   · Vagina: normal vaginal vault without central or paravaginal defects, no discharge present, no inflammatory lesions present, no masses present  · Bladder: non-tender to palpation  · Urethra: appears normal  · Cervix: normal   · Uterus: normal size, shape and consistency  · Adnexa: no adnexal tenderness present, no adnexal masses present  · Perineum: perineum within normal limits, no evidence of trauma, no rashes or skin lesions present  · Anus: anus within normal limits, no hemorrhoids present  · Inguinal Lymph Nodes: no lymphadenopathy present    Skin  · General Inspection: no rash, no lesions identified    Neurologic/Psychiatric  · Mental Status:  · Orientation: grossly oriented to person, place and time  · Mood and Affect: mood normal, affect appropriate    Assessment:  Probable incompletely tx'd yeast.    Plan:   Sabina Westbrook for Lotrisone to use BID x1-2wks  Suggest she keep notebook to track tx  RTO if sx persist, recur    Orders Placed This Encounter    clotrimazole-betamethasone (LOTRISONE) topical cream     Sig: Apply  to affected area two (2) times a day for 14 days.      Dispense:  30 g     Refill:  2

## 2017-06-27 NOTE — PATIENT INSTRUCTIONS
Vulvar Dermatitis: Care Instructions  Your Care Instructions  Vulvar dermatitis happens when the soft folds of skin around the opening of the vagina become red, painful, and itchy. Dermatitis can be caused by heat or wetness or can be a reaction to scented soaps, powders, creams, toilet paper, spermicides, or clothing. A skin condition, such as eczema, also can cause dermatitis. Your doctor may do tests to find out what is causing your symptoms. You can treat symptoms of vulvar dermatitis with medicine and home treatment. Try not to scratch your rash. Scratching can make the rash last longer or get worse. Follow-up care is a key part of your treatment and safety. Be sure to make and go to all appointments, and call your doctor if you are having problems. It's also a good idea to know your test results and keep a list of the medicines you take. How can you care for yourself at home? · Use your medicine exactly as prescribed. Call your doctor if you think you are having a problem with your medicine. Tell your doctor if you are taking other prescription or over-the-counter medicines. · Wash your vaginal area no more than once a day. Use cool water with or without mild soap. Pat dry or use a hair dryer on a low setting. · Do not have sex until you feel better. · Do not douche or use powders or sprays on your vulvar area. · Try not to scratch. Use a cold pack or a cool bath to treat itching. · For severe itching, try an oral antihistamine such as diphenhydramine (Benadryl) or chlorpheniramine maleate (Chlor-Trimeton). Read and follow all instructions on the label. · Try sleeping without underwear. · Wear loose cotton clothing. Do not wear nylon or other materials that hold body heat and moisture close to the skin. When should you call for help? Call your doctor now or seek immediate medical care if:  · You have a fever. · You have vaginal discharge that smells bad.   · You have burning or pain when you urinate. · You have increased pain, swelling, warmth, or redness in the vaginal area. Watch closely for changes in your health, and be sure to contact your doctor if:  · Your rash spreads. · You have new or increased pain in your vaginal area. · You have new or increased itching from inside your vagina. · You do not feel better after 2 or 3 days. Where can you learn more? Go to http://jorge a-katiana.info/. Enter X043 in the search box to learn more about \"Vulvar Dermatitis: Care Instructions. \"  Current as of: October 13, 2016  Content Version: 11.3  © 9965-8677 [employer]. Care instructions adapted under license by [employer] (which disclaims liability or warranty for this information). If you have questions about a medical condition or this instruction, always ask your healthcare professional. [address] any warranty or liability for your use of this information.

## 2017-07-06 RX ORDER — BACLOFEN 10 MG/1
10 TABLET ORAL
Qty: 30 TAB | Refills: 0 | Status: SHIPPED | OUTPATIENT
Start: 2017-07-06 | End: 2017-08-08 | Stop reason: SDUPTHER

## 2017-08-08 DIAGNOSIS — R33.9 URINARY RETENTION: ICD-10-CM

## 2017-08-08 RX ORDER — BETHANECHOL CHLORIDE 10 MG/1
TABLET ORAL
Qty: 60 TAB | Refills: 0 | Status: SHIPPED | OUTPATIENT
Start: 2017-08-08 | End: 2017-09-11 | Stop reason: SDUPTHER

## 2017-08-08 RX ORDER — BACLOFEN 10 MG/1
TABLET ORAL
Qty: 30 TAB | Refills: 0 | Status: SHIPPED | OUTPATIENT
Start: 2017-08-08 | End: 2017-09-11 | Stop reason: SDUPTHER

## 2017-09-07 ENCOUNTER — OFFICE VISIT (OUTPATIENT)
Dept: FAMILY MEDICINE CLINIC | Age: 67
End: 2017-09-07

## 2017-09-07 ENCOUNTER — HOSPITAL ENCOUNTER (OUTPATIENT)
Dept: GENERAL RADIOLOGY | Age: 67
Discharge: HOME | End: 2017-09-07
Payer: MEDICARE

## 2017-09-07 VITALS
HEART RATE: 60 BPM | RESPIRATION RATE: 18 BRPM | OXYGEN SATURATION: 98 % | WEIGHT: 173 LBS | BODY MASS INDEX: 26.22 KG/M2 | HEIGHT: 68 IN | DIASTOLIC BLOOD PRESSURE: 70 MMHG | SYSTOLIC BLOOD PRESSURE: 140 MMHG | TEMPERATURE: 98.3 F

## 2017-09-07 DIAGNOSIS — R15.9 INCONTINENCE OF FECES, UNSPECIFIED FECAL INCONTINENCE TYPE: ICD-10-CM

## 2017-09-07 DIAGNOSIS — R15.9 INCONTINENCE OF FECES, UNSPECIFIED FECAL INCONTINENCE TYPE: Primary | ICD-10-CM

## 2017-09-07 PROCEDURE — 74020 XR ABD FLAT/ ERECT: CPT
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
## 2017-09-07 NOTE — PROGRESS NOTES
Pt has a large amount of stool through out her entire colon and this is likely causing her issues. I recommend her giving herself an enema daily to try and empty her out some.   If not resolving then f/u with GI

## 2017-09-07 NOTE — PATIENT INSTRUCTIONS
Fecal Incontinence: Care Instructions  Your Care Instructions  Fecal incontinence is the loss of normal control of your bowels. You may not be able to reach the toilet in time for a bowel movement, or stool may leak from your anus. Fecal incontinence can be caused by constipation, diarrhea, or anxiety or other emotional stress. It can also result from nerve injury, muscle damage (especially from childbirth), lack of exercise, or poor diet. Treatment of fecal incontinence depends on what caused it and how bad it is. It may include changes to your diet, medicine, bowel training, or surgery. More than one treatment may be needed. Loss of bowel control can be hard to deal with. You may feel ashamed or embarrassed, and you may not want to leave the house because you fear that you might have an accident in public. But treatment can help you better control your bowels and manage your incontinence. Follow-up care is a key part of your treatment and safety. Be sure to make and go to all appointments, and call your doctor if you are having problems. It's also a good idea to know your test results and keep a list of the medicines you take. How can you care for yourself at home? · Keep a food diary of what you eat. This will help you learn which foods make your incontinence worse. · Eat small, frequent meals. Large meals may cause diarrhea. · Avoid constipation:  ¨ Include fruits, vegetables, beans, and whole grains in your diet each day. These foods are high in fiber. ¨ Drink plenty of fluids, enough so that your urine is light yellow or clear like water. If you have kidney, heart, or liver disease and have to limit fluids, talk with your doctor before you increase the amount of fluids you drink. ¨ Get some exercise every day. Build up slowly to 30 to 60 minutes a day on 5 or more days of the week. ¨ Take a fiber supplement, such as Citrucel or Metamucil, every day if needed.  Read and follow all instructions on the label.  ¨ Schedule time each day for a bowel movement. Having a daily routine may help. Take your time and do not strain when having a bowel movement. · Take your medicines exactly as prescribed. Call your doctor if you think you are having a problem with your medicine. You will get more details on the specific medicines your doctor prescribes. · Do pelvic floor (Kegel) exercises, which tighten and strengthen the pelvic muscles. To do Kegel exercises:  ¨ Squeeze the same muscles you would use to stop your urine. Your belly and thighs should not move. ¨ Hold the squeeze for 3 seconds, and then relax for 3 seconds. ¨ Start with 3 seconds. Then add 1 second each week until you are able to squeeze for 10 seconds. ¨ Repeat the exercise 10 to 15 times a session. Do three or more sessions a day. When should you call for help? Watch closely for changes in your health, and be sure to contact your doctor if:  · You have increased pain, or you develop itching or sores around the anus. · Your incontinence gets worse. · You have any new symptoms, such as blood in your stools. Where can you learn more? Go to http://jorge a-katiana.info/. Enter R545 in the search box to learn more about \"Fecal Incontinence: Care Instructions. \"  Current as of: August 9, 2016  Content Version: 11.3  © 7800-2179 Healthwise, Incorporated. Care instructions adapted under license by [employer] (which disclaims liability or warranty for this information). If you have questions about a medical condition or this instruction, always ask your healthcare professional. [place] [address] any warranty or liability for your use of this information.

## 2017-09-07 NOTE — MR AVS SNAPSHOT
Visit Information Date & Time Provider Department Dept. Phone Encounter #  
 9/7/2017 10:45 AM Arlette Patrick, [address] [phone] [id number] Your Appointments 5/22/2018  8:50 AM  
ESTABLISHED PATIENT with MD Markus Recinos ([facility]) Appt Note: ae  TH  
 [address] [facility] [address] [phone] [id number]  
[address] [facility] [address] Upcoming Health Maintenance Date Due FOBT Q 1 YEAR AGE 50-75 2/18/2001 Pneumococcal 65+ High/Highest Risk (2 of 2 - PPSV23) 5/16/2017 INFLUENZA AGE 9 TO ADULT 8/1/2017 MICROALBUMIN Q1 11/3/2017 HEMOGLOBIN A1C Q6M 12/6/2017 FOOT EXAM Q1 12/14/2017 EYE EXAM RETINAL OR DILATED Q1 2/24/2018 MEDICARE YEARLY EXAM 3/22/2018 LIPID PANEL Q1 4/1/2018 BREAST CANCER SCRN MAMMOGRAM 6/15/2018 GLAUCOMA SCREENING Q2Y 2/24/2019 DTaP/Tdap/Td series (2 - Td) 3/21/2027 Allergies as of 9/7/2017  Review Complete On: 9/7/2017 By: Arlette Patrick, DO Severity Noted Reaction Type Reactions Latex  06/09/2014    Other (comments) Patient states it burns around her mouth. Other Food  10/12/2016    Other (comments) Yogurt - stomach cramping Betadine [Povidone-iodine] High 11/15/2013    Itching Pt states this causes \"extreme\" itching Codeine High 11/15/2013    Anaphylaxis, Rash, Nausea Only, Other (comments) HEADACHE Tolerates tramadol Dilaudid [Hydromorphone (Bulk)] High 04/02/2014    Anaphylaxis, Itching, Nausea Only, Other (comments) HALLUCINATIONS Tolerates tramadol Hydrocodone High 11/15/2013    Anaphylaxis, Rash, Nausea Only, Vertigo Facial - eyelid swelling-had to go to ER for reaction, HALLUCINATIONS Tolerates tramadol Ditropan [Oxybutynin Chloride]  03/04/2014    Swelling Doxycycline  11/15/2013    Rash  PUSTULAR RASH- TOLERATING TETRACYCLINE  
 Keflex [Cephalexin]  11/15/2013    Nausea and Vomiting Pain in abdomen AND FLU-LIKE SX Metformin  11/15/2013    Nausea and Vomiting Severe abdominal pain Tape [Adhesive]  06/09/2014    Other (comments) Redness/inflammed. Can only use paper tape. CAN USE BAND-AIDS. TOLERATES SURGICAL TAPE USED IN [place] [facility]. Sulfamethoxazole-trimethoprim Low 02/28/2014    Other (comments) Cramping/flu-like symptoms Current Immunizations  Never Reviewed No immunizations on file. Not reviewed this visit You Were Diagnosed With   
  
 Codes Comments Incontinence of feces, unspecified fecal incontinence type    -  Primary ICD-10-CM: R15.9 ICD-9-CM: 787.60 Vitals BP Pulse Temp Resp Height(growth percentile) Weight(growth percentile) 140/70 60 98.3 °F (36.8 °C) (Oral) 18 5' 8\" (1.727 m) 173 lb (78.5 kg) SpO2 BMI OB Status Smoking Status 98% 26.3 kg/m2 Postmenopausal Never Smoker Vitals History BMI and BSA Data Body Mass Index Body Surface Area  
 26.3 kg/m 2 1.94 m 2 Preferred Pharmacy Pharmacy Name Phone [facility] [address] [place] [address] [address]  [facility] [phone] Your Updated Medication List  
  
   
This list is accurate as of: 9/7/17 11:26 AM.  Always use your most recent med list.  
  
  
  
  
 ALEVE 220 mg Cap Generic drug:  naproxen sodium Take 440 mg by mouth every evening. Alpha Lipoic Acid 600 mg Cap Take 1 Cap by mouth two (2) times a day. aspirin 81 mg chewable tablet Take 81 mg by mouth daily. baclofen 10 mg tablet Commonly known as:  LIORESAL  
TAKE 1 TABLET BY MOUTH THREE TIMES DAILY AS NEEDED  
  
 bethanechol 10 mg tablet Commonly known as:  URECHOLINE  
TAKE 1 TABLET BY MOUTH TWICE DAILY Biotin 2,500 mcg Cap Take 10,000 mcg by mouth. CALCIUM PO Take  by mouth. CINNAMON 500 mg Cap Generic drug:  cinnamon bark Take 2 Caps by mouth daily. CLARITIN 10 mg tablet Generic drug:  loratadine Take 10 mg by mouth nightly. CRANBERRY EXTRACT-VIT C PO Take 3 Caps by mouth daily. 4200 mg  mg Tab Generic drug:  docusate sodium Take  by mouth. FISH OIL PO Take  by mouth. fluticasone 50 mcg/actuation nasal spray Commonly known as:  Marielle Hoot 2 Sprays by Both Nostrils route daily. gabapentin 300 mg capsule Commonly known as:  NEURONTIN Take 600 mg by mouth three (3) times daily. GARCINIA CAMBOGIA PO Take 2 Tabs by mouth daily. glimepiride 2 mg tablet Commonly known as:  AMARYL Take 2 mg by mouth three (3) times daily. INVOKANA 100 mg tablet Generic drug:  canagliflozin Take 1 tablet by mouth  daily before breakfast  
  
 L-Mfolate-B6 Phos-Methyl-B12 3-35-2 mg Tab tab Commonly known as:  Polo Moons Take 1 Tab by mouth two (2) times a day. lisinopril 2.5 mg tablet Commonly known as:  Jos Handing Take 1 Tab by mouth daily. magnesium oxide 400 mg tablet Commonly known as:  MAG-OX Take 400 mg by mouth daily. nystatin-triamcinolone topical cream  
Commonly known as:  MYCOLOG II Apply  to affected area three (3) times daily as needed for Skin Irritation. pravastatin 40 mg tablet Commonly known as:  PRAVACHOL  
TAKE 1 TABLET NIGHTLY (NEEDS APPOINTMENT AS SOON AS POSSIBLE FOR FASTING LABS AND APPOINTMENT) SITagliptin 100 mg tablet Commonly known as:  Librado Ruths Take 1 Tab by mouth Daily (before breakfast). SOOLANTRA 1 % Crea Generic drug:  ivermectin  
by Apply Externally route. TRUETEST TEST STRIPS strip Generic drug:  glucose blood VI test strips TEST THREE TIMES DAILY  
  
 VITAMIN D3 1,000 unit Cap Generic drug:  cholecalciferol Take  by mouth daily. We Performed the Following REFERRAL TO GASTROENTEROLOGY [PRJ52 Custom] Comments: Please evaluate patient for fecal incontinence. To-Do List   
 09/07/2017 Imaging:  XR ABD FLAT/ ERECT Referral Information Referral ID Referred By Referred To  
  
 [id number] AAYUSH, [address] [facility]   
   [address] [facility]  [place], [address] [facility] Visits Status Start Date End Date 1 New Request 9/7/17 9/7/18 If your referral has a status of pending review or denied, additional information will be sent to support the outcome of this decision. Patient Instructions Fecal Incontinence: Care Instructions Your Care Instructions Fecal incontinence is the loss of normal control of your bowels. You may not be able to reach the toilet in time for a bowel movement, or stool may leak from your anus. Fecal incontinence can be caused by constipation, diarrhea, or anxiety or other emotional stress. It can also result from nerve injury, muscle damage (especially from childbirth), lack of exercise, or poor diet. Treatment of fecal incontinence depends on what caused it and how bad it is. It may include changes to your diet, medicine, bowel training, or surgery. More than one treatment may be needed. Loss of bowel control can be hard to deal with. You may feel ashamed or embarrassed, and you may not want to leave the house because you fear that you might have an accident in public. But treatment can help you better control your bowels and manage your incontinence. Follow-up care is a key part of your treatment and safety. Be sure to make and go to all appointments, and call your doctor if you are having problems. It's also a good idea to know your test results and keep a list of the medicines you take. How can you care for yourself at home? · Keep a food diary of what you eat. This will help you learn which foods make your incontinence worse. · Eat small, frequent meals. Large meals may cause diarrhea. · Avoid constipation: ¨ Include fruits, vegetables, beans, and whole grains in your diet each day. These foods are high in fiber. ¨ Drink plenty of fluids, enough so that your urine is light yellow or clear like water. If you have kidney, heart, or liver disease and have to limit fluids, talk with your doctor before you increase the amount of fluids you drink. ¨ Get some exercise every day. Build up slowly to 30 to 60 minutes a day on 5 or more days of the week. ¨ Take a fiber supplement, such as Citrucel or Metamucil, every day if needed. Read and follow all instructions on the label. ¨ Schedule time each day for a bowel movement. Having a daily routine may help. Take your time and do not strain when having a bowel movement. · Take your medicines exactly as prescribed. Call your doctor if you think you are having a problem with your medicine. You will get more details on the specific medicines your doctor prescribes. · Do pelvic floor (Kegel) exercises, which tighten and strengthen the pelvic muscles. To do Kegel exercises: 
¨ Squeeze the same muscles you would use to stop your urine. Your belly and thighs should not move. ¨ Hold the squeeze for 3 seconds, and then relax for 3 seconds. ¨ Start with 3 seconds. Then add 1 second each week until you are able to squeeze for 10 seconds. ¨ Repeat the exercise 10 to 15 times a session. Do three or more sessions a day. When should you call for help? Watch closely for changes in your health, and be sure to contact your doctor if: 
· You have increased pain, or you develop itching or sores around the anus. · Your incontinence gets worse. · You have any new symptoms, such as blood in your stools. Where can you learn more? Go to http://jorge a-katiana.info/. Enter H136 in the search box to learn more about \"Fecal Incontinence: Care Instructions. \" Current as of: August 9, 2016 Content Version: 11.3 © 4223-5905 Healthwise, Incorporated. Care instructions adapted under license by [employer] (which disclaims liability or warranty for this information). If you have questions about a medical condition or this instruction, always ask your healthcare professional. [address] any warranty or liability for your use of this information. Introducing [facility]! Dear Nydia Hollowayort: 
Thank you for requesting a [employer]est [employer] Card To Get Approved For account. Our records indicate that you have previously registered for a [employer] Card To Get Approved For account but its currently inactive. Please call our [employer]est [employer] To Get Approved For support line at [phone]. Additional Information If you have questions, please visit the Frequently Asked Questions section of the [employer] To Get Approved For website at [email or web address] Remember, E[employer]est [employer] Card To Get Approved For is NOT to be used for urgent needs. For medical emergencies, dial 911. Now available from your iPhone and Android! Please provide this summary of care documentation to your next provider. Your primary care clinician is listed as Kartik Corona. If you have any questions after today's visit, please call [phone].

## 2017-09-11 DIAGNOSIS — R33.9 URINARY RETENTION: ICD-10-CM

## 2017-09-12 RX ORDER — BETHANECHOL CHLORIDE 10 MG/1
TABLET ORAL
Qty: 60 TAB | Refills: 0 | Status: SHIPPED | OUTPATIENT
Start: 2017-09-12 | End: 2017-10-31

## 2017-09-12 RX ORDER — BACLOFEN 10 MG/1
TABLET ORAL
Qty: 30 TAB | Refills: 0 | Status: SHIPPED | OUTPATIENT
Start: 2017-09-12 | End: 2017-10-17 | Stop reason: SDUPTHER

## 2017-09-25 ENCOUNTER — OFFICE VISIT (OUTPATIENT)
Dept: OBGYN CLINIC | Age: 67
End: 2017-09-25

## 2017-09-25 VITALS — HEIGHT: 68 IN | WEIGHT: 173.4 LBS | BODY MASS INDEX: 26.28 KG/M2

## 2017-09-25 DIAGNOSIS — N89.8 VAGINAL DISCHARGE: ICD-10-CM

## 2017-09-25 DIAGNOSIS — R39.15 URINARY URGENCY: Primary | ICD-10-CM

## 2017-09-25 DIAGNOSIS — R82.90 CLOUDY URINE: ICD-10-CM

## 2017-09-25 DIAGNOSIS — N89.8 VAGINAL ITCHING: ICD-10-CM

## 2017-09-25 LAB
BILIRUB UR QL STRIP: NORMAL
GLUCOSE UR-MCNC: NORMAL MG/DL
KETONES P FAST UR STRIP-MCNC: NEGATIVE MG/DL
PH UR STRIP: 5 [PH] (ref 4.6–8)
PROT UR QL STRIP: NEGATIVE MG/DL
SP GR UR STRIP: 1.01 (ref 1–1.03)
UA UROBILINOGEN AMB POC: NORMAL (ref 0.2–1)
URINALYSIS CLARITY POC: NORMAL
URINALYSIS COLOR POC: YELLOW
URINE BLOOD POC: NEGATIVE
URINE LEUKOCYTES POC: NORMAL
URINE NITRITES POC: NEGATIVE

## 2017-09-25 RX ORDER — NYSTATIN AND TRIAMCINOLONE ACETONIDE 100000; 1 [USP'U]/G; MG/G
CREAM TOPICAL
Qty: 60 G | Refills: 2 | Status: SHIPPED | OUTPATIENT
Start: 2017-09-25 | End: 2017-10-31

## 2017-09-25 NOTE — PROGRESS NOTES
UTI note    Chioma Henry is a ,  78 y.o. female [facility] who presents today with had concerns including UTI and Vaginitis. Arya Vega Her symptoms started a few days ago, gradually worsening since that time. She has lots of issues related to transverse myelitis    Discomfort is in the suprapubic area and does not radiate. Symptoms are not alleviated by hydration. Symptoms are exacerbated with activity. She states she feels a lot of pressure in her left side, she is numb on her right. Patient's other complaints: urinary frequency, vaginal discharge and itching, irritation, odor, and cloudy urine. Her symptoms are moderate. Patient denies fever, headache and chills. . There is not any concern of sexual abuse. There is not a history of trauma to the genital area. Patient does not have a history of recurrent UTI. She does not have a history of pyelonephritis. She has a history of  has a past medical history of Abnormal MRI, cervical spine (4/3/2017); Abnormal pap (3/2015; 2016); Acute transverse myelitis ([address] Arthritis; Breast cancer ([address] (); Chronic pain; Diabetes ([address] Fibromyalgia; Ganglion cyst of wrist; Hypercholesterolemia; Hypertension; Memory loss; Murmur, cardiac; Neuropathy ([address] Rosacea; and Unspecified adverse effect of anesthesia. with the following surgical history  has a past surgical history that includes reconstr nose (2013, ); colposcopy (2016); vascular access (); mastectomy (); tonsillectomy; cataract removal (Bilateral, ); orthopaedic (Left, ); and cervical fusion. .  . She has not been evaluated for her current complaints.       Last urinalysis results are:  Results for orders placed or performed in visit on 17   AMB POC URINALYSIS DIP STICK MANUAL W/O MICRO   Result Value Ref Range    Color (UA POC) Yellow     Clarity (UA POC) Slightly Cloudy     Glucose (UA POC) 4+ Negative    Bilirubin (UA POC) Trace Negative    Ketones (UA POC) Negative Negative    Specific gravity (UA POC) 1.015 1.001 - 1.035    Blood (UA POC) Negative Negative    pH (UA POC) 5.0 4.6 - 8.0    Protein (UA POC) Negative Negative mg/dL    Urobilinogen (UA POC) normal 0.2 - 1    Nitrites (UA POC) Negative Negative    Leukocyte esterase (UA POC) Trace Negative           Results for orders placed or performed in visit on 06/06/17   AMB POC URINALYSIS DIP STICK AUTO W/ MICRO     Status: None   Result Value Ref Range Status    Color (UA POC) Yellow  Final    Clarity (UA POC) Clear  Final    Glucose (UA POC) 4+ Negative Final    Bilirubin (UA POC) Negative Negative Final    Ketones (UA POC) Negative Negative Final    Specific gravity (UA POC) 1.015 1.001 - 1.035 Final    Blood (UA POC) Negative Negative Final    pH (UA POC) 6.0 4.6 - 8.0 Final    Protein (UA POC) Negative Negative mg/dL Final    Urobilinogen (UA POC) 0.2 mg/dL 0.2 - 1 Final    Nitrites (UA POC) Negative Negative Final    Leukocyte esterase (UA POC) Negative Negative Final       Past Medical History:   Diagnosis Date    Abnormal MRI, cervical spine 4/3/2017    Abnormal pap 3/2015; 5/2016 2015 Neg pap +HPV; 2016 ASCUS +HPV    Acute transverse myelitis (HCC)     Arthritis     osteo-tests for RA were neg    Breast cancer ([facility] 1996    right    Chronic pain     Diabetes (HCC)     Fibromyalgia     Ganglion cyst of wrist     LEFT    Hypercholesterolemia     Hypertension     Memory loss     Per pt.      Murmur, cardiac     Neuropathy (HCC)     Rosacea     Unspecified adverse effect of anesthesia     \"SLOW TO WAKE UP, SENSITIVE TO ANESTHESIA, DO NOT COME AROUND FOR 2-3 DAYS\"      Past Surgical History:   Procedure Laterality Date    HX CATARACT REMOVAL Bilateral 2014    HX CERVICAL FUSION      HX COLPOSCOPY  5/2016    Neg path    HX MASTECTOMY  12/96    tram flap    HX ORTHOPAEDIC Left 1990's    foot surgery    HX TONSILLECTOMY      HX VASCULAR ACCESS  1997    portacath left chest-removed after chemo    RECONSTR NOSE  11/2013, 2005    HOLE IN SEPTUM     Social History     Occupational History    Not on file. Social History Main Topics    Smoking status: Never Smoker    Smokeless tobacco: Never Used      Comment: Never used vapor or e-cigs     Alcohol use No    Drug use: No    Sexual activity: Not Currently     Partners: Male      Comment:      Family History   Problem Relation Age of Onset    Heart Disease Mother     Hypertension Mother     COPD Mother     Diabetes Father     Other Son      INOPERABLE BRAIN TUMOR    Gout Son     Celiac Disease Son     Anesth Problems Neg Hx        Allergies   Allergen Reactions    Latex Other (comments)     Patient states it burns around her mouth.  Other Food Other (comments)     Yogurt - stomach cramping    Betadine [Povidone-Iodine] Itching     Pt states this causes \"extreme\" itching    Codeine Anaphylaxis, Rash, Nausea Only and Other (comments)     HEADACHE  Tolerates tramadol    Dilaudid [Hydromorphone (Bulk)] Anaphylaxis, Itching, Nausea Only and Other (comments)     HALLUCINATIONS  Tolerates tramadol      Hydrocodone Anaphylaxis, Rash, Nausea Only and Vertigo     Facial - eyelid swelling-had to go to ER for reaction, HALLUCINATIONS  Tolerates tramadol      Ditropan [Oxybutynin Chloride] Swelling    Doxycycline Rash     PUSTULAR RASH- TOLERATING TETRACYCLINE    Keflex [Cephalexin] Nausea and Vomiting     Pain in abdomen AND FLU-LIKE SX      Metformin Nausea and Vomiting     Severe abdominal pain      Tape [Adhesive] Other (comments)     Redness/inflammed. Can only use paper tape. CAN USE BAND-AIDS. TOLERATES SURGICAL TAPE USED IN [place] [facility].  Sulfamethoxazole-Trimethoprim Other (comments)     Cramping/flu-like symptoms     Prior to Admission medications    Medication Sig Start Date End Date Taking?  Authorizing Provider   bethanechol (URECHOLINE) 10 mg tablet TAKE 1 TABLET BY MOUTH TWICE DAILY 9/12/17  Yes Kartik Corona,  baclofen (LIORESAL) 10 mg tablet TAKE 1 TABLET BY MOUTH THREE TIMES DAILY AS NEEDED 9/12/17  Yes Shmuel Azul NP   SITagliptin (JANUVIA) 100 mg tablet Take 1 Tab by mouth Daily (before breakfast). 6/5/17  Yes Jennifer Mcfadden DO   lisinopril (PRINIVIL, ZESTRIL) 2.5 mg tablet Take 1 Tab by mouth daily. 6/5/17  Yes Jennifer Mcfadden DO   naproxen sodium (ALEVE) 220 mg cap Take 440 mg by mouth every evening. Yes Historical Provider   docusate sodium (DOK) 100 mg tab Take  by mouth. Yes Historical Provider   ivermectin (SOOLANTRA) 1 % crea by Apply Externally route. Yes Historical Provider   CALCIUM PO Take  by mouth. Yes Historical Provider   cholecalciferol (VITAMIN D3) 1,000 unit cap Take  by mouth daily. Yes Historical Provider   Biotin 2,500 mcg cap Take 10,000 mcg by mouth. Yes Historical Provider   DOCOSAHEXANOIC ACID/EPA (FISH OIL PO) Take  by mouth. Yes Historical Provider   aspirin 81 mg chewable tablet Take 81 mg by mouth daily. Yes Historical Provider   gabapentin (NEURONTIN) 300 mg capsule Take 600 mg by mouth three (3) times daily. Yes Historical Provider   glimepiride (AMARYL) 2 mg tablet Take 2 mg by mouth three (3) times daily. Yes Historical Provider   magnesium oxide (MAG-OX) 400 mg tablet Take 400 mg by mouth daily. Yes Historical Provider   fluticasone (FLONASE) 50 mcg/actuation nasal spray 2 Sprays by Both Nostrils route daily. 3/2/17  Yes Amaya Rosario NP   INVOKANA 100 mg tablet Take 1 tablet by mouth  daily before breakfast 3/1/17  Yes Jennifer Mcfadden DO   Alpha Lipoic Acid 600 mg cap Take 1 Cap by mouth two (2) times a day. Yes Historical Provider   L-Mfolate-B6 Phos-Methyl-B12 (METANX) 3-35-2 mg tab tab Take 1 Tab by mouth two (2) times a day. Yes Historical Provider   CHROM GENO/BRINDAL BERRY (GARCINIA CAMBO[place]IA PO) Take 2 Tabs by mouth daily.    Yes Historical Provider   pravastatin (PRAVACHOL) 40 mg tablet TAKE 1 TABLET NIGHTLY (NEEDS APPOINTMENT AS SOON AS POSSIBLE FOR FASTING LABS AND APPOINTMENT) 7/2/15  Yes Ekaterina Daniels MD   TRUETEST TEST STRIPS strip TEST THREE TIMES DAILY 3/31/14  Yes Ekaterina Daniels MD   CRANBERRY EXTRACT-VIT C PO Take 3 Caps by mouth daily. 4200 mg   Yes Historical Provider   cinnamon bark (CINNAMON) 500 mg cap Take 2 Caps by mouth daily. Yes Historical Provider   loratadine (CLARITIN) 10 mg tablet Take 10 mg by mouth nightly. Yes Historical Provider   nystatin-triamcinolone (MYCOLOG II) topical cream Apply  to affected area three (3) times daily as needed for Skin Irritation.  5/16/17   Lacy Singletary MD        Review of Systems: History obtained from the patient  Constitutional: negative for weight loss, fever, night sweats  Breast: negative for breast lumps, nipple discharge, galactorrhea  GI: negative for change in bowel habits, abdominal pain, black or bloody stools  : see HPI, negative for vaginal discharge  MSK: negative for back pain, joint pain, muscle pain  Skin: negative for itching, rash, hives  Psych: negative for anxiety, depression, change in mood      Objective:  Visit Vitals    Ht 5' 8\" (1.727 m)    Wt 173 lb 6.4 oz (78.7 kg)    BMI 26.37 kg/m2       Physical Exam:   PHYSICAL EXAMINATION    Constitutional  · Appearance: well-nourished, well developed, alert, in no acute distress    Gastrointestinal  · Abdominal Examination: abdomen non-tender to palpation, normal bowel sounds, no masses present  · Liver and spleen: no hepatomegaly present, spleen not palpable  · Hernias: no hernias identified    Genitourinary  · External Genitalia: normal appearance for age, no discharge present, no tenderness present, no inflammatory lesions present, no masses present, no atrophy present, redness  · Vagina: large cystocele, no discharge present, no inflammatory lesions present, no masses present  · Bladder: tender to palpation  · Urethra: appears normal  · Cervix: normal   · Uterus: normal size, shape and consistency  · Adnexa: no adnexal tenderness present, no adnexal masses present  · Perineum: perineum within normal limits, no evidence of trauma, no rashes or skin lesions present  · Anus: anus within normal limits, no hemorrhoids present  · Inguinal Lymph Nodes: no lymphadenopathy present    Skin  · General Inspection: no rash, no lesions identified    Neurologic/Psychiatric  · Mental Status:  · Orientation: grossly oriented to person, place and time  · Mood and Affect: mood normal, affect appropriate    Assessment:   Vaginal irritation  Urinary pressure    Plan:   Urine cx  Nuswab sent  Refill mycolog - get good external relief

## 2017-09-25 NOTE — PATIENT INSTRUCTIONS
Urinary Tract Infection in Women: Care Instructions  Your Care Instructions    A urinary tract infection, or UTI, is a general term for an infection anywhere between the kidneys and the urethra (where urine comes out). Most UTIs are bladder infections. They often cause pain or burning when you urinate. UTIs are caused by bacteria and can be cured with antibiotics. Be sure to complete your treatment so that the infection goes away. Follow-up care is a key part of your treatment and safety. Be sure to make and go to all appointments, and call your doctor if you are having problems. It's also a good idea to know your test results and keep a list of the medicines you take. How can you care for yourself at home? · Take your antibiotics as directed. Do not stop taking them just because you feel better. You need to take the full course of antibiotics. · Drink extra water and other fluids for the next day or two. This may help wash out the bacteria that are causing the infection. (If you have kidney, heart, or liver disease and have to limit fluids, talk with your doctor before you increase your fluid intake.)  · Avoid drinks that are carbonated or have caffeine. They can irritate the bladder. · Urinate often. Try to empty your bladder each time. · To relieve pain, take a hot bath or lay a heating pad set on low over your lower belly or genital area. Never go to sleep with a heating pad in place. To prevent UTIs  · Drink plenty of water each day. This helps you urinate often, which clears bacteria from your system. (If you have kidney, heart, or liver disease and have to limit fluids, talk with your doctor before you increase your fluid intake.)  · Urinate when you need to. · Urinate right after you have sex. · Change sanitary pads often. · Avoid douches, bubble baths, feminine hygiene sprays, and other feminine hygiene products that have deodorants.   · After going to the bathroom, wipe from front to back.  When should you call for help? Call your doctor now or seek immediate medical care if:  · Symptoms such as fever, chills, nausea, or vomiting get worse or appear for the first time. · You have new pain in your back just below your rib cage. This is called flank pain. · There is new blood or pus in your urine. · You have any problems with your antibiotic medicine. Watch closely for changes in your health, and be sure to contact your doctor if:  · You are not getting better after taking an antibiotic for 2 days. · Your symptoms go away but then come back. Where can you learn more? Go to http://jorge a-katiana.info/. Enter M799 in the search box to learn more about \"Urinary Tract Infection in Women: Care Instructions. \"  Current as of: November 28, 2016  Content Version: 11.3  © 8613-8367 [employer]. Care instructions adapted under license by [employer] (which disclaims liability or warranty for this information). If you have questions about a medical condition or this instruction, always ask your healthcare professional. [address] any warranty or liability for your use of this information.

## 2017-09-26 ENCOUNTER — TELEPHONE (OUTPATIENT)
Dept: OBGYN CLINIC | Age: 67
End: 2017-09-26

## 2017-09-26 RX ORDER — TRIAMCINOLONE ACETONIDE 1 MG/G
CREAM TOPICAL 3 TIMES DAILY
Qty: 30 G | Refills: 0 | Status: SHIPPED | OUTPATIENT
Start: 2017-09-26 | End: 2019-06-18

## 2017-09-26 RX ORDER — NYSTATIN 100000 U/G
CREAM TOPICAL 3 TIMES DAILY
Qty: 30 G | Refills: 0 | Status: SHIPPED | OUTPATIENT
Start: 2017-09-26 | End: 2019-06-18

## 2017-09-29 LAB
A VAGINAE DNA VAG QL NAA+PROBE: NORMAL SCORE
BVAB2 DNA VAG QL NAA+PROBE: NORMAL SCORE
C ALBICANS DNA VAG QL NAA+PROBE: NEGATIVE
C GLABRATA DNA VAG QL NAA+PROBE: NEGATIVE
MEGA1 DNA VAG QL NAA+PROBE: NORMAL SCORE
T VAGINALIS RRNA SPEC QL NAA+PROBE: NEGATIVE

## 2017-10-17 RX ORDER — BACLOFEN 10 MG/1
TABLET ORAL
Qty: 30 TAB | Refills: 0 | Status: SHIPPED | OUTPATIENT
Start: 2017-10-17 | End: 2017-10-31

## 2017-10-31 ENCOUNTER — HOSPITAL ENCOUNTER (INPATIENT)
Age: 67
LOS: 2 days | Discharge: HOME | DRG: 394 | End: 2017-11-02
Attending: EMERGENCY MEDICINE | Admitting: FAMILY MEDICINE
Payer: MEDICARE

## 2017-10-31 ENCOUNTER — APPOINTMENT (OUTPATIENT)
Dept: CT IMAGING | Age: 67
DRG: 394 | End: 2017-10-31
Attending: EMERGENCY MEDICINE
Payer: MEDICARE

## 2017-10-31 DIAGNOSIS — K52.9 NONINFECTIOUS GASTROENTERITIS, UNSPECIFIED TYPE: Primary | ICD-10-CM

## 2017-10-31 DIAGNOSIS — K92.1 BLOODY STOOL: ICD-10-CM

## 2017-10-31 LAB
ALBUMIN SERPL-MCNC: 3.1 G/DL (ref 3.5–5)
ALBUMIN/GLOB SERPL: 0.9 {RATIO} (ref 1.1–2.2)
ALP SERPL-CCNC: 157 U/L (ref 45–117)
ALT SERPL-CCNC: 27 U/L (ref 12–78)
ANION GAP SERPL CALC-SCNC: 13 MMOL/L (ref 5–15)
APPEARANCE UR: CLEAR
AST SERPL-CCNC: 18 U/L (ref 15–37)
BACTERIA URNS QL MICRO: NEGATIVE /HPF
BASOPHILS # BLD: 0 K/UL (ref 0–0.1)
BASOPHILS NFR BLD: 0 % (ref 0–1)
BILIRUB SERPL-MCNC: 0.5 MG/DL (ref 0.2–1)
BILIRUB UR QL CFM: NEGATIVE
BUN SERPL-MCNC: 23 MG/DL (ref 6–20)
BUN/CREAT SERPL: 39 (ref 12–20)
CALCIUM SERPL-MCNC: 9.3 MG/DL (ref 8.5–10.1)
CHLORIDE SERPL-SCNC: 103 MMOL/L (ref 97–108)
CO2 SERPL-SCNC: 25 MMOL/L (ref 21–32)
COLOR UR: ABNORMAL
CREAT SERPL-MCNC: 0.59 MG/DL (ref 0.55–1.02)
EOSINOPHIL # BLD: 0.1 K/UL (ref 0–0.4)
EOSINOPHIL NFR BLD: 1 % (ref 0–7)
EPITH CASTS URNS QL MICRO: ABNORMAL /LPF
ERYTHROCYTE [DISTWIDTH] IN BLOOD BY AUTOMATED COUNT: 14.2 % (ref 11.5–14.5)
GLOBULIN SER CALC-MCNC: 3.6 G/DL (ref 2–4)
GLUCOSE BLD STRIP.AUTO-MCNC: 140 MG/DL (ref 65–100)
GLUCOSE BLD STRIP.AUTO-MCNC: 86 MG/DL (ref 65–100)
GLUCOSE SERPL-MCNC: 179 MG/DL (ref 65–100)
GLUCOSE UR STRIP.AUTO-MCNC: >1000 MG/DL
HCT VFR BLD AUTO: 33.1 % (ref 35–47)
HCT VFR BLD AUTO: 41.6 % (ref 35–47)
HEMOCCULT STL QL: POSITIVE
HGB BLD-MCNC: 10.3 G/DL (ref 11.5–16)
HGB BLD-MCNC: 13.4 G/DL (ref 11.5–16)
HGB UR QL STRIP: NEGATIVE
HYALINE CASTS URNS QL MICRO: ABNORMAL /LPF (ref 0–5)
KETONES UR QL STRIP.AUTO: 80 MG/DL
LEUKOCYTE ESTERASE UR QL STRIP.AUTO: NEGATIVE
LIPASE SERPL-CCNC: 231 U/L (ref 73–393)
LYMPHOCYTES # BLD: 1.3 K/UL (ref 0.8–3.5)
LYMPHOCYTES NFR BLD: 14 % (ref 12–49)
MCH RBC QN AUTO: 27.3 PG (ref 26–34)
MCHC RBC AUTO-ENTMCNC: 32.2 G/DL (ref 30–36.5)
MCV RBC AUTO: 84.9 FL (ref 80–99)
MONOCYTES # BLD: 0.4 K/UL (ref 0–1)
MONOCYTES NFR BLD: 5 % (ref 5–13)
NEUTS SEG # BLD: 7.5 K/UL (ref 1.8–8)
NEUTS SEG NFR BLD: 80 % (ref 32–75)
NITRITE UR QL STRIP.AUTO: NEGATIVE
PH UR STRIP: 5 [PH] (ref 5–8)
PLATELET # BLD AUTO: 238 K/UL (ref 150–400)
POTASSIUM SERPL-SCNC: 4 MMOL/L (ref 3.5–5.1)
PROT SERPL-MCNC: 6.7 G/DL (ref 6.4–8.2)
PROT UR STRIP-MCNC: NEGATIVE MG/DL
RBC # BLD AUTO: 4.9 M/UL (ref 3.8–5.2)
RBC #/AREA URNS HPF: ABNORMAL /HPF (ref 0–5)
SERVICE CMNT-IMP: ABNORMAL
SERVICE CMNT-IMP: NORMAL
SODIUM SERPL-SCNC: 141 MMOL/L (ref 136–145)
SP GR UR REFRACTOMETRY: >1.03 (ref 1–1.03)
UA: UC IF INDICATED,UAUC: ABNORMAL
UROBILINOGEN UR QL STRIP.AUTO: 0.2 EU/DL (ref 0.2–1)
WBC # BLD AUTO: 9.4 K/UL (ref 3.6–11)
WBC URNS QL MICRO: ABNORMAL /HPF (ref 0–4)

## 2017-10-31 PROCEDURE — 87045 FECES CULTURE AEROBIC BACT: CPT | Performed by: FAMILY MEDICINE

## 2017-10-31 PROCEDURE — 74011250636 HC RX REV CODE- 250/636: Performed by: FAMILY MEDICINE

## 2017-10-31 PROCEDURE — 86923 COMPATIBILITY TEST ELECTRIC: CPT | Performed by: FAMILY MEDICINE

## 2017-10-31 PROCEDURE — 74011636320 HC RX REV CODE- 636/320: Performed by: RADIOLOGY

## 2017-10-31 PROCEDURE — 74011000250 HC RX REV CODE- 250: Performed by: EMERGENCY MEDICINE

## 2017-10-31 PROCEDURE — 74011250636 HC RX REV CODE- 250/636: Performed by: EMERGENCY MEDICINE

## 2017-10-31 PROCEDURE — 96361 HYDRATE IV INFUSION ADD-ON: CPT

## 2017-10-31 PROCEDURE — 87077 CULTURE AEROBIC IDENTIFY: CPT | Performed by: STUDENT IN AN ORGANIZED HEALTH CARE EDUCATION/TRAINING PROGRAM

## 2017-10-31 PROCEDURE — 36415 COLL VENOUS BLD VENIPUNCTURE: CPT | Performed by: FAMILY MEDICINE

## 2017-10-31 PROCEDURE — 74011000250 HC RX REV CODE- 250: Performed by: FAMILY MEDICINE

## 2017-10-31 PROCEDURE — 99284 EMERGENCY DEPT VISIT MOD MDM: CPT

## 2017-10-31 PROCEDURE — 80053 COMPREHEN METABOLIC PANEL: CPT | Performed by: EMERGENCY MEDICINE

## 2017-10-31 PROCEDURE — 81001 URINALYSIS AUTO W/SCOPE: CPT | Performed by: STUDENT IN AN ORGANIZED HEALTH CARE EDUCATION/TRAINING PROGRAM

## 2017-10-31 PROCEDURE — 87186 SC STD MICRODIL/AGAR DIL: CPT | Performed by: STUDENT IN AN ORGANIZED HEALTH CARE EDUCATION/TRAINING PROGRAM

## 2017-10-31 PROCEDURE — 85014 HEMATOCRIT: CPT | Performed by: FAMILY MEDICINE

## 2017-10-31 PROCEDURE — 85025 COMPLETE CBC W/AUTO DIFF WBC: CPT | Performed by: EMERGENCY MEDICINE

## 2017-10-31 PROCEDURE — 74177 CT ABD & PELVIS W/CONTRAST: CPT

## 2017-10-31 PROCEDURE — 86900 BLOOD TYPING SEROLOGIC ABO: CPT | Performed by: FAMILY MEDICINE

## 2017-10-31 PROCEDURE — 94761 N-INVAS EAR/PLS OXIMETRY MLT: CPT

## 2017-10-31 PROCEDURE — C9113 INJ PANTOPRAZOLE SODIUM, VIA: HCPCS | Performed by: FAMILY MEDICINE

## 2017-10-31 PROCEDURE — 82272 OCCULT BLD FECES 1-3 TESTS: CPT | Performed by: EMERGENCY MEDICINE

## 2017-10-31 PROCEDURE — 82962 GLUCOSE BLOOD TEST: CPT

## 2017-10-31 PROCEDURE — 65660000000 HC RM CCU STEPDOWN

## 2017-10-31 PROCEDURE — 65270000029 HC RM PRIVATE

## 2017-10-31 PROCEDURE — 89055 LEUKOCYTE ASSESSMENT FECAL: CPT | Performed by: FAMILY MEDICINE

## 2017-10-31 PROCEDURE — 87086 URINE CULTURE/COLONY COUNT: CPT | Performed by: STUDENT IN AN ORGANIZED HEALTH CARE EDUCATION/TRAINING PROGRAM

## 2017-10-31 PROCEDURE — 83690 ASSAY OF LIPASE: CPT | Performed by: EMERGENCY MEDICINE

## 2017-10-31 PROCEDURE — 74011250637 HC RX REV CODE- 250/637: Performed by: STUDENT IN AN ORGANIZED HEALTH CARE EDUCATION/TRAINING PROGRAM

## 2017-10-31 PROCEDURE — 96374 THER/PROPH/DIAG INJ IV PUSH: CPT

## 2017-10-31 PROCEDURE — 87040 BLOOD CULTURE FOR BACTERIA: CPT | Performed by: FAMILY MEDICINE

## 2017-10-31 RX ORDER — BETHANECHOL CHLORIDE 10 MG/1
10 TABLET ORAL
Status: DISCONTINUED | OUTPATIENT
Start: 2017-10-31 | End: 2017-11-02 | Stop reason: HOSPADM

## 2017-10-31 RX ORDER — SODIUM CHLORIDE 9 MG/ML
125 INJECTION, SOLUTION INTRAVENOUS CONTINUOUS
Status: DISCONTINUED | OUTPATIENT
Start: 2017-10-31 | End: 2017-11-02

## 2017-10-31 RX ORDER — FLUTICASONE PROPIONATE 50 MCG
2 SPRAY, SUSPENSION (ML) NASAL DAILY
Status: DISCONTINUED | OUTPATIENT
Start: 2017-11-01 | End: 2017-11-02 | Stop reason: HOSPADM

## 2017-10-31 RX ORDER — SODIUM CHLORIDE 9 MG/ML
250 INJECTION, SOLUTION INTRAVENOUS AS NEEDED
Status: DISCONTINUED | OUTPATIENT
Start: 2017-10-31 | End: 2017-11-02 | Stop reason: HOSPADM

## 2017-10-31 RX ORDER — INSULIN LISPRO 100 [IU]/ML
INJECTION, SOLUTION INTRAVENOUS; SUBCUTANEOUS
Status: DISCONTINUED | OUTPATIENT
Start: 2017-10-31 | End: 2017-11-02 | Stop reason: HOSPADM

## 2017-10-31 RX ORDER — PRAVASTATIN SODIUM 20 MG/1
40 TABLET ORAL DAILY
Status: DISCONTINUED | OUTPATIENT
Start: 2017-10-31 | End: 2017-11-02 | Stop reason: HOSPADM

## 2017-10-31 RX ORDER — SODIUM CHLORIDE 0.9 % (FLUSH) 0.9 %
5-10 SYRINGE (ML) INJECTION AS NEEDED
Status: DISCONTINUED | OUTPATIENT
Start: 2017-10-31 | End: 2017-11-02 | Stop reason: HOSPADM

## 2017-10-31 RX ORDER — METRONIDAZOLE 500 MG/100ML
500 INJECTION, SOLUTION INTRAVENOUS EVERY 8 HOURS
Status: DISCONTINUED | OUTPATIENT
Start: 2017-10-31 | End: 2017-11-02 | Stop reason: HOSPADM

## 2017-10-31 RX ORDER — SODIUM CHLORIDE 0.9 % (FLUSH) 0.9 %
5-10 SYRINGE (ML) INJECTION EVERY 8 HOURS
Status: DISCONTINUED | OUTPATIENT
Start: 2017-10-31 | End: 2017-11-02 | Stop reason: HOSPADM

## 2017-10-31 RX ORDER — LISINOPRIL 2.5 MG/1
2.5 TABLET ORAL
COMMUNITY
End: 2020-07-16 | Stop reason: ALTCHOICE

## 2017-10-31 RX ORDER — MAGNESIUM SULFATE 100 %
4 CRYSTALS MISCELLANEOUS AS NEEDED
Status: DISCONTINUED | OUTPATIENT
Start: 2017-10-31 | End: 2017-11-02 | Stop reason: HOSPADM

## 2017-10-31 RX ORDER — FLUTICASONE PROPIONATE 50 MCG
2 SPRAY, SUSPENSION (ML) NASAL
COMMUNITY
End: 2019-06-18

## 2017-10-31 RX ORDER — NYSTATIN 100000 U/G
CREAM TOPICAL 3 TIMES DAILY
Status: DISCONTINUED | OUTPATIENT
Start: 2017-10-31 | End: 2017-11-02 | Stop reason: HOSPADM

## 2017-10-31 RX ORDER — BACLOFEN 10 MG/1
10 TABLET ORAL
COMMUNITY
End: 2017-12-19 | Stop reason: SDUPTHER

## 2017-10-31 RX ORDER — GABAPENTIN 300 MG/1
300 CAPSULE ORAL 3 TIMES DAILY
Status: DISCONTINUED | OUTPATIENT
Start: 2017-10-31 | End: 2017-11-02 | Stop reason: HOSPADM

## 2017-10-31 RX ORDER — DIAPER,BRIEF,INFANT-TODD,DISP
1 EACH MISCELLANEOUS DAILY
COMMUNITY
End: 2019-06-18

## 2017-10-31 RX ORDER — LEVOFLOXACIN 5 MG/ML
500 INJECTION, SOLUTION INTRAVENOUS EVERY 24 HOURS
Status: DISCONTINUED | OUTPATIENT
Start: 2017-11-01 | End: 2017-11-02 | Stop reason: HOSPADM

## 2017-10-31 RX ORDER — DEXTROSE 50 % IN WATER (D50W) INTRAVENOUS SYRINGE
12.5-25 AS NEEDED
Status: DISCONTINUED | OUTPATIENT
Start: 2017-10-31 | End: 2017-11-02 | Stop reason: HOSPADM

## 2017-10-31 RX ORDER — TRIAMCINOLONE ACETONIDE 1 MG/G
CREAM TOPICAL 3 TIMES DAILY
Status: DISCONTINUED | OUTPATIENT
Start: 2017-10-31 | End: 2017-11-02 | Stop reason: HOSPADM

## 2017-10-31 RX ORDER — IVERMECTIN 10 MG/G
CREAM TOPICAL DAILY
Status: DISCONTINUED | OUTPATIENT
Start: 2017-11-01 | End: 2017-11-02 | Stop reason: HOSPADM

## 2017-10-31 RX ORDER — LEVOFLOXACIN 5 MG/ML
750 INJECTION, SOLUTION INTRAVENOUS
Status: COMPLETED | OUTPATIENT
Start: 2017-10-31 | End: 2017-11-01

## 2017-10-31 RX ORDER — MELOXICAM 7.5 MG/1
7.5 TABLET ORAL DAILY
COMMUNITY
End: 2017-11-02

## 2017-10-31 RX ORDER — LISINOPRIL 5 MG/1
2.5 TABLET ORAL
Status: DISCONTINUED | OUTPATIENT
Start: 2017-10-31 | End: 2017-11-02 | Stop reason: HOSPADM

## 2017-10-31 RX ORDER — METRONIDAZOLE 500 MG/100ML
500 INJECTION, SOLUTION INTRAVENOUS
Status: COMPLETED | OUTPATIENT
Start: 2017-10-31 | End: 2017-10-31

## 2017-10-31 RX ORDER — BETHANECHOL CHLORIDE 10 MG/1
10 TABLET ORAL
COMMUNITY
End: 2017-12-19 | Stop reason: SDUPTHER

## 2017-10-31 RX ORDER — LANOLIN ALCOHOL/MO/W.PET/CERES
400 CREAM (GRAM) TOPICAL DAILY
Status: DISCONTINUED | OUTPATIENT
Start: 2017-10-31 | End: 2017-11-02 | Stop reason: HOSPADM

## 2017-10-31 RX ADMIN — IOPAMIDOL 95 ML: 755 INJECTION, SOLUTION INTRAVENOUS at 13:11

## 2017-10-31 RX ADMIN — METRONIDAZOLE 500 MG: 500 INJECTION, SOLUTION INTRAVENOUS at 15:17

## 2017-10-31 RX ADMIN — LISINOPRIL 2.5 MG: 5 TABLET ORAL at 21:00

## 2017-10-31 RX ADMIN — PRAVASTATIN SODIUM 40 MG: 20 TABLET ORAL at 21:00

## 2017-10-31 RX ADMIN — SODIUM CHLORIDE 125 ML/HR: 900 INJECTION, SOLUTION INTRAVENOUS at 17:32

## 2017-10-31 RX ADMIN — METRONIDAZOLE 500 MG: 500 INJECTION, SOLUTION INTRAVENOUS at 23:00

## 2017-10-31 RX ADMIN — SODIUM CHLORIDE 1000 ML: 900 INJECTION, SOLUTION INTRAVENOUS at 12:12

## 2017-10-31 RX ADMIN — Medication 400 MG: at 17:29

## 2017-10-31 RX ADMIN — BETHANECHOL CHLORIDE 10 MG: 10 TABLET ORAL at 21:00

## 2017-10-31 RX ADMIN — GABAPENTIN 300 MG: 300 CAPSULE ORAL at 17:29

## 2017-10-31 RX ADMIN — GABAPENTIN 300 MG: 300 CAPSULE ORAL at 22:00

## 2017-10-31 RX ADMIN — SODIUM CHLORIDE 40 MG: 9 INJECTION INTRAMUSCULAR; INTRAVENOUS; SUBCUTANEOUS at 17:29

## 2017-10-31 RX ADMIN — LEVOFLOXACIN 750 MG: 5 INJECTION, SOLUTION INTRAVENOUS at 15:00

## 2017-10-31 NOTE — ED NOTES
Artificial Tears: One drop to both eyes 3-4 times daily. We recommend Systane or Refresh lubricating eye drops which can be found at any pharmacy. Attempted to call report to . Unable to receive at this time.

## 2017-10-31 NOTE — PROGRESS NOTES
Bedside shift change report given to Taty Tubbs (oncoming nurse) by Ori Allison (offgoing nurse). Report included the following information SBAR, Kardex, MAR and Recent Results.

## 2017-10-31 NOTE — PROGRESS NOTES
Primary Nurse Jyothi Copeland and Austin Westbrook RN performed a dual skin assessment on this patient No impairment noted  Jan score is 18

## 2017-10-31 NOTE — ED TRIAGE NOTES
\"I've been passing blood since Sunday. It's bright red with a lot of mucus in it. Before it starts, I get a hideous pain in my left side. \" Patient admits chills but no fever. Denies hemorrhoids or prior hx of bleeding from rectum.

## 2017-10-31 NOTE — IP AVS SNAPSHOT
[facility] 
 
 
 [address] 
[phone] Patient: Stephanie Dial MRN: [id number] About your hospitalization You were admitted on:  October 31, 2017 You last received care in the:  [facility]  MED SURG 2 You were discharged on:  November 2, 2017 Why you were hospitalized Your primary diagnosis was:  Not on File Things You Need To Do (next 8 weeks) Follow up with Jame Kocher, MD  
Please call to make an appointment Phone:  [phone] Where:  [address] [facility],  [address], [address] [facility] Cec[place]Alex Vipul [address] Tuesday Nov 07, 2017 ESTABLISHED PATIENT with Zachary Ojeda DO at  9:45 AM  
Where:  [address] [facility] ([address]) Discharge Orders None A check linda indicates which time of day the medication should be taken. My Medications STOP taking these medications   
 aspirin 81 mg chewable tablet  
   
  
 meloxicam 7.5 mg tablet Commonly known as:  MOBIC  
   
  
  
TAKE these medications as instructed Instructions Each Dose to Equal  
 Morning Noon Evening Bedtime Alpha Lipoic Acid 600 mg Cap Your last dose was: Your next dose is: Take 1 Cap by mouth two (2) times a day. 1 Cap  
    
   
   
   
  
 baclofen 10 mg tablet Commonly known as:  LIORESAL Your last dose was: Your next dose is: Take 10 mg by mouth daily as needed. 10 mg  
    
   
   
   
  
 bethanechol 10 mg tablet Commonly known as:  URECHOLINE Your last dose was: Your next dose is: Take 10 mg by mouth nightly. 10 mg  
    
   
   
   
  
 biotin 10,000 mcg Cap Your last dose was: Your next dose is: Take 1 Cap by mouth daily. 1 Cap CALCIUM PO Your last dose was: Your next dose is: Take 1 Caplet by mouth daily. 1 Caplet CINNAMON 500 mg Cap Generic drug:  cinnamon bark Your last dose was: Your next dose is: Take 2 Caps by mouth daily. 2 Cap CLARITIN 10 mg tablet Generic drug:  loratadine Your last dose was: Your next dose is: Take 10 mg by mouth nightly. 10 mg CRANBERRY EXTRACT-VIT C PO Your last dose was: Your next dose is: Take 2 Caps by mouth daily. 2 Cap FISH OIL PO Your last dose was: Your next dose is: Take 1 Cap by mouth daily. 1 Cap FLONASE 50 mcg/actuation nasal spray Generic drug:  fluticasone Your last dose was: Your next dose is: 2 Sprays by Both Nostrils route daily as needed for Rhinitis. 2 Spray  
    
   
   
   
  
 gabapentin 300 mg capsule Commonly known as:  NEURONTIN Your last dose was: Your next dose is: Take 300 mg by mouth three (3) times daily. 300 mg GARCINIA CAMBOGIA PO Your last dose was: Your next dose is: Take 1 Tab by mouth daily. 1 Tab  
    
   
   
   
  
 glimepiride 2 mg tablet Commonly known as:  AMARYL Your last dose was: Your next dose is: Take 2 mg by mouth three (3) times daily. 2 mg  
    
   
   
   
  
 guar gum packet Commonly known as:  Mardel Yue Your last dose was: Your next dose is: Take 1 Packet by mouth nightly as needed. 1 Packet INVOKANA 100 mg tablet Generic drug:  canagliflozin Your last dose was: Your next dose is: Take 1 tablet by mouth  daily before breakfast  
     
   
   
   
  
 L-Mfolate-B6 Phos-Methyl-B12 3-35-2 mg Tab tab Commonly known as:  Candy Coins Your last dose was: Your next dose is: Take 1 Tab by mouth two (2) times a day. Indications: neuropathy 1 Tab  
    
   
   
   
  
 lisinopril 2.5 mg tablet Commonly known as:  Alisson Wade Your last dose was: Your next dose is: Take 2.5 mg by mouth nightly. 2.5 mg  
    
   
   
   
  
 magnesium oxide 400 mg tablet Commonly known as:  MAG-OX Your last dose was: Your next dose is: Take 400 mg by mouth daily. 400 mg  
    
   
   
   
  
 metroNIDAZOLE 500 mg tablet Commonly known as:  FLAGYL Your last dose was: Your next dose is: Take 1 Tab by mouth every eight (8) hours for 2 doses. 500 mg  
    
   
   
   
  
 nystatin topical cream  
Commonly known as:  MYCOSTATIN Your last dose was: Your next dose is:    
   
   
 Apply  to affected area three (3) times daily. As needed for skin irritation. Use a think layer OTHER(NON-FORMULARY) Your last dose was: Your next dose is: Take 1 Tab by mouth daily. Vitamin D & E  
 1 Tab  
    
   
   
   
  
 pravastatin 40 mg tablet Commonly known as:  PRAVACHOL Your last dose was: Your next dose is: TAKE 1 TABLET NIGHTLY (NEEDS APPOINTMENT AS SOON AS POSSIBLE FOR FASTING LABS AND APPOINTMENT) SITagliptin 100 mg tablet Commonly known as:  Philliprama Navarrete Your last dose was: Your next dose is: Take 1 Tab by mouth Daily (before breakfast). 100 mg  
    
   
   
   
  
 SOOLANTRA 1 % Crea Generic drug:  ivermectin Your last dose was: Your next dose is:    
   
   
 by Apply Externally route daily. Indications: ACNE ROSACEA  
     
   
   
   
  
 triamcinolone acetonide 0.1 % topical cream  
Commonly known as:  KENALOG Your last dose was: Your next dose is: Apply  to affected area three (3) times daily. As needed for skin irritation, use thin layer. TRUETEST TEST STRIPS strip Generic drug:  glucose blood VI test strips Your last dose was: Your next dose is:    
   
   
 TEST THREE TIMES DAILY Where to Get Your Medications Information on where to get these meds will be given to you by the nurse or doctor. ! Ask your nurse or doctor about these medications  
  metroNIDAZOLE 500 mg tablet Discharge Instructions HOME DISCHARGE INSTRUCTIONS Alexandra Aw / [id number] : 1950 Admission date: 10/31/2017 Discharge date: 2017 Please bring this form with you to show your care provider at your follow-up appointment. Primary care provider:  Abram Sandra DO Discharging provider: Sherman Hollins MD  - Family Medicine Resident Kade Greenberg MD - Attending, Family Medicine You have been admitted to the hospital with the following diagnoses: 
 
ACUTE DIAGNOSES: 
· GI bleed secondary to acute colitis Jane Smallwood . . . . . . . . . . . . . . . . . . . . . . . . . . . . . . . . . . . . . . . . . . . . . . . . . . . . . . . . . . . . . . . . . . . . . . Jane Smallwood FOLLOW-UP CARE RECOMMENDATIONS: 
We recommend trying pelvic rehab as that may provide benefit for helping with urination incontinence. Ask you primary care physician about how this can be set up. Appointments: Follow-up Information Follow up With Details Comments Contact Info Abram Sandra DO On 2017 @9.45am for hospital follow up appointment [address] [place] [address] [address] [place] [address] 
[phone] Osvaldo Wilson MD  Please call to make an appointment  [address] [place] [address] 
[phone] Medication Changes: 
-STOP taking Meloxican and Aspirin as these can contribute to gastrointestinal bleeding.  See you primary care physician before restarting these medications. Also, avoid all NSAIDS such as Ibuprofen and Naproxen. 
 
-You will be sent home with two additional doses of antibiotics. Take one dose of Metronidazole at 8:00pm today and the last dose at 6:00a on 11/3 or as soon as you wake up. Follow-up tests needed: None Pending test results: At the time of your discharge the following test results are still pending: Ova and parasite. Please make sure you review these results with your outpatient follow-up provider(s). Specific symptoms to watch for: chest pain, shortness of breath, fever, chills, nausea, vomiting, diarrhea, change in mentation, falling, weakness, bleeding. DIET/what to eat: Start off with soft, bland foods such as broth, toast, applesauce and bananas. Slowly add elements from your previous regular diet ACTIVITY: Activity as tolerated Wound care: None Equipment needed: None What to do if new or unexpected symptoms occur? If you experience any of the above symptoms (or should other concerns or questions arise after discharge) please call your primary care physician. Return to the emergency room if you cannot get hold of your doctor. · It is very important that you keep your follow-up appointment(s). · Please bring discharge papers, medication list (and/or medication bottles) to your follow-up appointments for review by your outpatient provider(s). · Please check the list of medications and be sure it includes every medication (even non-prescription medications) that your provider wants you to take. · It is important that you take the medication exactly as they are prescribed. · Keep your medication in the bottles provided by the pharmacist and keep a list of the medication names, dosages, and times to be taken in your wallet. · Do not take other medications without consulting your doctor.   
· If you have any questions about your medications or other instructions, please talk to your nurse or care provider before you leave the hospital.  
 
Information obtained by:  
 
I understand that if any problems occur once I am at home I am to contact my physician. These instructions were explained to me and I had the opportunity to ask questions. I understand and acknowledge receipt of the instructions indicated above. Physician's or R.N.'s Signature                                                                  Date/Time Patient or Representative Signature                                                          Date/Time 
 
 
 
 
[employer] Transitions of Care Introducing [employer] [address] Galdamez offers a voluntary care coordination program to provide high quality service and care to [facility] fee-for-service beneficiaries. Roxane Garay was designed to help you enhance your health and well-being through the following services: ? Transitions of Care  support for individuals who are transitioning from one care setting to another (example: Hospital to home). ? Chronic and Complex Care Coordination  support for individuals and caregivers of those with serious or chronic illnesses or with more than one chronic (ongoing) condition and those who take a number of different medications. If you meet specific medical criteria, a [address] [place] [address] Rd may call you directly to coordinate your care with your primary care physician and your other care providers. For questions about the [facility] programs, please, contact your physicians office.  
 
For general questions or additional information about Accountable Care Organizations: 
Please visit www.medicare.gov/acos. html or call 1-800-MEDICARE (6-839.288.8273) TTY users should call 3-179.390.1126. [employer] Announcement We are excited to announce that we are making your provider's discharge notes available to you in [employer]. You will see these notes when they are completed and signed by the physician that discharged you from your recent hospital stay. If you have any questions or concerns about any information you see in [employer], please call the Health Information Department where you were seen or reach out to your Primary Care Provider for more information about your plan of care. Introducing [facility]! Dear Shaun Contreras: 
Thank you for requesting a [employer] account. Our records indicate that you have previously registered for a [employer] account but its currently inactive. Please call our [employer] support line at [phone]. Additional Information If you have questions, please visit the Frequently Asked Questions section of the [employer] website at [email or web address] Remember, [employer] is NOT to be used for urgent needs. For medical emergencies, dial 911. Now available from your iPhone and Android! Unresulted Labs-Please follow up with your PCP about these lab tests Order Current Status CULTURE, BLOOD Preliminary result Providers Seen During Your Hospitalization Provider Specialty Primary office phone Jayro Reddy MD Emergency Medicine [phone] Pollo Marroquin MD Family Practice [phone] Your Primary Care Physician (PCP) Primary Care Physician Office Phone Office Fax Delmi Hazel [phone] You are allergic to the following Allergen Reactions Latex Other (comments) Patient states it burns around her mouth. Other Food Other (comments) Yogurt - stomach cramping Betadine (Povidone-Iodine) Itching Pt states this causes \"extreme\" itching Codeine Anaphylaxis Rash Nausea Only Other (comments) HEADACHE Tolerates tramadol Dilaudid (Hydromorphone (Bulk)) Anaphylaxis Itching Nausea Only Other (comments) HALLUCINATIONS Tolerates tramadol Hydrocodone Anaphylaxis Rash Nausea Only Vertigo Facial - eyelid swelling-had to go to ER for reaction, HALLUCINATIONS Tolerates tramadol Ditropan (Oxybutynin Chloride) Swelling Doxycycline Rash PUSTULAR RASH- TOLERATING TETRACYCLINE Keflex (Cephalexin) Nausea and Vomiting Pain in abdomen AND FLU-LIKE SX Metformin Nausea and Vomiting Severe abdominal pain Tape (Adhesive) Other (comments) Redness/inflammed. Can only use paper tape. CAN USE BAND-AIDS. TOLERATES SURGICAL TAPE USED IN [place] [facility]. Sulfamethoxazole-Trimethoprim Other (comments) Cramping/flu-like symptoms Recent Documentation Height Weight BMI OB Status Smoking Status 1.727 m 73.1 kg 24.51 kg/m2 Postmenopausal Never Smoker Emergency Contacts Name Discharge Info Relation Home Work Mobile [facility] DISCHARGE CAREGIVER [3] Spouse [3] [phone] Patient Belongings The following personal items are in your possession at time of discharge: 
  Dental Appliances: None  Visual Aid: Glasses, With patient      Home Medications: None   Jewelry: None  Clothing: At bedside    Other Valuables: None Please provide this summary of care documentation to your next provider. Signatures-by signing, you are acknowledging that this After Visit Summary has been reviewed with you and you have received a copy. Patient Signature:  ____________________________________________________________ Date:  ____________________________________________________________  
  
Patricia Avila  Provider Signature: ____________________________________________________________ Date:  ____________________________________________________________

## 2017-10-31 NOTE — PROGRESS NOTES
10/31/2017   CARE MANAGEMENT NOTE:  CM is following pt in the ER for initial discharge planning. EMR reviewed. CM met with pt and her , Joselyn Rivera ([phone]) at bedside to obtain hx for this needs assessment. Reportedly, pt and her  reside in a one story home. PTA, pt was ambulatory with a cane or rolling walker, is indepn with ADLs, and drives. Pt is retired and she uses [address]. She does not have home healthcare currently but she goes to outpt PT on Thursdays each week. DME in the home includes a cane, rolling walker, w/c, BSC, and she has a glucometer. PCP is Dr. Mortimer Diesel. Plan is to return home when medically stable. CM notified Nurse Navigator, Fredis Ochoa of pt's admission.   Toya

## 2017-10-31 NOTE — PROGRESS NOTES
[facility]: MED RECONCILIATION    Comments/Recommendations:    Patient reports compliance with current medications, however she has not had her meds since Saturday/Sunday d/t illness   NSAIDs - Patient was taking Aleve for pain, but recently prescribed meloxicam on 9/27/17. She reports that she was told to stop taking Aleve since the meloxicam is a similar med. Note that patient also takes a baby aspirin daily and that taking both could contribute to patient's current symptoms of blood in stool. Medications added:     · Meloxicam  · Vitamin D&E  · Benefiber    Medications removed:    · Vitamin D3  · Docusate  · Mycolog cream   · Aleve    Medications adjusted:    · Baclofen daily PRN   · Bethanechol 10 mg QHS   · Lisinopril 2.5 mg PO QHS    Information obtained from: patient, medication list provided for review, Rx query    Significant PMH/Disease States:   Past Medical History:   Diagnosis Date    Abnormal MRI, cervical spine 4/3/2017    Abnormal pap 3/2015; 5/2016 2015 Neg pap +HPV; 2016 ASCUS +HPV    Acute transverse myelitis (HCC)     Arthritis     osteo-tests for RA were neg    Breast cancer ([facility] [place] [address] 1996    right    Chronic pain     Diabetes (HCC)     Fibromyalgia     Ganglion cyst of wrist     LEFT    Hypercholesterolemia     Hypertension     Memory loss     Per pt.  Murmur, cardiac     Neuropathy     Rosacea     Unspecified adverse effect of anesthesia     \"SLOW TO WAKE UP, SENSITIVE TO ANESTHESIA, DO NOT COME AROUND FOR 2-3 DAYS\"      Chief Complaint for this Admission:   Chief Complaint   Patient presents with    Rectal Bleeding     Allergies: Latex; Other food; Betadine [povidone-iodine]; Codeine; Dilaudid [hydromorphone (bulk)]; Hydrocodone; Ditropan [oxybutynin chloride]; Doxycycline; Keflex [cephalexin]; Metformin;  Tape [adhesive]; and Sulfamethoxazole-trimethoprim    Prior to Admission Medications:     Medication Documentation Review Audit       Reviewed by Amadou Huertas PHARMD (Pharmacist) on 10/31/17 at [phone]         Medication Sig Documenting Provider Last Dose Status Taking? Alpha Lipoic Acid 600 mg cap Take 1 Cap by mouth two (2) times a day. Historical Provider 10/28/2017 pm Active Yes             Med Note [facility], [address] [facility] [address]   Sat Apr 1, 2017  1:55 PM):        aspirin 81 mg chewable tablet Take 81 mg by mouth daily. Historical Provider 10/28/2017 am Active Yes    baclofen (LIORESAL) 10 mg tablet Take 10 mg by mouth daily as needed. Historical Provider 10/24/2017 Unknown time Active Yes    bethanechol (URECHOLINE) 10 mg tablet Take 10 mg by mouth nightly. Historical Provider 10/28/2017 pm Active Yes    biotin 10,000 mcg cap Take 1 Cap by mouth daily. Historical Provider 10/28/2017 am Active Yes    CALCIUM PO Take 1 Caplet by mouth daily. Historical Provider 10/28/2017 am Active Yes             Med Note (Pat Mays Oct 31, 2017  2:26 PM): Patient is uncertain of formulation of calcium      CHROM GENO/BRINDAL BERRY (GARCINIA CAMBOGIA PO) Take 1 Tab by mouth daily. Historical Provider 10/28/2017 am Active Yes             Med Note [facility], [facility]   Sat Apr 1, 2017  1:56 PM):        cinnamon bark (CINNAMON) 500 mg cap Take 2 Caps by mouth daily. Historical Provider 10/28/2017 am Active Yes             Med Note [facility], [address] [place] [address]   Sat Apr 1, 2017  1:56 PM):        CRANBERRY EXTRACT-VIT C PO Take 2 Caps by mouth daily. Historical Provider 10/28/2017 am Active Yes             Med Note [facility], [address] [place] [address]   Sat Apr 1, 2017  1:56 PM):        DOCOSAHEXANOIC ACID/EPA (FISH OIL PO) Take 1 Cap by mouth daily. Historical Provider 10/28/2017 am Active Yes    fluticasone (FLONASE) 50 mcg/actuation nasal spray 2 Sprays by Both Nostrils route daily as needed for Rhinitis. Historical Provider 10/23/2017 Active Yes    gabapentin (NEURONTIN) 300 mg capsule Take 300 mg by mouth three (3) times daily.  Historical Provider 10/28/2017 pm Active Yes    glimepiride (AMARYL) 2 mg tablet Take 2 mg by mouth three (3) times daily. Historical Provider 10/28/2017 pm Active Yes    guar gum (BENEFIBER) packet Take 1 Packet by mouth nightly as needed. Historical Provider 10/28/2017 pm Active Yes    INVOKANA 100 mg tablet Take 1 tablet by mouth  daily before breakfast Abram Sandra, DO 10/29/2017 am Active Yes    ivermectin (SOOLANTRA) 1 % crea by Apply Externally route daily. Indications: ACNE ROSACEA Historical Provider 10/28/2017 Active Yes    L-Mfolate-B6 Phos-Methyl-B12 (METANX) 3-35-2 mg tab tab Take 1 Tab by mouth two (2) times a day. Indications: neuropathy Historical Provider 10/28/2017 pm Active Yes             Med Note [facility], [address] [facility] [address]   Sat Apr 1, 2017  2:04 PM):        lisinopril (PRINIVIL, ZESTRIL) 2.5 mg tablet Take 2.5 mg by mouth nightly. Historical Provider 10/28/2017 pm Active Yes    loratadine (CLARITIN) 10 mg tablet Take 10 mg by mouth nightly. Historical Provider 10/28/2017 pm Active Yes    magnesium oxide (MAG-OX) 400 mg tablet Take 400 mg by mouth daily. Historical Provider 10/28/2017 am Active Yes    meloxicam (MOBIC) 7.5 mg tablet Take 7.5 mg by mouth daily. Historical Provider 10/28/2017 am Active Yes    nystatin (MYCOSTATIN) topical cream Apply  to affected area three (3) times daily. As needed for skin irritation. Use a think layer Amberly Bay MD  Active Yes    OTHER,NON-FORMULARY, Take 1 Tab by mouth daily. Vitamin D & E Historical Provider 10/28/2017 am Active Yes    pravastatin (PRAVACHOL) 40 mg tablet TAKE 1 TABLET NIGHTLY (NEEDS APPOINTMENT AS SOON AS POSSIBLE FOR FASTING LABS AND APPOINTMENT) Kartik Fang MD 10/29/2017 pm Active Yes    SITagliptin (JANUVIA) 100 mg tablet Take 1 Tab by mouth Daily (before breakfast). Jacques Mcfadden, DO 10/29/2017 am Active Yes    triamcinolone acetonide (KENALOG) 0.1 % topical cream Apply  to affected area three (3) times daily. As needed for skin irritation, use thin layer.  Amberly Bay MD  Active Yes    TRUETEST TEST STRIPS strip TEST THREE TIMES DAILY Chitra Edmond MD  Active No                  Thank you for the consult,  Roddy VENEGAS [facility]

## 2017-10-31 NOTE — H&P
[address] [place] [address]   Office [phone]  Fax [phone]       Admission H&P     Name: Arnie Serrano MRN: [id number]  Sex: Female   YOB: 1950  Age: 78 y.o. PCP: Melinda Bennett DO     Source of Information: patient, medical records    Chief complaint: Rectal Bleeding    History of Present Illness  Arnie Serrano is a 78 y.o. female with known HTN, Fibromyalgia, Rosacea, Cervical Transverse Myelitis, HLD, Non-insulin dependent DMII,  who presents to the ER complaining of bright red blood per rectum. Pt endorses intermittent brbpr since Sunday. States she noticed 2 episodes on Sunday with bowel movements. Subsequent episodes since then have not   Always been associated with BMs. Of note pt has bladder and rectal incontinence 2/2 transverse myelitis. These episodes have been associated with with 2 episodes of loose stools on Sunday and an intermittent, 4/10, pressure-like, non-radiating, left lower quadrant abdominal pain with no worsening or alleviating factors. She denies any abdominal trauma, chest pain, sob, palpitations, nausea, vomiting, fevers, chills or night sweats. She currently takes Meloxicam and ASA 81mg. Was prviously on Ibuprofen until 2 weeks ago for arthritic pain. Of note pt currently follows-up outpatient with GI (Dr. Mark Anthony Mock) for bowel incontinence 2/2 to transverse myelitis. States she was seen as recent as Wednesday for follow-up and was told she had no anal sphincter tone and was encouraged to continue laxatives. States her last colonoscopy was 5 yrs ago; at which point a \"few\" polyps were excised. She has hx of of Right breast Cancer s/p mastectomy and chemo in 1997 with remission since then. In ED, Vitals were remarkable for BP of 175/73. Labs were unremarkable. CT of her abdomen showed diffuse but predominantly distal colonic wall thickening and adjacent fluid and inflammation. IV fluid resuscitation was initiated.  Pt also started on course of LevaqCare One at [facility] and Flagyl. Past Medical History:   Diagnosis Date    Abnormal MRI, cervical spine 4/3/2017    Abnormal pap 3/2015; 5/2016 2015 Neg pap +HPV; 2016 ASCUS +HPV    Acute transverse myelitis (HCC)     Arthritis     osteo-tests for RA were neg    Breast cancer ([facility] [address] 1996    right    Chronic pain     Diabetes (HCC)     Fibromyalgia     Ganglion cyst of wrist     LEFT    Hypercholesterolemia     Hypertension     Memory loss     Per pt.  Murmur, cardiac     Neuropathy     Rosacea     Unspecified adverse effect of anesthesia     \"SLOW TO WAKE UP, SENSITIVE TO ANESTHESIA, DO NOT COME AROUND FOR 2-3 DAYS\"       Patient Vitals for the past 12 hrs:   Temp Pulse Resp BP SpO2   10/31/17 1557 98.5 °F (36.9 °C) - - - -   10/31/17 1516 - - - 172/51 97 %   10/31/17 1339 - 97 16 175/73 -   10/31/17 1156 - - - - 100 %   10/31/17 1133 - 97 16 175/73 100 %     Home Medications   Prior to Admission medications    Medication Sig Start Date End Date Taking? Authorizing Provider   baclofen (LIORESAL) 10 mg tablet Take 10 mg by mouth daily as needed. Yes Historical Provider   bethanechol (URECHOLINE) 10 mg tablet Take 10 mg by mouth nightly. Yes Historical Provider   biotin 10,000 mcg cap Take 1 Cap by mouth daily. Yes Historical Provider   fluticasone (FLONASE) 50 mcg/actuation nasal spray 2 Sprays by Both Nostrils route daily as needed for Rhinitis. Yes Historical Provider   lisinopril (PRINIVIL, ZESTRIL) 2.5 mg tablet Take 2.5 mg by mouth nightly. Yes Historical Provider   meloxicam (MOBIC) 7.5 mg tablet Take 7.5 mg by mouth daily. Yes Historical Provider   OTHER,NON-FORMULARY, Take 1 Tab by mouth daily. Vitamin D & E   Yes Historical Provider   guar gum (BENEFIBER) packet Take 1 Packet by mouth nightly as needed. Yes Historical Provider   nystatin (MYCOSTATIN) topical cream Apply  to affected area three (3) times daily. As needed for skin irritation.  Use a think layer 9/26/17  Yes Patti Durant Hosea Talavera MD   triamcinolone acetonide (KENALOG) 0.1 % topical cream Apply  to affected area three (3) times daily. As needed for skin irritation, use thin layer. 9/26/17  Yes Liam Boyer MD   SITagliptin (JANUVIA) 100 mg tablet Take 1 Tab by mouth Daily (before breakfast). 6/5/17  Yes Jennifer Mcfadden DO   ivermectin (SOOLANTRA) 1 % crea by Apply Externally route daily. Indications: ACNE ROSACEA   Yes Historical Provider   CALCIUM PO Take 1 Caplet by mouth daily. Yes Historical Provider   DOCOSAHEXANOIC ACID/EPA (FISH OIL PO) Take 1 Cap by mouth daily. Yes Historical Provider   aspirin 81 mg chewable tablet Take 81 mg by mouth daily. Yes Historical Provider   gabapentin (NEURONTIN) 300 mg capsule Take 300 mg by mouth three (3) times daily. Yes Historical Provider   glimepiride (AMARYL) 2 mg tablet Take 2 mg by mouth three (3) times daily. Yes Historical Provider   magnesium oxide (MAG-OX) 400 mg tablet Take 400 mg by mouth daily. Yes Historical Provider   INVOKANA 100 mg tablet Take 1 tablet by mouth  daily before breakfast 3/1/17  Yes Jennifer Mcfadden DO   Alpha Lipoic Acid 600 mg cap Take 1 Cap by mouth two (2) times a day. Yes Historical Provider   L-Mfolate-B6 Phos-Methyl-B12 (METANX) 3-35-2 mg tab tab Take 1 Tab by mouth two (2) times a day. Indications: neuropathy   Yes Historical Provider   CHROM GENO/BRINDAL BERRY (GARCINIA CAMBOGIA PO) Take 1 Tab by mouth daily. Yes Historical Provider   pravastatin (PRAVACHOL) 40 mg tablet TAKE 1 TABLET NIGHTLY (NEEDS APPOINTMENT AS SOON AS POSSIBLE FOR FASTING LABS AND APPOINTMENT) 7/2/15  Yes Ivy Ricketts MD   CRANBERRY EXTRACT-VIT C PO Take 2 Caps by mouth daily. Yes Historical Provider   cinnamon bark (CINNAMON) 500 mg cap Take 2 Caps by mouth daily. Yes Historical Provider   loratadine (CLARITIN) 10 mg tablet Take 10 mg by mouth nightly.    Yes Historical Provider   TRUETEST TEST STRIPS strip TEST THREE TIMES DAILY 3/31/14   Ivy Ricketts MD Allergies  Allergies   Allergen Reactions    Latex Other (comments)     Patient states it burns around her mouth.  Other Food Other (comments)     Yogurt - stomach cramping    Betadine [Povidone-Iodine] Itching     Pt states this causes \"extreme\" itching    Codeine Anaphylaxis, Rash, Nausea Only and Other (comments)     HEADACHE  Tolerates tramadol    Dilaudid [Hydromorphone (Bulk)] Anaphylaxis, Itching, Nausea Only and Other (comments)     HALLUCINATIONS  Tolerates tramadol      Hydrocodone Anaphylaxis, Rash, Nausea Only and Vertigo     Facial - eyelid swelling-had to go to ER for reaction, HALLUCINATIONS  Tolerates tramadol      Ditropan [Oxybutynin Chloride] Swelling    Doxycycline Rash     PUSTULAR RASH- TOLERATING TETRACYCLINE    Keflex [Cephalexin] Nausea and Vomiting     Pain in abdomen AND FLU-LIKE SX      Metformin Nausea and Vomiting     Severe abdominal pain      Tape [Adhesive] Other (comments)     Redness/inflammed. Can only use paper tape. CAN USE BAND-AIDS. TOLERATES SURGICAL TAPE USED IN [place] [facility].  Sulfamethoxazole-Trimethoprim Other (comments)     Cramping/flu-like symptoms       Past Medical History:   Diagnosis Date    Abnormal MRI, cervical spine 4/3/2017    Abnormal pap 3/2015; 5/2016 2015 Neg pap +HPV; 2016 ASCUS +HPV    Acute transverse myelitis (HCC)     Arthritis     osteo-tests for RA were neg    Breast cancer ([facility] [address] 1996    right    Chronic pain     Diabetes (HCC)     Fibromyalgia     Ganglion cyst of wrist     LEFT    Hypercholesterolemia     Hypertension     Memory loss     Per pt.      Murmur, cardiac     Neuropathy     Rosacea     Unspecified adverse effect of anesthesia     \"SLOW TO WAKE UP, SENSITIVE TO ANESTHESIA, DO NOT COME AROUND FOR 2-3 DAYS\"      Past Surgical History:   Procedure Laterality Date    HX CATARACT REMOVAL Bilateral 2014    HX CERVICAL FUSION      HX COLPOSCOPY  5/2016    Neg path    HX MASTECTOMY  12/96    tram flap  HX ORTHOPAEDIC Left 1's    foot surgery    HX TONSILLECTOMY      HX VASCULAR ACCESS  1997    portacath left chest-removed after chemo    RECONSTR NOSE  11/2013, 2005    HOLE IN SEPTUM       Family History   Problem Relation Age of Onset    Heart Disease Mother     Hypertension Mother     COPD Mother     Diabetes Father     Other Son      INOPERABLE BRAIN TUMOR    Gout Son     Celiac Disease Son     Anesth Problems Neg Hx      Social History  Social History     Social History    Marital status:      Spouse name: N/A    Number of children: N/A    Years of education: N/A     Occupational History    Not on file. Social History Main Topics    Smoking status: Never Smoker    Smokeless tobacco: Never Used      Comment: Never used vapor or e-cigs     Alcohol use No    Drug use: No    Sexual activity: Not Currently     Partners: Male      Comment:      Other Topics Concern    Not on file     Social History Narrative       Alcohol history: Not at all  Smoking history: Non-smoker  Illicit drug history: Not at all    Living arrangement: patient lives with their family. Ambulates: Independently     Review of Systems  Review of Systems   Constitutional: Negative for fever. All other systems reviewed and are negative. Physical Exam  Objective:  General Appearance:  General patient appearance: AOX3, NAD. Vital signs: (most recent): Blood pressure 146/73, pulse 80, temperature 98.1 °F (36.7 °C), resp. rate 18, height 5' 8\" (1.727 m), weight 161 lb 3.2 oz (73.1 kg), SpO2 97 %. No fever. Lungs:  Normal respiratory rate and normal effort. She is not in respiratory distress. Breath sounds clear to auscultation. No wheezes, rales, rhonchi or decreased breath sounds. Heart: Normal rate. Regular rhythm. S1 normal and S2 normal.  No murmur, gallop or friction rub. Chest: No chest wall tenderness. Symmetric chest wall expansion. Extremities: Normal range of motion.   There is no venous stasis, deformity, effusion, local swelling or dependent edema. (5/5 Strength in B/L UEs  3/5 Strength in LLE  4/5 Strength in RLE  Light touch sensation intact in B/L LEs)  Neurological: Patient is alert and oriented to person, place and time. Skin:  Warm and dry. No rash, ecchymosis, cyanosis or ulceration. Abdomen: Abdomen is soft and non-distended. There are no signs of ascites. Bowel sounds are normal.   There is not left upper quadrant or not left lower quadrant tenderness. There is no mass. There is no splenomegaly. There is no hepatomegaly. Pupils:  Pupils are equal, round, and reactive to light. Pupils are equal.   Pulses: Distal pulses are intact. O2 Device: Room air     Laboratory Data  Recent Results (from the past 8 hour(s))   CBC WITH AUTOMATED DIFF    Collection Time: 10/31/17 12:01 PM   Result Value Ref Range    WBC 9.4 3.6 - 11.0 K/uL    RBC 4.90 3.80 - 5.20 M/uL    HGB 13.4 11.5 - 16.0 g/dL    HCT 41.6 35.0 - 47.0 %    MCV 84.9 80.0 - 99.0 FL    MCH 27.3 26.0 - 34.0 PG    MCHC 32.2 30.0 - 36.5 g/dL    RDW 14.2 11.5 - 14.5 %    PLATELET 434 768 - 798 K/uL    NEUTROPHILS 80 (H) 32 - 75 %    LYMPHOCYTES 14 12 - 49 %    MONOCYTES 5 5 - 13 %    EOSINOPHILS 1 0 - 7 %    BASOPHILS 0 0 - 1 %    ABS. NEUTROPHILS 7.5 1.8 - 8.0 K/UL    ABS. LYMPHOCYTES 1.3 0.8 - 3.5 K/UL    ABS. MONOCYTES 0.4 0.0 - 1.0 K/UL    ABS. EOSINOPHILS 0.1 0.0 - 0.4 K/UL    ABS.  BASOPHILS 0.0 0.0 - 0.1 K/UL   METABOLIC PANEL, COMPREHENSIVE    Collection Time: 10/31/17 12:01 PM   Result Value Ref Range    Sodium 141 136 - 145 mmol/L    Potassium 4.0 3.5 - 5.1 mmol/L    Chloride 103 97 - 108 mmol/L    CO2 25 21 - 32 mmol/L    Anion gap 13 5 - 15 mmol/L    Glucose 179 (H) 65 - 100 mg/dL    BUN 23 (H) 6 - 20 MG/DL    Creatinine 0.59 0.55 - 1.02 MG/DL    BUN/Creatinine ratio 39 (H) 12 - 20      GFR est AA >60 >60 ml/min/1.73m2    GFR est non-AA >60 >60 ml/min/1.73m2    Calcium 9.3 8.5 - 10.1 MG/DL Bilirubin, total 0.5 0.2 - 1.0 MG/DL    ALT (SGPT) 27 12 - 78 U/L    AST (SGOT) 18 15 - 37 U/L    Alk. phosphatase 157 (H) 45 - 117 U/L    Protein, total 6.7 6.4 - 8.2 g/dL    Albumin 3.1 (L) 3.5 - 5.0 g/dL    Globulin 3.6 2.0 - 4.0 g/dL    A-G Ratio 0.9 (L) 1.1 - 2.2     LIPASE    Collection Time: 10/31/17 12:01 PM   Result Value Ref Range    Lipase 231 73 - 393 U/L   OCCULT BLOOD, STOOL    Collection Time: 10/31/17  1:26 PM   Result Value Ref Range    Occult blood, stool POSITIVE (A) NEG         Imaging  CXR Results  (Last 48 hours)    None        CT Results  (Last 48 hours)               10/31/17 1314  CT ABD PELV W CONT Final result    Impression:  IMPRESSION:    Findings of diffuse colitis likely attributable to nonspecific infection or   inflammation. No bowel obstruction. Narrative:  EXAM:  CT ABD PELV W CONT       INDICATION: Left lower abdomen pain and bloody stool. COMPARISON: 11/17/2006. TECHNIQUE: Helical CT of the abdomen  and pelvis  following the uneventful   intravenous administration of nonionic contrast.  Coronal and sagittal reformats   are performed. CT dose reduction was achieved through use of a standardized   protocol tailored for this examination and automatic exposure control for dose   modulation. FINDINGS:    The visualized lung bases demonstrate no mass or consolidation. The heart size   is normal. There is coronary artery atherosclerosis. There is no pericardial or   pleural effusion. The liver, spleen, pancreas, and adrenal glands are normal. The kidneys are   symmetric without hydronephrosis. The gall bladder is present  without intra- or   extra-hepatic biliary dilatation. There is diffuse but predominantly distal colonic wall thickening and adjacent   fluid and inflammation. There are no dilated bowel loops. The appendix is   normal.  There are no enlarged lymph nodes. There is no free fluid or free air. The aorta tapers without aneurysm. There is mild aortoiliac atherosclerosis. The urinary bladder is normal.  There is no pelvic mass. Posterior fusion hardware is present from T9 to the sacrum. T8 kyphoplasty   material is noted. There are diffuse degenerative changes. There is no   aggressive bony lesion. Assessment and Plan     Manfred Macdonald is a 78 y.o. female with known HTN, Fibromyalgia, Rosacea, Cervical Transverse Myelitis, HLD, Non-insulin dependent DMII,  Who is admitted for bright red blood per rectum. 1) Acute GI Bleed  - Pt with LLQ abdominal pain, BRBPR and CT abd with diffuse, predominantly distal colonic wall thickening and adjacent fluid and inflammation. FOBT Positive. - Findings concerning for infectious colitis vs ischemic colitis vs diverticula bleed  - Will maintain pt on clear liquids. Miralax vs gentle bowel prep tomorrow afternoon for GI discretion   - Continue with Levaquin and Flagyl; pharmacy to dose  - IV Fluid hydration and Protonix 40mg BID  - Trending H/H Q4 hrs (7pm and Midnight)  - Follow-up Blood cultures, urine cultures, C-Diff, stool wbcs, culture, ova and parasites. - Two 2OG IV lines obtained and pt typed and crossed  - GI on consult. Appreciate Recs. 2) Hx of Cervical Transverse Myelitis  - Pt with residual B/L LE loss of tactile differentiation, Bladder and Bowel incontinence  - On Baclofen 10mg but reports non-compliance for last 3 wks  - Will hold Baclofen for now. Can consider restarting if requested by pt.  - Continue home Mag-Ox 400mg qd    3) Hypertension  - BP on admission 175/73. At this time, 146/73.  - Continue home Lisinopril 2.5mg qd. - Will continue to monitor at this time and readjust meds as BP's trend. 4) Non-Insulin Dependent DM II  - Last HgA1c 7.2 (6/6/17). - Discontinued home medications of Glimepiride, Invokana and Januvia. - Insulin Sliding Scale normal sensitivity with AC&HS glucose checks. - Hypoglycemia protocols ordered.     5) Hypertriglyceridemia - Trigs-150 (04/17)  - Continue Home Pravachol 40mg    6) Fibromyalgia  - Continue home Gabapentin 300mg every day    7) Rosacea  - Continue home soolantra, nystatin cream and triamcinolone 1% cream per pharm substitution    FEN/GI - Clear liquid diet. NS at 125 mL/hr. Activity - Out of bed with assistance  DVT prophylaxis - SCDs  GI prophylaxis - Protonix 40mg BID  Fall prophylaxis - Not indicated at this time. Disposition - Admit to Remote Telemetry. Plan to d/c to Home. Consulting PT/OT/CM  Code Status - Full, discussed with patient / caregivers. Patient Prince Tilley was seen and discussed Dr. Fercho Crane.     4:00 PM, 10/31/17  Tyler Aquino MD  Family Medicine Resident       For Billing    Chief Complaint   Patient presents with   [address] [facility] Rectal Bleeding       Hospital Problems  Date Reviewed: 9/25/2017    None

## 2017-10-31 NOTE — ED PROVIDER NOTES
Patient is a 78 y.o. female presenting with anal bleeding. Rectal Bleeding           67y F here with blood in her stool. Started 2 days ago. Also with mucous in the stool. Having LLQ pain. No appetite. Hasn't really eaten much in the past 3 days. Has lost 9lbs since onset of sx's. No vomiting. No fever. No hx of similar. Recently had transverse myelitis and admitted here. Does not have great feeling in the lower abd and at baseline doesn't have good bowel control. Seems like this is worsening since sx's started. No syncope. No dizziness. Past Medical History:   Diagnosis Date    Abnormal MRI, cervical spine 4/3/2017    Abnormal pap 3/2015; 5/2016 2015 Neg pap +HPV; 2016 ASCUS +HPV    Acute transverse myelitis (HCC)     Arthritis     osteo-tests for RA were neg    Breast cancer ([facility] [address] 1996    right    Chronic pain     Diabetes (HCC)     Fibromyalgia     Ganglion cyst of wrist     LEFT    Hypercholesterolemia     Hypertension     Memory loss     Per pt.      Murmur, cardiac     Neuropathy     Rosacea     Unspecified adverse effect of anesthesia     \"SLOW TO WAKE UP, SENSITIVE TO ANESTHESIA, DO NOT COME AROUND FOR 2-3 DAYS\"        Past Surgical History:   Procedure Laterality Date    HX CATARACT REMOVAL Bilateral 2014    HX CERVICAL FUSION      HX COLPOSCOPY  5/2016    Neg path    HX MASTECTOMY  12/96    tram flap    HX ORTHOPAEDIC Left 1990's    foot surgery    HX TONSILLECTOMY      HX VASCULAR ACCESS  1997    portacath left chest-removed after chemo    RECONSTR NOSE  11/2013, 2005    HOLE IN SEPTUM         Family History:   Problem Relation Age of Onset    Heart Disease Mother     Hypertension Mother     COPD Mother     Diabetes Father     Other Son      INOPERABLE BRAIN TUMOR    Gout Son     Celiac Disease Son     Anesth Problems Neg Hx        Social History     Social History    Marital status:      Spouse name: N/A    Number of children: N/A    Years of education: N/A     Occupational History    Not on file. Social History Main Topics    Smoking status: Never Smoker    Smokeless tobacco: Never Used      Comment: Never used vapor or e-cigs     Alcohol use No    Drug use: No    Sexual activity: Not Currently     Partners: Male      Comment:      Other Topics Concern    Not on file     Social History Narrative         ALLERGIES: Latex; Other food; Betadine [povidone-iodine]; Codeine; Dilaudid [hydromorphone (bulk)]; Hydrocodone; Ditropan [oxybutynin chloride]; Doxycycline; Keflex [cephalexin]; Metformin; Tape [adhesive]; and Sulfamethoxazole-trimethoprim    Review of Systems   Gastrointestinal: Positive for anal bleeding. Review of Systems   Constitutional: (-) weight loss. HEENT: (-) stiff neck   Eyes: (-) discharge. Respiratory: (-) for cough. Cardiovascular: (-) syncope. Gastrointestinal: (+) blood in stool. Genitourinary: (-) hematuria. Musculoskeletal: (-) myalgias. Neurological: (-) seizure. Skin: (-) petechiae  Lymph/Immunologic: (-) enlarged lymph nodes  All other systems reviewed and are negative. Vitals:    10/31/17 1133 10/31/17 1156   BP: 175/73    Pulse: 97    Resp: 16    SpO2: 100% 100%   Weight: 73.1 kg (161 lb 3.2 oz)    Height: 5' 8\" (1.727 m)             Physical Exam Nursing note and vitals reviewed. Constitutional: oriented to person, place, and time. appears well-developed and well-nourished. No distress. Head: Normocephalic and atraumatic. Sclera anicteric  Nose: No rhinorrhea  Mouth/Throat: Oropharynx is clear and moist. Pharynx normal  Eyes: Conjunctivae are normal. Pupils are equal, round, and reactive to light. Right eye exhibits no discharge. Left eye exhibits no discharge. Neck: Painless normal range of motion. Neck supple. No LAD. Cardiovascular: Normal rate, regular rhythm, normal heart sounds and intact distal pulses. Exam reveals no gallop and no friction rub. No murmur heard.   Pulmonary/Chest:  No respiratory distress. No wheezes. No rales. No rhonchi. No increased work of breathing. No accessory muscle use. Good air exchange throughout. Abdominal: soft, tender to palpation in the LLQ, no rebound or guarding. No hepatosplenomegaly. Normal bowel sounds throughout. RECTAL: bloody stool with mucous  Back: no tenderness to palpation, no deformities, no CVA tenderness  Extremities/Musculoskeletal: Normal range of motion. no tenderness. No edema. Distal extremities are neurovasc intact. Lymphadenopathy:   No adenopathy. Neurological:  Alert and oriented to person, place, and time. Coordination normal. CN 2-12 intact. Motor and sensory function intact. Skin: Skin is warm and dry. No rash noted. No pallor. MDM Mozella.Deacon F here with bloody stool and abd pain. Will need labs, CT, fluids. May need admission.     ED Course       Procedures

## 2017-10-31 NOTE — PROGRESS NOTES
Updated FP regarding stool being semi-soft and not liquid in appearance. Per FP, we do not need to run for c-diff.

## 2017-10-31 NOTE — ED NOTES
TRANSFER - OUT REPORT:    Verbal report given to JEANETTE Coon(name) on Kathryn Hoyos  being transferred to  523(unit) for routine progression of care       Report consisted of patients Situation, Background, Assessment and   Recommendations(SBAR). Information from the following report(s) SBAR, Kardex, ED Summary, Intake/Output, MAR and Recent Results was reviewed with the receiving nurse. Lines:   Peripheral IV 10/31/17 Left (Active)   Site Assessment Clean, dry, & intact 10/31/2017 12:08 PM   Phlebitis Assessment 0 10/31/2017 12:08 PM   Infiltration Assessment 0 10/31/2017 12:08 PM   Dressing Status Clean, dry, & intact 10/31/2017 12:08 PM   Dressing Type Transparent 10/31/2017 12:08 PM   Hub Color/Line Status Pink 10/31/2017 12:08 PM   Alcohol Cap Used Yes 10/31/2017 12:08 PM       Peripheral IV 10/31/17 Left Hand (Active)   Site Assessment Clean, dry, & intact 10/31/2017  3:05 PM   Phlebitis Assessment 0 10/31/2017  3:05 PM   Infiltration Assessment 0 10/31/2017  3:05 PM   Dressing Type Tape;Transparent 10/31/2017  3:05 PM   Hub Color/Line Status Pink;Patent; Flushed 10/31/2017  3:05 PM   Action Taken Blood drawn 10/31/2017  3:05 PM        Opportunity for questions and clarification was provided.       Patient transported with:   Ellen Calvert, transport

## 2017-10-31 NOTE — IP AVS SNAPSHOT
Merry Acosta 
 
 
 [address] 
[phone] Patient: Darliss Angelucci MRN: [id number] WDI:4[birth year]26[birth year]10 My Medications STOP taking these medications   
 aspirin 81 mg chewable tablet  
   
  
 meloxicam 7.5 mg tablet Commonly known as:  MOBIC  
   
  
  
TAKE these medications as instructed Instructions Each Dose to Equal  
 Morning Noon Evening Bedtime Alpha Lipoic Acid 600 mg Cap Your last dose was: Your next dose is: Take 1 Cap by mouth two (2) times a day. 1 Cap  
    
   
   
   
  
 baclofen 10 mg tablet Commonly known as:  LIORESAL Your last dose was: Your next dose is: Take 10 mg by mouth daily as needed. 10 mg  
    
   
   
   
  
 bethanechol 10 mg tablet Commonly known as:  URECHOLINE Your last dose was: Your next dose is: Take 10 mg by mouth nightly. 10 mg  
    
   
   
   
  
 biotin 10,000 mcg Cap Your last dose was: Your next dose is: Take 1 Cap by mouth daily. 1 Cap CALCIUM PO Your last dose was: Your next dose is: Take 1 Caplet by mouth daily. 1 Caplet CINNAMON 500 mg Cap Generic drug:  cinnamon bark Your last dose was: Your next dose is: Take 2 Caps by mouth daily. 2 Cap CLARITIN 10 mg tablet Generic drug:  loratadine Your last dose was: Your next dose is: Take 10 mg by mouth nightly. 10 mg CRANBERRY EXTRACT-VIT C PO Your last dose was: Your next dose is: Take 2 Caps by mouth daily. 2 Cap FISH OIL PO Your last dose was: Your next dose is: Take 1 Cap by mouth daily. 1 Cap FLONASE 50 mcg/actuation nasal spray Generic drug:  fluticasone Your last dose was: Your next dose is: 2 Sprays by Both Nostrils route daily as needed for Rhinitis. 2 Spray  
    
   
   
   
  
 gabapentin 300 mg capsule Commonly known as:  NEURONTIN Your last dose was: Your next dose is: Take 300 mg by mouth three (3) times daily. 300 mg GARCINIA CAMBOGIA PO Your last dose was: Your next dose is: Take 1 Tab by mouth daily. 1 Tab  
    
   
   
   
  
 glimepiride 2 mg tablet Commonly known as:  AMARYL Your last dose was: Your next dose is: Take 2 mg by mouth three (3) times daily. 2 mg  
    
   
   
   
  
 guar gum packet Commonly known as:  Lavada Plaster Your last dose was: Your next dose is: Take 1 Packet by mouth nightly as needed. 1 Packet INVOKANA 100 mg tablet Generic drug:  canagliflozin Your last dose was: Your next dose is: Take 1 tablet by mouth  daily before breakfast  
     
   
   
   
  
 L-Mfolate-B6 Phos-Methyl-B12 3-35-2 mg Tab tab Commonly known as:  Dorethea Fahadstein Your last dose was: Your next dose is: Take 1 Tab by mouth two (2) times a day. Indications: neuropathy 1 Tab  
    
   
   
   
  
 lisinopril 2.5 mg tablet Commonly known as:  Jaky Loss Your last dose was: Your next dose is: Take 2.5 mg by mouth nightly. 2.5 mg  
    
   
   
   
  
 magnesium oxide 400 mg tablet Commonly known as:  MAG-OX Your last dose was: Your next dose is: Take 400 mg by mouth daily. 400 mg  
    
   
   
   
  
 metroNIDAZOLE 500 mg tablet Commonly known as:  FLAGYL Your last dose was: Your next dose is: Take 1 Tab by mouth every eight (8) hours for 2 doses. 500 mg  
    
   
   
   
  
 nystatin topical cream  
Commonly known as:  MYCOSTATIN Your last dose was: Your next dose is:    
   
   
 Apply  to affected area three (3) times daily. As needed for skin irritation. Use a think layer OTHER(NON-FORMULARY) Your last dose was: Your next dose is: Take 1 Tab by mouth daily. Vitamin D & E  
 1 Tab  
    
   
   
   
  
 pravastatin 40 mg tablet Commonly known as:  PRAVACHOL Your last dose was: Your next dose is: TAKE 1 TABLET NIGHTLY (NEEDS APPOINTMENT AS SOON AS POSSIBLE FOR FASTING LABS AND APPOINTMENT) SITagliptin 100 mg tablet Commonly known as:  Meghan Jensen Your last dose was: Your next dose is: Take 1 Tab by mouth Daily (before breakfast). 100 mg  
    
   
   
   
  
 SOOLANTRA 1 % Crea Generic drug:  ivermectin Your last dose was: Your next dose is:    
   
   
 by Apply Externally route daily. Indications: ACNE ROSACEA  
     
   
   
   
  
 triamcinolone acetonide 0.1 % topical cream  
Commonly known as:  KENALOG Your last dose was: Your next dose is:    
   
   
 Apply  to affected area three (3) times daily. As needed for skin irritation, use thin layer. TRUETEST TEST STRIPS strip Generic drug:  glucose blood VI test strips Your last dose was: Your next dose is:    
   
   
 TEST THREE TIMES DAILY Where to Get Your Medications Information on where to get these meds will be given to you by the nurse or doctor. ! Ask your nurse or doctor about these medications  
  metroNIDAZOLE 500 mg tablet

## 2017-10-31 NOTE — PROGRESS NOTES
[facility] Dosing Services: Antimicrobial Stewardship Daily Doc    Consult for antibiotic dosing of levaquin and flagyl by Dr. Emile Vides  Indication: colitis   Day of Therapy 1    Levaquin Dosing Regimen:   Current Regimen:  500 mg IV q 24 hours  Recommendation: continue current therapy    Flagyl Dosing Regimen:   Current Regimen:  500 mg IV q 8 hours  Recommendation: continue current therapy  Other Antimicrobial   (not dosed by pharmacist)    Cultures    Serum Creatinine Lab Results   Component Value Date/Time    Creatinine 0.59 10/31/2017 12:01 PM         Creatinine Clearance Estimated Creatinine Clearance: 94.6 mL/min (based on Cr of 0.59).      Temp Temp: 98.5 °F (36.9 °C)       WBC Lab Results   Component Value Date/Time    WBC 9.4 10/31/2017 12:01 PM        H/H Lab Results   Component Value Date/Time    HGB 13.4 10/31/2017 12:01 PM        Platelets    Lab Results   Component Value Date/Time    PLATELET 039 66/83/5507 12:01 PM          Pharmacist AYLA HandyD Contact information: [phone]

## 2017-11-01 LAB
ALBUMIN SERPL-MCNC: 2.6 G/DL (ref 3.5–5)
ALBUMIN/GLOB SERPL: 0.8 {RATIO} (ref 1.1–2.2)
ALP SERPL-CCNC: 127 U/L (ref 45–117)
ALT SERPL-CCNC: 25 U/L (ref 12–78)
ANION GAP SERPL CALC-SCNC: 11 MMOL/L (ref 5–15)
AST SERPL-CCNC: 17 U/L (ref 15–37)
BASOPHILS # BLD: 0 K/UL (ref 0–0.1)
BASOPHILS NFR BLD: 0 % (ref 0–1)
BILIRUB SERPL-MCNC: 0.4 MG/DL (ref 0.2–1)
BUN SERPL-MCNC: 15 MG/DL (ref 6–20)
BUN/CREAT SERPL: 31 (ref 12–20)
CALCIUM SERPL-MCNC: 8.6 MG/DL (ref 8.5–10.1)
CHLORIDE SERPL-SCNC: 108 MMOL/L (ref 97–108)
CO2 SERPL-SCNC: 23 MMOL/L (ref 21–32)
CREAT SERPL-MCNC: 0.48 MG/DL (ref 0.55–1.02)
EOSINOPHIL # BLD: 0.1 K/UL (ref 0–0.4)
EOSINOPHIL NFR BLD: 2 % (ref 0–7)
ERYTHROCYTE [DISTWIDTH] IN BLOOD BY AUTOMATED COUNT: 14.2 % (ref 11.5–14.5)
GLOBULIN SER CALC-MCNC: 3.3 G/DL (ref 2–4)
GLUCOSE BLD STRIP.AUTO-MCNC: 126 MG/DL (ref 65–100)
GLUCOSE BLD STRIP.AUTO-MCNC: 126 MG/DL (ref 65–100)
GLUCOSE BLD STRIP.AUTO-MCNC: 144 MG/DL (ref 65–100)
GLUCOSE BLD STRIP.AUTO-MCNC: 196 MG/DL (ref 65–100)
GLUCOSE SERPL-MCNC: 102 MG/DL (ref 65–100)
HCT VFR BLD AUTO: 38.4 % (ref 35–47)
HGB BLD-MCNC: 12 G/DL (ref 11.5–16)
LYMPHOCYTES # BLD: 1.6 K/UL (ref 0.8–3.5)
LYMPHOCYTES NFR BLD: 19 % (ref 12–49)
MCH RBC QN AUTO: 26.4 PG (ref 26–34)
MCHC RBC AUTO-ENTMCNC: 31.3 G/DL (ref 30–36.5)
MCV RBC AUTO: 84.4 FL (ref 80–99)
MONOCYTES # BLD: 0.6 K/UL (ref 0–1)
MONOCYTES NFR BLD: 8 % (ref 5–13)
NEUTS SEG # BLD: 5.8 K/UL (ref 1.8–8)
NEUTS SEG NFR BLD: 71 % (ref 32–75)
PLATELET # BLD AUTO: 212 K/UL (ref 150–400)
POTASSIUM SERPL-SCNC: 3.8 MMOL/L (ref 3.5–5.1)
PROT SERPL-MCNC: 5.9 G/DL (ref 6.4–8.2)
RBC # BLD AUTO: 4.55 M/UL (ref 3.8–5.2)
SERVICE CMNT-IMP: ABNORMAL
SODIUM SERPL-SCNC: 142 MMOL/L (ref 136–145)
WBC # BLD AUTO: 8.1 K/UL (ref 3.6–11)
WBC #/AREA STL HPF: NORMAL /HPF (ref 0–4)

## 2017-11-01 PROCEDURE — 97165 OT EVAL LOW COMPLEX 30 MIN: CPT

## 2017-11-01 PROCEDURE — 74011000250 HC RX REV CODE- 250: Performed by: FAMILY MEDICINE

## 2017-11-01 PROCEDURE — C9113 INJ PANTOPRAZOLE SODIUM, VIA: HCPCS | Performed by: FAMILY MEDICINE

## 2017-11-01 PROCEDURE — 97116 GAIT TRAINING THERAPY: CPT

## 2017-11-01 PROCEDURE — 65660000000 HC RM CCU STEPDOWN

## 2017-11-01 PROCEDURE — 74011250636 HC RX REV CODE- 250/636: Performed by: FAMILY MEDICINE

## 2017-11-01 PROCEDURE — 82962 GLUCOSE BLOOD TEST: CPT

## 2017-11-01 PROCEDURE — 36415 COLL VENOUS BLD VENIPUNCTURE: CPT | Performed by: STUDENT IN AN ORGANIZED HEALTH CARE EDUCATION/TRAINING PROGRAM

## 2017-11-01 PROCEDURE — 85025 COMPLETE CBC W/AUTO DIFF WBC: CPT | Performed by: STUDENT IN AN ORGANIZED HEALTH CARE EDUCATION/TRAINING PROGRAM

## 2017-11-01 PROCEDURE — 97161 PT EVAL LOW COMPLEX 20 MIN: CPT

## 2017-11-01 PROCEDURE — 97535 SELF CARE MNGMENT TRAINING: CPT

## 2017-11-01 PROCEDURE — 74011636637 HC RX REV CODE- 636/637: Performed by: FAMILY MEDICINE

## 2017-11-01 PROCEDURE — 80053 COMPREHEN METABOLIC PANEL: CPT | Performed by: STUDENT IN AN ORGANIZED HEALTH CARE EDUCATION/TRAINING PROGRAM

## 2017-11-01 PROCEDURE — 74011250637 HC RX REV CODE- 250/637: Performed by: STUDENT IN AN ORGANIZED HEALTH CARE EDUCATION/TRAINING PROGRAM

## 2017-11-01 RX ORDER — ACETAMINOPHEN 325 MG/1
650 TABLET ORAL
Status: DISCONTINUED | OUTPATIENT
Start: 2017-11-01 | End: 2017-11-02 | Stop reason: HOSPADM

## 2017-11-01 RX ADMIN — LEVOFLOXACIN 500 MG: 5 INJECTION, SOLUTION INTRAVENOUS at 16:55

## 2017-11-01 RX ADMIN — PRAVASTATIN SODIUM 40 MG: 20 TABLET ORAL at 21:00

## 2017-11-01 RX ADMIN — GABAPENTIN 300 MG: 300 CAPSULE ORAL at 16:55

## 2017-11-01 RX ADMIN — SODIUM CHLORIDE 40 MG: 9 INJECTION INTRAMUSCULAR; INTRAVENOUS; SUBCUTANEOUS at 05:00

## 2017-11-01 RX ADMIN — SODIUM CHLORIDE 125 ML/HR: 900 INJECTION, SOLUTION INTRAVENOUS at 09:11

## 2017-11-01 RX ADMIN — Medication 400 MG: at 09:06

## 2017-11-01 RX ADMIN — FLUTICASONE PROPIONATE 2 SPRAY: 50 SPRAY, METERED NASAL at 09:06

## 2017-11-01 RX ADMIN — GABAPENTIN 300 MG: 300 CAPSULE ORAL at 22:00

## 2017-11-01 RX ADMIN — BETHANECHOL CHLORIDE 10 MG: 10 TABLET ORAL at 21:00

## 2017-11-01 RX ADMIN — METRONIDAZOLE 500 MG: 500 INJECTION, SOLUTION INTRAVENOUS at 15:40

## 2017-11-01 RX ADMIN — METRONIDAZOLE 500 MG: 500 INJECTION, SOLUTION INTRAVENOUS at 22:00

## 2017-11-01 RX ADMIN — Medication 10 ML: at 15:28

## 2017-11-01 RX ADMIN — GABAPENTIN 300 MG: 300 CAPSULE ORAL at 09:06

## 2017-11-01 RX ADMIN — METRONIDAZOLE 500 MG: 500 INJECTION, SOLUTION INTRAVENOUS at 06:00

## 2017-11-01 RX ADMIN — INSULIN LISPRO 2 UNITS: 100 INJECTION, SOLUTION INTRAVENOUS; SUBCUTANEOUS at 12:00

## 2017-11-01 RX ADMIN — SODIUM CHLORIDE 40 MG: 9 INJECTION INTRAMUSCULAR; INTRAVENOUS; SUBCUTANEOUS at 16:54

## 2017-11-01 RX ADMIN — LISINOPRIL 2.5 MG: 5 TABLET ORAL at 21:00

## 2017-11-01 RX ADMIN — IVERMECTIN: 10 CREAM TOPICAL at 09:00

## 2017-11-01 NOTE — PROGRESS NOTES
[facility] FAM[facility] MEDICINE RESIDENCY PROGRAM   Daily Progress Note    Date: 11/1/2017    Assessment/Plan:   Geoff Hamm is a 78 y.o. female with known HTN, Fibromyalgia, Rosacea, Cervical Transverse Myelitis, HLD, Non-insulin dependent DMII,  Who is admitted for bright red blood per rectum.    1) Acute GI Bleed 2/2 to infectious vs ischemic colitis - Pt with LLQ abdominal pain, BRBPR and CT abdoomen with distal colonic wall thickening and adjacent fluid and inflammation. FOBT Positive. - Clear liquid diet with bowel prep for possible scope today  - Continue with Levaquin and Flagyl  - IV Fluid hydration and Protonix 40mg BID  - Follow-up Blood and urine cx, stool wbcs, culture, ova and parasites. - Two 2OG IV lines obtained and pt typed and crossed  - GI on following. Appreciate Recs.    2) Hx of Cervical Transverse Myelitis  - Pt with residual B/L LE loss of tactile differentiation, Bladder and Bowel incontinence  - On Baclofen 10mg but reports non-compliance for last 3 wks  - Will hold Baclofen for now. Can consider restarting if requested by pt.  - Continue home Mag-Ox 400mg qd     3) Hypertension  - BP on admission 175/73. At this time, 146/73.  - Continue home Lisinopril 2.5mg qd. - Will continue to monitor at this time and readjust meds as BP's trend.    4) Non-Insulin Dependent DM II  - Last HgA1c 7.2 (6/6/17). - Holding home medications of Glimepiride, Invokana and Januvia. - Insulin Sliding Scale normal sensitivity with AC&HS glucose checks. - Hypoglycemia protocols ordered.     5) Hypertriglyceridemia - Trigs-150 (04/17)  - Continue Home Pravachol 40mg     6) Fibromyalgia  - Continue home Gabapentin 300mg every day     7) Rosacea  - Continue home soolantra, nystatin cream and triamcinolone 1% cream per pharm substitution     FEN/GI - Clear liquid diet. NS at 125 mL/hr.   Activity - Out of bed with assistance  DVT prophylaxis - SCDs  GI prophylaxis - Protonix 40mg BID  Fall prophylaxis - Not indicated at this time. Disposition - Plan to d/c to Home. Consulting PT/OT/CM  Code Status - Full, discussed with patient / caregivers. Patient was seen and discussed with Dr. Mega March MD.      Jean Marie Schaffer MD  Family Medicine Resident  11/1/2017 9:48 AM         CC: Doing better    Subjective  No acute events overnight. Patient tolerating liquid diet. Denies chills, headaches, chest pain, shortness of breath, palpitation, nausea and vomiting. Has mild LLQ pain. One small nonbloody bowel movement at midnight. Feeling improved.       Inpatient Medications  Current Facility-Administered Medications   Medication Dose Route Frequency    bethanechol (URECHOLINE) tablet 10 mg  10 mg Oral QHS    gabapentin (NEURONTIN) capsule 300 mg  300 mg Oral TID    fluticasone (FLONASE) 50 mcg/actuation nasal spray 2 Spray  2 Spray Both Nostrils DAILY    ivermectin 1 % cream (Soolantra) (Patient Supplied)   Topical DAILY    lisinopril (PRINIVIL, ZESTRIL) tablet 2.5 mg  2.5 mg Oral QHS    magnesium oxide (MAG-OX) tablet 400 mg  400 mg Oral DAILY    nystatin (MYCOSTATIN) 100,000 unit/gram cream   Topical TID    pravastatin (PRAVACHOL) tablet 40 mg  40 mg Oral DAILY    triamcinolone acetonide (KENALOG) 0.1 % cream   Topical TID    sodium chloride (NS) flush 5-10 mL  5-10 mL IntraVENous Q8H    sodium chloride (NS) flush 5-10 mL  5-10 mL IntraVENous PRN    insulin lispro (HUMALOG) injection   SubCUTAneous QID WITH MEALS    glucose chewable tablet 16 g  4 Tab Oral PRN    dextrose (D50W) injection syrg 12.5-25 g  12.5-25 g IntraVENous PRN    glucagon (GLUCAGEN) injection 1 mg  1 mg IntraMUSCular PRN    0.9% sodium chloride infusion  125 mL/hr IntraVENous CONTINUOUS    pantoprazole (PROTONIX) 40 mg in sodium chloride 0.9 % 10 mL injection  40 mg IntraVENous Q12H    0.9% sodium chloride infusion 250 mL  250 mL IntraVENous PRN    metroNIDAZOLE (FLAGYL) IVPB premix 500 mg  500 mg IntraVENous Q8H    levoFLOXacin (LEVAQUIN) 500 mg in D5W IVPB  500 mg IntraVENous Q24H         Allergies  Allergies   Allergen Reactions    Latex Other (comments)     Patient states it burns around her mouth.  Other Food Other (comments)     Yogurt - stomach cramping    Betadine [Povidone-Iodine] Itching     Pt states this causes \"extreme\" itching    Codeine Anaphylaxis, Rash, Nausea Only and Other (comments)     HEADACHE  Tolerates tramadol    Dilaudid [Hydromorphone (Bulk)] Anaphylaxis, Itching, Nausea Only and Other (comments)     HALLUCINATIONS  Tolerates tramadol      Hydrocodone Anaphylaxis, Rash, Nausea Only and Vertigo     Facial - eyelid swelling-had to go to ER for reaction, HALLUCINATIONS  Tolerates tramadol      Ditropan [Oxybutynin Chloride] Swelling    Doxycycline Rash     PUSTULAR RASH- TOLERATING TETRACYCLINE    Keflex [Cephalexin] Nausea and Vomiting     Pain in abdomen AND FLU-LIKE SX      Metformin Nausea and Vomiting     Severe abdominal pain      Tape [Adhesive] Other (comments)     Redness/inflammed. Can only use paper tape. CAN USE BAND-AIDS. TOLERATES SURGICAL TAPE USED IN [facility].  Sulfamethoxazole-Trimethoprim Other (comments)     Cramping/flu-like symptoms         Objective  Vitals:  Patient Vitals for the past 8 hrs:   Temp Pulse Resp BP SpO2   11/01/17 0738 98.2 °F (36.8 °C) 65 19 153/72 100 %   11/01/17 0411 98.3 °F (36.8 °C) 69 16 142/57 98 %         I/O:  No intake or output data in the 24 hours ending 11/01/17 0948  Last shift:       Last 3 shifts:         Physical Exam:  General: No acute distress. Alert. Cooperative. HEENT: Normocephalic. Atraumatic. Val Sukhdeep Conjunctiva pink. Sclera white. PERRL. MMM   Respiratory: CTAB. No w/r/r/c.   Cardiovascular: RRR. Normal S1,S2. No m/r/g. 2+ pulses in DP bilaterally   GI: + bowel sounds. Mild tenderness to palpation of LLQ. No rebound tenderness. Some guarding in lower quadrants. Nondistended   Extremities: No edema. No tenderness. Laboratory Data  Recent Results (from the past 12 hour(s))   METABOLIC PANEL, COMPREHENSIVE    Collection Time: 11/01/17 12:10 AM   Result Value Ref Range    Sodium 142 136 - 145 mmol/L    Potassium 3.8 3.5 - 5.1 mmol/L    Chloride 108 97 - 108 mmol/L    CO2 23 21 - 32 mmol/L    Anion gap 11 5 - 15 mmol/L    Glucose 102 (H) 65 - 100 mg/dL    BUN 15 6 - 20 MG/DL    Creatinine 0.48 (L) 0.55 - 1.02 MG/DL    BUN/Creatinine ratio 31 (H) 12 - 20      GFR est AA >60 >60 ml/min/1.73m2    GFR est non-AA >60 >60 ml/min/1.73m2    Calcium 8.6 8.5 - 10.1 MG/DL    Bilirubin, total 0.4 0.2 - 1.0 MG/DL    ALT (SGPT) 25 12 - 78 U/L    AST (SGOT) 17 15 - 37 U/L    Alk. phosphatase 127 (H) 45 - 117 U/L    Protein, total 5.9 (L) 6.4 - 8.2 g/dL    Albumin 2.6 (L) 3.5 - 5.0 g/dL    Globulin 3.3 2.0 - 4.0 g/dL    A-G Ratio 0.8 (L) 1.1 - 2.2     CBC WITH AUTOMATED DIFF    Collection Time: 11/01/17 12:10 AM   Result Value Ref Range    WBC 8.1 3.6 - 11.0 K/uL    RBC 4.55 3.80 - 5.20 M/uL    HGB 12.0 11.5 - 16.0 g/dL    HCT 38.4 35.0 - 47.0 %    MCV 84.4 80.0 - 99.0 FL    MCH 26.4 26.0 - 34.0 PG    MCHC 31.3 30.0 - 36.5 g/dL    RDW 14.2 11.5 - 14.5 %    PLATELET 292 607 - 172 K/uL    NEUTROPHILS 71 32 - 75 %    LYMPHOCYTES 19 12 - 49 %    MONOCYTES 8 5 - 13 %    EOSINOPHILS 2 0 - 7 %    BASOPHILS 0 0 - 1 %    ABS. NEUTROPHILS 5.8 1.8 - 8.0 K/UL    ABS. LYMPHOCYTES 1.6 0.8 - 3.5 K/UL    ABS. MONOCYTES 0.6 0.0 - 1.0 K/UL    ABS. EOSINOPHILS 0.1 0.0 - 0.4 K/UL    ABS.  BASOPHILS 0.0 0.0 - 0.1 K/UL   GLUCOSE, POC    Collection Time: 11/01/17  6:58 AM   Result Value Ref Range    Glucose (POC) 126 (H) 65 - 100 mg/dL    Performed by Nany Mo Problems:  Hospital Problems  Date Reviewed: 9/25/2017    None

## 2017-11-01 NOTE — PROGRESS NOTES
[facility]. [address], [place] [address]  [phone]              GI PROGRESS NOTE    NAME: Manfred Macdonald   :  1950   MRN:  [id number]     CC: bleeding/diarrhea    Subjective:   Reports no further bleeding or stools. Reports mild LLQ tenderness. No nausea or vomiting.  at bedside. Objective:     VITALS:   Last 24hrs VS reviewed since prior progress note. Most recent are:  Visit Vitals    /72 (BP 1 Location: Right arm, BP Patient Position: At rest)    Pulse 65    Temp 98.2 °F (36.8 °C)    Resp 19    Ht 5' 8\" (1.727 m)    Wt 73.1 kg (161 lb 3.2 oz)    SpO2 100%    BMI 24.51 kg/m2       Intake & Output          ROS: Neg for fever, chest pain, SOB. PHYSICAL EXAM:  General: Cooperative, no acute distress    Neurologic:  Alert and oriented X 3. HEENT: PERRLA, EOMI. Lungs:  CTA Bilaterally. No Wheezing  Heart:  s1 s2, Regular  rhythm,  No murmur   Abdomen: Soft, Non distended, mild LLQ tenderness. Bowel sounds normal. No guarding or rebound. No hepatosplenomegaly. Extremities: No edema  Psych:   Good insight. Not anxious nor agitated. Lab Data Reviewed:   Recent Labs      17   0010  10/31/17   1845  10/31/17   1201   WBC  8.1   --   9.4   HGB  12.0  10.3*  13.4   MCV  84.4   --   84.9   PLT  212   --   238   NA  142   --   141   K  3.8   --   4.0   CREA  0.48*   --   0.59   BUN  15   --   23*   ALB  2.6*   --   3.1*   TBILI  0.4   --   0.5   SGOT  17   --   18   ALT  25   --   27   AP  127*   --   157*   LPSE   --    --   231         ________________________________________________________________________       Assessment/Plan:   LLQ pain with brbpr and colonic thickening on CT c/w acute colitis. Likely ischemia vs infection. Currently on IVF, IV abx, and clears. Stool cultures ordered and pending. ? Flex sig tomorrow if reoccurrence of bleeding. She states she does not want to drink a colonoscopy prep today.  On clears for now.        Signed By: Eric Ortiz NP     11/1/2017  9:08 AM

## 2017-11-01 NOTE — PROGRESS NOTES
Occupational Therapy EVALUATION/discharge  Patient: Jacques Berry (39 y.o. female)  Date: 11/1/2017  Primary Diagnosis: GI bleed        Precautions:   WBAT, Fall    ASSESSMENT:   Based on the objective data described below, the patient presents with hospital admission secondary to GI bleed. Patient received sitting in bedside chair after recently working with PT. Patient reports she is doing well at home, performing all ADLs, but notes some difficulty with utensils and UE strength. Patient reports will return to outpatient OT upon new year. Patient provided with education for built up handles, strengthening activities, movements against gravity and daily tasks to increase UE strength. Patient reports she has tried some of the suggestions but not others, but agreeable to perform HEP return home. Patient specifically reports unable to use foam built up handles. Patient able to demonstrate ability to complete ADL tasks without assistance today. Patient agreeable to continue daily tasks and incorporate additional strengthening as suggested by this therapist and HEP provided by outpatient therapist. .  Further skilled acute occupational therapy is not indicated at this time. Discharge Recommendations: None  Further Equipment Recommendations for Discharge: none       SUBJECTIVE:   Patient stated I would love more to get my arms stronger, but its got to wait until January when I have more therapy dollars .     OBJECTIVE DATA SUMMARY:   HISTORY:   Past Medical History:   Diagnosis Date    Abnormal MRI, cervical spine 4/3/2017    Abnormal pap 3/2015; 5/2016 2015 Neg pap +HPV; 2016 ASCUS +HPV    Acute transverse myelitis (HCC)     Arthritis     osteo-tests for RA were neg    Breast cancer ([facility] [address] 1996    right    Chronic pain     Diabetes (HCC)     Fibromyalgia     Ganglion cyst of wrist     LEFT    Hypercholesterolemia     Hypertension     Memory loss     Per pt.      Murmur, cardiac     Neuropathy     Rosacea     Unspecified adverse effect of anesthesia     \"SLOW TO WAKE UP, SENSITIVE TO ANESTHESIA, DO NOT COME AROUND FOR 2-3 DAYS\"      Past Surgical History:   Procedure Laterality Date    HX CATARACT REMOVAL Bilateral 2014    HX CERVICAL FUSION      HX COLPOSCOPY  5/2016    Neg path    HX MASTECTOMY  12/96    tram flap    HX ORTHOPAEDIC Left 1990's    foot surgery    HX TONSILLECTOMY      HX VASCULAR ACCESS  1997    portacath left chest-removed after chemo    RECONSTR NOSE  11/2013, 2005    HOLE IN SEPTUM       Prior Level of Function/Home Situation: No assist with ADLs, using cane or RW  Expanded or extensive additional review of patient history:     Home Situation  Home Environment: Private residence  # Steps to Enter: 2  Rails to Enter: No  One/Two Story Residence: One story  Living Alone: No  Support Systems: Family member(s)  Patient Expects to be Discharged to[de-identified] Private residence  Current DME Used/Available at Home: [address] [facility], [place], straight, Walker, rolling, Wheelchair, Grab bars  Tub or Shower Type: Shower  [x]  Right hand dominant   []  Left hand dominant    EXAMINATION OF PERFORMANCE DEFICITS:  Cognitive/Behavioral Status:  Neurologic State: Alert  Orientation Level: Oriented X4  Cognition: Follows commands  Perception: Appears intact  Perseveration: No perseveration noted  Safety/Judgement: Awareness of environment    Skin: intact as seen    Edema: none noted     Hearing: Auditory  Auditory Impairment: None    Vision/Perceptual:                                Corrective Lenses: Reading glasses    Range of Motion:  AROM: Within functional limits     Strength:  Strength: Generally decreased, functional        Coordination:  Coordination: Within functional limits  Fine Motor Skills-Upper: Left Intact; Right Intact    Gross Motor Skills-Upper: Left Intact; Right Intact    Tone & Sensation:  Tone: Normal  Sensation: Intact        Balance:  Sitting: Intact  Standing: Intact; With support    Functional Mobility and Transfers for ADLs:  Bed Mobility:       Transfers:  Sit to Stand: Contact guard assistance  Stand to Sit: Contact guard assistance  Toilet Transfer : Stand-by asssistance    ADL Assessment:  Feeding: Modified independent (increased time, some difficutly with utensils )    Oral Facial Hygiene/Grooming: Supervision (standing at sink for hand hygiene )    Bathing: Supervision    Upper Body Dressing: Supervision    Lower Body Dressing: Supervision    Toileting: Supervision                ADL Intervention and task modifications:       Cognitive Retraining  Safety/Judgement: Awareness of environment    Therapeutic Exercise:     Functional Measure:  Barthel Index:    Bathin  Bladder: 10  Bowels: 10  Groomin  Dressing: 10  Feedin  Mobility: 15  Stairs: 5  Toilet Use: 10  Transfer (Bed to Chair and Back): 10  Total: 85       Barthel and G-code impairment scale:  Percentage of impairment CH  0% CI  1-19% CJ  20-39% CK  40-59% CL  60-79% CM  80-99% CN  100%   Barthel Score 0-100 100 99-80 79-60 59-40 20-39 1-19   0   Barthel Score 0-20 20 17-19 13-16 9-12 5-8 1-4 0      The Barthel ADL Index: Guidelines  1. The index should be used as a record of what a patient does, not as a record of what a patient could do. 2. The main aim is to establish degree of independence from any help, physical or verbal, however minor and for whatever reason. 3. The need for supervision renders the patient not independent. 4. A patient's performance should be established using the best available evidence. Asking the patient, friends/relatives and nurses are the usual sources, but direct observation and common sense are also important. However direct testing is not needed. 5. Usually the patient's performance over the preceding 24-48 hours is important, but occasionally longer periods will be relevant. 6. Middle categories imply that the patient supplies over 50 per cent of the effort.   7. Use of aids to be independent is allowed. Troy Ahn., Barthel, D.W. (4281). Functional evaluation: the Barthel Index. [address] [facility] (14)2. GELY Alegria, Fatuma Ramírez.Ignacio., Mari, [address] (1999). Measuring the change indisability after inpatient rehabilitation; comparison of the responsiveness of the Barthel Index and Functional [place] Measure. Journal of Neurology, Neurosurgery, and Psychiatry, 66(4), 171-351. AINSLEY Keller, VARUN Mcguire, & Liseth Weems MKAMILAH. (2004.) Assessment of post-stroke quality of life in cost-effectiveness studies: The usefulness of the Barthel Index and the EuroQoL-5D. Quality of Life Research, 13, 113-31         G codes: In compliance with CMSs Claims Based Outcome Reporting, the following G-code set was chosen for this patient based on their primary functional limitation being treated: The outcome measure chosen to determine the severity of the functional limitation was the Barthel Index with a score of 85/100 which was correlated with the impairment scale. ?  Self Care:     - CURRENT STATUS: CI - 1%-19% impaired, limited or restricted    - GOAL STATUS: CI - 1%-19% impaired, limited or restricted    - D/C STATUS:  CI - 1%-19% impaired, limited or restricted       Occupational Therapy Evaluation Charge Determination   History Examination Decision-Making   LOW Complexity : Brief history review  LOW Complexity : 1-3 performance deficits relating to physical, cognitive , or psychosocial skils that result in activity limitations and / or participation restrictions  LOW Complexity : No comorbidities that affect functional and no verbal or physical assistance needed to complete eval tasks       Based on the above components, the patient evaluation is determined to be of the following complexity level: LOW   Pain:Pain Scale 1: Numeric (0 - 10)  Pain Intensity 1: 0              Activity Tolerance:   VSS  Please refer to the flowsheet for vital signs taken during this treatment. After treatment:   [x]  Patient left in no apparent distress sitting up in chair  []  Patient left in no apparent distress in bed  [x]  Call bell left within reach  [x]  Nursing notified  []  Caregiver present  []  Bed alarm activated    COMMUNICATION/EDUCATION:   Communication/Collaboration:  [x]      Home safety education was provided and the patient/caregiver indicated understanding. [x]      Patient/family have participated as able and agree with findings and recommendations. []      Patient is unable to participate in plan of care at this time.   Findings and recommendations were discussed with: Physical Therapist and Registered Nurse    Shirlean Babinski, OTR/L  Time Calculation: 22 mins

## 2017-11-01 NOTE — PROGRESS NOTES
Bedside shift change report given to JIMENEZ RN (oncoming nurse) by Kelly Brown RN (offgoing nurse). Report included the following information SBAR, Kardex, Intake/Output, MAR and Accordion.

## 2017-11-01 NOTE — PROGRESS NOTES
Bedside and Verbal shift change report given to [address] (oncoming nurse) by Viky Akhtar RN (offgoing nurse). Report included the following information SBAR, Kardex, Procedure Summary, Intake/Output, MAR and Recent Results.

## 2017-11-01 NOTE — PROGRESS NOTES
physical Therapy EVALUATION/DISCHARGE  Patient: Jermaine Joseph (13 y.o. female)  Date: 11/1/2017  Primary Diagnosis: GI bleed        Precautions:   WBAT, Fall  ASSESSMENT :  Based on the objective data described below, the patient presents with GI bleed with a premorbid history of transverse myelitis and chronic back pain with lumbar fusion history. She presented to the ER following bloody stools and now being followed by GI. Patient with a neurogenic bladder and bowel due to her above listed conditions. Today she is received in supine, and agreeable to PT. She is currently MOD I with safety concerns due to increased impulsivity with all upright activity. Patient prior to admission uses a [employer] for inside the home and outside the home ambulation and occasional RW usage. She is currently in outpatient PT. She lives in a 1 story home with her . She owns all needed DME. Patient is currently at her baseline for mobility and will not require additional PT while in the hospital.  Recommend continued outpatient PT . Skilled physical therapy is not indicated at this time. PLAN :  Discharge Recommendations: Outpatient  Further Equipment Recommendations for Discharge:      SUBJECTIVE:   Patient stated .    OBJECTIVE DATA SUMMARY:   HISTORY:    Past Medical History:   Diagnosis Date    Abnormal MRI, cervical spine 4/3/2017    Abnormal pap 3/2015; 5/2016 2015 Neg pap +HPV; 2016 ASCUS +HPV    Acute transverse myelitis ([address]     Arthritis     osteo-tests for RA were neg    Breast cancer ([address] 1996    right    Chronic pain     Diabetes ([address]     Fibromyalgia     Ganglion cyst of wrist     LEFT    Hypercholesterolemia     Hypertension     Memory loss     Per pt.      Murmur, cardiac     Neuropathy     Rosacea     Unspecified adverse effect of anesthesia     \"SLOW TO WAKE UP, SENSITIVE TO ANESTHESIA, DO NOT COME AROUND FOR 2-3 DAYS\"      Past Surgical History:   Procedure Laterality Date    HX CATARACT REMOVAL Bilateral 2014    HX CERVICAL FUSION      HX COLPOSCOPY  5/2016    Neg path    HX MASTECTOMY  12/96    tram flap    HX ORTHOPAEDIC Left 1990's    foot surgery    HX TONSILLECTOMY      HX VASCULAR ACCESS  1997    portacath left chest-removed after chemo    RECONSTR NOSE  11/2013, 2005    HOLE IN SEPTUM     Prior Level of Function/Home Situation:   Personal factors and/or comorbidities impacting plan of care:     Home Situation  Home Environment: Private residence  # Steps to Enter: 2  Rails to [facility]: No  One/Two Story Residence: One story  Living Alone: No  Support Systems: Family member(s)  Patient Expects to be Discharged to[de-identified] Private residence  Current DME Used/Available at Home: Eveleen Frames, straight, Walker, rolling, Wheelchair, Grab bars  Tub or Shower Type: Shower    EXAMINATION/PRESENTATION/DECISION MAKING:   Critical Behavior:  Neurologic State: Alert  Orientation Level: Oriented X4  Cognition: Follows commands  Safety/Judgement: Awareness of environment  Hearing: Auditory  Auditory Impairment: None  Skin:  All exposed intact  Edema: none noted  Range Of Motion:  AROM: Within functional limits                       Strength:    Strength: Generally decreased, functional                    Tone & Sensation:   Tone: Normal              Sensation: Intact               Coordination:  Coordination: Within functional limits  Vision:   Corrective Lenses: Reading glasses  Functional Mobility:  Bed Mobility:  Rolling: Modified independent  Supine to Sit: Modified independent  Sit to Supine: Modified independent     Transfers:  Sit to Stand: Modified independent  Stand to Sit: Modified independent        Bed to Chair: Modified independent              Balance:   Sitting: Intact  Standing: Intact; With support  Ambulation/Gait Training:  Distance (ft): 200 Feet (ft)  Assistive Device: Walker, rolling;Gait belt  Ambulation - Level of Assistance: Modified independent; Additional time;Assist x1 (safety concerns due to impulsivity)     Gait Description (WDL): Exceptions to WDL  Gait Abnormalities: Decreased step clearance                                           Stairs: Therapeutic Exercises:       Functional Measure:  Tinetti test:    Sitting Balance: 1  Arises: 1  Attempts to Rise: 2  Immediate Standing Balance: 2  Standing Balance: 1  Nudged: 1  Eyes Closed: 1  Turn 360 Degrees - Continuous/Discontinuous: 1  Turn 360 Degrees - Steady/Unsteady: 1  Sitting Down: 2  Balance Score: 13  Indication of Gait: 1  R Step Length/Height: 1  L Step Length/Height: 1  R Foot Clearance: 1  L Foot Clearance: 1  Step Symmetry: 1  Step Continuity: 1  Path: 1  Trunk: 1  Walking Time: 0  Gait Score: 9  Total Score: 22       Tinetti Test and G-code impairment scale:  Percentage of Impairment CH    0%   CI    1-19% CJ    20-39% CK    40-59% CL    60-79% CM    80-99% CN     100%   Tinetti  Score 0-28 28 23-27 17-22 12-16 6-11 1-5 0       Tinetti Tool Score Risk of Falls  <19 = High Fall Risk  19-24 = Moderate Fall Risk  25-28 = Low Fall Risk  Tinetti ME. Performance-Oriented Assessment of Mobility Problems in Elderly Patients. [facility]; [id number]. (Scoring Description: PT Bulletin Feb. 10, 1993)    Older adults: Issa Lewis et al, 2009; n = 1000 [facility] elderly evaluated with ABC, GREGORIO, ADL, and IADL)  · Mean GREGORIO score for males aged 69-68 years = 26.21(3.40)  · Mean GREGORIO score for females age 69-68 years = 25.16(4.30)  · Mean GREGORIO score for males over 80 years = 23.29(6.02)  · Mean GREGORIO score for females over 80 years = 17.20(8.32)       G codes: In compliance with CMSs Claims Based Outcome Reporting, the following G-code set was chosen for this patient based on their primary functional limitation being treated: The outcome measure chosen to determine the severity of the functional limitation was the Tinetti with a score of 22/28 which was correlated with the impairment scale.     ? Mobility - Walking and Moving Around:  - CURRENT STATUS: CJ - 20%-39% impaired, limited or restricted    - GOAL STATUS: CJ - 20%-39% impaired, limited or restricted    - D/C STATUS:  CJ - 20%-39% impaired, limited or restricted          Physical Therapy Evaluation Charge Determination   History Examination Presentation Decision-Making   MEDIUM  Complexity : 1-2 comorbidities / personal factors will impact the outcome/ POC  LOW Complexity : 1-2 Standardized tests and measures addressing body structure, function, activity limitation and / or participation in recreation  LOW Complexity : Stable, uncomplicated  Other outcome measures Barthel Index  LOW       Based on the above components, the patient evaluation is determined to be of the following complexity level: LOW     Pain:Pain Scale 1: Numeric (0 - 10)  Pain Intensity 1: 0     Activity Tolerance:   Good- no complications with upright activity  Please refer to the flowsheet for vital signs taken during this treatment. After treatment:   [x]   Patient left in no apparent distress sitting up in chair  []   Patient left in no apparent distress in bed  [x]   Call bell left within reach  [x]   Nursing notified  []   Caregiver present  []   Bed alarm activated    COMMUNICATION/EDUCATION:   Communication/Collaboration:  [x]   Fall prevention education was provided and the patient/caregiver indicated understanding. [x]   Patient/family have participated as able and agree with findings and recommendations. []   Patient is unable to participate in plan of care at this time.   Findings and recommendations were discussed with: Registered Nurse    Thank you for this referral.  Dhara Centeno, PT, DPT   Time Calculation: 12 mins

## 2017-11-02 VITALS
OXYGEN SATURATION: 100 % | TEMPERATURE: 98.3 F | SYSTOLIC BLOOD PRESSURE: 166 MMHG | DIASTOLIC BLOOD PRESSURE: 75 MMHG | WEIGHT: 161.2 LBS | HEART RATE: 68 BPM | RESPIRATION RATE: 18 BRPM | HEIGHT: 68 IN | BODY MASS INDEX: 24.43 KG/M2

## 2017-11-02 LAB
ALBUMIN SERPL-MCNC: 2.4 G/DL (ref 3.5–5)
ALBUMIN/GLOB SERPL: 0.8 {RATIO} (ref 1.1–2.2)
ALP SERPL-CCNC: 111 U/L (ref 45–117)
ALT SERPL-CCNC: 19 U/L (ref 12–78)
ANION GAP SERPL CALC-SCNC: 9 MMOL/L (ref 5–15)
AST SERPL-CCNC: 18 U/L (ref 15–37)
BACTERIA SPEC CULT: ABNORMAL
BACTERIA SPEC CULT: NORMAL
BASOPHILS # BLD: 0 K/UL (ref 0–0.1)
BASOPHILS NFR BLD: 1 % (ref 0–1)
BILIRUB SERPL-MCNC: 0.3 MG/DL (ref 0.2–1)
BUN SERPL-MCNC: 7 MG/DL (ref 6–20)
BUN/CREAT SERPL: 15 (ref 12–20)
C JEJUNI+C COLI AG STL QL: NEGATIVE
CALCIUM SERPL-MCNC: 8.6 MG/DL (ref 8.5–10.1)
CC UR VC: ABNORMAL
CHLORIDE SERPL-SCNC: 111 MMOL/L (ref 97–108)
CO2 SERPL-SCNC: 26 MMOL/L (ref 21–32)
CREAT SERPL-MCNC: 0.46 MG/DL (ref 0.55–1.02)
E COLI SXT1+2 STL IA: NEGATIVE
EOSINOPHIL # BLD: 0.2 K/UL (ref 0–0.4)
EOSINOPHIL NFR BLD: 4 % (ref 0–7)
ERYTHROCYTE [DISTWIDTH] IN BLOOD BY AUTOMATED COUNT: 14.1 % (ref 11.5–14.5)
GLOBULIN SER CALC-MCNC: 3 G/DL (ref 2–4)
GLUCOSE BLD STRIP.AUTO-MCNC: 140 MG/DL (ref 65–100)
GLUCOSE BLD STRIP.AUTO-MCNC: 157 MG/DL (ref 65–100)
GLUCOSE BLD STRIP.AUTO-MCNC: 161 MG/DL (ref 65–100)
GLUCOSE SERPL-MCNC: 128 MG/DL (ref 65–100)
HCT VFR BLD AUTO: 35.3 % (ref 35–47)
HGB BLD-MCNC: 11.2 G/DL (ref 11.5–16)
LYMPHOCYTES # BLD: 1.2 K/UL (ref 0.8–3.5)
LYMPHOCYTES NFR BLD: 31 % (ref 12–49)
MCH RBC QN AUTO: 26.2 PG (ref 26–34)
MCHC RBC AUTO-ENTMCNC: 31.7 G/DL (ref 30–36.5)
MCV RBC AUTO: 82.7 FL (ref 80–99)
MONOCYTES # BLD: 0.4 K/UL (ref 0–1)
MONOCYTES NFR BLD: 11 % (ref 5–13)
NEUTS SEG # BLD: 2.1 K/UL (ref 1.8–8)
NEUTS SEG NFR BLD: 53 % (ref 32–75)
PLATELET # BLD AUTO: 186 K/UL (ref 150–400)
POTASSIUM SERPL-SCNC: 3.9 MMOL/L (ref 3.5–5.1)
PROT SERPL-MCNC: 5.4 G/DL (ref 6.4–8.2)
RBC # BLD AUTO: 4.27 M/UL (ref 3.8–5.2)
SERVICE CMNT-IMP: ABNORMAL
SERVICE CMNT-IMP: NORMAL
SODIUM SERPL-SCNC: 146 MMOL/L (ref 136–145)
WBC # BLD AUTO: 3.9 K/UL (ref 3.6–11)

## 2017-11-02 PROCEDURE — 74011636637 HC RX REV CODE- 636/637: Performed by: FAMILY MEDICINE

## 2017-11-02 PROCEDURE — 85025 COMPLETE CBC W/AUTO DIFF WBC: CPT | Performed by: STUDENT IN AN ORGANIZED HEALTH CARE EDUCATION/TRAINING PROGRAM

## 2017-11-02 PROCEDURE — 82962 GLUCOSE BLOOD TEST: CPT

## 2017-11-02 PROCEDURE — 36415 COLL VENOUS BLD VENIPUNCTURE: CPT | Performed by: STUDENT IN AN ORGANIZED HEALTH CARE EDUCATION/TRAINING PROGRAM

## 2017-11-02 PROCEDURE — 74011000250 HC RX REV CODE- 250: Performed by: FAMILY MEDICINE

## 2017-11-02 PROCEDURE — 80053 COMPREHEN METABOLIC PANEL: CPT | Performed by: STUDENT IN AN ORGANIZED HEALTH CARE EDUCATION/TRAINING PROGRAM

## 2017-11-02 PROCEDURE — C9113 INJ PANTOPRAZOLE SODIUM, VIA: HCPCS | Performed by: FAMILY MEDICINE

## 2017-11-02 PROCEDURE — 74011250637 HC RX REV CODE- 250/637: Performed by: STUDENT IN AN ORGANIZED HEALTH CARE EDUCATION/TRAINING PROGRAM

## 2017-11-02 PROCEDURE — 74011250636 HC RX REV CODE- 250/636: Performed by: FAMILY MEDICINE

## 2017-11-02 RX ORDER — METRONIDAZOLE 500 MG/1
500 TABLET ORAL EVERY 8 HOURS
Qty: 2 TAB | Refills: 0 | Status: SHIPPED | OUTPATIENT
Start: 2017-11-02 | End: 2017-11-03

## 2017-11-02 RX ADMIN — FLUTICASONE PROPIONATE 2 SPRAY: 50 SPRAY, METERED NASAL at 08:04

## 2017-11-02 RX ADMIN — INSULIN LISPRO 2 UNITS: 100 INJECTION, SOLUTION INTRAVENOUS; SUBCUTANEOUS at 08:00

## 2017-11-02 RX ADMIN — GABAPENTIN 300 MG: 300 CAPSULE ORAL at 15:44

## 2017-11-02 RX ADMIN — METRONIDAZOLE 500 MG: 500 INJECTION, SOLUTION INTRAVENOUS at 14:00

## 2017-11-02 RX ADMIN — INSULIN LISPRO 2 UNITS: 100 INJECTION, SOLUTION INTRAVENOUS; SUBCUTANEOUS at 11:50

## 2017-11-02 RX ADMIN — METRONIDAZOLE 500 MG: 500 INJECTION, SOLUTION INTRAVENOUS at 04:33

## 2017-11-02 RX ADMIN — SODIUM CHLORIDE 125 ML/HR: 900 INJECTION, SOLUTION INTRAVENOUS at 07:54

## 2017-11-02 RX ADMIN — Medication 400 MG: at 08:00

## 2017-11-02 RX ADMIN — LEVOFLOXACIN 500 MG: 5 INJECTION, SOLUTION INTRAVENOUS at 14:16

## 2017-11-02 RX ADMIN — GABAPENTIN 300 MG: 300 CAPSULE ORAL at 08:00

## 2017-11-02 RX ADMIN — SODIUM CHLORIDE 40 MG: 9 INJECTION INTRAMUSCULAR; INTRAVENOUS; SUBCUTANEOUS at 04:33

## 2017-11-02 RX ADMIN — INSULIN LISPRO 2 UNITS: 100 INJECTION, SOLUTION INTRAVENOUS; SUBCUTANEOUS at 17:36

## 2017-11-02 RX ADMIN — SODIUM CHLORIDE 40 MG: 9 INJECTION INTRAMUSCULAR; INTRAVENOUS; SUBCUTANEOUS at 17:31

## 2017-11-02 NOTE — DISCHARGE INSTRUCTIONS
HOME DISCHARGE INSTRUCTIONS    Sang Bangura / [id number] : 1950    Admission date: 10/31/2017 Discharge date: 2017     Please bring this form with you to show your care provider at your follow-up appointment. Primary care provider:  Bianka Alicea DO    Discharging provider: Ian Garcia MD  - Family Medicine Resident  Brittani Segovia MD - Attending, Family Medicine     You have been admitted to the hospital with the following diagnoses:    ACUTE DIAGNOSES:  · GI bleed secondary to acute colitis  . . . . . . . . . . . . . . . . . . . . . . . . . . . . . . . . . . . . . . . . . . . . . . . . . . . . . . . . . . . . . . . . . . . . . . . Juan J Little FOLLOW-UP CARE RECOMMENDATIONS:  We recommend trying pelvic rehab as that may provide benefit for helping with urination incontinence. Ask you primary care physician about how this can be set up. Appointments: Follow-up Information     Follow up With Details Comments [address] DO On 2017 @9.45am for hospital follow up appointment [address]  [facility]  [phone]      Rhiannon Boyer MD  Please call to make an appointment  [address] [place] [address]  [phone]               Medication Changes:  -STOP taking Meloxican and Aspirin as these can contribute to gastrointestinal bleeding. See you primary care physician before restarting these medications. Also, avoid all NSAIDS such as Ibuprofen and Naproxen.    -You will be sent home with two additional doses of antibiotics. Take one dose of Metronidazole at 8:00pm today and the last dose at 6:00a on 11/3 or as soon as you wake up. Follow-up tests needed: None    Pending test results: At the time of your discharge the following test results are still pending: Ova and parasite. Please make sure you review these results with your outpatient follow-up provider(s).     Specific symptoms to watch for: chest pain, shortness of breath, fever, chills, nausea, vomiting, diarrhea, change in mentation, falling, weakness, bleeding. DIET/what to eat: Start off with soft, bland foods such as broth, toast, applesauce and bananas. Slowly add elements from your previous regular diet    ACTIVITY: Activity as tolerated    Wound care: None    Equipment needed: None    What to do if new or unexpected symptoms occur? If you experience any of the above symptoms (or should other concerns or questions arise after discharge) please call your primary care physician. Return to the emergency room if you cannot get hold of your doctor. · It is very important that you keep your follow-up appointment(s). · Please bring discharge papers, medication list (and/or medication bottles) to your follow-up appointments for review by your outpatient provider(s). · Please check the list of medications and be sure it includes every medication (even non-prescription medications) that your provider wants you to take. · It is important that you take the medication exactly as they are prescribed. · Keep your medication in the bottles provided by the pharmacist and keep a list of the medication names, dosages, and times to be taken in your wallet. · Do not take other medications without consulting your doctor. · If you have any questions about your medications or other instructions, please talk to your nurse or care provider before you leave the hospital.     Information obtained by:     I understand that if any problems occur once I am at home I am to contact my physician. These instructions were explained to me and I had the opportunity to ask questions. I understand and acknowledge receipt of the instructions indicated above.                                                                                                                                                Physician's or R.N.'s Signature Date/Time                                                                                                                                              Patient or Representative Signature                                                          Date/Time

## 2017-11-02 NOTE — DISCHARGE SUMMARY
[facility]   Discharge/Transfer/Off-Service Note    Date: November 2, 2017    Manfred Macdonald  MRN: [id number]  YOB: 1950  Age: 78 y.o. Date of admission: 10/31/2017     Date of discharge/transfer: 11/2/2017    Primary Care Physician: Marissa Acosta DO     Attending physician at admission: Dr. Arturo Jackson     Attending physician at discharge/transfer: Dr. Arturo Jackson     Resident physician at discharge/transfer: Michelle Hebert MD     Consultants during hospitalization:  GI - Dr. Micha Treviño     Admission diagnoses:   GI bleed    Discharge diagnoses:  Hospital Problems  Date Reviewed: 9/25/2017    None           History of Present Illness per admitting resident Dr. Chuyita Chin:  Carlos Hurst is a 78 y.o. female with known HTN, Fibromyalgia, Rosacea, Cervical Transverse Myelitis, HLD, Non-insulin dependent DMII,  who presents to the ER complaining of bright red blood per rectum. Pt endorses intermittent brbpr since Sunday. States she noticed 2 episodes on Sunday with bowel movements. Subsequent episodes since then have not   Always been associated with BMs. Of note pt has bladder and rectal incontinence 2/2 transverse myelitis. These episodes have been associated with with 2 episodes of loose stools on Sunday and an intermittent, 4/10, pressure-like, non-radiating, left lower quadrant abdominal pain with no worsening or alleviating factors. She denies any abdominal trauma, chest pain, sob, palpitations, nausea, vomiting, fevers, chills or night sweats. She currently takes Meloxicam and ASA 81mg. Was prviously on Ibuprofen until 2 weeks ago for arthritic pain. Of note pt currently follows-up outpatient with GI (Dr. Bruno Acevedo) for bowel incontinence 2/2 to transverse myelitis. States she was seen as recent as Wednesday for follow-up and was told she had no anal sphincter tone and was encouraged to continue laxatives.  States her last colonoscopy was 5 yrs ago; at which point a \"few\" polyps were excised. She has hx of of Right breast Cancer s/p mastectomy and chemo in 1997 with remission since then.      In ED, Vitals were remarkable for BP of 175/73. Labs were unremarkable. CT of her abdomen showed diffuse but predominantly distal colonic wall thickening and adjacent fluid and inflammation. IV fluid resuscitation was initiated. Pt also started on course of Levaquin and Flagyl. [facility] with associated diagnosis:  1) Acute GI Bleed 2/2 to infectious vs ischemic colitis - Pt presented with LLQ abdominal pain, BRBPR and CT abdoomen with distal colonic wall thickening positive FOBT and adjacent fluid and inflammation. Patient was NPO and placed on conservative measures including IV antibiotics and hydration. Colonoscopy was held as patient's bloody stools resolved with conservative measures such as bowel rest and hydration. Patient was placed on antibiotics of Flagyl and Levaquin. Diet was advanced as tolerated and pt was sent home with enough antibiotic to complete a total 3 day course. - Follow-up with Dr. Ross Blackburn (outpatient GI specialist) upon discharge      2) GNR in urine - Patient remained asymptomatic with less than <100,000 colonies. Treatment not indicated for these types of patients however, patient received some antibiotic coverage with Levaquin.  - F/u outpatient if urinary symptoms develop     3) Hx of Cervical Transverse Myelitis - Stable during admission.   - Recommended pelvic PT to be arranged on outpatient basis. - Continue home mag-ox    4) Hypertension - Stable during admission.  - Continue home Lisinopril 2.5mg qd.     5) Non-Insulin Dependent DM II - stable during admission. Patient required only small amounts of sliding scale insulin. - Continue home Glimepiride, Invokana and Januvia.      6) Hypertriglyceridemia - Trigs-150 (04/17) - Stable.   - Continue Home Pravachol 40mg      7) Fibromyalgia - Stable  - Continue home Gabapentin 300mg every day      8) Rosacea - Stable  - Continue home soolantra, nystatin cream and triamcinolone 1% cream per pharm substitution      Physical exam at discharge:   Visit Vitals    /75 (BP 1 Location: Right arm, BP Patient Position: At rest)    Pulse 68    Temp 98.3 °F (36.8 °C)    Resp 18    Ht 5' 8\" (1.727 m)    Wt 161 lb 3.2 oz (73.1 kg)    SpO2 100%    BMI 24.51 kg/m2       General: No acute distress. Alert. Cooperative. HEENT: Normocephalic. Atraumatic.      Conjunctiva pink. Sclera white. PERRL. MMM   Respiratory: CTAB. No w/r/r/c.   Cardiovascular: RRR. Normal S1,S2. No m/r/g. 2+ pulses in DP bilaterally   GI: + bowel sounds. Mild tenderness to palpation of LLQ. No rebound tenderness. Some guarding in lower quadrants. Nondistended   Extremities: No edema. No tenderness. Condition at discharge: Stable, tolerating bland diet    Disposition: Discharge home with PCP and GI follow-up    Recommended follow-up tests after discharge: None     Diet: Jona Fulling with continued advancement to regular diet    Activity: As tolerated    Labs:  Recent Labs      11/02/17   0504  11/01/17   0010  10/31/17   1845  10/31/17   1201   WBC  3.9  8.1   --   9.4   HGB  11.2*  12.0  10.3*  13.4   HCT  35.3  38.4  33.1*  41.6   PLT  186  212   --   238     Recent Labs      11/02/17   0504  11/01/17   0010  10/31/17   1201   NA  146*  142  141   K  3.9  3.8  4.0   CL  111*  108  103   CO2  26  23  25   BUN  7  15  23*   CREA  0.46*  0.48*  0.59   GLU  128*  102*  179*   CA  8.6  8.6  9.3     Recent Labs      11/02/17   0504  11/01/17   0010  10/31/17   1201   SGOT  18  17  18   ALT  19  25  27   AP  111  127*  157*   TBILI  0.3  0.4  0.5   TP  5.4*  5.9*  6.7   ALB  2.4*  2.6*  3.1*   GLOB  3.0  3.3  3.6   LPSE   --    --   231     No results for input(s): INR, PTP, APTT in the last 72 hours.     No lab exists for component: INREXT, INREXT   No results for input(s): FE, TIBC, PSAT, FERR in the last 72 hours. No results for input(s): PH, PCO2, PO2 in the last 72 hours. No results for input(s): CPK, CKMB in the last 72 hours.     No lab exists for component: TROPONINI  Lab Results   Component Value Date/Time    Glucose (POC) 161 11/02/2017 05:28 PM    Glucose (POC) 157 11/02/2017 11:45 AM    Glucose (POC) 140 11/02/2017 07:07 AM    Glucose (POC) 144 11/01/2017 09:03 PM    Glucose (POC) 126 11/01/2017 04:39 PM        All Micro Results     Procedure Component Value Units Date/Time    CULTURE, URINE [[id number]]  (Abnormal)  (Susceptibility) Collected:  10/31/17 1640    Order Status:  Completed Specimen:  Urine Updated:  11/02/17 1322     Special Requests: --        NO SPECIAL REQUESTS  Reflexed from [id number]       [place] Count --        [id number]  COLONIES/mL       Culture result: ESCHERICHIA COLI (A)       CULTURE, STOOL [[id number]] Collected:  10/31/17 1845    Order Status:  Completed Specimen:  Stool Updated:  11/02/17 0954     Special Requests: NO SPECIAL REQUESTS        Campylobacter antigen NEGATIVE        Shiga toxin-producing E. coli Ag NEGATIVE        Culture result:         NO ROUTINE ENTERIC PATHOGENS ISOLATED INCLUDING SALMONELLA, SHIGELLA, YERSINIA, VIBRIO OR SHIGA TOXIN PRODUCING E. COLI    CULTURE, BLOOD [[id number]] Collected:  10/31/17 1505    Order Status:  Completed Specimen:  Blood from Blood Updated:  11/02/17 0840     Special Requests: NO SPECIAL REQUESTS        Culture result: NO GROWTH 2 DAYS       C. DIFFICILE (DNA) [[id number]] Collected:  10/31/17 1845    Order Status:  Canceled Updated:  10/31/17 1907    OVA & PARASITES, [place] [[id number]] Collected:  10/31/17 1645    Order Status:  Canceled Specimen:  Stool from Stool           Diagnostic Studies and Procedures:  CT abdomen - showed diffuse wall thickening predominantly in distal colon suggestive of colitis       Discharge/Transfer Medications:  Current Discharge Medication List      START taking these medications    Details   metroNIDAZOLE (FLAGYL) 500 mg tablet Take 1 Tab by mouth every eight (8) hours for 2 doses. Qty: 2 Tab, Refills: 0         CONTINUE these medications which have NOT CHANGED    Details   baclofen (LIORESAL) 10 mg tablet Take 10 mg by mouth daily as needed. bethanechol (URECHOLINE) 10 mg tablet Take 10 mg by mouth nightly. biotin 10,000 mcg cap Take 1 Cap by mouth daily. fluticasone (FLONASE) 50 mcg/actuation nasal spray 2 Sprays by Both Nostrils route daily as needed for Rhinitis. lisinopril (PRINIVIL, ZESTRIL) 2.5 mg tablet Take 2.5 mg by mouth nightly. OTHER,NON-FORMULARY, Take 1 Tab by mouth daily. Vitamin D & E      guar gum (BENEFIBER) packet Take 1 Packet by mouth nightly as needed. nystatin (MYCOSTATIN) topical cream Apply  to affected area three (3) times daily. As needed for skin irritation. Use a think layer  Qty: 30 g, Refills: 0      triamcinolone acetonide (KENALOG) 0.1 % topical cream Apply  to affected area three (3) times daily. As needed for skin irritation, use thin layer. Qty: 30 g, Refills: 0      SITagliptin (JANUVIA) 100 mg tablet Take 1 Tab by mouth Daily (before breakfast). Qty: 30 Tab, Refills: 11      ivermectin (SOOLANTRA) 1 % crea by Apply Externally route daily. Indications: ACNE ROSACEA      CALCIUM PO Take 1 Caplet by mouth daily. DOCOSAHEXANOIC ACID/EPA (FISH OIL PO) Take 1 Cap by mouth daily. Associated Diagnoses: Myelitis, acute transverse (HCC)      gabapentin (NEURONTIN) 300 mg capsule Take 300 mg by mouth three (3) times daily. glimepiride (AMARYL) 2 mg tablet Take 2 mg by mouth three (3) times daily. magnesium oxide (MAG-OX) 400 mg tablet Take 400 mg by mouth daily. INVOKANA 100 mg tablet Take 1 tablet by mouth  daily before breakfast  Qty: 90 Tab, Refills: 1      Alpha Lipoic Acid 600 mg cap Take 1 Cap by mouth two (2) times a day. L-Mfolate-B6 Phos-Methyl-B12 (METANX) 3-35-2 mg tab tab Take 1 Tab by mouth two (2) times a day. Indications: neuropathy      CHROM GENO/BRINDAL BERRY (GARCINIA CAMBOGIA PO) Take 1 Tab by mouth daily. pravastatin (PRAVACHOL) 40 mg tablet TAKE 1 TABLET NIGHTLY (NEEDS APPOINTMENT AS SOON AS POSSIBLE FOR FASTING LABS AND APPOINTMENT)  Qty: 30 Tab, Refills: 0      CRANBERRY EXTRACT-VIT C PO Take 2 Caps by mouth daily. cinnamon bark (CINNAMON) 500 mg cap Take 2 Caps by mouth daily. loratadine (CLARITIN) 10 mg tablet Take 10 mg by mouth nightly. TRUETEST TEST STRIPS strip TEST THREE TIMES DAILY  Qty: 100 Strip, Refills: 1         STOP taking these medications       meloxicam (MOBIC) 7.5 mg tablet Comments:   Reason for Stopping:         aspirin 81 mg chewable tablet Comments:   Reason for Stopping: Follow up plans/appointments:   Follow-up Information     Follow up With Details Comments [address], DO On 11/7/2017 @9.45am for hospital follow up appointment [address]  [facility]  [phone]      Blanca Montoya MD  Please call to make an appointment  [address] [place] [address] [facility] [address]  [facility]  [phone]             Discharge instructions to patient/family  Please seek medical attention for any new or worsening symptoms particularly fever, chest pain, shortness of breath, abdominal pain, nausea, vomiting      Eleazar Ngo MD  Family Medicine Resident      Cc: Sue Chin DO

## 2017-11-02 NOTE — PROGRESS NOTES
Bedside shift change report given to Armen Freitas (oncoming nurse) by Tushar Richardson RN (offgoing nurse). Report included the following information SBAR, Kardex and MAR.

## 2017-11-02 NOTE — PROGRESS NOTES
[facility][1] FAMILY MEDICINE RESIDENCY PROGRAM   Daily Progress Note    Date: 11/2/2017    Assessment/Plan:   Remedios Hearn is a 78 y.o. female with known HTN, Fibromyalgia, Rosacea, Cervical Transverse Myelitis, HLD, Non-insulin dependent DMII,  Who is admitted for bright red blood per rectum.    1) Acute GI Bleed 2/2 to infectious vs ischemic colitis - Pt with LLQ abdominal pain, BRBPR and CT abdoomen with distal colonic wall thickening and adjacent fluid and inflammation. FOBT Positive. - Clear liquid diet  - Continue with Levaquin and Flagyl  - IV Fluid hydration and Protonix 40mg BID  - Follow-up Blood and urine cx, stool wbcs, culture, ova and parasites. - Two 2OG IV lines obtained and pt typed and crossed  - GI on following. Appreciate Recs.    2) GNR in urine - Patient is asymptomatic with less than <100,000 colonies. Treatment not indicated at this time. 3) Hx of Cervical Transverse Myelitis  - Pt with residual B/L LE loss of tactile differentiation, Bladder and Bowel incontinence  - On Baclofen 10mg but reports non-compliance for last 3 wks  - Will hold Baclofen for now. Can consider restarting if requested by pt.  - Continue home Mag-Ox 400mg qd     4) Hypertension  - BP on admission 175/73. At this time, 146/73.  - Continue home Lisinopril 2.5mg qd. - Will continue to monitor at this time and readjust meds as BP's trend.     5) Non-Insulin Dependent DM II  - Last HgA1c 7.2 (6/6/17). - Holding home medications of Glimepiride, Invokana and Januvia. - Insulin Sliding Scale normal sensitivity with AC&HS glucose checks. - Hypoglycemia protocols ordered.    6) Hypertriglyceridemia - Trigs-150 (04/17)  - Continue Home Pravachol 40mg     7) Fibromyalgia  - Continue home Gabapentin 300mg every day     8) Rosacea  - Continue home soolantra, nystatin cream and triamcinolone 1% cream per pharm substitution     FEN/GI - Clear liquid diet. NS at 125 mL/hr.   Activity - Out of bed with assistance  DVT prophylaxis - SCDs  GI prophylaxis - Protonix 40mg BID  Fall prophylaxis - Not indicated at this time. Disposition - Plan to d/c to Home. Consulting PT/OT/CM  Code Status - Full, discussed with patient / caregivers. Patient was seen and discussed with Dr. Temo Javier MD.      Viviana Patricio MD  Family Medicine Resident  11/2/2017 9:48 AM         CC: Payal Hussein is the endpoint\"    Subjective  No acute events overnight. Patient tolerating liquid diet. Had very small soft, non bloody stool yesterday afternoon. Is willing to do the scope but doesn't want to if it does not provide additional information. Wants to know the endpoint goal. Mild abdominal pain with feeling of needing to use the bathroom.       Inpatient Medications  Current Facility-Administered Medications   Medication Dose Route Frequency    acetaminophen (TYLENOL) tablet 650 mg  650 mg Oral Q6H PRN    bethanechol (URECHOLINE) tablet 10 mg  10 mg Oral QHS    gabapentin (NEURONTIN) capsule 300 mg  300 mg Oral TID    fluticasone (FLONASE) 50 mcg/actuation nasal spray 2 Spray  2 Spray Both Nostrils DAILY    ivermectin 1 % cream (Soolantra) (Patient Supplied)   Topical DAILY    lisinopril (PRINIVIL, ZESTRIL) tablet 2.5 mg  2.5 mg Oral QHS    magnesium oxide (MAG-OX) tablet 400 mg  400 mg Oral DAILY    nystatin (MYCOSTATIN) 100,000 unit/gram cream   Topical TID    pravastatin (PRAVACHOL) tablet 40 mg  40 mg Oral DAILY    triamcinolone acetonide (KENALOG) 0.1 % cream   Topical TID    sodium chloride (NS) flush 5-10 mL  5-10 mL IntraVENous Q8H    sodium chloride (NS) flush 5-10 mL  5-10 mL IntraVENous PRN    insulin lispro (HUMALOG) injection   SubCUTAneous QID WITH MEALS    glucose chewable tablet 16 g  4 Tab Oral PRN    dextrose (D50W) injection syrg 12.5-25 g  12.5-25 g IntraVENous PRN    glucagon (GLUCAGEN) injection 1 mg  1 mg IntraMUSCular PRN    0.9% sodium chloride infusion  125 mL/hr IntraVENous CONTINUOUS    pantoprazole (PROTONIX) 40 mg in sodium chloride 0.9 % 10 mL injection  40 mg IntraVENous Q12H    0.9% sodium chloride infusion 250 mL  250 mL IntraVENous PRN    metroNIDAZOLE (FLAGYL) IVPB premix 500 mg  500 mg IntraVENous Q8H    levoFLOXacin (LEVAQUIN) 500 mg in D5W IVPB  500 mg IntraVENous Q24H         Allergies  Allergies   Allergen Reactions    Latex Other (comments)     Patient states it burns around her mouth.  Other Food Other (comments)     Yogurt - stomach cramping    Betadine [Povidone-Iodine] Itching     Pt states this causes \"extreme\" itching    Codeine Anaphylaxis, Rash, Nausea Only and Other (comments)     HEADACHE  Tolerates tramadol    Dilaudid [Hydromorphone (Bulk)] Anaphylaxis, Itching, Nausea Only and Other (comments)     HALLUCINATIONS  Tolerates tramadol      Hydrocodone Anaphylaxis, Rash, Nausea Only and Vertigo     Facial - eyelid swelling-had to go to ER for reaction, HALLUCINATIONS  Tolerates tramadol      Ditropan [Oxybutynin Chloride] Swelling    Doxycycline Rash     PUSTULAR RASH- TOLERATING TETRACYCLINE    Keflex [Cephalexin] Nausea and Vomiting     Pain in abdomen AND FLU-LIKE SX      Metformin Nausea and Vomiting     Severe abdominal pain      Tape [Adhesive] Other (comments)     Redness/inflammed. Can only use paper tape. CAN USE BAND-AIDS. TOLERATES SURGICAL TAPE USED IN [place] [facility].  Sulfamethoxazole-Trimethoprim Other (comments)     Cramping/flu-like symptoms         Objective  Vitals:  Patient Vitals for the past 8 hrs:   Temp Pulse Resp BP SpO2   11/02/17 0315 98 °F (36.7 °C) 62 16 131/54 99 %   11/02/17 0002 98 °F (36.7 °C) 63 18 136/67 100 %         I/O:  No intake or output data in the 24 hours ending 11/02/17 0704  Last shift:       Last 3 shifts:         Physical Exam:  General: No acute distress. Alert. Cooperative. HEENT: Normocephalic. Atraumatic. Conjunctiva pink. Sclera white. PERRL. MMM   Respiratory: CTAB. No w/r/r/c.   Cardiovascular: RRR. Normal S1,S2. No m/r/g. 2+ pulses in DP bilaterally   GI: + bowel sounds. Mild tenderness to palpation of LLQ. No rebound tenderness. Some guarding in lower quadrants. Nondistended   Extremities: No edema. No tenderness. Laboratory Data  Recent Results (from the past 12 hour(s))   GLUCOSE, POC    Collection Time: 11/01/17  9:03 PM   Result Value Ref Range    Glucose (POC) 144 (H) 65 - 100 mg/dL    Performed by [address] [place] [address]    Collection Time: 11/02/17  5:04 AM   Result Value Ref Range    Sodium 146 (H) 136 - 145 mmol/L    Potassium 3.9 3.5 - 5.1 mmol/L    Chloride 111 (H) 97 - 108 mmol/L    CO2 26 21 - 32 mmol/L    Anion gap 9 5 - 15 mmol/L    Glucose 128 (H) 65 - 100 mg/dL    BUN 7 6 - 20 MG/DL    Creatinine 0.46 (L) 0.55 - 1.02 MG/DL    BUN/Creatinine ratio 15 12 - 20      GFR est AA >60 >60 ml/min/1.73m2    GFR est non-AA >60 >60 ml/min/1.73m2    Calcium 8.6 8.5 - 10.1 MG/DL    Bilirubin, total 0.3 0.2 - 1.0 MG/DL    ALT (SGPT) 19 12 - 78 U/L    AST (SGOT) 18 15 - 37 U/L    Alk. phosphatase 111 45 - 117 U/L    Protein, total 5.4 (L) 6.4 - 8.2 g/dL    Albumin 2.4 (L) 3.5 - 5.0 g/dL    Globulin 3.0 2.0 - 4.0 g/dL    A-G Ratio 0.8 (L) 1.1 - 2.2     CBC WITH AUTOMATED DIFF    Collection Time: 11/02/17  5:04 AM   Result Value Ref Range    WBC 3.9 3.6 - 11.0 K/uL    RBC 4.27 3.80 - 5.20 M/uL    HGB 11.2 (L) 11.5 - 16.0 g/dL    HCT 35.3 35.0 - 47.0 %    MCV 82.7 80.0 - 99.0 FL    MCH 26.2 26.0 - 34.0 PG    MCHC 31.7 30.0 - 36.5 g/dL    RDW 14.1 11.5 - 14.5 %    PLATELET 174 285 - 850 K/uL    NEUTROPHILS 53 32 - 75 %    LYMPHOCYTES 31 12 - 49 %    MONOCYTES 11 5 - 13 %    EOSINOPHILS 4 0 - 7 %    BASOPHILS 1 0 - 1 %    ABS. NEUTROPHILS 2.1 1.8 - 8.0 K/UL    ABS. LYMPHOCYTES 1.2 0.8 - 3.5 K/UL    ABS. MONOCYTES 0.4 0.0 - 1.0 K/UL    ABS. EOSINOPHILS 0.2 0.0 - 0.4 K/UL    ABS.  BASOPHILS 0.0 0.0 - 0.1 K/UL       Hospital Problems:  Hospital Problems  Date Reviewed: 9/25/2017    None

## 2017-11-02 NOTE — PROGRESS NOTES
Problem: Falls - Risk of  Goal: *Absence of Falls  Document Grant Fall Risk and appropriate interventions in the flowsheet. Outcome: Progressing Towards Goal  Fall Risk Interventions:  Mobility Interventions: Patient to call before getting OOB         Medication Interventions: Patient to call before getting OOB    Elimination Interventions: Call light in reach    History of Falls Interventions:  Investigate reason for fall, Door open when patient unattended

## 2017-11-02 NOTE — PROGRESS NOTES
Problem: Upper and Lower GI Bleed: Day 3  Goal: Discharge Planning  Outcome: Progressing Towards Goal  Possible discharge today after IV antibiotics and tolerating solid diet.

## 2017-11-02 NOTE — PROGRESS NOTES
[facility]. [address] [place] [address] Drive  [phone]              GI PROGRESS NOTE    NAME: Simran Soler   :  1950   MRN:  [id number]     CC: bleeding/diarrhea    Subjective:     Patient feels overall better from admission-denies nausea, vomiting, abdominal pain. No more bloody stools/mucous. Last bowel movement was 2 days ago and not associated with BRPR. Objective:     VITALS:   Last 24hrs VS reviewed since prior progress note. Most recent are:  Visit Vitals    /78 (BP 1 Location: Right arm, BP Patient Position: At rest)    Pulse 63    Temp 97.6 °F (36.4 °C)    Resp 17    Ht 5' 8\" (1.727 m)    Wt 161 lb 3.2 oz (73.1 kg)    SpO2 98%    BMI 24.51 kg/m2       Intake & Output   0701 -  1900  In: 5404.2 [I.V.:5404.2]  Out: -        ROS: Neg for fever, chest pain, SOB, abdominal pain, bloody stools. PHYSICAL EXAM:  General: Cooperative, no acute distress    Neurologic:  Alert and oriented X 3. HEENT: PERRLA, EOMI. Lungs:  CTA Bilaterally. No Wheezing  Heart:  s1 s2, Regular  rhythm,  No murmur   Abdomen: Soft, Non distended, mild LLQ tenderness. Bowel sounds normal. No guarding or rebound. No hepatosplenomegaly. Extremities: No edema  Psych:   Good insight. Not anxious nor agitated.     Lab Data Reviewed:   Recent Labs      17   0504  17   0010  10/31/17   1845  10/31/17   1201   WBC  3.9  8.1   --   9.4   HGB  11.2*  12.0  10.3*  13.4   MCV  82.7  84.4   --   84.9   PLT  186  212   --   238   NA  146*  142   --   141   K  3.9  3.8   --   4.0   CREA  0.46*  0.48*   --   0.59   BUN  7  15   --   23*   ALB  2.4*  2.6*   --   3.1*   TBILI  0.3  0.4   --   0.5   SGOT  18  17   --   18   ALT  19  25   --   27   AP  111  127*   --   157*   LPSE   --    --    --   231         ________________________________________________________________________       Assessment/Plan:   Acute Colitis: LLQ pain with BRPR and colonic thickening on CT related to ischemia vs infection. Doing well with IVF, antibiotics and CLD. No more episodes of bloody stools/mucous. Stool WBC and culture negative.   -No plan for inpatient flexible sigmoidoscopy as pt without further bleeding  -Can complete 3 day course of Levaquin and Flagyl today and discontinue thereafter   -Advanced diet to GI lite-okay for discharge if pt tolerates   -Discussed with patient regarding close follow-up with Dr. Mark Anthony Mock. She would also benefit from pelvic physical therapy which can be arranged either by PCP or Dr. Mark Anthony Mock on an outpatient basis.      Pt discussed with Dr. Barbara Rivera By: Radha Ervin MD     11/2/2017  9:08 AM

## 2017-11-02 NOTE — PROGRESS NOTES
Bedside shift change report given to SHANIKA REID (oncoming nurse) by Michael Angel RN (offgoing nurse). Report included the following information SBAR, Kardex, Intake/Output, MAR and Accordion.

## 2017-11-02 NOTE — PROGRESS NOTES
Patient discharged home with . Patient stated that she understood discharge instructions and follow up directions. To be wheeled out to car in w/c by PCT.

## 2017-11-03 ENCOUNTER — PATIENT OUTREACH (OUTPATIENT)
Dept: FAMILY MEDICINE CLINIC | Age: 67
End: 2017-11-03

## 2017-11-03 LAB
ABO + RH BLD: NORMAL
BLD PROD TYP BPU: NORMAL
BLOOD GROUP ANTIBODIES SERPL: NORMAL
BPU ID: NORMAL
CROSSMATCH RESULT,%XM: NORMAL
SPECIMEN EXP DATE BLD: NORMAL
STATUS OF UNIT,%ST: NORMAL
UNIT DIVISION, %UDIV: 0

## 2017-11-03 NOTE — PROGRESS NOTES
[facility]  Nursing Note  [phone]    Patient Name: Nicole Mora  YOB: 1950   Date/Time:  11/3/2017 3:58 PM    Patient listed on nurse navigator combined daily census report on 11/3/17. Patient hospitalized from 10/31/17 to 11/3/17 at [address] [place] [address] for GI Bleed secondary to infectious vs ischemic cholits. According to [place] summary notes, Patient was NPO and placed on conservative measures including IV antibiotics and hydration. Colonoscopy was held as patient's bloody stools resolved with conservative measures such as bowel rest and hydration. Patient was placed on antibiotics of Flagyl and Levaquin. Diet was advanced as tolerated and pt was sent home with enough antibiotic to complete a total 3 day course. Follow-up with Dr. Em Rosen (outpatient GI specialist) upon discharge. Patient has a history of cervical transverse myelitis, discharge provider recommends outpatient Pelvic PT.      Transition of Care/Follow up with [facility] scheduled within 7 days of discharge. Last [facility] appt. 9/7/17 with Dr Jai Carrillo. Nurse Navigator(NN) contacted the patient by telephone to perform post hospital/ED discharge assessment. 2 identifiers verified. Provided introduction to self, and explanation of the Nurse Navigator role. Discussed the following issues that she would like to discuss with Dr Jai Carrillo at appt.:  * She is currently in the medicare gap and can not afford both Januvia and Invokana. * She should have the A1c results from the [facility] endo   *Not able to do the pelvic PT for the same reason. She could do this next year as well. Is there an alternative/exercise in the interim? *She is to be getting a new med from her rheumatologist and would like to discuss further with Dr Jai Carrillo.       Follow up appt is scheduled for:  Future Appointments  Date Time Provider Lorna Jones   11/7/2017 9:45 AM Sebas Clifford, DO [facility] [place] [id number]   5/22/2018 8:50 MICHAEL Celis MD [facility]        Readmission Risk  RRAT score: Medium    Total Hospitalizations/ED visits:  0 in the past 6 months prior to most recent hospitalization    Diet:   Patient reports: Soft Diet     Activity:    Patient reports: mostly moving around the house    Medication:   Performed medication reconciliation with patient, and patient verbalizes understanding of administration of home medications. There were no barriers to obtaining medications identified at this time. Support system:  patient and spouse    Discharge Instructions :  Reviewed discharge instructions with patient. Patient verbalizes understanding of discharge instructions and follow-up care. Home Health orders at discharge? no     Specific symptoms to watch for: chest pain, shortness of breath, fever, chills, nausea, vomiting, diarrhea, change in mentation, falling, weakness, bleeding. Labs Reviewed:  Recent Labs      11/02/17   0504  11/01/17   0010  10/31/17   1845   WBC  3.9  8.1   --    HGB  11.2*  12.0  10.3*   HCT  35.3  38.4  33.1*   PLT  186  212   --      Recent Labs      11/02/17   0504  11/01/17   0010   NA  146*  142   K  3.9  3.8   CL  111*  108   CO2  26  23   GLU  128*  102*   BUN  7  15   CREA  0.46*  0.48*   CA  8.6  8.6   ALB  2.4*  2.6*   SGOT  18  17   ALT  19  25       Advance Medical Directive on file in EMR? no     Verbalized understanding that if any questions or concerns should arise prior to next office visit, she will call the office of [facility]. Plan:  NN plan is to continue to monitor during the [address] Follow-up episode. Discussed the importance of understanding discharge instructions, medications(Compliance and Reconciliation), and F/U appts. Answered questions and provided instruction, goal work and resource connection as indicated. ..  Goals      Knowledge and adherence of prescribed medication (ie. action, side effects, missed dose, etc.).             Discuss new medication prescribed by Rheumatologist with Dr Angelica Tim to understand the risks, benefits, side affects etc to better be able to make decision if you will be taking or note.  Prevent complications post hospitalization. Go to all your MD appts, take medication as prescribed and if any questions or concerns arise, or red flags (as discussed) occur, contact PCP office.  Returns to baseline activity level.             Continue to work with PT to get back to baseline; discuss alternatives to pelvic PT with MD and current physical therapist.

## 2017-11-04 DIAGNOSIS — E11.43 TYPE 2 DIABETES MELLITUS WITH DIABETIC AUTONOMIC NEUROPATHY, WITHOUT LONG-TERM CURRENT USE OF INSULIN (HCC): ICD-10-CM

## 2017-11-06 LAB
BACTERIA SPEC CULT: NORMAL
SERVICE CMNT-IMP: NORMAL

## 2017-11-07 ENCOUNTER — OFFICE VISIT (OUTPATIENT)
Dept: FAMILY MEDICINE CLINIC | Age: 67
End: 2017-11-07

## 2017-11-07 VITALS
OXYGEN SATURATION: 100 % | HEART RATE: 75 BPM | SYSTOLIC BLOOD PRESSURE: 132 MMHG | BODY MASS INDEX: 24.86 KG/M2 | DIASTOLIC BLOOD PRESSURE: 64 MMHG | RESPIRATION RATE: 18 BRPM | WEIGHT: 164 LBS | TEMPERATURE: 97.8 F | HEIGHT: 68 IN

## 2017-11-07 DIAGNOSIS — K52.9 COLITIS: Primary | ICD-10-CM

## 2017-11-07 DIAGNOSIS — D50.0 IRON DEFICIENCY ANEMIA DUE TO CHRONIC BLOOD LOSS: ICD-10-CM

## 2017-11-07 RX ORDER — SITAGLIPTIN 100 MG/1
TABLET, FILM COATED ORAL
Qty: 30 TAB | Refills: 0 | Status: SHIPPED | OUTPATIENT
Start: 2017-11-07 | End: 2019-06-18

## 2017-11-07 NOTE — PROGRESS NOTES
Martha Esquivel is a 78 y.o. female   Chief Complaint   Patient presents with   [facility] Follow Up    pt recently d/c from hospital and states her rheum gave her cymbalta and pt does not want to take it. Pt never took it and states was given prior and then was given savella but states can not afford this now since would be 300 now. Pt is going to contact her rheum regarding not taking this. Pt is also unable to afford both [place] and invokana both and pt advised to stay with invokana for now. Pt with mild anemia and advised to increase iron in diet and will recheck in 6 weeks. she is a 78y.o. year old female who presents for evalution. Reviewed PmHx, RxHx, FmHx, SocHx, AllgHx and updated and dated in the chart. Review of Systems - negative except as listed above in the HPI    Objective:     Vitals:    11/07/17 1018   BP: 132/64   Pulse: 75   Resp: 18   Temp: 97.8 °F (36.6 °C)   TempSrc: Oral   SpO2: 100%   Weight: 164 lb (74.4 kg)   Height: 5' 8\" (1.727 m)       Current Outpatient Prescriptions   Medication Sig    baclofen (LIORESAL) 10 mg tablet Take 10 mg by mouth daily as needed.  bethanechol (URECHOLINE) 10 mg tablet Take 10 mg by mouth nightly.  biotin 10,000 mcg cap Take 1 Cap by mouth daily.  fluticasone (FLONASE) 50 mcg/actuation nasal spray 2 Sprays by Both Nostrils route daily as needed for Rhinitis.  lisinopril (PRINIVIL, ZESTRIL) 2.5 mg tablet Take 2.5 mg by mouth nightly.  OTHER,NON-FORMULARY, Take 1 Tab by mouth daily. Vitamin D & E    guar gum (BENEFIBER) packet Take 1 Packet by mouth nightly as needed.  nystatin (MYCOSTATIN) topical cream Apply  to affected area three (3) times daily. As needed for skin irritation. Use a think layer    triamcinolone acetonide (KENALOG) 0.1 % topical cream Apply  to affected area three (3) times daily. As needed for skin irritation, use thin layer.     SITagliptin (JANUVIA) 100 mg tablet Take 1 Tab by mouth Daily (before breakfast).  ivermectin (SOOLANTRA) 1 % crea by Apply Externally route daily. Indications: ACNE ROSACEA    CALCIUM PO Take 1 Caplet by mouth daily.  DOCOSAHEXANOIC ACID/EPA (FISH OIL PO) Take 1 Cap by mouth daily.  gabapentin (NEURONTIN) 300 mg capsule Take 300 mg by mouth three (3) times daily.  glimepiride (AMARYL) 2 mg tablet Take 2 mg by mouth three (3) times daily.  magnesium oxide (MAG-OX) 400 mg tablet Take 400 mg by mouth daily.  INVOKANA 100 mg tablet Take 1 tablet by mouth  daily before breakfast    Alpha Lipoic Acid 600 mg cap Take 1 Cap by mouth two (2) times a day.  L-Mfolate-B6 Phos-Methyl-B12 (METANX) 3-35-2 mg tab tab Take 1 Tab by mouth two (2) times a day. Indications: neuropathy    CHROM GENO/BRINDAL BERRY (GARCINIA CAMBOGIA PO) Take 1 Tab by mouth daily.  pravastatin (PRAVACHOL) 40 mg tablet TAKE 1 TABLET NIGHTLY (NEEDS APPOINTMENT AS SOON AS POSSIBLE FOR FASTING LABS AND APPOINTMENT)    TRUETEST TEST STRIPS strip TEST THREE TIMES DAILY    CRANBERRY EXTRACT-VIT C PO Take 2 Caps by mouth daily.  cinnamon bark (CINNAMON) 500 mg cap Take 2 Caps by mouth daily.  loratadine (CLARITIN) 10 mg tablet Take 10 mg by mouth nightly. No current facility-administered medications for this visit. Physical Examination: General appearance - alert, well appearing, and in no distress  Mental status - alert, oriented to person, place, and time  Eyes - pupils equal and reactive, extraocular eye movements intact  Chest - clear to auscultation, no wheezes, rales or rhonchi, symmetric air entry  Heart - normal rate, regular rhythm, normal S1, S2, no murmurs, rubs, clicks or gallops  Abdomen - soft, nontender, nondistended, no masses or organomegaly      Assessment/ Plan:   Diagnoses and all orders for this visit:    1. Colitis  Improved, f/u GI prn  2.  Iron deficiency anemia due to chronic blood loss  discusseed increasing iron in diet since pt does not tolerate iron pills and will recheck 6-8 weeks     Follow-up Disposition:  Return in about 6 weeks (around 12/19/2017), or if symptoms worsen or fail to improve. I have discussed the diagnosis with the patient and the intended plan as seen in the above orders. The patient has received an after-visit summary and questions were answered concerning future plans. Pt conveyed understanding of plan.     Medication Side Effects and Warnings were discussed with patient      [facility] [place], DO

## 2017-11-07 NOTE — MR AVS SNAPSHOT
Visit Information Date & Time Provider Department Dept. Phone Encounter #  
 11/7/2017  9:45 AM Bianka AliceaLloyd [phone] [id number] Follow-up Instructions Return in about 6 weeks (around 12/19/2017), or if symptoms worsen or fail to improve. Your Appointments 5/22/2018  8:50 AM  
ESTABLISHED PATIENT with MD Markus Ryan ([facility]) Appt Note: ae  TH  
 [address] [facility] [address] [phone]  
[facility] [address] Upcoming Health Maintenance Date Due Pneumococcal 65+ High/Highest Risk (2 of 2 - PPSV23) 5/16/2017 MICROALBUMIN Q1 11/3/2017 HEMOGLOBIN A1C Q6M 12/6/2017 FOOT EXAM Q1 12/14/2017 EYE EXAM RETINAL OR DILATED Q1 2/24/2018 MEDICARE YEARLY EXAM 3/22/2018 LIPID PANEL Q1 4/1/2018 BREAST CANCER SCRN MAMMOGRAM 6/15/2018 FOBT Q 1 YEAR AGE 50-75 10/31/2018 GLAUCOMA SCREENING Q2Y 2/24/2019 DTaP/Tdap/Td series (2 - Td) 3/21/2027 Allergies as of 11/7/2017  Review Complete On: 11/7/2017 By: Bianka Alicea, DO Severity Noted Reaction Type Reactions Latex  06/09/2014    Other (comments) Patient states it burns around her mouth. Other Food  10/12/2016    Other (comments) Yogurt - stomach cramping Betadine [Povidone-iodine] High 11/15/2013    Itching Pt states this causes \"extreme\" itching Codeine High 11/15/2013    Anaphylaxis, Rash, Nausea Only, Other (comments) HEADACHE Tolerates tramadol Dilaudid [Hydromorphone (Bulk)] High 04/02/2014    Anaphylaxis, Itching, Nausea Only, Other (comments) HALLUCINATIONS Tolerates tramadol Hydrocodone High 11/15/2013    Anaphylaxis, Rash, Nausea Only, Vertigo Facial - eyelid swelling-had to go to ER for reaction, HALLUCINATIONS Tolerates tramadol Ditropan [Oxybutynin Chloride]  03/04/2014    Swelling Doxycycline  11/15/2013    Rash PUSTULAR RASH- TOLERATING TETRACYCLINE Keflex [Cephalexin]  11/15/2013    Nausea and Vomiting Pain in abdomen AND FLU-LIKE SX Metformin  11/15/2013    Nausea and Vomiting Severe abdominal pain Tape [Adhesive]  06/09/2014    Other (comments) Redness/inflammed. Can only use paper tape. CAN USE BAND-AIDS. TOLERATES SURGICAL TAPE USED IN [place] [facility]. Sulfamethoxazole-trimethoprim Low 02/28/2014    Other (comments) Cramping/flu-like symptoms Current Immunizations  Never Reviewed No immunizations on file. Not reviewed this visit You Were Diagnosed With   
  
 Codes Comments Colitis    -  Primary ICD-10-CM: K52.9 ICD-9-CM: 558.9 Iron deficiency anemia due to chronic blood loss     ICD-10-CM: D50.0 ICD-9-CM: 280.0 Vitals BP Pulse Temp Resp Height(growth percentile) Weight(growth percentile) 132/64 75 97.8 °F (36.6 °C) (Oral) 18 5' 8\" (1.727 m) 164 lb (74.4 kg) SpO2 BMI OB Status Smoking Status 100% 24.94 kg/m2 Postmenopausal Never Smoker Vitals History BMI and BSA Data Body Mass Index Body Surface Area 24.94 kg/m 2 1.89 m 2 Preferred Pharmacy Pharmacy Name Phone [facility] [address] [place] [address] [address]  [facility] [phone] Your Updated Medication List  
  
   
This list is accurate as of: 11/7/17 10:46 AM.  Always use your most recent med list.  
  
  
  
  
 Alpha Lipoic Acid 600 mg Cap Take 1 Cap by mouth two (2) times a day. baclofen 10 mg tablet Commonly known as:  LIORESAL Take 10 mg by mouth daily as needed. bethanechol 10 mg tablet Commonly known as:  URECHOLINE Take 10 mg by mouth nightly. biotin 10,000 mcg Cap Take 1 Cap by mouth daily. CALCIUM PO Take 1 Caplet by mouth daily. CINNAMON 500 mg Cap Generic drug:  cinnamon bark Take 2 Caps by mouth daily. CLARITIN 10 mg tablet Generic drug:  loratadine Take 10 mg by mouth nightly. CRANBERRY EXTRACT-VIT C PO Take 2 Caps by mouth daily. FISH OIL PO Take 1 Cap by mouth daily. FLONASE 50 mcg/actuation nasal spray Generic drug:  fluticasone 2 Sprays by Both Nostrils route daily as needed for Rhinitis.  
  
 gabapentin 300 mg capsule Commonly known as:  NEURONTIN Take 300 mg by mouth three (3) times daily. GARCINIA CAMBOGIA PO Take 1 Tab by mouth daily. glimepiride 2 mg tablet Commonly known as:  AMARYL Take 2 mg by mouth three (3) times daily. guar gum packet Commonly known as:  Lugene Slimmer Take 1 Packet by mouth nightly as needed. INVOKANA 100 mg tablet Generic drug:  canagliflozin Take 1 tablet by mouth  daily before breakfast  
  
 L-Mfolate-B6 Phos-Methyl-B12 3-35-2 mg Tab tab Commonly known as:  Marilin Akin Take 1 Tab by mouth two (2) times a day. Indications: neuropathy  
  
 lisinopril 2.5 mg tablet Commonly known as:  Luis Antonio Jw Take 2.5 mg by mouth nightly.  
  
 magnesium oxide 400 mg tablet Commonly known as:  MAG-OX Take 400 mg by mouth daily. nystatin topical cream  
Commonly known as:  MYCOSTATIN Apply  to affected area three (3) times daily. As needed for skin irritation. Use a think layer OTHER(NON-FORMULARY) Take 1 Tab by mouth daily. Vitamin D & E  
  
 pravastatin 40 mg tablet Commonly known as:  PRAVACHOL  
TAKE 1 TABLET NIGHTLY (NEEDS APPOINTMENT AS SOON AS POSSIBLE FOR FASTING LABS AND APPOINTMENT) SITagliptin 100 mg tablet Commonly known as:  Bebe Chey Take 1 Tab by mouth Daily (before breakfast). SOOLANTRA 1 % Crea Generic drug:  ivermectin  
by Apply Externally route daily. Indications: ACNE ROSACEA  
  
 triamcinolone acetonide 0.1 % topical cream  
Commonly known as:  KENALOG Apply  to affected area three (3) times daily. As needed for skin irritation, use thin layer. TRUETEST TEST STRIPS strip Generic drug:  glucose blood VI test strips TEST THREE TIMES DAILY Follow-up Instructions Return in about 6 weeks (around 12/19/2017), or if symptoms worsen or fail to improve. Patient Instructions Fibromyalgia: Care Instructions Your Care Instructions Fibromyalgia is a painful condition that is not completely understood by medical experts. The cause of fibromyalgia is not known. It can make you feel tired and ache all over. It causes tender spots at specific points of the body that hurt only when you press on them. You may have trouble sleeping, as well as other symptoms. These problems can upset your work and home life. Symptoms tend to come and go, although they may never go away completely. Fibromyalgia does not harm your muscles, joints, or organs. Follow-up care is a key part of your treatment and safety. Be sure to make and go to all appointments, and call your doctor if you are having problems. It's also a good idea to know your test results and keep a list of the medicines you take. How can you care for yourself at home? · Exercise often. Walk, swim, or bike to help with pain and sleep problems and to make you feel better. · Try to get a good night's sleep. Go to bed and get up at the same time each day, whether you feel rested or not. Make sure you have a good mattress and pillow. · Reduce stress. Avoid things that cause you stress, if you can. If not, work at making them less stressful. Learn to use biofeedback, guided imagery, meditation, or other methods to relax. · Make healthy changes. Eat a balanced diet, quit smoking, and limit alcohol and caffeine. · Use a heating pad set on low or take warm baths or showers for pain. Using cold packs for up to 20 minutes at a time can also relieve pain. Put a thin cloth between the cold pack and your skin. A gentle massage might help too. · Be safe with medicines. Take your medicines exactly as prescribed. Call your doctor if you think you are having a problem with your medicine. Your doctor may talk to you about taking antidepressant medicines. These medicines may improve sleep, relieve pain, and in some cases treat depression. · Learn about fibromyalgia. This makes coping easier. Then, take an active role in your treatment. · Think about joining a support group with others who have fibromyalgia to learn more and get support. When should you call for help? Watch closely for changes in your health, and be sure to contact your doctor if: 
? · You feel sad, helpless, or hopeless; lose interest in things you used to enjoy; or have other symptoms of depression. ? · Your fibromyalgia symptoms get worse. Where can you learn more? Go to http://jorge a-katiana.info/. Enter V003 in the search box to learn more about \"Fibromyalgia: Care Instructions. \" Current as of: October 14, 2016 Content Version: 11.4 © 7368-8506 [employer]. Care instructions adapted under license by [employer] (which disclaims liability or warranty for this information). If you have questions about a medical condition or this instruction, always ask your healthcare professional. [place] [address] any warranty or liability for your use of this information. Introducing [facility]! Dear Meghan Mabry: 
Thank you for requesting a Nimia account. Our records indicate that you have previously registered for a Nimia account but its currently inactive. Please call our Nimia support line at [phone]. Additional Information If you have questions, please visit the Frequently Asked Questions section of the Nimia website at [email or web address] Remember, Nimia is NOT to be used for urgent needs. For medical emergencies, dial 911. Now available from your iPhone and Android! Please provide this summary of care documentation to your next provider. Your primary care clinician is listed as Dannielle Cooks. If you have any questions after today's visit, please call [phone].

## 2017-11-22 RX ORDER — BACLOFEN 10 MG/1
TABLET ORAL
Qty: 30 TAB | Refills: 0 | Status: SHIPPED | OUTPATIENT
Start: 2017-11-22 | End: 2018-01-08 | Stop reason: SDUPTHER

## 2017-12-01 NOTE — PATIENT INSTRUCTIONS
Fibromyalgia: Care Instructions  Your Care Instructions    Fibromyalgia is a painful condition that is not completely understood by medical experts. The cause of fibromyalgia is not known. It can make you feel tired and ache all over. It causes tender spots at specific points of the body that hurt only when you press on them. You may have trouble sleeping, as well as other symptoms. These problems can upset your work and home life. Symptoms tend to come and go, although they may never go away completely. Fibromyalgia does not harm your muscles, joints, or organs. Follow-up care is a key part of your treatment and safety. Be sure to make and go to all appointments, and call your doctor if you are having problems. It's also a good idea to know your test results and keep a list of the medicines you take. How can you care for yourself at home? · Exercise often. Walk, swim, or bike to help with pain and sleep problems and to make you feel better. · Try to get a good night's sleep. Go to bed and get up at the same time each day, whether you feel rested or not. Make sure you have a good mattress and pillow. · Reduce stress. Avoid things that cause you stress, if you can. If not, work at making them less stressful. Learn to use biofeedback, guided imagery, meditation, or other methods to relax. · Make healthy changes. Eat a balanced diet, quit smoking, and limit alcohol and caffeine. · Use a heating pad set on low or take warm baths or showers for pain. Using cold packs for up to 20 minutes at a time can also relieve pain. Put a thin cloth between the cold pack and your skin. A gentle massage might help too. · Be safe with medicines. Take your medicines exactly as prescribed. Call your doctor if you think you are having a problem with your medicine. Your doctor may talk to you about taking antidepressant medicines. These medicines may improve sleep, relieve pain, and in some cases treat depression.   · Learn about Returned call to the patient. He stated he is down is 13 lbs. However he is feelings significantly worse. He has had no improvement in his breathing, in fact he stated he has been experiencing worsening shortness of breath. He also reports edema in his lower extremities which is new. He also reported he is feeling significantly fatigue and experiencing severe joint pain in his knees and hips to the point he has had to stop driving and rest. He also experienced more than one syncopal episodes. He stated he was at work and went to the bathroom and recalls waking up on the bathroom floor. He is unaware how long he was unconscious for. The only precipitating symptom was fatigue. He also believes he may have passed out yesterday but also admits to having a couple other episodes. He has been experiencing worsening abdominal pain and discomfort. He has also been experiencing episodes of profuse diaphoresis. At this time recommended patient come in the ER for evaluation. He was advised it is NOT safe for him to drive.   fibromyalgia. This makes coping easier. Then, take an active role in your treatment. · Think about joining a support group with others who have fibromyalgia to learn more and get support. When should you call for help? Watch closely for changes in your health, and be sure to contact your doctor if:  ? · You feel sad, helpless, or hopeless; lose interest in things you used to enjoy; or have other symptoms of depression. ? · Your fibromyalgia symptoms get worse. Where can you learn more? Go to http://jorge a-katiana.info/. Enter V003 in the search box to learn more about \"Fibromyalgia: Care Instructions. \"  Current as of: October 14, 2016  Content Version: 11.4  © 4202-3060 Healthwise, Medcurrent. Care instructions adapted under license by [employer] (which disclaims liability or warranty for this information). If you have questions about a medical condition or this instruction, always ask your healthcare professional. [address] any warranty or liability for your use of this information.

## 2017-12-18 DIAGNOSIS — R33.9 URINARY RETENTION: ICD-10-CM

## 2017-12-18 RX ORDER — BETHANECHOL CHLORIDE 10 MG/1
TABLET ORAL
Qty: 60 TAB | Refills: 0 | Status: SHIPPED | OUTPATIENT
Start: 2017-12-18 | End: 2018-02-19 | Stop reason: SDUPTHER

## 2017-12-19 ENCOUNTER — HOSPITAL ENCOUNTER (OUTPATIENT)
Dept: LAB | Age: 67
Discharge: HOME | End: 2017-12-19
Payer: MEDICARE

## 2017-12-19 ENCOUNTER — OFFICE VISIT (OUTPATIENT)
Dept: FAMILY MEDICINE CLINIC | Age: 67
End: 2017-12-19

## 2017-12-19 VITALS
OXYGEN SATURATION: 98 % | DIASTOLIC BLOOD PRESSURE: 66 MMHG | HEART RATE: 76 BPM | SYSTOLIC BLOOD PRESSURE: 144 MMHG | BODY MASS INDEX: 26.22 KG/M2 | RESPIRATION RATE: 12 BRPM | HEIGHT: 68 IN | WEIGHT: 173 LBS | TEMPERATURE: 97.6 F

## 2017-12-19 DIAGNOSIS — E11.40 TYPE 2 DIABETES MELLITUS WITH DIABETIC NEUROPATHY, WITHOUT LONG-TERM CURRENT USE OF INSULIN (HCC): ICD-10-CM

## 2017-12-19 DIAGNOSIS — R30.9 URINARY PAIN: Primary | ICD-10-CM

## 2017-12-19 DIAGNOSIS — D64.9 ANEMIA, UNSPECIFIED TYPE: ICD-10-CM

## 2017-12-19 LAB
BILIRUB UR QL STRIP: NEGATIVE
GLUCOSE UR-MCNC: NEGATIVE MG/DL
KETONES P FAST UR STRIP-MCNC: NEGATIVE MG/DL
PH UR STRIP: 5.5 [PH] (ref 4.6–8)
PROT UR QL STRIP: NEGATIVE
SP GR UR STRIP: 1.01 (ref 1–1.03)
UA UROBILINOGEN AMB POC: NORMAL (ref 0.2–1)
URINALYSIS CLARITY POC: CLEAR
URINALYSIS COLOR POC: YELLOW
URINE BLOOD POC: NEGATIVE
URINE LEUKOCYTES POC: NEGATIVE
URINE NITRITES POC: POSITIVE

## 2017-12-19 PROCEDURE — 87186 SC STD MICRODIL/AGAR DIL: CPT

## 2017-12-19 PROCEDURE — 82043 UR ALBUMIN QUANTITATIVE: CPT

## 2017-12-19 PROCEDURE — 85025 COMPLETE CBC W/AUTO DIFF WBC: CPT

## 2017-12-19 PROCEDURE — 87086 URINE CULTURE/COLONY COUNT: CPT

## 2017-12-19 PROCEDURE — 87088 URINE BACTERIA CULTURE: CPT

## 2017-12-19 RX ORDER — NITROFURANTOIN 25; 75 MG/1; MG/1
100 CAPSULE ORAL 2 TIMES DAILY
Qty: 14 CAP | Refills: 0 | Status: SHIPPED | OUTPATIENT
Start: 2017-12-19 | End: 2017-12-26

## 2017-12-19 NOTE — PROGRESS NOTES
Chief Complaint   Patient presents with    Anemia     6 week follow up    Urinary Pain     x 5 days

## 2017-12-19 NOTE — PROGRESS NOTES
Cesario Eugene is a 78 y.o. female   Chief Complaint   Patient presents with    Anemia     6 week follow up    Urinary Pain     x 5 days    pt states she has chronic fatigue and pt is anemic at 11.2 when checked beginning of nov. Pt is also having urinary pain states hurts right at her urethra. No fever no chills. Sometimes hurts sometimes it does not. Pt also has diabetes and is managed by endocrinolog but is overdue for microalbumin and will check this. Pt does admit to not taking her diabetes meds like she should. she is a 78y.o. year old female who presents for evalution. Reviewed PmHx, RxHx, FmHx, SocHx, AllgHx and updated and dated in the chart. Review of Systems - negative except as listed above in the HPI    Objective:     Vitals:    12/19/17 0953   BP: 144/66   Pulse: 76   Resp: 12   Temp: 97.6 °F (36.4 °C)   SpO2: 98%   Weight: 173 lb (78.5 kg)   Height: 5' 8\" (1.727 m)       Current Outpatient Prescriptions   Medication Sig    nitrofurantoin, macrocrystal-monohydrate, (MACROBID) 100 mg capsule Take 1 Cap by mouth two (2) times a day for 7 days.  bethanechol (URECHOLINE) 10 mg tablet TAKE 1 TABLET BY MOUTH TWICE DAILY    baclofen (LIORESAL) 10 mg tablet TAKE 1 TABLET BY MOUTH THREE TIMES DAILY AS NEEDED    JANUVIA 100 mg tablet TAKE 1 TABLET BY MOUTH DAILY    biotin 10,000 mcg cap Take 1 Cap by mouth daily.  fluticasone (FLONASE) 50 mcg/actuation nasal spray 2 Sprays by Both Nostrils route daily as needed for Rhinitis.  lisinopril (PRINIVIL, ZESTRIL) 2.5 mg tablet Take 2.5 mg by mouth nightly.  OTHER,NON-FORMULARY, Take 1 Tab by mouth daily. Vitamin D & E    guar gum (BENEFIBER) packet Take 1 Packet by mouth nightly as needed.  nystatin (MYCOSTATIN) topical cream Apply  to affected area three (3) times daily. As needed for skin irritation. Use a think layer    triamcinolone acetonide (KENALOG) 0.1 % topical cream Apply  to affected area three (3) times daily.  As needed for skin irritation, use thin layer.  ivermectin (SOOLANTRA) 1 % crea by Apply Externally route daily. Indications: ACNE ROSACEA    CALCIUM PO Take 1 Caplet by mouth daily.  DOCOSAHEXANOIC ACID/EPA (FISH OIL PO) Take 1 Cap by mouth daily.  gabapentin (NEURONTIN) 300 mg capsule Take 300 mg by mouth three (3) times daily.  glimepiride (AMARYL) 2 mg tablet Take 2 mg by mouth three (3) times daily.  magnesium oxide (MAG-OX) 400 mg tablet Take 400 mg by mouth daily.  Alpha Lipoic Acid 600 mg cap Take 1 Cap by mouth two (2) times a day.  L-Mfolate-B6 Phos-Methyl-B12 (METANX) 3-35-2 mg tab tab Take 1 Tab by mouth two (2) times a day. Indications: neuropathy    CHROM GENO/BRINDAL BERRY (GARCINIA CAMBOGIA PO) Take 1 Tab by mouth daily.  pravastatin (PRAVACHOL) 40 mg tablet TAKE 1 TABLET NIGHTLY (NEEDS APPOINTMENT AS SOON AS POSSIBLE FOR FASTING LABS AND APPOINTMENT)    TRUETEST TEST STRIPS strip TEST THREE TIMES DAILY    CRANBERRY EXTRACT-VIT C PO Take 2 Caps by mouth daily.  cinnamon bark (CINNAMON) 500 mg cap Take 2 Caps by mouth daily.  loratadine (CLARITIN) 10 mg tablet Take 10 mg by mouth nightly.  INVOKANA 100 mg tablet Take 1 tablet by mouth  daily before breakfast     No current facility-administered medications for this visit. Physical Examination: General appearance - alert, well appearing, and in no distress  Eyes - pupils equal and reactive, extraocular eye movements intact  Chest - clear to auscultation, no wheezes, rales or rhonchi, symmetric air entry  Heart - normal rate, regular rhythm, normal S1, S2, no murmurs, rubs, clicks or gallops      Assessment/ Plan:   Diagnoses and all orders for this visit:    1. Urinary pain  -     AMB POC URINALYSIS DIP STICK AUTO W/ MICRO  -     nitrofurantoin, macrocrystal-monohydrate, (MACROBID) 100 mg capsule; Take 1 Cap by mouth two (2) times a day for 7 days. -     CULTURE, URINE    2.  Anemia, unspecified type  -     CBC WITH AUTOMATED DIFF    3. Type 2 diabetes mellitus with diabetic neuropathy, without long-term current use of insulin (HCC)  -     MICROALBUMIN, UR, RAND W/ MICROALBUMIN/CREA RATIO       Follow-up Disposition:  Return if symptoms worsen or fail to improve. I have discussed the diagnosis with the patient and the intended plan as seen in the above orders. The patient has received an after-visit summary and questions were answered concerning future plans. Pt conveyed understanding of plan.     Medication Side Effects and Warnings were discussed with patient      Anthony Kidney, DO

## 2017-12-19 NOTE — PATIENT INSTRUCTIONS
Anemia: Care Instructions  Your Care Instructions    Anemia is a low level of red blood cells, which carry oxygen throughout your body. Many things can cause anemia. Lack of iron is one of the most common causes. Your body needs iron to make hemoglobin, a substance in red blood cells that carries oxygen from the lungs to your body's cells. Without enough iron, the body produces fewer and smaller red blood cells. As a result, your body's cells do not get enough oxygen, and you feel tired and weak. And you may have trouble concentrating. Bleeding is the most common cause of a lack of iron. You may have heavy menstrual bleeding or bleeding caused by conditions such as ulcers, hemorrhoids, or cancer. Regular use of aspirin or other anti-inflammatory medicines (such as ibuprofen) also can cause bleeding in some people. A lack of iron in your diet also can cause anemia, especially at times when the body needs more iron, such as during pregnancy, infancy, and the teen years. Your doctor may have prescribed iron pills. It may take several months of treatment for your iron levels to return to normal. Your doctor also may suggest that you eat foods that are rich in iron, such as meat and beans. There are many other causes of anemia. It is not always due to a lack of iron. Finding the specific cause of your anemia will help your doctor find the right treatment for you. Follow-up care is a key part of your treatment and safety. Be sure to make and go to all appointments, and call your doctor if you are having problems. It's also a good idea to know your test results and keep a list of the medicines you take. How can you care for yourself at home? · Take your medicines exactly as prescribed. Call your doctor if you think you are having a problem with your medicine. · If your doctor recommends iron pills, take them as directed:  ¨ Try to take the pills on an empty stomach about 1 hour before or 2 hours after meals.  But you may need to take iron with food to avoid an upset stomach. ¨ Do not take antacids or drink milk or caffeine drinks (such as coffee, tea, or cola) at the same time or within 2 hours of the time that you take your iron. They can make it hard for your body to absorb the iron. ¨ Vitamin C (from food or supplements) helps your body absorb iron. Try taking iron pills with a glass of orange juice or some other food that is high in vitamin C, such as citrus fruits. ¨ Iron pills may cause stomach problems, such as heartburn, nausea, diarrhea, constipation, and cramps. Be sure to drink plenty of fluids, and include fruits, vegetables, and fiber in your diet each day. Iron pills often make your bowel movements dark or green. ¨ If you forget to take an iron pill, do not take a double dose of iron the next time you take a pill. ¨ Keep iron pills out of the reach of small children. An overdose of iron can be very dangerous. · Follow your doctor's advice about eating iron-rich foods. These include red meat, shellfish, poultry, eggs, beans, raisins, whole-grain bread, and leafy green vegetables. · Steam vegetables to help them keep their iron content. When should you call for help? Call 911 anytime you think you may need emergency care. For example, call if:  ? · You have symptoms of a heart attack. These may include:  ¨ Chest pain or pressure, or a strange feeling in the chest.  ¨ Sweating. ¨ Shortness of breath. ¨ Nausea or vomiting. ¨ Pain, pressure, or a strange feeling in the back, neck, jaw, or upper belly or in one or both shoulders or arms. ¨ Lightheadedness or sudden weakness. ¨ A fast or irregular heartbeat. After you call 911, the  may tell you to chew 1 adult-strength or 2 to 4 low-dose aspirin. Wait for an ambulance. Do not try to drive yourself. ? · You passed out (lost consciousness).    ?Call your doctor now or seek immediate medical care if:  ? · You have new or increased shortness of breath. ? · You are dizzy or lightheaded, or you feel like you may faint. ? · Your fatigue and weakness continue or get worse. ? · You have any abnormal bleeding, such as:  ¨ Nosebleeds. ¨ Vaginal bleeding that is different (heavier, more frequent, at a different time of the month) than what you are used to. ¨ Bloody or black stools, or rectal bleeding. ¨ Bloody or pink urine. ? Watch closely for changes in your health, and be sure to contact your doctor if:  ? · You do not get better as expected. Where can you learn more? Go to http://jorge a-katiana.info/. Enter R301 in the search box to learn more about \"Anemia: Care Instructions. \"  Current as of: October 13, 2016  Content Version: 11.4  © 2576-7618 [employer]. Care instructions adapted under license by [employer] (which disclaims liability or warranty for this information). If you have questions about a medical condition or this instruction, always ask your healthcare professional. [place] [address] any warranty or liability for your use of this information.

## 2017-12-20 ENCOUNTER — PATIENT OUTREACH (OUTPATIENT)
Dept: FAMILY MEDICINE CLINIC | Age: 67
End: 2017-12-20

## 2017-12-20 LAB
ALBUMIN/CREAT UR: 26.7 MG/G CREAT (ref 0–30)
BASOPHILS # BLD AUTO: 0 X10E3/UL (ref 0–0.2)
BASOPHILS NFR BLD AUTO: 0 %
CREAT UR-MCNC: 24 MG/DL
EOSINOPHIL # BLD AUTO: 0.1 X10E3/UL (ref 0–0.4)
EOSINOPHIL NFR BLD AUTO: 2 %
ERYTHROCYTE [DISTWIDTH] IN BLOOD BY AUTOMATED COUNT: 15 % (ref 12.3–15.4)
HCT VFR BLD AUTO: 40.9 % (ref 34–46.6)
HGB BLD-MCNC: 12.7 G/DL (ref 11.1–15.9)
IMM GRANULOCYTES # BLD: 0 X10E3/UL (ref 0–0.1)
IMM GRANULOCYTES NFR BLD: 0 %
LYMPHOCYTES # BLD AUTO: 1.2 X10E3/UL (ref 0.7–3.1)
LYMPHOCYTES NFR BLD AUTO: 27 %
MCH RBC QN AUTO: 26.6 PG (ref 26.6–33)
MCHC RBC AUTO-ENTMCNC: 31.1 G/DL (ref 31.5–35.7)
MCV RBC AUTO: 86 FL (ref 79–97)
MICROALBUMIN UR-MCNC: 6.4 UG/ML
MONOCYTES # BLD AUTO: 0.3 X10E3/UL (ref 0.1–0.9)
MONOCYTES NFR BLD AUTO: 7 %
NEUTROPHILS # BLD AUTO: 2.9 X10E3/UL (ref 1.4–7)
NEUTROPHILS NFR BLD AUTO: 64 %
PLATELET # BLD AUTO: 227 X10E3/UL (ref 150–379)
RBC # BLD AUTO: 4.78 X10E6/UL (ref 3.77–5.28)
WBC # BLD AUTO: 4.5 X10E3/UL (ref 3.4–10.8)

## 2017-12-20 NOTE — PROGRESS NOTES
[address]  [phone]  Fax [phone]    Patient Name: Gosia Marin  YOB: 1950    Patient hospitalized from 10/31/17-11/2/17 for GI Bleed at [facility]. She has kept her  follow up appts and has not had any further issues at the present time. Called to follow up; CRISTIAN on VM. Resolving post 30 day Transitions of Care Coordination episode.

## 2017-12-21 LAB — BACTERIA UR CULT: ABNORMAL

## 2018-01-08 RX ORDER — BACLOFEN 10 MG/1
TABLET ORAL
Qty: 30 TAB | Refills: 0 | Status: SHIPPED | OUTPATIENT
Start: 2018-01-08 | End: 2018-09-24 | Stop reason: SDUPTHER

## 2018-02-19 DIAGNOSIS — R33.9 URINARY RETENTION: ICD-10-CM

## 2018-02-20 RX ORDER — BETHANECHOL CHLORIDE 10 MG/1
TABLET ORAL
Qty: 60 TAB | Refills: 0 | Status: SHIPPED | OUTPATIENT
Start: 2018-02-20 | End: 2019-06-18

## 2018-05-22 ENCOUNTER — OFFICE VISIT (OUTPATIENT)
Dept: FAMILY MEDICINE CLINIC | Age: 68
End: 2018-05-22

## 2018-05-22 VITALS
DIASTOLIC BLOOD PRESSURE: 60 MMHG | OXYGEN SATURATION: 99 % | RESPIRATION RATE: 18 BRPM | BODY MASS INDEX: 27.13 KG/M2 | WEIGHT: 179 LBS | HEIGHT: 68 IN | HEART RATE: 65 BPM | TEMPERATURE: 97.6 F | SYSTOLIC BLOOD PRESSURE: 132 MMHG

## 2018-05-22 DIAGNOSIS — F40.240 CLAUSTROPHOBIA: Primary | ICD-10-CM

## 2018-05-22 RX ORDER — LIDOCAINE AND PRILOCAINE 25; 25 MG/G; MG/G
CREAM TOPICAL
COMMUNITY
Start: 2018-05-16 | End: 2019-05-16

## 2018-05-22 RX ORDER — EMPAGLIFLOZIN 10 MG/1
10 TABLET, FILM COATED ORAL
COMMUNITY
Start: 2018-02-26 | End: 2020-06-15

## 2018-05-22 RX ORDER — DIAZEPAM 5 MG/1
TABLET ORAL
Qty: 3 TAB | Refills: 0 | Status: SHIPPED | OUTPATIENT
Start: 2018-05-22 | End: 2019-06-18

## 2018-05-22 NOTE — PROGRESS NOTES
Leyla Porras is a 80 y.o. female   Chief Complaint   Patient presents with    Other     needs medication to have MRI   Pt is going to be having 3 MRI's done ordered by neurosurgery at [facility] but pt forgot to ask for something to help with her anxiety. There was lesion found on prior imaging and this is now being explored further. Pt also reports she had a neurostim implanted for her bladder, medtronic brand to help her know when she needs to use the restroom for urination. Pt is still having some leakage. This was placed the end of march. Pt lost her manual and will contact [email or web address].      she is a 80y.o. year old female who presents for evalution. Reviewed PmHx, RxHx, FmHx, SocHx, AllgHx and updated and dated in the chart. Review of Systems - negative except as listed above in the HPI    Objective:     Vitals:    05/22/18 1003   BP: 132/60   Pulse: 65   Resp: 18   Temp: 97.6 °F (36.4 °C)   TempSrc: Oral   SpO2: 99%   Weight: 179 lb (81.2 kg)   Height: 5' 8\" (1.727 m)       Current Outpatient Prescriptions   Medication Sig    lidocaine-prilocaine (EMLA) topical cream Apply  to affected area.  JARDIANCE 10 mg tablet     diazePAM (VALIUM) 5 mg tablet Take 1 tab PO 1 hour prior to MRI    bethanechol (URECHOLINE) 10 mg tablet TAKE 1 TABLET BY MOUTH TWICE DAILY    biotin 10,000 mcg cap Take 1 Cap by mouth daily.  fluticasone (FLONASE) 50 mcg/actuation nasal spray 2 Sprays by Both Nostrils route daily as needed for Rhinitis.  lisinopril (PRINIVIL, ZESTRIL) 2.5 mg tablet Take 2.5 mg by mouth nightly.  OTHER,NON-FORMULARY, Take 1 Tab by mouth daily. Vitamin D & E    guar gum (BENEFIBER) packet Take 1 Packet by mouth nightly as needed.  ivermectin (SOOLANTRA) 1 % crea by Apply Externally route daily. Indications: ACNE ROSACEA    CALCIUM PO Take 1 Caplet by mouth daily.  DOCOSAHEXANOIC ACID/EPA (FISH OIL PO) Take 1 Cap by mouth daily.     gabapentin (NEURONTIN) 300 mg capsule Take 300 mg by mouth three (3) times daily.  glimepiride (AMARYL) 2 mg tablet Take 2 mg by mouth three (3) times daily.  magnesium oxide (MAG-OX) 400 mg tablet Take 400 mg by mouth daily.  Alpha Lipoic Acid 600 mg cap Take 1 Cap by mouth two (2) times a day.  L-Mfolate-B6 Phos-Methyl-B12 (METANX) 3-35-2 mg tab tab Take 1 Tab by mouth two (2) times a day. Indications: neuropathy    CHROM GENO/BRINDAL BERRY (GARCINIA CAMBOGIA PO) Take 1 Tab by mouth daily.  pravastatin (PRAVACHOL) 40 mg tablet TAKE 1 TABLET NIGHTLY (NEEDS APPOINTMENT AS SOON AS POSSIBLE FOR FASTING LABS AND APPOINTMENT)    TRUETEST TEST STRIPS strip TEST THREE TIMES DAILY    CRANBERRY EXTRACT-VIT C PO Take 2 Caps by mouth daily.  cinnamon bark (CINNAMON) 500 mg cap Take 2 Caps by mouth daily.  loratadine (CLARITIN) 10 mg tablet Take 10 mg by mouth nightly.  baclofen (LIORESAL) 10 mg tablet TAKE 1 TABLET BY MOUTH THREE TIMES DAILY AS NEEDED    JANUVIA 100 mg tablet TAKE 1 TABLET BY MOUTH DAILY    nystatin (MYCOSTATIN) topical cream Apply  to affected area three (3) times daily. As needed for skin irritation. Use a think layer    triamcinolone acetonide (KENALOG) 0.1 % topical cream Apply  to affected area three (3) times daily. As needed for skin irritation, use thin layer.  INVOKANA 100 mg tablet Take 1 tablet by mouth  daily before breakfast     No current facility-administered medications for this visit. Physical Examination: General appearance - alert, well appearing, and in no distress  Mental status - alert, oriented to person, place, and time      Assessment/ Plan:   Diagnoses and all orders for this visit:    1. Claustrophobia  -     diazePAM (VALIUM) 5 mg tablet; Take 1 tab PO 1 hour prior to MRI      Follow-up Disposition:  Return if symptoms worsen or fail to improve. I have discussed the diagnosis with the patient and the intended plan as seen in the above orders.   The patient has received an after-visit summary and questions were answered concerning future plans. Pt conveyed understanding of plan.     Medication Side Effects and Warnings were discussed with patient      Trevin Todd, DO

## 2018-05-22 NOTE — PATIENT INSTRUCTIONS
Magnetic Resonance Imaging (MRI): About This Test  What is it? Magnetic resonance imaging (MRI) is a test that uses a magnetic field and pulses of radio wave energy to make pictures of organs and structures inside the body. When you have an MRI, you lie on a table and your body is moved into the MRI machine, where an image is taken of the area of the body being studied. Why is this test done? You may have an MRI for many reasons. This test can find problems such as tumors, bleeding, injury, blood vessel disease, and infection. An MRI also may provide more information about a problem seen on an X-ray, ultrasound scan, CT scan, or nuclear medicine exam.  How can you prepare for the test?  Talk to your doctor about all your health conditions before the test. For example, tell your doctor if:  · You are allergic to any medicines. · You are or might be pregnant. · You have a pacemaker, an artificial limb, any metal pins or metal parts in your body, metal heart valves, metal clips in your brain, metal implants in your ears, or any other implanted or prosthetic medical device. · You have an intrauterine device (IUD) in place. · You get nervous in confined spaces. You may need medicine to help you relax. · You wear any patches that contain medicine. · You have kidney disease. What happens before the test?  · You will remove all metal objects from your body. These include hearing aids, dentures, jewelry, watches, and hairpins. · You will take off all or most of your clothes and then change into a gown. · If you do leave some clothes on, make sure you take everything out of your pockets. · You may have contrast materials (dye) put into your arm through a tube called an IV. Contrast material helps doctors see specific organs, blood vessels, and most tumors. What happens during the test?  · You will lie on your back on a table that is part of the MRI scanner.   · The table will slide into the space that contains the magnet. · Inside the scanner you will hear a fan and feel air moving. You may hear tapping, thumping, or snapping noises. You may be given earplugs or headphones to reduce the noise. · You will be asked to hold still during the scan. You may be asked to hold your breath for short periods. · You may be alone in the scanning room, but a technologist will be watching you through a window and talking with you during the test.  What else should you know about the test?  · An MRI does not hurt. · If a dye is used, you may feel a quick sting or pinch and some coolness when the IV is started. The dye may give you a metallic taste in your mouth. Some people feel sick to their stomach or get a headache. · If you breastfeed and are concerned about whether the dye used in this test is safe, talk to your doctor. Most experts believe that very little dye passes into breast milk and even less is passed on to the baby. But if you prefer, you can store some of your breast milk ahead of time and use it for a day or two after the test.  · You may feel warmth in the area being examined. This is normal.  How long does the test take? · The test usually takes 30 to 60 minutes but can take as long as 2 hours. What happens after the test?  · You will probably be able to go home right away, depending on the reason for the test.  Follow-up care is a key part of your treatment and safety. Be sure to make and go to all appointments, and call your doctor if you are having problems. It's also a good idea to keep a list of the medicines you take. Ask your doctor when you can expect to have your test results. Where can you learn more? Go to http://jorge a-katiana.info/. Enter V016 in the search box to learn more about \"Magnetic Resonance Imaging (MRI): About This Test.\"  Current as of: October 14, 2016  Content Version: 11.4  © 8803-1390 [employer]wise[employer] Inc[employer].  Care instructions adapted under license by [employer] (which disclaims liability or warranty for this information). If you have questions about a medical condition or this instruction, always ask your healthcare professional. [address] any warranty or liability for your use of this information.

## 2018-05-22 NOTE — MR AVS SNAPSHOT
[address] [address] 
[phone] Patient: Anne Roldan MRN: [id number] [id number] Visit Information Date & Time Provider Department Dept. Phone Encounter #  
 5/22/2018  9:50 AM Jose Seen, [address] [phone] [id number] Follow-up Instructions Return if symptoms worsen or fail to improve. Upcoming Health Maintenance Date Due Pneumococcal 65+ High/Highest Risk (2 of 2 - PPSV23) 5/16/2017 HEMOGLOBIN A1C Q6M 12/6/2017 FOOT EXAM Q1 12/14/2017 EYE EXAM RETINAL OR DILATED Q1 2/24/2018 MEDICARE YEARLY EXAM 3/22/2018 LIPID PANEL Q1 4/1/2018 BREAST CANCER SCRN MAMMOGRAM 6/15/2018 Influenza Age 5 to Adult 8/1/2018 FOBT Q 1 YEAR AGE 50-75 10/31/2018 MICROALBUMIN Q1 12/19/2018 GLAUCOMA SCREENING Q2Y 2/24/2019 DTaP/Tdap/Td series (2 - Td) 3/21/2027 Allergies as of 5/22/2018  Review Complete On: 5/22/2018 By: Jose Seen, DO Severity Noted Reaction Type Reactions Latex  06/09/2014    Other (comments) Patient states it burns around her mouth. Other Food  10/12/2016    Other (comments) Yogurt - stomach cramping Betadine [Povidone-iodine] High 11/15/2013    Itching Pt states this causes \"extreme\" itching Codeine High 11/15/2013    Anaphylaxis, Rash, Nausea Only, Other (comments) HEADACHE Tolerates tramadol Dilaudid [Hydromorphone (Bulk)] High 04/02/2014    Anaphylaxis, Itching, Nausea Only, Other (comments) HALLUCINATIONS Tolerates tramadol Hydrocodone High 11/15/2013    Anaphylaxis, Rash, Nausea Only, Vertigo Facial - eyelid swelling-had to go to ER for reaction, HALLUCINATIONS Tolerates tramadol Ditropan [Oxybutynin Chloride]  03/04/2014    Swelling Doxycycline  11/15/2013    Rash PUSTULAR RASH- TOLERATING TETRACYCLINE Keflex [Cephalexin]  11/15/2013    Nausea and Vomiting Pain in abdomen AND FLU-LIKE SX Metformin  11/15/2013    Nausea and Vomiting Severe abdominal pain Tape [Adhesive]  06/09/2014    Other (comments) Redness/inflammed. Can only use paper tape. CAN USE BAND-AIDS. TOLERATES SURGICAL TAPE USED IN [place] [facility]. Sulfamethoxazole-trimethoprim Low 02/28/2014    Other (comments) Cramping/flu-like symptoms Current Immunizations  Never Reviewed No immunizations on file. Not reviewed this visit You Were Diagnosed With   
  
 Codes Comments Claustrophobia    -  Primary ICD-10-CM: F40.240 ICD-9-CM: 300.29 Vitals BP Pulse Temp Resp Height(growth percentile) Weight(growth percentile) 132/60 (BP 1 Location: Left arm, BP Patient Position: Sitting) 65 97.6 °F (36.4 °C) (Oral) 18 5' 8\" (1.727 m) 179 lb (81.2 kg) SpO2 BMI OB Status Smoking Status 99% 27.22 kg/m2 Postmenopausal Never Smoker Vitals History BMI and BSA Data Body Mass Index Body Surface Area  
 27.22 kg/m 2 1.97 m 2 Preferred Pharmacy Pharmacy Name [facility] [address] [place] [address] [address]  [facility] [phone] Your Updated Medication List  
  
   
This list is accurate as of 5/22/18 10:26 AM.  Always use your most recent med list.  
  
  
  
  
 Alpha Lipoic Acid 600 mg Cap Take 1 Cap by mouth two (2) times a day. baclofen 10 mg tablet Commonly known as:  LIORESAL  
TAKE 1 TABLET BY MOUTH THREE TIMES DAILY AS NEEDED  
  
 bethanechol 10 mg tablet Commonly known as:  URECHOLINE  
TAKE 1 TABLET BY MOUTH TWICE DAILY  
  
 biotin 10,000 mcg Cap Take 1 Cap by mouth daily. CALCIUM PO Take 1 Caplet by mouth daily. CINNAMON 500 mg Cap Generic drug:  cinnamon bark Take 2 Caps by mouth daily. CLARITIN 10 mg tablet Generic drug:  loratadine Take 10 mg by mouth nightly. CRANBERRY EXTRACT-VIT C PO Take 2 Caps by mouth daily. diazePAM 5 mg tablet Commonly known as:  VALIUM Take 1 tab PO 1 hour prior to MRI FISH OIL PO Take 1 Cap by mouth daily. FLONASE 50 mcg/actuation nasal spray Generic drug:  fluticasone 2 Sprays by Both Nostrils route daily as needed for Rhinitis.  
  
 gabapentin 300 mg capsule Commonly known as:  NEURONTIN Take 300 mg by mouth three (3) times daily. GARCINIA CAMBOGIA PO Take 1 Tab by mouth daily. glimepiride 2 mg tablet Commonly known as:  AMARYL Take 2 mg by mouth three (3) times daily. guar gum packet Commonly known as:  Clarine Furl Take 1 Packet by mouth nightly as needed. INVOKANA 100 mg tablet Generic drug:  canagliflozin Take 1 tablet by mouth  daily before breakfast  
  
 JANUVIA 100 mg tablet Generic drug:  SITagliptin TAKE 1 TABLET BY MOUTH DAILY JARDIANCE 10 mg tablet Generic drug:  empagliflozin L-Mfolate-B6 Phos-Methyl-B12 3-35-2 mg Tab tab Commonly known as:  Mary Fass Take 1 Tab by mouth two (2) times a day. Indications: neuropathy  
  
 lidocaine-prilocaine topical cream  
Commonly known as:  EMLA Apply  to affected area. lisinopril 2.5 mg tablet Commonly known as:  Félix Juanjose Take 2.5 mg by mouth nightly.  
  
 magnesium oxide 400 mg tablet Commonly known as:  MAG-OX Take 400 mg by mouth daily. nystatin topical cream  
Commonly known as:  MYCOSTATIN Apply  to affected area three (3) times daily. As needed for skin irritation. Use a think layer OTHER(NON-FORMULARY) Take 1 Tab by mouth daily. Vitamin D & E  
  
 pravastatin 40 mg tablet Commonly known as:  PRAVACHOL  
TAKE 1 TABLET NIGHTLY (NEEDS APPOINTMENT AS SOON AS POSSIBLE FOR FASTING LABS AND APPOINTMENT) SOOLANTRA 1 % Crea Generic drug:  ivermectin  
by Apply Externally route daily. Indications: ACNE ROSACEA  
  
 triamcinolone acetonide 0.1 % topical cream  
Commonly known as:  KENALOG Apply  to affected area three (3) times daily. As needed for skin irritation, use thin layer. TRUETEST TEST STRIPS strip Generic drug:  glucose blood VI test strips TEST THREE TIMES DAILY Prescriptions Printed Refills  
 diazePAM (VALIUM) 5 mg tablet 0 Sig: Take 1 tab PO 1 hour prior to MRI Class: Print Follow-up Instructions Return if symptoms worsen or fail to improve. Patient Instructions Magnetic Resonance Imaging (MRI): About This Test 
What is it? Magnetic resonance imaging (MRI) is a test that uses a magnetic field and pulses of radio wave energy to make pictures of organs and structures inside the body. When you have an MRI, you lie on a table and your body is moved into the MRI machine, where an image is taken of the area of the body being studied. Why is this test done? You may have an MRI for many reasons. This test can find problems such as tumors, bleeding, injury, blood vessel disease, and infection. An MRI also may provide more information about a problem seen on an X-ray, ultrasound scan, CT scan, or nuclear medicine exam. 
How can you prepare for the test? 
Talk to your doctor about all your health conditions before the test. For example, tell your doctor if: 
· You are allergic to any medicines. · You are or might be pregnant. · You have a pacemaker, an artificial limb, any metal pins or metal parts in your body, metal heart valves, metal clips in your brain, metal implants in your ears, or any other implanted or prosthetic medical device. · You have an intrauterine device (IUD) in place. · You get nervous in confined spaces. You may need medicine to help you relax. · You wear any patches that contain medicine. · You have kidney disease. What happens before the test? 
· You will remove all metal objects from your body. These include hearing aids, dentures, jewelry, watches, and hairpins. · You will take off all or most of your clothes and then change into a gown. · If you do leave some clothes on, make sure you take everything out of your pockets. · You may have contrast materials (dye) put into your arm through a tube called an IV. Contrast material helps doctors see specific organs, blood vessels, and most tumors. What happens during the test? 
· You will lie on your back on a table that is part of the MRI scanner. · The table will slide into the space that contains the magnet. · Inside the scanner you will hear a fan and feel air moving. You may hear tapping, thumping, or snapping noises. You may be given earplugs or headphones to reduce the noise. · You will be asked to hold still during the scan. You may be asked to hold your breath for short periods. · You may be alone in the scanning room, but a technologist will be watching you through a window and talking with you during the test. 
What else should you know about the test? 
· An MRI does not hurt. · If a dye is used, you may feel a quick sting or pinch and some coolness when the IV is started. The dye may give you a metallic taste in your mouth. Some people feel sick to their stomach or get a headache. · If you breastfeed and are concerned about whether the dye used in this test is safe, talk to your doctor. Most experts believe that very little dye passes into breast milk and even less is passed on to the baby. But if you prefer, you can store some of your breast milk ahead of time and use it for a day or two after the test. 
· You may feel warmth in the area being examined. This is normal. 
How long does the test take? · The test usually takes 30 to 60 minutes but can take as long as 2 hours. What happens after the test? 
· You will probably be able to go home right away, depending on the reason for the test. 
Follow-up care is a key part of your treatment and safety.  Be sure to make and go to all appointments, and call your doctor if you are having problems. It's also a good idea to keep a list of the medicines you take. Ask your doctor when you can expect to have your test results. Where can you learn more? Go to http://jorge a-katiana.info/. Enter V016 in the search box to learn more about \"Magnetic Resonance Imaging (MRI): About This Test.\" Current as of: October 14, 2016 Content Version: 11.4 © 1549-3620 [employer]. Care instructions adapted under license by [employer] (which disclaims liability or warranty for this information). If you have questions about a medical condition or this instruction, always ask your healthcare professional. [address] any warranty or liability for your use of this information. Introducing [facility]! [employer] introduces Footnote patient portal. Now you can access parts of your medical record, email your doctor's office, and request medication refills online. 1. In your internet browser, go to [email or web address] 2. Click on the First Time User? Click Here link in the Sign In box. You will see the New Member Sign Up page. 3. Enter your Footnote Access Code exactly as it appears below. You will not need to use this code after youve completed the sign-up process. If you do not sign up before the expiration date, you must request a new code. · Footnote Access Code: [email or web address] Expires: 8/15/2018 10:39 AM 
 
4. Enter the last four digits of your Social Security Number (xxxx) and Date of Birth (mm/dd/yyyy) as indicated and click Submit. You will be taken to the next sign-up page. 5. Create a Footnote ID. This will be your Footnote login ID and cannot be changed, so think of one that is secure and easy to remember. 6. Create a SwitchNotet password. You can change your password at any time. 7. Enter your Password Reset Question and Answer. This can be used at a later time if you forget your password. 8. Enter your e-mail address. You will receive e-mail notification when new information is available in [address] 9. Click Sign Up. You can now view and download portions of your medical record. 10. Click the Download Summary menu link to download a portable copy of your medical information. If you have questions, please visit the Frequently Asked Questions section of the [employer] website. Remember, Cometa is NOT to be used for urgent needs. For medical emergencies, dial 911. Now available from your iPhone and Android! Please provide this summary of care documentation to your next provider. Your primary care clinician is listed as [facility]. If you have any questions after today's visit, please call [phone].

## 2018-05-22 NOTE — PROGRESS NOTES
Chief Complaint   Patient presents with    Other     needs medication to have MRI     1. Have you been to the ER, urgent care clinic since your last visit? Hospitalized since your last visit? No    2. Have you seen or consulted any other health care providers outside of the [address] [place] [address] since your last visit? Include any pap smears or colon screening. [facility] last week for transverse myelitis.

## 2018-07-30 ENCOUNTER — HOSPITAL ENCOUNTER (OUTPATIENT)
Dept: CT IMAGING | Age: 68
Discharge: HOME | End: 2018-07-30
Attending: SURGERY
Payer: MEDICARE

## 2018-07-30 DIAGNOSIS — R10.32 ABDOMINAL PAIN, LEFT LOWER QUADRANT: ICD-10-CM

## 2018-07-30 LAB — CREAT BLD-MCNC: 0.6 MG/DL (ref 0.6–1.3)

## 2018-07-30 PROCEDURE — 74011636320 HC RX REV CODE- 636/320: Performed by: RADIOLOGY

## 2018-07-30 PROCEDURE — 74177 CT ABD & PELVIS W/CONTRAST: CPT

## 2018-07-30 PROCEDURE — 82565 ASSAY OF CREATININE: CPT

## 2018-07-30 RX ADMIN — IOPAMIDOL 100 ML: 755 INJECTION, SOLUTION INTRAVENOUS at 16:00

## 2018-08-02 ENCOUNTER — DOCUMENTATION ONLY (OUTPATIENT)
Dept: FAMILY MEDICINE CLINIC | Age: 68
End: 2018-08-02

## 2018-09-12 ENCOUNTER — TELEPHONE (OUTPATIENT)
Dept: FAMILY MEDICINE CLINIC | Age: 68
End: 2018-09-12

## 2018-09-12 NOTE — TELEPHONE ENCOUNTER
Is the patient looking for a neursurgeon?   If so I usually refer to Dr Amy Mendoza with [facility]

## 2018-09-12 NOTE — TELEPHONE ENCOUNTER
RC   Physical therapy that specializes in s steve cord injury therapy group. Has a neurosurgeon already. Given 4 recommendations for pain management. Can leave a detailed message.

## 2018-09-12 NOTE — TELEPHONE ENCOUNTER
Patient calling requesting a name of a spinal cord injury physician for [facility]    [phone]

## 2018-09-24 RX ORDER — BACLOFEN 10 MG/1
TABLET ORAL
Qty: 30 TAB | Refills: 0 | Status: SHIPPED | OUTPATIENT
Start: 2018-09-24 | End: 2018-10-25 | Stop reason: SDUPTHER

## 2018-10-01 ENCOUNTER — HOSPITAL ENCOUNTER (OUTPATIENT)
Dept: GENERAL RADIOLOGY | Age: 68
Discharge: HOME | End: 2018-10-01
Payer: MEDICARE

## 2018-10-01 ENCOUNTER — HOSPITAL ENCOUNTER (OUTPATIENT)
Dept: CT IMAGING | Age: 68
Discharge: HOME | End: 2018-10-01
Payer: MEDICARE

## 2018-10-01 VITALS
RESPIRATION RATE: 16 BRPM | SYSTOLIC BLOOD PRESSURE: 170 MMHG | DIASTOLIC BLOOD PRESSURE: 70 MMHG | OXYGEN SATURATION: 98 % | HEART RATE: 65 BPM

## 2018-10-01 DIAGNOSIS — M48.02 CERVICAL STENOSIS OF SPINE: ICD-10-CM

## 2018-10-01 DIAGNOSIS — G37.3 ACUTE TRANSVERSE MYELITIS (HCC): ICD-10-CM

## 2018-10-01 PROCEDURE — 74011636320 HC RX REV CODE- 636/320: Performed by: RADIOLOGY

## 2018-10-01 PROCEDURE — 72126 CT NECK SPINE W/DYE: CPT

## 2018-10-01 PROCEDURE — 77030018868 HC TY MYELGRM BD -A

## 2018-10-01 PROCEDURE — 77030003666 HC NDL SPINAL BD -A

## 2018-10-01 PROCEDURE — 74011000250 HC RX REV CODE- 250: Performed by: RADIOLOGY

## 2018-10-01 PROCEDURE — 62302 MYELOGRAPHY LUMBAR INJECTION: CPT

## 2018-10-01 PROCEDURE — 74011250636 HC RX REV CODE- 250/636: Performed by: RADIOLOGY

## 2018-10-01 RX ORDER — LIDOCAINE HYDROCHLORIDE 10 MG/ML
10 INJECTION, SOLUTION EPIDURAL; INFILTRATION; INTRACAUDAL; PERINEURAL ONCE
Status: COMPLETED | OUTPATIENT
Start: 2018-10-01 | End: 2018-10-01

## 2018-10-01 RX ADMIN — IOHEXOL 20 ML: 240 INJECTION, SOLUTION INTRATHECAL; INTRAVASCULAR; INTRAVENOUS; ORAL at 09:40

## 2018-10-01 RX ADMIN — SODIUM BICARBONATE 2 ML: 0.2 INJECTION, SOLUTION INTRAVENOUS at 09:39

## 2018-10-01 RX ADMIN — LIDOCAINE HYDROCHLORIDE 10 ML: 10 INJECTION, SOLUTION EPIDURAL; INFILTRATION; INTRACAUDAL; PERINEURAL at 09:39

## 2018-10-01 NOTE — IP AVS SNAPSHOT
[address] 
[phone] Patient: Bernadette Harris MRN: [id number] DLU:[id number] A check linda indicates which time of day the medication should be taken. My Medications ASK your doctor about these medications Instructions Each Dose to Equal  
 Morning Noon Evening Bedtime Alpha Lipoic Acid 600 mg Cap Your last dose was: Your next dose is: Take 1 Cap by mouth two (2) times a day. 1 Cap  
    
   
   
   
  
 baclofen 10 mg tablet Commonly known as:  LIORESAL Your last dose was: Your next dose is: TAKE 1 TABLET BY MOUTH THREE TIMES DAILY AS NEEDED  
     
   
   
   
  
 bethanechol 10 mg tablet Commonly known as:  URECHOLINE Your last dose was: Your next dose is: TAKE 1 TABLET BY MOUTH TWICE DAILY  
     
   
   
   
  
 biotin 10,000 mcg Cap Your last dose was: Your next dose is: Take 1 Cap by mouth daily. 1 Cap CALCIUM PO Your last dose was: Your next dose is: Take 1 Caplet by mouth daily. 1 Caplet CINNAMON 500 mg Cap Generic drug:  cinnamon bark Your last dose was: Your next dose is: Take 2 Caps by mouth daily. 2 Cap CLARITIN 10 mg tablet Generic drug:  loratadine Your last dose was: Your next dose is: Take 10 mg by mouth nightly. 10 mg CRANBERRY EXTRACT-VIT C PO Your last dose was: Your next dose is: Take 2 Caps by mouth daily. 2 Cap  
    
   
   
   
  
 diazePAM 5 mg tablet Commonly known as:  VALIUM Your last dose was: Your next dose is: Take 1 tab PO 1 hour prior to MRI FISH OIL PO Your last dose was: Your next dose is: Take 1 Cap by mouth daily. 1 Cap FLONASE 50 mcg/actuation nasal spray Generic drug:  fluticasone Your last dose was: Your next dose is: 2 Sprays by Both Nostrils route daily as needed for Rhinitis. 2 Spray  
    
   
   
   
  
 gabapentin 300 mg capsule Commonly known as:  NEURONTIN Your last dose was: Your next dose is: Take 300 mg by mouth three (3) times daily. 300 mg GARCINIA CAMBOGIA PO Your last dose was: Your next dose is: Take 1 Tab by mouth daily. 1 Tab  
    
   
   
   
  
 glimepiride 2 mg tablet Commonly known as:  AMARYL Your last dose was: Your next dose is: Take 2 mg by mouth three (3) times daily. 2 mg  
    
   
   
   
  
 guar gum packet Commonly known as:  Valeda Jaleel Your last dose was: Your next dose is: Take 1 Packet by mouth nightly as needed. 1 Packet INVOKANA 100 mg tablet Generic drug:  canagliflozin Your last dose was: Your next dose is: Take 1 tablet by mouth  daily before breakfast  
     
   
   
   
  
 JANUVIA 100 mg tablet Generic drug:  SITagliptin Your last dose was: Your next dose is: TAKE 1 TABLET BY MOUTH DAILY JARDIANCE 10 mg tablet Generic drug:  empagliflozin Your last dose was: Your next dose is: L-Mfolate-B6 Phos-Methyl-B12 3-35-2 mg Tab tab Commonly known as:  John Manuel Your last dose was: Your next dose is: Take 1 Tab by mouth two (2) times a day. Indications: neuropathy 1 Tab  
    
   
   
   
  
 lidocaine-prilocaine topical cream  
Commonly known as:  EMLA Your last dose was: Your next dose is:    
   
   
 Apply  to affected area. lisinopril 2.5 mg tablet Commonly known as:  Cortes Mendez Your last dose was: Your next dose is: Take 2.5 mg by mouth nightly. 2.5 mg  
    
   
   
   
  
 magnesium oxide 400 mg (241.3 mg magnesium) tablet Commonly known as:  MAG-OX Your last dose was: Your next dose is: Take 400 mg by mouth daily. 400 mg  
    
   
   
   
  
 nystatin topical cream  
Commonly known as:  MYCOSTATIN Your last dose was: Your next dose is:    
   
   
 Apply  to affected area three (3) times daily. As needed for skin irritation. Use a think layer OTHER(NON-FORMULARY) Your last dose was: Your next dose is: Take 1 Tab by mouth daily. Vitamin D & E  
 1 Tab  
    
   
   
   
  
 pravastatin 40 mg tablet Commonly known as:  PRAVACHOL Your last dose was: Your next dose is: TAKE 1 TABLET NIGHTLY (NEEDS APPOINTMENT AS SOON AS POSSIBLE FOR FASTING LABS AND APPOINTMENT) SOOLANTRA 1 % Crea Generic drug:  ivermectin Your last dose was: Your next dose is:    
   
   
 by Apply Externally route daily. Indications: ACNE ROSACEA  
     
   
   
   
  
 triamcinolone acetonide 0.1 % topical cream  
Commonly known as:  KENALOG Your last dose was: Your next dose is:    
   
   
 Apply  to affected area three (3) times daily. As needed for skin irritation, use thin layer. TRUETEST TEST STRIPS strip Generic drug:  glucose blood VI test strips Your last dose was: Your next dose is:    
   
   
 TEST THREE TIMES DAILY

## 2018-10-01 NOTE — PROGRESS NOTES
Pt brought back to [facility] following scheduled Myelogram.  Pt laying supine and without complaints. Pain reported as 0/10.    1005 - Pt Access called by RN and it was asked if they would notify Pt's  that procedure is completed and Pt is resting in RH  1011 - VS taken (see doc flow)  1025 - Pt resting, drinking fluids and eating peanut butter/crackers. 1039 - Pt is resting and has no complaints at this time. Reviewed discharge instructions with Pt. Pt able to verbalize understanding. Opportunity for questions/clarification. CD being made. 1105 - Pt able to ambulate with rollator to BR and void. No complaints. Procedure site c/d/i. Pt given copy of AVS but unable to obtain discharge signature due to device issues. Pt given CD prior to discharge and taken in wheel chair to vehicle/.

## 2018-10-01 NOTE — IP AVS SNAPSHOT
[address] 
[phone] Patient: Jose Guy MRN: [id number] LFX:[id number]/[id number] About your hospitalization You were admitted on:  October 1, 2018 You last received care in the:  [facility] You were discharged on:  October 1, 2018 Why you were hospitalized Your primary diagnosis was:  Not on File Follow-up Information None Discharge Orders None A check linda indicates which time of day the medication should be taken. My Medications ASK your doctor about these medications Instructions Each Dose to Equal  
 Morning Noon Evening Bedtime Alpha Lipoic Acid 600 mg Cap Your last dose was: Your next dose is: Take 1 Cap by mouth two (2) times a day. 1 Cap  
    
   
   
   
  
 baclofen 10 mg tablet Commonly known as:  LIORESAL Your last dose was: Your next dose is: TAKE 1 TABLET BY MOUTH THREE TIMES DAILY AS NEEDED  
     
   
   
   
  
 bethanechol 10 mg tablet Commonly known as:  URECHOLINE Your last dose was: Your next dose is: TAKE 1 TABLET BY MOUTH TWICE DAILY  
     
   
   
   
  
 biotin 10,000 mcg Cap Your last dose was: Your next dose is: Take 1 Cap by mouth daily. 1 Cap CALCIUM PO Your last dose was: Your next dose is: Take 1 Caplet by mouth daily. 1 Caplet CINNAMON 500 mg Cap Generic drug:  cinnamon bark Your last dose was: Your next dose is: Take 2 Caps by mouth daily. 2 Cap CLARITIN 10 mg tablet Generic drug:  loratadine Your last dose was: Your next dose is: Take 10 mg by mouth nightly. 10 mg CRANBERRY EXTRACT-VIT C PO Your last dose was: Your next dose is: Take 2 Caps by mouth daily. 2 Cap  
    
   
   
   
  
 diazePAM 5 mg tablet Commonly known as:  VALIUM Your last dose was: Your next dose is: Take 1 tab PO 1 hour prior to MRI FISH OIL PO Your last dose was: Your next dose is: Take 1 Cap by mouth daily. 1 Cap FLONASE 50 mcg/actuation nasal spray Generic drug:  fluticasone Your last dose was: Your next dose is: 2 Sprays by Both Nostrils route daily as needed for Rhinitis. 2 Spray  
    
   
   
   
  
 gabapentin 300 mg capsule Commonly known as:  NEURONTIN Your last dose was: Your next dose is: Take 300 mg by mouth three (3) times daily. 300 mg GARCINIA CAMBOGIA PO Your last dose was: Your next dose is: Take 1 Tab by mouth daily. 1 Tab  
    
   
   
   
  
 glimepiride 2 mg tablet Commonly known as:  AMARYL Your last dose was: Your next dose is: Take 2 mg by mouth three (3) times daily. 2 mg  
    
   
   
   
  
 guar gum packet Commonly known as:  Marrie Penta Your last dose was: Your next dose is: Take 1 Packet by mouth nightly as needed. 1 Packet INVOKANA 100 mg tablet Generic drug:  canagliflozin Your last dose was: Your next dose is: Take 1 tablet by mouth  daily before breakfast  
     
   
   
   
  
 JANUVIA 100 mg tablet Generic drug:  SITagliptin Your last dose was: Your next dose is: TAKE 1 TABLET BY MOUTH DAILY JARDIANCE 10 mg tablet Generic drug:  empagliflozin Your last dose was: Your next dose is: L-Mfolate-B6 Phos-Methyl-B12 3-35-2 mg Tab tab Commonly known as:  David Rosario Your last dose was: Your next dose is: Take 1 Tab by mouth two (2) times a day. Indications: neuropathy 1 Tab  
    
   
   
   
  
 lidocaine-prilocaine topical cream  
Commonly known as:  EMLA Your last dose was: Your next dose is:    
   
   
 Apply  to affected area. lisinopril 2.5 mg tablet Commonly known as:  Libradoestee Gleason Your last dose was: Your next dose is: Take 2.5 mg by mouth nightly. 2.5 mg  
    
   
   
   
  
 magnesium oxide 400 mg (241.3 mg magnesium) tablet Commonly known as:  MAG-OX Your last dose was: Your next dose is: Take 400 mg by mouth daily. 400 mg  
    
   
   
   
  
 nystatin topical cream  
Commonly known as:  MYCOSTATIN Your last dose was: Your next dose is:    
   
   
 Apply  to affected area three (3) times daily. As needed for skin irritation. Use a think layer OTHER(NON-FORMULARY) Your last dose was: Your next dose is: Take 1 Tab by mouth daily. Vitamin D & E  
 1 Tab  
    
   
   
   
  
 pravastatin 40 mg tablet Commonly known as:  PRAVACHOL Your last dose was: Your next dose is: TAKE 1 TABLET NIGHTLY (NEEDS APPOINTMENT AS SOON AS POSSIBLE FOR FASTING LABS AND APPOINTMENT) SOOLANTRA 1 % Crea Generic drug:  ivermectin Your last dose was: Your next dose is:    
   
   
 by Apply Externally route daily. Indications: ACNE ROSACEA  
     
   
   
   
  
 triamcinolone acetonide 0.1 % topical cream  
Commonly known as:  KENALOG Your last dose was: Your next dose is:    
   
   
 Apply  to affected area three (3) times daily. As needed for skin irritation, use thin layer. TRUETEST TEST STRIPS strip Generic drug:  glucose blood VI test strips Your last dose was: Your next dose is:    
   
   
 TEST THREE TIMES DAILY Discharge Instructions Myelogram: About This Test 
What is it? A myelogram uses X-rays and a special dye to make pictures of bones and nerves of the spine (spinal canal). The spinal canal holds the spinal cord, the spinal nerve roots, and the fluid-filled space between the bones in your spine. Why is this test done? A myelogram is done to check for: · The cause of arm or leg numbness, weakness, or pain. · Narrowing of the spinal canal (spinal stenosis). · A tumor or infection causing problems with the spinal cord or nerve roots. · A spinal disc that has ruptured (herniated disc). · Inflammation of the membrane that covers the brain and spinal cord. · Problems with the blood vessels to the spine. How can you prepare for the test? 
Your doctor will tell you if you need to change how much you eat and drink before the myelogram. You may be asked to increase the amount of water you drink before the test. Follow the instructions your doctor gives you about eating and drinking. Before a myelogram, tell your doctor if: 
· You are allergic to any medicines, contrast material, or iodine dye. · You have had bleeding problems, or you take a blood thinner, such as aspirin, clopidogrel (Plavix), or warfarin (Coumadin), or you take over-the-counter medicines, such as ibuprofen (Advil, Motrin) or naproxen (Aleve). Your doctor will tell you when you should stop taking these medicines several days before your procedure. Make sure that you understand exactly what he or she wants you to do. · You have had kidney problems. · You have diabetes, especially if you take metformin (Glucophage, for example). · You are or might be pregnant. Ask someone to take you home and stay with you after the test. 
What happens during the test? 
The dye is put into your spinal canal with a thin needle.  This is called a lumbar puncture. The dye moves through the space so the nerve roots and spinal cord can be seen more clearly. After the dye is put in, you will lie still while the X-ray pictures are taken. How does it feel? You will feel a quick sting from a small needle that has medicine to numb the skin on your back. You will also feel some pressure as the long, thin spinal needle is put into your spinal canal. You may feel a quick sharp pain down your buttock or leg when the needle is moved in your spine. The dye may make you feel warm and flushed and have a metallic taste in your mouth. What else should you know about the test? 
In rare cases, the hole made by the needle in the sac around the spine does not close normally. This can allow spinal fluid to leak out. This leak may need to be repaired through a procedure called an epidural blood patch. To do the patch, your doctor injects some of your own blood to cover the hole. How long does the test take? · A myelogram usually takes 30 minutes to 1 hour. What happens after the test? 
· You will probably be able to go home 30 minutes to 2 hours after the test. 
· You may need to lie in bed with your head raised for 4 to 24 hours after the test. To prevent seizures, which are a rare side effect, do not bend over or lie down with your head lower than your body. Keeping your head higher than your body after a myelogram also may help prevent or reduce other side effects, such as headache, nausea, or vomiting. · Avoid strenuous activity, such as running or heavy lifting, for at least 1 day after your myelogram. 
· Drink plenty of water after the myelogram. Your doctor will give you instructions on taking your regular medicines. When should you call for help? Call 911 anytime you think you may need emergency care. For example, call if: 
· You have a seizure. Call your doctor now or seek immediate medical care if: · You have any increase in pain, weakness, or numbness in your legs. · You have a severe headache or stiff neck, or your eyes become very sensitive to light. · You have a headache that lasts longer than 24 hours. · You have problems urinating or having a bowel movement. · You have a fever. Follow-up care is a key part of your treatment and safety. Be sure to make and go to all appointments, and call your doctor if you are having problems. It's also a good idea to keep a list of the medicines you take. Ask your doctor when you can expect to have your test results. For questions or concerns call [facility] [phone] M-F 7:30 am to 3:30 pm or after hours call [phone] Lon Ramsey RN, [facility] Where can you learn more? Go to http://jorge aCorporate Timeskatiana.info/. Enter Y495 in the search box to learn more about \"Myelogram: About This Test.\" Current as of: October 14, 2016 Content Version: 11.4 © 0178-4820 Healthwise, Incorporated. Care instructions adapted under license by Direct Access Software (which disclaims liability or warranty for this information). If you have questions about a medical condition or this instruction, always ask your healthcare professional. [place] [address] any warranty or liability for your use of this information. ACO Transitions of Care Introducing [employer] [address] Caroline Galdamez offers a voluntary care coordination program to provide high quality service and care to [facility] fee-for-service beneficiaries. [place]  was designed to help you enhance your health and well-being through the following services: ? Transitions of Care  support for individuals who are transitioning from one care setting to another (example: Hospital to home). ?  Chronic and Complex Care Coordination  support for individuals and caregivers of those with serious or chronic illnesses or with more than one chronic (ongoing) condition and those who take a number of different medications. If you meet specific medical criteria, a [facility] [address] may call you directly to coordinate your care with your primary care physician and your other care providers. For questions about the [facility] programs, please, contact your physicians office. For general questions or additional information about Accountable Care Organizations: 
Please visit www.medicare.gov/acos. html or call 1-800-MEDICARE (7-291.710.6518) TTY users should call 6-158.664.4420. Introducing [facility]! [facility] introduces [employer] patient portal. Now you can access parts of your medical record, email your doctor's office, and request medication refills online. 1. In your internet browser, go to [email or web address] 2. Click on the First Time User? Click Here link in the Sign In box. You will see the New Member Sign Up page. 3. Enter your [employer] Access Code exactly as it appears below. You will not need to use this code after youve completed the sign-up process. If you do not sign up before the expiration date, you must request a new code. · [employer] Access Code: [email or web address] Expires: 12/24/2018  4:10 PM 
 
4. Enter the last four digits of your Social Security Number (xxxx) and Date of Birth (mm/dd/yyyy) as indicated and click Submit. You will be taken to the next sign-up page. 5. Create a [employer] ID. This will be your [employer] login ID and cannot be changed, so think of one that is secure and easy to remember. 6. Create a [employer] password. You can change your password at any time. 7. Enter your Password Reset Question and Answer. This can be used at a later time if you forget your password. 8. Enter your e-mail address. You will receive e-mail notification when new information is available in [address] 9. Click Sign Up. You can now view and download portions of your medical record. 10. Click the Download Summary menu link to download a portable copy of your medical information. If you have questions, please visit the Frequently Asked Questions section of the [employer]t website. Remember, [employer] is NOT to be used for urgent needs. For medical emergencies, dial 911. Now available from your iPhone and Android! Introducing Ulysses Gambino As a [facility] [address] patient, I wanted to make you aware of our electronic visit tool called Ulysses Gambino. indico [facility] 24/7 allows you to connect within minutes with a medical provider 24 hours a day, seven days a week via a mobile device or tablet or logging into a secure website from your computer. You can access Ulysses Gambino from anywhere in the [place]. A virtual visit might be right for you when you have a simple condition and feel like you just dont want to get out of bed, or cant get away from work for an appointment, when your regular [address] provider is not available (evenings, weekends or holidays), or when youre out of town and need minor care. Electronic visits cost only $49 and if the [employer] [facility] 24/7 provider determines a prescription is needed to treat your condition, one can be electronically transmitted to a nearby pharmacy*. Please take a moment to enroll today if you have not already done so. The enrollment process is free and takes just a few minutes. To enroll, please download the [employer] 24/7 raman to your tablet or phone, or visit [email or web address] to enroll on your computer. And, as an [address] [place] [address] patient with a [employer] account, the results of your visits will be scanned into your electronic medical record and your primary care provider will be able to view the scanned results.    
We urge you to continue to see your regular [address] provider for your ongoing medical care. And while your primary care provider may not be the one available when you seek a Uylsses Borgesfin virtual visit, the peace of mind you get from getting a real diagnosis real time can be priceless. For more information on Ulysses Borgesfin, view our Frequently Asked Questions (FAQs) at [email or web address]. Sincerely, 
 
Margot Azul MD 
Chief Medical Officer Jonny Galdamez *:  certain medications cannot be prescribed via [employer] Unresulted Labs-Please follow up with your PCP about these lab tests Order Current Status XR MYELO CERVICAL In process Your Primary Care Physician (PCP) Primary Care Physician Office Phone Office Fax Tressa Almaguer [phone] You are allergic to the following Allergen Reactions Latex Other (comments) Patient states it burns around her mouth. Other Food Other (comments) Yogurt - stomach cramping Betadine (Povidone-Iodine) Itching Pt states this causes \"extreme\" itching Codeine Anaphylaxis Rash Nausea Only Other (comments) HEADACHE Tolerates tramadol Dilaudid (Hydromorphone (Bulk)) Anaphylaxis Itching Nausea Only Other (comments) HALLUCINATIONS Tolerates tramadol Hydrocodone Anaphylaxis Rash Nausea Only Vertigo Facial - eyelid swelling-had to go to ER for reaction, HALLUCINATIONS Tolerates tramadol Ditropan (Oxybutynin Chloride) Swelling Doxycycline Rash PUSTULAR RASH- TOLERATING TETRACYCLINE Keflex (Cephalexin) Nausea and Vomiting Pain in abdomen AND FLU-LIKE SX Metformin Nausea and Vomiting Severe abdominal pain Tape (Adhesive) Other (comments) Redness/inflammed. Can only use paper tape. CAN USE BAND-AIDS. TOLERATES SURGICAL TAPE USED IN [place] [facility]. Sulfamethoxazole-Trimethoprim Other (comments) Cramping/flu-like symptoms Recent Documentation OB Status Smoking Status Postmenopausal Never Smoker Emergency Contacts Name Discharge Info Relation Home Work Mobile [facility] DISCHARGE CAREGIVER [3] Spouse [3] [phone] Patient Belongings The following personal items are in your possession at time of discharge: 
                             
 
  
  
 Please provide this summary of care documentation to your next provider. Signatures-by signing, you are acknowledging that this After Visit Summary has been reviewed with you and you have received a copy. Patient Signature:  ____________________________________________________________ Date:  ____________________________________________________________  
  
Peter Captain Provider Signature:  ____________________________________________________________ Date:  ____________________________________________________________

## 2018-10-01 NOTE — DISCHARGE INSTRUCTIONS
Myelogram: About This Test  What is it? A myelogram uses X-rays and a special dye to make pictures of bones and nerves of the spine (spinal canal). The spinal canal holds the spinal cord, the spinal nerve roots, and the fluid-filled space between the bones in your spine. Why is this test done? A myelogram is done to check for:  · The cause of arm or leg numbness, weakness, or pain. · Narrowing of the spinal canal (spinal stenosis). · A tumor or infection causing problems with the spinal cord or nerve roots. · A spinal disc that has ruptured (herniated disc). · Inflammation of the membrane that covers the brain and spinal cord. · Problems with the blood vessels to the spine. How can you prepare for the test?  Your doctor will tell you if you need to change how much you eat and drink before the myelogram. You may be asked to increase the amount of water you drink before the test. Follow the instructions your doctor gives you about eating and drinking. Before a myelogram, tell your doctor if:  · You are allergic to any medicines, contrast material, or iodine dye. · You have had bleeding problems, or you take a blood thinner, such as aspirin, clopidogrel (Plavix), or warfarin (Coumadin), or you take over-the-counter medicines, such as ibuprofen (Advil, Motrin) or naproxen (Aleve). Your doctor will tell you when you should stop taking these medicines several days before your procedure. Make sure that you understand exactly what he or she wants you to do. · You have had kidney problems. · You have diabetes, especially if you take metformin (Glucophage, for example). · You are or might be pregnant. Ask someone to take you home and stay with you after the test.  What happens during the test?  The dye is put into your spinal canal with a thin needle. This is called a lumbar puncture. The dye moves through the space so the nerve roots and spinal cord can be seen more clearly.  After the dye is put in, you will lie still while the X-ray pictures are taken. How does it feel? You will feel a quick sting from a small needle that has medicine to numb the skin on your back. You will also feel some pressure as the long, thin spinal needle is put into your spinal canal. You may feel a quick sharp pain down your buttock or leg when the needle is moved in your spine. The dye may make you feel warm and flushed and have a metallic taste in your mouth. What else should you know about the test?  In rare cases, the hole made by the needle in the sac around the spine does not close normally. This can allow spinal fluid to leak out. This leak may need to be repaired through a procedure called an epidural blood patch. To do the patch, your doctor injects some of your own blood to cover the hole. How long does the test take? · A myelogram usually takes 30 minutes to 1 hour. What happens after the test?  · You will probably be able to go home 30 minutes to 2 hours after the test.  · You may need to lie in bed with your head raised for 4 to 24 hours after the test. To prevent seizures, which are a rare side effect, do not bend over or lie down with your head lower than your body. Keeping your head higher than your body after a myelogram also may help prevent or reduce other side effects, such as headache, nausea, or vomiting. · Avoid strenuous activity, such as running or heavy lifting, for at least 1 day after your myelogram.  · Drink plenty of water after the myelogram. Your doctor will give you instructions on taking your regular medicines. When should you call for help? Call 911 anytime you think you may need emergency care. For example, call if:  · You have a seizure. Call your doctor now or seek immediate medical care if:  · You have any increase in pain, weakness, or numbness in your legs. · You have a severe headache or stiff neck, or your eyes become very sensitive to light.   · You have a headache that lasts longer than 24 hours. · You have problems urinating or having a bowel movement. · You have a fever. Follow-up care is a key part of your treatment and safety. Be sure to make and go to all appointments, and call your doctor if you are having problems. It's also a good idea to keep a list of the medicines you take. Ask your doctor when you can expect to have your test results. For questions or concerns call [facility] [phone] M-F 7:30 am to 3:30 pm or after hours call [phone]  Steffi Espinal RN, [facility]      Where can you learn more? Go to http://jorge a-katiana.info/. Enter T584 in the search box to learn more about \"Myelogram: About This Test.\"  Current as of: October 14, 2016  Content Version: 11.4  © 9798-8740 [employer]. Care instructions adapted under license by "[employer] (which disclaims liability or warranty for this information). If you have questions about a medical condition or this instruction, always ask your healthcare professional. [place] [address] any warranty or liability for your use of this information.

## 2018-10-24 ENCOUNTER — OFFICE VISIT (OUTPATIENT)
Dept: FAMILY MEDICINE CLINIC | Age: 68
End: 2018-10-24

## 2018-10-24 VITALS
OXYGEN SATURATION: 97 % | HEIGHT: 68 IN | BODY MASS INDEX: 26.67 KG/M2 | RESPIRATION RATE: 16 BRPM | DIASTOLIC BLOOD PRESSURE: 66 MMHG | SYSTOLIC BLOOD PRESSURE: 125 MMHG | WEIGHT: 176 LBS | HEART RATE: 73 BPM | TEMPERATURE: 97.5 F

## 2018-10-24 DIAGNOSIS — J01.00 ACUTE NON-RECURRENT MAXILLARY SINUSITIS: Primary | ICD-10-CM

## 2018-10-24 RX ORDER — AMOXICILLIN AND CLAVULANATE POTASSIUM 875; 125 MG/1; MG/1
1 TABLET, FILM COATED ORAL EVERY 12 HOURS
Qty: 14 TAB | Refills: 0 | Status: SHIPPED | OUTPATIENT
Start: 2018-10-24 | End: 2018-10-31

## 2018-10-24 NOTE — PROGRESS NOTES
Jese Randolph is a 80 y.o. female Chief Complaint Patient presents with  Nasal Congestion  Cough  
 pt states she is getting tooth pain and pain under her eye on the L side for past week. Pt has been using otc with no relief. Pt feels her symptoms are worsening. Pt is also using flonase with no relief. No fever no chills. No ear pain. + cough, yellow mucous. Pt sees endo for diabetes and marquez lrequest recent labs be sent here 
she is a 80y.o. year old female who presents for evalution. Reviewed PmHx, RxHx, FmHx, SocHx, AllgHx and updated and dated in the chart. Review of Systems - negative except as listed above in the HPI Objective:  
 
Vitals:  
 10/24/18 1134 BP: 125/66 Pulse: 73 Resp: 16 Temp: 97.5 °F (36.4 °C) TempSrc: Oral  
SpO2: 97% Weight: 176 lb (79.8 kg) Height: 5' 8\" (1.727 m) Current Outpatient Medications Medication Sig  
 amoxicillin-clavulanate (AUGMENTIN) 875-125 mg per tablet Take 1 Tab by mouth every twelve (12) hours for 7 days.  baclofen (LIORESAL) 10 mg tablet TAKE 1 TABLET BY MOUTH THREE TIMES DAILY AS NEEDED  
 lidocaine-prilocaine (EMLA) topical cream Apply  to affected area.  bethanechol (URECHOLINE) 10 mg tablet TAKE 1 TABLET BY MOUTH TWICE DAILY  fluticasone (FLONASE) 50 mcg/actuation nasal spray 2 Sprays by Both Nostrils route daily as needed for Rhinitis.  lisinopril (PRINIVIL, ZESTRIL) 2.5 mg tablet Take 2.5 mg by mouth nightly.  OTHER,NON-FORMULARY, Take 1 Tab by mouth daily. Vitamin D & E  
 guar gum (BENEFIBER) packet Take 1 Packet by mouth nightly as needed.  nystatin (MYCOSTATIN) topical cream Apply  to affected area three (3) times daily. As needed for skin irritation. Use a think layer  ivermectin (SOOLANTRA) 1 % crea by Apply Externally route daily. Indications: ACNE ROSACEA  CALCIUM PO Take 1 Caplet by mouth daily.   
 gabapentin (NEURONTIN) 300 mg capsule Take 300 mg by mouth three (3) times daily.  glimepiride (AMARYL) 2 mg tablet Take 2 mg by mouth three (3) times daily.  magnesium oxide (MAG-OX) 400 mg tablet Take 400 mg by mouth daily.  Alpha Lipoic Acid 600 mg cap Take 1 Cap by mouth two (2) times a day.  L-Mfolate-B6 Phos-Methyl-B12 (METANX) 3-35-2 mg tab tab Take 1 Tab by mouth two (2) times a day. Indications: neuropathy  CHROM GENO/BRINDAL BERRY (GARCINIA CAMBOGIA PO) Take 1 Tab by mouth daily.  pravastatin (PRAVACHOL) 40 mg tablet TAKE 1 TABLET NIGHTLY (NEEDS APPOINTMENT AS SOON AS POSSIBLE FOR FASTING LABS AND APPOINTMENT)  TRUETEST TEST STRIPS strip TEST THREE TIMES DAILY  CRANBERRY EXTRACT-VIT C PO Take 2 Caps by mouth daily.  loratadine (CLARITIN) 10 mg tablet Take 10 mg by mouth nightly.  JARDIANCE 10 mg tablet  diazePAM (VALIUM) 5 mg tablet Take 1 tab PO 1 hour prior to MRI  JANUVIA 100 mg tablet TAKE 1 TABLET BY MOUTH DAILY  biotin 10,000 mcg cap Take 1 Cap by mouth daily.  triamcinolone acetonide (KENALOG) 0.1 % topical cream Apply  to affected area three (3) times daily. As needed for skin irritation, use thin layer.  DOCOSAHEXANOIC ACID/EPA (FISH OIL PO) Take 1 Cap by mouth daily.  INVOKANA 100 mg tablet Take 1 tablet by mouth  daily before breakfast  
 cinnamon bark (CINNAMON) 500 mg cap Take 2 Caps by mouth daily. No current facility-administered medications for this visit. Physical Examination: General appearance - alert, well appearing, and in no distress Eyes - pupils equal and reactive, extraocular eye movements intact Ears - bilateral TM's and external ear canals normal 
Nose - mucosal congestion, mucosal erythema and sinus tenderness noted L maxillary Mouth - mucous membranes moist, pharynx normal without lesions Neck - supple, no significant adenopathy Chest - clear to auscultation, no wheezes, rales or rhonchi, symmetric air entry Heart - normal rate, regular rhythm, normal S1, S2, no murmurs, rubs, clicks or gallops Assessment/ Plan:  
Diagnoses and all orders for this visit: 
 
1. Acute non-recurrent maxillary sinusitis 
-     amoxicillin-clavulanate (AUGMENTIN) 875-125 mg per tablet; Take 1 Tab by mouth every twelve (12) hours for 7 days. Follow-up Disposition: 
Return if symptoms worsen or fail to improve. I have discussed the diagnosis with the patient and the intended plan as seen in the above orders. The patient has received an after-visit summary and questions were answered concerning future plans. Pt conveyed understanding of plan. Medication Side Effects and Warnings were discussed with patient Pearl Later, DO

## 2018-10-24 NOTE — PATIENT INSTRUCTIONS
Sinusitis: Care Instructions Your Care Instructions Sinusitis is an infection of the lining of the sinus cavities in your head. Sinusitis often follows a cold. It causes pain and pressure in your head and face. In most cases, sinusitis gets better on its own in 1 to 2 weeks. But some mild symptoms may last for several weeks. Sometimes antibiotics are needed. Follow-up care is a key part of your treatment and safety. Be sure to make and go to all appointments, and call your doctor if you are having problems. It's also a good idea to know your test results and keep a list of the medicines you take. How can you care for yourself at home? · Take an over-the-counter pain medicine, such as acetaminophen (Tylenol), ibuprofen (Advil, Motrin), or naproxen (Aleve). Read and follow all instructions on the label. · If the doctor prescribed antibiotics, take them as directed. Do not stop taking them just because you feel better. You need to take the full course of antibiotics. · Be careful when taking over-the-counter cold or flu medicines and Tylenol at the same time. Many of these medicines have acetaminophen, which is Tylenol. Read the labels to make sure that you are not taking more than the recommended dose. Too much acetaminophen (Tylenol) can be harmful. · Breathe warm, moist air from a steamy shower, a hot bath, or a sink filled with hot water. Avoid cold, dry air. Using a humidifier in your home may help. Follow the directions for cleaning the machine. · Use saline (saltwater) nasal washes to help keep your nasal passages open and wash out mucus and bacteria. You can buy saline nose drops at a grocery store or drugstore. Or you can make your own at home by adding 1 teaspoon of salt and 1 teaspoon of baking soda to 2 cups of distilled water. If you make your own, fill a bulb syringe with the solution, insert the tip into your nostril, and squeeze gently. Oscar Vasquez your nose. · Put a hot, wet towel or a warm gel pack on your face 3 or 4 times a day for 5 to 10 minutes each time. · Try a decongestant nasal spray like oxymetazoline (Afrin). Do not use it for more than 3 days in a row. Using it for more than 3 days can make your congestion worse. When should you call for help? Call your doctor now or seek immediate medical care if: 
  · You have new or worse swelling or redness in your face or around your eyes.  
  · You have a new or higher fever.  
 Watch closely for changes in your health, and be sure to contact your doctor if: 
  · You have new or worse facial pain.  
  · The mucus from your nose becomes thicker (like pus) or has new blood in it.  
  · You are not getting better as expected. Where can you learn more? Go to http://jorge a-katiana.info/. Enter N242 in the search box to learn more about \"Sinusitis: Care Instructions. \" Current as of: March 28, 2018 Content Version: 11.8 © 3453-4498 [employer]. Care instructions adapted under license by [employer] (which disclaims liability or warranty for this information). If you have questions about a medical condition or this instruction, always ask your healthcare professional. [address] any warranty or liability for your use of this information.

## 2018-10-24 NOTE — PROGRESS NOTES
Chief Complaint Patient presents with  Nasal Congestion  Cough Patient presents in office with c/o cough and nasal congestion that started a week ago. Coughing up phlegm yellow in color. Taking Claritin and Flonase with little relief. No further concerns.

## 2018-10-25 RX ORDER — BACLOFEN 10 MG/1
TABLET ORAL
Qty: 30 TAB | Refills: 0 | Status: SHIPPED | OUTPATIENT
Start: 2018-10-25 | End: 2018-11-18 | Stop reason: SDUPTHER

## 2018-11-18 RX ORDER — BACLOFEN 10 MG/1
TABLET ORAL
Qty: 30 TAB | Refills: 0 | Status: SHIPPED | OUTPATIENT
Start: 2018-11-18 | End: 2018-12-15 | Stop reason: SDUPTHER

## 2018-11-20 ENCOUNTER — OFFICE VISIT (OUTPATIENT)
Dept: OBGYN CLINIC | Age: 68
End: 2018-11-20

## 2018-11-20 VITALS — WEIGHT: 175 LBS | BODY MASS INDEX: 26.52 KG/M2 | HEIGHT: 68 IN

## 2018-11-20 DIAGNOSIS — R32 URINARY INCONTINENCE, UNSPECIFIED TYPE: ICD-10-CM

## 2018-11-20 DIAGNOSIS — Z01.419 ENCOUNTER FOR GYNECOLOGICAL EXAMINATION (GENERAL) (ROUTINE) WITHOUT ABNORMAL FINDINGS: Primary | ICD-10-CM

## 2018-11-20 NOTE — PATIENT INSTRUCTIONS

## 2018-11-20 NOTE — PROGRESS NOTES
Boston Davis is a ,  80 y.o. female [facility]   who presents for her annual checkup. She is menopausal and amenorrheic. Still recovering from transverse myelitis. Lots of bowel issues and bladder leakage. Thinks leakage is worse and worried about infection    She is having no significant problems. Hormone Status:    She is not having vasomotor symptoms. The patient is not using HRT. She has not had any vaginal bleeding. She reports no gynecologic symptoms. Sexual history:    She  reports that she does not currently engage in sexual activity but has had partners who are Male. Medical conditions:    Since her last annual GYN exam about 2017 ago, she has had the following changes in her health history: none. Surgical history confirmed with patient. has a past surgical history that includes pr reconstr nose (2013, ); hx colposcopy (2016); hx vascular access (); hx mastectomy (); hx tonsillectomy; hx cataract removal (Bilateral, ); hx orthopaedic (Left, ); hx cervical fusion; ANTERIOR SPINAL FUSION L4-S1, LUMBAR LAMINECTOMY POSTERIOR SPINAL FUSION T9-S1 (LATEX ALLERGY) (N/A, 10/5/2016); SPINE ANTERIOR SUPINE APPROACH (N/A, 10/5/2016); and HYSTEROSCOPY, DILATATION AND CURETTAGE (N/A, 2014). Pap and Mammogram History:    Her most recent Pap smear was normal/-HPV obtained 2017 year(s) ago. Hx of ASCUS/-HPV 2016. Colposcopy revealed negative pathology     The patient had a recent mammogram 2018 which was negative for malignancy    Breast Cancer History/Substance Abuse:     She does not have a family history of breast cancer. Osteoporosis History:    Family history does not include a first or second degree relative with osteopenia or osteoporosis. A bone density scan was obtained 2016 and revealed osteopenia. She is currently taking calcium and vit D.     Past Medical History:   Diagnosis Date    Abnormal MRI, cervical spine 4/3/2017    Abnormal pap 3/2015; 5/2016 2015 Neg pap +HPV; 2016 ASCUS +HPV    Acute transverse myelitis (HCC)     Arthritis     osteo-tests for RA were neg    Breast cancer ([facility] [address] 1996    right    Chronic pain     Diabetes (HCC)     Fibromyalgia     Ganglion cyst of wrist     LEFT    Hypercholesterolemia     Hypertension     Memory loss     Per pt.  Murmur, cardiac     Neuropathy     Rosacea     Unspecified adverse effect of anesthesia     \"SLOW TO WAKE UP, SENSITIVE TO ANESTHESIA, DO NOT COME AROUND FOR 2-3 DAYS\"      Past Surgical History:   Procedure Laterality Date    HX CATARACT REMOVAL Bilateral 2014    HX CERVICAL FUSION      HX COLPOSCOPY  5/2016    Neg path    HX MASTECTOMY  12/96    tram flap    HX ORTHOPAEDIC Left 1990's    foot surgery    HX TONSILLECTOMY      HX VASCULAR ACCESS  1997    portacath left chest-removed after chemo    RECONSTR NOSE  11/2013, 2005    HOLE IN SEPTUM     Current Outpatient Medications   Medication Sig Dispense Refill    baclofen (LIORESAL) 10 mg tablet TAKE 1 TABLET BY MOUTH THREE TIMES DAILY AS NEEDED 30 Tab 0    lidocaine-prilocaine (EMLA) topical cream Apply  to affected area.  JARDIANCE 10 mg tablet       diazePAM (VALIUM) 5 mg tablet Take 1 tab PO 1 hour prior to MRI 3 Tab 0    bethanechol (URECHOLINE) 10 mg tablet TAKE 1 TABLET BY MOUTH TWICE DAILY 60 Tab 0    JANUVIA 100 mg tablet TAKE 1 TABLET BY MOUTH DAILY 30 Tab 0    biotin 10,000 mcg cap Take 1 Cap by mouth daily.  fluticasone (FLONASE) 50 mcg/actuation nasal spray 2 Sprays by Both Nostrils route daily as needed for Rhinitis.  lisinopril (PRINIVIL, ZESTRIL) 2.5 mg tablet Take 2.5 mg by mouth nightly.  OTHER,NON-FORMULARY, Take 1 Tab by mouth daily. Vitamin D & E      guar gum (BENEFIBER) packet Take 1 Packet by mouth nightly as needed.  nystatin (MYCOSTATIN) topical cream Apply  to affected area three (3) times daily.  As needed for skin irritation. Use a think layer 30 g 0    triamcinolone acetonide (KENALOG) 0.1 % topical cream Apply  to affected area three (3) times daily. As needed for skin irritation, use thin layer. 30 g 0    ivermectin (SOOLANTRA) 1 % crea by Apply Externally route daily. Indications: ACNE ROSACEA      CALCIUM PO Take 1 Caplet by mouth daily.  DOCOSAHEXANOIC ACID/EPA (FISH OIL PO) Take 1 Cap by mouth daily.  gabapentin (NEURONTIN) 300 mg capsule Take 300 mg by mouth three (3) times daily.  glimepiride (AMARYL) 2 mg tablet Take 2 mg by mouth three (3) times daily.  magnesium oxide (MAG-OX) 400 mg tablet Take 400 mg by mouth daily.  INVOKANA 100 mg tablet Take 1 tablet by mouth  daily before breakfast 90 Tab 1    Alpha Lipoic Acid 600 mg cap Take 1 Cap by mouth two (2) times a day.  L-Mfolate-B6 Phos-Methyl-B12 (METANX) 3-35-2 mg tab tab Take 1 Tab by mouth two (2) times a day. Indications: neuropathy      CHROM GENO/BRINDAL BERRY (GARCINIA CAMBOGIA PO) Take 1 Tab by mouth daily.  pravastatin (PRAVACHOL) 40 mg tablet TAKE 1 TABLET NIGHTLY (NEEDS APPOINTMENT AS SOON AS POSSIBLE FOR FASTING LABS AND APPOINTMENT) 30 Tab 0    TRUETEST TEST STRIPS strip TEST THREE TIMES DAILY 100 Strip 1    CRANBERRY EXTRACT-VIT C PO Take 2 Caps by mouth daily.  cinnamon bark (CINNAMON) 500 mg cap Take 2 Caps by mouth daily.  loratadine (CLARITIN) 10 mg tablet Take 10 mg by mouth nightly. Allergies: Latex; Other food; Betadine [povidone-iodine]; Codeine; Dilaudid [hydromorphone (bulk)]; Hydrocodone; Ditropan [oxybutynin chloride]; Doxycycline; Keflex [cephalexin]; Metformin;  Tape [adhesive]; and Sulfamethoxazole-trimethoprim   Social History     Socioeconomic History    Marital status:      Spouse name: Not on file    Number of children: Not on file    Years of education: Not on file    Highest education level: Not on file   Social Needs    Financial resource strain: Not on file  Food insecurity - worry: Not on file    Food insecurity - inability: Not on file    Transportation needs - medical: Not on file   [employer] needs - non-medical: Not on file   Occupational History    Not on file   Tobacco Use    Smoking status: Never Smoker    Smokeless tobacco: Never Used    Tobacco comment: Never used vapor or e-cigs    Substance and Sexual Activity    Alcohol use: No    Drug use: No    Sexual activity: Not Currently     Partners: Male     Comment:    Other Topics Concern    Not on file   Social History Narrative    Not on file     Tobacco History:  reports that  has never smoked. she has never used smokeless tobacco.  Alcohol Abuse:  reports that she does not drink alcohol. Drug Abuse:  reports that she does not use drugs.   Patient Active Problem List   Diagnosis Code    Diabetes mellitus with neuropathy ([facility] [address] E11.40    Postmenopausal bleeding N95.0    Fibromyalgia M79.7    HTN (hypertension) I10    Breast cancer (HCC) C50.919    Scoliosis M41.9    ACP (advance care planning) Z71.89    Left-sided weakness R53.1    Acute transverse myelitis (HCC) G37.3    Abnormal MRI, cervical spine R93.7       Review of Systems - History obtained from the patient  Constitutional: negative for weight loss, fever, night sweats  HEENT: negative for hearing loss, earache, congestion, snoring, sorethroat  CV: negative for chest pain, palpitations, edema  Resp: negative for cough, shortness of breath, wheezing  GI: negative for change in bowel habits, abdominal pain, black or bloody stools  : negative for frequency, dysuria, hematuria, vaginal discharge  MSK: negative for back pain, joint pain, muscle pain  Breast: negative for breast lumps, nipple discharge, galactorrhea  Skin :negative for itching, rash, hives  Neuro: negative for dizziness, headache, confusion, weakness  Psych: negative for anxiety, depression, change in mood  Heme/lymph: negative for bleeding, bruising, pallor    Physical Exam    Visit Vitals  Ht 5' 8\" (1.727 m)   Wt 175 lb (79.4 kg)   BMI 26.61 kg/m²     Constitutional  · Appearance: well-nourished, well developed, alert, in no acute distress    HENT  · Head and Face: appears normal    Neck  · Inspection/Palpation: normal appearance, no masses or tenderness  · Lymph Nodes: no lymphadenopathy present  · Thyroid: gland size normal, nontender, no nodules or masses present on palpation    Chest  · Respiratory Effort: breathing normal  · Auscultation: normal breath sounds    Cardiovascular  · Heart:  · Auscultation: regular rate and rhythm without murmur    Breasts  · Inspection of Breasts: breasts symmetrical, no skin changes, no discharge present, nipple appearance normal, no skin retraction present  · Palpation of Breasts and Axillae: no masses present on palpation, no breast tenderness  · Axillary Lymph Nodes: no lymphadenopathy present    Gastrointestinal  · Abdominal Examination: abdomen non-tender to palpation, normal bowel sounds, no masses present  · Liver and spleen: no hepatomegaly present, spleen not palpable  · Hernias: no hernias identified    Genitourinary  · External Genitalia: normal appearance for age with atrophy, no discharge present, no tenderness present, no inflammatory lesions present, no masses present  · Vagina:atrophic vaginal vault with pale epithelium and flattening of rugae, large cystocele, no discharge present, no inflammatory lesions present, no masses present  · Bladder: non-tender to palpation  · Urethra: appears normal  · Cervix: normal   · Uterus: normal size, shape and consistency  · Adnexa: no adnexal tenderness present, no adnexal masses present  · Perineum: perineum within normal limits, no evidence of trauma, no rashes or skin lesions present  · Anus: anus within normal limits, no hemorrhoids present  · Inguinal Lymph Nodes: no lymphadenopathy present    Skin  · General Inspection: no rash, no lesions identified    Neurologic/Psychiatric  · Mental Status:  · Orientation: grossly oriented to person, place and time  · Mood and Affect: mood normal, affect appropriate    .   Assessment:  Routine gynecologic examination  Cystocele  Incontinence  Transverse myelitis    Plan:  Counseled re: diet, exercise, healthy lifestyle  Return for yearly wellness visits  Rec annual mammogram  Pap next year  Urine culture

## 2018-11-23 LAB — BACTERIA UR CULT: ABNORMAL

## 2018-11-26 RX ORDER — LEVOFLOXACIN 500 MG/1
500 TABLET, FILM COATED ORAL DAILY
Qty: 10 TAB | Refills: 0 | Status: SHIPPED | OUTPATIENT
Start: 2018-11-26 | End: 2018-12-06

## 2018-11-27 NOTE — PROGRESS NOTES
Yidio message not read yet. Left message on machine for patient to call the office or to respond to the Yidio message sent regarding urine culture results.

## 2018-11-28 NOTE — PROGRESS NOTES
Called and notified patient of lab results, Drs recommendations, and rx sent to pharmacy. She verbalized understanding. Advised to let us know if symptoms don't improve.

## 2018-12-13 ENCOUNTER — TELEPHONE (OUTPATIENT)
Dept: FAMILY MEDICINE CLINIC | Age: 68
End: 2018-12-13

## 2018-12-13 NOTE — TELEPHONE ENCOUNTER
Patient called in and would like to know if you can write a prescription for a back brace or does she need to see her ortho doctor.   Phone [phone]

## 2018-12-13 NOTE — TELEPHONE ENCOUNTER
Called and spoke with patient. Let her know that she would need to contact ortho. Patient verbalized understanding.

## 2018-12-16 RX ORDER — BACLOFEN 10 MG/1
TABLET ORAL
Qty: 30 TAB | Refills: 0 | Status: SHIPPED | OUTPATIENT
Start: 2018-12-16 | End: 2019-01-27 | Stop reason: SDUPTHER

## 2019-01-04 ENCOUNTER — OFFICE VISIT (OUTPATIENT)
Dept: FAMILY MEDICINE CLINIC | Age: 69
End: 2019-01-04

## 2019-01-04 VITALS
DIASTOLIC BLOOD PRESSURE: 82 MMHG | WEIGHT: 174 LBS | TEMPERATURE: 97.9 F | HEART RATE: 67 BPM | BODY MASS INDEX: 27.31 KG/M2 | OXYGEN SATURATION: 98 % | SYSTOLIC BLOOD PRESSURE: 129 MMHG | RESPIRATION RATE: 16 BRPM | HEIGHT: 67 IN

## 2019-01-04 DIAGNOSIS — J20.9 ACUTE BRONCHITIS, UNSPECIFIED ORGANISM: Primary | ICD-10-CM

## 2019-01-04 RX ORDER — METHYLPREDNISOLONE 4 MG/1
TABLET ORAL
Qty: 1 DOSE PACK | Refills: 0 | Status: SHIPPED | OUTPATIENT
Start: 2019-01-04 | End: 2019-01-31 | Stop reason: ALTCHOICE

## 2019-01-04 RX ORDER — ALBUTEROL SULFATE 90 UG/1
2 AEROSOL, METERED RESPIRATORY (INHALATION)
Qty: 1 INHALER | Refills: 3 | Status: SHIPPED | OUTPATIENT
Start: 2019-01-04 | End: 2019-06-18

## 2019-01-04 NOTE — PROGRESS NOTES
Chief Complaint Patient presents with  Follow-up Patient presents in office for f/u from [facility] on 12/29/18. Seen for URI and was prescribed a z-ryann and tessalon perles. Course completed of the z-ryann. States she took some of the tessalon perle and they just made her cough worse. States she has not had any improvement. C/o a dry cough. Not taking any additional OTC medication. No other concerns. 1. Have you been to the ER, urgent care clinic since your last visit? Hospitalized since your last visit? Yes When: 12/29/18 [facility] for URI 2. Have you seen or consulted any other health care providers outside of the [address] [place] [address] since your last visit? Include any pap smears or colon screening. Yes When: 12/2018 Dr. Maria Lin endocrinology Learning Assessment 3/4/2014 PRIMARY LEARNER Patient HIGHEST LEVEL OF EDUCATION - PRIMARY LEARNER  4 YEARS OF COLLEGE  
BARRIERS PRIMARY LEARNER NONE PRIMARY LANGUAGE ENGLISH  
LEARNER PREFERENCE PRIMARY LISTENING  
ANSWERED BY self RELATIONSHIP SELF

## 2019-01-04 NOTE — PATIENT INSTRUCTIONS
Bronchitis: Care Instructions Your Care Instructions Bronchitis is inflammation of the bronchial tubes, which carry air to the lungs. The tubes swell and produce mucus, or phlegm. The mucus and inflamed bronchial tubes make you cough. You may have trouble breathing. Most cases of bronchitis are caused by viruses like those that cause colds. Antibiotics usually do not help and they may be harmful. Bronchitis usually develops rapidly and lasts about 2 to 3 weeks in otherwise healthy people. Follow-up care is a key part of your treatment and safety. Be sure to make and go to all appointments, and call your doctor if you are having problems. It's also a good idea to know your test results and keep a list of the medicines you take. How can you care for yourself at home? · Take all medicines exactly as prescribed. Call your doctor if you think you are having a problem with your medicine. · Get some extra rest. 
· Take an over-the-counter pain medicine, such as acetaminophen (Tylenol), ibuprofen (Advil, Motrin), or naproxen (Aleve) to reduce fever and relieve body aches. Read and follow all instructions on the label. · Do not take two or more pain medicines at the same time unless the doctor told you to. Many pain medicines have acetaminophen, which is Tylenol. Too much acetaminophen (Tylenol) can be harmful. · Take an over-the-counter cough medicine that contains dextromethorphan to help quiet a dry, hacking cough so that you can sleep. Avoid cough medicines that have more than one active ingredient. Read and follow all instructions on the label. · Breathe moist air from a humidifier, hot shower, or sink filled with hot water. The heat and moisture will thin mucus so you can cough it out. · Do not smoke. Smoking can make bronchitis worse. If you need help quitting, talk to your doctor about stop-smoking programs and medicines. These can increase your chances of quitting for good. When should you call for help? Call 911 anytime you think you may need emergency care. For example, call if: 
  · You have severe trouble breathing.  
 Call your doctor now or seek immediate medical care if: 
  · You have new or worse trouble breathing.  
  · You cough up dark brown or bloody mucus (sputum).  
  · You have a new or higher fever.  
  · You have a new rash.  
 Watch closely for changes in your health, and be sure to contact your doctor if: 
  · You cough more deeply or more often, especially if you notice more mucus or a change in the color of your mucus.  
  · You are not getting better as expected. Where can you learn more? Go to http://jorge a-katiana.info/. Enter H333 in the search box to learn more about \"Bronchitis: Care Instructions. \" Current as of: December 6, 2017 Content Version: 11.8 © 7997-6513 Healthwise, [employer]. Care instructions adapted under license by [employer] (which disclaims liability or warranty for this information). If you have questions about a medical condition or this instruction, always ask your healthcare professional. [address] any warranty or liability for your use of this information.

## 2019-01-04 NOTE — PROGRESS NOTES
Marrianne Leyden is a 80 y.o. female Chief Complaint Patient presents with  Follow-up  
 pt states she was dx with a URI at [facility] on 12/29 and states she was given z-pack and feels no better. Pt continues with a cough which is non productive. No fever but some chills. + sinus congestion and ear pain. Pt had a CXR at [facility] and this was normal.  Pt also living in house with mold. she is a 80y.o. year old female who presents for evalution. Reviewed PmHx, RxHx, FmHx, SocHx, AllgHx and updated and dated in the chart. Review of Systems - negative except as listed above in the HPI Objective:  
 
Vitals:  
 01/04/19 6334 BP: 129/82 Pulse: 67 Resp: 16 Temp: 97.9 °F (36.6 °C) TempSrc: Oral  
SpO2: 98% Weight: 174 lb (78.9 kg) Height: 5' 7\" (1.702 m) Current Outpatient Medications Medication Sig  
 albuterol (PROVENTIL HFA, VENTOLIN HFA, PROAIR HFA) 90 mcg/actuation inhaler Take 2 Puffs by inhalation every six (6) hours as needed for Wheezing or Shortness of Breath.  methylPREDNISolone (MEDROL DOSEPACK) 4 mg tablet Take as directed  baclofen (LIORESAL) 10 mg tablet TAKE 1 TABLET BY MOUTH THREE TIMES DAILY AS NEEDED  JARDIANCE 10 mg tablet  bethanechol (URECHOLINE) 10 mg tablet TAKE 1 TABLET BY MOUTH TWICE DAILY  JANUVIA 100 mg tablet TAKE 1 TABLET BY MOUTH DAILY  biotin 10,000 mcg cap Take 1 Cap by mouth daily.  fluticasone (FLONASE) 50 mcg/actuation nasal spray 2 Sprays by Both Nostrils route daily as needed for Rhinitis.  lisinopril (PRINIVIL, ZESTRIL) 2.5 mg tablet Take 2.5 mg by mouth nightly.  OTHER,NON-FORMULARY, Take 1 Tab by mouth daily. Vitamin D & E  
 guar gum (BENEFIBER) packet Take 1 Packet by mouth nightly as needed.  triamcinolone acetonide (KENALOG) 0.1 % topical cream Apply  to affected area three (3) times daily. As needed for skin irritation, use thin layer.  ivermectin (SOOLANTRA) 1 % crea by Apply Externally route daily. Indications: ACNE ROSACEA  CALCIUM PO Take 1 Caplet by mouth daily.  gabapentin (NEURONTIN) 300 mg capsule Take 300 mg by mouth three (3) times daily.  glimepiride (AMARYL) 2 mg tablet Take 2 mg by mouth three (3) times daily.  magnesium oxide (MAG-OX) 400 mg tablet Take 400 mg by mouth daily.  Alpha Lipoic Acid 600 mg cap Take 1 Cap by mouth two (2) times a day.  L-Mfolate-B6 Phos-Methyl-B12 (METANX) 3-35-2 mg tab tab Take 1 Tab by mouth two (2) times a day. Indications: neuropathy  CHROM GENO/BRINDAL BERRY (GARCINIA CAMBOGIA PO) Take 1 Tab by mouth daily.  pravastatin (PRAVACHOL) 40 mg tablet TAKE 1 TABLET NIGHTLY (NEEDS APPOINTMENT AS SOON AS POSSIBLE FOR FASTING LABS AND APPOINTMENT)  TRUETEST TEST STRIPS strip TEST THREE TIMES DAILY  CRANBERRY EXTRACT-VIT C PO Take 2 Caps by mouth daily.  loratadine (CLARITIN) 10 mg tablet Take 10 mg by mouth nightly.  lidocaine-prilocaine (EMLA) topical cream Apply  to affected area.  diazePAM (VALIUM) 5 mg tablet Take 1 tab PO 1 hour prior to MRI  nystatin (MYCOSTATIN) topical cream Apply  to affected area three (3) times daily. As needed for skin irritation. Use a think layer  DOCOSAHEXANOIC ACID/EPA (FISH OIL PO) Take 1 Cap by mouth daily.  INVOKANA 100 mg tablet Take 1 tablet by mouth  daily before breakfast  
 cinnamon bark (CINNAMON) 500 mg cap Take 2 Caps by mouth daily. No current facility-administered medications for this visit. Physical Examination: General appearance - alert, well appearing, and in no distress Ears - bilateral TM's and external ear canals normal 
Nose - mucosal congestion Mouth - mucous membranes moist, pharynx normal without lesions Neck - supple, no significant adenopathy Chest - clear to auscultation, no wheezes, rales or rhonchi, symmetric air entry Heart - normal rate, regular rhythm, normal S1, S2, no murmurs, rubs, clicks or gallops Assessment/ Plan:  
Diagnoses and all orders for this visit: 
 
1. Acute bronchitis, unspecified organism 
-     albuterol (PROVENTIL HFA, VENTOLIN HFA, PROAIR HFA) 90 mcg/actuation inhaler; Take 2 Puffs by inhalation every six (6) hours as needed for Wheezing or Shortness of Breath. -     methylPREDNISolone (MEDROL DOSEPACK) 4 mg tablet; Take as directed d/w pt viral nature of illness and reason why z-pack did not help. No ind for further abx @ this time. Use mucinex and nasal rinses as well and change claritin to [employer] Follow-up Disposition: 
Return if symptoms worsen or fail to improve. I have discussed the diagnosis with the patient and the intended plan as seen in the above orders. The patient has received an after-visit summary and questions were answered concerning future plans. Pt conveyed understanding of plan. Medication Side Effects and Warnings were discussed with patient Woody Carranza, DO

## 2019-01-15 ENCOUNTER — OFFICE VISIT (OUTPATIENT)
Dept: FAMILY MEDICINE CLINIC | Age: 69
End: 2019-01-15

## 2019-01-15 VITALS
TEMPERATURE: 98 F | BODY MASS INDEX: 27.31 KG/M2 | DIASTOLIC BLOOD PRESSURE: 85 MMHG | HEART RATE: 61 BPM | RESPIRATION RATE: 16 BRPM | HEIGHT: 67 IN | SYSTOLIC BLOOD PRESSURE: 153 MMHG | WEIGHT: 174 LBS | OXYGEN SATURATION: 96 %

## 2019-01-15 DIAGNOSIS — K59.04 CHRONIC IDIOPATHIC CONSTIPATION: ICD-10-CM

## 2019-01-15 DIAGNOSIS — J01.00 ACUTE NON-RECURRENT MAXILLARY SINUSITIS: Primary | ICD-10-CM

## 2019-01-15 NOTE — PROGRESS NOTES
Chief Complaint   Patient presents with    Follow-up     Patient presents in office today for f/u from [facility]. Was seen there on Friday and dx'd with acute sinuitis and acute pharyngitis. Prescribed Augmentin BID x10. No other concerns. 1. Have you been to the ER, urgent care clinic since your last visit? Hospitalized since your last visit? Yes When: 1/11/19 at [facility] for cough     2. Have you seen or consulted any other health care providers outside of the [address] [place] [address] since your last visit? Include any pap smears or colon screening.  No    Learning Assessment 3/4/2014   PRIMARY LEARNER Patient   HIGHEST LEVEL OF EDUCATION - PRIMARY LEARNER  4 YEARS OF COLLEGE   BARRIERS PRIMARY LEARNER NONE   PRIMARY LANGUAGE ENGLISH   LEARNER PREFERENCE PRIMARY LISTENING   ANSWERED BY self   RELATIONSHIP SELF

## 2019-01-15 NOTE — PATIENT INSTRUCTIONS
Sinusitis: Care Instructions  Your Care Instructions    Sinusitis is an infection of the lining of the sinus cavities in your head. Sinusitis often follows a cold. It causes pain and pressure in your head and face. In most cases, sinusitis gets better on its own in 1 to 2 weeks. But some mild symptoms may last for several weeks. Sometimes antibiotics are needed. Follow-up care is a key part of your treatment and safety. Be sure to make and go to all appointments, and call your doctor if you are having problems. It's also a good idea to know your test results and keep a list of the medicines you take. How can you care for yourself at home? · Take an over-the-counter pain medicine, such as acetaminophen (Tylenol), ibuprofen (Advil, Motrin), or naproxen (Aleve). Read and follow all instructions on the label. · If the doctor prescribed antibiotics, take them as directed. Do not stop taking them just because you feel better. You need to take the full course of antibiotics. · Be careful when taking over-the-counter cold or flu medicines and Tylenol at the same time. Many of these medicines have acetaminophen, which is Tylenol. Read the labels to make sure that you are not taking more than the recommended dose. Too much acetaminophen (Tylenol) can be harmful. · Breathe warm, moist air from a steamy shower, a hot bath, or a sink filled with hot water. Avoid cold, dry air. Using a humidifier in your home may help. Follow the directions for cleaning the machine. · Use saline (saltwater) nasal washes to help keep your nasal passages open and wash out mucus and bacteria. You can buy saline nose drops at a grocery store or drugstore. Or you can make your own at home by adding 1 teaspoon of salt and 1 teaspoon of baking soda to 2 cups of distilled water. If you make your own, fill a bulb syringe with the solution, insert the tip into your nostril, and squeeze gently. [place] your nose.   · Put a hot, wet towel or a warm gel pack on your face 3 or 4 times a day for 5 to 10 minutes each time. · Try a decongestant nasal spray like oxymetazoline (Afrin). Do not use it for more than 3 days in a row. Using it for more than 3 days can make your congestion worse. When should you call for help? Call your doctor now or seek immediate medical care if:    · You have new or worse swelling or redness in your face or around your eyes.     · You have a new or higher fever.    Watch closely for changes in your health, and be sure to contact your doctor if:    · You have new or worse facial pain.     · The mucus from your nose becomes thicker (like pus) or has new blood in it.     · You are not getting better as expected. Where can you learn more? Go to http://jorge a-katiana.info/. Enter E404 in the search box to learn more about \"Sinusitis: Care Instructions. \"  Current as of: March 28, 2018  Content Version: 11.8  © 5061-1266 Healthwise, Incorporated. Care instructions adapted under license by [employer] (which disclaims liability or warranty for this information). If you have questions about a medical condition or this instruction, always ask your healthcare professional. [place] [address] any warranty or liability for your use of this information.

## 2019-01-15 NOTE — PROGRESS NOTES
Shyla Haney is a 80 y.o. female   Chief Complaint   Patient presents with    Follow-up    pt here for follow up from [facility] with sinusitis and pharyngitis and states was given Augmentin and is starting to Feel better.  + Cough and SOB and cough is non prod. Pt tried robitussin dm for her cough and makes her jittery. Pt has tessalon and states did not help. Pt does not want anything else for the cough. Pt needs Rx for 10 days of linzess to cover until she gets her mail order supply. Normally gets from [facility]. Pt spends over $200 a month and will look into amitiza as well.    she is a 80y.o. year old female who presents for evalution. Reviewed PmHx, RxHx, FmHx, SocHx, AllgHx and updated and dated in the chart. Review of Systems - negative except as listed above in the HPI    Objective:     Vitals:    01/15/19 1136   BP: 153/85   Pulse: 61   Resp: 16   Temp: 98 °F (36.7 °C)   TempSrc: Oral   SpO2: 96%   Weight: 174 lb (78.9 kg)   Height: 5' 7\" (1.702 m)       Current Outpatient Medications   Medication Sig    linaclotide (LINZESS) 145 mcg cap capsule Take 1 Cap by mouth Daily (before breakfast). To cover until mail order arrives    albuterol (PROVENTIL HFA, VENTOLIN HFA, PROAIR HFA) 90 mcg/actuation inhaler Take 2 Puffs by inhalation every six (6) hours as needed for Wheezing or Shortness of Breath.  baclofen (LIORESAL) 10 mg tablet TAKE 1 TABLET BY MOUTH THREE TIMES DAILY AS NEEDED    lidocaine-prilocaine (EMLA) topical cream Apply  to affected area.  JARDIANCE 10 mg tablet     diazePAM (VALIUM) 5 mg tablet Take 1 tab PO 1 hour prior to MRI    bethanechol (URECHOLINE) 10 mg tablet TAKE 1 TABLET BY MOUTH TWICE DAILY    JANUVIA 100 mg tablet TAKE 1 TABLET BY MOUTH DAILY    biotin 10,000 mcg cap Take 1 Cap by mouth daily.  fluticasone (FLONASE) 50 mcg/actuation nasal spray 2 Sprays by Both Nostrils route daily as needed for Rhinitis.     lisinopril (PRINIVIL, ZESTRIL) 2.5 mg tablet Take 2.5 mg by mouth nightly.  OTHER,NON-FORMULARY, Take 1 Tab by mouth daily. Vitamin D & E    guar gum (BENEFIBER) packet Take 1 Packet by mouth nightly as needed.  nystatin (MYCOSTATIN) topical cream Apply  to affected area three (3) times daily. As needed for skin irritation. Use a think layer    triamcinolone acetonide (KENALOG) 0.1 % topical cream Apply  to affected area three (3) times daily. As needed for skin irritation, use thin layer.  ivermectin (SOOLANTRA) 1 % crea by Apply Externally route daily. Indications: ACNE ROSACEA    CALCIUM PO Take 1 Caplet by mouth daily.  DOCOSAHEXANOIC ACID/EPA (FISH OIL PO) Take 1 Cap by mouth daily.  gabapentin (NEURONTIN) 300 mg capsule Take 300 mg by mouth three (3) times daily.  glimepiride (AMARYL) 2 mg tablet Take 2 mg by mouth three (3) times daily.  magnesium oxide (MAG-OX) 400 mg tablet Take 400 mg by mouth daily.  INVOKANA 100 mg tablet Take 1 tablet by mouth  daily before breakfast    Alpha Lipoic Acid 600 mg cap Take 1 Cap by mouth two (2) times a day.  L-Mfolate-B6 Phos-Methyl-B12 (METANX) 3-35-2 mg tab tab Take 1 Tab by mouth two (2) times a day. Indications: neuropathy    CHROM GENO/BRINDAL BERRY (GARCINIA CAMBOGIA PO) Take 1 Tab by mouth daily.  pravastatin (PRAVACHOL) 40 mg tablet TAKE 1 TABLET NIGHTLY (NEEDS APPOINTMENT AS SOON AS POSSIBLE FOR FASTING LABS AND APPOINTMENT)    TRUETEST TEST STRIPS strip TEST THREE TIMES DAILY    CRANBERRY EXTRACT-VIT C PO Take 2 Caps by mouth daily.  cinnamon bark (CINNAMON) 500 mg cap Take 2 Caps by mouth daily.  loratadine (CLARITIN) 10 mg tablet Take 10 mg by mouth nightly.  methylPREDNISolone (MEDROL DOSEPACK) 4 mg tablet Take as directed     No current facility-administered medications for this visit.         Physical Examination: General appearance - alert, well appearing, and in no distress  Eyes - pupils equal and reactive, extraocular eye movements intact  Ears - bilateral TM's and external ear canals normal  Nose - mucosal congestion, mucosal erythema and sinus tenderness noted frontal  Mouth - mucous membranes moist, pharynx normal without lesions  Neck - supple, no significant adenopathy  Chest - clear to auscultation, no wheezes, rales or rhonchi, symmetric air entry  Heart - normal rate, regular rhythm, normal S1, S2, no murmurs, rubs, clicks or gallops      Assessment/ Plan:   Diagnoses and all orders for this visit:    1. Acute non-recurrent maxillary sinusitis    2. Chronic idiopathic constipation  -     linaclotide (LINZESS) 145 mcg cap capsule; Take 1 Cap by mouth Daily (before breakfast). To cover until mail order arrives     finish augmentin  Follow-up Disposition:  Return if symptoms worsen or fail to improve. I have discussed the diagnosis with the patient and the intended plan as seen in the above orders. The patient has received an after-visit summary and questions were answered concerning future plans. Pt conveyed understanding of plan.     Medication Side Effects and Warnings were discussed with patient      Abisai Oseguera DO

## 2019-01-16 DIAGNOSIS — K59.04 CHRONIC IDIOPATHIC CONSTIPATION: ICD-10-CM

## 2019-01-27 RX ORDER — BACLOFEN 10 MG/1
TABLET ORAL
Qty: 30 TAB | Refills: 0 | Status: SHIPPED | OUTPATIENT
Start: 2019-01-27 | End: 2019-02-07 | Stop reason: SDUPTHER

## 2019-01-31 ENCOUNTER — OFFICE VISIT (OUTPATIENT)
Dept: FAMILY MEDICINE CLINIC | Age: 69
End: 2019-01-31

## 2019-01-31 VITALS
OXYGEN SATURATION: 100 % | WEIGHT: 174.2 LBS | BODY MASS INDEX: 27.34 KG/M2 | TEMPERATURE: 97.6 F | DIASTOLIC BLOOD PRESSURE: 76 MMHG | HEIGHT: 67 IN | RESPIRATION RATE: 20 BRPM | SYSTOLIC BLOOD PRESSURE: 134 MMHG | HEART RATE: 65 BPM

## 2019-01-31 DIAGNOSIS — J01.01 ACUTE RECURRENT MAXILLARY SINUSITIS: Primary | ICD-10-CM

## 2019-01-31 RX ORDER — FEXOFENADINE HCL AND PSEUDOEPHEDRINE HCI 180; 240 MG/1; MG/1
1 TABLET, EXTENDED RELEASE ORAL DAILY
COMMUNITY
End: 2019-06-18

## 2019-01-31 RX ORDER — CEFDINIR 300 MG/1
300 CAPSULE ORAL 2 TIMES DAILY
Qty: 20 CAP | Refills: 0 | Status: SHIPPED | OUTPATIENT
Start: 2019-01-31 | End: 2019-02-10

## 2019-02-07 RX ORDER — BACLOFEN 10 MG/1
TABLET ORAL
Qty: 30 TAB | Refills: 0 | Status: SHIPPED | OUTPATIENT
Start: 2019-02-07 | End: 2019-06-18

## 2019-02-08 ENCOUNTER — TELEPHONE (OUTPATIENT)
Dept: FAMILY MEDICINE CLINIC | Age: 69
End: 2019-02-08

## 2019-02-08 NOTE — TELEPHONE ENCOUNTER
Called and provided patient with the information for Dr. Vida Lockwood. Patient states that she will call him and set up an appointment.

## 2019-02-08 NOTE — TELEPHONE ENCOUNTER
Pt calling and states that she still has sinus problem with headache for over a month now. She has been on several medications with no relief. Wants to know if she can get an anti-fungal medication thinking it might be mold in her house. Please call her back at [phone].

## 2019-02-26 ENCOUNTER — OFFICE VISIT (OUTPATIENT)
Dept: FAMILY MEDICINE CLINIC | Age: 69
End: 2019-02-26

## 2019-02-26 ENCOUNTER — HOSPITAL ENCOUNTER (OUTPATIENT)
Dept: LAB | Age: 69
Discharge: HOME | End: 2019-02-26
Payer: MEDICARE

## 2019-02-26 VITALS
WEIGHT: 174 LBS | OXYGEN SATURATION: 94 % | DIASTOLIC BLOOD PRESSURE: 79 MMHG | TEMPERATURE: 98.6 F | RESPIRATION RATE: 16 BRPM | HEIGHT: 67 IN | BODY MASS INDEX: 27.31 KG/M2 | SYSTOLIC BLOOD PRESSURE: 113 MMHG | HEART RATE: 80 BPM

## 2019-02-26 DIAGNOSIS — F41.8 DEPRESSION WITH ANXIETY: ICD-10-CM

## 2019-02-26 DIAGNOSIS — R41.3 MEMORY LOSS: Primary | ICD-10-CM

## 2019-02-26 PROCEDURE — 82607 VITAMIN B-12: CPT

## 2019-02-26 PROCEDURE — 80053 COMPREHEN METABOLIC PANEL: CPT

## 2019-02-26 NOTE — PATIENT INSTRUCTIONS
A Healthy Lifestyle: Care Instructions Your Care Instructions A healthy lifestyle can help you feel good, stay at a healthy weight, and have plenty of energy for both work and play. A healthy lifestyle is something you can share with your whole family. A healthy lifestyle also can lower your risk for serious health problems, such as high blood pressure, heart disease, and diabetes. You can follow a few steps listed below to improve your health and the health of your family. Follow-up care is a key part of your treatment and safety. Be sure to make and go to all appointments, and call your doctor if you are having problems. It's also a good idea to know your test results and keep a list of the medicines you take. How can you care for yourself at home? · Do not eat too much sugar, fat, or fast foods. You can still have dessert and treats now and then. The goal is moderation. · Start small to improve your eating habits. Pay attention to portion sizes, drink less juice and soda pop, and eat more fruits and vegetables. ? Eat a healthy amount of food. A 3-ounce serving of meat, for example, is about the size of a deck of cards. Fill the rest of your plate with vegetables and whole grains. ? Limit the amount of soda and sports drinks you have every day. Drink more water when you are thirsty. ? Eat at least 5 servings of fruits and vegetables every day. It may seem like a lot, but it is not hard to reach this goal. A serving or helping is 1 piece of fruit, 1 cup of vegetables, or 2 cups of leafy, raw vegetables. Have an apple or some carrot sticks as an afternoon snack instead of a candy bar. Try to have fruits and/or vegetables at every meal. 
· Make exercise part of your daily routine. You may want to start with simple activities, such as walking, bicycling, or slow swimming. Try to be active 30 to 60 minutes every day.  You do not need to do all 30 to 60 minutes all at once. For example, you can exercise 3 times a day for 10 or 20 minutes. Moderate exercise is safe for most people, but it is always a good idea to talk to your doctor before starting an exercise program. 
· Keep moving. [employer] the lawn, work in the garden, or [employer]. Take the stairs instead of the elevator at work. · If you smoke, quit. People who smoke have an increased risk for heart attack, stroke, cancer, and other lung illnesses. Quitting is hard, but there are ways to boost your chance of quitting tobacco for good. ? Use nicotine gum, patches, or lozenges. ? Ask your doctor about stop-smoking programs and medicines. ? Keep trying. In addition to reducing your risk of diseases in the future, you will notice some benefits soon after you stop using tobacco. If you have shortness of breath or asthma symptoms, they will likely get better within a few weeks after you quit. · Limit how much alcohol you drink. Moderate amounts of alcohol (up to 2 drinks a day for men, 1 drink a day for women) are okay. But drinking too much can lead to liver problems, high blood pressure, and other health problems. Family health If you have a family, there are many things you can do together to improve your health. · Eat meals together as a family as often as possible. · Eat healthy foods. This includes fruits, vegetables, lean meats and dairy, and whole grains. · Include your family in your fitness plan. Most people think of activities such as jogging or tennis as the way to fitness, but there are many ways you and your family can be more active. Anything that makes you breathe hard and gets your heart pumping is exercise. Here are some tips: 
? Walk to do errands or to take your child to school or the bus. 
? Go for a family bike ride after dinner instead of watching TV. Where can you learn more? Go to http://jorge a-katiana.info/. Enter M034 in the search box to learn more about \"A Healthy Lifestyle: Care Instructions. \" Current as of: September 11, 2018 Content Version: 11.9 © 7511-5364 [employer]. Care instructions adapted under license by [employer] (which disclaims liability or warranty for this information). If you have questions about a medical condition or this instruction, always ask your healthcare professional. [place] [address] any warranty or liability for your use of this information.

## 2019-02-26 NOTE — PROGRESS NOTES
Shyla Haney is a 77 y.o. female Chief Complaint Patient presents with  Memory Loss  
 pt here with  and states they went to a dinner and movie and wanted a hamburger then said no and wanted a mac and cheese bowl. When the food came out pt stated she did not order that but  states she did. Told him she ordered a sandwich but did not have a sandwich on the menu. 1 day later watching red carpet for [employer] and mentioned something about it. Then 45 min later brought up the same thing and forgot they had discussed this. Friday at [employer] made a scene arguing with the  and then a patron asked her to calm down and she yelled at him to get out of her face, states this is very out of character for her.  states the memory issues have worsened over the past 2 years and pt states that things do not stick in her memory. States more of an issue with short term memory vs short term. Pt reports it worsened since she got transverse myelitis. Pt states she writes everything in a calendar and unless she is looking at she can't remember and will give a false answer to her . Pt does report feeling depressed and some anxiety at times as well . States she has mentioned this to other clinicians and feels like it has been ignored. I discussed with her that mood disorders can also lead to memory issues which can be treated by treating the underlying mood disorder, however, pt is not interested in taking anything for this. she is a 77y.o. year old female who presents for evalution. Reviewed PmHx, RxHx, FmHx, SocHx, AllgHx and updated and dated in the chart. Review of Systems - negative except as listed above in the HPI Objective:  
 
Vitals:  
 02/26/19 1511 BP: 113/79 Pulse: 80 Resp: 16 Temp: 98.6 °F (37 °C) TempSrc: Oral  
SpO2: 94% Weight: 174 lb (78.9 kg) Height: 5' 7\" (1.702 m) Current Outpatient Medications Medication Sig  
  baclofen (LIORESAL) 10 mg tablet TAKE 1 TABLET BY MOUTH THREE TIMES DAILY AS NEEDED  
 fexofenadine-pseudoephedrine (ALLEGRA-D 24 HOUR) 180-240 mg per tablet Take 1 Tab by mouth daily.  linaclotide (LINZESS) 145 mcg cap capsule Take 1 Cap by mouth Daily (before breakfast). To cover until mail order arrives  JARDIANCE 10 mg tablet  bethanechol (URECHOLINE) 10 mg tablet TAKE 1 TABLET BY MOUTH TWICE DAILY  fluticasone (FLONASE) 50 mcg/actuation nasal spray 2 Sprays by Both Nostrils route daily as needed for Rhinitis.  lisinopril (PRINIVIL, ZESTRIL) 2.5 mg tablet Take 2.5 mg by mouth nightly.  guar gum (BENEFIBER) packet Take 1 Packet by mouth nightly as needed.  nystatin (MYCOSTATIN) topical cream Apply  to affected area three (3) times daily. As needed for skin irritation. Use a think layer  ivermectin (SOOLANTRA) 1 % crea by Apply Externally route daily. Indications: ACNE ROSACEA  CALCIUM PO Take 1 Caplet by mouth daily.  gabapentin (NEURONTIN) 300 mg capsule Take 600 mg by mouth three (3) times daily.  glimepiride (AMARYL) 2 mg tablet Take 2 mg by mouth three (3) times daily.  magnesium oxide (MAG-OX) 400 mg tablet Take 400 mg by mouth daily.  Alpha Lipoic Acid 600 mg cap Take 1 Cap by mouth two (2) times a day.  L-Mfolate-B6 Phos-Methyl-B12 (METANX) 3-35-2 mg tab tab Take 1 Tab by mouth two (2) times a day. Indications: neuropathy  CHROM GENO/BRINDAL BERRY (GARCINIA CAMBOGIA PO) Take 1 Tab by mouth daily.  pravastatin (PRAVACHOL) 40 mg tablet TAKE 1 TABLET NIGHTLY (NEEDS APPOINTMENT AS SOON AS POSSIBLE FOR FASTING LABS AND APPOINTMENT)  TRUETEST TEST STRIPS strip TEST THREE TIMES DAILY  CRANBERRY EXTRACT-VIT C PO Take 2 Caps by mouth daily.  albuterol (PROVENTIL HFA, VENTOLIN HFA, PROAIR HFA) 90 mcg/actuation inhaler Take 2 Puffs by inhalation every six (6) hours as needed for Wheezing or Shortness of Breath.  lidocaine-prilocaine (EMLA) topical cream Apply  to affected area.  diazePAM (VALIUM) 5 mg tablet Take 1 tab PO 1 hour prior to MRI (Patient not taking: Reported on 1/31/2019)  JANUVIA 100 mg tablet TAKE 1 TABLET BY MOUTH DAILY (Patient not taking: Reported on 1/31/2019)  biotin 10,000 mcg cap Take 1 Cap by mouth daily.  OTHER,NON-FORMULARY, Take 1 Tab by mouth daily. Vitamin D & E  
 triamcinolone acetonide (KENALOG) 0.1 % topical cream Apply  to affected area three (3) times daily. As needed for skin irritation, use thin layer. (Patient not taking: Reported on 1/31/2019)  DOCOSAHEXANOIC ACID/EPA (FISH OIL PO) Take 1 Cap by mouth daily.  INVOKANA 100 mg tablet Take 1 tablet by mouth  daily before breakfast (Patient not taking: Reported on 1/31/2019)  cinnamon bark (CINNAMON) 500 mg cap Take 2 Caps by mouth daily.  loratadine (CLARITIN) 10 mg tablet Take 10 mg by mouth nightly. No current facility-administered medications for this visit. Physical Examination: General appearance - alert, well appearing, and in no distress Mental status - alert, oriented to person, place, and time, mini cog normal 
Eyes PERRL Chest - clear to auscultation, no wheezes, rales or rhonchi, symmetric air entry Heart - normal rate, regular rhythm, normal S1, S2, no murmurs, rubs, clicks or gallops Assessment/ Plan:  
Diagnoses and all orders for this visit: 
 
1. Memory loss -     VITAMIN B12 & FOLATE 
-     METABOLIC PANEL, COMPREHENSIVE 
-     REFERRAL TO NEUROLOGY 2. Depression with anxiety Pt declined treatment Follow-up Disposition: 
Return if symptoms worsen or fail to improve. I have discussed the diagnosis with the patient and the intended plan as seen in the above orders. The patient has received an after-visit summary and questions were answered concerning future plans. Pt conveyed understanding of plan. Medication Side Effects and Warnings were discussed with patient [address] [facility], DO Over 50% of the 25 minutes face to face with Marvel Galvez consisted of counseling and/or discussing treatment plans in reference to her memory.

## 2019-02-26 NOTE — PROGRESS NOTES
Chief Complaint Patient presents with  Memory Loss Patient presents in office today with c/o memory loss getting worse over the last 2 months.  states that she has had several episodes over the weekend. States she went out to eat and placed her order and when her meal came out she said that was not what she ordered. She states that she ordered an item that wasn't even on their menu.  states that she was at the bank on Friday and made a scene in the bank and she told the manager to get out of her face when asked to leave. No other concerns. 1. Have you been to the ER, urgent care clinic since your last visit? Hospitalized since your last visit? No  
 
2. Have you seen or consulted any other health care providers outside of the [address] [place] [address] since your last visit? Include any pap smears or colon screening. No 
 
Learning Assessment 3/4/2014 PRIMARY LEARNER Patient HIGHEST LEVEL OF EDUCATION - PRIMARY LEARNER  4 YEARS OF COLLEGE  
BARRIERS PRIMARY LEARNER NONE PRIMARY LANGUAGE ENGLISH  
LEARNER PREFERENCE PRIMARY LISTENING  
ANSWERED BY self RELATIONSHIP SELF

## 2019-02-27 LAB
ALBUMIN SERPL-MCNC: 4.1 G/DL (ref 3.6–4.8)
ALBUMIN/GLOB SERPL: 1.7 {RATIO} (ref 1.2–2.2)
ALP SERPL-CCNC: 110 IU/L (ref 39–117)
ALT SERPL-CCNC: 25 IU/L (ref 0–32)
AST SERPL-CCNC: 27 IU/L (ref 0–40)
BILIRUB SERPL-MCNC: 0.3 MG/DL (ref 0–1.2)
BUN SERPL-MCNC: 17 MG/DL (ref 8–27)
BUN/CREAT SERPL: 32 (ref 12–28)
CALCIUM SERPL-MCNC: 9.1 MG/DL (ref 8.7–10.3)
CHLORIDE SERPL-SCNC: 104 MMOL/L (ref 96–106)
CO2 SERPL-SCNC: 23 MMOL/L (ref 20–29)
CREAT SERPL-MCNC: 0.53 MG/DL (ref 0.57–1)
FOLATE SERPL-MCNC: >20 NG/ML
GLOBULIN SER CALC-MCNC: 2.4 G/DL (ref 1.5–4.5)
GLUCOSE SERPL-MCNC: 62 MG/DL (ref 65–99)
POTASSIUM SERPL-SCNC: 4.1 MMOL/L (ref 3.5–5.2)
PROT SERPL-MCNC: 6.5 G/DL (ref 6–8.5)
SODIUM SERPL-SCNC: 142 MMOL/L (ref 134–144)
VIT B12 SERPL-MCNC: >2000 PG/ML (ref 232–1245)

## 2019-02-27 NOTE — PROGRESS NOTES
Is pt seeing endo for her diabetes? If not she needs to f/u here for labs and urine test for diabetes.   Otherwise labs from yesterday came back fine except sugar was a touch low

## 2019-02-27 NOTE — PROGRESS NOTES
Patient id x 3, notified of labs and verbalized understanding. States that she saw her endo ( Dr. Kelin Rogers) this am. Also has an appt with Dr. Salvador Lam next week.

## 2019-02-28 ENCOUNTER — TELEPHONE (OUTPATIENT)
Dept: FAMILY MEDICINE CLINIC | Age: 69
End: 2019-02-28

## 2019-02-28 RX ORDER — AMOXICILLIN AND CLAVULANATE POTASSIUM 875; 125 MG/1; MG/1
1 TABLET, FILM COATED ORAL EVERY 12 HOURS
Qty: 14 TAB | Refills: 0 | Status: SHIPPED | OUTPATIENT
Start: 2019-02-28 | End: 2019-03-07

## 2019-02-28 NOTE — TELEPHONE ENCOUNTER
7 days of augmentin sent in.   Lashae Berry is a 1x courtesy and in the future will need to be seen to discuss need for abx

## 2019-02-28 NOTE — TELEPHONE ENCOUNTER
Patient called stated thinks she may have a sinus infection and is wishing you to call something in. Stated she was seen on 2-26-19 for other issues. Explained that needed to be seen in order for medication to be given.    She has an appt with Kelly Khanna on Monday     She may be called at [phone]

## 2019-03-05 ENCOUNTER — TELEPHONE (OUTPATIENT)
Dept: OBGYN CLINIC | Age: 69
End: 2019-03-05

## 2019-03-05 DIAGNOSIS — Z90.10 HISTORY OF MASTECTOMY, UNSPECIFIED LATERALITY: ICD-10-CM

## 2019-03-05 DIAGNOSIS — Z90.10 HISTORY OF MASTECTOMY, UNSPECIFIED LATERALITY: Primary | ICD-10-CM

## 2019-03-05 NOTE — TELEPHONE ENCOUNTER
Call received at 637am    77year old patient last seen in the office on 11/20/18      Patient calling to get a prescription for 6 mastectomy bras and 1 breast prothesis. Patient reports she can get every 2 years. Patient would like the order faxed to luis valenzuela at [phone]        Order placed as per MD order , printed and signed by MD, work in Dr. Nehemiah Mirza to patient provided fax number .     Confirmation recieved

## 2019-03-05 NOTE — TELEPHONE ENCOUNTER
New encounter to order under work in MD, [facility]. Patient advised that orders have been placed and fax confirmation received    Patient verbalized understanding.

## 2019-03-06 ENCOUNTER — OFFICE VISIT (OUTPATIENT)
Dept: NEUROLOGY | Age: 69
End: 2019-03-06

## 2019-03-06 DIAGNOSIS — I65.23 BILATERAL CAROTID ARTERY OCCLUSION: ICD-10-CM

## 2019-03-06 DIAGNOSIS — F90.0 ATTENTION DEFICIT HYPERACTIVITY DISORDER (ADHD), INATTENTIVE TYPE, MILD: Chronic | ICD-10-CM

## 2019-03-06 DIAGNOSIS — G37.3 MYELITIS, ACUTE TRANSVERSE (HCC): ICD-10-CM

## 2019-03-06 DIAGNOSIS — G31.84 MILD COGNITIVE IMPAIRMENT: Primary | ICD-10-CM

## 2019-03-06 DIAGNOSIS — F43.23 ADJUSTMENT REACTION WITH ANXIETY AND DEPRESSION: ICD-10-CM

## 2019-03-06 DIAGNOSIS — R41.3 SHORT-TERM MEMORY LOSS: ICD-10-CM

## 2019-03-06 DIAGNOSIS — F45.42 PAIN DISORDER ASSOCIATED WITH PSYCHOLOGICAL AND PHYSICAL FACTORS: ICD-10-CM

## 2019-03-06 DIAGNOSIS — R47.89 WORD FINDING DIFFICULTY: ICD-10-CM

## 2019-03-06 NOTE — PROGRESS NOTES
[address] [facility],[address]  [address]   [place] [address] [place] [address] [place] [address]   [phone] Office   [phone] Fax      Neuropsychology    Initial Diagnostic Interview Note      Referral:  Tian Said, DO Dalia Ghosh is a 77 y.o. left handed   female who was accompanied by her spouse to the initial clinical interview on 3/6/19 . Please refer to her medical records for details pertaining to her history. Briefly, the patient reported that she completed college without history of previously diagnosed LD and/or receipt of special education services. 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 She has progressive decline in short term memory. She had problems before April of last year. She had 8 hour mastectomy/reconstruction in 1996 and started memory problems then with more recent decline over the past six months. Back surgery in 2016. She has memory decline and is very concerned about it. Another example would be forgetting what she ordered at restaurant and the food came and she had not recalled having ordered/changed that order. No sandwich on menu but said she ordered one. Two weekends ago had some major cognitive deficits. Friday had to wait 22 minutes at the bank and she broke into a meeting with a cris and she apparently engaged him in an argument and somebody told her to cool off. She told that cris to get out of her face. This was totally unlike her. Memory problems worse the past two years. t reports it worsened since she got transverse myelitis. Pt states she writes everything in a calendar and unless she is looking at she can't remember and will give a false answer to her . She does get depressed and anxious and has talked to previous clinicians about it to no avail.   No counseling or psychiatrist.   Grazyna Shah discussion on Ion's night and they repeated that conversation 45 minutes later. No acute or focal issues. No changes in sense of taste or smell. Sleep and appetite unchanged. She has some OB issues and sees Dr. Dionicio Garduno and Dr. Liza Skelton. She is not on any medication for mood. She finds all of this very, very frustrating. Just dealing with the physical issues associated with TM is a struggle with her. She lost her mother and MIL within 8 months of each other in [id number]. She has noticed word finding problems, difficulties processing information. She has very sensitive taste, has to have steak at restaurants rinsed and cooked on foil. Driving is generally okay, but struggles with focusing on directions. She writes lists for medications/finances. She can manage her ADLs. She does use a walker. No known family history of dementia. She has not fallen lately. She has seen PT. She has problems with incontinence. Saw Dr. Gurvinder Obando and neurostimulator placed in hip last March. MRI from 2017  FINDINGS:     There are a few tiny foci of high signal in the deep white matter, likely within  normal limits given the patient's age.     No evidence for acute infarct. No foci of restricted diffusion.     No abnormal contrast enhancement.     IMPRESSION  IMPRESSION:  1. No acute abnormality. 2. Scattered tiny foci of high signal in the deep white matter may represent  chronic ischemic change and likely within normal limits for the patient's age      No previous neuropsych.      Neuropsychological Mental Status Exam (NMSE):  Historian: Good  Praxis: No UE apraxia  R/L Orientation: Intact to self and to other  Dress: within normal limits   Weight: within normal limits   Appearance/Hygiene: within normal limits   Gait: within normal limits   Assistive Devices Walker, Glasses  Mood: within normal limits   Affect: within normal limits   Comprehension: within normal limits   Thought Process: within normal limits   Expressive Language: within normal limits   Receptive Language: within normal limits   Motor:  No cognitive or motor perseveration  ETOH: Denied  Tobacco: Denied  Illicit: Denied  SI/HI: Denied  Psychosis: Denied  Insight: Within normal limits  Judgment: Within normal limits  Other Psych:      Past Medical History:   Diagnosis Date    Abnormal MRI, cervical spine 4/3/2017    Abnormal pap 3/2015; 5/2016 2015 Neg pap +HPV; 2016 ASCUS +HPV    Acute transverse myelitis (HCC)     Arthritis     osteo-tests for RA were neg    Breast cancer ([facility] [address] 1996    right    Chronic pain     Diabetes (HCC)     Fibromyalgia     Ganglion cyst of wrist     LEFT    Hypercholesterolemia     Hypertension     Memory loss     Per pt.  Murmur, cardiac     Neuropathy     Rosacea     Unspecified adverse effect of anesthesia     \"SLOW TO WAKE UP, SENSITIVE TO ANESTHESIA, DO NOT COME AROUND FOR 2-3 DAYS\"        Past Surgical History:   Procedure Laterality Date    HX CATARACT REMOVAL Bilateral 2014    HX CERVICAL FUSION      HX COLPOSCOPY  5/2016    Neg path    HX MASTECTOMY  12/96    tram flap    HX ORTHOPAEDIC Left 1990's    foot surgery    HX TONSILLECTOMY      HX VASCULAR ACCESS  1997    portacath left chest-removed after chemo    RECONSTR NOSE  11/2013, 2005    HOLE IN SEPTUM       Allergies   Allergen Reactions    Latex Other (comments)     Patient states it burns around her mouth.     Other Food Other (comments)     Yogurt - stomach cramping    Betadine [Povidone-Iodine] Itching     Pt states this causes \"extreme\" itching    Codeine Anaphylaxis, Rash, Nausea Only and Other (comments)     HEADACHE  Tolerates tramadol    Dilaudid [Hydromorphone (Bulk)] Anaphylaxis, Itching, Nausea Only and Other (comments)     HALLUCINATIONS  Tolerates tramadol      Hydrocodone Anaphylaxis, Rash, Nausea Only and Vertigo     Facial - eyelid swelling-had to go to ER for reaction, HALLUCINATIONS  Tolerates tramadol      Ditropan [Oxybutynin Chloride] Swelling  Doxycycline Rash     PUSTULAR RASH- TOLERATING TETRACYCLINE    Keflex [Cephalexin] Nausea and Vomiting     Pain in abdomen AND FLU-LIKE SX      Metformin Nausea and Vomiting     Severe abdominal pain      Tape [Adhesive] Other (comments)     Redness/inflammed. Can only use paper tape. CAN USE BAND-AIDS. TOLERATES SURGICAL TAPE USED IN [place] [facility].  Trulicity [Dulaglutide] Other (comments)     Abdominal pain     Sulfamethoxazole-Trimethoprim Other (comments)     Cramping/flu-like symptoms       Family History   Problem Relation Age of Onset    Heart Disease Mother     Hypertension Mother     COPD Mother     Diabetes Father     Other Son         INOPERABLE BRAIN TUMOR    Gout Son     Celiac Disease Son     Anesth Problems Neg Hx        Social History     Tobacco Use    Smoking status: Never Smoker    Smokeless tobacco: Never Used    Tobacco comment: Never used vapor or e-cigs    Substance Use Topics    Alcohol use: No    Drug use: No       Current Outpatient Medications   Medication Sig Dispense Refill    SURGICAL BRA misc Mastectomy bra . Wear as needed as directed 1 Each 5    MASTECTOMY PROSTHESIS misc One breast prosthesis. Use as directed as needed 1 Each 0    amoxicillin-clavulanate (AUGMENTIN) 875-125 mg per tablet Take 1 Tab by mouth every twelve (12) hours for 7 days. 14 Tab 0    baclofen (LIORESAL) 10 mg tablet TAKE 1 TABLET BY MOUTH THREE TIMES DAILY AS NEEDED 30 Tab 0    fexofenadine-pseudoephedrine (ALLEGRA-D 24 HOUR) 180-240 mg per tablet Take 1 Tab by mouth daily.  linaclotide (LINZESS) 145 mcg cap capsule Take 1 Cap by mouth Daily (before breakfast). To cover until mail order arrives 30 Cap 2    albuterol (PROVENTIL HFA, VENTOLIN HFA, PROAIR HFA) 90 mcg/actuation inhaler Take 2 Puffs by inhalation every six (6) hours as needed for Wheezing or Shortness of Breath. 1 Inhaler 3    lidocaine-prilocaine (EMLA) topical cream Apply  to affected area.       JARDIANCE 10 mg tablet       diazePAM (VALIUM) 5 mg tablet Take 1 tab PO 1 hour prior to MRI (Patient not taking: Reported on 1/31/2019) 3 Tab 0    bethanechol (URECHOLINE) 10 mg tablet TAKE 1 TABLET BY MOUTH TWICE DAILY 60 Tab 0    JANUVIA 100 mg tablet TAKE 1 TABLET BY MOUTH DAILY (Patient not taking: Reported on 1/31/2019) 30 Tab 0    biotin 10,000 mcg cap Take 1 Cap by mouth daily.  fluticasone (FLONASE) 50 mcg/actuation nasal spray 2 Sprays by Both Nostrils route daily as needed for Rhinitis.  lisinopril (PRINIVIL, ZESTRIL) 2.5 mg tablet Take 2.5 mg by mouth nightly.  OTHER,NON-FORMULARY, Take 1 Tab by mouth daily. Vitamin D & E      guar gum (BENEFIBER) packet Take 1 Packet by mouth nightly as needed.  nystatin (MYCOSTATIN) topical cream Apply  to affected area three (3) times daily. As needed for skin irritation. Use a think layer 30 g 0    triamcinolone acetonide (KENALOG) 0.1 % topical cream Apply  to affected area three (3) times daily. As needed for skin irritation, use thin layer. (Patient not taking: Reported on 1/31/2019) 30 g 0    ivermectin (SOOLANTRA) 1 % crea by Apply Externally route daily. Indications: ACNE ROSACEA      CALCIUM PO Take 1 Caplet by mouth daily.  DOCOSAHEXANOIC ACID/EPA (FISH OIL PO) Take 1 Cap by mouth daily.  gabapentin (NEURONTIN) 300 mg capsule Take 600 mg by mouth three (3) times daily.  glimepiride (AMARYL) 2 mg tablet Take 2 mg by mouth three (3) times daily.  magnesium oxide (MAG-OX) 400 mg tablet Take 400 mg by mouth daily.  INVOKANA 100 mg tablet Take 1 tablet by mouth  daily before breakfast (Patient not taking: Reported on 1/31/2019) 90 Tab 1    Alpha Lipoic Acid 600 mg cap Take 1 Cap by mouth two (2) times a day.  L-Mfolate-B6 Phos-Methyl-B12 (METANX) 3-35-2 mg tab tab Take 1 Tab by mouth two (2) times a day. Indications: neuropathy      CHROM GENO/BRINDAL BERRY (GARCINIA CAMBOGIA PO) Take 1 Tab by mouth daily.       pravastatin (PRAVACHOL) 40 mg tablet TAKE 1 TABLET NIGHTLY (NEEDS APPOINTMENT AS SOON AS POSSIBLE FOR FASTING LABS AND APPOINTMENT) 30 Tab 0    TRUETEST TEST STRIPS strip TEST THREE TIMES DAILY 100 Strip 1    CRANBERRY EXTRACT-VIT C PO Take 2 Caps by mouth daily.  cinnamon bark (CINNAMON) 500 mg cap Take 2 Caps by mouth daily.  loratadine (CLARITIN) 10 mg tablet Take 10 mg by mouth nightly. Plan:  Obtain authorization for testing from insurance company. Report to follow once testing, scoring, and interpretation completed. ? Organic based neurocognitive issues versus mood disorder or combination of same. ? Problems organic, functional, or both? This note will not be viewable in [address] 77year old with cognitive decline and depression and anxiety. She had multiple medical issues and anesthesia and concern is for MCI versus dementia versus mood or combination of same. Time: 96116 x 1 NSE 45 minutes with patient and spouse and reviewing records.

## 2019-03-07 NOTE — PROGRESS NOTES
[address] [facility],[address]  [address]   [place] [address] [facility]   Oralia Rodas    [phone] Office   [phone] Fax      Neuropsychological Evaluation Report    Referral:  DO David Rios is a 77 y.o. left handed   female who was accompanied by her spouse to the initial clinical interview on 3/6/19 . Please refer to her medical records for details pertaining to her history. Briefly, the patient reported that she completed college without history of previously diagnosed LD and/or receipt of special education services. 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 She has progressive decline in short term memory. She had problems before April of last year. She had 8 hour mastectomy/reconstruction in 1996 and started memory problems then with more recent decline over the past six months. Back surgery in 2016. She has memory decline and is very concerned about it. Another example would be forgetting what she ordered at restaurant and the food came and she had not recalled having ordered/changed that order. No sandwich on menu but said she ordered one. Two weekends ago had some major cognitive deficits. Friday had to wait 22 minutes at the bank and she broke into a meeting with a cris and she apparently engaged him in an argument and somebody told her to cool off. She told that cris to get out of her face. This was totally unlike her. Memory problems worse the past two years. t reports it worsened since she got transverse myelitis. Pt states she writes everything in a calendar and unless she is looking at she can't remember and will give a false answer to her . She does get depressed and anxious and has talked to previous clinicians about it to no avail.   No counseling or psychiatrist.   Nagi Garb discussion on Ion's night and they repeated that conversation 45 minutes later. No acute or focal issues. No changes in sense of taste or smell. Sleep and appetite unchanged. She has some OB issues and sees Dr. Damaris Verduzco and Dr. Kelvin Youssef. She is not on any medication for mood. She finds all of this very, very frustrating. Just dealing with the physical issues associated with TM is a struggle with her. She lost her mother and MIL within 8 months of each other in [id number]. She has noticed word finding problems, difficulties processing information. She has very sensitive taste, has to have steak at restaurants rinsed and cooked on foil. Driving is generally okay, but struggles with focusing on directions. She writes lists for medications/finances. She can manage her ADLs. She does use a walker. No known family history of dementia. She has not fallen lately. She has seen PT. She has problems with incontinence. Saw Dr. Ayden Calvert and neurostimulator placed in hip last March. MRI from 2017  FINDINGS:     There are a few tiny foci of high signal in the deep white matter, likely within  normal limits given the patient's age.     No evidence for acute infarct. No foci of restricted diffusion.     No abnormal contrast enhancement.     IMPRESSION  IMPRESSION:  1. No acute abnormality. 2. Scattered tiny foci of high signal in the deep white matter may represent  chronic ischemic change and likely within normal limits for the patient's age      No previous neuropsych.      Neuropsychological Mental Status Exam (NMSE):  Historian: Good  Praxis: No UE apraxia  R/L Orientation: Intact to self and to other  Dress: within normal limits   Weight: within normal limits   Appearance/Hygiene: within normal limits   Gait: within normal limits   Assistive Devices Walker, Glasses  Mood: within normal limits   Affect: within normal limits   Comprehension: within normal limits   Thought Process: within normal limits   Expressive Language: within normal limits   Receptive Language: within normal limits   Motor:  No cognitive or motor perseveration  ETOH: Denied  Tobacco: Denied  Illicit: Denied  SI/HI: Denied  Psychosis: Denied  Insight: Within normal limits  Judgment: Within normal limits  Other Psych:      Past Medical History:   Diagnosis Date    Abnormal MRI, cervical spine 4/3/2017    Abnormal pap 3/2015; 5/2016 2015 Neg pap +HPV; 2016 ASCUS +HPV    Acute transverse myelitis (HCC)     Arthritis     osteo-tests for RA were neg    Breast cancer ([facility] [address] 1996    right    Chronic pain     Diabetes (HCC)     Fibromyalgia     Ganglion cyst of wrist     LEFT    Hypercholesterolemia     Hypertension     Memory loss     Per pt.  Murmur, cardiac     Neuropathy     Rosacea     Unspecified adverse effect of anesthesia     \"SLOW TO WAKE UP, SENSITIVE TO ANESTHESIA, DO NOT COME AROUND FOR 2-3 DAYS\"        Past Surgical History:   Procedure Laterality Date    HX CATARACT REMOVAL Bilateral 2014    HX CERVICAL FUSION      HX COLPOSCOPY  5/2016    Neg path    HX MASTECTOMY  12/96    tram flap    HX ORTHOPAEDIC Left 1990's    foot surgery    HX TONSILLECTOMY      HX VASCULAR ACCESS  1997    portacath left chest-removed after chemo    RECONSTR NOSE  11/2013, 2005    HOLE IN SEPTUM       Allergies   Allergen Reactions    Latex Other (comments)     Patient states it burns around her mouth.     Other Food Other (comments)     Yogurt - stomach cramping    Betadine [Povidone-Iodine] Itching     Pt states this causes \"extreme\" itching    Codeine Anaphylaxis, Rash, Nausea Only and Other (comments)     HEADACHE  Tolerates tramadol    Dilaudid [Hydromorphone (Bulk)] Anaphylaxis, Itching, Nausea Only and Other (comments)     HALLUCINATIONS  Tolerates tramadol      Hydrocodone Anaphylaxis, Rash, Nausea Only and Vertigo     Facial - eyelid swelling-had to go to ER for reaction, HALLUCINATIONS  Tolerates tramadol      Ditropan [Oxybutynin Chloride] Swelling    Doxycycline Rash PUSTULAR RASH- TOLERATING TETRACYCLINE    Keflex [Cephalexin] Nausea and Vomiting     Pain in abdomen AND FLU-LIKE SX      Metformin Nausea and Vomiting     Severe abdominal pain      Tape [Adhesive] Other (comments)     Redness/inflammed. Can only use paper tape. CAN USE BAND-AIDS. TOLERATES SURGICAL TAPE USED IN [place] [facility].  Trulicity [Dulaglutide] Other (comments)     Abdominal pain     Sulfamethoxazole-Trimethoprim Other (comments)     Cramping/flu-like symptoms       Family History   Problem Relation Age of Onset    Heart Disease Mother     Hypertension Mother     COPD Mother     Diabetes Father     Other Son         INOPERABLE BRAIN TUMOR    Gout Son     Celiac Disease Son     Anesth Problems Neg Hx        Social History     Tobacco Use    Smoking status: Never Smoker    Smokeless tobacco: Never Used    Tobacco comment: Never used vapor or e-cigs    Substance Use Topics    Alcohol use: No    Drug use: No       Current Outpatient Medications   Medication Sig Dispense Refill    SURGICAL BRA misc Mastectomy bra . Wear as needed as directed 1 Each 5    MASTECTOMY PROSTHESIS misc One breast prosthesis. Use as directed as needed 1 Each 0    amoxicillin-clavulanate (AUGMENTIN) 875-125 mg per tablet Take 1 Tab by mouth every twelve (12) hours for 7 days. 14 Tab 0    baclofen (LIORESAL) 10 mg tablet TAKE 1 TABLET BY MOUTH THREE TIMES DAILY AS NEEDED 30 Tab 0    fexofenadine-pseudoephedrine (ALLEGRA-D 24 HOUR) 180-240 mg per tablet Take 1 Tab by mouth daily.  linaclotide (LINZESS) 145 mcg cap capsule Take 1 Cap by mouth Daily (before breakfast). To cover until mail order arrives 30 Cap 2    albuterol (PROVENTIL HFA, VENTOLIN HFA, PROAIR HFA) 90 mcg/actuation inhaler Take 2 Puffs by inhalation every six (6) hours as needed for Wheezing or Shortness of Breath. 1 Inhaler 3    lidocaine-prilocaine (EMLA) topical cream Apply  to affected area.       JARDIANCE 10 mg tablet       diazePAM (VALIUM) 5 mg tablet Take 1 tab PO 1 hour prior to MRI (Patient not taking: Reported on 1/31/2019) 3 Tab 0    bethanechol (URECHOLINE) 10 mg tablet TAKE 1 TABLET BY MOUTH TWICE DAILY 60 Tab 0    JANUVIA 100 mg tablet TAKE 1 TABLET BY MOUTH DAILY (Patient not taking: Reported on 1/31/2019) 30 Tab 0    biotin 10,000 mcg cap Take 1 Cap by mouth daily.  fluticasone (FLONASE) 50 mcg/actuation nasal spray 2 Sprays by Both Nostrils route daily as needed for Rhinitis.  lisinopril (PRINIVIL, ZESTRIL) 2.5 mg tablet Take 2.5 mg by mouth nightly.  OTHER,NON-FORMULARY, Take 1 Tab by mouth daily. Vitamin D & E      guar gum (BENEFIBER) packet Take 1 Packet by mouth nightly as needed.  nystatin (MYCOSTATIN) topical cream Apply  to affected area three (3) times daily. As needed for skin irritation. Use a think layer 30 g 0    triamcinolone acetonide (KENALOG) 0.1 % topical cream Apply  to affected area three (3) times daily. As needed for skin irritation, use thin layer. (Patient not taking: Reported on 1/31/2019) 30 g 0    ivermectin (SOOLANTRA) 1 % crea by Apply Externally route daily. Indications: ACNE ROSACEA      CALCIUM PO Take 1 Caplet by mouth daily.  DOCOSAHEXANOIC ACID/EPA (FISH OIL PO) Take 1 Cap by mouth daily.  gabapentin (NEURONTIN) 300 mg capsule Take 600 mg by mouth three (3) times daily.  glimepiride (AMARYL) 2 mg tablet Take 2 mg by mouth three (3) times daily.  magnesium oxide (MAG-OX) 400 mg tablet Take 400 mg by mouth daily.  INVOKANA 100 mg tablet Take 1 tablet by mouth  daily before breakfast (Patient not taking: Reported on 1/31/2019) 90 Tab 1    Alpha Lipoic Acid 600 mg cap Take 1 Cap by mouth two (2) times a day.  L-Mfolate-B6 Phos-Methyl-B12 (METANX) 3-35-2 mg tab tab Take 1 Tab by mouth two (2) times a day. Indications: neuropathy      CHROM GENO/BRINDAL BERRY (GARCINIA CAMBOGIA PO) Take 1 Tab by mouth daily.       pravastatin (PRAVACHOL) 40 mg tablet TAKE 1 TABLET NIGHTLY (NEEDS APPOINTMENT AS SOON AS POSSIBLE FOR FASTING LABS AND APPOINTMENT) 30 Tab 0    TRUETEST TEST STRIPS strip TEST THREE TIMES DAILY 100 Strip 1    CRANBERRY EXTRACT-VIT C PO Take 2 Caps by mouth daily.  cinnamon bark (CINNAMON) 500 mg cap Take 2 Caps by mouth daily.  loratadine (CLARITIN) 10 mg tablet Take 10 mg by mouth nightly. Plan:  Obtain authorization for testing from insurance company. Report to follow once testing, scoring, and interpretation completed. ? Organic based neurocognitive issues versus mood disorder or combination of same. ? Problems organic, functional, or both? This note will not be viewable in [address] 77year old with cognitive decline and depression and anxiety. She had multiple medical issues and anesthesia and concern is for MCI versus dementia versus mood or combination of same. Time: 96116 x 1 NSE 45 minutes with patient and spouse and reviewing records. Neuropsychological Test Results  Patient Testing 3/6/19 Report Completed 3/7/2019  A Psychometrist Assisted w/ portions of this evaluation while under my direct  supervision    The following evaluation procedures/tests were administered:      Neuropsychologist Performed, Interpreted, & Reported:  Neuropsychological Mental Status Exam, Revised Memory & Behavior Checklist,  Mini Mental Status Exam, Clock Drawing Test, Rigo-Melzack Pain Questionnaire, Test Of Premorbid Functioning, History Taking  & Clinical Interview With The Patient, Additional History Taking w/ the Patient's, CASE, ABAS-3,SIMON,  Review Of Available Records.     Psychometrist Administered under Neuropsychologist Supervision & Neuropsychologist Interpreted & Neuropsychologist Reported:  Verbal Fluency Tests, Jairo & Jairo - Revised, Trailmaking Test Parts A & B, Wechsler Adult Intelligence Scale - IV, Olivia Continuous Performance Test - III, [place] Book&Table [employer] - 3, Grooved Pegboard, Chapman Depression Inventory - II, Chapman Anxiety Inventory, Personality Assessment Inventory. Test Findings:  Test Findings:  Note:  The patients raw data have been compared with currently available norms which include demographic corrections for age, gender, and/or education. Sometimes, the patients scores are compared to demographically similar individuals as close to the patients age, education level, etc., as possible. \"Average\" is viewed as being +/- 1 standard deviation (SD) from the stated mean for a particular test score. \"Low average\" is viewed as being between 1 and 2 SD below the mean, and above average is viewed as being 1 and 2 SD above the mean. Scores falling in the borderline range (between 1-1/2 and 2 SD below the mean) are viewed with particular attention as to whether they are normal or abnormal neurocognitive test scores. Other methods of inference in analyzing the test data are also utilized, including the pattern and range of scores in the profile, bilateral motor functions, and the presence, if any, of pathognomonic signs. Behaviorally, the patient was friendly and cooperative and appeared motivated to perform well during this examination. Within this context, the results of this evaluation are viewed as a valid reflection of the patients actual neurocognitive and emotional status. The patient's score of 28/30 on the Mini-Mental Status Exam was impaired. In this regard, serial 7s were 3/5 correct. Clock drawing was within normal limits. Her structured word list fluency, as assessed by the FAS Test, was within the average range with a T score of 50. Category fluency was within the above average range with a T score of 58. Confrontation naming ability, as assessed by the [facility] - Revised, was within the above average range at 59/60 correct (T = 65).   This pattern of performance is not indicative of a patient who is at increased risk for day-to-day problems with verbal fluency or confrontation naming. The patient was administered the [facility] Continuous Performance Test - III and review of the subscales within this instrument revealed mild concerns for inattentiveness without impulsivity. This pattern of performance is indicative of a patient who is at increased risk for day-to-day problems with sustained visual attention/concentration. The patient is not showing problems with working memory capacity (77th %ile) or processing speed (55th %ile) on the WAIS-IV. Her Verbal Comprehension Index score of 112 (79th %ile) is within the high average range. Her Perceptual Reasoning Index score of 94 was average. These scores do not reflect a decline in functioning based on an assessment of premorbid functioning. The patient was administered the [place] Verbal Learning Test  - 3 and generated a normal to above normal range (and positive) learning curve over five repeated auditory word list learning trials. An interference trial was normal.  Free and cued, short and long delayed recall were all well above the normal range, with some scores falling in the 98th %ile. Recognition and forced choice recall were normal.  This pattern of performance is not indicative of a patient who is at increased risk for day-to-day problems with auditory learning and/or memory. Simple timed visual motor sequencing (Trailmaking Test Part A) was within the above average range with a T score of 55. Her performance on a similar, but more complex task of timed visual motor sequencing (Trailmaking Test Part B) was within the above average range with a T score of 66. This pattern of performance is not indicative of a patient who is at increased risk for day-to-day problems with executive functioning. Fine motor dexterity was within the severely impaired range for her dominant hand and mildly impaired range for her nondominant hand.   Hand pain is reported below. The patient rated her current level of pain as \"2/5- Discomforting\" on the Rigo-Melzack Pain Questionnaire. She reported pain in her face, left neck, right shoulder, left arm, hands, left back region, left hip, and left thigh. Her Chapman Depression Inventory -II score of 19 was within the mildly depressed range. Her Chapman Anxiety Inventory score of 14 reflected mild anxiety. The patient's responses on the Personality Assessment Inventory were deemed valid for interpretation. Within this context, there is very strong support for somatization or at the very least, a strong psychological component to her physical pain. The personality profile is otherwise normal, and she is highly motivated for treatment. Impressions & Recommendations: This is a predominantly normal range Neuropsychological Evaluation with respect to neurocognitive functioning. In this regard, she is showing severe impairments with dominant handed motor dexterity, mild problems with nondominant handed fine motor dexterity, and mild problems with attention  Her memory scores are well above the normal range, and her performances across all other neurocognitive domains assessed were also within or above the normal range. From an emotional standpoint, there is depression, anxiety, and a strong psychological component to her physical issues. Thankfully, this profile is not consistent with dementia or MCI. She may have chronic and mild attention deficit issues. The bigger area of concern relates to psychiatric status and the impact mood issues have upon her physical functioning and cognitive abilities. Psychiatric treatment and counseling for depression, anxiety, and pain would prove very helpful for her, in my opinion. She should be encouraged to remain as mentally, socially, and physically active as possible. Consider treatment for attention issues if not medically contraindicated.  She is cleared from a neurocognitive/psychiatric standpoint for surgery if stimulator is considered an option here, but she needs concurrent psychiatric intervention. I am not concerned about competency, day-to-day supervision, etc. The exam results are quite reassuring. Baseline now established. Follow up prn. Clinical correlation is, of course, indicated. I will discuss these findings with the patient when she follows up with me in the near future. A follow up Neuropsychological Evaluation is indicated on a prn basis, especially if there are any cognitive and/or emotional changes. DIAGNOSES:  The Referral Dx of MCI - IS NOT SUPPORTED     Depression and Anxiety     Pain Associated with Physical and Psychological Factors     ADHD, Inattentive, Mild     The above information is based upon information currently available to me. If there is any additional information of which I am currently unaware, I would be more than happy to review it upon having it made available to me. Thank you for the opportunity to see this interesting individual.     Sincerely,       Liza Dave. Camila Jeong, EdS    CC:  Rik Vivar, DO    Time Documentation:    81304 x 1: Neurobehavioral Status Exam/Clinical Interview: (1 hour, (already billed on first date of service)    11363*3 Neuropsych test/data gathering by Neuropsychologist 35 minutes     96138 x 1  96139 x 6 Test Administration/Data Gathering By Technician: (3.5 hours). 27925 x 1 (first 30 minutes), 45857 x 6 (each additional 30 minutes)    96132 x 1  96133 x 1 Testing Evaluation Services by Neuropsychologist (1 hour, 50 minutes) 96132 x 1 (first hour), 96133 x 1 (50 minutes)    Definitions:      53834/87425:  Neurobehavioral Status Exam, Clinical interview.   Clinical assessment of thinking, reasoning and judgment, by neuropsychologist, both face to face time with patient and time interpreting those test results and reporting, first and subsequent hours)    61336/57001: Neuropsychological Test Administration by Technician/Psychometrist, first 30 minutes and each additional 30 minutes. The above includes: Record review. Review of history provided by patient. Review of collaborative information. Testing by Clinician. Review of raw data. Scoring. Report writing of individual tests administered by Clinician. Integration of individual tests administered by psychometrist with NSE/testing by clinician, review of records/history/collaborative information, case Conceptualization, treatment planning, clinical decision making, report writing, coordination Of Care. Psychometry test codes as time spent by psychometrist administering and scoring neurocognitive/psychological tests under supervision of neuropsychologist.  Integral services including scoring of raw data, data interpretation, case conceptualization, report writing etcetera were initiated after the patient finished testing/raw data collected and was completed on the date the report was signed.

## 2019-03-18 ENCOUNTER — TELEPHONE (OUTPATIENT)
Dept: OBGYN CLINIC | Age: 69
End: 2019-03-18

## 2019-03-18 ENCOUNTER — OFFICE VISIT (OUTPATIENT)
Dept: OBGYN CLINIC | Age: 69
End: 2019-03-18

## 2019-03-18 VITALS — HEIGHT: 67 IN | WEIGHT: 174 LBS | BODY MASS INDEX: 27.31 KG/M2

## 2019-03-18 DIAGNOSIS — N89.8 VAGINAL DISCHARGE: ICD-10-CM

## 2019-03-18 DIAGNOSIS — R39.15 URINARY URGENCY: Primary | ICD-10-CM

## 2019-03-18 DIAGNOSIS — R31.9 HEMATURIA, UNSPECIFIED TYPE: ICD-10-CM

## 2019-03-18 DIAGNOSIS — R35.0 URINARY FREQUENCY: ICD-10-CM

## 2019-03-18 DIAGNOSIS — R10.9 FLANK PAIN: ICD-10-CM

## 2019-03-18 LAB
BILIRUB UR QL STRIP: NEGATIVE
GLUCOSE UR-MCNC: NORMAL MG/DL
KETONES P FAST UR STRIP-MCNC: NEGATIVE MG/DL
PH UR STRIP: NORMAL [PH] (ref 4.6–8)
PROT UR QL STRIP: NEGATIVE
SP GR UR STRIP: NORMAL (ref 1–1.03)
UA UROBILINOGEN AMB POC: NORMAL (ref 0.2–1)
URINALYSIS CLARITY POC: CLEAR
URINALYSIS COLOR POC: YELLOW
URINE BLOOD POC: NORMAL
URINE LEUKOCYTES POC: NEGATIVE
URINE NITRITES POC: NEGATIVE

## 2019-03-18 NOTE — PROGRESS NOTES
UTI note    Leyla Porras is a ,  77 y.o. female [facility] who presents today with had concerns including Urinary Frequency. . Her symptoms started 4 days ago. Discomfort is in the suprapubic area and does not radiate. Symptoms are not alleviated by hydration. Symptoms are exacerbated with activity. Patient's other complaints: back pain. Her symptoms are moderate. Patient denies fever. There is not any concern of sexual abuse. There is not a history of trauma to the genital area. Patient does not have a history of recurrent UTI. She does not have a history of pyelonephritis. She has a history of  has a past medical history of Abnormal MRI, cervical spine (4/3/2017), Abnormal pap (3/2015; 2016), Acute transverse myelitis ([address] Arthritis, Breast cancer ([address] (), Chronic pain, Diabetes ([address] Fibromyalgia, Ganglion cyst of wrist, Hypercholesterolemia, Hypertension, Memory loss, Murmur, cardiac, Neuropathy, Rosacea, and Unspecified adverse effect of anesthesia. with the following surgical history  has a past surgical history that includes pr reconstr nose (2013, ); hx colposcopy (2016); hx vascular access (); hx mastectomy (); hx tonsillectomy; hx cataract removal (Bilateral, ); hx orthopaedic (Left, ); and hx cervical fusion. .  . She has not been evaluated for her current complaints.    Urine culture sent  Results for orders placed or performed in visit on 19   AMB POC URINALYSIS DIP STICK MANUAL W/O MICRO   Result Value Ref Range    Color (UA POC) Yellow     Clarity (UA POC) Clear     Glucose (UA POC) 4+ Negative    Bilirubin (UA POC) Negative Negative    Ketones (UA POC) Negative Negative    Specific gravity (UA POC)  1.001 - 1.035    Blood (UA POC) Trace Negative    pH (UA POC)  4.6 - 8.0    Protein (UA POC) Negative Negative    Urobilinogen (UA POC) normal 0.2 - 1    Nitrites (UA POC) Negative Negative    Leukocyte esterase (UA POC) Negative Negative         Results for orders placed or performed in visit on 12/19/17   AMB POC URINALYSIS DIP STICK AUTO W/ MICRO     Status: None   Result Value Ref Range Status    Color (UA POC) Yellow  Final    Clarity (UA POC) Clear  Final    Glucose (UA POC) Negative Negative Final    Bilirubin (UA POC) Negative Negative Final    Ketones (UA POC) Negative Negative Final    Specific gravity (UA POC) 1.010 1.001 - 1.035 Final    Blood (UA POC) Negative Negative Final    pH (UA POC) 5.5 4.6 - 8.0 Final    Protein (UA POC) Negative Negative Final    Urobilinogen (UA POC) 0.2 mg/dL 0.2 - 1 Final    Nitrites (UA POC) Positive Negative Final    Leukocyte esterase (UA POC) Negative Negative Final   Results for orders placed or performed in visit on 09/25/17   AMB POC URINALYSIS DIP STICK MANUAL W/O MICRO     Status: None   Result Value Ref Range Status    Color (UA POC) Yellow  Final    Clarity (UA POC) Slightly Cloudy  Final    Glucose (UA POC) 4+ Negative Final    Bilirubin (UA POC) Trace Negative Final    Ketones (UA POC) Negative Negative Final    Specific gravity (UA POC) 1.015 1.001 - 1.035 Final    Blood (UA POC) Negative Negative Final    pH (UA POC) 5.0 4.6 - 8.0 Final    Protein (UA POC) Negative Negative mg/dL Final    Urobilinogen (UA POC) normal 0.2 - 1 Final    Nitrites (UA POC) Negative Negative Final    Leukocyte esterase (UA POC) Trace Negative Final       Past Medical History:   Diagnosis Date    Abnormal MRI, cervical spine 4/3/2017    Abnormal pap 3/2015; 5/2016 2015 Neg pap +HPV; 2016 ASCUS +HPV    Acute transverse myelitis (HCC)     Arthritis     osteo-tests for RA were neg    Breast cancer ([facility] 1996    right    Chronic pain     Diabetes (HCC)     Fibromyalgia     Ganglion cyst of wrist     LEFT    Hypercholesterolemia     Hypertension     Memory loss     Per pt.      Murmur, cardiac     Neuropathy     Rosacea     Unspecified adverse effect of anesthesia     \"SLOW TO WAKE UP, SENSITIVE TO ANESTHESIA, DO NOT COME AROUND FOR 2-3 DAYS\"      Past Surgical History:   Procedure Laterality Date    HX CATARACT REMOVAL Bilateral 2014    HX CERVICAL FUSION      HX COLPOSCOPY  5/2016    Neg path    HX MASTECTOMY  12/96    tram flap    HX ORTHOPAEDIC Left 1990's    foot surgery    HX TONSILLECTOMY      HX VASCULAR ACCESS  1997    portacath left chest-removed after chemo    RECONSTR NOSE  11/2013, 2005    HOLE IN SEPTUM     Social History     Occupational History    Not on file   Tobacco Use    Smoking status: Never Smoker    Smokeless tobacco: Never Used    Tobacco comment: Never used vapor or e-cigs    Substance and Sexual Activity    Alcohol use: No    Drug use: No    Sexual activity: Not Currently     Partners: Male     Comment:      Family History   Problem Relation Age of Onset    Heart Disease Mother     Hypertension Mother     COPD Mother     Diabetes Father     Other Son         INOPERABLE BRAIN TUMOR    Gout Son     Celiac Disease Son     Anesth Problems Neg Hx        Allergies   Allergen Reactions    Latex Other (comments)     Patient states it burns around her mouth.     Other Food Other (comments)     Yogurt - stomach cramping    Betadine [Povidone-Iodine] Itching     Pt states this causes \"extreme\" itching    Codeine Anaphylaxis, Rash, Nausea Only and Other (comments)     HEADACHE  Tolerates tramadol    Dilaudid [Hydromorphone (Bulk)] Anaphylaxis, Itching, Nausea Only and Other (comments)     HALLUCINATIONS  Tolerates tramadol      Hydrocodone Anaphylaxis, Rash, Nausea Only and Vertigo     Facial - eyelid swelling-had to go to ER for reaction, HALLUCINATIONS  Tolerates tramadol      Ditropan [Oxybutynin Chloride] Swelling    Doxycycline Rash     PUSTULAR RASH- TOLERATING TETRACYCLINE    Keflex [Cephalexin] Nausea and Vomiting     Pain in abdomen AND FLU-LIKE SX      Metformin Nausea and Vomiting     Severe abdominal pain      Tape [Adhesive] Other (comments)     Redness/inflammed. Can only use paper tape. CAN USE BAND-AIDS. TOLERATES SURGICAL TAPE USED IN [place].  Trulicity [Dulaglutide] Other (comments)     Abdominal pain     Sulfamethoxazole-Trimethoprim Other (comments)     Cramping/flu-like symptoms     Prior to Admission medications    Medication Sig Start Date End Date Taking? Authorizing Provider   SURGICAL BRA misc Mastectomy bra . Wear as needed as directed 3/5/19  Yes Gaudencio Martinez MD   MASTECTOMY PROSTHESIS [facility] One breast prosthesis. Use as directed as needed 3/5/19  Yes Gaudencio Martinez MD   baclofen (LIORESAL) 10 mg tablet TAKE 1 TABLET BY MOUTH THREE TIMES DAILY AS NEEDED 2/7/19  Yes Chiki Veras, RADHA   fexofenadine-pseudoephedrine (ALLEGRA-D 24 HOUR) 180-240 mg per tablet Take 1 Tab by mouth daily. Yes Provider, Historical   linaclotide (LINZESS) 145 mcg cap capsule Take 1 Cap by mouth Daily (before breakfast). To cover until mail order arrives 1/16/19  Yes Jennifer Mcfadden DO   albuterol (PROVENTIL HFA, VENTOLIN HFA, PROAIR HFA) 90 mcg/actuation inhaler Take 2 Puffs by inhalation every six (6) hours as needed for Wheezing or Shortness of Breath. 1/4/19  Yes Jennifer Mcfadden DO   lidocaine-prilocaine (EMLA) topical cream Apply  to affected area. 5/16/18 5/16/19 Yes Provider, Historical   JARDIANCE 10 mg tablet  2/26/18  Yes Provider, Historical   diazePAM (VALIUM) 5 mg tablet Take 1 tab PO 1 hour prior to MRI 5/22/18  Yes Jennifer Mcfadden DO   bethanechol (URECHOLINE) 10 mg tablet TAKE 1 TABLET BY MOUTH TWICE DAILY 2/20/18  Yes Jennifer Mcfadden DO   JANUVIA 100 mg tablet TAKE 1 TABLET BY MOUTH DAILY 11/7/17  Yes Jennifer Mcfadden DO   biotin 10,000 mcg cap Take 1 Cap by mouth daily. Yes Provider, Historical   fluticasone (FLONASE) 50 mcg/actuation nasal spray 2 Sprays by Both Nostrils route daily as needed for Rhinitis. Yes Provider, Historical   lisinopril (PRINIVIL, ZESTRIL) 2.5 mg tablet Take 2.5 mg by mouth nightly.    Yes Provider, Historical   OTHER,NON-FORMULARY, Take 1 Tab by mouth daily. Vitamin D & E   Yes Provider, Historical   guar gum (BENEFIBER) packet Take 1 Packet by mouth nightly as needed. Yes Provider, Historical   nystatin (MYCOSTATIN) topical cream Apply  to affected area three (3) times daily. As needed for skin irritation. Use a think layer 9/26/17  Yes Shyla Terry MD   triamcinolone acetonide (KENALOG) 0.1 % topical cream Apply  to affected area three (3) times daily. As needed for skin irritation, use thin layer. 9/26/17  Yes Shyla Terry MD   ivermectin [facility]) 1 % crea by Apply Externally route daily. Indications: ACNE ROSACEA   Yes Provider, Historical   CALCIUM PO Take 1 Caplet by mouth daily. Yes Provider, Historical   DOCOSAHEXANOIC ACID/EPA (FISH OIL PO) Take 1 Cap by mouth daily. Yes Provider, Historical   gabapentin (NEURONTIN) 300 mg capsule Take 600 mg by mouth three (3) times daily. Yes Provider, Historical   glimepiride (AMARYL) 2 mg tablet Take 2 mg by mouth three (3) times daily. Yes Provider, Historical   magnesium oxide (MAG-OX) 400 mg tablet Take 400 mg by mouth daily. Yes Provider, Historical   INVOKANA 100 mg tablet Take 1 tablet by mouth  daily before breakfast 3/1/17  Yes Rocio Mcfadden,    Alpha Lipoic Acid 600 mg cap Take 1 Cap by mouth two (2) times a day. Yes Provider, Historical   L-Mfolate-B6 Phos-Methyl-B12 (METANX) 3-35-2 mg tab tab Take 1 Tab by mouth two (2) times a day. Indications: neuropathy   Yes Provider, Historical   CHROM GENO/BRINDAL BERRY (GARCINIA CAMBOGIA PO) Take 1 Tab by mouth daily. Yes Provider, Historical   pravastatin (PRAVACHOL) 40 mg tablet TAKE 1 TABLET NIGHTLY (NEEDS APPOINTMENT AS SOON AS POSSIBLE FOR FASTING LABS AND APPOINTMENT) 7/2/15  Yes Juanita Durand MD   TRUETEST TEST STRIPS strip TEST THREE TIMES DAILY 3/31/14  Yes Juanita Durand MD   CRANBERRY EXTRACT-VIT C PO Take 2 Caps by mouth daily.    Yes Provider, Historical cinnamon bark (CINNAMON) 500 mg cap Take 2 Caps by mouth daily. Yes Provider, Historical   loratadine (CLARITIN) 10 mg tablet Take 10 mg by mouth nightly.    Yes Provider, Historical        Review of Systems: History obtained from the patient  Constitutional: negative for weight loss, fever, night sweats  Breast: negative for breast lumps, nipple discharge, galactorrhea  GI: negative for change in bowel habits, abdominal pain, black or bloody stools  : see HPI, negative for vaginal discharge  MSK: negative for back pain, joint pain, muscle pain  Skin: negative for itching, rash, hives  Psych: negative for anxiety, depression, change in mood      Objective:  Visit Vitals  Ht 5' 7\" (1.702 m)   Wt 174 lb (78.9 kg)   BMI 27.25 kg/m²       Physical Exam:   PHYSICAL EXAMINATION    Constitutional  · Appearance: well-nourished, well developed, alert, in no acute distress    Gastrointestinal  · Abdominal Examination: abdomen non-tender to palpation, normal bowel sounds, no masses present  · Liver and spleen: no hepatomegaly present, spleen not palpable  · Hernias: no hernias identified    Genitourinary  · External Genitalia: normal appearance for age, no discharge present, no tenderness present, no inflammatory lesions present, no masses present, no atrophy present  · Vagina: normal vaginal vault without central or paravaginal defects, white discharge present, no inflammatory lesions present, no masses present  · Bladder: tender to palpation  · Urethra: appears normal  · Cervix: normal   · Uterus: normal size, shape and consistency  · Adnexa: no adnexal tenderness present, no adnexal masses present  · Perineum: perineum within normal limits, no evidence of trauma, no rashes or skin lesions present  · Anus: anus within normal limits, no hemorrhoids present  · Inguinal Lymph Nodes: no lymphadenopathy present    Skin  · General Inspection: no rash, no lesions identified    Neurologic/Psychiatric  · Mental Status:  · Orientation: grossly oriented to person, place and time  · Mood and Affect: mood normal, affect appropriate  Results for orders placed or performed in visit on 03/18/19   AMB POC URINALYSIS DIP STICK MANUAL W/O MICRO   Result Value Ref Range    Color (UA POC) Yellow     Clarity (UA POC) Clear     Glucose (UA POC) 4+ Negative    Bilirubin (UA POC) Negative Negative    Ketones (UA POC) Negative Negative    Specific gravity (UA POC)  1.001 - 1.035    Blood (UA POC) Trace Negative    pH (UA POC)  4.6 - 8.0    Protein (UA POC) Negative Negative    Urobilinogen (UA POC) normal 0.2 - 1    Nitrites (UA POC) Negative Negative    Leukocyte esterase (UA POC) Negative Negative       Assessment:   Probable urine retention due to transverse myelitis  Vaginal discharge   Plan:   Urine culture  See urology  nuswab

## 2019-03-18 NOTE — TELEPHONE ENCOUNTER
Message left at 550am    77year old last seen in the office on 11/20/18. Patient left a message on the voicemail that she has a uti. This nurse called the patient back and patient reports burning upon urination, chills , frequency and not sure if she has a fever. Patient placed on the schedule to be seen this am. 10:40( ok per GS    Patient verbalized understanding.

## 2019-03-20 LAB
A VAGINAE DNA VAG QL NAA+PROBE: NORMAL SCORE
BACTERIA UR CULT: NORMAL
BVAB2 DNA VAG QL NAA+PROBE: NORMAL SCORE
C ALBICANS DNA VAG QL NAA+PROBE: NEGATIVE
C GLABRATA DNA VAG QL NAA+PROBE: NEGATIVE
MEGA1 DNA VAG QL NAA+PROBE: NORMAL SCORE
T VAGINALIS RRNA SPEC QL NAA+PROBE: NEGATIVE

## 2019-03-20 NOTE — PROGRESS NOTES
Added to chart. Patient notified of lab results.  She has appointment scheduled with Urology/Dr. Sherly [place] on 3/27/19

## 2019-04-26 ENCOUNTER — OFFICE VISIT (OUTPATIENT)
Dept: NEUROLOGY | Age: 69
End: 2019-04-26

## 2019-04-26 DIAGNOSIS — F43.23 ADJUSTMENT REACTION WITH ANXIETY AND DEPRESSION: ICD-10-CM

## 2019-04-26 DIAGNOSIS — F45.42 PAIN DISORDER ASSOCIATED WITH PSYCHOLOGICAL AND PHYSICAL FACTORS: Primary | ICD-10-CM

## 2019-04-26 DIAGNOSIS — G31.84 MILD COGNITIVE IMPAIRMENT: ICD-10-CM

## 2019-04-26 DIAGNOSIS — F90.0 ATTENTION DEFICIT HYPERACTIVITY DISORDER (ADHD), INATTENTIVE TYPE, MILD: ICD-10-CM

## 2019-04-26 NOTE — PROGRESS NOTES
45 minutes psychotherapy and psychoeducation and supportive counseling session with the patient and spouse today. I reviewed the results of the recent Neuropsychological Evaluation, including discussing individual tests as well as patient's areas of neurocognitive strength versus weakness. Prior to seeing the patient I reviewed the records, including the previously completed report, the records in [place], and any updated visits from other providers since I saw the patient last.      We discussed, in detail, the following: This is a predominantly normal range Neuropsychological Evaluation with respect to neurocognitive functioning. In this regard, she is showing severe impairments with dominant handed motor dexterity, mild problems with nondominant handed fine motor dexterity, and mild problems with attention  Her memory scores are well above the normal range, and her performances across all other neurocognitive domains assessed were also within or above the normal range. From an emotional standpoint, there is depression, anxiety, and a strong psychological component to her physical issues.                   Thankfully, this profile is not consistent with dementia or MCI. She may have chronic and mild attention deficit issues. The bigger area of concern relates to psychiatric status and the impact mood issues have upon her physical functioning and cognitive abilities. Psychiatric treatment and counseling for depression, anxiety, and pain would prove very helpful for her, in my opinion. She should be encouraged to remain as mentally, socially, and physically active as possible. Consider treatment for attention issues if not medically contraindicated. She is cleared from a neurocognitive/psychiatric standpoint for surgery if stimulator is considered an option here, but she needs concurrent psychiatric intervention.   I am not concerned about competency, day-to-day supervision, etc. The exam results are quite reassuring. Baseline now established. Follow up prn. Clinical correlation is, of course, indicated. I will discuss these findings with the patient when she follows up with me in the near future. A follow up Neuropsychological Evaluation is indicated on a prn basis, especially if there are any cognitive and/or emotional changes.       DIAGNOSES:             The Referral Dx of MCI - IS NOT SUPPORTED                                      Depression and Anxiety                                      Pain Associated with Physical and Psychological Factors                                      ADHD, Inattentive, Mild       Education was provided regarding my diagnostic impressions, and we discussed treatment plan/options. I also answered numerous questions related to the clinical findings, including discussing various methods to improve cognition and mood. Counseling provided regarding mood and cognition. CBT and supportive psychotherapy techniques were utilized in processing her depression, anxiety, and pain. To see psychology and psychiatry outpatient for further treatment. The patient will follow up with the referring provider, and reported being very pleased with the services provided. [facility]. Psychotherapy 45 minutes with patient and spouse as noted above.

## 2019-05-03 ENCOUNTER — OFFICE VISIT (OUTPATIENT)
Dept: FAMILY MEDICINE CLINIC | Age: 69
End: 2019-05-03

## 2019-05-03 VITALS
RESPIRATION RATE: 16 BRPM | SYSTOLIC BLOOD PRESSURE: 148 MMHG | HEART RATE: 104 BPM | BODY MASS INDEX: 27.47 KG/M2 | WEIGHT: 175 LBS | TEMPERATURE: 97.8 F | OXYGEN SATURATION: 96 % | HEIGHT: 67 IN | DIASTOLIC BLOOD PRESSURE: 67 MMHG

## 2019-05-03 DIAGNOSIS — F33.8 SEASONAL AFFECTIVE DISORDER (HCC): ICD-10-CM

## 2019-05-03 DIAGNOSIS — M79.7 FIBROMYALGIA: Primary | ICD-10-CM

## 2019-05-03 NOTE — PROGRESS NOTES
Chief Complaint Patient presents with  Follow-up Patient presents in office today for f/u from visit with Dr. Matilda Yanez. States that she was told to f/u with Dr. Carla Frey for the prescribing of medication for ADD and anxiety. No other concerns. 1. Have you been to the ER, urgent care clinic since your last visit? Hospitalized since your last visit? No 
 
2. Have you seen or consulted any other health care providers outside of the [address] [place] [address] since your last visit? Include any pap smears or colon screening. No 
 
Learning Assessment 3/4/2014 PRIMARY LEARNER Patient HIGHEST LEVEL OF EDUCATION - PRIMARY LEARNER  4 YEARS OF COLLEGE  
BARRIERS PRIMARY LEARNER NONE PRIMARY LANGUAGE ENGLISH  
LEARNER PREFERENCE PRIMARY LISTENING  
ANSWERED BY self RELATIONSHIP SELF

## 2019-05-03 NOTE — PROGRESS NOTES
Candelario Thurman is a 77 y.o. female Chief Complaint Patient presents with  Follow-up  
 pt here for follow up and was seen by Dr Gomez Mario neuropsychology and I have reviewed his notes as well. Last time the pt and I met we had discussed her cognitive impairment was likely 2nd to depression anxiety. Pt states as the clocks were changed. This seems c/w SAD. Pt states she is not interested in counseling. Discussed using cymbalta to help with depression and anxiety and pain and opt is not interested in this. Pt has prev used savella for pain and this worked well for her overall and no side effects. Since this is an SNRI will hopefully get effect from the medication for her anxiety depression. she is a 77y.o. year old female who presents for evalution. Reviewed PmHx, RxHx, FmHx, SocHx, AllgHx and updated and dated in the chart. Review of Systems - negative except as listed above in the HPI Objective:  
 
Vitals:  
 05/03/19 8058 BP: 148/67 Pulse: (!) 104 Resp: 16 Temp: 97.8 °F (36.6 °C) TempSrc: Oral  
SpO2: 96% Weight: 175 lb (79.4 kg) Height: 5' 7\" (1.702 m) Current Outpatient Medications Medication Sig  
 milnacipran (SAVELLA) 12.5 mg (5)-25 mg(8)-50 mg(42) DsPk Use as directed  SURGICAL BRA misc Mastectomy bra . Wear as needed as directed  MASTECTOMY PROSTHESIS misc One breast prosthesis. Use as directed as needed  baclofen (LIORESAL) 10 mg tablet TAKE 1 TABLET BY MOUTH THREE TIMES DAILY AS NEEDED  
 fexofenadine-pseudoephedrine (ALLEGRA-D 24 HOUR) 180-240 mg per tablet Take 1 Tab by mouth daily.  linaclotide (LINZESS) 145 mcg cap capsule Take 1 Cap by mouth Daily (before breakfast). To cover until mail order arrives  albuterol (PROVENTIL HFA, VENTOLIN HFA, PROAIR HFA) 90 mcg/actuation inhaler Take 2 Puffs by inhalation every six (6) hours as needed for Wheezing or Shortness of Breath.  JARDIANCE 10 mg tablet  bethanechol (URECHOLINE) 10 mg tablet TAKE 1 TABLET BY MOUTH TWICE DAILY  biotin 10,000 mcg cap Take 1 Cap by mouth daily.  fluticasone (FLONASE) 50 mcg/actuation nasal spray 2 Sprays by Both Nostrils route daily as needed for Rhinitis.  lisinopril (PRINIVIL, ZESTRIL) 2.5 mg tablet Take 2.5 mg by mouth nightly.  guar gum (BENEFIBER) packet Take 1 Packet by mouth nightly as needed.  nystatin (MYCOSTATIN) topical cream Apply  to affected area three (3) times daily. As needed for skin irritation. Use a think layer  triamcinolone acetonide (KENALOG) 0.1 % topical cream Apply  to affected area three (3) times daily. As needed for skin irritation, use thin layer.  ivermectin (SOOLANTRA) 1 % crea by Apply Externally route daily. Indications: ACNE ROSACEA  CALCIUM PO Take 1 Caplet by mouth daily.  gabapentin (NEURONTIN) 300 mg capsule Take 600 mg by mouth three (3) times daily.  glimepiride (AMARYL) 2 mg tablet Take 2 mg by mouth three (3) times daily.  magnesium oxide (MAG-OX) 400 mg tablet Take 400 mg by mouth daily.  Alpha Lipoic Acid 600 mg cap Take 1 Cap by mouth two (2) times a day.  L-Mfolate-B6 Phos-Methyl-B12 (METANX) 3-35-2 mg tab tab Take 1 Tab by mouth two (2) times a day. Indications: neuropathy  CHROM GENO/BRINDAL BERRY (GARCINIA CAMBOGIA PO) Take 1 Tab by mouth daily.  pravastatin (PRAVACHOL) 40 mg tablet TAKE 1 TABLET NIGHTLY (NEEDS APPOINTMENT AS SOON AS POSSIBLE FOR FASTING LABS AND APPOINTMENT)  TRUETEST TEST STRIPS strip TEST THREE TIMES DAILY  CRANBERRY EXTRACT-VIT C PO Take 2 Caps by mouth daily.  lidocaine-prilocaine (EMLA) topical cream Apply  to affected area.  diazePAM (VALIUM) 5 mg tablet Take 1 tab PO 1 hour prior to MRI  JANUVIA 100 mg tablet TAKE 1 TABLET BY MOUTH DAILY  OTHER,NON-FORMULARY, Take 1 Tab by mouth daily. Vitamin D & E  
 DOCOSAHEXANOIC ACID/EPA (FISH OIL PO) Take 1 Cap by mouth daily.  INVOKANA 100 mg tablet Take 1 tablet by mouth  daily before breakfast  
 cinnamon bark (CINNAMON) 500 mg cap Take 2 Caps by mouth daily.  loratadine (CLARITIN) 10 mg tablet Take 10 mg by mouth nightly. No current facility-administered medications for this visit. Physical Examination: General appearance - alert, well appearing, and in no distress Mental status - alert, oriented to person, place, and time Chest - clear to auscultation, no wheezes, rales or rhonchi, symmetric air entry Heart - normal rate, regular rhythm, normal S1, S2, no murmurs, rubs, clicks or gallops Assessment/ Plan:  
Diagnoses and all orders for this visit: 1. Fibromyalgia 
-     milnacipran (SAVELLA) 12.5 mg (5)-25 mg(8)-50 mg(42) DsPk; Use as directed 2. [facility]al affective disorder [facility] 
-     milnacipran (SAVELLA) 12.5 mg (5)-25 mg(8)-50 mg(42) DsPk; Use as directed Follow-up and Dispositions · Return in about 1 month (around 5/31/2019), or if symptoms worsen or fail to improve. I have discussed the diagnosis with the patient and the intended plan as seen in the above orders. The patient has received an after-visit summary and questions were answered concerning future plans. Pt conveyed understanding of plan. Medication Side Effects and Warnings were discussed with patient [id number] [facility], DO

## 2019-05-03 NOTE — PATIENT INSTRUCTIONS
A Healthy Lifestyle: Care Instructions Your Care Instructions A healthy lifestyle can help you feel good, stay at a healthy weight, and have plenty of energy for both work and play. A healthy lifestyle is something you can share with your whole family. A healthy lifestyle also can lower your risk for serious health problems, such as high blood pressure, heart disease, and diabetes. You can follow a few steps listed below to improve your health and the health of your family. Follow-up care is a key part of your treatment and safety. Be sure to make and go to all appointments, and call your doctor if you are having problems. It's also a good idea to know your test results and keep a list of the medicines you take. How can you care for yourself at home? · Do not eat too much sugar, fat, or fast foods. You can still have dessert and treats now and then. The goal is moderation. · Start small to improve your eating habits. Pay attention to portion sizes, drink less juice and soda pop, and eat more fruits and vegetables. ? Eat a healthy amount of food. A 3-ounce serving of meat, for example, is about the size of a deck of cards. Fill the rest of your plate with vegetables and whole grains. ? Limit the amount of soda and sports drinks you have every day. Drink more water when you are thirsty. ? Eat at least 5 servings of fruits and vegetables every day. It may seem like a lot, but it is not hard to reach this goal. A serving or helping is 1 piece of fruit, 1 cup of vegetables, or 2 cups of leafy, raw vegetables. Have an apple or some carrot sticks as an afternoon snack instead of a candy bar. Try to have fruits and/or vegetables at every meal. 
· Make exercise part of your daily routine. You may want to start with simple activities, such as walking, bicycling, or slow swimming. Try to be active 30 to 60 minutes every day.  You do not need to do all 30 to 60 minutes all at once. For example, you can exercise 3 times a day for 10 or 20 minutes. Moderate exercise is safe for most people, but it is always a good idea to talk to your doctor before starting an exercise program. 
· Keep moving. Elana Jarretts the lawn, work in the garden, or in[employer]. Take the stairs instead of the elevator at work. · If you smoke, quit. People who smoke have an increased risk for heart attack, stroke, cancer, and other lung illnesses. Quitting is hard, but there are ways to boost your chance of quitting tobacco for good. ? Use nicotine gum, patches, or lozenges. ? Ask your doctor about stop-smoking programs and medicines. ? Keep trying. In addition to reducing your risk of diseases in the future, you will notice some benefits soon after you stop using tobacco. If you have shortness of breath or asthma symptoms, they will likely get better within a few weeks after you quit. · Limit how much alcohol you drink. Moderate amounts of alcohol (up to 2 drinks a day for men, 1 drink a day for women) are okay. But drinking too much can lead to liver problems, high blood pressure, and other health problems. Family health If you have a family, there are many things you can do together to improve your health. · Eat meals together as a family as often as possible. · Eat healthy foods. This includes fruits, vegetables, lean meats and dairy, and whole grains. · Include your family in your fitness plan. Most people think of activities such as jogging or tennis as the way to fitness, but there are many ways you and your family can be more active. Anything that makes you breathe hard and gets your heart pumping is exercise. Here are some tips: 
? Walk to do errands or to take your child to school or the bus. 
? Go for a family bike ride after dinner instead of watching TV. Where can you learn more? Go to http://jorge a-katiana.info/. Enter A886 in the search box to learn more about \"A Healthy Lifestyle: Care Instructions. \" Current as of: September 11, 2018 Content Version: 11.9 © 1697-7742 [employer]. Care instructions adapted under license by [employer] (which disclaims liability or warranty for this information). If you have questions about a medical condition or this instruction, always ask your healthcare professional. [place] [address] any warranty or liability for your use of this information.

## 2019-05-16 ENCOUNTER — OFFICE VISIT (OUTPATIENT)
Dept: OBGYN CLINIC | Age: 69
End: 2019-05-16

## 2019-05-16 VITALS
HEIGHT: 67 IN | WEIGHT: 171 LBS | DIASTOLIC BLOOD PRESSURE: 78 MMHG | SYSTOLIC BLOOD PRESSURE: 122 MMHG | BODY MASS INDEX: 26.84 KG/M2

## 2019-05-16 DIAGNOSIS — R93.89 THICKENED ENDOMETRIUM: Primary | ICD-10-CM

## 2019-05-16 NOTE — PROGRESS NOTES
Chief Complaint   Follow-up      HPI  Noman Altamirano is a 77 y.o. female who presents to discuss ultrasound done at urologist office on 4/18/19. She would like to discuss possible surgical options. She has no bleeding. Incidentally the lining appeared to be 9mm on US at [facility]. No LMP recorded. Patient is postmenopausal.     I reviewed the hyst D&C from 2014 where she had end polyp removed. No issues with the surgery      Past Medical History:   Diagnosis Date    Abnormal MRI, cervical spine 4/3/2017    Abnormal pap 3/2015; 5/2016 2015 Neg pap +HPV; 2016 ASCUS +HPV    Acute transverse myelitis (HCC)     Arthritis     osteo-tests for RA were neg    Breast cancer ([facility] [address] 1996    right    Chronic pain     Diabetes (HCC)     Fibromyalgia     Ganglion cyst of wrist     LEFT    Hypercholesterolemia     Hypertension     Memory loss     Per pt.      Murmur, cardiac     Neuropathy     Rosacea     Unspecified adverse effect of anesthesia     \"SLOW TO WAKE UP, SENSITIVE TO ANESTHESIA, DO NOT COME AROUND FOR 2-3 DAYS\"      Past Surgical History:   Procedure Laterality Date    HX CATARACT REMOVAL Bilateral 2014    HX CERVICAL FUSION      HX COLPOSCOPY  5/2016    Neg path    HX MASTECTOMY  12/96    tram flap    HX ORTHOPAEDIC Left 1990's    foot surgery    HX TONSILLECTOMY      HX VASCULAR ACCESS  1997    portacath left chest-removed after chemo    RECONSTR NOSE  11/2013, 2005    HOLE IN SEPTUM     Social History     Occupational History    Not on file   Tobacco Use    Smoking status: Never Smoker    Smokeless tobacco: Never Used    Tobacco comment: Never used vapor or e-cigs    Substance and Sexual Activity    Alcohol use: No    Drug use: No    Sexual activity: Not Currently     Partners: Male     Comment:      Family History   Problem Relation Age of Onset    Heart Disease Mother     Hypertension Mother     COPD Mother     Diabetes Father     Other Son         INOPERABLE BRAIN TUMOR    Gout Son     Celiac Disease Son    [facility] Anesth Problems Neg Hx        Allergies   Allergen Reactions    Latex Other (comments)     Patient states it burns around her mouth.  Other Food Other (comments)     Yogurt - stomach cramping    Betadine [Povidone-Iodine] Itching     Pt states this causes \"extreme\" itching    Codeine Anaphylaxis, Rash, Nausea Only and Other (comments)     HEADACHE  Tolerates tramadol    Dilaudid [Hydromorphone (Bulk)] Anaphylaxis, Itching, Nausea Only and Other (comments)     HALLUCINATIONS  Tolerates tramadol      Hydrocodone Anaphylaxis, Rash, Nausea Only and Vertigo     Facial - eyelid swelling-had to go to ER for reaction, HALLUCINATIONS  Tolerates tramadol      Ditropan [Oxybutynin Chloride] Swelling    Doxycycline Rash     PUSTULAR RASH- TOLERATING TETRACYCLINE    Keflex [Cephalexin] Nausea and Vomiting     Pain in abdomen AND FLU-LIKE SX      Metformin Nausea and Vomiting     Severe abdominal pain      Tape [Adhesive] Other (comments)     Redness/inflammed. Can only use paper tape. CAN USE BAND-AIDS. TOLERATES SURGICAL TAPE USED IN [place].  Trulicity [Dulaglutide] Other (comments)     Abdominal pain     Sulfamethoxazole-Trimethoprim Other (comments)     Cramping/flu-like symptoms     Prior to Admission medications    Medication Sig Start Date End Date Taking? Authorizing Provider   baclofen (LIORESAL) 10 mg tablet TAKE 1 TABLET BY MOUTH THREE TIMES DAILY AS NEEDED 2/7/19  Yes Maximiliano Lan, RADHA   fexofenadine-pseudoephedrine (ALLEGRA-D 24 HOUR) 180-240 mg per tablet Take 1 Tab by mouth daily. Yes Provider, Historical   linaclotide (LINZESS) 145 mcg cap capsule Take 1 Cap by mouth Daily (before breakfast). To cover until mail order arrives 1/16/19  Yes Jennifer Mcfadden,    albuterol (PROVENTIL HFA, VENTOLIN HFA, PROAIR HFA) 90 mcg/actuation inhaler Take 2 Puffs by inhalation every six (6) hours as needed for Wheezing or Shortness of Breath. 1/4/19  Yes Vy Mcfadden, DO   JARDIANCE 10 mg tablet  2/26/18  Yes Provider, Historical   JANUVIA 100 mg tablet TAKE 1 TABLET BY MOUTH DAILY 11/7/17  Yes Jennifer Mcfadden, DO   lisinopril (PRINIVIL, ZESTRIL) 2.5 mg tablet Take 2.5 mg by mouth nightly. Yes Provider, Historical   guar gum (BENEFIBER) packet Take 1 Packet by mouth nightly as needed. Yes Provider, Historical   ivermectin (SOOLANTRA) 1 % crea by Apply Externally route daily. Indications: ACNE ROSACEA   Yes Provider, Historical   CALCIUM PO Take 1 Caplet by mouth daily. Yes Provider, Historical   gabapentin (NEURONTIN) 300 mg capsule Take 600 mg by mouth three (3) times daily. Yes Provider, Historical   glimepiride (AMARYL) 2 mg tablet Take 2 mg by mouth three (3) times daily. Yes Provider, Historical   magnesium oxide (MAG-OX) 400 mg tablet Take 400 mg by mouth daily. Yes Provider, Historical   INVOKANA 100 mg tablet Take 1 tablet by mouth  daily before breakfast 3/1/17  Yes Vy Mcfadden, DO   Alpha Lipoic Acid 600 mg cap Take 1 Cap by mouth two (2) times a day. Yes Provider, Historical   L-Mfolate-B6 Phos-Methyl-B12 (METANX) 3-35-2 mg tab tab Take 1 Tab by mouth two (2) times a day. Indications: neuropathy   Yes Provider, Historical   CHROM GENO/BRINDAL BERRY (GARCINIA CAMBOGIA PO) Take 1 Tab by mouth daily. Yes Provider, Historical   pravastatin (PRAVACHOL) 40 mg tablet TAKE 1 TABLET NIGHTLY (NEEDS APPOINTMENT AS SOON AS POSSIBLE FOR FASTING LABS AND APPOINTMENT) 7/2/15  Yes Candelaria Chairez MD   TRUETEST TEST STRIPS strip TEST THREE TIMES DAILY 3/31/14  Yes Candelaria Chairez MD   CRANBERRY EXTRACT-VIT C PO Take 2 Caps by mouth daily. Yes Provider, Historical   milnacipran (SAVELLA) 12.5 mg (5)-25 mg(8)-50 mg(42) DsPk Use as directed 5/3/19   Jimy Dillon DO   SURGICAL BRA misc Mastectomy bra . Wear as needed as directed 3/5/19   Terryann Goltz, MD   MASTECTOMY PROSTHESIS [facility] One breast prosthesis.  Use as directed as needed 3/5/19   Ru Nolan MD   lidocaine-prilocaine (EMLA) topical cream Apply  to affected area. 5/16/18 5/16/19  Provider, Historical   diazePAM (VALIUM) 5 mg tablet Take 1 tab PO 1 hour prior to MRI 5/22/18   Jennifer Mcfadden DO   bethanechol (URECHOLINE) 10 mg tablet TAKE 1 TABLET BY MOUTH TWICE DAILY 2/20/18   Jennifre Mcfadden DO   biotin 10,000 mcg cap Take 1 Cap by mouth daily. Provider, Historical   fluticasone (FLONASE) 50 mcg/actuation nasal spray 2 Sprays by Both Nostrils route daily as needed for Rhinitis. Provider, Historical   OTHER,NON-FORMULARY, Take 1 Tab by mouth daily. Vitamin D & E    Provider, Historical   nystatin (MYCOSTATIN) topical cream Apply  to affected area three (3) times daily. As needed for skin irritation. Use a think layer 9/26/17   Stefan Mayorga MD   triamcinolone acetonide (KENALOG) 0.1 % topical cream Apply  to affected area three (3) times daily. As needed for skin irritation, use thin layer. 9/26/17   Stefan Mayorga MD   DOCOSAHEXANOIC ACID/EPA (FISH OIL PO) Take 1 Cap by mouth daily. Provider, Historical   cinnamon bark (CINNAMON) 500 mg cap Take 2 Caps by mouth daily. Provider, Historical   loratadine (CLARITIN) 10 mg tablet Take 10 mg by mouth nightly.     Provider, Historical        Review of Systems: History obtained from the patient  Constitutional: negative for weight loss, fever, night sweats  Breast: negative for breast lumps, nipple discharge, galactorrhea  GI: negative for change in bowel habits, abdominal pain, black or bloody stools  : negative for frequency, dysuria, hematuria, vaginal discharge  MSK: negative for back pain, joint pain, muscle pain  Skin: negative for itching, rash, hives  Psych: negative for anxiety, depression, change in mood      Objective:  Visit Vitals  /78   Ht 5' 7\" (1.702 m)   Wt 171 lb (77.6 kg)   BMI 26.78 kg/m²       Physical Exam:   PHYSICAL EXAMINATION    Constitutional  · Appearance: well-nourished, well developed, alert, in no acute distress      Skin  · General Inspection: no rash, no lesions identified    Neurologic/Psychiatric  · Mental Status:  · Orientation: grossly oriented to person, place and time  · Mood and Affect: mood normal, affect appropriate    Assessment:   US reviewed and disc with patient and     Plan: Will get SIS to confirm findings  If the lining is thick, I would also rec hyst D&C    RTO prn if symptoms persist or worsen. Instructions given to pt. Handouts given to pt.     15 minutes was spent face to face with patient and >50% was spent counseling

## 2019-05-29 ENCOUNTER — OFFICE VISIT (OUTPATIENT)
Dept: FAMILY MEDICINE CLINIC | Age: 69
End: 2019-05-29

## 2019-05-29 VITALS
OXYGEN SATURATION: 98 % | BODY MASS INDEX: 26.84 KG/M2 | HEIGHT: 67 IN | DIASTOLIC BLOOD PRESSURE: 75 MMHG | HEART RATE: 80 BPM | RESPIRATION RATE: 16 BRPM | SYSTOLIC BLOOD PRESSURE: 145 MMHG | WEIGHT: 171 LBS | TEMPERATURE: 98.3 F

## 2019-05-29 DIAGNOSIS — Z00.00 MEDICARE ANNUAL WELLNESS VISIT, SUBSEQUENT: ICD-10-CM

## 2019-05-29 DIAGNOSIS — Z13.39 SCREENING FOR ALCOHOLISM: ICD-10-CM

## 2019-05-29 DIAGNOSIS — M79.7 FIBROMYALGIA: Primary | ICD-10-CM

## 2019-05-29 NOTE — PROGRESS NOTES
Chief Complaint   Patient presents with    Follow-up     Patient presents in office today for f/u on being started on the [facility]. No concerns. 1. Have you been to the ER, urgent care clinic since your last visit? Hospitalized since your last visit? No    2. Have you seen or consulted any other health care providers outside of the [address] [place] [address] since your last visit? Include any pap smears or colon screening.  No    Learning Assessment 3/4/2014   PRIMARY LEARNER Patient   HIGHEST LEVEL OF EDUCATION - PRIMARY LEARNER  4 YEARS OF COLLEGE   BARRIERS PRIMARY LEARNER NONE   PRIMARY LANGUAGE ENGLISH   LEARNER PREFERENCE PRIMARY LISTENING   ANSWERED BY self   RELATIONSHIP SELF

## 2019-05-29 NOTE — PATIENT INSTRUCTIONS
A Healthy Lifestyle: Care Instructions Your Care Instructions A healthy lifestyle can help you feel good, stay at a healthy weight, and have plenty of energy for both work and play. A healthy lifestyle is something you can share with your whole family. A healthy lifestyle also can lower your risk for serious health problems, such as high blood pressure, heart disease, and diabetes. You can follow a few steps listed below to improve your health and the health of your family. Follow-up care is a key part of your treatment and safety. Be sure to make and go to all appointments, and call your doctor if you are having problems. It's also a good idea to know your test results and keep a list of the medicines you take. How can you care for yourself at home? · Do not eat too much sugar, fat, or fast foods. You can still have dessert and treats now and then. The goal is moderation. · Start small to improve your eating habits. Pay attention to portion sizes, drink less juice and soda pop, and eat more fruits and vegetables. ? Eat a healthy amount of food. A 3-ounce serving of meat, for example, is about the size of a deck of cards. Fill the rest of your plate with vegetables and whole grains. ? Limit the amount of soda and sports drinks you have every day. Drink more water when you are thirsty. ? Eat at least 5 servings of fruits and vegetables every day. It may seem like a lot, but it is not hard to reach this goal. A serving or helping is 1 piece of fruit, 1 cup of vegetables, or 2 cups of leafy, raw vegetables. Have an apple or some carrot sticks as an afternoon snack instead of a candy bar. Try to have fruits and/or vegetables at every meal. 
· Make exercise part of your daily routine. You may want to start with simple activities, such as walking, bicycling, or slow swimming. Try to be active 30 to 60 minutes every day.  You do not need to do all 30 to 60 minutes all at once. For example, you can exercise 3 times a day for 10 or 20 minutes. Moderate exercise is safe for most people, but it is always a good idea to talk to your doctor before starting an exercise program. 
· Keep moving. Agnieszka Virks the lawn, work in the garden, or [place]. Take the stairs instead of the elevator at work. · If you smoke, quit. People who smoke have an increased risk for heart attack, stroke, cancer, and other lung illnesses. Quitting is hard, but there are ways to boost your chance of quitting tobacco for good. ? Use nicotine gum, patches, or lozenges. ? Ask your doctor about stop-smoking programs and medicines. ? Keep trying. In addition to reducing your risk of diseases in the future, you will notice some benefits soon after you stop using tobacco. If you have shortness of breath or asthma symptoms, they will likely get better within a few weeks after you quit. · Limit how much alcohol you drink. Moderate amounts of alcohol (up to 2 drinks a day for men, 1 drink a day for women) are okay. But drinking too much can lead to liver problems, high blood pressure, and other health problems. Family health If you have a family, there are many things you can do together to improve your health. · Eat meals together as a family as often as possible. · Eat healthy foods. This includes fruits, vegetables, lean meats and dairy, and whole grains. · Include your family in your fitness plan. Most people think of activities such as jogging or tennis as the way to fitness, but there are many ways you and your family can be more active. Anything that makes you breathe hard and gets your heart pumping is exercise. Here are some tips: 
? Walk to do errands or to take your child to school or the bus. 
? Go for a family bike ride after dinner instead of watching TV. Where can you learn more? Go to http://jorge a-katiana.info/. Enter N000 in the search box to learn more about \"A Healthy Lifestyle: Care Instructions. \" Current as of: September 11, 2018 Content Version: 11.9 © 1478-3027 [employer]. Care instructions adapted under license by [employer] (which disclaims liability or warranty for this information). If you have questions about a medical condition or this instruction, always ask your healthcare professional. [address] any warranty or liability for your use of this information. Medicare Wellness Visit, Female The best way to live healthy is to have a lifestyle where you eat a well-balanced diet, exercise regularly, limit alcohol use, and quit all forms of tobacco/nicotine, if applicable. Regular preventive services are another way to keep healthy. Preventive services (vaccines, screening tests, monitoring & exams) can help personalize your care plan, which helps you manage your own care. Screening tests can find health problems at the earliest stages, when they are easiest to treat. [facility] follows the current, evidence-based guidelines published by the [facility] States Mark Pako (USPSTF) when recommending preventive services for our patients. Because we follow these guidelines, sometimes recommendations change over time as research supports it. (For example, mammograms used to be recommended annually. Even though Medicare will still pay for an annual mammogram, the newer guidelines recommend a mammogram every two years for women of average risk.) Of course, you and your doctor may decide to screen more often for some diseases, based on your risk and your health status. Preventive services for you include: - Medicare offers their members a free annual wellness visit, which is time for you and your primary care provider to discuss and plan for your preventive service needs. Take advantage of this benefit every year! -All adults over the age of 72 should receive the recommended pneumonia vaccines. Current USPSTF guidelines recommend a series of two vaccines for the best pneumonia protection.  
-All adults should have a flu vaccine yearly and a tetanus vaccine every 10 years. All adults age 61 and older should receive a shingles vaccine once in their lifetime.   
-A bone mass density test is recommended when a woman turns 65 to screen for osteoporosis. This test is only recommended one time, as a screening. Some providers will use this same test as a disease monitoring tool if you already have osteoporosis. -All adults age 38-68 who are overweight should have a diabetes screening test once every three years.  
-Other screening tests and preventive services for persons with diabetes include: an eye exam to screen for diabetic retinopathy, a kidney function test, a foot exam, and stricter control over your cholesterol.  
-Cardiovascular screening for adults with routine risk involves an electrocardiogram (ECG) at intervals determined by your doctor.  
-Colorectal cancer screenings should be done for adults age 54-65 with no increased risk factors for colorectal cancer. There are a number of acceptable methods of screening for this type of cancer. Each test has its own benefits and drawbacks. Discuss with your doctor what is most appropriate for you during your annual wellness visit. The different tests include: colonoscopy (considered the best screening method), a fecal occult blood test, a fecal DNA test, and sigmoidoscopy. -Breast cancer screenings are recommended every other year for women of normal risk, age 54-69. 
-Cervical cancer screenings for women over age 72 are only recommended with certain risk factors.  
-All adults born between [facility] should be screened once for Hepatitis C. Here is a list of your current Health Maintenance items (your personalized list of preventive services) with a due date: Health Maintenance Due Topic Date Due  Shingles Vaccine (1 of 2) 02/18/2001  Pneumococcal Vaccine (1 of 2 - PCV13) 02/18/2016  Hemoglobin A1C    12/06/2017 [facility] Diabetic Foot Care  12/14/2017 [facility] Annual Well Visit  03/22/2018  Cholesterol Test   04/01/2018  Stool testing for trace blood  10/31/2018  Albumin Urine Test  12/19/2018  Glaucoma Screening   02/24/2019 [facility] Eye Exam  02/24/2019  Mammogram  06/12/2019

## 2019-05-29 NOTE — PROGRESS NOTES
Jermaine Joseph is a 77 y.o. female   Chief Complaint   Patient presents with    Follow-up    pt here for f/u on her savella and states she is feeling much better overall. Pt states she can tell her memory is better and less pain. Pt would like to continue this. FIbro pain is better as well.       she is a 77y.o. year old female who presents for evalution. Reviewed PmHx, RxHx, FmHx, SocHx, AllgHx and updated and dated in the chart. Review of Systems - negative except as listed above in the HPI    Objective:     Vitals:    05/29/19 0919   BP: 145/75   Pulse: 80   Resp: 16   Temp: 98.3 °F (36.8 °C)   TempSrc: Oral   SpO2: 98%   Weight: 171 lb (77.6 kg)   Height: 5' 7\" (1.702 m)       Current Outpatient Medications   Medication Sig    milnacipran (SAVELLA) 50 mg tablet Take 1 Tab by mouth two (2) times a day.  SURGICAL BRA misc Mastectomy bra . Wear as needed as directed    MASTECTOMY PROSTHESIS [facility] One breast prosthesis. Use as directed as needed    baclofen (LIORESAL) 10 mg tablet TAKE 1 TABLET BY MOUTH THREE TIMES DAILY AS NEEDED    fexofenadine-pseudoephedrine (ALLEGRA-D 24 HOUR) 180-240 mg per tablet Take 1 Tab by mouth daily.  linaclotide (LINZESS) 145 mcg cap capsule Take 1 Cap by mouth Daily (before breakfast). To cover until mail order arrives    albuterol (PROVENTIL HFA, VENTOLIN HFA, PROAIR HFA) 90 mcg/actuation inhaler Take 2 Puffs by inhalation every six (6) hours as needed for Wheezing or Shortness of Breath.  JARDIANCE 10 mg tablet     diazePAM (VALIUM) 5 mg tablet Take 1 tab PO 1 hour prior to MRI    bethanechol (URECHOLINE) 10 mg tablet TAKE 1 TABLET BY MOUTH TWICE DAILY    JANUVIA 100 mg tablet TAKE 1 TABLET BY MOUTH DAILY    biotin 10,000 mcg cap Take 1 Cap by mouth daily.  fluticasone (FLONASE) 50 mcg/actuation nasal spray 2 Sprays by Both Nostrils route daily as needed for Rhinitis.  lisinopril (PRINIVIL, ZESTRIL) 2.5 mg tablet Take 2.5 mg by mouth nightly.     OTHER,NON-FORMULARY, Take 1 Tab by mouth daily. Vitamin D & E    guar gum (BENEFIBER) packet Take 1 Packet by mouth nightly as needed.  nystatin (MYCOSTATIN) topical cream Apply  to affected area three (3) times daily. As needed for skin irritation. Use a think layer    triamcinolone acetonide (KENALOG) 0.1 % topical cream Apply  to affected area three (3) times daily. As needed for skin irritation, use thin layer.  ivermectin (SOOLANTRA) 1 % crea by Apply Externally route daily. Indications: ACNE ROSACEA    CALCIUM PO Take 1 Caplet by mouth daily.  DOCOSAHEXANOIC ACID/EPA (FISH OIL PO) Take 1 Cap by mouth daily.  gabapentin (NEURONTIN) 300 mg capsule Take 600 mg by mouth three (3) times daily.  glimepiride (AMARYL) 2 mg tablet Take 2 mg by mouth three (3) times daily.  magnesium oxide (MAG-OX) 400 mg tablet Take 400 mg by mouth daily.  INVOKANA 100 mg tablet Take 1 tablet by mouth  daily before breakfast    Alpha Lipoic Acid 600 mg cap Take 1 Cap by mouth two (2) times a day.  L-Mfolate-B6 Phos-Methyl-B12 (METANX) 3-35-2 mg tab tab Take 1 Tab by mouth two (2) times a day. Indications: neuropathy    CHROM GENO/BRINDAL BERRY (GARCINIA CAMBOGIA PO) Take 1 Tab by mouth daily.  pravastatin (PRAVACHOL) 40 mg tablet TAKE 1 TABLET NIGHTLY (NEEDS APPOINTMENT AS SOON AS POSSIBLE FOR FASTING LABS AND APPOINTMENT)    TRUETEST TEST STRIPS strip TEST THREE TIMES DAILY    CRANBERRY EXTRACT-VIT C PO Take 2 Caps by mouth daily.  cinnamon bark (CINNAMON) 500 mg cap Take 2 Caps by mouth daily.  loratadine (CLARITIN) 10 mg tablet Take 10 mg by mouth nightly. No current facility-administered medications for this visit.         Physical Examination: General appearance - alert, well appearing, and in no distress  Mental status - alert, oriented to person, place, and time  Chest - clear to auscultation, no wheezes, rales or rhonchi, symmetric air entry  Heart - normal rate, regular rhythm, normal S1, S2, no murmurs, rubs, clicks or gallops      Assessment/ Plan:   Diagnoses and all orders for this visit:    1. Fibromyalgia  -     milnacipran (SAVELLA) 50 mg tablet; Take 1 Tab by mouth two (2) times a day. 2. Medicare annual wellness visit, subsequent    3. Screening for alcoholism  -     [place] ANNUAL ALCOHOL SCREEN 15 MIN       Follow-up and Dispositions    · Return in about 3 months (around 8/29/2019), or if symptoms worsen or fail to improve. I have discussed the diagnosis with the patient and the intended plan as seen in the above orders. The patient has received an after-visit summary and questions were answered concerning future plans. Pt conveyed understanding of plan. Medication Side Effects and Warnings were discussed with patient      Anthony Kidney, DO     This is the Subsequent Medicare Annual Wellness Exam, performed 12 months or more after the Initial AWV or the last Subsequent AWV    I have reviewed the patient's medical history in detail and updated the computerized patient record. History     Past Medical History:   Diagnosis Date    Abnormal MRI, cervical spine 4/3/2017    Abnormal pap 3/2015; 5/2016 2015 Neg pap +HPV; 2016 ASCUS +HPV    Acute transverse myelitis (HCC)     Arthritis     osteo-tests for RA were neg    Breast cancer ([facility] [address] 1996    right    Chronic pain     Diabetes (HCC)     Fibromyalgia     Ganglion cyst of wrist     LEFT    Hypercholesterolemia     Hypertension     Memory loss     Per pt.      Murmur, cardiac     Neuropathy     Rosacea     Unspecified adverse effect of anesthesia     \"SLOW TO WAKE UP, SENSITIVE TO ANESTHESIA, DO NOT COME AROUND FOR 2-3 DAYS\"       Past Surgical History:   Procedure Laterality Date    HX CATARACT REMOVAL Bilateral 2014    HX CERVICAL FUSION      HX COLPOSCOPY  5/2016    Neg path    HX MASTECTOMY  12/96    tram flap    HX ORTHOPAEDIC Left 1990's    foot surgery    HX TONSILLECTOMY      HX VASCULAR ACCESS 1997    portacath left chest-removed after chemo    RECONSTR NOSE  11/2013, 2005    HOLE IN SEPTUM     Current Outpatient Medications   Medication Sig Dispense Refill    milnacipran (SAVELLA) 50 mg tablet Take 1 Tab by mouth two (2) times a day. 180 Tab 3    SURGICAL BRA misc Mastectomy bra . Wear as needed as directed 1 Each 5    MASTECTOMY PROSTHESIS mis One breast prosthesis. Use as directed as needed 1 Each 0    baclofen (LIORESAL) 10 mg tablet TAKE 1 TABLET BY MOUTH THREE TIMES DAILY AS NEEDED 30 Tab 0    fexofenadine-pseudoephedrine (ALLEGRA-D 24 HOUR) 180-240 mg per tablet Take 1 Tab by mouth daily.  linaclotide (LINZESS) 145 mcg cap capsule Take 1 Cap by mouth Daily (before breakfast). To cover until mail order arrives 30 Cap 2    albuterol (PROVENTIL HFA, VENTOLIN HFA, PROAIR HFA) 90 mcg/actuation inhaler Take 2 Puffs by inhalation every six (6) hours as needed for Wheezing or Shortness of Breath. 1 Inhaler 3    JARDIANCE 10 mg tablet       diazePAM (VALIUM) 5 mg tablet Take 1 tab PO 1 hour prior to MRI 3 Tab 0    bethanechol (URECHOLINE) 10 mg tablet TAKE 1 TABLET BY MOUTH TWICE DAILY 60 Tab 0    JANUVIA 100 mg tablet TAKE 1 TABLET BY MOUTH DAILY 30 Tab 0    biotin 10,000 mcg cap Take 1 Cap by mouth daily.  fluticasone (FLONASE) 50 mcg/actuation nasal spray 2 Sprays by Both Nostrils route daily as needed for Rhinitis.  lisinopril (PRINIVIL, ZESTRIL) 2.5 mg tablet Take 2.5 mg by mouth nightly.  OTHER,NON-FORMULARY, Take 1 Tab by mouth daily. Vitamin D & E      guar gum (BENEFIBER) packet Take 1 Packet by mouth nightly as needed.  nystatin (MYCOSTATIN) topical cream Apply  to affected area three (3) times daily. As needed for skin irritation. Use a think layer 30 g 0    triamcinolone acetonide (KENALOG) 0.1 % topical cream Apply  to affected area three (3) times daily. As needed for skin irritation, use thin layer.  30 g 0    ivermectin (SOOLANTRA) 1 % crea by Apply Externally route daily. Indications: ACNE ROSACEA      CALCIUM PO Take 1 Caplet by mouth daily.  DOCOSAHEXANOIC ACID/EPA (FISH OIL PO) Take 1 Cap by mouth daily.  gabapentin (NEURONTIN) 300 mg capsule Take 600 mg by mouth three (3) times daily.  glimepiride (AMARYL) 2 mg tablet Take 2 mg by mouth three (3) times daily.  magnesium oxide (MAG-OX) 400 mg tablet Take 400 mg by mouth daily.  INVOKANA 100 mg tablet Take 1 tablet by mouth  daily before breakfast 90 Tab 1    Alpha Lipoic Acid 600 mg cap Take 1 Cap by mouth two (2) times a day.  L-Mfolate-B6 Phos-Methyl-B12 (METANX) 3-35-2 mg tab tab Take 1 Tab by mouth two (2) times a day. Indications: neuropathy      CHROM GENO/BRINDAL BERRY (GARCINIA CAMBOGIA PO) Take 1 Tab by mouth daily.  pravastatin (PRAVACHOL) 40 mg tablet TAKE 1 TABLET NIGHTLY (NEEDS APPOINTMENT AS SOON AS POSSIBLE FOR FASTING LABS AND APPOINTMENT) 30 Tab 0    TRUETEST TEST STRIPS strip TEST THREE TIMES DAILY 100 Strip 1    CRANBERRY EXTRACT-VIT C PO Take 2 Caps by mouth daily.  cinnamon bark (CINNAMON) 500 mg cap Take 2 Caps by mouth daily.  loratadine (CLARITIN) 10 mg tablet Take 10 mg by mouth nightly. Allergies   Allergen Reactions    Latex Other (comments)     Patient states it burns around her mouth.     Other Food Other (comments)     Yogurt - stomach cramping    Betadine [Povidone-Iodine] Itching     Pt states this causes \"extreme\" itching    Codeine Anaphylaxis, Rash, Nausea Only and Other (comments)     HEADACHE  Tolerates tramadol    Dilaudid [Hydromorphone (Bulk)] Anaphylaxis, Itching, Nausea Only and Other (comments)     HALLUCINATIONS  Tolerates tramadol      Hydrocodone Anaphylaxis, Rash, Nausea Only and Vertigo     Facial - eyelid swelling-had to go to ER for reaction, HALLUCINATIONS  Tolerates tramadol      Ditropan [Oxybutynin Chloride] Swelling    Doxycycline Rash     PUSTULAR RASH- TOLERATING TETRACYCLINE    Keflex [Cephalexin] Nausea and Vomiting     Pain in abdomen AND FLU-LIKE SX      Metformin Nausea and Vomiting     Severe abdominal pain      Tape [Adhesive] Other (comments)     Redness/inflammed. Can only use paper tape. CAN USE BAND-AIDS. TOLERATES SURGICAL TAPE USED IN [place].  Trulicity [Dulaglutide] Other (comments)     Abdominal pain     Sulfamethoxazole-Trimethoprim Other (comments)     Cramping/flu-like symptoms     Family History   Problem Relation Age of Onset    Heart Disease Mother     Hypertension Mother     COPD Mother     Diabetes Father     Other Son         INOPERABLE BRAIN TUMOR    Gout Son     Celiac Disease Son     Anesth Problems Neg Hx      Social History     Tobacco Use    Smoking status: Never Smoker    Smokeless tobacco: Never Used    Tobacco comment: Never used vapor or e-cigs    Substance Use Topics    Alcohol use: No     Patient Active Problem List   Diagnosis Code    Diabetes mellitus with neuropathy ([facility] [address] E11.40    Postmenopausal bleeding N95.0    Fibromyalgia M79.7    HTN (hypertension) I10    Breast cancer ([facility] [address] C50.919    Scoliosis M41.9    ACP (advance care planning) Z71.89    Left-sided weakness R53.1    Acute transverse myelitis (HCC) G37.3    Abnormal MRI, cervical spine R93.7       Depression Risk Factor Screening:     3 most recent PHQ Screens 5/3/2019   PHQ Not Done -   Little interest or pleasure in doing things Not at all   Feeling down, depressed, irritable, or hopeless Not at all   Total Score PHQ 2 0     Alcohol Risk Factor Screening: You do not drink alcohol or very rarely. Functional Ability and Level of Safety:   Hearing Loss  Hearing is good. Activities of Daily Living  The home contains: no safety equipment. Patient does total self care    Fall Risk  Fall Risk Assessment, last 12 mths 5/3/2019   Able to walk? Yes   Fall in past 12 months? Yes   Fall with injury?  No   Number of falls in past 12 months 2   Fall Risk Score 2 Abuse Screen  Patient is not abused    Cognitive Screening   Evaluation of Cognitive Function:  Has your family/caregiver stated any concerns about your memory: no  Normal    Patient Care Team   Patient Care Team:  Aldair Mcpherson DO as PCP - General (Family Practice)  Stormy Silverio MD as Physician (Obstetrics & Gynecology)  Ronda Espino, RN as Ambulatory Care Navigator [facility])    Assessment/Plan   Education and counseling provided:  Are appropriate based on today's review and evaluation    Diagnoses and all orders for this visit:    1. Fibromyalgia  -     milnacipran (SAVELLA) 50 mg tablet; Take 1 Tab by mouth two (2) times a day. 2. Medicare annual wellness visit, subsequent    3. Screening for alcoholism  -     [place] ANNUAL ALCOHOL SCREEN 15 MIN        Health Maintenance Due   Topic Date Due    Shingrix Vaccine Age 49> (1 of 2) 02/18/2001    Pneumococcal 65+ years (1 of 2 - PCV13) 02/18/2016    HEMOGLOBIN A1C Q6M  12/06/2017    FOOT EXAM Q1  12/14/2017    MEDICARE YEARLY EXAM  03/22/2018    LIPID PANEL Q1  04/01/2018    FOBT Q 1 YEAR AGE 50-75  10/31/2018    MICROALBUMIN Q1  12/19/2018    GLAUCOMA SCREENING Q2Y  02/24/2019    EYE EXAM RETINAL OR DILATED  02/24/2019    BREAST CANCER SCRN MAMMOGRAM  06/12/2019     I have discussed the diagnosis with the patient and the intended plan as seen in the above orders. The patient has received an after-visit summary and questions were answered concerning future plans. Pt conveyed understanding of plan.        [facility]

## 2019-05-31 ENCOUNTER — OFFICE VISIT (OUTPATIENT)
Dept: OBGYN CLINIC | Age: 69
End: 2019-05-31

## 2019-05-31 DIAGNOSIS — R93.89 THICKENED ENDOMETRIUM: Primary | ICD-10-CM

## 2019-05-31 NOTE — PROGRESS NOTES
[facility]  OFFICE PROCEDURE PROGRESS NOTE        Chart reviewed for the following:   Zully OLIVEIRA LPN, have reviewed the History, Physical and updated the Allergic reactions for [address] performed immediately prior to start of procedure:   Zully OLIVEIRA LPN, have performed the following reviews on Nicole Mora prior to the start of the procedure:            * Patient was identified by name and date of birth   * Agreement on procedure being performed was verified  * Risks and Benefits explained to the patient  * Procedure site verified and marked as necessary  * Patient was positioned for comfort  * Consent was signed and verified     Time: 11:59pm      Date of procedure: 2019    Procedure performed by:  Chago Velasquez MD    Patient assisted by: self    How tolerated by patient: tolerated the procedure well with no complications    Post Procedural Pain Scale: 0 - No Hurt    SONOHYSTEROGRAPHY    Nicole Mora is a ,  77 y.o. female [facility] whose No LMP recorded. Patient is postmenopausal.   , presents for a sonohysterography. The indications for this procedure were reviewed with the patient. The procedure was explained in detail and all questions were answered. Procedure: The patient was placed in the lithotomy position. A graves speculum was introduced into the vagina and the cervix was visualized. The cervix was prepped with iodine solution. A Cook's Hysterography catheter was then introduced into the uterine cavity and the speculum was removed. Sterile sonohysterography with 3D Reconstruction was performed. The endometrial cavity was distended with sterile saline. The findings are as follows: abnormal cavity with polyp   The patient tolerated the procedure well without complication, and was discharged to home. A: endometrial polyp  P: hyst D&C with myosure  Pt counseled concerning risks of surgery.  Risks disc include bleeding, transfusion, infection, readmission, abscess drainage, injury to abdominal organs including bowel, bladder, ureters, need for additional surgery, injury may not be recognized at time of surgery, and risk of death.

## 2019-05-31 NOTE — PATIENT INSTRUCTIONS
Female Reproductive Organs, Side View: Anatomy Sketch    Current as of: May 14, 2018  Content Version: 11.9  © 4280-8690 [employer]. Care instructions adapted under license by [employer] (which disclaims liability or warranty for this information). If you have questions about a medical condition or this instruction, always ask your healthcare professional. [place] [address] any warranty or liability for your use of this information.

## 2019-06-04 DIAGNOSIS — N84.0 ENDOMETRIAL POLYP: Primary | ICD-10-CM

## 2019-06-07 LAB — HBA1C MFR BLD HPLC: 7.7 %

## 2019-06-10 ENCOUNTER — HOSPITAL ENCOUNTER (OUTPATIENT)
Dept: DIABETES SERVICES | Age: 69
Discharge: HOME | End: 2019-06-10
Payer: MEDICARE

## 2019-06-10 DIAGNOSIS — E11.9 DIABETES MELLITUS WITHOUT COMPLICATION (HCC): ICD-10-CM

## 2019-06-10 PROCEDURE — 97802 MEDICAL NUTRITION INDIV IN: CPT | Performed by: DIETITIAN, REGISTERED

## 2019-06-10 NOTE — DIABETES MGMT
Diabetes Treatment  Center - Nutrition Evaluation       DATE: 6/10/2019      REFERRING PHYSICIAN: Dr. Veronika Romeo  NAME: Aubrie Cardoso : 1950 AGE: 77 y.o. GENDER: female  REASON FOR VISIT: MNT for Type 2 diabetes and IBS    ASSESSMENT:  Past Medical Hx: HTN, breast cancer, Cataracts, GERD, IBS, High Cholesterol, Diverticulosis, ADD, Fibromyalgia, arthritis, DJD, Spinal stenosis. LABS: A1c on 6-3-19 was 7.7%  Lab Results   Component Value Date/Time    Hemoglobin A1c 7.2 (H) 2017 09:36 AM     Lab Results   Component Value Date/Time    Creatinine 0.53 (L) 2019 04:01 PM     CrCl cannot be calculated (Unknown ideal weight.). Lab Results   Component Value Date/Time    Cholesterol, total 179 2017 04:30 PM    HDL Cholesterol 72 2017 04:30 PM    LDL, calculated 77 2017 04:30 PM    Triglyceride 150 (H) 2017 04:30 PM    CHOL/HDL Ratio 2.5 2017 04:30 PM       MEDICATIONS/SUPPLEMENTS:   [unfilled]  Prior to Admission medications    Medication Sig Start Date End Date Taking? Authorizing Provider   milnacipran (SAVELLA) 50 mg tablet Take 1 Tab by mouth two (2) times a day. 19   Joseluis Lara,    SURGICAL BRA misc Mastectomy bra . Wear as needed as directed 3/5/19   Lars Ruiz MD   MASTECTOMY PROSTHESIS [facility] One breast prosthesis. Use as directed as needed 3/5/19   Lars Ruiz MD   baclofen (LIORESAL) 10 mg tablet TAKE 1 TABLET BY MOUTH THREE TIMES DAILY AS NEEDED 19   Ana Haney NP   fexofenadine-pseudoephedrine (ALLEGRA-D 24 HOUR) 180-240 mg per tablet Take 1 Tab by mouth daily. Provider, Historical   linaclotide (LINZESS) 145 mcg cap capsule Take 1 Cap by mouth Daily (before breakfast). To cover until mail order arrives 19   Deanne Mcfadden, DO   albuterol (PROVENTIL HFA, VENTOLIN HFA, PROAIR HFA) 90 mcg/actuation inhaler Take 2 Puffs by inhalation every six (6) hours as needed for Wheezing or Shortness of Breath.  19   Jose Lara DO JARDIANCE 10 mg tablet  2/26/18   Provider, Historical   diazePAM (VALIUM) 5 mg tablet Take 1 tab PO 1 hour prior to MRI 5/22/18   Jennifer Mcfadden DO   bethanechol (URECHOLINE) 10 mg tablet TAKE 1 TABLET BY MOUTH TWICE DAILY 2/20/18   Jennifer Mcfadden DO   JANUVIA 100 mg tablet TAKE 1 TABLET BY MOUTH DAILY 11/7/17   Jennifer Mcfadden DO   biotin 10,000 mcg cap Take 1 Cap by mouth daily. Provider, Historical   fluticasone (FLONASE) 50 mcg/actuation nasal spray 2 Sprays by Both Nostrils route daily as needed for Rhinitis. Provider, Historical   lisinopril (PRINIVIL, ZESTRIL) 2.5 mg tablet Take 2.5 mg by mouth nightly. Provider, Historical   OTHER,NON-FORMULARY, Take 1 Tab by mouth daily. Vitamin D & E    Provider, Historical   guar gum (BENEFIBER) packet Take 1 Packet by mouth nightly as needed. Provider, Historical   nystatin (MYCOSTATIN) topical cream Apply  to affected area three (3) times daily. As needed for skin irritation. Use a think layer 9/26/17   Tylor Anthony MD   triamcinolone acetonide (KENALOG) 0.1 % topical cream Apply  to affected area three (3) times daily. As needed for skin irritation, use thin layer. 9/26/17   Tylor Anthony MD   ivermectin (SOOLANTRA) 1 % crea by Apply Externally route daily. Indications: ACNE ROSACEA    Provider, Historical   CALCIUM PO Take 1 Caplet by mouth daily. Provider, Historical   DOCOSAHEXANOIC ACID/EPA (FISH OIL PO) Take 1 Cap by mouth daily. Provider, Historical   gabapentin (NEURONTIN) 300 mg capsule Take 600 mg by mouth three (3) times daily. Provider, Historical   glimepiride (AMARYL) 2 mg tablet Take 2 mg by mouth three (3) times daily. Provider, Historical   magnesium oxide (MAG-OX) 400 mg tablet Take 400 mg by mouth daily. Provider, Historical   INVOKANA 100 mg tablet Take 1 tablet by mouth  daily before breakfast 3/1/17   Meagan Mcfadden DO   Alpha Lipoic Acid 600 mg cap Take 1 Cap by mouth two (2) times a day.     Provider, Historical L-Mfolate-B6 Phos-Methyl-B12 (METANX) 3-35-2 mg tab tab Take 1 Tab by mouth two (2) times a day. Indications: neuropathy    Provider, Historical   CHROM GENO/BRINDAL BERRY (GARCINIA CAMBOGIA PO) Take 1 Tab by mouth daily. Provider, Historical   pravastatin (PRAVACHOL) 40 mg tablet TAKE 1 TABLET NIGHTLY (NEEDS APPOINTMENT AS SOON AS POSSIBLE FOR FASTING LABS AND APPOINTMENT) 7/2/15   Syed You MD   TRUETEST TEST STRIPS strip TEST THREE TIMES DAILY 3/31/14   Syed You MD   CRANBERRY EXTRACT-VIT C PO Take 2 Caps by mouth daily. Provider, Historical   cinnamon bark (CINNAMON) 500 mg cap Take 2 Caps by mouth daily. Provider, Historical   loratadine (CLARITIN) 10 mg tablet Take 10 mg by mouth nightly. Provider, Historical       FOOD ALLERGIES/INTOLERANCES: tomato and yogurt, some diary and some spices     ANTHROPOMETRICS:    Ht Readings from Last 1 Encounters:   05/29/19 5' 7\" (1.702 m)         Weight  6-10-19 172.6   IBW: 135 # +/- 10%    ABW: 144.4#   BMI: 26.24      Reported Wt Hx: steady    Estimated Nutritional Needs:   Kcal: 1636  Protein: 52-65 grams    Reported Diet Hx:    24 Hour Diet Recall  Breakfast  1 pkt Maple Brown Sugar oatmeal mixed with water and almond milk   Lunch  Tuna salad on wheat bread   Dinner  A meat, rice or baked potato and a salad or green veggie   Snacks  Chips, Mary Protein bars, Special K Bars,    Beverages  Diet juice, water, tea with splenda and coffee with splenda and heavy cream, sugar free ICE drinks     Exercise/Physical Actvity: Limited due to pain but does do some walking using her walker. Environmental/Social: lives with , he is very supportive. She is not working. NUTRITION DIAGNOSIS: Food and nutrition related knowledge deficit due lack of prior exposure to information as supported by pt seeking nutrition education in preparation for management of diabetes and IBS.      NUTRITION INTERVENTION: Pt was seen one on one for nutrition education for management of diabetes and IBS. Her  attended the appointment with her. She is currently eating 3 meals per day and drinking only sugar free beverages. She is usually following the plate method for meal planning. We discussed aiming for 45-60 grams of carbs at meals to help with the management of her blood sugars. Encouraged her to continue with sugar free beverage choices. We focused on eating low to moderate FODMAP foods to aid with her IBS symptoms. She will try the elimination phase of diet over the next month to see if eliminating high FODMAP foods impacts her IBS symptoms. After the elimination she will return for follow up and we will start phase 2 which re-introduced high FODMAP foods one at a time to monitor IBS symptoms. PATIENT GOALS:  1. Eat low and Moderate FODMAP foods- doing the elimination phase of the diet for the next month  2. Aim for 45-60 grams of carbs the list of low to moderate FODMAP foods provided             Specific tips and techniques to facilitate compliance with above recommendations were provided and discussed. Pt was strongly encourage to begin making necessary changes now and follow as scheduled on 7-16-19. If you have any questions please feel free to contact me at [phone].     Wendy Fabian RD, CDE

## 2019-06-18 ENCOUNTER — HOSPITAL ENCOUNTER (OUTPATIENT)
Dept: PREADMISSION TESTING | Age: 69
Discharge: HOME | End: 2019-06-18
Payer: MEDICARE

## 2019-06-18 ENCOUNTER — HOSPITAL ENCOUNTER (OUTPATIENT)
Dept: NON INVASIVE DIAGNOSTICS | Age: 69
Discharge: HOME | End: 2019-06-18
Attending: OBSTETRICS & GYNECOLOGY
Payer: MEDICARE

## 2019-06-18 VITALS
SYSTOLIC BLOOD PRESSURE: 141 MMHG | HEIGHT: 68 IN | HEART RATE: 77 BPM | WEIGHT: 173.28 LBS | TEMPERATURE: 98 F | RESPIRATION RATE: 16 BRPM | BODY MASS INDEX: 26.26 KG/M2 | DIASTOLIC BLOOD PRESSURE: 67 MMHG | OXYGEN SATURATION: 97 %

## 2019-06-18 DIAGNOSIS — N84.0 ENDOMETRIAL POLYP: ICD-10-CM

## 2019-06-18 LAB
ABO + RH BLD: NORMAL
ANION GAP SERPL CALC-SCNC: 3 MMOL/L (ref 5–15)
ATRIAL RATE: 71 BPM
BLOOD GROUP ANTIBODIES SERPL: NORMAL
BUN SERPL-MCNC: 15 MG/DL (ref 6–20)
BUN/CREAT SERPL: 27 (ref 12–20)
CALCIUM SERPL-MCNC: 9.4 MG/DL (ref 8.5–10.1)
CALCULATED P AXIS, ECG09: 20 DEGREES
CALCULATED R AXIS, ECG10: -45 DEGREES
CALCULATED T AXIS, ECG11: 73 DEGREES
CHLORIDE SERPL-SCNC: 105 MMOL/L (ref 97–108)
CO2 SERPL-SCNC: 33 MMOL/L (ref 21–32)
CREAT SERPL-MCNC: 0.56 MG/DL (ref 0.55–1.02)
DIAGNOSIS, 93000: NORMAL
ERYTHROCYTE [DISTWIDTH] IN BLOOD BY AUTOMATED COUNT: 13.3 % (ref 11.5–14.5)
GLUCOSE SERPL-MCNC: 183 MG/DL (ref 65–100)
HCT VFR BLD AUTO: 44.5 % (ref 35–47)
HGB BLD-MCNC: 13.8 G/DL (ref 11.5–16)
MCH RBC QN AUTO: 26.7 PG (ref 26–34)
MCHC RBC AUTO-ENTMCNC: 31 G/DL (ref 30–36.5)
MCV RBC AUTO: 86.2 FL (ref 80–99)
NRBC # BLD: 0 K/UL (ref 0–0.01)
NRBC BLD-RTO: 0 PER 100 WBC
P-R INTERVAL, ECG05: 150 MS
PLATELET # BLD AUTO: 201 K/UL (ref 150–400)
PMV BLD AUTO: 11 FL (ref 8.9–12.9)
POTASSIUM SERPL-SCNC: 5.4 MMOL/L (ref 3.5–5.1)
Q-T INTERVAL, ECG07: 394 MS
QRS DURATION, ECG06: 138 MS
QTC CALCULATION (BEZET), ECG08: 428 MS
RBC # BLD AUTO: 5.16 M/UL (ref 3.8–5.2)
SODIUM SERPL-SCNC: 141 MMOL/L (ref 136–145)
SPECIMEN EXP DATE BLD: NORMAL
VENTRICULAR RATE, ECG03: 71 BPM
WBC # BLD AUTO: 4.6 K/UL (ref 3.6–11)

## 2019-06-18 PROCEDURE — 80048 BASIC METABOLIC PNL TOTAL CA: CPT

## 2019-06-18 PROCEDURE — 93005 ELECTROCARDIOGRAM TRACING: CPT

## 2019-06-18 PROCEDURE — 85027 COMPLETE CBC AUTOMATED: CPT

## 2019-06-18 PROCEDURE — 86900 BLOOD TYPING SEROLOGIC ABO: CPT

## 2019-06-18 PROCEDURE — 36415 COLL VENOUS BLD VENIPUNCTURE: CPT

## 2019-06-18 RX ORDER — FEXOFENADINE HCL AND PSEUDOEPHEDRINE HCI 60; 120 MG/1; MG/1
1 TABLET, EXTENDED RELEASE ORAL EVERY 12 HOURS
COMMUNITY
End: 2019-09-23 | Stop reason: DRUGHIGH

## 2019-06-18 RX ORDER — BACLOFEN 10 MG/1
10 TABLET ORAL
COMMUNITY
End: 2021-09-29 | Stop reason: ALTCHOICE

## 2019-06-18 RX ORDER — PRAVASTATIN SODIUM 40 MG/1
40 TABLET ORAL
COMMUNITY
End: 2020-06-15

## 2019-06-18 RX ORDER — IVERMECTIN 10 MG/G
CREAM TOPICAL DAILY
COMMUNITY
End: 2022-05-11 | Stop reason: ALTCHOICE

## 2019-06-18 RX ORDER — BETHANECHOL CHLORIDE 5 MG/1
5 TABLET ORAL 2 TIMES DAILY
COMMUNITY
End: 2021-09-29 | Stop reason: ALTCHOICE

## 2019-06-18 RX ORDER — CLINDAMYCIN PHOSPHATE 10 MG/G
GEL TOPICAL 2 TIMES DAILY
COMMUNITY
End: 2019-06-18

## 2019-06-18 RX ORDER — LANOLIN ALCOHOL/MO/W.PET/CERES
400 CREAM (GRAM) TOPICAL
COMMUNITY

## 2019-06-18 RX ORDER — FLUTICASONE PROPIONATE 50 MCG
2 SPRAY, SUSPENSION (ML) NASAL
COMMUNITY

## 2019-06-18 RX ORDER — GABAPENTIN 600 MG/1
600 TABLET ORAL 3 TIMES DAILY
COMMUNITY
End: 2020-09-16 | Stop reason: ALTCHOICE

## 2019-06-18 NOTE — PERIOP NOTES
[address] , [address] [facility]                            MAIN OR                                  [phone]   MAIN PRE OP                          [phone]                                                                                AMBULATORY PRE OP          [phone]  PRE-ADMISSION TESTING    [phone]     Surgery Date:   7/3/2019      Is surgery arrival time given by surgeon? NO  If NO, [id number] staff will call you between 3 and 7pm the day before your surgery with your arrival time. (If your surgery is on a Monday, we will call you the Friday before.)    Call [phone] after 7pm Monday-Friday if you did not receive your arrival time. INSTRUCTIONS BEFORE YOUR SURGERY   When You  Arrive   Arrive at the [address] [facility] on the day of your surgery  Have your insurance card, photo ID, and any copayment (if needed)     Food   and   Drink   NO food or drink after midnight the night before surgery    This means NO water, gum, mints, coffee, juice, etc.  No alcohol (beer, wine, liquor) 24 hours before and after surgery     Medications to   TAKE   Morning of Surgery   MEDICATIONS TO TAKE THE MORNING OF SURGERY WITH A SIP OF WATER:    Bethanechol     Medications  To  STOP      7 days before surgery LAST DOSE OF ALL SUPPLEMENTS AND VITAMINS WILL BE June 25th     Non-Steroidal anti-inflammatory Drugs (NSAID's): for example, Ibuprofen (Advil, Motrin), Naproxen (Aleve)   Aspirin, if taking for pain    Herbal supplements, vitamins, and fish oil   Other:Metanex (because of vitamins)  (Pain medications not listed above, including Tylenol may be taken)   Blood  Thinners    If you take  Aspirin, Plavix, Coumadin, or any blood-thinning or anti-blood clot medicine, talk to the doctor who prescribed the medications for pre-operative instructions.      Bathing Clothing  Jewelry  Valuables       If you shower the morning of surgery, please do not apply anything to your skin (lotions, powders, deodorant, or makeup, especially mascara)   Follow all special bath instructions (for total joint replacement, spine and bowel surgeries)   Do not shave or trim anywhere 24 hours before surgery   Wear your hair loose or down; no pony-tails, buns, or metal hair clips   Wear loose, comfortable, clean clothes   Wear glasses instead of contacts   Leave money, valuables, and jewelry, including body piercings, at home     Going Home       or Spending the Night    SAME-DAY SURGERY: You must have a responsible adult drive you home and stay with you 24 hours after surgery   ADMITS: If your doctor is keeping you into the hospital after surgery, leave personal belongings/luggage in your car until you have a hospital room number. Hospital discharge time is 12 noon  Drivers must be here before 12 noon unless you are told differently   Special Instructions Free  parking 7am-5pm. Please bring your advanced directive to registration and also bring your nerve stimulator remote. Follow all instructions so your surgery wont be cancelled. Please, be on time. If a situation occurs and you are delayed the day of surgery, call [phone]. If your physical condition changes (like a fever, cold, flu, etc.) call your surgeon. The patient was contacted  in person. Home medication reviewed and verified during PAT appointment. The patient verbalizes understanding of all instructions and does not  need reinforcement.

## 2019-06-19 NOTE — PERIOP NOTES
Discussed labs and EKG with Terrell Mulligan NP. Faxed labs and EKG to Dr. Carla Frey (PCP) to review via EMR and hard copy. 6/21/19 Confirmed above fax was received and given to Dr. Carla Frey for review. 6/24/19 MD not in office  6/25/19  Requested MD review EKG. Requested call back when EKG was reviewed.

## 2019-06-21 ENCOUNTER — DOCUMENTATION ONLY (OUTPATIENT)
Dept: FAMILY MEDICINE CLINIC | Age: 69
End: 2019-06-21

## 2019-06-24 NOTE — PERIOP NOTES
Called Dr. Dante Robledo regarding EKG review. Dr. Alf Gaston returns to office 6:25. Will call back tomorrow.

## 2019-06-25 ENCOUNTER — TELEPHONE (OUTPATIENT)
Dept: FAMILY MEDICINE CLINIC | Age: 69
End: 2019-06-25

## 2019-06-25 NOTE — TELEPHONE ENCOUNTER
Miladys with PAT called to follow up on pt's chest xray and if you had any concerns. She will be closing out the acct.   Phone [phone]

## 2019-06-26 NOTE — PERIOP NOTES
CC notes reviewed from Dr. Alda Augustine office. Call placed to office that EKG/labwork have been sent for review and not a CXR, requested a return call with any concerns or if any additional testing is required. Received a call from Perry De La Cruz at Dr. Alda Augustine office. Per Perry De La Cruz, Dr. Xavier Lunsford has reviewed EKG/labwork and no further testing is required.

## 2019-06-26 NOTE — TELEPHONE ENCOUNTER
Called and spoke with German Constantino with PAT. She states it was not regarding a CXR. States it was regarding an EKG and labs. See encounter for today for Dr. Mert Norwood documentation.

## 2019-07-02 ENCOUNTER — DOCUMENTATION ONLY (OUTPATIENT)
Dept: FAMILY MEDICINE CLINIC | Age: 69
End: 2019-07-02

## 2019-07-02 ENCOUNTER — ANESTHESIA EVENT (OUTPATIENT)
Dept: SURGERY | Age: 69
End: 2019-07-02
Payer: MEDICARE

## 2019-07-02 NOTE — PROGRESS NOTES
[employer] Request for medical records was faxed to [address] [place] [address] to be processed

## 2019-07-03 ENCOUNTER — ANESTHESIA (OUTPATIENT)
Dept: SURGERY | Age: 69
End: 2019-07-03
Payer: MEDICARE

## 2019-07-03 ENCOUNTER — HOSPITAL ENCOUNTER (OUTPATIENT)
Age: 69
Setting detail: OUTPATIENT SURGERY
Discharge: HOME | End: 2019-07-03
Attending: OBSTETRICS & GYNECOLOGY | Admitting: OBSTETRICS & GYNECOLOGY
Payer: MEDICARE

## 2019-07-03 VITALS
SYSTOLIC BLOOD PRESSURE: 149 MMHG | TEMPERATURE: 97.6 F | DIASTOLIC BLOOD PRESSURE: 52 MMHG | HEART RATE: 70 BPM | RESPIRATION RATE: 13 BRPM | OXYGEN SATURATION: 94 %

## 2019-07-03 DIAGNOSIS — N84.0 ENDOMETRIAL POLYP: ICD-10-CM

## 2019-07-03 LAB
ABO + RH BLD: NORMAL
BLOOD GROUP ANTIBODIES SERPL: NORMAL
GLUCOSE BLD STRIP.AUTO-MCNC: 135 MG/DL (ref 65–100)
GLUCOSE BLD STRIP.AUTO-MCNC: 136 MG/DL (ref 65–100)
SERVICE CMNT-IMP: ABNORMAL
SERVICE CMNT-IMP: ABNORMAL
SPECIMEN EXP DATE BLD: NORMAL

## 2019-07-03 PROCEDURE — 77030037417 HC DEV TISS RMVL HOLO -H: Performed by: OBSTETRICS & GYNECOLOGY

## 2019-07-03 PROCEDURE — 77030033136 HC TBNG INFLO AQUILEX ST HOLO -C: Performed by: OBSTETRICS & GYNECOLOGY

## 2019-07-03 PROCEDURE — 86900 BLOOD TYPING SEROLOGIC ABO: CPT

## 2019-07-03 PROCEDURE — 76010000138 HC OR TIME 0.5 TO 1 HR: Performed by: OBSTETRICS & GYNECOLOGY

## 2019-07-03 PROCEDURE — 77030020143 HC AIRWY LARYN INTUB CGAS -A: Performed by: ANESTHESIOLOGY

## 2019-07-03 PROCEDURE — 74011250636 HC RX REV CODE- 250/636: Performed by: ANESTHESIOLOGY

## 2019-07-03 PROCEDURE — 82962 GLUCOSE BLOOD TEST: CPT

## 2019-07-03 PROCEDURE — 76060000032 HC ANESTHESIA 0.5 TO 1 HR: Performed by: OBSTETRICS & GYNECOLOGY

## 2019-07-03 PROCEDURE — 77030032490 HC SLV COMPR SCD KNE COVD -B

## 2019-07-03 PROCEDURE — 77030033650 HC DEV TISS RMVL MYOSUR HOLO -F: Performed by: OBSTETRICS & GYNECOLOGY

## 2019-07-03 PROCEDURE — 88305 TISSUE EXAM BY PATHOLOGIST: CPT

## 2019-07-03 PROCEDURE — 77030005537 HC CATH URETH BARD -A: Performed by: OBSTETRICS & GYNECOLOGY

## 2019-07-03 PROCEDURE — 76210000021 HC REC RM PH II 0.5 TO 1 HR: Performed by: OBSTETRICS & GYNECOLOGY

## 2019-07-03 PROCEDURE — 77030020782 HC GWN BAIR PAWS FLX 3M -B

## 2019-07-03 PROCEDURE — 74011250636 HC RX REV CODE- 250/636

## 2019-07-03 PROCEDURE — 77030018836 HC SOL IRR NACL ICUM -A: Performed by: OBSTETRICS & GYNECOLOGY

## 2019-07-03 PROCEDURE — 76210000006 HC OR PH I REC 0.5 TO 1 HR: Performed by: OBSTETRICS & GYNECOLOGY

## 2019-07-03 PROCEDURE — 36415 COLL VENOUS BLD VENIPUNCTURE: CPT

## 2019-07-03 PROCEDURE — 77030033137 HC TBNG OUTFLO AQUILEX ST HOLO -B: Performed by: OBSTETRICS & GYNECOLOGY

## 2019-07-03 RX ORDER — ALBUTEROL SULFATE 0.83 MG/ML
2.5 SOLUTION RESPIRATORY (INHALATION) AS NEEDED
Status: DISCONTINUED | OUTPATIENT
Start: 2019-07-03 | End: 2019-07-03 | Stop reason: HOSPADM

## 2019-07-03 RX ORDER — CEFAZOLIN SODIUM 1 G/3ML
INJECTION, POWDER, FOR SOLUTION INTRAMUSCULAR; INTRAVENOUS AS NEEDED
Status: DISCONTINUED | OUTPATIENT
Start: 2019-07-03 | End: 2019-07-03 | Stop reason: HOSPADM

## 2019-07-03 RX ORDER — ONDANSETRON 2 MG/ML
INJECTION INTRAMUSCULAR; INTRAVENOUS AS NEEDED
Status: DISCONTINUED | OUTPATIENT
Start: 2019-07-03 | End: 2019-07-03 | Stop reason: HOSPADM

## 2019-07-03 RX ORDER — FENTANYL CITRATE 50 UG/ML
INJECTION, SOLUTION INTRAMUSCULAR; INTRAVENOUS AS NEEDED
Status: DISCONTINUED | OUTPATIENT
Start: 2019-07-03 | End: 2019-07-03 | Stop reason: HOSPADM

## 2019-07-03 RX ORDER — PROPOFOL 10 MG/ML
INJECTION, EMULSION INTRAVENOUS AS NEEDED
Status: DISCONTINUED | OUTPATIENT
Start: 2019-07-03 | End: 2019-07-03 | Stop reason: HOSPADM

## 2019-07-03 RX ORDER — ONDANSETRON 2 MG/ML
4 INJECTION INTRAMUSCULAR; INTRAVENOUS AS NEEDED
Status: DISCONTINUED | OUTPATIENT
Start: 2019-07-03 | End: 2019-07-03 | Stop reason: HOSPADM

## 2019-07-03 RX ORDER — MIDAZOLAM HYDROCHLORIDE 1 MG/ML
INJECTION, SOLUTION INTRAMUSCULAR; INTRAVENOUS AS NEEDED
Status: DISCONTINUED | OUTPATIENT
Start: 2019-07-03 | End: 2019-07-03 | Stop reason: HOSPADM

## 2019-07-03 RX ORDER — DIPHENHYDRAMINE HYDROCHLORIDE 50 MG/ML
12.5 INJECTION, SOLUTION INTRAMUSCULAR; INTRAVENOUS AS NEEDED
Status: DISCONTINUED | OUTPATIENT
Start: 2019-07-03 | End: 2019-07-03 | Stop reason: HOSPADM

## 2019-07-03 RX ORDER — LIDOCAINE HYDROCHLORIDE 20 MG/ML
INJECTION, SOLUTION EPIDURAL; INFILTRATION; INTRACAUDAL; PERINEURAL AS NEEDED
Status: DISCONTINUED | OUTPATIENT
Start: 2019-07-03 | End: 2019-07-03 | Stop reason: HOSPADM

## 2019-07-03 RX ORDER — SODIUM CHLORIDE, SODIUM LACTATE, POTASSIUM CHLORIDE, CALCIUM CHLORIDE 600; 310; 30; 20 MG/100ML; MG/100ML; MG/100ML; MG/100ML
150 INJECTION, SOLUTION INTRAVENOUS CONTINUOUS
Status: DISCONTINUED | OUTPATIENT
Start: 2019-07-03 | End: 2019-07-03 | Stop reason: HOSPADM

## 2019-07-03 RX ORDER — SODIUM CHLORIDE, SODIUM LACTATE, POTASSIUM CHLORIDE, CALCIUM CHLORIDE 600; 310; 30; 20 MG/100ML; MG/100ML; MG/100ML; MG/100ML
125 INJECTION, SOLUTION INTRAVENOUS CONTINUOUS
Status: DISCONTINUED | OUTPATIENT
Start: 2019-07-03 | End: 2019-07-03 | Stop reason: HOSPADM

## 2019-07-03 RX ORDER — LIDOCAINE HYDROCHLORIDE 10 MG/ML
0.1 INJECTION, SOLUTION EPIDURAL; INFILTRATION; INTRACAUDAL; PERINEURAL AS NEEDED
Status: DISCONTINUED | OUTPATIENT
Start: 2019-07-03 | End: 2019-07-03 | Stop reason: HOSPADM

## 2019-07-03 RX ADMIN — ONDANSETRON 4 MG: 2 INJECTION INTRAMUSCULAR; INTRAVENOUS at 09:17

## 2019-07-03 RX ADMIN — SODIUM CHLORIDE, POTASSIUM CHLORIDE, SODIUM LACTATE AND CALCIUM CHLORIDE: 600; 310; 30; 20 INJECTION, SOLUTION INTRAVENOUS at 08:41

## 2019-07-03 RX ADMIN — SODIUM CHLORIDE, POTASSIUM CHLORIDE, SODIUM LACTATE AND CALCIUM CHLORIDE 150 ML/HR: 600; 310; 30; 20 INJECTION, SOLUTION INTRAVENOUS at 07:00

## 2019-07-03 RX ADMIN — FENTANYL CITRATE 50 MCG: 50 INJECTION, SOLUTION INTRAMUSCULAR; INTRAVENOUS at 08:45

## 2019-07-03 RX ADMIN — PROPOFOL 120 MG: 10 INJECTION, EMULSION INTRAVENOUS at 08:48

## 2019-07-03 RX ADMIN — CEFAZOLIN SODIUM 2 G: 1 INJECTION, POWDER, FOR SOLUTION INTRAMUSCULAR; INTRAVENOUS at 08:57

## 2019-07-03 RX ADMIN — MIDAZOLAM HYDROCHLORIDE 2 MG: 1 INJECTION, SOLUTION INTRAMUSCULAR; INTRAVENOUS at 08:45

## 2019-07-03 RX ADMIN — LIDOCAINE HYDROCHLORIDE 80 MG: 20 INJECTION, SOLUTION EPIDURAL; INFILTRATION; INTRACAUDAL; PERINEURAL at 08:48

## 2019-07-03 NOTE — H&P
Gynecology History and Physical    Name: Tete Walker MRN: [id number] SSN: [id number]    YOB: 1950  Age: 77 y.o. Sex: female       Subjective:      Chief complaint: Thickened endometrium    Carlton Liu is a 77 y.o.  female with a history of endometrial thickening. Previous workup included a sonohysterogram which revealed polyp(s). Previous treatment measures included observation. She is admitted for Procedure(s) (LRB):  HYSTEROSCOPY/DILATATION AND CURETTAGE/POLYPECTOMY WITH MYOSURE (LATEX ALLERGY) (N/A). The current method of family planning is post menopausal status. OB History        3    Para   3    Term   3       0    AB   0    Living   3       SAB   0    TAB   0    Ectopic   0    Molar        Multiple   0    Live Births   3              Past Medical History:   Diagnosis Date    Abnormal MRI, cervical spine 4/3/2017    Abnormal pap 3/2015; 2016 Neg pap +HPV;  ASCUS +HPV    Arthritis     osteo-tests for RA were neg    Breast cancer, right breast (HCC)     Chronic pain     Diabetes (HCC)     Fibromyalgia     Ganglion cyst of wrist     LEFT    Hypercholesterolemia     Hypertension     Ill-defined condition     Prolapsed mitral valve    Memory loss     Per pt.      Murmur, cardiac     Neuropathy     Rosacea     Transverse myelopathy syndrome (HCC)     Unspecified adverse effect of anesthesia     \"SLOW TO WAKE UP, SENSITIVE TO ANESTHESIA, DO NOT COME AROUND FOR 2-3 DAYS\"      Past Surgical History:   Procedure Laterality Date    HX BACK SURGERY      HX CATARACT REMOVAL Bilateral 2014    HX COLPOSCOPY  2016    Neg path    HX MASTECTOMY Right 1996    tram flap    HX ORTHOPAEDIC Left     foot surgery    HX RHINOPLASTY N/A     HX SEPTOPLASTY N/A     HX TONSILLECTOMY      HX VASCULAR ACCESS      portacath left chest-removed after chemo    RECONSTR NOSE  2005    HOLE IN SEPTUM     Social History     Occupational History    Not on file   Tobacco Use    Smoking status: Never Smoker    Smokeless tobacco: Never Used    Tobacco comment: Never used vapor or e-cigs    Substance and Sexual Activity    Alcohol use: No    Drug use: No    Sexual activity: Not Currently     Partners: Male     Comment:      Family History   Problem Relation Age of Onset    Heart Disease Mother     Hypertension Mother     COPD Mother     Diabetes Father     Other Son         INOPERABLE BRAIN TUMOR    Gout Son     Celiac Disease Son     Anesth Problems Neg Hx         Allergies   Allergen Reactions    Latex Other (comments)     Patient states it burns around her mouth.  Other Food Other (comments)     Yogurt - stomach cramping    Betadine [Povidone-Iodine] Rash and Itching     Pt states this causes \"extreme\" itching    Codeine Anaphylaxis, Rash, Nausea Only and Other (comments)     HEADACHE  Tolerates tramadol    Dilaudid [Hydromorphone (Bulk)] Anaphylaxis, Itching, Nausea Only and Other (comments)     HALLUCINATIONS  Tolerates tramadol      Hydrocodone Anaphylaxis, Rash, Nausea Only and Vertigo     Facial - eyelid swelling-had to go to ER for reaction, HALLUCINATIONS  Tolerates tramadol      Ditropan [Oxybutynin Chloride] Swelling    Doxycycline Rash     PUSTULAR RASH- TOLERATING TETRACYCLINE    Iodine Other (comments)     Headache    Keflex [Cephalexin] Nausea and Vomiting     Pain in abdomen AND FLU-LIKE SX      Metformin Nausea and Vomiting     Severe abdominal pain      Tape [Adhesive] Other (comments)     Redness/inflammed. Can only use paper tape. CAN USE BAND-AIDS. TOLERATES SURGICAL TAPE USED IN [place] [facility].  Trulicity [Dulaglutide] Other (comments)     Abdominal pain     Sulfamethoxazole-Trimethoprim Other (comments)     Cramping/flu-like symptoms     Prior to Admission medications    Medication Sig Start Date End Date Taking? Authorizing Provider   BUTALBITAL-ASPIRIN-CAFFEINE PO Take 1 Tab by mouth as needed. Yes Provider, Historical   baclofen (LIORESAL) 10 mg tablet Take 10 mg by mouth nightly. Yes Provider, Historical   bethanechol (URECHOLINE) 5 mg tablet Take 5 mg by mouth two (2) times a day. Yes Provider, Historical   gabapentin (NEURONTIN) 600 mg tablet Take 600 mg by mouth three (3) times daily. Yes Provider, Historical   pravastatin (PRAVACHOL) 40 mg tablet Take 40 mg by mouth nightly. Yes Provider, Historical   magnesium oxide (MAG-OX) 400 mg tablet Take 400 mg by mouth every morning. Yes Provider, Historical   OTHER Take  by mouth daily. \"Vitamin D3 liquid\"   Yes Provider, Historical   OTHER Take 2 Tabs by mouth two (2) times a day. \"Benefiber\"   Yes Provider, Historical   cranberry fruit extract (CRANBERRY PO) Take 4,200 mg by mouth daily. Yes Provider, Historical   ivermectin (SOOLANTRA) 1 % crea Apply  to affected area daily. Yes Provider, Historical   linaclotide (LINZESS) 145 mcg cap capsule Take 1 Cap by mouth Daily (before breakfast). To cover until mail order arrives 1/16/19  Yes Fish Mcfadden,    JARDIANCE 10 mg tablet Take 10 mg by mouth every morning. 2/26/18  Yes Provider, Historical   lisinopril (PRINIVIL, ZESTRIL) 2.5 mg tablet Take 2.5 mg by mouth nightly. Yes Provider, Historical   CALCIUM PO Take 1 Caplet by mouth daily. Yes Provider, Historical   glimepiride (AMARYL) 2 mg tablet Take 2 mg by mouth three (3) times daily. Yes Provider, Historical   L-Mfolate-B6 Phos-Methyl-B12 (METANX) 3-35-2 mg tab tab Take 1 Tab by mouth two (2) times a day. Indications: neuropathy   Yes Provider, Historical   fluticasone propionate (FLONASE ALLERGY RELIEF) 50 mcg/actuation nasal spray 2 Sprays by Both Nostrils route every morning. Provider, Historical   fexofenadine-pseudoephedrine (ALLEGRA-D 12 HOUR)  mg Tb12 Take 1 Tab by mouth every twelve (12) hours. Provider, Historical   ALPHA LIPOIC ACID PO Take 1 Cap by mouth two (2) times a day.     Provider, Historical Review of Systems:  A comprehensive review of systems was negative except for that written in the History of Present Illness. Objective: There were no vitals filed for this visit. Physical Exam:  Heart: Regular rate and rhythm  Lung: clear to auscultation throughout lung fields, no wheezes, no rales, no rhonchi and normal respiratory effort  Abdomen: soft, nontender  External Genitalia: normal general appearance  Urinary system: urethral meatus normal  Vagina: normal mucosa without prolapse or lesions  Cervix: normal appearance  Adnexa: normal bimanual exam  Uterus: normal single, nontender    Assessment: Thickened endometrium with endometrial polyp    Plan:     Procedure(s) (LRB):  HYSTEROSCOPY/DILATATION AND CURETTAGE/POLYPECTOMY WITH MYOSURE (LATEX ALLERGY) (N/A)  Discussed the risks of surgery including the risks of bleeding, infection, deep vein thrombosis, and surgical injuries to internal organs including but not limited to the bowels, bladder, rectum, and female reproductive organs. The patient understands the risks; any and all questions were answered to the patient's satisfaction.     Signed By:  Imelda So MD     July 3, 2019

## 2019-07-03 NOTE — ANESTHESIA POSTPROCEDURE EVALUATION
Procedure(s): HYSTEROSCOPY/DILATATION AND CURETTAGE/POLYPECTOMY WITH MYOSURE (LATEX ALLERGY). general    Anesthesia Post Evaluation      Multimodal analgesia: multimodal analgesia not used between 6 hours prior to anesthesia start to PACU discharge  Patient location during evaluation: PACU  Patient participation: complete - patient participated  Level of consciousness: awake and alert  Pain score: 2  Pain management: satisfactory to patient  Airway patency: patent  Anesthetic complications: no  Cardiovascular status: acceptable  Respiratory status: acceptable  Hydration status: acceptable  Post anesthesia nausea and vomiting:  none      Vitals Value Taken Time   /52 7/3/2019  9:55 AM   Temp 36.4 °C (97.6 °F) 7/3/2019  9:55 AM   Pulse 69 7/3/2019  9:58 AM   Resp 14 7/3/2019  9:58 AM   SpO2 94 % 7/3/2019  9:58 AM   Vitals shown include unvalidated device data.

## 2019-07-03 NOTE — DISCHARGE INSTRUCTIONS
Patient Education        Hysteroscopy: What to Expect at Home  Your Recovery    An operative hysteroscopy is a procedure to find and treat problems with your uterus. It may have been done to remove growths from the uterus. Or it may have been done to treat fertility problems or abnormal bleeding. You may have cramps and vaginal bleeding for several days. If the doctor filled your uterus with air during the procedure, you may have gas pains, a feeling of fullness in your belly, or shoulder pain. These symptoms usually go away in 1 or 2 days. If the doctor filled your uterus with liquid during the procedure, you may have watery vaginal discharge for a few days. Many women are able to return to work on the day after the procedure. But it depends on what was done during the procedure and the type of work you do. This care sheet gives you a general idea about how long it will take for you to recover. But each person recovers at a different pace. Follow the steps below to get better as quickly as possible. How can you care for yourself at home? Activity    · Rest when you feel tired. Getting enough sleep will help you recover.     · Most women are able to return to work on the day after the procedure.     · You may shower and take baths as usual.     · Ask your doctor when it is okay for you to have sex.     · Talk about birth control with your doctor. Do not try to become pregnant until your doctor says it is okay. Diet    · You can eat your normal diet. If your stomach is upset, try bland, low-fat foods like plain rice, broiled chicken, toast, and yogurt.     · You may notice that your bowel movements are not regular right after the procedure. This is common. Try to avoid constipation and straining with bowel movements. You may want to take a fiber supplement every day. If you have not had a bowel movement after a couple of days, ask your doctor about taking a mild laxative.    Medicines    · Your doctor will tell you if and when you can restart your medicines. He or she will also give you instructions about taking any new medicines.     · If you take blood thinners, such as warfarin (Coumadin), clopidogrel (Plavix), or aspirin, be sure to talk to your doctor. He or she will tell you if and when to start taking those medicines again. Make sure that you understand exactly what your doctor wants you to do.     · Be safe with medicines. Take pain medicines exactly as directed. ? If the doctor gave you a prescription medicine for pain, take it as prescribed. ? If you are not taking a prescription pain medicine, ask your doctor if you can take an over-the-counter medicine. ? Do not take two or more pain medicines at the same time unless the doctor told you to. Many pain medicines have acetaminophen, which is Tylenol. Too much acetaminophen (Tylenol) can be harmful.     · If you think your pain medicine is making you sick to your stomach:  ? Take your medicine after meals (unless your doctor has told you not to). ? Ask your doctor for a different pain medicine.     · If your doctor prescribed antibiotics, take them as directed. Do not stop taking them just because you feel better. You need to take the full course of antibiotics. Other instructions    · You may have some light vaginal bleeding. Wear sanitary pads if needed. Do not douche or use tampons until your doctor says it is okay.     · You may want to use a heating pad on your belly to help with pain. Use a low heat setting. Follow-up care is a key part of your treatment and safety. Be sure to make and go to all appointments, and call your doctor if you are having problems. It's also a good idea to know your test results and keep a list of the medicines you take. When should you call for help? Call 911 anytime you think you may need emergency care.  For example, call if:    · You pass out (lose consciousness).     · You have chest pain, are short of breath, or cough up blood.    Call your doctor now or seek immediate medical care if:    · You have bright red vaginal bleeding that soaks one or more pads in an hour, or you have large clots.     · You have vaginal discharge that has increased in amount or smells bad.     · You are sick to your stomach or cannot drink fluids.     · You have pain that does not get better after you take pain medicine.     · You have signs of infection, such as:  ? Increased pain, swelling, warmth, or redness. ? A fever.     · You cannot pass stools or gas.     · You have signs of a blood clot in your leg (called a deep vein thrombosis), such as:  ? Pain in your calf, back of the knee, thigh, or groin. ? Redness and swelling in your leg.    Watch closely for changes in your health, and be sure to contact your doctor if you have any problems. Where can you learn more? Go to http://jorge a-katiana.info/. Enter T323 in the search box to learn more about \"Operative Hysteroscopy: What to Expect at Home. \"  Current as of: May 14, 2018  Content Version: 11.9  © 3615-6812 [employer]. Care instructions adapted under license by [employer] (which disclaims liability or warranty for this information). If you have questions about a medical condition or this instruction, always ask your healthcare professional. [address] any warranty or liability for your use of this information. DISCHARGE SUMMARY from Nurse    PATIENT INSTRUCTIONS:    After general anesthesia or intravenous sedation, for 24 hours or while taking prescription Narcotics:  · Limit your activities  · Do not drive and operate hazardous machinery  · Do not make important personal or business decisions  · Do  not drink alcoholic beverages  · If you have not urinated within 8 hours after discharge, please contact your surgeon on call.     Report the following to your surgeon:  · Excessive pain, swelling, redness or odor of or around the surgical area  · Temperature over 100.5  · Nausea and vomiting lasting longer than 4 hours or if unable to take medications  · Any signs of decreased circulation or nerve impairment to extremity: change in color, persistent  numbness, tingling, coldness or increase pain  · Any questions    What to do at Home:  Recommended activity: Activity as tolerated and no driving for today,     *  Please give a list of your current medications to your Primary Care Provider. *  Please update this list whenever your medications are discontinued, doses are      changed, or new medications (including over-the-counter products) are added. *  Please carry medication information at all times in case of emergency situations. These are general instructions for a healthy lifestyle:    No smoking/ No tobacco products/ Avoid exposure to second hand smoke  Surgeon General's Warning:  Quitting smoking now greatly reduces serious risk to your health. Obesity, smoking, and sedentary lifestyle greatly increases your risk for illness    A healthy diet, regular physical exercise & weight monitoring are important for maintaining a healthy lifestyle    You may be retaining fluid if you have a history of heart failure or if you experience any of the following symptoms:  Weight gain of 3 pounds or more overnight or 5 pounds in a week, increased swelling in our hands or feet or shortness of breath while lying flat in bed. Please call your doctor as soon as you notice any of these symptoms; do not wait until your next office visit. The discharge information has been reviewed with the spouse. The spouse verbalized understanding. Discharge medications reviewed with the spouse and appropriate educational materials and side effects teaching were provided.   ___________________________________________________________________________________________________________________________________       Ankle Pumps    Ankle pumps increase the circulation of oxygenated blood to your lower extremities and decrease your risk for circulation problems such as blood clots. They also stretch the muscles, tendons and ligaments in your foot and ankle, and prevent joint contracture in the ankle and foot, especially after surgeries on the legs. It is important to do ankle pump exercises regularly after surgery because immobility increases your risk for developing a blood clot. Your doctor may also have you take an Aspirin for the next few days as well. If your doctor did not ask you to take an Aspirin, consult with him before starting Aspirin therapy on your own. The exercise is quite simple. 6. Slowly point your foot forward, feeling the muscles on the top of your lower leg stretch, and hold this position for 5 seconds. 7. Next, pull your foot back toward you as far as possible, stretching the calf muscles, and hold that position for 5 seconds. 8. Repeat with the other foot. 9. Perform 10 repetitions every hour while awake for both ankles if possible (down and then up with the foot once is one repetition). You should feel gentle stretching of the muscles in your lower leg when doing this exercise. If you feel pain, or your range of motion is limited, don't  Push too hard. Only go the limit your joint and muscles will let you go. If you have increasing pain, progressively worsening leg warmth or swelling, STOP the exercise and call your doctor.

## 2019-07-03 NOTE — BRIEF OP NOTE
BRIEF OPERATIVE NOTE    Date of Procedure: 7/3/2019   Preoperative Diagnosis: ENDOMETRIAL POLYP  Postoperative Diagnosis: ENDOMETRIAL POLYP    Procedure(s):   HYSTEROSCOPY/DILATATION AND CURETTAGE/POLYPECTOMY WITH MYOSURE (LATEX ALLERGY), ECC  Surgeon(s) and Role:     Hodan Edwards MD - Primary         Surgical Assistant: none    Surgical Staff:  Circ-1: Crescencio Leonard RN  Scrub Tech-1: Steve Massey  Event Time In Time Out   Incision Start 4988    Incision Close 0919      Anesthesia: General   Estimated Blood Loss: <50cc  Specimens:   ID Type Source Tests Collected by Time Destination   1 : endometrial curetting and polyp Preservative Endometrial Curetting  Lily Vazquez MD 7/3/2019 0911 Pathology   2 : endocervical curetting Preservative Endocervical Curetting  Lily Vazquez MD 7/3/2019 0915 Pathology      Findings: large end polyp, end cavity with studded appearance/bumpy  Complications: none  Implants: * No implants in log *

## 2019-07-03 NOTE — ANESTHESIA PREPROCEDURE EVALUATION
Relevant Problems   No relevant active problems       Anesthetic History   No history of anesthetic complications            Review of Systems / Medical History  Patient summary reviewed, nursing notes reviewed and pertinent labs reviewed    Pulmonary  Within defined limits                 Neuro/Psych   Within defined limits           Cardiovascular  Within defined limits  Hypertension              Exercise tolerance: >4 METS  Comments: MVP   GI/Hepatic/Renal  Within defined limits              Endo/Other  Within defined limits  Diabetes    Arthritis and cancer     Other Findings   Comments: Fibromyalgia  Neuropathy  Transverse myelopathy syndrome  Hx breast ca           Physical Exam    Airway  Mallampati: II    Neck ROM: normal range of motion   Mouth opening: Normal     Cardiovascular  Regular rate and rhythm,  S1 and S2 normal,  no murmur, click, rub, or gallop  Rhythm: regular  Rate: normal         Dental  No notable dental hx       Pulmonary  Breath sounds clear to auscultation               Abdominal  GI exam deferred       Other Findings            Anesthetic Plan    ASA: 3  Anesthesia type: general          Induction: Intravenous  Anesthetic plan and risks discussed with: Patient

## 2019-07-06 NOTE — OP NOTES
[facility]  OPERATIVE REPORT    Name:  Yasmeen Jones  MR#:  [id number]  :  1950  ACCOUNT #:  [de-identified]  DATE OF SERVICE:  2019    PREOPERATIVE DIAGNOSIS:  Endometrial polyp. POSTOPERATIVE DIAGNOSES:  Endometrial polyp, studding of endometrial cavity. PROCEDURE PERFORMED:  Diagnostic hysteroscopy, removal of endometrial polyp with MyoSure device, endometrial sampling with MyoSure, endocervical curettage. SURGEON:  Imelda So MD    ASSISTANT:  None. ANESTHESIA:  General endotracheal.    COMPLICATIONS:  None. SPECIMENS REMOVED:  Endometrial and endocervical curettings. IMPLANTS:  None. ESTIMATED BLOOD LOSS:  Less than 50 mL. TUBES AND DRAINS:  None. PROCEDURE:  After proper patient identification and consent were obtained, the patient was taken to the operating room where after sufficient induction of general anesthesia, she was placed in the dorsal lithotomy position and prepped and draped in the usual sterile fashion. Examination revealed a small normal-sized uterus. A speculum was placed in the vagina. The anterior lip of the cervix was grasped with a single-toothed tenaculum. The cervix was dilated with Hegar dilator in order to admit the hysteroscope. The hysteroscope was placed to the cervix and hydrodissection was used to enter the endocervical canal and the endometrial cavity. There was a large polyp noted and there was also studding of the entire endometrial surface noted. The MyoSure device was introduced. The polyp was completely removed easily and the entire cavity was sampled with the MyoSure device encompassing multiple of the studded-appearing lesions. Both tubal ostia appeared normal.  The MyoSure device was then removed. An endocervical curettage was then performed using a small curette and obtaining a sample in all quadrants of the endocervical canal.  The specimen was sent for pathology.   At the end of the procedure, all the instruments were removed. Pad, needle, and sponge counts were correct x2. The patient was awakened from anesthesia and went to recovery in stable condition.       Naman العلي MD      [id number]  D:  07/05/2019 11:17  T:  07/05/2019 22:29  JOB #:  [id number]

## 2019-07-15 ENCOUNTER — TELEPHONE (OUTPATIENT)
Dept: FAMILY MEDICINE CLINIC | Age: 69
End: 2019-07-15

## 2019-07-16 NOTE — TELEPHONE ENCOUNTER
Pt needs to check with her insurance to see which are covered the best for her plan then let me know and I can send in.   She needs an appt for fasting labs for her diabetes as well

## 2019-07-16 NOTE — TELEPHONE ENCOUNTER
Called and spoke with patient and informed her to contact her insurance company to find out what brand is going to be covered. Patient verbalized understanding. She has apt scheduled for 8/29/19.

## 2019-08-06 ENCOUNTER — OFFICE VISIT (OUTPATIENT)
Dept: OBGYN CLINIC | Age: 69
End: 2019-08-06

## 2019-08-06 VITALS — WEIGHT: 171 LBS | DIASTOLIC BLOOD PRESSURE: 79 MMHG | BODY MASS INDEX: 26 KG/M2 | SYSTOLIC BLOOD PRESSURE: 171 MMHG

## 2019-08-06 DIAGNOSIS — R93.89 INCREASED ENDOMETRIAL STRIPE THICKNESS: Primary | ICD-10-CM

## 2019-08-06 NOTE — PROGRESS NOTES
Postop Evaluation  Sweetie Kelly is a 77 y.o. female returns for a routine post-operative follow-up visit after undergoing the following: hysteroscopy/d&c/polypectomy with myosure which was done 4 weeks ago. She had thickened endometrium  Her pathology results revealed    FINAL PATHOLOGIC DIAGNOSIS   1. Endometrium, curettage:   Benign endometrial polyp   2. Endocervix, curettage:   Red blood cells . Since the patient's surgery, she has had typical postoperative discomfort but no significant symptoms or problems since the surgery. The patient's incision is healing well with no significant drainage. She states since the procedure, she has returned to full daily activities, ambulating, and not lifting or exercising. PHYSICAL EXAMINATION    Gastrointestinal  · Abdominal Examination: abdomen non-tender to palpation, incision/s healing well, normal bowel sounds, no masses present  · Liver and spleen: no hepatomegaly present, spleen not palpable  · Hernias: no hernias identified    Genitourinary  · External Genitalia: normal appearance for age, no discharge present, no tenderness present, no inflammatory lesions present, no masses present, no atrophy present  · Vagina: normal vaginal vault without central or paravaginal defects, no discharge present, no inflammatory lesions present, no masses present  · Bladder: non-tender to palpation  · Urethra: appears normal  · Cervix: normal   · Uterus: normal size, shape and consistency  · Adnexa: no adnexal tenderness present, no adnexal masses present  · Perineum: perineum within normal limits, no evidence of trauma, no rashes or skin lesions present  Skin  · General Inspection: no rash, no lesions identified    Neurologic/Psychiatric  · Mental Status:  · Orientation: grossly oriented to person, place and time  · Mood and Affect: mood normal, affect appropriate    Assessment:  Normal postop checkup    Plan:  RTO as scheduled for AE.   Now dx with sarcoidosis

## 2019-08-12 ENCOUNTER — HOSPITAL ENCOUNTER (OUTPATIENT)
Dept: DIABETES SERVICES | Age: 69
Discharge: HOME | End: 2019-08-12
Attending: INTERNAL MEDICINE
Payer: MEDICARE

## 2019-08-12 DIAGNOSIS — E11.9 DIABETES MELLITUS WITHOUT COMPLICATION (HCC): ICD-10-CM

## 2019-08-12 PROCEDURE — 97802 MEDICAL NUTRITION INDIV IN: CPT | Performed by: DIETITIAN, REGISTERED
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
## 2019-08-29 ENCOUNTER — OFFICE VISIT (OUTPATIENT)
Dept: FAMILY MEDICINE CLINIC | Age: 69
End: 2019-08-29

## 2019-08-29 VITALS
RESPIRATION RATE: 16 BRPM | TEMPERATURE: 98 F | WEIGHT: 175 LBS | SYSTOLIC BLOOD PRESSURE: 136 MMHG | BODY MASS INDEX: 26.52 KG/M2 | HEART RATE: 70 BPM | HEIGHT: 68 IN | DIASTOLIC BLOOD PRESSURE: 69 MMHG | OXYGEN SATURATION: 94 %

## 2019-08-29 DIAGNOSIS — E11.40 TYPE 2 DIABETES MELLITUS WITH DIABETIC NEUROPATHY, WITHOUT LONG-TERM CURRENT USE OF INSULIN (HCC): ICD-10-CM

## 2019-08-29 DIAGNOSIS — M79.7 FIBROMYALGIA: Primary | ICD-10-CM

## 2019-08-29 DIAGNOSIS — D86.9 SARCOID: ICD-10-CM

## 2019-08-29 DIAGNOSIS — J43.8 OTHER EMPHYSEMA (HCC): ICD-10-CM

## 2019-08-29 LAB — HBA1C MFR BLD HPLC: 7.6 %

## 2019-08-29 NOTE — PROGRESS NOTES
Segun Marcano is a 77 y.o. female   Chief Complaint   Patient presents with    Follow-up    pt here for follow up on her [facility]. Pt states her memory has improved as well since starting this medication and her fibro feels better controlled as well. She does get an occasional hot flash. She was also dx with sarcoid of her skin. Diabetes managed by endo, last A1C was 7.4 or 7.5.      she is a 77y.o. year old female who presents for evalution. Reviewed PmHx, RxHx, FmHx, SocHx, AllgHx and updated and dated in the chart. Review of Systems - negative except as listed above in the HPI    Objective:     Vitals:    08/29/19 0757 08/29/19 0832   BP: 145/71 136/69   Pulse: 70    Resp: 16    Temp: 98 °F (36.7 °C)    TempSrc: Oral    SpO2: 94%    Weight: 175 lb (79.4 kg)    Height: 5' 8\" (1.727 m)        Current Outpatient Medications   Medication Sig    SITagliptin (JANUVIA) 100 mg tablet Take 100 mg by mouth daily.  milnacipran (SAVELLA) 50 mg tablet Take 1 Tab by mouth two (2) times a day.  umeclidinium brm/vilanterol tr (ANORO ELLIPTA IN) Take  by inhalation.  BUTALBITAL-ASPIRIN-CAFFEINE PO Take 1 Tab by mouth as needed.  baclofen (LIORESAL) 10 mg tablet Take 10 mg by mouth nightly.  bethanechol (URECHOLINE) 5 mg tablet Take 5 mg by mouth two (2) times a day.  gabapentin (NEURONTIN) 600 mg tablet Take 600 mg by mouth three (3) times daily.  pravastatin (PRAVACHOL) 40 mg tablet Take 40 mg by mouth nightly.  fluticasone propionate (FLONASE ALLERGY RELIEF) 50 mcg/actuation nasal spray 2 Sprays by Both Nostrils route every morning.  magnesium oxide (MAG-OX) 400 mg tablet Take 400 mg by mouth every morning.  OTHER Take 2 Tabs by mouth two (2) times a day. \"Benefiber\"    cranberry fruit extract (CRANBERRY PO) Take 4,200 mg by mouth daily.  ivermectin (SOOLANTRA) 1 % crea Apply  to affected area daily.     fexofenadine-pseudoephedrine (ALLEGRA-D 12 HOUR)  mg Tb12 Take 1 Tab by mouth every twelve (12) hours.  linaclotide (LINZESS) 145 mcg cap capsule Take 1 Cap by mouth Daily (before breakfast). To cover until mail order arrives    lisinopril (PRINIVIL, ZESTRIL) 2.5 mg tablet Take 2.5 mg by mouth nightly.  CALCIUM PO Take 1 Caplet by mouth daily.  glimepiride (AMARYL) 2 mg tablet Take 2 mg by mouth three (3) times daily.  ALPHA LIPOIC ACID PO Take 1 Cap by mouth two (2) times a day.  L-Mfolate-B6 Phos-Methyl-B12 (METANX) 3-35-2 mg tab tab Take 1 Tab by mouth two (2) times a day. Indications: neuropathy    OTHER Take  by mouth daily. \"Vitamin D3 liquid\"    JARDIANCE 10 mg tablet Take 10 mg by mouth every morning. No current facility-administered medications for this visit. Physical Examination: General appearance - alert, well appearing, and in no distress  Mental status - alert, oriented to person, place, and time  Chest - clear to auscultation, no wheezes, rales or rhonchi, symmetric air entry  Heart - normal rate, regular rhythm, normal S1, S2, no murmurs, rubs, clicks or gallops      Assessment/ Plan:   Diagnoses and all orders for this visit:    1. Fibromyalgia  -     milnacipran (SAVELLA) 50 mg tablet; Take 1 Tab by mouth two (2) times a day. Doing well c/w current therapy  2. Sarcoid  Of skin being worked up further currently and seeing pulm  3. Type 2 diabetes mellitus with diabetic neuropathy, without long-term current use of insulin ([facility])  -     AMB POC HEMOGLOBIN A1C  7.5 today   4. Emphysema  Followed by pulm  Follow-up and Dispositions    · Return in about 6 months (around 2/29/2020), or if symptoms worsen or fail to improve. I have discussed the diagnosis with the patient and the intended plan as seen in the above orders. The patient has received an after-visit summary and questions were answered concerning future plans. Pt conveyed understanding of plan.     Medication Side Effects and Warnings were discussed with patient      Essie Betts H Bogdan, DO

## 2019-08-29 NOTE — PROGRESS NOTES
Chief Complaint   Patient presents with    Follow-up     Patient presents in office today for 3 month f/u. No concerns. 1. Have you been to the ER, urgent care clinic since your last visit? Hospitalized since your last visit? No    2. Have you seen or consulted any other health care providers outside of the [address] [place] [address] since your last visit? Include any pap smears or colon screening.  No    Learning Assessment 3/4/2014   PRIMARY LEARNER Patient   HIGHEST LEVEL OF EDUCATION - PRIMARY LEARNER  4 YEARS OF COLLEGE   BARRIERS PRIMARY LEARNER NONE   PRIMARY LANGUAGE ENGLISH   LEARNER PREFERENCE PRIMARY LISTENING   ANSWERED BY self   RELATIONSHIP SELF

## 2019-09-04 ENCOUNTER — HOSPITAL ENCOUNTER (OUTPATIENT)
Dept: CT IMAGING | Age: 69
Discharge: HOME | End: 2019-09-04
Attending: INTERNAL MEDICINE
Payer: MEDICARE

## 2019-09-04 DIAGNOSIS — D86.9 SARCOIDOSIS: ICD-10-CM

## 2019-09-04 PROCEDURE — 71260 CT THORAX DX C+: CPT

## 2019-09-04 PROCEDURE — 74011636320 HC RX REV CODE- 636/320: Performed by: RADIOLOGY

## 2019-09-04 RX ADMIN — IOPAMIDOL 100 ML: 612 INJECTION, SOLUTION INTRAVENOUS at 17:00

## 2019-09-18 ENCOUNTER — HOSPITAL ENCOUNTER (OUTPATIENT)
Dept: PET IMAGING | Age: 69
Discharge: HOME | End: 2019-09-18
Attending: INTERNAL MEDICINE
Payer: MEDICARE

## 2019-09-18 VITALS — BODY MASS INDEX: 27 KG/M2 | HEIGHT: 67 IN | WEIGHT: 172 LBS

## 2019-09-18 DIAGNOSIS — R91.8 PULMONARY NODULES: ICD-10-CM

## 2019-09-18 PROCEDURE — A9552 F18 FDG: HCPCS

## 2019-09-18 RX ORDER — SODIUM CHLORIDE 0.9 % (FLUSH) 0.9 %
10 SYRINGE (ML) INJECTION
Status: COMPLETED | OUTPATIENT
Start: 2019-09-18 | End: 2019-09-18

## 2019-09-18 RX ADMIN — Medication 10 ML: at 15:07

## 2019-09-23 ENCOUNTER — OFFICE VISIT (OUTPATIENT)
Dept: CARDIOLOGY CLINIC | Age: 69
End: 2019-09-23

## 2019-09-23 VITALS
SYSTOLIC BLOOD PRESSURE: 130 MMHG | OXYGEN SATURATION: 98 % | BODY MASS INDEX: 27.62 KG/M2 | HEART RATE: 86 BPM | WEIGHT: 176 LBS | DIASTOLIC BLOOD PRESSURE: 62 MMHG | HEIGHT: 67 IN

## 2019-09-23 DIAGNOSIS — R00.2 PALPITATIONS: ICD-10-CM

## 2019-09-23 DIAGNOSIS — R06.09 DOE (DYSPNEA ON EXERTION): Primary | ICD-10-CM

## 2019-09-23 DIAGNOSIS — I70.0 ATHEROSCLEROSIS OF AORTA (HCC): ICD-10-CM

## 2019-09-23 DIAGNOSIS — G37.3 ACUTE TRANSVERSE MYELITIS (HCC): ICD-10-CM

## 2019-09-23 NOTE — LETTER
9/24/19 Patient: Charisma Caceres YOB: 1950 Date of Visit: 9/23/2019 Mick Dumas DO 
[address] [facility] [address] [place] [address] VIA In Basket Dear Mick Dumas DO, Thank you for referring Ms. Kori Brown to [facility] for evaluation. My notes for this consultation are attached. If you have questions, please do not hesitate to call me. I look forward to following your patient along with you. Sincerely, Floridalma Arriaga MD

## 2019-09-23 NOTE — PROGRESS NOTES
Camilo Tucker, [address]  [address],  [address]  [place], [address] [facility]    Office [phone]  Fax [phone]  Cell [phone]        Pierre Beckwith is a 77 y.o. female last seen by me over 2 years ago. Assessment  Encounter Diagnoses   Name Primary?  KHALIL (dyspnea on exertion) Yes    Palpitations     Atherosclerosis of aorta (HCC)     Acute transverse myelitis (HCC)      Recommendations:    Chronic KHALIL, likely multifactorial - deconditioning, COPD. No clear evidence of pulmonary sarcoid. Doubt cardiac sarcoid. I am concerned about myocardial ischemia given her diabetes and the presence of vascular calcification on recent chest CT involving her aorta. Her exam today is unremarkable. Recent EKG with LVH. Will evacuate further with echo and lexiscan Cardiolite. Cardiac MRI would not be an option here d/t her neurostimulator. Palpitations, longstanding. Suspect PACs or PVCs. Would not pursue cardiac monitor unless her sxs progress. 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 She also has a hx of transverse myelitis with a neurostimulator in her hip. She has chronic KHALIL but recent pulmonary evaluation with Dr. Marsha Tijerina demonstrated fairly normal PFTs. Updated chest CT demonstrated pulmonary nodules but no adenopathy. No coronary calcifications were reported. Pierre Beckwith reports she has sarcoidosis, but she does not know much more than that. She reports progressive KHALIL in the past couple of years. She notes intermittent palpitations while sitting occurring maybe twice a week. Patient denies any exertional chest pain, syncope, orthopnea, edema or paroxysmal nocturnal dyspnea. She ambulates with a walker.     Cardiac risk factors   HTN yes  DM no  Smoking no  Family hx of CAD no    Cardiac testing  Echo 04/02/17-LVEF 60%, negative bubble study    Past Medical History: Diagnosis Date    Abnormal MRI, cervical spine 4/3/2017    Abnormal pap 3/2015; 5/2016 2015 Neg pap +HPV; 2016 ASCUS +HPV    Arthritis     osteo-tests for RA were neg    Breast cancer, right breast (HCC) 1996    Chronic pain     Diabetes (HCC)     Fibromyalgia     Ganglion cyst of wrist     LEFT    Hypercholesterolemia     Hypertension     Ill-defined condition     Prolapsed mitral valve    Memory loss     Per pt.  Murmur, cardiac     Neuropathy     Rosacea     Sarcoidosis     Transverse myelopathy syndrome (HCC)     Unspecified adverse effect of anesthesia     \"SLOW TO WAKE UP, SENSITIVE TO ANESTHESIA, DO NOT COME AROUND FOR 2-3 DAYS\"         Current Outpatient Medications   Medication Sig Dispense Refill    fexofenadine HCl (ALLEGRA ALLERGY PO) Take  by mouth daily.  SITagliptin (JANUVIA) 100 mg tablet Take 100 mg by mouth daily.  milnacipran (SAVELLA) 50 mg tablet Take 1 Tab by mouth two (2) times a day. (Patient taking differently: Take 100 mg by mouth two (2) times a day.) 180 Tab 3    umeclidinium brm/vilanterol tr (ANORO ELLIPTA IN) Take  by inhalation.  BUTALBITAL-ASPIRIN-CAFFEINE PO Take 1 Tab by mouth as needed.  baclofen (LIORESAL) 10 mg tablet Take 10 mg by mouth nightly.  bethanechol (URECHOLINE) 5 mg tablet Take 5 mg by mouth two (2) times a day.  gabapentin (NEURONTIN) 600 mg tablet Take 600 mg by mouth three (3) times daily.  pravastatin (PRAVACHOL) 40 mg tablet Take 40 mg by mouth nightly.  fluticasone propionate (FLONASE ALLERGY RELIEF) 50 mcg/actuation nasal spray 2 Sprays by Both Nostrils route every morning.  magnesium oxide (MAG-OX) 400 mg tablet Take 400 mg by mouth every morning.  OTHER Take 2 Tabs by mouth two (2) times a day. \"Benefiber\"      cranberry fruit extract (CRANBERRY PO) Take 4,200 mg by mouth daily.  ivermectin (SOOLANTRA) 1 % crea Apply  to affected area daily.       lisinopril (PRINIVIL, ZESTRIL) 2.5 mg tablet Take 2.5 mg by mouth nightly.  CALCIUM PO Take 1 Caplet by mouth daily.  glimepiride (AMARYL) 2 mg tablet Take 2 mg by mouth three (3) times daily.  ALPHA LIPOIC ACID PO Take 1 Cap by mouth two (2) times a day.  L-Mfolate-B6 Phos-Methyl-B12 (METANX) 3-35-2 mg tab tab Take 1 Tab by mouth two (2) times a day. Indications: neuropathy      OTHER Take  by mouth daily. \"Vitamin D3 liquid\"      linaclotide (LINZESS) 145 mcg cap capsule Take 1 Cap by mouth Daily (before breakfast). To cover until mail order arrives 30 Cap 2    JARDIANCE 10 mg tablet Take 10 mg by mouth every morning. Allergies   Allergen Reactions    Latex Other (comments)     Patient states it burns around her mouth.  Other Food Other (comments)     Yogurt - stomach cramping    Betadine [Povidone-Iodine] Rash and Itching     Pt states this causes \"extreme\" itching    Codeine Anaphylaxis, Rash, Nausea Only and Other (comments)     HEADACHE  Tolerates tramadol    Dilaudid [Hydromorphone (Bulk)] Anaphylaxis, Itching, Nausea Only and Other (comments)     HALLUCINATIONS  Tolerates tramadol      Hydrocodone Anaphylaxis, Rash, Nausea Only and Vertigo     Facial - eyelid swelling-had to go to ER for reaction, HALLUCINATIONS  Tolerates tramadol      Ditropan [Oxybutynin Chloride] Swelling    Doxycycline Rash     PUSTULAR RASH- TOLERATING TETRACYCLINE    Iodine Other (comments)     Headache    Keflex [Cephalexin] Nausea and Vomiting     Pain in abdomen AND FLU-LIKE SX      Metformin Nausea and Vomiting     Severe abdominal pain      Tape [Adhesive] Other (comments)     Redness/inflammed. Can only use paper tape. CAN USE BAND-AIDS. TOLERATES SURGICAL TAPE USED IN [place] [facility].      Trulicity [Dulaglutide] Other (comments)     Abdominal pain     Sulfamethoxazole-Trimethoprim Other (comments)     Cramping/flu-like symptoms          Review of Systems  Constitutional: Negative for fever, chills, malaise/fatigue and diaphoresis. Respiratory: Negative for cough, hemoptysis, sputum production, and wheezing. +KHALIL  Cardiovascular: Negative for chest pain, orthopnea, claudication, leg swelling and PND. +palpitations  Gastrointestinal: Negative for heartburn, nausea, vomiting, blood in stool and melena. Genitourinary: Negative for dysuria and flank pain. Musculoskeletal: Negative for back pain or joint pain. Skin: Negative for rash. Neurological: Negative for focal weakness, seizures, loss of consciousness, weakness and headaches. Endo/Heme/Allergies: Does not bruise/bleed easily. Psychiatric/Behavioral: Negative for memory loss. The patient does not have insomnia. Physical Exam    Visit Vitals  /62 (BP 1 Location: Left arm, BP Patient Position: Sitting)   Pulse 86   Ht 5' 7\" (1.702 m)   Wt 176 lb (79.8 kg)   SpO2 98%   BMI 27.57 kg/m²     Wt Readings from Last 3 Encounters:   09/23/19 176 lb (79.8 kg)   09/18/19 172 lb (78 kg)   08/29/19 175 lb (79.4 kg)      General - well developed well nourished  Neck - JVP normal, thyroid nl  Cardiac - normal S1, S2, no murmurs, rubs or gallops. No clicks  Vascular - carotids without bruits, radials, femorals and pedal pulses equal bilateral  Lungs - clear to auscultation bilaterals, no rales, wheezing or rhonchi  Abd - soft nontender, no HSM, no abd bruits  Extremities - no edema  Skin - no rash  Neuro - nonfocal  Psych - normal mood and affect    Cardiographics  EKG 04/01/17- SR, LVH   Echo 04/02/17-LVEF 60%, negative bubble study   EKG 6/18/19 - SR, LVH, [facility]    Written by [facility], as dictated by Davy Medrano M.D.      Davy Medrano MD

## 2019-09-23 NOTE — PROGRESS NOTES
Patient says that she has shortness of breath     Patient says that her legs swell, but wears compression socks    Visit Vitals  /62 (BP 1 Location: Left arm, BP Patient Position: Sitting)   Pulse 86   Ht 5' 7\" (1.702 m)   Wt 176 lb (79.8 kg)   SpO2 98%   BMI 27.57 kg/m²

## 2019-09-24 PROBLEM — D86.9 SARCOID: Status: RESOLVED | Noted: 2019-08-29 | Resolved: 2019-09-24

## 2019-09-24 PROBLEM — I70.0 ATHEROSCLEROSIS OF AORTA (HCC): Status: ACTIVE | Noted: 2019-09-24

## 2019-09-25 ENCOUNTER — OFFICE VISIT (OUTPATIENT)
Dept: FAMILY MEDICINE CLINIC | Age: 69
End: 2019-09-25

## 2019-09-25 VITALS
BODY MASS INDEX: 28.09 KG/M2 | DIASTOLIC BLOOD PRESSURE: 76 MMHG | TEMPERATURE: 97.7 F | OXYGEN SATURATION: 98 % | SYSTOLIC BLOOD PRESSURE: 148 MMHG | HEART RATE: 76 BPM | WEIGHT: 179 LBS | RESPIRATION RATE: 18 BRPM | HEIGHT: 67 IN

## 2019-09-25 DIAGNOSIS — J01.10 ACUTE FRONTAL SINUSITIS, RECURRENCE NOT SPECIFIED: Primary | ICD-10-CM

## 2019-09-25 RX ORDER — AZITHROMYCIN 250 MG/1
TABLET, FILM COATED ORAL
Qty: 6 TAB | Refills: 0 | Status: SHIPPED | OUTPATIENT
Start: 2019-09-25 | End: 2020-07-16 | Stop reason: ALTCHOICE

## 2019-09-25 RX ORDER — CETIRIZINE HCL 10 MG
10 TABLET ORAL DAILY
Qty: 30 TAB | Refills: 5 | Status: SHIPPED | OUTPATIENT
Start: 2019-09-25 | End: 2019-09-25

## 2019-09-25 RX ORDER — LORATADINE 10 MG/1
10 TABLET ORAL DAILY
Qty: 30 TAB | Refills: 11 | Status: SHIPPED | OUTPATIENT
Start: 2019-09-25 | End: 2020-09-19

## 2019-09-25 NOTE — PROGRESS NOTES
Patient here for sinus pressure and pain. Ron ears hurt, but left side > right. Sx started about 1 week ago. Not trying otc meds. 1. Have you been to the ER, urgent care clinic since your last visit? Hospitalized since your last visit? No    2. Have you seen or consulted any other health care providers outside of the [address] [place] [address] since your last visit? Include any pap smears or colon screening. No       Chief Complaint   Patient presents with    Sinus Infection     sinus and ear pain     She is a 77 y.o. female who presents for evalution. Reviewed PmHx, RxHx, FmHx, SocHx, AllgHx and updated and dated in the chart. Patient Active Problem List    Diagnosis    Atherosclerosis of aorta (HCC)    Other emphysema (HCC)     Seen on cxr thru pulm      Abnormal MRI, cervical spine    Left-sided weakness    Acute transverse myelitis ([address]    ACP (advance care planning)     discussed      Scoliosis    [facility] [facility]    HTN (hypertension)    Fibromyalgia    Postmenopausal bleeding    Diabetes mellitus with neuropathy ([address]       Review of Systems - negative except as listed above in the HPI    Objective:     Vitals:    09/25/19 1336   BP: 148/76   Pulse: 76   Resp: 18   Temp: 97.7 °F (36.5 °C)   SpO2: 98%   Weight: 179 lb (81.2 kg)   Height: 5' 7\" (1.702 m)     Physical Examination: General appearance - alert, well appearing, and in no distress  Neck - supple, no significant adenopathy  Chest - clear to auscultation, no wheezes, rales or rhonchi, symmetric air entry  Heart - normal rate, regular rhythm, normal S1, S2, no murmurs, rubs, clicks or gallops      Assessment/ Plan:   Diagnoses and all orders for this visit:    1. Acute frontal sinusitis, recurrence not specified  -     azithromycin (ZITHROMAX) 250 mg tablet; Take two tablets today then one tablet daily  -     cetirizine (ZYRTEC) 10 mg tablet; Take 1 Tab by mouth daily.        Follow-up and Dispositions    · Return if symptoms worsen or fail to improve. I have discussed the diagnosis with the patient and the intended plan as seen in the above orders. The patient understands and agrees with the plan. The patient has received an after-visit summary and questions were answered concerning future plans. Medication Side Effects and Warnings were discussed with patient  Patient Labs were reviewed and or requested:  Patient Past Records were reviewed and or requested    Azeem Ortiz M.D. There are no Patient Instructions on file for this visit.

## 2019-10-07 ENCOUNTER — TELEPHONE (OUTPATIENT)
Dept: CARDIOLOGY CLINIC | Age: 69
End: 2019-10-07

## 2019-10-07 NOTE — TELEPHONE ENCOUNTER
ID verified per protocol. Confirmed appointment(s) for 10-9-2019. [facility]. Patient instructed to be NPO x 2 hrs prior, no caffeine (not even decaf coffee,tea,nataliya) x 12 hrs prior, wear comfortable clothes/good walking shoes. Hold Butibatal-asa-caffeine. Patient also informed the test takes 4 hrs.  Patient verbalized understanding and agrees with prep/test.

## 2019-10-14 ENCOUNTER — TELEPHONE (OUTPATIENT)
Dept: CARDIOLOGY CLINIC | Age: 69
End: 2019-10-14

## 2019-10-14 NOTE — TELEPHONE ENCOUNTER
[id number] notified of test results per Lucretia NP note. patient verbalized understanding and had no further questions

## 2019-10-14 NOTE — TELEPHONE ENCOUNTER
----- Message from Jes Velasquez NP sent at 10/11/2019  2:54 PM EDT -----  Please notify Ms. Rendon that her stress test showed normal blood flow and normal heart pumping function. Echo with no significant findings. Work on obtaining some low intensity exercise such as walking each day. Follow a heart healthy low sodium diet. Monitor BP at home and keep a log.      Continue current medications and follow up again in 6 months.    ----- Message -----  From: Myra Fernandez MD  Sent: 10/10/2019   5:29 PM EDT  To: Hemant Hearn MD

## 2019-10-18 LAB
CREATININE, EXTERNAL: 0.6
LDL-C, EXTERNAL: 76

## 2019-12-26 ENCOUNTER — OFFICE VISIT (OUTPATIENT)
Dept: FAMILY MEDICINE CLINIC | Age: 69
End: 2019-12-26

## 2019-12-26 VITALS
BODY MASS INDEX: 27.62 KG/M2 | HEART RATE: 80 BPM | DIASTOLIC BLOOD PRESSURE: 78 MMHG | RESPIRATION RATE: 16 BRPM | TEMPERATURE: 98 F | OXYGEN SATURATION: 97 % | WEIGHT: 176 LBS | HEIGHT: 67 IN | SYSTOLIC BLOOD PRESSURE: 177 MMHG

## 2019-12-26 DIAGNOSIS — R26.89 BALANCE DISORDER: ICD-10-CM

## 2019-12-26 DIAGNOSIS — G89.29 CHRONIC BACK PAIN, UNSPECIFIED BACK LOCATION, UNSPECIFIED BACK PAIN LATERALITY: ICD-10-CM

## 2019-12-26 DIAGNOSIS — R53.1 WEAKNESS: ICD-10-CM

## 2019-12-26 DIAGNOSIS — R29.898 LEFT HAND WEAKNESS: ICD-10-CM

## 2019-12-26 DIAGNOSIS — G37.3 ACUTE TRANSVERSE MYELITIS (HCC): Primary | ICD-10-CM

## 2019-12-26 DIAGNOSIS — M54.9 CHRONIC BACK PAIN, UNSPECIFIED BACK LOCATION, UNSPECIFIED BACK PAIN LATERALITY: ICD-10-CM

## 2019-12-26 DIAGNOSIS — H93.8X1 CONGESTION OF RIGHT EAR: ICD-10-CM

## 2019-12-26 DIAGNOSIS — R29.898 RIGHT HAND WEAKNESS: ICD-10-CM

## 2019-12-26 DIAGNOSIS — R26.0 ATAXIC GAIT: ICD-10-CM

## 2019-12-26 RX ORDER — POLYETHYLENE GLYCOL 3350 17 G/17G
17 POWDER, FOR SOLUTION ORAL DAILY
COMMUNITY
End: 2021-09-29

## 2019-12-26 NOTE — PATIENT INSTRUCTIONS
A Healthy Lifestyle: Care Instructions  Your Care Instructions    A healthy lifestyle can help you feel good, stay at a healthy weight, and have plenty of energy for both work and play. A healthy lifestyle is something you can share with your whole family. A healthy lifestyle also can lower your risk for serious health problems, such as high blood pressure, heart disease, and diabetes. You can follow a few steps listed below to improve your health and the health of your family. Follow-up care is a key part of your treatment and safety. Be sure to make and go to all appointments, and call your doctor if you are having problems. It's also a good idea to know your test results and keep a list of the medicines you take. How can you care for yourself at home? · Do not eat too much sugar, fat, or fast foods. You can still have dessert and treats now and then. The goal is moderation. · Start small to improve your eating habits. Pay attention to portion sizes, drink less juice and soda pop, and eat more fruits and vegetables. ? Eat a healthy amount of food. A 3-ounce serving of meat, for example, is about the size of a deck of cards. Fill the rest of your plate with vegetables and whole grains. ? Limit the amount of soda and sports drinks you have every day. Drink more water when you are thirsty. ? Eat at least 5 servings of fruits and vegetables every day. It may seem like a lot, but it is not hard to reach this goal. A serving or helping is 1 piece of fruit, 1 cup of vegetables, or 2 cups of leafy, raw vegetables. Have an apple or some carrot sticks as an afternoon snack instead of a candy bar. Try to have fruits and/or vegetables at every meal.  · Make exercise part of your daily routine. You may want to start with simple activities, such as walking, bicycling, or slow swimming. Try to be active 30 to 60 minutes every day. You do not need to do all 30 to 60 minutes all at once.  For example, you can exercise 3 times a day for 10 or 20 minutes. Moderate exercise is safe for most people, but it is always a good idea to talk to your doctor before starting an exercise program.  · Keep moving. Itzel Listen the lawn, work in the garden, or [employer]. Take the stairs instead of the elevator at work. · If you smoke, quit. People who smoke have an increased risk for heart attack, stroke, cancer, and other lung illnesses. Quitting is hard, but there are ways to boost your chance of quitting tobacco for good. ? Use nicotine gum, patches, or lozenges. ? Ask your doctor about stop-smoking programs and medicines. ? Keep trying. In addition to reducing your risk of diseases in the future, you will notice some benefits soon after you stop using tobacco. If you have shortness of breath or asthma symptoms, they will likely get better within a few weeks after you quit. · Limit how much alcohol you drink. Moderate amounts of alcohol (up to 2 drinks a day for men, 1 drink a day for women) are okay. But drinking too much can lead to liver problems, high blood pressure, and other health problems. Family health  If you have a family, there are many things you can do together to improve your health. · Eat meals together as a family as often as possible. · Eat healthy foods. This includes fruits, vegetables, lean meats and dairy, and whole grains. · Include your family in your fitness plan. Most people think of activities such as jogging or tennis as the way to fitness, but there are many ways you and your family can be more active. Anything that makes you breathe hard and gets your heart pumping is exercise. Here are some tips:  ? Walk to do errands or to take your child to school or the bus.  ? Go for a family bike ride after dinner instead of watching TV. Where can you learn more? Go to http://jorge a-katiana.info/. Enter B108 in the search box to learn more about \"A Healthy Lifestyle: Care Instructions. \"  Current as of: May 28, 2019  Content Version: 12.2  © 1571-3524 [employer]. Care instructions adapted under license by [employer] (which disclaims liability or warranty for this information). If you have questions about a medical condition or this instruction, always ask your healthcare professional. [address] any warranty or liability for your use of this information.

## 2019-12-26 NOTE — PROGRESS NOTES
Guido George is a 77 y.o. female   Chief Complaint   Patient presents with    Ear Pain    Referral / Marylin Choco is a 77 y.o. female   Chief Complaint   Patient presents with    Ear Pain    Referral / Consult    pt here for R sided ear pain for the past week. Pt called ENT office and was told no openings. Pt states sometimes she needs to have the cerumen cleaned out. Feels full. No fever no chills. In the morning feels stopped up and then gets a little better like it drains. Pt is also requesting a referral to physical therapy for difficulty with ambulation, back pain and generalized weakness. Pt also rpeorts weakness in her hands. she is a 77y.o. year old female who presents for evalution. Reviewed PmHx, RxHx, FmHx, SocHx, AllgHx and updated and dated in the chart. Review of Systems - negative except as listed above in the HPI    Objective:     Vitals:    12/26/19 0944   BP: 177/78   Pulse: 80   Resp: 16   Temp: 98 °F (36.7 °C)   TempSrc: Oral   SpO2: 97%   Weight: 176 lb (79.8 kg)   Height: 5' 7\" (1.702 m)       Current Outpatient Medications   Medication Sig    linaCLOtide (LINZESS) 145 mcg cap capsule Take  by mouth Daily (before breakfast).  polyethylene glycol (MIRALAX) 17 gram packet Take 17 g by mouth daily.  loratadine (CLARITIN) 10 mg tablet Take 1 Tab by mouth daily for 360 days.  SITagliptin (JANUVIA) 100 mg tablet Take 100 mg by mouth daily.  milnacipran (SAVELLA) 50 mg tablet Take 1 Tab by mouth two (2) times a day. (Patient taking differently: Take 100 mg by mouth two (2) times a day.)    BUTALBITAL-ASPIRIN-CAFFEINE PO Take 1 Tab by mouth as needed.  baclofen (LIORESAL) 10 mg tablet Take 10 mg by mouth nightly.  bethanechol (URECHOLINE) 5 mg tablet Take 5 mg by mouth two (2) times a day.  pravastatin (PRAVACHOL) 40 mg tablet Take 40 mg by mouth nightly.     fluticasone propionate (FLONASE ALLERGY RELIEF) 50 mcg/actuation nasal spray 2 Spoke with elder at pharmacy to verify pt has medication from [facility]    Sprays by Both Nostrils route every morning.  magnesium oxide (MAG-OX) 400 mg tablet Take 400 mg by mouth every morning.  cranberry fruit extract (CRANBERRY PO) Take 4,200 mg by mouth daily.  ivermectin (SOOLANTRA) 1 % crea Apply  to affected area daily.  lisinopril (PRINIVIL, ZESTRIL) 2.5 mg tablet Take 2.5 mg by mouth nightly.  CALCIUM PO Take 1 Caplet by mouth daily.  glimepiride (AMARYL) 2 mg tablet Take 2 mg by mouth three (3) times daily.  ALPHA LIPOIC ACID PO Take 1 Cap by mouth two (2) times a day.  L-Mfolate-B6 Phos-Methyl-B12 (METANX) 3-35-2 mg tab tab Take 1 Tab by mouth two (2) times a day. Indications: neuropathy    plecanatide (TRULANCE) 3 mg tab Take 1 Tab by mouth daily.  azithromycin (ZITHROMAX) 250 mg tablet Take two tablets today then one tablet daily    umeclidinium brm/vilanterol tr (ANORO ELLIPTA IN) Take  by inhalation.  gabapentin (NEURONTIN) 600 mg tablet Take 600 mg by mouth three (3) times daily.  OTHER Take  by mouth daily. \"Vitamin D3 liquid\"    OTHER Take 2 Tabs by mouth two (2) times a day. \"Benefiber\"    JARDIANCE 10 mg tablet Take 10 mg by mouth every morning. No current facility-administered medications for this visit. Physical Examination: General appearance - alert, well appearing, and in no distress  Ears - cerumen of R ear, L ear clear TM normal b/l  Chest - clear to auscultation, no wheezes, rales or rhonchi, symmetric air entry  Heart - normal rate, regular rhythm, normal S1, S2, no murmurs, rubs, clicks or gallops      Assessment/ Plan:   Diagnoses and all orders for this visit:    1.  Acute transverse myelitis (HCC)  -     REFERRAL TO PHYSICAL THERAPY    2. Ataxic gait  -     REFERRAL TO PHYSICAL THERAPY    3. Balance disorder  -     REFERRAL TO PHYSICAL THERAPY    4. Chronic back pain, unspecified back location, unspecified back pain laterality  -     REFERRAL TO PHYSICAL THERAPY    5. Weakness  -     REFERRAL TO PHYSICAL THERAPY    6. Left hand weakness  -     REFERRAL TO PHYSICAL THERAPY    7. Right hand weakness  -     REFERRAL TO PHYSICAL THERAPY    8. Congestion of right ear  Debrox bid, secondary to cerumen but no impaction present     Follow-up and Dispositions    · Return if symptoms worsen or fail to improve. I have discussed the diagnosis with the patient and the intended plan as seen in the above orders. The patient has received an after-visit summary and questions were answered concerning future plans. Pt conveyed understanding of plan.     Medication Side Effects and Warnings were discussed with patient      Allie Ness DO

## 2019-12-26 NOTE — PROGRESS NOTES
Chief Complaint   Patient presents with    Ear Pain    Referral / Consult     Patient presents in office today with c/o right ear pain for about a week. Would also like to discuss getting a referral to PT. No other concerns. 1. Have you been to the ER, urgent care clinic since your last visit? Hospitalized since your last visit? No    2. Have you seen or consulted any other health care providers outside of the [address] [place] [address] since your last visit? Include any pap smears or colon screening.  No    Learning Assessment 3/4/2014   PRIMARY LEARNER Patient   HIGHEST LEVEL OF EDUCATION - PRIMARY LEARNER  4 YEARS OF COLLEGE   BARRIERS PRIMARY LEARNER NONE   PRIMARY LANGUAGE ENGLISH   LEARNER PREFERENCE PRIMARY LISTENING   ANSWERED BY self   RELATIONSHIP SELF

## 2020-01-09 ENCOUNTER — DOCUMENTATION ONLY (OUTPATIENT)
Dept: FAMILY MEDICINE CLINIC | Age: 70
End: 2020-01-09

## 2020-01-10 ENCOUNTER — DOCUMENTATION ONLY (OUTPATIENT)
Dept: FAMILY MEDICINE CLINIC | Age: 70
End: 2020-01-10

## 2020-01-13 ENCOUNTER — HOSPITAL ENCOUNTER (OUTPATIENT)
Dept: CT IMAGING | Age: 70
Discharge: HOME | End: 2020-01-13
Attending: INTERNAL MEDICINE
Payer: MEDICARE

## 2020-01-13 DIAGNOSIS — R91.8 PULMONARY NODULES: ICD-10-CM

## 2020-01-13 PROCEDURE — 71250 CT THORAX DX C-: CPT

## 2020-02-20 ENCOUNTER — OFFICE VISIT (OUTPATIENT)
Dept: FAMILY MEDICINE CLINIC | Age: 70
End: 2020-02-20

## 2020-02-20 VITALS
SYSTOLIC BLOOD PRESSURE: 150 MMHG | WEIGHT: 173 LBS | OXYGEN SATURATION: 98 % | HEIGHT: 67 IN | DIASTOLIC BLOOD PRESSURE: 77 MMHG | TEMPERATURE: 98.1 F | RESPIRATION RATE: 16 BRPM | HEART RATE: 87 BPM | BODY MASS INDEX: 27.15 KG/M2

## 2020-02-20 DIAGNOSIS — L30.9 DERMATITIS: Primary | ICD-10-CM

## 2020-02-20 RX ORDER — TRIAMCINOLONE ACETONIDE 1 MG/G
CREAM TOPICAL 2 TIMES DAILY
Qty: 30 G | Refills: 0 | Status: SHIPPED | OUTPATIENT
Start: 2020-02-20 | End: 2020-09-16 | Stop reason: ALTCHOICE

## 2020-02-20 NOTE — PROGRESS NOTES
Sharon Dye is a 72 y.o. female   Chief Complaint   Patient presents with    Knee Swelling    pt here for R knee swelling which started a week ago today. Knee does not hurt but itches quite a bit. No fever no chills. States started with a bruise and some redness. she is a 72y.o. year old female who presents for evalution. Reviewed PmHx, RxHx, FmHx, SocHx, AllgHx and updated and dated in the chart. Review of Systems - negative except as listed above in the HPI    Objective:     Vitals:    02/20/20 1337   BP: 150/77   Pulse: 87   Resp: 16   Temp: 98.1 °F (36.7 °C)   TempSrc: Oral   SpO2: 98%   Weight: 173 lb (78.5 kg)   Height: 5' 7\" (1.702 m)       Current Outpatient Medications   Medication Sig    triamcinolone acetonide (KENALOG) 0.1 % topical cream Apply  to affected area two (2) times a day. use thin layer    linaCLOtide (LINZESS) 145 mcg cap capsule Take  by mouth Daily (before breakfast).  polyethylene glycol (MIRALAX) 17 gram packet Take 17 g by mouth daily.  loratadine (CLARITIN) 10 mg tablet Take 1 Tab by mouth daily for 360 days.  SITagliptin (JANUVIA) 100 mg tablet Take 100 mg by mouth daily.  milnacipran (SAVELLA) 50 mg tablet Take 1 Tab by mouth two (2) times a day. (Patient taking differently: Take 100 mg by mouth two (2) times a day.)    BUTALBITAL-ASPIRIN-CAFFEINE PO Take 1 Tab by mouth as needed.  baclofen (LIORESAL) 10 mg tablet Take 10 mg by mouth nightly.  bethanechol (URECHOLINE) 5 mg tablet Take 5 mg by mouth two (2) times a day.  pravastatin (PRAVACHOL) 40 mg tablet Take 40 mg by mouth nightly.  fluticasone propionate (FLONASE ALLERGY RELIEF) 50 mcg/actuation nasal spray 2 Sprays by Both Nostrils route every morning.  magnesium oxide (MAG-OX) 400 mg tablet Take 400 mg by mouth every morning.  OTHER Take 2 Tabs by mouth two (2) times a day. \"Benefiber\"    cranberry fruit extract (CRANBERRY PO) Take 4,200 mg by mouth daily.     ivermectin (SOOLANTRA) 1 % crea Apply  to affected area daily.  lisinopril (PRINIVIL, ZESTRIL) 2.5 mg tablet Take 2.5 mg by mouth nightly.  CALCIUM PO Take 1 Caplet by mouth daily.  glimepiride (AMARYL) 2 mg tablet Take 2 mg by mouth three (3) times daily.  ALPHA LIPOIC ACID PO Take 1 Cap by mouth two (2) times a day.  L-Mfolate-B6 Phos-Methyl-B12 (METANX) 3-35-2 mg tab tab Take 1 Tab by mouth two (2) times a day. Indications: neuropathy    plecanatide (TRULANCE) 3 mg tab Take 1 Tab by mouth daily.  azithromycin (ZITHROMAX) 250 mg tablet Take two tablets today then one tablet daily    umeclidinium brm/vilanterol tr (ANORO ELLIPTA IN) Take  by inhalation.  gabapentin (NEURONTIN) 600 mg tablet Take 600 mg by mouth three (3) times daily.  OTHER Take  by mouth daily. \"Vitamin D3 liquid\"    JARDIANCE 10 mg tablet Take 10 mg by mouth every morning. No current facility-administered medications for this visit. Physical Examination: General appearance - alert, well appearing, and in no distress  Skin - erythema of knee with mild urticaria      Assessment/ Plan:   Diagnoses and all orders for this visit:    1. Dermatitis  -     triamcinolone acetonide (KENALOG) 0.1 % topical cream; Apply  to affected area two (2) times a day. use thin layer       Follow-up and Dispositions    · Return if symptoms worsen or fail to improve. I have discussed the diagnosis with the patient and the intended plan as seen in the above orders. The patient has received an after-visit summary and questions were answered concerning future plans. Pt conveyed understanding of plan.     Medication Side Effects and Warnings were discussed with patient      Marissa Acosta DO

## 2020-02-20 NOTE — PATIENT INSTRUCTIONS
A Healthy Lifestyle: Care Instructions  Your Care Instructions    A healthy lifestyle can help you feel good, stay at a healthy weight, and have plenty of energy for both work and play. A healthy lifestyle is something you can share with your whole family. A healthy lifestyle also can lower your risk for serious health problems, such as high blood pressure, heart disease, and diabetes. You can follow a few steps listed below to improve your health and the health of your family. Follow-up care is a key part of your treatment and safety. Be sure to make and go to all appointments, and call your doctor if you are having problems. It's also a good idea to know your test results and keep a list of the medicines you take. How can you care for yourself at home? · Do not eat too much sugar, fat, or fast foods. You can still have dessert and treats now and then. The goal is moderation. · Start small to improve your eating habits. Pay attention to portion sizes, drink less juice and soda pop, and eat more fruits and vegetables. ? Eat a healthy amount of food. A 3-ounce serving of meat, for example, is about the size of a deck of cards. Fill the rest of your plate with vegetables and whole grains. ? Limit the amount of soda and sports drinks you have every day. Drink more water when you are thirsty. ? Eat at least 5 servings of fruits and vegetables every day. It may seem like a lot, but it is not hard to reach this goal. A serving or helping is 1 piece of fruit, 1 cup of vegetables, or 2 cups of leafy, raw vegetables. Have an apple or some carrot sticks as an afternoon snack instead of a candy bar. Try to have fruits and/or vegetables at every meal.  · Make exercise part of your daily routine. You may want to start with simple activities, such as walking, bicycling, or slow swimming. Try to be active 30 to 60 minutes every day. You do not need to do all 30 to 60 minutes all at once.  For example, you can exercise 3 times a day for 10 or 20 minutes. Moderate exercise is safe for most people, but it is always a good idea to talk to your doctor before starting an exercise program.  · Keep moving. Shweta Kruger the lawn, work in the garden, or [employer]. Take the stairs instead of the elevator at work. · If you smoke, quit. People who smoke have an increased risk for heart attack, stroke, cancer, and other lung illnesses. Quitting is hard, but there are ways to boost your chance of quitting tobacco for good. ? Use nicotine gum, patches, or lozenges. ? Ask your doctor about stop-smoking programs and medicines. ? Keep trying. In addition to reducing your risk of diseases in the future, you will notice some benefits soon after you stop using tobacco. If you have shortness of breath or asthma symptoms, they will likely get better within a few weeks after you quit. · Limit how much alcohol you drink. Moderate amounts of alcohol (up to 2 drinks a day for men, 1 drink a day for women) are okay. But drinking too much can lead to liver problems, high blood pressure, and other health problems. Family health  If you have a family, there are many things you can do together to improve your health. · Eat meals together as a family as often as possible. · Eat healthy foods. This includes fruits, vegetables, lean meats and dairy, and whole grains. · Include your family in your fitness plan. Most people think of activities such as jogging or tennis as the way to fitness, but there are many ways you and your family can be more active. Anything that makes you breathe hard and gets your heart pumping is exercise. Here are some tips:  ? Walk to do errands or to take your child to school or the bus.  ? Go for a family bike ride after dinner instead of watching TV. Where can you learn more? Go to http://jorge a-katiana.info/. Enter W602 in the search box to learn more about \"A Healthy Lifestyle: Care Instructions. \"  Current as of: May 28, 2019  Content Version: 12.2  © 2473-7981 [employer]. Care instructions adapted under license by [employer] (which disclaims liability or warranty for this information). If you have questions about a medical condition or this instruction, always ask your healthcare professional. [address] any warranty or liability for your use of this information.

## 2020-02-20 NOTE — PROGRESS NOTES
Chief Complaint   Patient presents with    Knee Swelling     Patient presents in office today with c/o right knee swelling. No other concerns. 1. Have you been to the ER, urgent care clinic since your last visit? Hospitalized since your last visit? No    2. Have you seen or consulted any other health care providers outside of the [address] [place] [address] since your last visit? Include any pap smears or colon screening.  No    Learning Assessment 3/4/2014   PRIMARY LEARNER Patient   HIGHEST LEVEL OF EDUCATION - PRIMARY LEARNER  4 YEARS OF COLLEGE   BARRIERS PRIMARY LEARNER NONE   PRIMARY LANGUAGE ENGLISH   LEARNER PREFERENCE PRIMARY LISTENING   ANSWERED BY self   RELATIONSHIP SELF

## 2020-03-03 ENCOUNTER — HOSPITAL ENCOUNTER (EMERGENCY)
Age: 70
Discharge: HOME | End: 2020-03-03
Attending: STUDENT IN AN ORGANIZED HEALTH CARE EDUCATION/TRAINING PROGRAM | Admitting: STUDENT IN AN ORGANIZED HEALTH CARE EDUCATION/TRAINING PROGRAM
Payer: MEDICARE

## 2020-03-03 ENCOUNTER — APPOINTMENT (OUTPATIENT)
Dept: GENERAL RADIOLOGY | Age: 70
End: 2020-03-03
Attending: STUDENT IN AN ORGANIZED HEALTH CARE EDUCATION/TRAINING PROGRAM
Payer: MEDICARE

## 2020-03-03 VITALS
DIASTOLIC BLOOD PRESSURE: 81 MMHG | BODY MASS INDEX: 26.68 KG/M2 | SYSTOLIC BLOOD PRESSURE: 177 MMHG | OXYGEN SATURATION: 99 % | RESPIRATION RATE: 16 BRPM | HEART RATE: 89 BPM | TEMPERATURE: 97.6 F | WEIGHT: 170 LBS | HEIGHT: 67 IN
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
## 2020-03-03 DIAGNOSIS — S69.92XA INJURY OF LEFT WRIST, INITIAL ENCOUNTER: ICD-10-CM

## 2020-03-03 PROCEDURE — 73110 X-RAY EXAM OF WRIST: CPT

## 2020-03-03 PROCEDURE — 99283 EMERGENCY DEPT VISIT LOW MDM: CPT

## 2020-03-03 NOTE — ED PROVIDER NOTES
72 y.o. female with past medical history significant for HTN, hypercholesterolemia, fibromyalgia, DM, transverse myelopathy syndrome, and sarcoidosis who presents from home with chief complaint of wrist pain. Pt complains of left wrist pain secondary to a fall last night. Pt states she tripped over a wheel on her rollator causing her to fall and land on the left wrist. Pt denies head injury or LOC. There are no other acute medical concerns at this time. Social hx: Never Smoker. Denies EtOH Use. PCP: Walker Sesay DO    Note written by Arthur Guevara, as dictated by Torsten Del Rosario, DO 8:21 AM      The history is provided by the patient. Past Medical History:   Diagnosis Date    Abnormal MRI, cervical spine 4/3/2017    Abnormal pap 3/2015; 5/2016 2015 Neg pap +HPV; 2016 ASCUS +HPV    Arthritis     osteo-tests for RA were neg    Breast cancer, right breast (HCC) 1996    Chronic pain     Diabetes ([address]     Fibromyalgia     Ganglion cyst of wrist     LEFT    Hypercholesterolemia     Hypertension     Ill-defined condition     Prolapsed mitral valve    Memory loss     Per pt.      Murmur, cardiac     Neuropathy     Rosacea     Sarcoidosis     Sarcoidosis     Transverse myelopathy syndrome (HCC)     Unspecified adverse effect of anesthesia     \"SLOW TO WAKE UP, SENSITIVE TO ANESTHESIA, DO NOT COME AROUND FOR 2-3 DAYS\"        Past Surgical History:   Procedure Laterality Date    HX BACK SURGERY  2016    HX CATARACT REMOVAL Bilateral 2014    HX COLPOSCOPY  5/2016    Neg path    HX DILATION AND CURETTAGE  07/03/2019    path-benign    HX MASTECTOMY Right 12/1996    tram flap    HX ORTHOPAEDIC Left 1991    foot surgery    HX RHINOPLASTY N/A 1993    HX SEPTOPLASTY N/A 2013    HX TONSILLECTOMY      HX VASCULAR ACCESS  1997    portacath left chest-removed after chemo    RECONSTR NOSE  2005    HOLE IN SEPTUM         Family History:   Problem Relation Age of Onset    Heart Disease Mother     Hypertension Mother     COPD Mother     Diabetes Father     Other Son         INOPERABLE BRAIN TUMOR    Gout Son     Celiac Disease Son     Anesth Problems Neg Hx        Social History     Socioeconomic History    Marital status:      Spouse name: Not on file    Number of children: Not on file    Years of education: Not on file    Highest education level: Not on file   Occupational History    Not on file   Social Needs    Financial resource strain: Not on file    Food insecurity:     Worry: Not on file     Inability: Not on file    Transportation needs:     Medical: Not on file     Non-medical: Not on file   Tobacco Use    Smoking status: Never Smoker    Smokeless tobacco: Never Used    Tobacco comment: Never used vapor or e-cigs    Substance and Sexual Activity    Alcohol use: No    Drug use: No    Sexual activity: Not Currently     Partners: Male     Comment:    Lifestyle    Physical activity:     Days per week: Not on file     Minutes per session: Not on file    Stress: Not on file   Relationships    Social connections:     Talks on phone: Not on file     Gets together: Not on file     Attends [employer] service: Not on file     Active member of club or organization: Not on file     Attends meetings of clubs or organizations: Not on file     Relationship status: Not on file    Intimate partner violence:     Fear of current or ex partner: Not on file     Emotionally abused: Not on file     Physically abused: Not on file     Forced sexual activity: Not on file   Other Topics Concern    Not on file   Social History Narrative    Not on file         ALLERGIES: Latex; Other food; Betadine [povidone-iodine]; Codeine; Dilaudid [hydromorphone (bulk)]; Hydrocodone; Ditropan [oxybutynin chloride]; Doxycycline; Iodine; Keflex [cephalexin]; Metformin; Tape [adhesive];  Trulicity [dulaglutide]; and Sulfamethoxazole-trimethoprim    Review of Systems   Constitutional: Negative for chills and fever. HENT: Negative for congestion and rhinorrhea. Eyes: Negative for redness and visual disturbance. Respiratory: Negative for cough and shortness of breath. Cardiovascular: Negative for chest pain and leg swelling. Gastrointestinal: Negative for abdominal pain, diarrhea, nausea and vomiting. Genitourinary: Negative for dysuria, flank pain, frequency, hematuria and urgency. Musculoskeletal: Positive for arthralgias (left wrist.). Negative for back pain, myalgias and neck pain. Skin: Negative for rash and wound. Allergic/Immunologic: Negative for immunocompromised state. Neurological: Negative for dizziness and headaches. All other systems reviewed and are negative. Vitals:    03/03/20 0812   BP: 177/81   Pulse: 89   Resp: 16   Temp: 97.6 °F (36.4 °C)   SpO2: 99%   Weight: 77.1 kg (170 lb)   Height: 5' 7\" (1.702 m)            Physical Exam  Vitals signs and nursing note reviewed. Constitutional:       General: She is not in acute distress. Appearance: She is well-developed. She is not diaphoretic. HENT:      Head: Normocephalic. Mouth/Throat:      Pharynx: No oropharyngeal exudate. Eyes:      General:         Right eye: No discharge. Left eye: No discharge. Pupils: Pupils are equal, round, and reactive to light. Neck:      Musculoskeletal: Normal range of motion and neck supple. Cardiovascular:      Rate and Rhythm: Normal rate and regular rhythm. Heart sounds: Normal heart sounds. No murmur. No friction rub. No gallop. Pulmonary:      Effort: Pulmonary effort is normal. No respiratory distress. Breath sounds: Normal breath sounds. No stridor. No wheezing or rales. Abdominal:      General: Bowel sounds are normal. There is no distension. Palpations: Abdomen is soft. Tenderness: There is no abdominal tenderness. There is no guarding or rebound. Musculoskeletal: Normal range of motion.          General: No deformity. Left wrist: She exhibits tenderness. Comments: Left snuff box tenderness. Left dorsal wrist tenderness. Neurovascularly intact. Skin:     General: Skin is warm and dry. Capillary Refill: Capillary refill takes less than 2 seconds. Findings: No rash. Neurological:      Mental Status: She is alert and oriented to person, place, and time. Psychiatric:         Behavior: Behavior normal.      Note written by Jo Boxer. Mitch Damon, as dictated by Law Campos,  8:22 AM      MDM:  Pt refused pain meds    XR read as neg    Thumb spica splint placed    Patient is a 43-year-old female here with left wrist pain after falling on it last night. She has some snuffbox tenderness. No clear deformity. X-rays negative for acute fracture or process. Given snuffbox tenderness she was placed in a thumb spica and explained that there could still be an occult fracture of the scaphoid bone. I advised that she keep the splint on until she follows up with hand surgery. No other injuries noted. She was discharged home in stable condition.     LYNN Wright, DO

## 2020-03-03 NOTE — DISCHARGE INSTRUCTIONS
RETURN IF WORSENING PAIN, CHANGE IN COLOR, CHANGE IN TEMPERATURE, OR LOSS OF SENSATION IN THE AFFECTED EXTREMITY    WEAR THE SPLINT UNTIL YOU FOLLOW UP WITH THE HAND DOCTOR    YOU HAD PAIN AT THE LOCATION OF THE SCAPHOID BONE WHICH IF BROKEN MAY NOT ALWAYS SHOW UP ON THE XRAY RIGHT AWAY. PLEASE FOLLOW UP WITH HAND.

## 2020-03-23 ENCOUNTER — DOCUMENTATION ONLY (OUTPATIENT)
Dept: FAMILY MEDICINE CLINIC | Age: 70
End: 2020-03-23

## 2020-03-24 ENCOUNTER — DOCUMENTATION ONLY (OUTPATIENT)
Dept: FAMILY MEDICINE CLINIC | Age: 70
End: 2020-03-24

## 2020-04-09 PROBLEM — Z85.3 HISTORY OF BREAST CANCER: Status: ACTIVE | Noted: 2020-04-09

## 2020-06-04 ENCOUNTER — VIRTUAL VISIT (OUTPATIENT)
Dept: FAMILY MEDICINE CLINIC | Age: 70
End: 2020-06-04

## 2020-06-04 ENCOUNTER — TELEPHONE (OUTPATIENT)
Dept: CARDIOLOGY CLINIC | Age: 70
End: 2020-06-04

## 2020-06-04 DIAGNOSIS — R07.89 SENSATION OF CHEST PRESSURE: Primary | ICD-10-CM

## 2020-06-04 RX ORDER — FLASH GLUCOSE SENSOR
KIT MISCELLANEOUS
COMMUNITY
Start: 2020-03-03 | End: 2021-12-15

## 2020-06-04 NOTE — PROGRESS NOTES
Consent: Vianca Reed, who was seen by synchronous (real-time) audio-video technology, and/or her healthcare decision maker, is aware that this patient-initiated, Telehealth encounter on 6/4/2020 is a billable service, with coverage as determined by her insurance carrier. She is aware that she may receive a bill and has provided verbal consent to proceed: Yes. 712      Subjective:   Vianca Reed is a 72 y.o. female who was seen for No chief complaint on file. Pt states woke up in the middle of the night and felt a weight on chest. She also noticed some difficulty breathing/sensation of needing to work harder to take deep breath. Notes that she feels she is able to take a deep breath, and breathing is not labored. Denies chest pain or palpations but notes that feeling of pressure/weight on chest has not subsided since onset last night. Has never had this happen in the past.  Has slight dry cough. Denies HA, fever/chills, dizziness/lighheaded/syncope. Prior to Admission medications    Medication Sig Start Date End Date Taking? Authorizing Provider   FreeStyle Efe 14 Day Sensor kit U UTD Q 14 DAYS 3/3/20  Yes Provider, Historical   linaCLOtide (LINZESS) 145 mcg cap capsule Take  by mouth Daily (before breakfast). Yes Provider, Historical   polyethylene glycol (MIRALAX) 17 gram packet Take 17 g by mouth daily. Yes Provider, Historical   loratadine (CLARITIN) 10 mg tablet Take 1 Tab by mouth daily for 360 days. 9/25/19 9/19/20 Yes Florence Tran MD   SITagliptin (JANUVIA) 100 mg tablet Take 100 mg by mouth daily. Yes Provider, Historical   milnacipran (SAVELLA) 50 mg tablet Take 1 Tab by mouth two (2) times a day. Patient taking differently: Take 100 mg by mouth two (2) times a day. 8/29/19  Yes Jennifer Mcfadden, DO   BUTALBITAL-ASPIRIN-CAFFEINE PO Take 1 Tab by mouth as needed. Yes Provider, Historical   baclofen (LIORESAL) 10 mg tablet Take 10 mg by mouth nightly.    Yes Provider, Historical bethanechol (URECHOLINE) 5 mg tablet Take 5 mg by mouth two (2) times a day. Yes Provider, Historical   pravastatin (PRAVACHOL) 40 mg tablet Take 40 mg by mouth nightly. Yes Provider, Historical   fluticasone propionate (FLONASE ALLERGY RELIEF) 50 mcg/actuation nasal spray 2 Sprays by Both Nostrils route every morning. Yes Provider, Historical   magnesium oxide (MAG-OX) 400 mg tablet Take 400 mg by mouth every morning. Yes Provider, Historical   OTHER Take 2 Tabs by mouth two (2) times a day. \"Benefiber\"   Yes Provider, Historical   cranberry fruit extract (CRANBERRY PO) Take 4,200 mg by mouth daily. Yes Provider, Historical   ivermectin (SOOLANTRA) 1 % crea Apply  to affected area daily. Yes Provider, Historical   lisinopril (PRINIVIL, ZESTRIL) 2.5 mg tablet Take 2.5 mg by mouth nightly. Yes Provider, Historical   CALCIUM PO Take 1 Caplet by mouth daily. Yes Provider, Historical   glimepiride (AMARYL) 2 mg tablet Take 2 mg by mouth three (3) times daily. Yes Provider, Historical   ALPHA LIPOIC ACID PO Take 1 Cap by mouth two (2) times a day. Yes Provider, Historical   L-Mfolate-B6 Phos-Methyl-B12 (METANX) 3-35-2 mg tab tab Take 1 Tab by mouth two (2) times a day. Indications: neuropathy   Yes Provider, Historical   SITagliptin (Januvia) 100 mg tablet 1 tab(s)    Provider, Historical   triamcinolone acetonide (KENALOG) 0.1 % topical cream Apply  to affected area two (2) times a day. use thin layer 2/20/20   Jennifer Mcfadden DO   plecanatide (TRULANCE) 3 mg tab Take 1 Tab by mouth daily. Provider, Historical   azithromycin (ZITHROMAX) 250 mg tablet Take two tablets today then one tablet daily 9/25/19   Pedro Peng MD   umeclidinium brm/vilanterol tr (ANORO ELLIPTA IN) Take  by inhalation. Provider, Historical   gabapentin (NEURONTIN) 600 mg tablet Take 600 mg by mouth three (3) times daily. Provider, Historical   OTHER Take  by mouth daily.  \"Vitamin D3 liquid\"    Provider, Historical JARDIANCE 10 mg tablet Take 10 mg by mouth every morning. 2/26/18   Provider, Historical     Allergies   Allergen Reactions    Latex Other (comments)     Patient states it burns around her mouth.  Other Food Other (comments)     Yogurt - stomach cramping    Betadine [Povidone-Iodine] Rash and Itching     Pt states this causes \"extreme\" itching    Codeine Anaphylaxis, Rash, Nausea Only and Other (comments)     HEADACHE  Tolerates tramadol    Dilaudid [Hydromorphone (Bulk)] Anaphylaxis, Itching, Nausea Only and Other (comments)     HALLUCINATIONS  Tolerates tramadol      Hydrocodone Anaphylaxis, Rash, Nausea Only and Vertigo     Facial - eyelid swelling-had to go to ER for reaction, HALLUCINATIONS  Tolerates tramadol      Ditropan [Oxybutynin Chloride] Swelling    Doxycycline Rash     PUSTULAR RASH- TOLERATING TETRACYCLINE    Iodine Other (comments)     Headache    Keflex [Cephalexin] Nausea and Vomiting     Pain in abdomen AND FLU-LIKE SX      Metformin Nausea and Vomiting     Severe abdominal pain      Tape [Adhesive] Other (comments)     Redness/inflammed. Can only use paper tape. CAN USE BAND-AIDS. TOLERATES SURGICAL TAPE USED IN [place] [facility].      Trulicity [Dulaglutide] Other (comments)     Abdominal pain     Sulfamethoxazole-Trimethoprim Other (comments)     Cramping/flu-like symptoms       Patient Active Problem List    Diagnosis Date Noted    History of breast cancer 04/09/2020    Atherosclerosis of aorta ([facility] 09/24/2019    Other emphysema ([address] 08/29/2019    Abnormal MRI, cervical spine 04/03/2017    Left-sided weakness 04/02/2017    Acute transverse myelitis ([address] 04/02/2017    ACP (advance care planning) 12/14/2016    Scoliosis 10/05/2016    HTN (hypertension) 09/15/2014    Fibromyalgia 04/21/2014    Postmenopausal bleeding 04/09/2014    Diabetes mellitus with neuropathy ([address] 03/17/2014       ROS - negative except as listed above in the HPI      Objective:   Vital Signs: (As obtained by patient/caregiver at home)  There were no vitals taken for this visit. Physical Exam:   General: alert, cooperative, no distress   Mental  status: normal mood, behavior, speech, dress, motor activity, and thought processes, able to follow commands   HEENT: normocephalic, atraumatic    Eyes:  extraocular movements intact, sclera normal, no visible discharge   Ears:  external ears normal   Mouth/Throat:  mucous memrates appear moist    Neck: no visualized mass   Resp: respiratory effort is normal, breathing appears non-labored, no visualized signs of respiratory distress   Neuro: no gross deficits   Skin: no discoloration or lesions of concern on visible areas   Psychiatric: normal affect, consistent with stated mood, no evidence of hallucinations      Additional exam findings:      Assessment & Plan:   Diagnoses and all orders for this visit:    1. Sensation of chest pressure  - Advised to go to ED or urgent care as soon as possible for EKG/labs. Discussed if cardiac work up is negative they may consider CXR. - Pt in agreement w/ plan and will go to ER or urgent care ASAP for further evaluation              I spent at least 15 minutes with this established patient, and >50% of the time was spent counseling and/or coordinating care regarding chest pressure      We discussed the expected course, resolution and complications of the diagnosis(es) in detail. Medication risks, benefits, costs, interactions, and alternatives were discussed as indicated. I advised her to contact the office if her condition worsens, changes or fails to improve as anticipated. She expressed understanding with the diagnosis(es) and plan. Andrew Cervantes is a 72 y.o. female being evaluated by a video visit encounter for concerns as above. A caregiver was present when appropriate.  Due to this being a TeleHealth encounter (During TOIBM-90 public health emergency), evaluation of the following organ systems was limited: Vitals/Constitutional/EENT/Resp/CV/GI//MS/Neuro/Skin/Heme-Lymph-Imm. Pursuant to the emergency declaration under the [address] [place] [address], [facility] waiver authority and the [employer] Act, this Virtual  Visit was conducted, with patient's (and/or legal guardian's) consent, to reduce the patient's risk of exposure to COVID-19 and provide necessary medical care. Services were provided through a video synchronous discussion virtually to substitute for in-person clinic visit. Patient and provider were located at their individual homes.         Mendel Converse, NP

## 2020-06-04 NOTE — TELEPHONE ENCOUNTER
Dr Naseem Shipman with [facility] ED is calling to speak with Dr Rahel Herrera regarding patient that is coming in to ED.      Cell: [phone]

## 2020-06-05 NOTE — TELEPHONE ENCOUNTER
Spoke with Dr Adelso Wolf - clinical picture that of subacute progressive HF with new LBBB, CXR with small effusions, NT proBNP 1000ish. Got diuretic. Repeat echo today, anticipated discharge.      Will arrange followup next week - in person with me or Leandro Krishna

## 2020-06-12 ENCOUNTER — TELEPHONE (OUTPATIENT)
Dept: CARDIOLOGY CLINIC | Age: 70
End: 2020-06-12

## 2020-06-12 NOTE — TELEPHONE ENCOUNTER
Patient stated that she is still very weak and light-headed with shortness of breath and would like to discuss this further with the nurse. Please advise.     Phone #: [phone]  Thanks

## 2020-06-12 NOTE — TELEPHONE ENCOUNTER
Morena Cruz stated [facility] nurse evaluated her this AM on phone. Patient has increase in SOB and dizziness since hospitalization. Weight 168lb    /68   HR 70/min  Denies CP  Has OV 6/19    Nurse told patient to call and get an earlier appt.       advise

## 2020-06-15 ENCOUNTER — OFFICE VISIT (OUTPATIENT)
Dept: CARDIOLOGY CLINIC | Age: 70
End: 2020-06-15

## 2020-06-15 VITALS
WEIGHT: 168 LBS | HEIGHT: 67 IN | BODY MASS INDEX: 26.37 KG/M2 | OXYGEN SATURATION: 98 % | DIASTOLIC BLOOD PRESSURE: 62 MMHG | SYSTOLIC BLOOD PRESSURE: 124 MMHG

## 2020-06-15 DIAGNOSIS — I42.8 OTHER CARDIOMYOPATHY (HCC): ICD-10-CM

## 2020-06-15 DIAGNOSIS — I25.10 CORONARY ARTERY DISEASE INVOLVING NATIVE CORONARY ARTERY OF NATIVE HEART WITHOUT ANGINA PECTORIS: Primary | ICD-10-CM

## 2020-06-15 DIAGNOSIS — I44.7 LBBB (LEFT BUNDLE BRANCH BLOCK): ICD-10-CM

## 2020-06-15 DIAGNOSIS — E11.40 TYPE 2 DIABETES MELLITUS WITH DIABETIC NEUROPATHY, WITHOUT LONG-TERM CURRENT USE OF INSULIN (HCC): ICD-10-CM

## 2020-06-15 DIAGNOSIS — R06.09 DOE (DYSPNEA ON EXERTION): ICD-10-CM

## 2020-06-15 DIAGNOSIS — I50.22 SYSTOLIC CHF, CHRONIC (HCC): ICD-10-CM

## 2020-06-15 PROBLEM — I50.32 CHRONIC HEART FAILURE WITH PRESERVED EJECTION FRACTION (HCC): Status: ACTIVE | Noted: 2020-06-15

## 2020-06-15 PROBLEM — I42.9 MYOCARDIOPATHY (HCC): Status: ACTIVE | Noted: 2020-06-15

## 2020-06-15 RX ORDER — CARVEDILOL 3.12 MG/1
TABLET ORAL 2 TIMES DAILY WITH MEALS
COMMUNITY
End: 2020-07-01 | Stop reason: SDUPTHER

## 2020-06-15 RX ORDER — SPIRONOLACTONE 25 MG/1
TABLET ORAL DAILY
COMMUNITY
End: 2020-07-01 | Stop reason: SDUPTHER

## 2020-06-15 RX ORDER — ATORVASTATIN CALCIUM 40 MG/1
TABLET, FILM COATED ORAL DAILY
COMMUNITY
End: 2020-07-16 | Stop reason: SDUPTHER

## 2020-06-15 NOTE — PROGRESS NOTES
Visit Vitals  /62   Ht 5' 7\" (1.702 m)   Wt 168 lb (76.2 kg)   SpO2 98%   BMI 26.31 kg/m²       Discharged 6-8 from [facility]  SOB and lightheadedness

## 2020-06-15 NOTE — PROGRESS NOTES
Camilo Mcginnis [place], [address]  [address]  [place], [address] [facility]    Office [phone]  Fax [phone]  Cell [phone]        Evie Stevens is a 72 y.o. female followup from recent PCI at [address] [place] [address]     Assessment  Encounter Diagnoses   Name Primary?  KHALIL (dyspnea on exertion)     Type 2 diabetes mellitus with diabetic neuropathy, without long-term current use of insulin (HCC)     Coronary artery disease involving native coronary artery of native heart without angina pectoris Yes    LBBB (left bundle branch block)     Other cardiomyopathy ([address]     Systolic CHF, chronic ([address]      Recommendations:    New onset HFrEF with LBBB, EF 35%. Class 3 KHALIL but no volume overload. Diff dx of CM includes LBBB, CAD, diabetes. Combination of meds good but need to adjust timing to minimize orthostasis  - continue carvedilol 3.125 mg bid  - dose lisinopril 2.5 mg midday  - continue spironolactone 25 mg/d  - optimal dosing will be challenging  - reassess LVEF in a month    CAD with 1v disease s/p PCI LAD with SORAYA 6/8/20 ([facility] @ [address] [place] [address]). Sx = HF, dyspnea but no chest pain. Risk drivers=DM, dyslipidemia. - continue DAPT one year. May need to replace Brilinta with plavix if dyspnea persists    KHALIL, multifactorial - HF/LV dysfunction, Brilinta, deconditioning. Type 2 DM. Elevated sugars of late likely stress related from recent HF/PCI, etc. followup with Dr Robison Just near future  - consider challenging with another SGLT2i such as [facility] given favorable HF data (intolerant of Jardiance)      Follow-up and Dispositions    · Return in about 4 weeks (around 7/13/2020). Subjective:    Recent hospital course discussed with hospitalist at [address] [place] [address] and Dr Timmy Bryant from [address] [place] [address]. Presented with subacute HF with new LBBB, effusions. Interval decline in EF (35%) but normal troponin.  Underwent cath by Dr Timmy Bryant 6/8/20 demonstrating severe 1v CAD with LAD /diagonal bifurcation 80% stenosis treated with PCI with SORAYA (3.0 x 18 mm Resolute), optimal expansion confirmed by IVUS. Never had chest pain. Has persistent KHALIL, class 3. Patient denies any exertional chest pain, palpitations, syncope, orthopnea, edema or paroxysmal nocturnal dyspnea. Weight has further declined since hospital discharge but not on loop diuretics. Activity largely limited by transverse myelitis, ambulates with walker but mostly sedentary. Intermittent positional lightheadedness noted but no syncope. Carvedilol, spironolactone and lisinopril are new meds to her    She has chronic KHALIL with hx cutaneous sarcoid but no evidence of pulmonary involvement. She also has a hx of transverse myelitis with a neurostimulator in her hip. Recent pulmonary evaluation with Dr. Antonino Mcintyre demonstrated fairly normal PFTs. Updated chest CT last fall demonstrated pulmonary nodules but no adenopathy. lexiscan cardiolite and echo Oct 2019 normal     She ambulates with a walker. Cardiac risk factors   HTN yes  DM no  Smoking no  Family hx of CAD no    Cardiac testing  Echo 04/02/17-LVEF 60%, negative bubble study    Echo 10/10/19 - EF 51%. No WMA. G1DD. Mild MR. Lexiscan Cardiolite 10/10/19 - Normal perfusion. EF 54%. Past Medical History:   Diagnosis Date    Abnormal MRI, cervical spine 4/3/2017    Abnormal pap 3/2015; 5/2016 2015 Neg pap +HPV; 2016 ASCUS +HPV    Arthritis     osteo-tests for RA were neg    Breast cancer ([address] 3/12/2015    Breast cancer, right breast ([address] 1996    Chronic pain     Diabetes ([address]     Fibromyalgia     Ganglion cyst of wrist     LEFT    Hypercholesterolemia     Hypertension     Ill-defined condition     Prolapsed mitral valve    Memory loss     Per pt.      Murmur, cardiac     Neuropathy     Rosacea     Sarcoidosis     Sarcoidosis     Transverse myelopathy syndrome (HCC)     Unspecified adverse effect of anesthesia     \"SLOW TO WAKE UP, SENSITIVE TO ANESTHESIA, DO NOT COME AROUND FOR 2-3 DAYS\"         Current Outpatient Medications   Medication Sig Dispense Refill    atorvastatin (LIPITOR) 40 mg tablet Take  by mouth daily.  ticagrelor (BRILINTA) 90 mg tablet Take  by mouth two (2) times a day.  carvediloL (COREG) 3.125 mg tablet Take  by mouth two (2) times daily (with meals).  spironolactone (ALDACTONE) 25 mg tablet Take  by mouth daily.  polyethylene glycol (MIRALAX) 17 gram packet Take 17 g by mouth daily.  loratadine (CLARITIN) 10 mg tablet Take 1 Tab by mouth daily for 360 days. 30 Tab 11    SITagliptin (JANUVIA) 100 mg tablet Take 100 mg by mouth daily.  milnacipran (SAVELLA) 50 mg tablet Take 1 Tab by mouth two (2) times a day. 180 Tab 3    BUTALBITAL-ASPIRIN-CAFFEINE PO Take 1 Tab by mouth as needed.  baclofen (LIORESAL) 10 mg tablet Take 10 mg by mouth nightly.  bethanechol (URECHOLINE) 5 mg tablet Take 5 mg by mouth two (2) times a day.  fluticasone propionate (FLONASE ALLERGY RELIEF) 50 mcg/actuation nasal spray 2 Sprays by Both Nostrils route every morning.  magnesium oxide (MAG-OX) 400 mg tablet Take 400 mg by mouth every morning.  OTHER Take 2 Tabs by mouth two (2) times a day. \"Benefiber\"      cranberry fruit extract (CRANBERRY PO) Take 4,200 mg by mouth daily.  ivermectin (SOOLANTRA) 1 % crea Apply  to affected area daily.  CALCIUM PO Take 1 Caplet by mouth daily.  glimepiride (AMARYL) 2 mg tablet Take 2 mg by mouth three (3) times daily. 1 in the morning, 2 at night      ALPHA LIPOIC ACID PO Take 1 Cap by mouth two (2) times a day.  L-Mfolate-B6 Phos-Methyl-B12 (METANX) 3-35-2 mg tab tab Take 1 Tab by mouth two (2) times a day. Indications: neuropathy      FreeStyle Efe 14 Day Sensor kit U UTD Q 14 DAYS      triamcinolone acetonide (KENALOG) 0.1 % topical cream Apply  to affected area two (2) times a day. use thin layer 30 g 0    linaCLOtide (LINZESS) 145 mcg cap capsule Take  by mouth Daily (before breakfast).       plecanatide (TRULANCE) 3 mg tab Take 1 Tab by mouth daily.  azithromycin (ZITHROMAX) 250 mg tablet Take two tablets today then one tablet daily 6 Tab 0    umeclidinium brm/vilanterol tr (ANORO ELLIPTA IN) Take  by inhalation.  gabapentin (NEURONTIN) 600 mg tablet Take 600 mg by mouth three (3) times daily.  OTHER Take  by mouth daily. \"Vitamin D3 liquid\"      lisinopril (PRINIVIL, ZESTRIL) 2.5 mg tablet Take 2.5 mg by mouth nightly. Allergies   Allergen Reactions    Latex Other (comments)     Patient states it burns around her mouth.  Other Food Other (comments)     Yogurt - stomach cramping    Betadine [Povidone-Iodine] Rash and Itching     Pt states this causes \"extreme\" itching    Codeine Anaphylaxis, Rash, Nausea Only and Other (comments)     HEADACHE  Tolerates tramadol    Dilaudid [Hydromorphone (Bulk)] Anaphylaxis, Itching, Nausea Only and Other (comments)     HALLUCINATIONS  Tolerates tramadol      Hydrocodone Anaphylaxis, Rash, Nausea Only and Vertigo     Facial - eyelid swelling-had to go to ER for reaction, HALLUCINATIONS  Tolerates tramadol      Ditropan [Oxybutynin Chloride] Swelling    Doxycycline Rash     PUSTULAR RASH- TOLERATING TETRACYCLINE    Iodine Other (comments)     Headache    Keflex [Cephalexin] Nausea and Vomiting     Pain in abdomen AND FLU-LIKE SX      Metformin Nausea and Vomiting     Severe abdominal pain      Tape [Adhesive] Other (comments)     Redness/inflammed. Can only use paper tape. CAN USE BAND-AIDS. TOLERATES SURGICAL TAPE USED IN [place] [facility].  Trulicity [Dulaglutide] Other (comments)     Abdominal pain     Sulfamethoxazole-Trimethoprim Other (comments)     Cramping/flu-like symptoms          Review of Systems  Constitutional: Negative for fever, chills,  and diaphoresis. Respiratory: Negative for cough, hemoptysis, sputum production, and wheezing.  +KHALIL  Cardiovascular: Negative for chest pain, orthopnea, claudication, leg swelling and PND. Gastrointestinal: Negative for heartburn, nausea, vomiting, blood in stool and melena. Genitourinary: Negative for dysuria and flank pain. Musculoskeletal: Negative for joint pain. Skin: Negative for rash. Neurological: Negative for focal weakness, seizures, loss of consciousness, weakness and headaches. Endo/Heme/Allergies: Does not bruise/bleed easily. Psychiatric/Behavioral: Negative for memory loss. The patient does not have insomnia. Physical Exam    Visit Vitals  /62   Ht 5' 7\" (1.702 m)   Wt 168 lb (76.2 kg)   SpO2 98%   BMI 26.31 kg/m²     Wt Readings from Last 3 Encounters:   06/15/20 168 lb (76.2 kg)   03/03/20 170 lb (77.1 kg)   02/20/20 173 lb (78.5 kg)      General - well developed well nourished  Neck - JVP normal, thyroid nl  Cardiac - normal S1, S2, no murmurs, rubs or gallops.  No clicks  Vascular - carotids without bruits, radials, femorals and pedal pulses equal bilateral  Lungs - clear to auscultation bilaterals, no rales, wheezing or rhonchi  Abd - soft nontender, no HSM, no abd bruits  Extremities - no edema  Skin - no rash  Neuro - nonfocal  Psych - normal mood and affect    Cardiographics  EKG 04/01/17- SR, LVH   Echo 04/02/17-LVEF 60%, negative bubble study   EKG 6/18/19 - SR, LVH, LAHB  EKG 6/15/2020 - sinus LBBB, new from one year ago        Dae Martino MD

## 2020-06-15 NOTE — PATIENT INSTRUCTIONS
Take carvedilol breakfast and dinner Take lisinopril midday with lunch Take spironolactone in the morning Brilinta likely contributing to shortness of breath. If this does not improve in 1-2 weeks, would consider replacing with clopidogrel (Plavix) Discuss trying [facility] for diabetes and heart with Dr Jose Frias

## 2020-06-22 ENCOUNTER — TELEPHONE (OUTPATIENT)
Dept: CARDIOLOGY CLINIC | Age: 70
End: 2020-06-22

## 2020-06-22 NOTE — TELEPHONE ENCOUNTER
Patient requesting to speak with the nurse in regards to not feeling well and she has stents; she thinks her medication needs to be changed.  Please advise        AEACD:[phone]

## 2020-06-22 NOTE — TELEPHONE ENCOUNTER
Shruti Alvarado stated continues to be extremely fatigued, sleeps most of the day and has not been able to go out due to being confused,dizzy,unbalanced and SOB    /66  HR 74/min weight 165lb    Patient expressing frustration on feeling worse now then when she was in the hospital. Would like her medications reevaluated to help with symptoms.       advise

## 2020-06-23 RX ORDER — CLOPIDOGREL BISULFATE 75 MG/1
75 TABLET ORAL DAILY
Qty: 30 TAB | Refills: 3 | Status: SHIPPED | OUTPATIENT
Start: 2020-06-23 | End: 2020-07-01 | Stop reason: SDUPTHER

## 2020-06-23 NOTE — TELEPHONE ENCOUNTER
PTIDX2 instructed patient to replace Brilinta with Plavix.   Plavix 75mg daily RX sent to [facility]    Patient verbalized understanding

## 2020-07-01 ENCOUNTER — HOSPITAL ENCOUNTER (OUTPATIENT)
Dept: CT IMAGING | Age: 70
Discharge: HOME | End: 2020-07-01
Attending: INTERNAL MEDICINE
Payer: MEDICARE

## 2020-07-01 ENCOUNTER — TELEPHONE (OUTPATIENT)
Dept: OBGYN CLINIC | Age: 70
End: 2020-07-01

## 2020-07-01 DIAGNOSIS — R91.1 PULMONARY NODULE: ICD-10-CM

## 2020-07-01 DIAGNOSIS — R92.2 DENSE BREAST TISSUE: Primary | ICD-10-CM

## 2020-07-01 PROCEDURE — 71250 CT THORAX DX C-: CPT

## 2020-07-01 NOTE — TELEPHONE ENCOUNTER
Patient of [address] [place] [address] Rd. Mammo from 6/29/20 under imaging    Calling to say that she was recommended to do a 3 month mammogram again around 9/29/20 at [facility]       Needs orders      Do you want me to place order for Left breast dx mammo and send to [address] [place] [address]? Okay to use dense breast tissue as DX?     Fax number to [facility]  [phone]    Phone number to breast center [facility]  [phone]

## 2020-07-09 ENCOUNTER — TELEPHONE (OUTPATIENT)
Dept: OBGYN CLINIC | Age: 70
End: 2020-07-09

## 2020-07-09 NOTE — TELEPHONE ENCOUNTER
Message left at 544am    72year old patient last seen in the office on 8/6/2019      Patient left a message to say that she needs the order for left breast diagnostic mammogram and ultrasound faxed to [facility]    This nurse called the patient and advised that the order would be printed ,signed and faxed to [facility]      Fax confirmation received. Patient verbalized understanding.

## 2020-07-16 ENCOUNTER — OFFICE VISIT (OUTPATIENT)
Dept: CARDIOLOGY CLINIC | Age: 70
End: 2020-07-16

## 2020-07-16 ENCOUNTER — TELEPHONE (OUTPATIENT)
Dept: CARDIOLOGY CLINIC | Age: 70
End: 2020-07-16

## 2020-07-16 VITALS
WEIGHT: 165 LBS | BODY MASS INDEX: 25.9 KG/M2 | SYSTOLIC BLOOD PRESSURE: 130 MMHG | DIASTOLIC BLOOD PRESSURE: 70 MMHG | OXYGEN SATURATION: 89 % | HEIGHT: 67 IN | HEART RATE: 78 BPM

## 2020-07-16 DIAGNOSIS — E11.40 TYPE 2 DIABETES MELLITUS WITH DIABETIC NEUROPATHY, WITHOUT LONG-TERM CURRENT USE OF INSULIN (HCC): ICD-10-CM

## 2020-07-16 DIAGNOSIS — I25.10 CORONARY ARTERY DISEASE INVOLVING NATIVE CORONARY ARTERY OF NATIVE HEART WITHOUT ANGINA PECTORIS: ICD-10-CM

## 2020-07-16 DIAGNOSIS — I70.0 ATHEROSCLEROSIS OF AORTA (HCC): ICD-10-CM

## 2020-07-16 DIAGNOSIS — I10 ESSENTIAL HYPERTENSION: ICD-10-CM

## 2020-07-16 DIAGNOSIS — I42.8 OTHER CARDIOMYOPATHY (HCC): ICD-10-CM

## 2020-07-16 DIAGNOSIS — I50.22 CHRONIC SYSTOLIC HEART FAILURE (HCC): Primary | ICD-10-CM

## 2020-07-16 DIAGNOSIS — I44.7 LBBB (LEFT BUNDLE BRANCH BLOCK): ICD-10-CM

## 2020-07-16 DIAGNOSIS — J43.8 OTHER EMPHYSEMA (HCC): ICD-10-CM

## 2020-07-16 RX ORDER — GLUCOSAMINE/CHONDR SU A SOD 750-600 MG
TABLET ORAL
COMMUNITY

## 2020-07-16 RX ORDER — ATORVASTATIN CALCIUM 40 MG/1
40 TABLET, FILM COATED ORAL DAILY
Qty: 90 TAB | Refills: 1 | Status: SHIPPED | OUTPATIENT
Start: 2020-07-16 | End: 2020-12-30

## 2020-07-16 RX ORDER — SACUBITRIL AND VALSARTAN 24; 26 MG/1; MG/1
1 TABLET, FILM COATED ORAL 2 TIMES DAILY
Qty: 30 TAB | Refills: 1 | Status: SHIPPED | OUTPATIENT
Start: 2020-07-16 | End: 2020-07-21 | Stop reason: SDUPTHER

## 2020-07-16 NOTE — TELEPHONE ENCOUNTER
Reviewed refill for lipitor with   Requested Prescriptions     Pending Prescriptions Disp Refills    atorvastatin (LIPITOR) 40 mg tablet 90 Tab 1     Sig: Take 1 Tab by mouth daily.      VO

## 2020-07-16 NOTE — PROGRESS NOTES
Tree Galvan is a 72 y.o. female    Chief Complaint   Patient presents with    Coronary Artery Disease    CHF       Chest pain No    SOB patient states SOB with moving around    Dizziness patient states some lightheadedness    Swelling No    Refills no patient has an issue with insurance with refill on atorvastatin    Visit Vitals  /70   Pulse 78   Ht 5' 7\" (1.702 m)   Wt 165 lb (74.8 kg)   SpO2 (!) 89%   BMI 25.84 kg/m²       1. Have you been to the ER, urgent care clinic since your last visit? Hospitalized since your last visit? no    2. Have you seen or consulted any other health care providers outside of the [address] [place] [address] since your last visit? Include any pap smears or colon screening.   no

## 2020-07-16 NOTE — LETTER
7/16/20 Patient: Soni Echavarria YOB: 1950 Date of Visit: 7/16/2020 Skye Ya DO 
[facility] [address] [place] [id number] VIA In Basket Dear Skye Ya DO, Thank you for referring Ms. Mary Connell to [facility] for evaluation. My notes for this consultation are attached. If you have questions, please do not hesitate to call me. I look forward to following your patient along with you. Sincerely, Ashleigh Dimas MD

## 2020-07-16 NOTE — PROGRESS NOTES
[address]  [address]  [place], [address] [facility]    Office [phone]  Fax [phone]  Cell [phone]        Guido George is a 72 y.o. female last seen by me 1 month ago. Assessment  Encounter Diagnoses   Name Primary?  Coronary artery disease involving native coronary artery of native heart without angina pectoris     LBBB (left bundle branch block)     Chronic systolic heart failure (HCC) Yes    Essential hypertension     Atherosclerosis of aorta (HCC)     Other cardiomyopathy ([facility]     Type 2 diabetes mellitus with diabetic neuropathy, without long-term current use of insulin (HCC)     Other emphysema ([facility]      Recommendations:    Recent HFrEF with LBBB. LVEF 20-25% today. Class 3 KHALIL but no volume overload. Diff dx of CM includes LBBB, diabetes. No improvement in EF post PCI (actually worse - 35% by echo last month at [facility]). Her medical therapy is reasonable but favor replacing lisinopril w/ entresto. Long discussion w/ patient and her  about HF and treatment options including CRT-D.   - Continue carvedilol 3.125 mg bid  - Replace lisinopril w/ Entresto 24/26 mg bid after 36 hour wash-out  - Continue spironolactone 25 mg/d  - Optimal dosing will be challenging d/t low, normal BP  - Check BMP, NT Pro-BNP at next visit  - Refer to Dr. Flavio Medley for Biv ICD implant evaluation    CAD with 1v disease s/p PCI LAD with SORAYA 6/8/20 ([facility] @ [address] [place] [address]). Sx = HF, dyspnea but no chest pain. Risk drivers=DM, dyslipidemia.  - Continue DAPT one year, now on Plavix instead of Burlinta     Type 2 DM. Farxiga added 3 weeks ago. Tolerating this week. Continue followup with Dr Elpidio Spears near future    Functional capacity in part reduced by hx of transverse myelitis        Follow-up and Dispositions    · Return in about 4 weeks (around 8/13/2020). Subjective:    Guido George reports a recent increased sedentary lifestyle in the last 3 months.  She gets KHALIL fairly easily. No chest pain. Ambulates with walker due to hx of transvere myelitis. Patient denies any exertional chest pain, palpitations, syncope, orthopnea, edema or paroxysmal nocturnal dyspnea. She reports some dizziness when standing up. She reports KHALIL w/ ADLs. Mary Jane David was switched to Plavix a month ago d/t dyspnea. She has been taking [place] for 3 weeks. BP regularly 136/62 at home. She reports radiating pain all day every day along her back that resolved once she got her stent. The pain is aggravated by supine positions. She ambulates with a walker. She is here with her . Cardiac risk factors   HTN yes  DM no  Smoking no  Family hx of CAD no    Cardiac testing  Echo 04/02/17-LVEF 60%, negative bubble study    Echo 10/10/19 - EF 51%. No WMA. G1DD. Mild MR. Lexiscan Cardiolite 10/10/19 - Normal perfusion. EF 54%. EKG 6/18/19 - SR, LVH, LAHB  EKG 6/15/2020 - sinus LBBB, new from one year    Echo 7/16/20- EF 20-25%, dilalated LV, global HK, septal dyssynergy    Past Medical History:   Diagnosis Date    Abnormal MRI, cervical spine 4/3/2017    Abnormal pap 3/2015; 5/2016 2015 Neg pap +HPV; 2016 ASCUS +HPV    Arthritis     osteo-tests for RA were neg    Breast cancer ([address] 3/12/2015    Breast cancer, right breast ([address] 1996    Chronic pain     Diabetes ([address]     Fibromyalgia     Ganglion cyst of wrist     LEFT    Hypercholesterolemia     Hypertension     Ill-defined condition     Prolapsed mitral valve    Memory loss     Per pt.  Murmur, cardiac     Neuropathy     Rosacea     Sarcoidosis     Sarcoidosis     Transverse myelopathy syndrome (HCC)     Unspecified adverse effect of anesthesia     \"SLOW TO WAKE UP, SENSITIVE TO ANESTHESIA, DO NOT COME AROUND FOR 2-3 DAYS\"         Current Outpatient Medications   Medication Sig Dispense Refill    Biotin 2,500 mcg cap Take  by mouth.  OTHER daily.  Bulberry      dapagliflozin propanediol (FARXIGA PO) Take  by mouth.  sacubitriL-valsartan (Entresto) 24-26 mg tablet Take 1 Tab by mouth two (2) times a day. 30 Tab 1    spironolactone (ALDACTONE) 25 mg tablet Take 1 Tab by mouth daily. 90 Tab 1    carvediloL (COREG) 3.125 mg tablet Take 1 Tab by mouth two (2) times daily (with meals). 180 Tab 1    clopidogreL (Plavix) 75 mg tab Take 1 Tab by mouth daily. 90 Tab 1    FreeStyle Efe 14 Day Sensor kit U UTD Q 14 DAYS      linaCLOtide (LINZESS) 145 mcg cap capsule Take  by mouth Daily (before breakfast).  polyethylene glycol (MIRALAX) 17 gram packet Take 17 g by mouth daily.  loratadine (CLARITIN) 10 mg tablet Take 1 Tab by mouth daily for 360 days. 30 Tab 11    milnacipran (SAVELLA) 50 mg tablet Take 1 Tab by mouth two (2) times a day. 180 Tab 3    BUTALBITAL-ASPIRIN-CAFFEINE PO Take 1 Tab by mouth as needed.  baclofen (LIORESAL) 10 mg tablet Take 10 mg by mouth nightly.  bethanechol (URECHOLINE) 5 mg tablet Take 5 mg by mouth two (2) times a day.  fluticasone propionate (FLONASE ALLERGY RELIEF) 50 mcg/actuation nasal spray 2 Sprays by Both Nostrils route every morning.  magnesium oxide (MAG-OX) 400 mg tablet Take 400 mg by mouth every morning.  OTHER Take 2 Tabs by mouth two (2) times a day. \"Benefiber\"      cranberry fruit extract (CRANBERRY PO) Take 4,200 mg by mouth daily.  ivermectin (SOOLANTRA) 1 % crea Apply  to affected area daily.  CALCIUM PO Take 1 Caplet by mouth daily.  glimepiride (AMARYL) 2 mg tablet Take 2 mg by mouth three (3) times daily. 1 in the morning, 2 at night      ALPHA LIPOIC ACID PO Take 1 Cap by mouth two (2) times a day.  L-Mfolate-B6 Phos-Methyl-B12 (METANX) 3-35-2 mg tab tab Take 1 Tab by mouth two (2) times a day. Indications: neuropathy      atorvastatin (LIPITOR) 40 mg tablet Take 1 Tab by mouth daily.  90 Tab 1    triamcinolone acetonide (KENALOG) 0.1 % topical cream Apply  to affected area two (2) times a day. use thin layer (Patient not taking: Reported on 7/16/2020) 30 g 0    plecanatide (TRULANCE) 3 mg tab Take 1 Tab by mouth daily.  SITagliptin (JANUVIA) 100 mg tablet Take 100 mg by mouth daily.  umeclidinium brm/vilanterol tr (ANORO ELLIPTA IN) Take  by inhalation.  gabapentin (NEURONTIN) 600 mg tablet Take 600 mg by mouth three (3) times daily.  OTHER Take  by mouth daily. \"Vitamin D3 liquid\"         Allergies   Allergen Reactions    Latex Other (comments)     Patient states it burns around her mouth.  Other Food Other (comments)     Yogurt - stomach cramping    Betadine [Povidone-Iodine] Rash and Itching     Pt states this causes \"extreme\" itching    Codeine Anaphylaxis, Rash, Nausea Only and Other (comments)     HEADACHE  Tolerates tramadol    Dilaudid [Hydromorphone (Bulk)] Anaphylaxis, Itching, Nausea Only and Other (comments)     HALLUCINATIONS  Tolerates tramadol      Hydrocodone Anaphylaxis, Rash, Nausea Only and Vertigo     Facial - eyelid swelling-had to go to ER for reaction, HALLUCINATIONS  Tolerates tramadol      Ditropan [Oxybutynin Chloride] Swelling    Doxycycline Rash     PUSTULAR RASH- TOLERATING TETRACYCLINE    Iodine Other (comments)     Headache    Keflex [Cephalexin] Nausea and Vomiting     Pain in abdomen AND FLU-LIKE SX      Metformin Nausea and Vomiting     Severe abdominal pain      Tape [Adhesive] Other (comments)     Redness/inflammed. Can only use paper tape. CAN USE BAND-AIDS. TOLERATES SURGICAL TAPE USED IN [place] [facility].  Trulicity [Dulaglutide] Other (comments)     Abdominal pain     Sulfamethoxazole-Trimethoprim Other (comments)     Cramping/flu-like symptoms          Review of Systems  Constitutional: Negative for fever, chills,  and diaphoresis. Respiratory: Negative for cough, hemoptysis, sputum production, and wheezing. +KHALIL  Cardiovascular: Negative for chest pain, orthopnea, claudication, leg swelling and PND. Gastrointestinal: Negative for heartburn, nausea, vomiting, blood in stool and melena. Genitourinary: Negative for dysuria and flank pain. Musculoskeletal: Negative for joint pain. Skin: Negative for rash. Neurological: Negative for focal weakness, seizures, loss of consciousness, weakness and headaches. Endo/Heme/Allergies: Does not bruise/bleed easily. Psychiatric/Behavioral: Negative for memory loss. The patient does not have insomnia. Physical Exam    Visit Vitals  /70   Pulse 78   Ht 5' 7\" (1.702 m)   Wt 165 lb (74.8 kg)   SpO2 (!) 89%   BMI 25.84 kg/m²     Wt Readings from Last 3 Encounters:   07/16/20 165 lb (74.8 kg)   07/16/20 165 lb (74.8 kg)   06/15/20 168 lb (76.2 kg)      General - well developed well nourished  Neck - JVP normal, thyroid nl  Cardiac - normal S1, S2, no murmurs, rubs or gallops. No clicks  Vascular - carotids without bruits, radials, femorals and pedal pulses equal bilateral  Lungs - clear to auscultation bilaterals, no rales, wheezing or rhonchi  Abd - soft nontender, no HSM, no abd bruits  Extremities - no edema  Skin - no rash  Neuro - nonfocal  Psych - normal mood and affect    Cardiographics  EKG 04/01/17- SR, LVH   Echo 04/02/17-LVEF 60%, negative bubble study   EKG 6/18/19 - SR, LVH, LAHB  EKG 6/15/2020 - sinus LBBB, new from one year ago  Echo 7/16/20- EF 20-25%, dilalated LV, global HK, septal dyssynergy       Written by Leilani Kayser, as dictated by Melina Carlson M.D.        Melina Carlsno MD

## 2020-07-16 NOTE — PATIENT INSTRUCTIONS
-Stop Lisinopril today and start Entresto 24/26 twice daily tomorrow.   -Refer to Dr. Rafa Ruiz for BiV ICD

## 2020-07-20 ENCOUNTER — TELEPHONE (OUTPATIENT)
Dept: CARDIOLOGY CLINIC | Age: 70
End: 2020-07-20

## 2020-07-20 NOTE — TELEPHONE ENCOUNTER
Patient stated that she has not been able to stand up for 3 days because she was unable to catch her breath. Please advise.     Phone #: [phone]  Thanks

## 2020-07-21 RX ORDER — SACUBITRIL AND VALSARTAN 24; 26 MG/1; MG/1
1 TABLET, FILM COATED ORAL 2 TIMES DAILY
Qty: 56 TAB | Refills: 0 | Status: SHIPPED | COMMUNITY
Start: 2020-07-21 | End: 2020-09-04

## 2020-07-21 NOTE — TELEPHONE ENCOUNTER
[id number] instructed to continue on Entresto and yolanda will call on Thursday to further evaluate. BP today 115/55    Patient will see  in 7/29    Patient does not have a follow up with Michael Armando samples in closet for patient    Raji Samy would like you to call patient Thursday to follow up.

## 2020-07-23 NOTE — TELEPHONE ENCOUNTER
Spoke with Ms. Rendon this morning. She is doing great. She notes a nearly complete resolution of her dyspnea at this time. She notes a significant increase in urination and has started to have a productive \"clearing\" cough. No s/sxs of volume depletion. She is up and moving around her house without complaints. We reviewed that she will continue her current medication regimen, follow up with Dr. Gia Junior next week, and then we will determine a plan moving forward from there. She was encouraged to call back or MyChart with any questions or concerns prior to her next office visit.

## 2020-07-27 NOTE — PROGRESS NOTES
HISTORY OF PRESENTING ILLNESS      Soni Echavarria is a 72 y.o. female with NICM, LBBB, LVEF 20-25%, DM, hypertension, CAD s/p PCI (6/8/20), history of breast cancer status post chemotherapy, transverse myelitis, NYHA III whose LV function has failed to improve on medical therapy. PAST MEDICAL HISTORY     Past Medical History:   Diagnosis Date    Abnormal MRI, cervical spine 4/3/2017    Abnormal pap 3/2015; 5/2016 2015 Neg pap +HPV; 2016 ASCUS +HPV    Arthritis     osteo-tests for RA were neg    Breast cancer ([address] 3/12/2015    Breast cancer, right breast ([address] 1996    Chronic pain     Diabetes ([address]     Fibromyalgia     Ganglion cyst of wrist     LEFT    Hypercholesterolemia     Hypertension     Ill-defined condition     Prolapsed mitral valve    Memory loss     Per pt.  Murmur, cardiac     Neuropathy     Rosacea     Sarcoidosis     Sarcoidosis     Transverse myelopathy syndrome (HCC)     Unspecified adverse effect of anesthesia     \"SLOW TO WAKE UP, SENSITIVE TO ANESTHESIA, DO NOT COME AROUND FOR 2-3 DAYS\"            PAST SURGICAL HISTORY     Past Surgical History:   Procedure Laterality Date    HX BACK SURGERY  2016    HX CATARACT REMOVAL Bilateral 2014    HX COLPOSCOPY  5/2016    Neg path    HX DILATION AND CURETTAGE  07/03/2019    path-benign    HX MASTECTOMY Right 12/1996    tram flap    HX ORTHOPAEDIC Left 1991    foot surgery    HX RHINOPLASTY N/A 1993    HX SEPTOPLASTY N/A 2013    HX TONSILLECTOMY      HX VASCULAR ACCESS  1997    portacath left chest-removed after chemo    RECONSTR NOSE  2005    HOLE IN SEPTUM          ALLERGIES     Allergies   Allergen Reactions    Latex Other (comments)     Patient states it burns around her mouth.     Other Food Other (comments)     Yogurt - stomach cramping    Betadine [Povidone-Iodine] Rash and Itching     Pt states this causes \"extreme\" itching    Codeine Anaphylaxis, Rash, Nausea Only and Other (comments) HEADACHE  Tolerates tramadol    Dilaudid [Hydromorphone (Bulk)] Anaphylaxis, Itching, Nausea Only and Other (comments)     HALLUCINATIONS  Tolerates tramadol      Hydrocodone Anaphylaxis, Rash, Nausea Only and Vertigo     Facial - eyelid swelling-had to go to ER for reaction, HALLUCINATIONS  Tolerates tramadol      Ditropan [Oxybutynin Chloride] Swelling    Doxycycline Rash     PUSTULAR RASH- TOLERATING TETRACYCLINE    Iodine Other (comments)     Headache    Keflex [Cephalexin] Nausea and Vomiting     Pain in abdomen AND FLU-LIKE SX      Metformin Nausea and Vomiting     Severe abdominal pain      Tape [Adhesive] Other (comments)     Redness/inflammed. Can only use paper tape. CAN USE BAND-AIDS. TOLERATES SURGICAL TAPE USED IN [place].  Trulicity [Dulaglutide] Other (comments)     Abdominal pain     Sulfamethoxazole-Trimethoprim Other (comments)     Cramping/flu-like symptoms          FAMILY HISTORY     Family History   Problem Relation Age of Onset    Heart Disease Mother     Hypertension Mother     COPD Mother     Diabetes Father     Other Son         INOPERABLE BRAIN TUMOR    Gout Son     Celiac Disease Son     Anesth Problems Neg Hx     negative for cardiac disease       SOCIAL HISTORY     Social History     Socioeconomic History    Marital status:      Spouse name: Not on file    Number of children: Not on file    Years of education: Not on file    Highest education level: Not on file   Tobacco Use    Smoking status: Never Smoker    Smokeless tobacco: Never Used    Tobacco comment: Never used vapor or e-cigs    Substance and Sexual Activity    Alcohol use: No    Drug use: No    Sexual activity: Not Currently     Partners: Male     Comment:          MEDICATIONS     Current Outpatient Medications   Medication Sig    sacubitriL-valsartan (Entresto) 24-26 mg tablet Take 1 Tab by mouth two (2) times a day.  Biotin 2,500 mcg cap Take  by mouth.  OTHER daily. Bulberry    dapagliflozin propanediol (FARXIGA PO) Take  by mouth.  atorvastatin (LIPITOR) 40 mg tablet Take 1 Tab by mouth daily.  spironolactone (ALDACTONE) 25 mg tablet Take 1 Tab by mouth daily.  carvediloL (COREG) 3.125 mg tablet Take 1 Tab by mouth two (2) times daily (with meals).  clopidogreL (Plavix) 75 mg tab Take 1 Tab by mouth daily.  FreeStyle Efe 14 Day Sensor kit U UTD Q 14 DAYS    triamcinolone acetonide (KENALOG) 0.1 % topical cream Apply  to affected area two (2) times a day. use thin layer (Patient not taking: Reported on 7/16/2020)    linaCLOtide (LINZESS) 145 mcg cap capsule Take  by mouth Daily (before breakfast).  polyethylene glycol (MIRALAX) 17 gram packet Take 17 g by mouth daily.  plecanatide (TRULANCE) 3 mg tab Take 1 Tab by mouth daily.  loratadine (CLARITIN) 10 mg tablet Take 1 Tab by mouth daily for 360 days.  SITagliptin (JANUVIA) 100 mg tablet Take 100 mg by mouth daily.  milnacipran (SAVELLA) 50 mg tablet Take 1 Tab by mouth two (2) times a day.  umeclidinium brm/vilanterol tr (ANORO ELLIPTA IN) Take  by inhalation.  BUTALBITAL-ASPIRIN-CAFFEINE PO Take 1 Tab by mouth as needed.  baclofen (LIORESAL) 10 mg tablet Take 10 mg by mouth nightly.  bethanechol (URECHOLINE) 5 mg tablet Take 5 mg by mouth two (2) times a day.  gabapentin (NEURONTIN) 600 mg tablet Take 600 mg by mouth three (3) times daily.  fluticasone propionate (FLONASE ALLERGY RELIEF) 50 mcg/actuation nasal spray 2 Sprays by Both Nostrils route every morning.  magnesium oxide (MAG-OX) 400 mg tablet Take 400 mg by mouth every morning.  OTHER Take  by mouth daily. \"Vitamin D3 liquid\"    OTHER Take 2 Tabs by mouth two (2) times a day. \"Benefiber\"    cranberry fruit extract (CRANBERRY PO) Take 4,200 mg by mouth daily.  ivermectin (SOOLANTRA) 1 % crea Apply  to affected area daily.  CALCIUM PO Take 1 Caplet by mouth daily.     glimepiride (AMARYL) 2 mg tablet Take 2 mg by mouth three (3) times daily. 1 in the morning, 2 at night    ALPHA LIPOIC ACID PO Take 1 Cap by mouth two (2) times a day.  L-Mfolate-B6 Phos-Methyl-B12 (METANX) 3-35-2 mg tab tab Take 1 Tab by mouth two (2) times a day. Indications: neuropathy     No current facility-administered medications for this visit. I have reviewed the nurses notes, vitals, problem list, allergy list, medical history, family, social history and medications. REVIEW OF SYMPTOMS      General: Pt denies excessive weight gain or loss. Pt is able to conduct ADL's  HEENT: Denies blurred vision, headaches, hearing loss, epistaxis and difficulty swallowing. Respiratory: Denies cough, congestion, shortness of breath, KHALIL, wheezing or stridor. Cardiovascular: Denies precordial pain, palpitations, edema or PND  Gastrointestinal: Denies poor appetite, indigestion, abdominal pain or blood in stool  Genitourinary: Denies hematuria, dysuria, increased urinary frequency  Musculoskeletal: Denies joint pain or swelling from muscles or joints  Neurologic: Denies tremor, paresthesias, headache, or sensory motor disturbance  Psychiatric: Denies confusion, insomnia, depression  Integumentray: Denies rash, itching or ulcers. Hematologic: Denies easy bruising, bleeding       PHYSICAL EXAMINATION      Vitals: see vitals section  General: Well developed, in no acute distress. HEENT: No jaundice, oral mucosa moist, no oral ulcers  Neck: Supple, no stiffness, no lymphadenopathy, supple  Heart:  Normal S1/S2 negative S3 or S4. Regular, no murmur, gallop or rub, no jugular venous distention  Respiratory: Clear bilaterally x 4, no wheezing or rales  Abdomen:   Soft, non-tender, bowel sounds are active. Extremities:  No edema, normal cap refill, no cyanosis. Musculoskeletal: No clubbing, no deformities  Neuro: A&Ox3, speech clear, gait stable, cooperative, no focal neurologic deficits  Skin: Skin color is normal. No rashes or lesions. Non diaphoretic, moist.  Vascular: 2+ pulses symmetric in all extremities       DIAGNOSTIC DATA      EKG:        LABORATORY DATA      Lab Results   Component Value Date/Time    WBC 4.6 06/18/2019 09:46 AM    HGB 13.8 06/18/2019 09:46 AM    HCT 44.5 06/18/2019 09:46 AM    PLATELET 303 97/89/0307 09:46 AM    MCV 86.2 06/18/2019 09:46 AM      Lab Results   Component Value Date/Time    Sodium 141 06/18/2019 09:46 AM    Potassium 5.4 (H) 06/18/2019 09:46 AM    Chloride 105 06/18/2019 09:46 AM    CO2 33 (H) 06/18/2019 09:46 AM    Anion gap 3 (L) 06/18/2019 09:46 AM    Glucose 183 (H) 06/18/2019 09:46 AM    BUN 15 06/18/2019 09:46 AM    Creatinine 0.56 06/18/2019 09:46 AM    BUN/Creatinine ratio 27 (H) 06/18/2019 09:46 AM    GFR est AA >60 06/18/2019 09:46 AM    GFR est non-AA >60 06/18/2019 09:46 AM    Calcium 9.4 06/18/2019 09:46 AM    Bilirubin, total 0.3 02/26/2019 04:01 PM    Alk. phosphatase 110 02/26/2019 04:01 PM    Protein, total 6.5 02/26/2019 04:01 PM    Albumin 4.1 02/26/2019 04:01 PM    Globulin 3.0 11/02/2017 05:04 AM    A-G Ratio 1.7 02/26/2019 04:01 PM    ALT (SGPT) 25 02/26/2019 04:01 PM           ASSESSMENT      1. Cardiomyopathy   A. NICM   B. LVEF 20-25%  2. LBBB  3. Diabetes mellitus  4. Hypertension  5. CAD, native  6. Percutaneous transluminal angioplasty       PLAN     Based on risk of sudden cardiac death, a formal shared decision has been made to proceed with implantation of a cardiac defibrillator for primary prevention of sudden cardiac death using the [address] [place] [address] patient decision aid tool. The patient verbalizes understanding of indication, risks and benefits of ICD procedure. Repeat echocardiogram in September and if LV function remains less than 35% we will plan for implantation of a biventricular ICD for primary prevention of sudden cardiac death with [employer].   MAC anesthesia assist for use of propofol given her unfavorable response to Versed in the past.  She also indicated preference of staying overnight following her procedure. We will start with left subclavian venogram given previous history of left chest port (now removed). ICD-10-CM ICD-9-CM    1. Cardiomyopathy, unspecified type ([address]  I42.9 425.4    2. Coronary artery disease involving native coronary artery of native heart without angina pectoris  I25.10 414.01    3. LBBB (left bundle branch block)  I44.7 426.3    4. Essential hypertension  I10 401.9    5. Atherosclerosis of aorta (HCC)  I70.0 440.0    6. Other emphysema ([address]  J43.8 492.8      No orders of the defined types were placed in this encounter. FOLLOW-UP     After echo  Thank you, Jackie Khan DO and Dr. Gregory Gallegos for allowing me to participate in the care of this extraordinarily pleasant female. Please do not hesitate to contact me for further questions/concerns.          Kevin Grimaldo MD  Cardiac Electrophysiology / Cardiology    [address]  [address], Suite [facility], [address] [place] [address]  [phone] / [phone] Fax   [phone] / [phone] Fax

## 2020-07-29 ENCOUNTER — TELEPHONE (OUTPATIENT)
Dept: CARDIOLOGY CLINIC | Age: 70
End: 2020-07-29

## 2020-07-29 ENCOUNTER — OFFICE VISIT (OUTPATIENT)
Dept: CARDIOLOGY CLINIC | Age: 70
End: 2020-07-29

## 2020-07-29 VITALS
SYSTOLIC BLOOD PRESSURE: 132 MMHG | DIASTOLIC BLOOD PRESSURE: 84 MMHG | OXYGEN SATURATION: 100 % | BODY MASS INDEX: 25.96 KG/M2 | HEIGHT: 67 IN | WEIGHT: 165.4 LBS | HEART RATE: 88 BPM | RESPIRATION RATE: 20 BRPM

## 2020-07-29 DIAGNOSIS — I44.7 LBBB (LEFT BUNDLE BRANCH BLOCK): ICD-10-CM

## 2020-07-29 DIAGNOSIS — I25.10 CORONARY ARTERY DISEASE INVOLVING NATIVE CORONARY ARTERY OF NATIVE HEART WITHOUT ANGINA PECTORIS: ICD-10-CM

## 2020-07-29 DIAGNOSIS — J43.8 OTHER EMPHYSEMA (HCC): ICD-10-CM

## 2020-07-29 DIAGNOSIS — I42.9 CARDIOMYOPATHY, UNSPECIFIED TYPE (HCC): Primary | ICD-10-CM

## 2020-07-29 DIAGNOSIS — I70.0 ATHEROSCLEROSIS OF AORTA (HCC): ICD-10-CM

## 2020-07-29 DIAGNOSIS — I10 ESSENTIAL HYPERTENSION: ICD-10-CM

## 2020-07-29 RX ORDER — PREDNISONE 5 MG/1
TABLET ORAL
COMMUNITY
End: 2020-07-29

## 2020-07-29 NOTE — PROGRESS NOTES
ROOM # 1  Visit Vitals  /84 (BP 1 Location: Left arm, BP Patient Position: Sitting)   Pulse 88   Resp 20   Ht 5' 7\" (1.702 m)   Wt 165 lb 6.4 oz (75 kg)   SpO2 100%   BMI 25.91 kg/m²

## 2020-09-15 NOTE — PROGRESS NOTES
[place]ilo [place], [address]  [address]  [address]  [place], [address] [facility]    Office [phone]  Fax [phone]  Cell [phone]        Rafa Spears is a 72 y.o. female last seen by me 2 months ago. Assessment  Encounter Diagnoses   Name Primary?  Coronary artery disease involving native coronary artery of native heart without angina pectoris     Essential hypertension     Atherosclerosis of aorta (HCC)     LBBB (left bundle branch block)     Other cardiomyopathy ([address]     Systolic CHF, chronic (HCC) Yes    Type 2 diabetes mellitus with diabetic neuropathy, without long-term current use of insulin (HCC)     Other emphysema (HCC)     Transverse myelopathy syndrome ([address]      Recommendations:    Chronic HFrEF with LBBB. LVEF 20-25% today, unchanged from 2 months ago. Class 3 KHALIL but no volume overload. Diff dx of CM includes LBBB, diabetes. Recent cath w/ single vessel disease s/p PCI. Her medical therapy is reasonable but optimal dosing is limited by low-eric BP. For some reason, she remains on low dose lisinopril but has had no issues w/ angioedema. - Continue carvedilol 3.125 mg BID, stop lisinopril and increase Entresto to 49/51 mg BID  - Continue spironolactone 25 mg/d  - Proceed w/ Biv ICD implant w/ Dr. Miles Edward (has already seen him previously)    CAD with 1v disease s/p PCI LAD with SORAYA 6/8/20 ([facility] @ [facility]). Sx = HF, dyspnea but no chest pain. Risk drivers=DM, dyslipidemia.  - Continue DAPT one year     Type 2 DM. Nusrat Haley which also has HF benefit. Continue followup with Dr Suzy Gaming as planned. Functional capacity in part reduced by hx of transverse myelitis    Follow-up and Dispositions    · Return in about 3 months (around 12/16/2020). Subjective:    Rafa Spears reports stable pattern of exertional fatigue and KHALIL/SOB w/ ADLs and just sitting.  She states that for two weeks, she had felt great w/ energy, but she then started to feel fatigued and short of breath again. Patient denies any exertional chest pain, palpitations, syncope, orthopnea, edema or paroxysmal nocturnal dyspnea. BP at home- 128 systolic this morning. She notes some dizziness/lightheadedness. She reports that she has been taking lisinopril 2.5 mg in addition to Oleta Pounds. No lip or tongue swelling noted     She ambulates with a walker. She is here with her . Cardiac risk factors   HTN yes  DM no  Smoking no  Family hx of CAD no    Cardiac testing  Echo 04/02/17-LVEF 60%, negative bubble study    Echo 10/10/19 - EF 51%. No WMA. G1DD. Mild MR. Lexiscan Cardiolite 10/10/19 - Normal perfusion. EF 54%. EKG 6/18/19 - SR, LVH, LAHB  EKG 6/15/2020 - sinus LBBB, new from one year    Echo 7/16/20- EF 20-25%, dilalated LV, global HK, septal dyssynergy  Echo 9/16/20- EF 20-25%, dilated LV, unchanged from 7/16/20    Past Medical History:   Diagnosis Date    Abnormal MRI, cervical spine 4/3/2017    Abnormal pap 3/2015; 5/2016 2015 Neg pap +HPV; 2016 ASCUS +HPV    Arthritis     osteo-tests for RA were neg    Breast cancer ([address] 3/12/2015    [facility] cancer, right breast [facility] 1996    Chronic pain     Diabetes ([facility]     Fibromyalgia     Ganglion cyst of wrist     LEFT    Hypercholesterolemia     Hypertension     Ill-defined condition     Prolapsed mitral valve    Memory loss     Per pt.  Murmur, cardiac     Neuropathy     Rosacea     Sarcoidosis     Sarcoidosis     Transverse myelopathy syndrome (HCC)     Unspecified adverse effect of anesthesia     \"SLOW TO WAKE UP, SENSITIVE TO ANESTHESIA, DO NOT COME AROUND FOR 2-3 DAYS\"         Current Outpatient Medications   Medication Sig Dispense Refill    Biotin 2,500 mcg cap Take  by mouth.  OTHER daily. Bulberry      dapagliflozin (Farxiga) 10 mg tab tablet Take  by mouth daily.  atorvastatin (LIPITOR) 40 mg tablet Take 1 Tab by mouth daily.  90 Tab 1    spironolactone (ALDACTONE) 25 mg tablet Take 1 Tab by mouth daily. 90 Tab 1    carvediloL (COREG) 3.125 mg tablet Take 1 Tab by mouth two (2) times daily (with meals). 180 Tab 1    clopidogreL (Plavix) 75 mg tab Take 1 Tab by mouth daily. 90 Tab 1    FreeStyle Efe 14 Day Sensor kit U UTD Q 14 DAYS      linaCLOtide (LINZESS) 145 mcg cap capsule Take  by mouth Daily (before breakfast).  polyethylene glycol (MIRALAX) 17 gram packet Take 17 g by mouth daily.  loratadine (CLARITIN) 10 mg tablet Take 1 Tab by mouth daily for 360 days. 30 Tab 11    SITagliptin (JANUVIA) 100 mg tablet Take 100 mg by mouth daily.  milnacipran (SAVELLA) 50 mg tablet Take 1 Tab by mouth two (2) times a day. 180 Tab 3    BUTALBITAL-ASPIRIN-CAFFEINE PO Take 1 Tab by mouth as needed.  baclofen (LIORESAL) 10 mg tablet Take 10 mg by mouth nightly.  bethanechol (URECHOLINE) 5 mg tablet Take 5 mg by mouth two (2) times a day.  fluticasone propionate (FLONASE ALLERGY RELIEF) 50 mcg/actuation nasal spray 2 Sprays by Both Nostrils route every morning.  magnesium oxide (MAG-OX) 400 mg tablet Take 400 mg by mouth every morning.  OTHER Take 2 Tabs by mouth daily. \"Benefiber\"       cranberry fruit extract (CRANBERRY PO) Take 4,200 mg by mouth daily.  ivermectin (SOOLANTRA) 1 % crea Apply  to affected area daily.  CALCIUM PO Take 1 Caplet by mouth daily.  glimepiride (AMARYL) 2 mg tablet Take 2 mg by mouth three (3) times daily. 1 in the morning, 2 at night      ALPHA LIPOIC ACID PO Take 1 Cap by mouth two (2) times a day.  L-Mfolate-B6 Phos-Methyl-B12 (METANX) 3-35-2 mg tab tab Take 1 Tab by mouth two (2) times a day. Indications: neuropathy         Allergies   Allergen Reactions    Latex Other (comments)     Patient states it burns around her mouth.     Other Food Other (comments)     Yogurt - stomach cramping    Betadine [Povidone-Iodine] Rash and Itching     Pt states this causes \"extreme\" itching    Codeine Anaphylaxis, Rash, Nausea Only and Other (comments)     HEADACHE  Tolerates tramadol    Dilaudid [Hydromorphone (Bulk)] Anaphylaxis, Itching, Nausea Only and Other (comments)     HALLUCINATIONS  Tolerates tramadol      Hydrocodone Anaphylaxis, Rash, Nausea Only and Vertigo     Facial - eyelid swelling-had to go to ER for reaction, HALLUCINATIONS  Tolerates tramadol      Ditropan [Oxybutynin Chloride] Swelling    Doxycycline Rash     PUSTULAR RASH- TOLERATING TETRACYCLINE    Iodine Other (comments)     Headache    Keflex [Cephalexin] Nausea and Vomiting     Pain in abdomen AND FLU-LIKE SX      Metformin Nausea and Vomiting     Severe abdominal pain      Tape [Adhesive] Other (comments)     Redness/inflammed. Can only use paper tape. CAN USE BAND-AIDS. TOLERATES SURGICAL TAPE USED IN [place].  Trulicity [Dulaglutide] Other (comments)     Abdominal pain     Sulfamethoxazole-Trimethoprim Other (comments)     Cramping/flu-like symptoms          Review of Systems  Constitutional: Negative for fever, chills,  and diaphoresis. +fatigue, dizziness/lightheadedness   Respiratory: Negative for cough, hemoptysis, sputum production, and wheezing. +KHALIL/SOB  Cardiovascular: Negative for chest pain, orthopnea, claudication, leg swelling and PND. Gastrointestinal: Negative for heartburn, nausea, vomiting, blood in stool and melena. Genitourinary: Negative for dysuria and flank pain. Musculoskeletal: Negative for joint pain. Skin: Negative for rash. Neurological: Negative for focal weakness, seizures, weakness and headaches. Endo/Heme/Allergies: Does not bruise/bleed easily. Psychiatric/Behavioral: Negative for memory loss. The patient does not have insomnia.     Physical Exam    Visit Vitals  /78   Pulse 77   Resp 20   Ht 5' 7\" (1.702 m)   Wt 165 lb (74.8 kg)   SpO2 99%   BMI 25.84 kg/m²     Wt Readings from Last 3 Encounters:   09/16/20 165 lb (74.8 kg)   09/16/20 165 lb (74.8 kg)   07/29/20 165 lb 6.4 oz (75 kg)      General - well developed well nourished  Neck - JVP normal, thyroid nl  Cardiac - normal S1, S2, no murmurs, rubs or gallops. No clicks  Vascular - carotids without bruits, radials, femorals and pedal pulses equal bilateral  Lungs - clear to auscultation bilaterals, no rales, wheezing or rhonchi  Abd - soft nontender, no HSM, no abd bruits  Extremities - no edema  Skin - no rash  Neuro - nonfocal  Psych - normal mood and affect    Cardiographics  EKG 04/01/17- SR, LVH   Echo 04/02/17-LVEF 60%, negative bubble study   EKG 6/18/19 - SR, LVH, LAHB  EKG 6/15/2020 - sinus LBBB, new from one year ago  Echo 7/16/20- EF 20-25%, dilalated LV, global HK, septal dyssynergy   Echo 9/16/20- EF 20-25%, dilated LV, unchanged from 7/16/20     Written by Jackie Fierro, as dictated by Thai Tracey M.D.        Thai Tracey MD

## 2020-09-16 ENCOUNTER — ANCILLARY PROCEDURE (OUTPATIENT)
Dept: CARDIOLOGY CLINIC | Age: 70
End: 2020-09-16
Payer: MEDICARE

## 2020-09-16 ENCOUNTER — OFFICE VISIT (OUTPATIENT)
Dept: CARDIOLOGY CLINIC | Age: 70
End: 2020-09-16
Payer: MEDICARE

## 2020-09-16 VITALS
BODY MASS INDEX: 25.9 KG/M2 | SYSTOLIC BLOOD PRESSURE: 120 MMHG | HEIGHT: 67 IN | DIASTOLIC BLOOD PRESSURE: 78 MMHG | WEIGHT: 165 LBS

## 2020-09-16 VITALS
HEIGHT: 67 IN | DIASTOLIC BLOOD PRESSURE: 78 MMHG | RESPIRATION RATE: 20 BRPM | HEART RATE: 77 BPM | SYSTOLIC BLOOD PRESSURE: 120 MMHG | WEIGHT: 165 LBS | OXYGEN SATURATION: 99 % | BODY MASS INDEX: 25.9 KG/M2

## 2020-09-16 DIAGNOSIS — I70.0 ATHEROSCLEROSIS OF AORTA (HCC): ICD-10-CM

## 2020-09-16 DIAGNOSIS — G37.3 TRANSVERSE MYELOPATHY SYNDROME (HCC): ICD-10-CM

## 2020-09-16 DIAGNOSIS — I42.9 CARDIOMYOPATHY, UNSPECIFIED TYPE (HCC): ICD-10-CM

## 2020-09-16 DIAGNOSIS — E11.40 TYPE 2 DIABETES MELLITUS WITH DIABETIC NEUROPATHY, WITHOUT LONG-TERM CURRENT USE OF INSULIN (HCC): ICD-10-CM

## 2020-09-16 DIAGNOSIS — I50.22 SYSTOLIC CHF, CHRONIC (HCC): Primary | ICD-10-CM

## 2020-09-16 DIAGNOSIS — I25.10 CORONARY ARTERY DISEASE INVOLVING NATIVE CORONARY ARTERY OF NATIVE HEART WITHOUT ANGINA PECTORIS: ICD-10-CM

## 2020-09-16 DIAGNOSIS — I44.7 LBBB (LEFT BUNDLE BRANCH BLOCK): ICD-10-CM

## 2020-09-16 DIAGNOSIS — I42.8 OTHER CARDIOMYOPATHY (HCC): ICD-10-CM

## 2020-09-16 DIAGNOSIS — I10 ESSENTIAL HYPERTENSION: ICD-10-CM

## 2020-09-16 DIAGNOSIS — J43.8 OTHER EMPHYSEMA (HCC): ICD-10-CM

## 2020-09-16 LAB
ECHO AO ASC DIAM: 3.7 CM
ECHO AO ROOT DIAM: 3.41 CM
ECHO AV AREA PEAK VELOCITY: 3.27 CM2
ECHO AV AREA VTI: 3.5 CM2
ECHO AV AREA/BSA PEAK VELOCITY: 1.8 CM2/M2
ECHO AV AREA/BSA VTI: 1.9 CM2/M2
ECHO AV MEAN GRADIENT: 2.21 MMHG
ECHO AV PEAK GRADIENT: 3.22 MMHG
ECHO AV PEAK VELOCITY: 89.76 CM/S
ECHO AV VTI: 17.48 CM
ECHO LA AREA 4C: 16.7 CM2
ECHO LA MAJOR AXIS: 4.38 CM
ECHO LA MINOR AXIS: 2.35 CM
ECHO LA VOL 2C: 59.67 ML (ref 22–52)
ECHO LA VOL 4C: 48.17 ML (ref 22–52)
ECHO LA VOL BP: 55.37 ML (ref 22–52)
ECHO LA VOL/BSA BIPLANE: 29.72 ML/M2 (ref 16–28)
ECHO LA VOLUME INDEX A2C: 32.02 ML/M2 (ref 16–28)
ECHO LA VOLUME INDEX A4C: 25.85 ML/M2 (ref 16–28)
ECHO LV E' LATERAL VELOCITY: 4.62 CM/S
ECHO LV E' SEPTAL VELOCITY: 4.83 CM/S
ECHO LV EDV A2C: 160.65 ML
ECHO LV EDV A4C: 141.2 ML
ECHO LV EDV BP: 151.21 ML (ref 56–104)
ECHO LV EDV INDEX A4C: 75.8 ML/M2
ECHO LV EDV INDEX BP: 81.2 ML/M2
ECHO LV EDV NDEX A2C: 86.2 ML/M2
ECHO LV EJECTION FRACTION A2C: 40 PERCENT
ECHO LV EJECTION FRACTION A4C: 34 PERCENT
ECHO LV EJECTION FRACTION BIPLANE: 37.3 PERCENT (ref 55–100)
ECHO LV ESV A2C: 95.99 ML
ECHO LV ESV A4C: 93.08 ML
ECHO LV ESV BP: 94.83 ML (ref 19–49)
ECHO LV ESV INDEX A2C: 51.5 ML/M2
ECHO LV ESV INDEX A4C: 50 ML/M2
ECHO LV ESV INDEX BP: 50.9 ML/M2
ECHO LV INTERNAL DIMENSION DIASTOLIC: 5.21 CM (ref 3.9–5.3)
ECHO LV INTERNAL DIMENSION SYSTOLIC: 4.07 CM
ECHO LV IVSD: 1.13 CM (ref 0.6–0.9)
ECHO LV MASS 2D: 232.5 G (ref 67–162)
ECHO LV MASS INDEX 2D: 124.8 G/M2 (ref 43–95)
ECHO LV POSTERIOR WALL DIASTOLIC: 1.15 CM (ref 0.6–0.9)
ECHO LVOT DIAM: 2.23 CM
ECHO LVOT PEAK GRADIENT: 2.25 MMHG
ECHO LVOT PEAK VELOCITY: 75.05 CM/S
ECHO LVOT SV: 61.2 ML
ECHO LVOT VTI: 15.63 CM
ECHO MV A VELOCITY: 67.33 CM/S
ECHO MV AREA PHT: 6 CM2
ECHO MV E DECELERATION TIME (DT): 0.13 S
ECHO MV E VELOCITY: 25.15 CM/S
ECHO MV E/A RATIO: 0.37
ECHO MV E/E' LATERAL: 5.44
ECHO MV E/E' RATIO (AVERAGED): 5.33
ECHO MV E/E' SEPTAL: 5.21
ECHO MV PRESSURE HALF TIME (PHT): 0.04 S
ECHO RA AREA 4C: 9.54 CM2
ECHO RA MAJOR AXIS: 3.15 CM
ECHO RV INTERNAL DIMENSION: 2.55 CM
ECHO RV TAPSE: 1.38 CM (ref 1.5–2)
LA VOL DISK BP: 54.45 ML (ref 22–52)

## 2020-09-16 PROCEDURE — 3017F COLORECTAL CA SCREEN DOC REV: CPT | Performed by: SPECIALIST

## 2020-09-16 PROCEDURE — G8754 DIAS BP LESS 90: HCPCS | Performed by: SPECIALIST

## 2020-09-16 PROCEDURE — 3288F FALL RISK ASSESSMENT DOCD: CPT | Performed by: SPECIALIST

## 2020-09-16 PROCEDURE — G8427 DOCREV CUR MEDS BY ELIG CLIN: HCPCS | Performed by: SPECIALIST

## 2020-09-16 PROCEDURE — G8419 CALC BMI OUT NRM PARAM NOF/U: HCPCS | Performed by: SPECIALIST

## 2020-09-16 PROCEDURE — G8399 PT W/DXA RESULTS DOCUMENT: HCPCS | Performed by: SPECIALIST

## 2020-09-16 PROCEDURE — 99214 OFFICE O/P EST MOD 30 MIN: CPT | Performed by: SPECIALIST

## 2020-09-16 PROCEDURE — 1090F PRES/ABSN URINE INCON ASSESS: CPT | Performed by: SPECIALIST

## 2020-09-16 PROCEDURE — G9899 SCRN MAM PERF RSLTS DOC: HCPCS | Performed by: SPECIALIST

## 2020-09-16 PROCEDURE — G0463 HOSPITAL OUTPT CLINIC VISIT: HCPCS | Performed by: SPECIALIST

## 2020-09-16 PROCEDURE — G8536 NO DOC ELDER MAL SCRN: HCPCS | Performed by: SPECIALIST

## 2020-09-16 PROCEDURE — 3046F HEMOGLOBIN A1C LEVEL >9.0%: CPT | Performed by: SPECIALIST

## 2020-09-16 PROCEDURE — 1100F PTFALLS ASSESS-DOCD GE2>/YR: CPT | Performed by: SPECIALIST

## 2020-09-16 PROCEDURE — G8752 SYS BP LESS 140: HCPCS | Performed by: SPECIALIST

## 2020-09-16 PROCEDURE — G8432 DEP SCR NOT DOC, RNG: HCPCS | Performed by: SPECIALIST

## 2020-09-16 PROCEDURE — 93306 TTE W/DOPPLER COMPLETE: CPT | Performed by: SPECIALIST

## 2020-09-16 PROCEDURE — 2022F DILAT RTA XM EVC RTNOPTHY: CPT | Performed by: SPECIALIST

## 2020-09-16 NOTE — PROGRESS NOTES
Visit Vitals  /78   Pulse 77   Resp 20   Ht 5' 7\" (1.702 m)   Wt 165 lb (74.8 kg)   SpO2 99%   BMI 25.84 kg/m²     Complains of SOB. Denies CP.  ECHO today

## 2020-09-16 NOTE — PATIENT INSTRUCTIONS
- Stop lisinopril - Increase Entresto to 2 tablets twice a day. Let me know how you are tolerating this in a week

## 2020-09-16 NOTE — LETTER
9/19/20 Patient: Gaurav Shea YOB: 1950 Date of Visit: 9/16/2020 West Jones DO 
[address] [facility] [address] [place] [address] VIA In Basket Dear West Jones DO, Thank you for referring Ms. Rosalina Jones to [facility] for evaluation. My notes for this consultation are attached. If you have questions, please do not hesitate to call me. I look forward to following your patient along with you. Sincerely, Pranav Catalan MD

## 2020-09-19 RX ORDER — SACUBITRIL AND VALSARTAN 49; 51 MG/1; MG/1
1 TABLET, FILM COATED ORAL 2 TIMES DAILY
Qty: 180 TAB | Refills: 3 | Status: SHIPPED | OUTPATIENT
Start: 2020-09-19 | End: 2021-09-29 | Stop reason: ALTCHOICE

## 2020-09-22 ENCOUNTER — TELEPHONE (OUTPATIENT)
Dept: CARDIOLOGY CLINIC | Age: 70
End: 2020-09-22

## 2020-09-22 NOTE — TELEPHONE ENCOUNTER
Ok per TONY Haas NP to schedule patient for ICD. Called patient, ID verified using two patient identifiers. Scheduled patient for [facility] BiV ICD implant with Dr. Angel Crowell on Thursday 10/29/20 with an arrival time of 6 am @ [facility]. Pre procedure instructions to be mailed to patient.

## 2020-09-23 ENCOUNTER — VIRTUAL VISIT (OUTPATIENT)
Dept: FAMILY MEDICINE CLINIC | Age: 70
End: 2020-09-23
Payer: MEDICARE

## 2020-09-23 DIAGNOSIS — I10 ESSENTIAL HYPERTENSION: ICD-10-CM

## 2020-09-23 DIAGNOSIS — E11.40 TYPE 2 DIABETES MELLITUS WITH DIABETIC NEUROPATHY, WITHOUT LONG-TERM CURRENT USE OF INSULIN (HCC): ICD-10-CM

## 2020-09-23 DIAGNOSIS — J06.9 UPPER RESPIRATORY TRACT INFECTION, UNSPECIFIED TYPE: Primary | ICD-10-CM

## 2020-09-23 LAB — HBA1C MFR BLD HPLC: 7.2 %

## 2020-09-23 PROCEDURE — 99213 OFFICE O/P EST LOW 20 MIN: CPT | Performed by: FAMILY MEDICINE

## 2020-09-23 PROCEDURE — 2022F DILAT RTA XM EVC RTNOPTHY: CPT | Performed by: FAMILY MEDICINE

## 2020-09-23 PROCEDURE — 3288F FALL RISK ASSESSMENT DOCD: CPT | Performed by: FAMILY MEDICINE

## 2020-09-23 PROCEDURE — 1100F PTFALLS ASSESS-DOCD GE2>/YR: CPT | Performed by: FAMILY MEDICINE

## 2020-09-23 PROCEDURE — 3046F HEMOGLOBIN A1C LEVEL >9.0%: CPT | Performed by: FAMILY MEDICINE

## 2020-09-23 PROCEDURE — G8756 NO BP MEASURE DOC: HCPCS | Performed by: FAMILY MEDICINE

## 2020-09-23 PROCEDURE — 3017F COLORECTAL CA SCREEN DOC REV: CPT | Performed by: FAMILY MEDICINE

## 2020-09-23 PROCEDURE — G8427 DOCREV CUR MEDS BY ELIG CLIN: HCPCS | Performed by: FAMILY MEDICINE

## 2020-09-23 PROCEDURE — G9899 SCRN MAM PERF RSLTS DOC: HCPCS | Performed by: FAMILY MEDICINE

## 2020-09-23 PROCEDURE — 1090F PRES/ABSN URINE INCON ASSESS: CPT | Performed by: FAMILY MEDICINE

## 2020-09-23 PROCEDURE — G8399 PT W/DXA RESULTS DOCUMENT: HCPCS | Performed by: FAMILY MEDICINE

## 2020-09-23 PROCEDURE — G8510 SCR DEP NEG, NO PLAN REQD: HCPCS | Performed by: FAMILY MEDICINE

## 2020-09-23 RX ORDER — LORATADINE 10 MG/1
10 TABLET ORAL DAILY
Qty: 30 TAB | Refills: 11 | Status: SHIPPED | OUTPATIENT
Start: 2020-09-23 | End: 2021-09-18

## 2020-09-23 NOTE — PROGRESS NOTES
Patient here today with complaints of a one-week history of increased facial pressure general.  No discharge no febrile illness no myalgias. She does states she has been seeing endocrinologist for diabetes with last A1c being 7.2 back in June. Consent: Sourav Bangura, who was seen by synchronous (real-time) audio-video technology, and/or her healthcare decision maker, is aware that this patient-initiated, Telehealth encounter on 9/23/2020 is a billable service, with coverage as determined by her insurance carrier. She is aware that she may receive a bill and has provided verbal consent to proceed: YES-Consent obtained within past 12 months        712  Subjective:   Sourav Bangura is a 72 y.o. female who was seen for No chief complaint on file. Prior to Admission medications    Medication Sig Start Date End Date Taking? Authorizing Provider   loratadine (Claritin) 10 mg tablet Take 1 Tab by mouth daily for 360 days. 9/23/20 9/18/21 Yes Nuha Rosa MD   sacubitriL-valsartan Emani Ghada) 49-51 mg tab tablet Take 1 Tab by mouth two (2) times a day. 9/19/20   Jhoana Duenas MD   Biotin 2,500 mcg cap Take  by mouth. Provider, Historical   OTHER daily. Bulberry    Provider, Historical   dapagliflozin (Farxiga) 10 mg tab tablet Take  by mouth daily. Provider, Historical   atorvastatin (LIPITOR) 40 mg tablet Take 1 Tab by mouth daily. 7/16/20   Jhoana Duenas MD   spironolactone (ALDACTONE) 25 mg tablet Take 1 Tab by mouth daily. 7/2/20   Rocco Fruit, NP   carvediloL (COREG) 3.125 mg tablet Take 1 Tab by mouth two (2) times daily (with meals). 7/2/20   Roccosatya Barrett NP   clopidogreL (Plavix) 75 mg tab Take 1 Tab by mouth daily. 7/2/20   Rocco Fruit, NP   FreeStyle Efe 14 Day Sensor kit U UTD Q 14 DAYS 3/3/20   Provider, Historical   linaCLOtide [facility]) 145 mcg cap capsule Take  by mouth Daily (before breakfast).     Provider, Historical   polyethylene glycol (MIRALAX) 17 gram packet Take 17 g by mouth daily. Provider, Historical   SITagliptin (JANUVIA) 100 mg tablet Take 100 mg by mouth daily. Provider, Historical   milnacipran (SAVELLA) 50 mg tablet Take 1 Tab by mouth two (2) times a day. 8/29/19   Jennifer Mcfadden,    BUTALBITAL-ASPIRIN-CAFFEINE PO Take 1 Tab by mouth as needed. Provider, Historical   baclofen (LIORESAL) 10 mg tablet Take 10 mg by mouth nightly. Provider, Historical   bethanechol (URECHOLINE) 5 mg tablet Take 5 mg by mouth two (2) times a day. Provider, Historical   fluticasone propionate (FLONASE ALLERGY RELIEF) 50 mcg/actuation nasal spray 2 Sprays by Both Nostrils route every morning. Provider, Historical   magnesium oxide (MAG-OX) 400 mg tablet Take 400 mg by mouth every morning. Provider, Historical   OTHER Take 2 Tabs by mouth daily. \"Benefiber\"     Provider, Historical   cranberry fruit extract (CRANBERRY PO) Take 4,200 mg by mouth daily. Provider, Historical   ivermectin (SOOLANTRA) 1 % crea Apply  to affected area daily. Provider, Historical   CALCIUM PO Take 1 Caplet by mouth daily. Provider, Historical   glimepiride (AMARYL) 2 mg tablet Take 2 mg by mouth three (3) times daily. 1 in the morning, 2 at night    Provider, Historical   ALPHA LIPOIC ACID PO Take 1 Cap by mouth two (2) times a day. Provider, Historical   L-Mfolate-B6 Phos-Methyl-B12 (METANX) 3-35-2 mg tab tab Take 1 Tab by mouth two (2) times a day. Indications: neuropathy    Provider, Historical     Allergies   Allergen Reactions    Latex Other (comments)     Patient states it burns around her mouth.     Other Food Other (comments)     Yogurt - stomach cramping    Betadine [Povidone-Iodine] Rash and Itching     Pt states this causes \"extreme\" itching    Codeine Anaphylaxis, Rash, Nausea Only and Other (comments)     HEADACHE  Tolerates tramadol    Dilaudid [Hydromorphone (Bulk)] Anaphylaxis, Itching, Nausea Only and Other (comments)     HALLUCINATIONS  Tolerates tramadol      Hydrocodone Anaphylaxis, Rash, Nausea Only and Vertigo     Facial - eyelid swelling-had to go to ER for reaction, HALLUCINATIONS  Tolerates tramadol      Ditropan [Oxybutynin Chloride] Swelling    Doxycycline Rash     PUSTULAR RASH- TOLERATING TETRACYCLINE    Iodine Other (comments)     Headache    Keflex [Cephalexin] Nausea and Vomiting     Pain in abdomen AND FLU-LIKE SX      Metformin Nausea and Vomiting     Severe abdominal pain      Tape [Adhesive] Other (comments)     Redness/inflammed. Can only use paper tape. CAN USE BAND-AIDS. TOLERATES SURGICAL TAPE USED IN [place].  Trulicity [Dulaglutide] Other (comments)     Abdominal pain     Sulfamethoxazole-Trimethoprim Other (comments)     Cramping/flu-like symptoms       ROS    Objective:   Vital Signs: (As obtained by patient/caregiver at home)  There were no vitals taken for this visit.      [INSTRUCTIONS:  \"[x]\" Indicates a positive item  \"[]\" Indicates a negative item  -- DELETE ALL ITEMS NOT EXAMINED]    Constitutional: [x] Appears well-developed and well-nourished [x] No apparent distress      [] Abnormal -     Mental status: [x] Alert and awake  [x] Oriented to person/place/time [x] Able to follow commands    [] Abnormal -     Eyes:   EOM    [x]  Normal    [] Abnormal -   Sclera  [x]  Normal    [] Abnormal -          Discharge [x]  None visible   [] Abnormal -     HENT: [x] Normocephalic, atraumatic  [] Abnormal -   [x] Mouth/Throat: Mucous membranes are moist    External Ears [x] Normal  [] Abnormal -    Neck: [x] No visualized mass [] Abnormal -     Pulmonary/Chest: [x] Respiratory effort normal   [x] No visualized signs of difficulty breathing or respiratory distress        [] Abnormal -        Neurological:        [x] No Facial Asymmetry (Cranial nerve 7 motor function) (limited exam due to video visit)          [x] No gaze palsy        [] Abnormal -          Skin:        [x] No significant exanthematous lesions or discoloration noted on facial skin         [] Abnormal -            Psychiatric:       [x] Normal Affect [] Abnormal -        [x] No Hallucinations    Other pertinent observable physical exam findings:-              Assessment & Plan:   Diagnoses and all orders for this visit:    1. Upper respiratory tract infection, unspecified type  -     loratadine (Claritin) 10 mg tablet; Take 1 Tab by mouth daily for 360 days. 2. Type 2 diabetes mellitus with diabetic neuropathy, without long-term current use of insulin ([facility])  -Continue to see Endo, A1c is at goal    3. Essential hypertension  -Blood pressure at goal                  We discussed the expected course, resolution and complications of the diagnosis(es) in detail. Medication risks, benefits, costs, interactions, and alternatives were discussed as indicated. I advised her to contact the office if her condition worsens, changes or fails to improve as anticipated. She expressed understanding with the diagnosis(es) and plan. Chacha Ochoa is a 72 y.o. female being evaluated by a video visit encounter for concerns as above. A caregiver was present when appropriate. Due to this being a TeleHealth encounter (During JQZGW-82 public health emergency), evaluation of the following organ systems was limited: Vitals/Constitutional/EENT/Resp/CV/GI//MS/Neuro/Skin/Heme-Lymph-Imm. Pursuant to the emergency declaration under the [facility], [facility] waiver authority and the [employer] General Act, this Virtual  Visit was conducted, with patient's (and/or legal guardian's) consent, to reduce the patient's risk of exposure to COVID-19 and provide necessary medical care. Services were provided through a video synchronous discussion virtually to substitute for in-person clinic visit. Patient and provider were located at their individual homes.         Reji Baez MD

## 2020-10-02 ENCOUNTER — TELEPHONE (OUTPATIENT)
Dept: FAMILY MEDICINE CLINIC | Age: 70
End: 2020-10-02

## 2020-10-02 DIAGNOSIS — M79.641 PAIN IN BOTH HANDS: Primary | ICD-10-CM

## 2020-10-02 DIAGNOSIS — M79.642 PAIN IN BOTH HANDS: Primary | ICD-10-CM

## 2020-10-02 NOTE — TELEPHONE ENCOUNTER
Pt calling and states wants a referral to have physical therapy on her hands for trigger point problems. I tried to make a virtual appt but she states he knows about this problem. She wants to speak with the nurse. She can be reached at [phone].

## 2020-10-02 NOTE — TELEPHONE ENCOUNTER
Called and spoke with patient. Advised referral has been placed. She requested that it be faxed to [facility]. Referral faxed to [facility]. Fax number [phone] confirmation number [id number].

## 2020-10-06 ENCOUNTER — TELEPHONE (OUTPATIENT)
Dept: CARDIOLOGY CLINIC | Age: 70
End: 2020-10-06

## 2020-10-06 NOTE — TELEPHONE ENCOUNTER
Called patient, ID verified using two patient identifiers. Informed patient that COVID-19 testing is done 4 days prior to her procedure date. And that I would be sending out pre procedure instructions to her home address to include information on getting COVID-19 test done. Patient verbalizes understanding of all information.

## 2020-10-06 NOTE — TELEPHONE ENCOUNTER
Pt states she supposed to have a procedure with Dr. Ayah Parikh; inquiring where/when to go to get COVID testing.  Please advise      [facility]:[phone]

## 2020-10-14 DIAGNOSIS — I42.9 CARDIOMYOPATHY, UNSPECIFIED TYPE (HCC): Primary | ICD-10-CM

## 2020-10-14 DIAGNOSIS — I42.9 CARDIOMYOPATHY, UNSPECIFIED TYPE (HCC): ICD-10-CM

## 2020-10-14 DIAGNOSIS — Z01.812 PRE-PROCEDURE LAB EXAM: ICD-10-CM

## 2020-10-14 RX ORDER — SODIUM CHLORIDE 0.9 % (FLUSH) 0.9 %
5-40 SYRINGE (ML) INJECTION EVERY 8 HOURS
Status: CANCELLED | OUTPATIENT
Start: 2020-10-14

## 2020-10-14 RX ORDER — SODIUM CHLORIDE 0.9 % (FLUSH) 0.9 %
5-40 SYRINGE (ML) INJECTION AS NEEDED
Status: CANCELLED | OUTPATIENT
Start: 2020-10-14

## 2020-10-25 ENCOUNTER — HOSPITAL ENCOUNTER (OUTPATIENT)
Dept: LAB | Age: 70
Discharge: HOME | End: 2020-10-25
Payer: MEDICARE

## 2020-10-25 ENCOUNTER — TRANSCRIBE ORDER (OUTPATIENT)
Dept: REGISTRATION | Age: 70
End: 2020-10-25

## 2020-10-25 DIAGNOSIS — U07.1 COVID-19: Primary | ICD-10-CM

## 2020-10-25 DIAGNOSIS — U07.1 COVID-19: ICD-10-CM

## 2020-10-25 PROCEDURE — 87635 SARS-COV-2 COVID-19 AMP PRB: CPT

## 2020-10-27 LAB — SARS-COV-2, COV2NT: NOT DETECTED

## 2020-10-28 LAB
APPEARANCE UR: CLEAR
BACTERIA #/AREA URNS HPF: ABNORMAL /[HPF]
BACTERIA UR CULT: ABNORMAL
BILIRUB UR QL STRIP: NEGATIVE
CASTS URNS MICRO: ABNORMAL
CASTS URNS QL MICRO: PRESENT /LPF
COLOR UR: YELLOW
EPI CELLS #/AREA URNS HPF: ABNORMAL /HPF (ref 0–10)
GLUCOSE UR QL: ABNORMAL
HGB UR QL STRIP: NEGATIVE
KETONES UR QL STRIP: NEGATIVE
LEUKOCYTE ESTERASE UR QL STRIP: ABNORMAL
MICRO URNS: ABNORMAL
MUCOUS THREADS URNS QL MICRO: PRESENT
NITRITE UR QL STRIP: POSITIVE
PH UR STRIP: 5.5 [PH] (ref 5–7.5)
PROT UR QL STRIP: NEGATIVE
RBC #/AREA URNS HPF: ABNORMAL /HPF (ref 0–2)
SP GR UR: >=1.03 (ref 1–1.03)
URINALYSIS REFLEX , 377201: ABNORMAL
UROBILINOGEN UR STRIP-MCNC: 0.2 MG/DL (ref 0.2–1)
WBC #/AREA URNS HPF: >30 /HPF (ref 0–5)

## 2020-10-29 ENCOUNTER — APPOINTMENT (OUTPATIENT)
Dept: GENERAL RADIOLOGY | Age: 70
End: 2020-10-29
Attending: INTERNAL MEDICINE
Payer: MEDICARE

## 2020-10-29 ENCOUNTER — HOSPITAL ENCOUNTER (OUTPATIENT)
Age: 70
Discharge: HOME | End: 2020-10-30
Attending: INTERNAL MEDICINE | Admitting: INTERNAL MEDICINE
Payer: MEDICARE

## 2020-10-29 DIAGNOSIS — I42.9 CARDIOMYOPATHY, UNSPECIFIED TYPE (HCC): ICD-10-CM

## 2020-10-29 PROBLEM — Z95.0 PACEMAKER: Status: ACTIVE | Noted: 2020-10-29

## 2020-10-29 LAB
ANION GAP SERPL CALC-SCNC: 3 MMOL/L (ref 5–15)
BUN SERPL-MCNC: 26 MG/DL (ref 6–20)
BUN/CREAT SERPL: 40 (ref 12–20)
CALCIUM SERPL-MCNC: 8.9 MG/DL (ref 8.5–10.1)
CHLORIDE SERPL-SCNC: 104 MMOL/L (ref 97–108)
CO2 SERPL-SCNC: 31 MMOL/L (ref 21–32)
COMMENT, HOLDF: NORMAL
CREAT SERPL-MCNC: 0.65 MG/DL (ref 0.55–1.02)
ERYTHROCYTE [DISTWIDTH] IN BLOOD BY AUTOMATED COUNT: 13.2 % (ref 11.5–14.5)
GLUCOSE BLD STRIP.AUTO-MCNC: 142 MG/DL (ref 65–100)
GLUCOSE BLD STRIP.AUTO-MCNC: 308 MG/DL (ref 65–100)
GLUCOSE BLD STRIP.AUTO-MCNC: 494 MG/DL (ref 65–100)
GLUCOSE SERPL-MCNC: 197 MG/DL (ref 65–100)
HCT VFR BLD AUTO: 44.9 % (ref 35–47)
HGB BLD-MCNC: 14.1 G/DL (ref 11.5–16)
MCH RBC QN AUTO: 27.5 PG (ref 26–34)
MCHC RBC AUTO-ENTMCNC: 31.4 G/DL (ref 30–36.5)
MCV RBC AUTO: 87.5 FL (ref 80–99)
NRBC # BLD: 0 K/UL (ref 0–0.01)
NRBC BLD-RTO: 0 PER 100 WBC
PLATELET # BLD AUTO: 209 K/UL (ref 150–400)
PMV BLD AUTO: 11.2 FL (ref 8.9–12.9)
POTASSIUM SERPL-SCNC: 4.5 MMOL/L (ref 3.5–5.1)
RBC # BLD AUTO: 5.13 M/UL (ref 3.8–5.2)
SAMPLES BEING HELD,HOLD: NORMAL
SERVICE CMNT-IMP: ABNORMAL
SODIUM SERPL-SCNC: 138 MMOL/L (ref 136–145)
WBC # BLD AUTO: 4.4 K/UL (ref 3.6–11)

## 2020-10-29 PROCEDURE — 74011000636 HC RX REV CODE- 636: Performed by: INTERNAL MEDICINE

## 2020-10-29 PROCEDURE — 74011000250 HC RX REV CODE- 250: Performed by: INTERNAL MEDICINE

## 2020-10-29 PROCEDURE — 77030028698 HC BLD TISS PLSM MEDT -D: Performed by: INTERNAL MEDICINE

## 2020-10-29 PROCEDURE — 74011250637 HC RX REV CODE- 250/637: Performed by: INTERNAL MEDICINE

## 2020-10-29 PROCEDURE — 33249 INSJ/RPLCMT DEFIB W/LEAD(S): CPT | Performed by: INTERNAL MEDICINE

## 2020-10-29 PROCEDURE — 74011250636 HC RX REV CODE- 250/636: Performed by: INTERNAL MEDICINE

## 2020-10-29 PROCEDURE — 76937 US GUIDE VASCULAR ACCESS: CPT | Performed by: INTERNAL MEDICINE

## 2020-10-29 PROCEDURE — 85027 COMPLETE CBC AUTOMATED: CPT

## 2020-10-29 PROCEDURE — 99152 MOD SED SAME PHYS/QHP 5/>YRS: CPT | Performed by: INTERNAL MEDICINE

## 2020-10-29 PROCEDURE — 33225 L VENTRIC PACING LEAD ADD-ON: CPT | Performed by: INTERNAL MEDICINE

## 2020-10-29 PROCEDURE — 77030038613 HC SUT PDS STRATA SPIRL J&J -B: Performed by: INTERNAL MEDICINE

## 2020-10-29 PROCEDURE — A4565 SLINGS: HCPCS | Performed by: INTERNAL MEDICINE

## 2020-10-29 PROCEDURE — 77030033138 HC SUT PGA STRATFX J&J -B: Performed by: INTERNAL MEDICINE

## 2020-10-29 PROCEDURE — C1893 INTRO/SHEATH, FIXED,NON-PEEL: HCPCS | Performed by: INTERNAL MEDICINE

## 2020-10-29 PROCEDURE — 2709999900 HC NON-CHARGEABLE SUPPLY: Performed by: INTERNAL MEDICINE

## 2020-10-29 PROCEDURE — C1898 LEAD, PMKR, OTHER THAN TRANS: HCPCS | Performed by: INTERNAL MEDICINE

## 2020-10-29 PROCEDURE — C1769 GUIDE WIRE: HCPCS | Performed by: INTERNAL MEDICINE

## 2020-10-29 PROCEDURE — 71045 X-RAY EXAM CHEST 1 VIEW: CPT

## 2020-10-29 PROCEDURE — C1777 LEAD, AICD, ENDO SINGLE COIL: HCPCS | Performed by: INTERNAL MEDICINE

## 2020-10-29 PROCEDURE — 36415 COLL VENOUS BLD VENIPUNCTURE: CPT

## 2020-10-29 PROCEDURE — 82962 GLUCOSE BLOOD TEST: CPT

## 2020-10-29 PROCEDURE — C1900 LEAD, CORONARY VENOUS: HCPCS | Performed by: INTERNAL MEDICINE

## 2020-10-29 PROCEDURE — C1887 CATHETER, GUIDING: HCPCS | Performed by: INTERNAL MEDICINE

## 2020-10-29 PROCEDURE — 80048 BASIC METABOLIC PNL TOTAL CA: CPT

## 2020-10-29 PROCEDURE — 99153 MOD SED SAME PHYS/QHP EA: CPT | Performed by: INTERNAL MEDICINE

## 2020-10-29 PROCEDURE — C1751 CATH, INF, PER/CENT/MIDLINE: HCPCS | Performed by: INTERNAL MEDICINE

## 2020-10-29 PROCEDURE — 93641 EP EVL 1/2CHMB PAC CVDFB TST: CPT | Performed by: INTERNAL MEDICINE

## 2020-10-29 PROCEDURE — C1892 INTRO/SHEATH,FIXED,PEEL-AWAY: HCPCS | Performed by: INTERNAL MEDICINE

## 2020-10-29 PROCEDURE — 77030018547 HC SUT ETHBND1 J&J -B: Performed by: INTERNAL MEDICINE

## 2020-10-29 PROCEDURE — 77030037713 HC CLOSR DEV INCIS ZIP STRY -B: Performed by: INTERNAL MEDICINE

## 2020-10-29 PROCEDURE — 77030018729 HC ELECTRD DEFIB PAD CARD -B: Performed by: INTERNAL MEDICINE

## 2020-10-29 DEVICE — PACE/SENSE LEAD
Type: IMPLANTABLE DEVICE | Status: FUNCTIONAL
Brand: INGEVITY™+

## 2020-10-29 DEVICE — INTEGRATED BIPOLAR PACE/SENSE AND DEFIBRILLATION LEAD
Type: IMPLANTABLE DEVICE | Status: FUNCTIONAL
Brand: ENDOTAK RELIANCE® S

## 2020-10-29 DEVICE — PACE/SENSE LEAD
Type: IMPLANTABLE DEVICE | Status: FUNCTIONAL
Brand: ACUITY™ X4 SPIRAL L

## 2020-10-29 RX ORDER — SODIUM CHLORIDE 0.9 % (FLUSH) 0.9 %
5-40 SYRINGE (ML) INJECTION AS NEEDED
Status: DISCONTINUED | OUTPATIENT
Start: 2020-10-29 | End: 2020-10-30 | Stop reason: HOSPADM

## 2020-10-29 RX ORDER — LIDOCAINE HYDROCHLORIDE AND EPINEPHRINE 10; 10 MG/ML; UG/ML
INJECTION, SOLUTION INFILTRATION; PERINEURAL AS NEEDED
Status: DISCONTINUED | OUTPATIENT
Start: 2020-10-29 | End: 2020-10-29 | Stop reason: HOSPADM

## 2020-10-29 RX ORDER — GLIMEPIRIDE 2 MG/1
2 TABLET ORAL 3 TIMES DAILY
Status: DISCONTINUED | OUTPATIENT
Start: 2020-10-29 | End: 2020-10-29

## 2020-10-29 RX ORDER — MAGNESIUM SULFATE 100 %
4 CRYSTALS MISCELLANEOUS AS NEEDED
Status: DISCONTINUED | OUTPATIENT
Start: 2020-10-29 | End: 2020-10-30 | Stop reason: HOSPADM

## 2020-10-29 RX ORDER — MIDAZOLAM HYDROCHLORIDE 1 MG/ML
INJECTION, SOLUTION INTRAMUSCULAR; INTRAVENOUS AS NEEDED
Status: DISCONTINUED | OUTPATIENT
Start: 2020-10-29 | End: 2020-10-29 | Stop reason: HOSPADM

## 2020-10-29 RX ORDER — ATORVASTATIN CALCIUM 20 MG/1
40 TABLET, FILM COATED ORAL
Status: DISCONTINUED | OUTPATIENT
Start: 2020-10-30 | End: 2020-10-30 | Stop reason: HOSPADM

## 2020-10-29 RX ORDER — GLIMEPIRIDE 2 MG/1
4 TABLET ORAL
Status: DISCONTINUED | OUTPATIENT
Start: 2020-10-30 | End: 2020-10-30 | Stop reason: HOSPADM

## 2020-10-29 RX ORDER — DEXTROSE 50 % IN WATER (D50W) INTRAVENOUS SYRINGE
12.5-25 AS NEEDED
Status: DISCONTINUED | OUTPATIENT
Start: 2020-10-29 | End: 2020-10-29

## 2020-10-29 RX ORDER — GENTAMICIN SULFATE 80 MG/100ML
80 INJECTION, SOLUTION INTRAVENOUS ONCE
Status: COMPLETED | OUTPATIENT
Start: 2020-10-29 | End: 2020-10-29

## 2020-10-29 RX ORDER — GLIMEPIRIDE 2 MG/1
2 TABLET ORAL
Status: DISCONTINUED | OUTPATIENT
Start: 2020-10-30 | End: 2020-10-30 | Stop reason: HOSPADM

## 2020-10-29 RX ORDER — BETHANECHOL CHLORIDE 10 MG/1
5 TABLET ORAL 2 TIMES DAILY
Status: DISCONTINUED | OUTPATIENT
Start: 2020-10-29 | End: 2020-10-30 | Stop reason: HOSPADM

## 2020-10-29 RX ORDER — ACETAMINOPHEN 325 MG/1
650 TABLET ORAL
Status: DISCONTINUED | OUTPATIENT
Start: 2020-10-29 | End: 2020-10-30 | Stop reason: HOSPADM

## 2020-10-29 RX ORDER — DIPHENHYDRAMINE HYDROCHLORIDE 50 MG/ML
INJECTION, SOLUTION INTRAMUSCULAR; INTRAVENOUS AS NEEDED
Status: DISCONTINUED | OUTPATIENT
Start: 2020-10-29 | End: 2020-10-29 | Stop reason: HOSPADM

## 2020-10-29 RX ORDER — CARVEDILOL 3.12 MG/1
3.12 TABLET ORAL 2 TIMES DAILY WITH MEALS
Status: DISCONTINUED | OUTPATIENT
Start: 2020-10-29 | End: 2020-10-30 | Stop reason: HOSPADM

## 2020-10-29 RX ORDER — ONDANSETRON 2 MG/ML
INJECTION INTRAMUSCULAR; INTRAVENOUS AS NEEDED
Status: DISCONTINUED | OUTPATIENT
Start: 2020-10-29 | End: 2020-10-29 | Stop reason: HOSPADM

## 2020-10-29 RX ORDER — ONDANSETRON 2 MG/ML
4 INJECTION INTRAMUSCULAR; INTRAVENOUS
Status: DISCONTINUED | OUTPATIENT
Start: 2020-10-29 | End: 2020-10-30 | Stop reason: HOSPADM

## 2020-10-29 RX ORDER — DEXTROSE 50 % IN WATER (D50W) INTRAVENOUS SYRINGE
25-50 AS NEEDED
Status: DISCONTINUED | OUTPATIENT
Start: 2020-10-29 | End: 2020-10-30 | Stop reason: HOSPADM

## 2020-10-29 RX ORDER — ATORVASTATIN CALCIUM 20 MG/1
40 TABLET, FILM COATED ORAL DAILY
Status: DISCONTINUED | OUTPATIENT
Start: 2020-10-29 | End: 2020-10-29

## 2020-10-29 RX ORDER — SODIUM CHLORIDE 0.9 % (FLUSH) 0.9 %
5-40 SYRINGE (ML) INJECTION EVERY 8 HOURS
Status: DISCONTINUED | OUTPATIENT
Start: 2020-10-29 | End: 2020-10-30 | Stop reason: HOSPADM

## 2020-10-29 RX ORDER — LANOLIN ALCOHOL/MO/W.PET/CERES
400 CREAM (GRAM) TOPICAL
Status: DISCONTINUED | OUTPATIENT
Start: 2020-10-29 | End: 2020-10-29

## 2020-10-29 RX ORDER — MAGNESIUM SULFATE 100 %
4 CRYSTALS MISCELLANEOUS AS NEEDED
Status: DISCONTINUED | OUTPATIENT
Start: 2020-10-29 | End: 2020-10-29

## 2020-10-29 RX ORDER — FENTANYL CITRATE 50 UG/ML
INJECTION, SOLUTION INTRAMUSCULAR; INTRAVENOUS AS NEEDED
Status: DISCONTINUED | OUTPATIENT
Start: 2020-10-29 | End: 2020-10-29 | Stop reason: HOSPADM

## 2020-10-29 RX ORDER — BUPIVACAINE HYDROCHLORIDE 5 MG/ML
INJECTION, SOLUTION EPIDURAL; INTRACAUDAL AS NEEDED
Status: DISCONTINUED | OUTPATIENT
Start: 2020-10-29 | End: 2020-10-29 | Stop reason: HOSPADM

## 2020-10-29 RX ORDER — BACLOFEN 10 MG/1
10 TABLET ORAL
Status: DISCONTINUED | OUTPATIENT
Start: 2020-10-29 | End: 2020-10-30 | Stop reason: HOSPADM

## 2020-10-29 RX ORDER — LORATADINE 10 MG/1
10 TABLET ORAL DAILY
Status: DISCONTINUED | OUTPATIENT
Start: 2020-10-30 | End: 2020-10-30 | Stop reason: HOSPADM

## 2020-10-29 RX ORDER — SPIRONOLACTONE 25 MG/1
25 TABLET ORAL DAILY
Status: DISCONTINUED | OUTPATIENT
Start: 2020-10-29 | End: 2020-10-29

## 2020-10-29 RX ORDER — OXYCODONE AND ACETAMINOPHEN 5; 325 MG/1; MG/1
1 TABLET ORAL
Status: DISCONTINUED | OUTPATIENT
Start: 2020-10-29 | End: 2020-10-30 | Stop reason: HOSPADM

## 2020-10-29 RX ORDER — HEPARIN SODIUM 200 [USP'U]/100ML
INJECTION, SOLUTION INTRAVENOUS
Status: COMPLETED | OUTPATIENT
Start: 2020-10-29 | End: 2020-10-29

## 2020-10-29 RX ORDER — LANOLIN ALCOHOL/MO/W.PET/CERES
400 CREAM (GRAM) TOPICAL DAILY
Status: DISCONTINUED | OUTPATIENT
Start: 2020-10-30 | End: 2020-10-30 | Stop reason: HOSPADM

## 2020-10-29 RX ORDER — LORATADINE 10 MG/1
10 TABLET ORAL DAILY
Status: DISCONTINUED | OUTPATIENT
Start: 2020-10-29 | End: 2020-10-29

## 2020-10-29 RX ORDER — SPIRONOLACTONE 25 MG/1
25 TABLET ORAL
Status: DISCONTINUED | OUTPATIENT
Start: 2020-10-30 | End: 2020-10-30 | Stop reason: HOSPADM

## 2020-10-29 RX ORDER — VANCOMYCIN/0.9 % SOD CHLORIDE 1.5G/250ML
1500 PLASTIC BAG, INJECTION (ML) INTRAVENOUS ONCE
Status: COMPLETED | OUTPATIENT
Start: 2020-10-29 | End: 2020-10-29

## 2020-10-29 RX ADMIN — BETHANECHOL CHLORIDE 5 MG: 10 TABLET ORAL at 17:48

## 2020-10-29 RX ADMIN — Medication 10 ML: at 22:01

## 2020-10-29 RX ADMIN — OXYCODONE HYDROCHLORIDE AND ACETAMINOPHEN 1 TABLET: 5; 325 TABLET ORAL at 21:58

## 2020-10-29 RX ADMIN — MILNACIPRAN HYDROCHLORIDE 50 MG: 25 TABLET, FILM COATED ORAL at 18:23

## 2020-10-29 RX ADMIN — OXYCODONE HYDROCHLORIDE AND ACETAMINOPHEN 1 TABLET: 5; 325 TABLET ORAL at 16:09

## 2020-10-29 RX ADMIN — CARVEDILOL 3.12 MG: 3.12 TABLET, FILM COATED ORAL at 16:09

## 2020-10-29 RX ADMIN — VANCOMYCIN HYDROCHLORIDE 1500 MG: 10 INJECTION, POWDER, LYOPHILIZED, FOR SOLUTION INTRAVENOUS at 08:00

## 2020-10-29 RX ADMIN — BACLOFEN 10 MG: 10 TABLET ORAL at 22:00

## 2020-10-29 RX ADMIN — VANCOMYCIN HYDROCHLORIDE 1000 MG: 1 INJECTION, POWDER, LYOPHILIZED, FOR SOLUTION INTRAVENOUS at 17:49

## 2020-10-29 RX ADMIN — GENTAMICIN SULFATE 80 MG: 80 INJECTION, SOLUTION INTRAVENOUS at 11:28

## 2020-10-29 RX ADMIN — ACETAMINOPHEN 650 MG: 325 TABLET ORAL at 11:42

## 2020-10-29 RX ADMIN — SACUBITRIL AND VALSARTAN 1 TABLET: 49; 51 TABLET, FILM COATED ORAL at 18:23

## 2020-10-29 RX ADMIN — GLIMEPIRIDE 2 MG: 2 TABLET ORAL at 16:09

## 2020-10-29 NOTE — H&P
HISTORY OF PRESENTING ILLNESS      Hamilton Summers is a 72 y.o. female with NICM, LBBB, LVEF 20-25%, DM, hypertension, CAD s/p PCI (6/8/20), history of breast cancer status post chemotherapy, transverse myelitis, NYHA III whose LV function has failed to improve on medical therapy.           PAST MEDICAL HISTORY           Past Medical History:   Diagnosis Date    Abnormal MRI, cervical spine 4/3/2017    Abnormal pap 3/2015; 5/2016 2015 Neg pap +HPV; 2016 ASCUS +HPV    Arthritis       osteo-tests for RA were neg    Breast cancer ([address] 3/12/2015    Breast cancer, right breast ([address] 1996    Chronic pain      Diabetes (HCC)      Fibromyalgia      Ganglion cyst of wrist       LEFT    Hypercholesterolemia      Hypertension      Ill-defined condition       Prolapsed mitral valve    Memory loss       Per pt.  Murmur, cardiac      Neuropathy      Rosacea      Sarcoidosis      Sarcoidosis      Transverse myelopathy syndrome (HCC)      Unspecified adverse effect of anesthesia       \"SLOW TO WAKE UP, SENSITIVE TO ANESTHESIA, DO NOT COME AROUND FOR 2-3 DAYS\"              PAST SURGICAL HISTORY            Past Surgical History:   Procedure Laterality Date    HX BACK SURGERY   2016    HX CATARACT REMOVAL Bilateral 2014    HX COLPOSCOPY   5/2016     Neg path    HX DILATION AND CURETTAGE   07/03/2019     path-benign    HX MASTECTOMY Right 12/1996     tram flap    HX ORTHOPAEDIC Left 1991     foot surgery    HX RHINOPLASTY N/A 1993    HX SEPTOPLASTY N/A 2013    HX TONSILLECTOMY        HX VASCULAR ACCESS   1997     portacath left chest-removed after chemo    RECONSTR NOSE   2005     HOLE IN SEPTUM             ALLERGIES            Allergies   Allergen Reactions    Latex Other (comments)       Patient states it burns around her mouth.     Other Food Other (comments)       Yogurt - stomach cramping    Betadine [Povidone-Iodine] Rash and Itching       Pt states this causes \"extreme\" itching  Codeine Anaphylaxis, Rash, Nausea Only and Other (comments)       HEADACHE  Tolerates tramadol    Dilaudid [Hydromorphone (Bulk)] Anaphylaxis, Itching, Nausea Only and Other (comments)       HALLUCINATIONS  Tolerates tramadol       Hydrocodone Anaphylaxis, Rash, Nausea Only and Vertigo       Facial - eyelid swelling-had to go to ER for reaction, HALLUCINATIONS  Tolerates tramadol       Ditropan [Oxybutynin Chloride] Swelling    Doxycycline Rash       PUSTULAR RASH- TOLERATING TETRACYCLINE    Iodine Other (comments)       Headache    Keflex [Cephalexin] Nausea and Vomiting       Pain in abdomen AND FLU-LIKE SX       Metformin Nausea and Vomiting       Severe abdominal pain       Tape [Adhesive] Other (comments)       Redness/inflammed. Can only use paper tape. CAN USE BAND-AIDS. TOLERATES SURGICAL TAPE USED IN [place].      Trulicity [Dulaglutide] Other (comments)       Abdominal pain     Sulfamethoxazole-Trimethoprim Other (comments)       Cramping/flu-like symptoms            FAMILY HISTORY            Family History   Problem Relation Age of Onset    Heart Disease Mother      Hypertension Mother      COPD Mother      Diabetes Father      Other Son           INOPERABLE BRAIN TUMOR    Gout Son      Celiac Disease Son      Anesth Problems Neg Hx      negative for cardiac disease         SOCIAL HISTORY      Social History               Socioeconomic History    Marital status:        Spouse name: Not on file    Number of children: Not on file    Years of education: Not on file    Highest education level: Not on file   Tobacco Use    Smoking status: Never Smoker    Smokeless tobacco: Never Used    Tobacco comment: Never used vapor or e-cigs    Substance and Sexual Activity    Alcohol use: No    Drug use: No    Sexual activity: Not Currently       Partners: Male       Comment:               MEDICATIONS           Current Outpatient Medications   Medication Sig    sacubitriL-valsartan (Entresto) 24-26 mg tablet Take 1 Tab by mouth two (2) times a day.  Biotin 2,500 mcg cap Take  by mouth.  OTHER daily. Bulberry    dapagliflozin propanediol (FARXIGA PO) Take  by mouth.  atorvastatin (LIPITOR) 40 mg tablet Take 1 Tab by mouth daily.  spironolactone (ALDACTONE) 25 mg tablet Take 1 Tab by mouth daily.  carvediloL (COREG) 3.125 mg tablet Take 1 Tab by mouth two (2) times daily (with meals).  clopidogreL (Plavix) 75 mg tab Take 1 Tab by mouth daily.  FreeStyle Efe 14 Day Sensor kit U UTD Q 14 DAYS    triamcinolone acetonide (KENALOG) 0.1 % topical cream Apply  to affected area two (2) times a day. use thin layer (Patient not taking: Reported on 7/16/2020)    linaCLOtide (LINZESS) 145 mcg cap capsule Take  by mouth Daily (before breakfast).  polyethylene glycol (MIRALAX) 17 gram packet Take 17 g by mouth daily.  plecanatide (TRULANCE) 3 mg tab Take 1 Tab by mouth daily.  loratadine (CLARITIN) 10 mg tablet Take 1 Tab by mouth daily for 360 days.  SITagliptin (JANUVIA) 100 mg tablet Take 100 mg by mouth daily.  milnacipran (SAVELLA) 50 mg tablet Take 1 Tab by mouth two (2) times a day.  umeclidinium brm/vilanterol tr (ANORO ELLIPTA IN) Take  by inhalation.  BUTALBITAL-ASPIRIN-CAFFEINE PO Take 1 Tab by mouth as needed.  baclofen (LIORESAL) 10 mg tablet Take 10 mg by mouth nightly.  bethanechol (URECHOLINE) 5 mg tablet Take 5 mg by mouth two (2) times a day.  gabapentin (NEURONTIN) 600 mg tablet Take 600 mg by mouth three (3) times daily.  fluticasone propionate (FLONASE ALLERGY RELIEF) 50 mcg/actuation nasal spray 2 Sprays by Both Nostrils route every morning.  magnesium oxide (MAG-OX) 400 mg tablet Take 400 mg by mouth every morning.  OTHER Take  by mouth daily. \"Vitamin D3 liquid\"    OTHER Take 2 Tabs by mouth two (2) times a day. \"Benefiber\"    cranberry fruit extract (CRANBERRY PO) Take 4,200 mg by mouth daily.     ivermectin (SOOLANTRA) 1 % crea Apply  to affected area daily.  CALCIUM PO Take 1 Caplet by mouth daily.  glimepiride (AMARYL) 2 mg tablet Take 2 mg by mouth three (3) times daily. 1 in the morning, 2 at night    ALPHA LIPOIC ACID PO Take 1 Cap by mouth two (2) times a day.  L-Mfolate-B6 Phos-Methyl-B12 (METANX) 3-35-2 mg tab tab Take 1 Tab by mouth two (2) times a day. Indications: neuropathy      No current facility-administered medications for this visit.          I have reviewed the nurses notes, vitals, problem list, allergy list, medical history, family, social history and medications.         REVIEW OF SYMPTOMS      General: Pt denies excessive weight gain or loss. Pt is able to conduct ADL's  HEENT: Denies blurred vision, headaches, hearing loss, epistaxis and difficulty swallowing. Respiratory: Denies cough, congestion, shortness of breath, KHALIL, wheezing or stridor. Cardiovascular: Denies precordial pain, palpitations, edema or PND  Gastrointestinal: Denies poor appetite, indigestion, abdominal pain or blood in stool  Genitourinary: Denies hematuria, dysuria, increased urinary frequency  Musculoskeletal: Denies joint pain or swelling from muscles or joints  Neurologic: Denies tremor, paresthesias, headache, or sensory motor disturbance  Psychiatric: Denies confusion, insomnia, depression  Integumentray: Denies rash, itching or ulcers. Hematologic: Denies easy bruising, bleeding         PHYSICAL EXAMINATION      Vitals: see vitals section  General: Well developed, in no acute distress. HEENT: No jaundice, oral mucosa moist, no oral ulcers  Neck: Supple, no stiffness, no lymphadenopathy, supple  Heart:  Normal S1/S2 negative S3 or S4. Regular, no murmur, gallop or rub, no jugular venous distention  Respiratory: Clear bilaterally x 4, no wheezing or rales  Abdomen:   Soft, non-tender, bowel sounds are active.   Extremities:  No edema, normal cap refill, no cyanosis.   Musculoskeletal: No clubbing, no deformities  Neuro: A&Ox3, speech clear, gait stable, cooperative, no focal neurologic deficits  Skin: Skin color is normal. No rashes or lesions. Non diaphoretic, moist.  Vascular: 2+ pulses symmetric in all extremities         DIAGNOSTIC DATA      EKG:          LABORATORY DATA            Lab Results   Component Value Date/Time     WBC 4.6 06/18/2019 09:46 AM     HGB 13.8 06/18/2019 09:46 AM     HCT 44.5 06/18/2019 09:46 AM     PLATELET 023 81/10/7679 09:46 AM     MCV 86.2 06/18/2019 09:46 AM            Lab Results   Component Value Date/Time     Sodium 141 06/18/2019 09:46 AM     Potassium 5.4 (H) 06/18/2019 09:46 AM     Chloride 105 06/18/2019 09:46 AM     CO2 33 (H) 06/18/2019 09:46 AM     Anion gap 3 (L) 06/18/2019 09:46 AM     Glucose 183 (H) 06/18/2019 09:46 AM     BUN 15 06/18/2019 09:46 AM     Creatinine 0.56 06/18/2019 09:46 AM     BUN/Creatinine ratio 27 (H) 06/18/2019 09:46 AM     GFR est AA >60 06/18/2019 09:46 AM     GFR est non-AA >60 06/18/2019 09:46 AM     Calcium 9.4 06/18/2019 09:46 AM     Bilirubin, total 0.3 02/26/2019 04:01 PM     Alk. phosphatase 110 02/26/2019 04:01 PM     Protein, total 6.5 02/26/2019 04:01 PM     Albumin 4.1 02/26/2019 04:01 PM     Globulin 3.0 11/02/2017 05:04 AM     A-G Ratio 1.7 02/26/2019 04:01 PM     ALT (SGPT) 25 02/26/2019 04:01 PM             ASSESSMENT      1. Cardiomyopathy              A. NICM              B. LVEF 20-25%  2. LBBB  3. Diabetes mellitus  4. Hypertension  5. CAD, native  6.  Percutaneous transluminal angioplasty         PLAN      ICD     Dallas Solorzano MD  Cardiac Electrophysiology / Cardiology     [address] [place] [address], [address], [address]  Oralia Rodas [address] [place] [address]  [phone] / [phone] Fax [phone] / [phone] Fax

## 2020-10-29 NOTE — PROGRESS NOTES
Dr. Sonya Bowen called and informed in regards to 308 blood sugar. Ordered to continue with her home medication and a sliding scale insulin is not needed.

## 2020-10-29 NOTE — PROGRESS NOTES
Transition of Care Plan:  [place]IEN[place] and State observation letters have been provided to patient along with information on [facility]. Patient's  will transport home at d/c. Care Management Interventions  PCP Verified by CM:  Yes  Discharge Durable Medical Equipment: No  Physical Therapy Consult: No  Occupational Therapy Consult: No  Speech Therapy Consult: No  Current Support Network: Lives with Spouse  Confirm Follow Up Transport: Family  The Plan for Transition of Care is Related to the Following Treatment Goals : Cardiomyopathy  Discharge Location  Discharge Placement: [place], [place]

## 2020-10-29 NOTE — Clinical Note
TRANSFER - OUT REPORT:     Verbal report given to: Jose Waddell. Report consisted of patient's Situation, Background, Assessment and   Recommendations(SBAR). Opportunity for questions and clarification was provided. Patient transported with a Registered Nurse. Patient transported to: [facility].

## 2020-10-29 NOTE — PROGRESS NOTES
[id number]    Patient arrived. ID and allergies verified verbally with patient. Pt voices understanding of procedure to be performed. Consent obtained. Pt prepped for procedure. [id number]    TRANSFER - OUT REPORT:    Verbal report given to Megha Duval RN (name) on [facility]  being transferred to EP LAB (unit) for ordered procedure       Report consisted of patients Situation, Background, Assessment and   Recommendations(SBAR). Information from the following report(s) SBAR was reviewed with the receiving nurse. Lines:   Peripheral IV 10/29/20 Left Other(comment) (Active)        Opportunity for questions and clarification was provided. Patient transported with:   Registered Nurse      1008    TRANSFER - IN REPORT:    [facility] [address] [place] [address] (name) on [facility]  being received from EP LAB (unit) for routine progression of care      Report consisted of patients Situation, Background, Assessment and   Recommendations(SBAR). Information from the following report(s) Procedure Summary was reviewed with the receiving nurse. Opportunity for questions and clarification was provided. Assessment completed upon patients arrival to unit and care assumed. Ice pack applied to left upper chest at procedural site. Minimal bruising noted just distal to the implant    No c/o pain from patient  Resting comfortably     1115    Family updated    11:57 AM    Patient sitting up in bed snacking     12:33 PM     updated will meet patient in her room. 1:20 PM    TRANSFER - OUT REPORT:    Verbal report given to Carito Sanchez RN (name) on [facility]  being transferred to 5th FLOOR(unit) for routine progression of care       Report consisted of patients Situation, Background, Assessment and   Recommendations(SBAR). Information from the following report(s) SBAR, Procedure Summary and Cardiac Rhythm sr was reviewed with the receiving nurse.     Lines:   Peripheral IV 10/29/20 Left Other(comment) (Active)   Site Assessment Clean, dry, & intact 10/29/20 1015   Phlebitis Assessment 0 10/29/20 1015   Infiltration Assessment 0 10/29/20 1015   Dressing Status Clean, dry, & intact 10/29/20 1015   Dressing Type Transparent 10/29/20 1015   Hub Color/Line Status Pink; Infusing 10/29/20 1015        Opportunity for questions and clarification was provided.       Patient transported with:   Monitor  Registered Nurse

## 2020-10-29 NOTE — PROCEDURES
Implantation of a biventricular ICD was performed  Conscious sedation was administered  Ultrasound utilized for access  No complications    Plan:  CXR now  Pressure bandage overnight  Likely DC in AM    Nando Larios MD

## 2020-10-29 NOTE — Clinical Note
TRANSFER - IN REPORT:     [facility]  Report consisted of patient's Situation, Background, Assessment and   Recommendations(SBAR). Opportunity for questions and clarification was provided. Assessment completed upon patient's arrival to unit and care assumed. Patient transported with a Registered Nurse.

## 2020-10-29 NOTE — DISCHARGE INSTRUCTIONS
ICD  Discharge Instructions    Please make sure you have received your Temporary ICD identification card with your discharge instructions      MEDICATIONS         Take only the medications prescribed to you at discharge. ACTIVITY         Return to your normal activity, except as noted below. o Do not lift anything heavier than 10 pounds for 4 weeks with the affected arm. This is how long it takes the muscles to heal, and the leads inside your heart to stabilize their position. o Do not reach above your head with the affected arm for 4 weeks, doing so increases the risk of lead dislodgement.    o It is, however, important to move the affected arm to prevent shoulder stiffness and locking. o Avoid tight clothes or unnecessary pressure over your incision (such as bra straps or seat belts). If it is tender or sensitive to clothing, cover the incision with a soft dressing or pad.  o Questions about driving are individualized and should be discussed with one of the EP Physicians prior to discharge. SHOWERING         Leave the bandage over your incision until your clinic follow up in 10-14 days after the Pacemaker implant. You bandage will be removed in clinic during that appointment.  It is important to keep the bandaged area clean and dry. You may shower around the site until the bandage is removed in clinic. Thereafter, you may shower after the bandage is removed, washing it gently with soap and water. Do not apply any lotions, powders, or perfumes to the incision line.  Avoid submerging your incision in water (tub baths, hot tubs, or swimming) for four weeks.  Underneath the dressing.  o You will most likely have a Zipline dressing over the incision. This is made with plastic zip ties to hold the incision together and promote better healing.  You may note dried blood around this dressing which is normal. Do not attempt to pull this off.   o If you have white steri-strips over your incision (underneath the gauze dressing), they will curl up at the end and fall off, usually within 10 days. Do not pull them off.  - OR -   o You may have a different type of closure for the incision including a dermabond adhesive which will slowly peel and come off on its own once you are able to shower. DISCHARGE PRECAUTIONS         Record your temperature every day, at the same time, for 3 weeks after your implant. A temperature of 100.5 F, or higher, can be the first sign of infection. This should be reported to your Doctor immediately.  You can have an MRI after 6 weeks. You must be aware that any strong magnet or magnetic field can affect your ICD. In general, be careful of metal detectors, heavy machinery, and any area where arc-welding is performed. When approaching a security checkpoint show your Pacemaker ID Card to security personnel.  Always tell your doctor or dentist that you have an ICD. In some cases, antibiotics may be prescribed before certain procedures.  Your temporary identification will be given to you with these instructions. Keep your ICD card in your wallet or on your person at all times. You should receive your permanent card, although this may take up to 8-12 weeks. If you do not receive your permanent card, please call the office at [phone] or the phone number provided on your temporary card for the ICD company. TAKING YOUR PULSE         Take your pulse the same time every day, preferably in the morning.  Sit down and rest for 5 minutes prior to taking your pulse.  Take your pulse for 1 full minute, use a clock or stop watch with a second hand.  To feel your pulse, use the first two fingers of one hand; place them on the thumb side of the wrist of the opposite hand. The pulse will be steady, regular and throbbing.  Call the office if your pulse is less than 40 beats per minute.       SYMPTOMS THAT NEED TO BE REPORTED IMMEDIATELY  Temperature more than 100.4 F     Redness or warmth at the incision site, or pain for longer than the first 5 days after the implant.  Drainage from the incision site.  Swelling around the incision site.  Shortness of breath.  Rapid heart rate or palpitations.  Dizziness, lightheadedness, fainting.  Slow pulse below 40 beats per minute.  REMEMBER: If you feel something is an emergency or cannot be handled over the phone, call 911 or go to the closest emergency room. WHAT TO DO IF YOUR ICD DELIVERS A SHOCK     · If your ICD delivers a shock, you should seek attention at the nearest emergency room, call our office or call 911.           Nando Larios MD  Cardiac Electrophysiology / Cardiology    [place] [address].  [address] [place] [address], [address] [facility], [address]  [address]  [phone] / [phone] Fax       [phone] / [phone] Fax

## 2020-10-29 NOTE — PROGRESS NOTES
Dr. Aubree Zelaya recalled about blood sugar now of 494. He states that he will call back after his procedure is finished to give more orders.

## 2020-10-29 NOTE — Clinical Note
Left chest and left neck clipped prepped with ChloraPrep Wet prep solution applied at: 817. Wet prep solution dried at: 820. Wet prep elapsed drying time: 3 mins.

## 2020-10-30 VITALS
OXYGEN SATURATION: 100 % | HEART RATE: 76 BPM | SYSTOLIC BLOOD PRESSURE: 102 MMHG | TEMPERATURE: 97.4 F | WEIGHT: 163.36 LBS | RESPIRATION RATE: 18 BRPM | HEIGHT: 66 IN | DIASTOLIC BLOOD PRESSURE: 62 MMHG | BODY MASS INDEX: 26.25 KG/M2

## 2020-10-30 LAB
EST. AVERAGE GLUCOSE BLD GHB EST-MCNC: 203 MG/DL
HBA1C MFR BLD: 8.7 % (ref 4–5.6)

## 2020-10-30 PROCEDURE — 74011250637 HC RX REV CODE- 250/637: Performed by: INTERNAL MEDICINE

## 2020-10-30 PROCEDURE — 74011250636 HC RX REV CODE- 250/636: Performed by: INTERNAL MEDICINE

## 2020-10-30 PROCEDURE — 83036 HEMOGLOBIN GLYCOSYLATED A1C: CPT

## 2020-10-30 PROCEDURE — 36415 COLL VENOUS BLD VENIPUNCTURE: CPT

## 2020-10-30 RX ORDER — SODIUM CHLORIDE 900 MG/100ML
INJECTION INTRAVENOUS
Status: DISCONTINUED
Start: 2020-10-30 | End: 2020-10-30 | Stop reason: HOSPADM

## 2020-10-30 RX ADMIN — LORATADINE 10 MG: 10 TABLET ORAL at 10:29

## 2020-10-30 RX ADMIN — MAGNESIUM GLUCONATE 500 MG ORAL TABLET 400 MG: 500 TABLET ORAL at 10:31

## 2020-10-30 RX ADMIN — MILNACIPRAN HYDROCHLORIDE 50 MG: 25 TABLET, FILM COATED ORAL at 10:29

## 2020-10-30 RX ADMIN — VANCOMYCIN HYDROCHLORIDE 1000 MG: 1 INJECTION, POWDER, LYOPHILIZED, FOR SOLUTION INTRAVENOUS at 05:23

## 2020-10-30 RX ADMIN — BETHANECHOL CHLORIDE 5 MG: 10 TABLET ORAL at 10:31

## 2020-10-30 RX ADMIN — Medication 10 ML: at 05:42

## 2020-10-30 RX ADMIN — CARVEDILOL 3.12 MG: 3.12 TABLET, FILM COATED ORAL at 10:29

## 2020-10-30 RX ADMIN — GLIMEPIRIDE 2 MG: 2 TABLET ORAL at 10:29

## 2020-10-30 RX ADMIN — SACUBITRIL AND VALSARTAN 1 TABLET: 49; 51 TABLET, FILM COATED ORAL at 10:28

## 2020-10-30 NOTE — PROGRESS NOTES
Problem: Falls - Risk of  Goal: *Absence of Falls  Description: Document Flavia Jalloh Fall Risk and appropriate interventions in the flowsheet.   Outcome: Progressing Towards Goal  Note: Fall Risk Interventions:  Mobility Interventions: Bed/chair exit alarm         Medication Interventions: Bed/chair exit alarm    Elimination Interventions: Call light in reach    History of Falls Interventions: Evaluate medications/consider consulting pharmacy         Problem: Patient Education: Go to Patient Education Activity  Goal: Patient/Family Education  Outcome: Progressing Towards Goal

## 2020-10-30 NOTE — PROGRESS NOTES
Transition Plan of Care  RUR OBS      Patient is ready for discharge with no needs.  to transport. [facility] updated in AVS.    Care Management Interventions  PCP Verified by CM:  Yes  Discharge Durable Medical Equipment: No  Physical Therapy Consult: No  Occupational Therapy Consult: No  Speech Therapy Consult: No  Current Support Network: Lives with Spouse  Confirm Follow Up Transport: Family  The Plan for Transition of Care is Related to the Following Treatment Goals : Cardiomyopathy  Discharge Location  Discharge Placement: Home  TOMASA Eastman

## 2020-10-30 NOTE — DISCHARGE SUMMARY
Cardiac Electrophysiology Discharge Summary       Patient ID:  Karine Esquivel  [id number]  80 y.o.  1950    Admit Date: 10/29/2020    Discharge Date: 10/30/2020     Admitting Physician: Aiden Flores MD     Discharge Physician: Sienna Chahal NP    Admission Diagnoses: Cardiomyopathy, unspecified type [facility]) [I42.9]  Pacemaker [Z95.0]    Discharge Diagnoses: Active Problems:    Pacemaker (10/29/2020)        Discharge Condition: stable    Cardiology Procedures this Admission:  Ysitie 84 Course: Patient underwent above procedure without complication and remained stable without acute events. with NICM, LBBB, LVEF 20-25%, DM, hypertension, CAD s/p PCI (6/8/20), history of breast cancer status post chemotherapy, transverse myelitis, NYHA III whose LV function has failed to improve on medical therapy. She underwent implantation of BIV ICD. Post procedure chest xray shows proper placement. Device interrogation shows normal functioning. Pressure dressing removed this morning, some ecchymosis present. Mild swelling. Discharge Exam:  Visit Vitals  /62 (BP 1 Location: Right arm, BP Patient Position: At rest;Supine)   Pulse 76   Temp 97.4 °F (36.3 °C)   Resp 18   Ht 5' 6\" (1.676 m)   Wt 163 lb 5.8 oz (74.1 kg)   SpO2 100%   Breastfeeding No   BMI 26.37 kg/m²       Abdomen: soft, non-tender  Extremities: extremities normal  Heart: regular rate and rhythm  Lungs: clear to auscultation bilaterally  Neck: no JVD  Neurologic: Grossly normal  Pulses: 2+ and symmetric    Disposition: Home    Patient Instructions:   Current Discharge Medication List      CONTINUE these medications which have NOT CHANGED    Details   loratadine (Claritin) 10 mg tablet Take 1 Tab by mouth daily for 360 days. Qty: 30 Tab, Refills: 11    Associated Diagnoses: Upper respiratory tract infection, unspecified type      sacubitriL-valsartan (Entresto) 49-51 mg tab tablet Take 1 Tab by mouth two (2) times a day.   Qty: 180 Tab, Refills: 3      Biotin 2,500 mcg cap Take  by mouth. !! OTHER daily. Bulberry      dapagliflozin (Farxiga) 10 mg tab tablet Take  by mouth daily. atorvastatin (LIPITOR) 40 mg tablet Take 1 Tab by mouth daily. Qty: 90 Tab, Refills: 1      spironolactone (ALDACTONE) 25 mg tablet Take 1 Tab by mouth daily. Qty: 90 Tab, Refills: 1      carvediloL (COREG) 3.125 mg tablet Take 1 Tab by mouth two (2) times daily (with meals). Qty: 180 Tab, Refills: 1      clopidogreL (Plavix) 75 mg tab Take 1 Tab by mouth daily. Qty: 90 Tab, Refills: 1      FreeStyle Efe 14 Day Sensor kit U UTD Q 14 DAYS      linaCLOtide (LINZESS) 145 mcg cap capsule Take  by mouth Daily (before breakfast). polyethylene glycol (MIRALAX) 17 gram packet Take 17 g by mouth daily. SITagliptin (JANUVIA) 100 mg tablet Take 100 mg by mouth daily. milnacipran (SAVELLA) 50 mg tablet Take 1 Tab by mouth two (2) times a day. Qty: 180 Tab, Refills: 3    Associated Diagnoses: Fibromyalgia      BUTALBITAL-ASPIRIN-CAFFEINE PO Take 1 Tab by mouth as needed. baclofen (LIORESAL) 10 mg tablet Take 10 mg by mouth nightly. fluticasone propionate (FLONASE ALLERGY RELIEF) 50 mcg/actuation nasal spray 2 Sprays by Both Nostrils route every morning.      magnesium oxide (MAG-OX) 400 mg tablet Take 400 mg by mouth every morning.      !! OTHER Take 2 Tabs by mouth daily. \"Benefiber\"       cranberry fruit extract (CRANBERRY PO) Take 4,200 mg by mouth daily. ivermectin (SOOLANTRA) 1 % crea Apply  to affected area daily. CALCIUM PO Take 1 Caplet by mouth daily. glimepiride (AMARYL) 2 mg tablet Take 2 mg by mouth three (3) times daily. 1 in the morning, 2 at night      ALPHA LIPOIC ACID PO Take 1 Cap by mouth two (2) times a day. L-Mfolate-B6 Phos-Methyl-B12 (METANX) 3-35-2 mg tab tab Take 1 Tab by mouth two (2) times a day.  Indications: neuropathy      bethanechol (URECHOLINE) 5 mg tablet Take 5 mg by mouth two (2) times a day.       !! - Potential duplicate medications found. Please discuss with provider. Referenced discharge instructions provided by nursing for diet and activity. Follow-up with Anupam Pinon NP for wound check in 2 weeks.          Anupam Pinon NP

## 2020-11-02 ENCOUNTER — TELEPHONE (OUTPATIENT)
Dept: CARDIOLOGY CLINIC | Age: 70
End: 2020-11-02

## 2020-11-02 NOTE — TELEPHONE ENCOUNTER
Patient is calling to reschedule her upcoming appointments with Karilyn Pallas, NP.        Phone #: [phone]  Thanks

## 2020-11-04 ENCOUNTER — TELEPHONE (OUTPATIENT)
Dept: CARDIOLOGY CLINIC | Age: 70
End: 2020-11-04

## 2020-11-04 NOTE — TELEPHONE ENCOUNTER
Patient would like to know if she is to resume her aspirin now that she has had her pacemaker surgery? Patient was also advised to speak with Dr. Candis Nevarez as her Dermatologists Dr. Nelda Osorio has prescribed her plaquenil and would like to know if that is safe to take. Please advise.     Phone: [phone]

## 2020-11-04 NOTE — TELEPHONE ENCOUNTER
Patient want's to know if okay to resume Asprin (s/p BIV ICD) and is it safe for her to take Plaquenil that was prescribed by Dermatologist.  Verified patient with two patient identifiers.

## 2020-11-05 RX ORDER — ASPIRIN 81 MG/1
81 TABLET ORAL DAILY
COMMUNITY

## 2020-11-05 NOTE — TELEPHONE ENCOUNTER
Verified patient with two patient identifiers. Spoke with patient and was advised per Anil Speak that she can resume Aspirin 81 mg daily.

## 2020-11-09 ENCOUNTER — OFFICE VISIT (OUTPATIENT)
Dept: CARDIOLOGY CLINIC | Age: 70
End: 2020-11-09
Payer: MEDICARE

## 2020-11-09 ENCOUNTER — CLINICAL SUPPORT (OUTPATIENT)
Dept: CARDIOLOGY CLINIC | Age: 70
End: 2020-11-09
Payer: MEDICARE

## 2020-11-09 DIAGNOSIS — Z95.810 PRESENCE OF BIVENTRICULAR AUTOMATIC CARDIOVERTER/DEFIBRILLATOR (AICD): Primary | ICD-10-CM

## 2020-11-09 DIAGNOSIS — I42.9 CARDIOMYOPATHY, UNSPECIFIED TYPE (HCC): ICD-10-CM

## 2020-11-09 DIAGNOSIS — Z51.89 VISIT FOR WOUND CHECK: ICD-10-CM

## 2020-11-09 PROCEDURE — 99024 POSTOP FOLLOW-UP VISIT: CPT | Performed by: NURSE PRACTITIONER

## 2020-11-09 PROCEDURE — 93284 PRGRMG EVAL IMPLANTABLE DFB: CPT

## 2020-11-09 PROCEDURE — 93005 ELECTROCARDIOGRAM TRACING: CPT | Performed by: NURSE PRACTITIONER

## 2020-11-09 PROCEDURE — 93288 INTERROG EVL PM/LDLS PM IP: CPT

## 2020-11-09 PROCEDURE — 93010 ELECTROCARDIOGRAM REPORT: CPT | Performed by: NURSE PRACTITIONER

## 2020-11-09 NOTE — PROGRESS NOTES
Patient presents for wound check post-device implantation (BIV ICD). The dressing was removed and the site was inspected. The site appeared to be well-healing without ecchymosis/tenderness/erythema. Denies pain, fevers, discharge. Device interrogation shows normal functioning. EKG shows v pacing, positivity in V1. Plan:    Continue follow up in device clinic as planned.        Radha Sincalir NP

## 2020-12-03 ENCOUNTER — OFFICE VISIT (OUTPATIENT)
Dept: FAMILY MEDICINE CLINIC | Age: 70
End: 2020-12-03
Payer: MEDICARE

## 2020-12-03 VITALS
HEIGHT: 66 IN | WEIGHT: 161 LBS | SYSTOLIC BLOOD PRESSURE: 126 MMHG | OXYGEN SATURATION: 98 % | BODY MASS INDEX: 25.88 KG/M2 | HEART RATE: 89 BPM | DIASTOLIC BLOOD PRESSURE: 76 MMHG | RESPIRATION RATE: 16 BRPM | TEMPERATURE: 98.1 F

## 2020-12-03 DIAGNOSIS — I10 ESSENTIAL HYPERTENSION: Primary | ICD-10-CM

## 2020-12-03 DIAGNOSIS — I50.22 SYSTOLIC CHF, CHRONIC (HCC): ICD-10-CM

## 2020-12-03 DIAGNOSIS — I44.7 LBBB (LEFT BUNDLE BRANCH BLOCK): ICD-10-CM

## 2020-12-03 DIAGNOSIS — E11.40 TYPE 2 DIABETES MELLITUS WITH DIABETIC NEUROPATHY, WITHOUT LONG-TERM CURRENT USE OF INSULIN (HCC): ICD-10-CM

## 2020-12-03 PROCEDURE — 3288F FALL RISK ASSESSMENT DOCD: CPT | Performed by: FAMILY MEDICINE

## 2020-12-03 PROCEDURE — 99214 OFFICE O/P EST MOD 30 MIN: CPT | Performed by: FAMILY MEDICINE

## 2020-12-03 PROCEDURE — G8419 CALC BMI OUT NRM PARAM NOF/U: HCPCS | Performed by: FAMILY MEDICINE

## 2020-12-03 PROCEDURE — G8754 DIAS BP LESS 90: HCPCS | Performed by: FAMILY MEDICINE

## 2020-12-03 PROCEDURE — 1090F PRES/ABSN URINE INCON ASSESS: CPT | Performed by: FAMILY MEDICINE

## 2020-12-03 PROCEDURE — G9899 SCRN MAM PERF RSLTS DOC: HCPCS | Performed by: FAMILY MEDICINE

## 2020-12-03 PROCEDURE — G8427 DOCREV CUR MEDS BY ELIG CLIN: HCPCS | Performed by: FAMILY MEDICINE

## 2020-12-03 PROCEDURE — G8399 PT W/DXA RESULTS DOCUMENT: HCPCS | Performed by: FAMILY MEDICINE

## 2020-12-03 PROCEDURE — G8432 DEP SCR NOT DOC, RNG: HCPCS | Performed by: FAMILY MEDICINE

## 2020-12-03 PROCEDURE — 2022F DILAT RTA XM EVC RTNOPTHY: CPT | Performed by: FAMILY MEDICINE

## 2020-12-03 PROCEDURE — G0463 HOSPITAL OUTPT CLINIC VISIT: HCPCS | Performed by: FAMILY MEDICINE

## 2020-12-03 PROCEDURE — 3017F COLORECTAL CA SCREEN DOC REV: CPT | Performed by: FAMILY MEDICINE

## 2020-12-03 PROCEDURE — 3052F HG A1C>EQUAL 8.0%<EQUAL 9.0%: CPT | Performed by: FAMILY MEDICINE

## 2020-12-03 PROCEDURE — G8752 SYS BP LESS 140: HCPCS | Performed by: FAMILY MEDICINE

## 2020-12-03 PROCEDURE — G8536 NO DOC ELDER MAL SCRN: HCPCS | Performed by: FAMILY MEDICINE

## 2020-12-03 PROCEDURE — 1100F PTFALLS ASSESS-DOCD GE2>/YR: CPT | Performed by: FAMILY MEDICINE

## 2020-12-03 NOTE — PROGRESS NOTES
Progress Note    she is a 72y.o. year old female who presents for evalution. Subjective:     Pt for recent ICD and pacemaker implant she had for NYHA class 3. Pt did fail medical therapy. EF was 20-25%. Pt has an appt with Dr Yuko Leiva on Monday. Pt states her energy levels are returning now. Pt is also diabetic and had hr A1C checked in Oct and was 8.7. Pt has been losing some weight technically she is over weight. Pt does not tolerate liquid supplements like boost or glucerna. Pt states she has a bit of an aversion to food. States she is not taking meds as often as she should. Fasting glucose as 81 today 2 days ago was 81. Pt has freestyle coty. Duscussed goal glucose level fasting and post prandial.  We have also discussed diet with diabetes and her decreased appetite. With smaller meals thru out the day would hopefully be able to tolerate meds better for diabetes. Pt is seeing endo on Monday and will have labs with them. Reviewed PmHx, RxHx, FmHx, SocHx, AllgHx and updated and dated in the chart. Review of Systems - negative except as listed above in the HPI    Objective:     Vitals:    12/03/20 1140   BP: 126/76   Pulse: 89   Resp: 16   Temp: 98.1 °F (36.7 °C)   TempSrc: Oral   SpO2: 98%   Weight: 161 lb (73 kg)   Height: 5' 6\" (1.676 m)       Current Outpatient Medications   Medication Sig    calcium carb/vitamin D3/vit K1 (VIACTIV PO) Take  by mouth.  aspirin delayed-release 81 mg tablet Take 81 mg by mouth daily.  loratadine (Claritin) 10 mg tablet Take 1 Tab by mouth daily for 360 days.  sacubitriL-valsartan (Entresto) 49-51 mg tab tablet Take 1 Tab by mouth two (2) times a day.  Biotin 2,500 mcg cap Take  by mouth.  OTHER daily. Bulberry    dapagliflozin (Farxiga) 10 mg tab tablet Take  by mouth daily.  atorvastatin (LIPITOR) 40 mg tablet Take 1 Tab by mouth daily.  spironolactone (ALDACTONE) 25 mg tablet Take 1 Tab by mouth daily.     carvediloL (COREG) 3.125 mg tablet Take 1 Tab by mouth two (2) times daily (with meals).  clopidogreL (Plavix) 75 mg tab Take 1 Tab by mouth daily.  FreeStyle Efe 14 Day Sensor kit U UTD Q 14 DAYS    linaCLOtide (LINZESS) 145 mcg cap capsule Take  by mouth Daily (before breakfast).  polyethylene glycol (MIRALAX) 17 gram packet Take 17 g by mouth daily.  milnacipran (SAVELLA) 50 mg tablet Take 1 Tab by mouth two (2) times a day.  BUTALBITAL-ASPIRIN-CAFFEINE PO Take 1 Tab by mouth as needed.  baclofen (LIORESAL) 10 mg tablet Take 10 mg by mouth nightly.  bethanechol (URECHOLINE) 5 mg tablet Take 5 mg by mouth two (2) times a day.  fluticasone propionate (FLONASE ALLERGY RELIEF) 50 mcg/actuation nasal spray 2 Sprays by Both Nostrils route every morning.  magnesium oxide (MAG-OX) 400 mg tablet Take 400 mg by mouth every morning.  OTHER Take 2 Tabs by mouth daily. \"Benefiber\"     cranberry fruit extract (CRANBERRY PO) Take 4,200 mg by mouth daily.  ivermectin (SOOLANTRA) 1 % crea Apply  to affected area daily.  CALCIUM PO Take 1 Caplet by mouth daily.  glimepiride (AMARYL) 2 mg tablet Take 2 mg by mouth three (3) times daily. 1 in the morning, 2 at night    ALPHA LIPOIC ACID PO Take 1 Cap by mouth two (2) times a day.  L-Mfolate-B6 Phos-Methyl-B12 (METANX) 3-35-2 mg tab tab Take 1 Tab by mouth two (2) times a day. Indications: neuropathy    SITagliptin (JANUVIA) 100 mg tablet Take 100 mg by mouth daily. No current facility-administered medications for this visit.         Physical Examination: General appearance - alert, well appearing, and in no distress  Mental status - alert, oriented to person, place, and time  Chest - clear to auscultation, no wheezes, rales or rhonchi, symmetric air entry  Heart - normal rate, regular rhythm, normal S1, S2, no murmurs, rubs, clicks or gallops  Skin - pacemaker well healed    Ext w/ no pitting edema  Assessment/ Plan:   Diagnoses and all orders for this visit:    1. Essential hypertension  Controlled on medication  2. Chronic systolic heart failure  Status post ICD/biventricular pacemaker. Doing well has follow-up with cardiology next week. 3. LBBB (left bundle branch block)    4. Type 2 diabetes mellitus with diabetic neuropathy, without long-term current use of insulin ([facility])  Discussed checking blood glucose using freestyle coty for fasting and 2 hours postprandial along with trying to eat smaller meals throughout the day. Patient does not tolerate regular yogurt and does not like to eat much for breakfast discussed trial of coconut yogurt to see if this helps with probiotics. If patient is able to eat more regularly would help her to be able to take her medications more regularly as well     Follow-up and Dispositions    · Return in about 6 months (around 6/3/2021), or if symptoms worsen or fail to improve. I have discussed the diagnosis with the patient and the intended plan as seen in the above orders. The patient has received an after-visit summary and questions were answered concerning future plans. Pt conveyed understanding of plan.     Medication Side Effects and Warnings were discussed with patient      Shelly Young,

## 2020-12-03 NOTE — PATIENT INSTRUCTIONS
A Healthy Lifestyle: Care Instructions  Your Care Instructions     A healthy lifestyle can help you feel good, stay at a healthy weight, and have plenty of energy for both work and play. A healthy lifestyle is something you can share with your whole family. A healthy lifestyle also can lower your risk for serious health problems, such as high blood pressure, heart disease, and diabetes. You can follow a few steps listed below to improve your health and the health of your family. Follow-up care is a key part of your treatment and safety. Be sure to make and go to all appointments, and call your doctor if you are having problems. It's also a good idea to know your test results and keep a list of the medicines you take. How can you care for yourself at home? · Do not eat too much sugar, fat, or fast foods. You can still have dessert and treats now and then. The goal is moderation. · Start small to improve your eating habits. Pay attention to portion sizes, drink less juice and soda pop, and eat more fruits and vegetables. ? Eat a healthy amount of food. A 3-ounce serving of meat, for example, is about the size of a deck of cards. Fill the rest of your plate with vegetables and whole grains. ? Limit the amount of soda and sports drinks you have every day. Drink more water when you are thirsty. ? Eat at least 5 servings of fruits and vegetables every day. It may seem like a lot, but it is not hard to reach this goal. A serving or helping is 1 piece of fruit, 1 cup of vegetables, or 2 cups of leafy, raw vegetables. Have an apple or some carrot sticks as an afternoon snack instead of a candy bar. Try to have fruits and/or vegetables at every meal.  · Make exercise part of your daily routine. You may want to start with simple activities, such as walking, bicycling, or slow swimming. Try to be active 30 to 60 minutes every day. You do not need to do all 30 to 60 minutes all at once.  For example, you can exercise 3 times a day for 10 or 20 minutes. Moderate exercise is safe for most people, but it is always a good idea to talk to your doctor before starting an exercise program.  · Keep moving. Ashley Callie the lawn, work in the garden, or [place]. Take the stairs instead of the elevator at work. · If you smoke, quit. People who smoke have an increased risk for heart attack, stroke, cancer, and other lung illnesses. Quitting is hard, but there are ways to boost your chance of quitting tobacco for good. ? Use nicotine gum, patches, or lozenges. ? Ask your doctor about stop-smoking programs and medicines. ? Keep trying. In addition to reducing your risk of diseases in the future, you will notice some benefits soon after you stop using tobacco. If you have shortness of breath or asthma symptoms, they will likely get better within a few weeks after you quit. · Limit how much alcohol you drink. Moderate amounts of alcohol (up to 2 drinks a day for men, 1 drink a day for women) are okay. But drinking too much can lead to liver problems, high blood pressure, and other health problems. Family health  If you have a family, there are many things you can do together to improve your health. · Eat meals together as a family as often as possible. · Eat healthy foods. This includes fruits, vegetables, lean meats and dairy, and whole grains. · Include your family in your fitness plan. Most people think of activities such as jogging or tennis as the way to fitness, but there are many ways you and your family can be more active. Anything that makes you breathe hard and gets your heart pumping is exercise. Here are some tips:  ? Walk to do errands or to take your child to school or the bus.  ? Go for a family bike ride after dinner instead of watching TV. Where can you learn more?   Go to http://www.gray.com/  Enter N712 in the search box to learn more about \"A Healthy Lifestyle: Care Instructions. \"  Current as of: January 31, 2020               Content Version: 12.6  © 5015-5591 [employer]. Care instructions adapted under license by [employer] (which disclaims liability or warranty for this information). If you have questions about a medical condition or this instruction, always ask your healthcare professional. [place] [address] any warranty or liability for your use of this information.

## 2020-12-03 NOTE — PROGRESS NOTES
Chief Complaint   Patient presents with    Follow-up     Patient presents in office today for f/u from having a pacemaker and defibrillator put in. Was placed on 10/29/20. Also has c/o weight loss. States that she does not always take her medications as she has a loss of appetite. No other concerns. 1. Have you been to the ER, urgent care clinic since your last visit? Hospitalized since your last visit? Yes When: 10/29/20 Where: [facility] Reason for visit: placement of pacemaker    2. Have you seen or consulted any other health care providers outside of the [address] [place] [address] since your last visit? Include any pap smears or colon screening.  No    Learning Assessment 3/4/2014   PRIMARY LEARNER Patient   HIGHEST LEVEL OF EDUCATION - PRIMARY LEARNER  4 YEARS OF COLLEGE   BARRIERS PRIMARY LEARNER NONE   PRIMARY LANGUAGE ENGLISH   LEARNER PREFERENCE PRIMARY LISTENING   ANSWERED BY self   RELATIONSHIP SELF

## 2020-12-07 ENCOUNTER — OFFICE VISIT (OUTPATIENT)
Dept: CARDIOLOGY CLINIC | Age: 70
End: 2020-12-07
Payer: MEDICARE

## 2020-12-07 VITALS
DIASTOLIC BLOOD PRESSURE: 60 MMHG | WEIGHT: 164 LBS | OXYGEN SATURATION: 98 % | HEART RATE: 73 BPM | SYSTOLIC BLOOD PRESSURE: 122 MMHG | HEIGHT: 66 IN | BODY MASS INDEX: 26.36 KG/M2

## 2020-12-07 DIAGNOSIS — I25.10 CORONARY ARTERY DISEASE INVOLVING NATIVE CORONARY ARTERY OF NATIVE HEART WITHOUT ANGINA PECTORIS: ICD-10-CM

## 2020-12-07 DIAGNOSIS — I42.8 OTHER CARDIOMYOPATHY (HCC): ICD-10-CM

## 2020-12-07 DIAGNOSIS — I50.20 HFREF (HEART FAILURE WITH REDUCED EJECTION FRACTION) (HCC): Primary | ICD-10-CM

## 2020-12-07 DIAGNOSIS — I10 ESSENTIAL HYPERTENSION: ICD-10-CM

## 2020-12-07 DIAGNOSIS — Z95.810 ICD (IMPLANTABLE CARDIOVERTER-DEFIBRILLATOR), BIVENTRICULAR, IN SITU: ICD-10-CM

## 2020-12-07 DIAGNOSIS — G37.3 TRANSVERSE MYELOPATHY SYNDROME (HCC): ICD-10-CM

## 2020-12-07 DIAGNOSIS — E11.40 TYPE 2 DIABETES MELLITUS WITH DIABETIC NEUROPATHY, WITHOUT LONG-TERM CURRENT USE OF INSULIN (HCC): ICD-10-CM

## 2020-12-07 DIAGNOSIS — J43.8 OTHER EMPHYSEMA (HCC): ICD-10-CM

## 2020-12-07 DIAGNOSIS — I70.0 ATHEROSCLEROSIS OF AORTA (HCC): ICD-10-CM

## 2020-12-07 DIAGNOSIS — I44.7 LBBB (LEFT BUNDLE BRANCH BLOCK): ICD-10-CM

## 2020-12-07 PROCEDURE — G8419 CALC BMI OUT NRM PARAM NOF/U: HCPCS | Performed by: SPECIALIST

## 2020-12-07 PROCEDURE — G8432 DEP SCR NOT DOC, RNG: HCPCS | Performed by: SPECIALIST

## 2020-12-07 PROCEDURE — G8754 DIAS BP LESS 90: HCPCS | Performed by: SPECIALIST

## 2020-12-07 PROCEDURE — G8536 NO DOC ELDER MAL SCRN: HCPCS | Performed by: SPECIALIST

## 2020-12-07 PROCEDURE — G8399 PT W/DXA RESULTS DOCUMENT: HCPCS | Performed by: SPECIALIST

## 2020-12-07 PROCEDURE — 3052F HG A1C>EQUAL 8.0%<EQUAL 9.0%: CPT | Performed by: SPECIALIST

## 2020-12-07 PROCEDURE — G9899 SCRN MAM PERF RSLTS DOC: HCPCS | Performed by: SPECIALIST

## 2020-12-07 PROCEDURE — G8427 DOCREV CUR MEDS BY ELIG CLIN: HCPCS | Performed by: SPECIALIST

## 2020-12-07 PROCEDURE — 1100F PTFALLS ASSESS-DOCD GE2>/YR: CPT | Performed by: SPECIALIST

## 2020-12-07 PROCEDURE — 3288F FALL RISK ASSESSMENT DOCD: CPT | Performed by: SPECIALIST

## 2020-12-07 PROCEDURE — 3017F COLORECTAL CA SCREEN DOC REV: CPT | Performed by: SPECIALIST

## 2020-12-07 PROCEDURE — G0463 HOSPITAL OUTPT CLINIC VISIT: HCPCS | Performed by: SPECIALIST

## 2020-12-07 PROCEDURE — G8752 SYS BP LESS 140: HCPCS | Performed by: SPECIALIST

## 2020-12-07 PROCEDURE — 2022F DILAT RTA XM EVC RTNOPTHY: CPT | Performed by: SPECIALIST

## 2020-12-07 PROCEDURE — 1090F PRES/ABSN URINE INCON ASSESS: CPT | Performed by: SPECIALIST

## 2020-12-07 PROCEDURE — 99214 OFFICE O/P EST MOD 30 MIN: CPT | Performed by: SPECIALIST

## 2020-12-07 NOTE — PROGRESS NOTES
[place] is a 72 y.o. female    Visit Vitals  /60 (BP 1 Location: Left arm, BP Patient Position: Sitting)   Pulse 73   Ht 5' 6\" (1.676 m)   Wt 164 lb (74.4 kg)   SpO2 98%   BMI 26.47 kg/m²       Chief Complaint   Patient presents with    Other     LBBB    Cardiomyopathy    CHF    Coronary Artery Disease    Hypertension       Chest pain NO  SOB NO  Dizziness NO  Swelling NO  Recent hospital visit NO  Refills NO

## 2020-12-07 NOTE — PROGRESS NOTES
Camilo Max Single, [address]  [address],  [address]  [place], [address] [facility]    Office [phone]  Fax [phone]  Cell [phone]        Joseph Drake is a 72 y.o. female last seen by me 3 months ago. Assessment  Encounter Diagnoses   Name Primary?  HFrEF (heart failure with reduced ejection fraction) ([facility] Yes    Coronary artery disease involving native coronary artery of native heart without angina pectoris     Essential hypertension     Atherosclerosis of aorta (HCC)     LBBB (left bundle branch block)     Other cardiomyopathy ([facility]     Type 2 diabetes mellitus with diabetic neuropathy, without long-term current use of insulin ([facility])     Other emphysema ([facility]     ICD (implantable cardioverter-defibrillator), biventricular, in situ     Transverse myelopathy syndrome ([facility]      Recommendations:    Chronic HFrEF with LBBB. LVEF 20-25% most recently by echo Sept 2020. S/p BiV ICD 5 weeks ago. She now has class 2 sxs from class 3 pre-device. Diff dx of CM includes LBBB, diabetes. Recent cath w/ single vessel disease s/p PCI. Her medical therapy is reasonable but optimal dosing has been limited by low-eric BP. She has been inconsistent w/ her adherence to morning doses of Coreg and Entresto. - Continue carvedilol 3.125 mg BID but take morning dose mid-day  - Continue Entresto 49/51 mg BID  - Continue spironolactone 25 mg/d  - Reassess LVEF w/ echo 6 months post-implant     CAD with 1v disease s/p PCI LAD with SORAYA 6/8/20 ([facility] @ [facility]). Sx = HF, dyspnea but no chest pain. Risk drivers=DM, dyslipidemia.  - Continue DAPT one year     S/p BiV ICD 10/30/20 [facility]). She has healed well  - Continue remote checks      Type 2 DM. Lyndol Ao which also has HF benefit. Continue followup with Dr Kaiden Navarrete as planned. Functional capacity in part has been reduced by hx of transverse myelitis, but she feels much better overall.      3 month f/u Subjective:    Underwent BiV ICD in late October by Dr. Camila Harkins. She reports that she feels better since her BiV ICD placement. She reports improved stamina, breathing, and activity level. Patient denies any exertional chest pain, palpitations, syncope, orthopnea, edema or paroxysmal nocturnal dyspnea. She reports a mostly sedentary lifestyle, but she has slowly started to do more. She reports a poor appetite. Her  states that she has lost weight. She reports not taking her medications regularly (Coreg, Entresto). She ambulates with a walker. She is here with her . Of note, she and her  recently visited the [facility]. Cardiac risk factors   HTN yes  DM no  Smoking no  Family hx of CAD no    Cardiac testing  Echo 04/02/17-LVEF 60%, negative bubble study    Echo 10/10/19 - EF 51%. No WMA. G1DD. Mild MR. Lexiscan Cardiolite 10/10/19 - Normal perfusion. EF 54%. EKG 6/18/19 - SR, LVH, LAHB  EKG 6/15/2020 - sinus LBBB, new from one year    Echo 7/16/20- EF 20-25%, dilalated LV, global HK, septal dyssynergy  Echo 9/16/20- EF 20-25%, dilated LV, unchanged from 7/16/20    10/30/20: [address] biventricular ICD per Dr. Carrie Cleaning    Past Medical History:   Diagnosis Date    Abnormal MRI, cervical spine 4/3/2017    Abnormal pap 3/2015; 5/2016 2015 Neg pap +HPV; 2016 ASCUS +HPV    Arthritis     osteo-tests for RA were neg    Breast cancer ([address] 3/12/2015    Breast cancer, right breast ([address] 1996    Chronic pain     Diabetes ([address]     Fibromyalgia     Ganglion cyst of wrist     LEFT    Hypercholesterolemia     Hypertension     Ill-defined condition     Prolapsed mitral valve    Memory loss     Per pt.      Murmur, cardiac     Neuropathy     Rosacea     Sarcoidosis     Sarcoidosis     Transverse myelopathy syndrome (HCC)     Unspecified adverse effect of anesthesia     \"SLOW TO WAKE UP, SENSITIVE TO ANESTHESIA, DO NOT COME AROUND FOR 2-3 DAYS\"         Current Outpatient Medications   Medication Sig Dispense Refill    calcium carb/vitamin D3/vit K1 (VIACTIV PO) Take  by mouth.  aspirin delayed-release 81 mg tablet Take 81 mg by mouth daily.  loratadine (Claritin) 10 mg tablet Take 1 Tab by mouth daily for 360 days. 30 Tab 11    sacubitriL-valsartan (Entresto) 49-51 mg tab tablet Take 1 Tab by mouth two (2) times a day. 180 Tab 3    Biotin 2,500 mcg cap Take  by mouth.  OTHER daily. Bulberry      dapagliflozin (Farxiga) 10 mg tab tablet Take  by mouth daily.  atorvastatin (LIPITOR) 40 mg tablet Take 1 Tab by mouth daily. 90 Tab 1    spironolactone (ALDACTONE) 25 mg tablet Take 1 Tab by mouth daily. 90 Tab 1    carvediloL (COREG) 3.125 mg tablet Take 1 Tab by mouth two (2) times daily (with meals). 180 Tab 1    clopidogreL (Plavix) 75 mg tab Take 1 Tab by mouth daily. 90 Tab 1    FreeStyle Efe 14 Day Sensor kit U UTD Q 14 DAYS      linaCLOtide (LINZESS) 145 mcg cap capsule Take  by mouth Daily (before breakfast).  polyethylene glycol (MIRALAX) 17 gram packet Take 17 g by mouth daily.  milnacipran (SAVELLA) 50 mg tablet Take 1 Tab by mouth two (2) times a day. 180 Tab 3    BUTALBITAL-ASPIRIN-CAFFEINE PO Take 1 Tab by mouth as needed.  baclofen (LIORESAL) 10 mg tablet Take 10 mg by mouth nightly.  bethanechol (URECHOLINE) 5 mg tablet Take 5 mg by mouth two (2) times a day.  fluticasone propionate (FLONASE ALLERGY RELIEF) 50 mcg/actuation nasal spray 2 Sprays by Both Nostrils route every morning.  magnesium oxide (MAG-OX) 400 mg tablet Take 400 mg by mouth every morning.  OTHER Take 2 Tabs by mouth daily. \"Benefiber\"       cranberry fruit extract (CRANBERRY PO) Take 4,200 mg by mouth daily.  ivermectin (SOOLANTRA) 1 % crea Apply  to affected area daily.  glimepiride (AMARYL) 2 mg tablet Take 2 mg by mouth three (3) times daily.  1 in the morning, 2 at night     [address] [facility] ACID PO Take 1 Cap by mouth two (2) times a day.  L-Mfolate-B6 Phos-Methyl-B12 (METANX) 3-35-2 mg tab tab Take 1 Tab by mouth two (2) times a day. Indications: neuropathy      SITagliptin (JANUVIA) 100 mg tablet Take 100 mg by mouth daily.  CALCIUM PO Take 1 Caplet by mouth daily. Allergies   Allergen Reactions    Latex Other (comments)     Patient states it burns around her mouth.  Other Food Other (comments)     Yogurt - stomach cramping    Betadine [Povidone-Iodine] Rash and Itching     Pt states this causes \"extreme\" itching    Codeine Anaphylaxis, Rash, Nausea Only and Other (comments)     HEADACHE  Tolerates tramadol    Dilaudid [Hydromorphone (Bulk)] Anaphylaxis, Itching, Nausea Only and Other (comments)     HALLUCINATIONS  Tolerates tramadol      Hydrocodone Anaphylaxis, Rash, Nausea Only and Vertigo     Facial - eyelid swelling-had to go to ER for reaction, HALLUCINATIONS  Tolerates tramadol      Ditropan [Oxybutynin Chloride] Swelling    Doxycycline Rash     PUSTULAR RASH- TOLERATING TETRACYCLINE    Iodine Other (comments)     Headache    Keflex [Cephalexin] Nausea and Vomiting     Pain in abdomen AND FLU-LIKE SX      Metformin Nausea and Vomiting     Severe abdominal pain      Tape [Adhesive] Other (comments)     Redness/inflammed. Can only use paper tape. CAN USE BAND-AIDS. TOLERATES SURGICAL TAPE USED IN [place] [facility].  Trulicity [Dulaglutide] Other (comments)     Abdominal pain     Sulfamethoxazole-Trimethoprim Other (comments)     Cramping/flu-like symptoms          Review of Systems  Constitutional: Negative for fever, chills,  and diaphoresis. +fatigue, dizziness/lightheadedness   Respiratory: Negative for cough, hemoptysis, sputum production, and wheezing. +KHALIL/SOB  Cardiovascular: Negative for chest pain, orthopnea, claudication, leg swelling and PND. Gastrointestinal: Negative for heartburn, nausea, vomiting, blood in stool and melena.   Genitourinary: Negative for dysuria and flank pain. Musculoskeletal: Negative for joint pain. Skin: Negative for rash. Neurological: Negative for focal weakness, seizures, weakness and headaches. Endo/Heme/Allergies: Does not bruise/bleed easily. Psychiatric/Behavioral: Negative for memory loss. The patient does not have insomnia. Physical Exam    Visit Vitals  /60 (BP 1 Location: Left arm, BP Patient Position: Sitting)   Pulse 73   Ht 5' 6\" (1.676 m)   Wt 164 lb (74.4 kg)   SpO2 98%   BMI 26.47 kg/m²     Wt Readings from Last 3 Encounters:   12/07/20 164 lb (74.4 kg)   12/03/20 161 lb (73 kg)   10/29/20 163 lb 5.8 oz (74.1 kg)      General - well developed well nourished  Neck - JVP normal, thyroid nl  Cardiac - normal S1, S2, no murmurs, rubs or gallops. No clicks  Vascular - carotids without bruits, radials, femorals and pedal pulses equal bilateral  Lungs - clear to auscultation bilaterals, no rales, wheezing or rhonchi  Abd - soft nontender, no HSM, no abd bruits  Extremities - no edema  Skin - no rash  Neuro - nonfocal  Psych - normal mood and affect    Cardiographics  EKG 04/01/17- SR, LVH   Echo 04/02/17-LVEF 60%, negative bubble study   EKG 6/18/19 - SR, LVH, LAHB  EKG 6/15/2020 - sinus LBBB, new from one year ago  Echo 7/16/20- EF 20-25%, dilalated LV, global HK, septal dyssynergy   Echo 9/16/20- EF 20-25%, dilated LV, unchanged from 7/16/20     Written by Miles Summers, as dictated by Nick Lao M.D.        Nick Lao MD

## 2020-12-07 NOTE — LETTER
12/9/20 Patient: Pelon Marinelli YOB: 1950 Date of Visit: 12/7/2020 Markel Gustafson DO 
[address]  [address] [address] [facility] [address] VIA In Basket Dear Markel Gustafson DO, Thank you for referring Ms. Yony Burns to [facility] for evaluation. My notes for this consultation are attached. If you have questions, please do not hesitate to call me. I look forward to following your patient along with you. Sincerely, Monica Bustillos MD

## 2020-12-10 ENCOUNTER — HOSPITAL ENCOUNTER (OUTPATIENT)
Dept: CT IMAGING | Age: 70
Discharge: HOME | End: 2020-12-10
Attending: INTERNAL MEDICINE
Payer: MEDICARE

## 2020-12-10 DIAGNOSIS — R91.1 PULMONARY NODULE: ICD-10-CM

## 2020-12-10 PROCEDURE — 71250 CT THORAX DX C-: CPT

## 2020-12-30 RX ORDER — ATORVASTATIN CALCIUM 40 MG/1
TABLET, FILM COATED ORAL
Qty: 90 TAB | Refills: 1 | Status: SHIPPED | OUTPATIENT
Start: 2020-12-30 | End: 2021-07-06

## 2021-01-04 RX ORDER — CARVEDILOL 3.12 MG/1
TABLET ORAL
Qty: 180 TAB | Refills: 1 | Status: SHIPPED | OUTPATIENT
Start: 2021-01-04 | End: 2021-07-05

## 2021-01-04 RX ORDER — SPIRONOLACTONE 25 MG/1
TABLET ORAL
Qty: 90 TAB | Refills: 1 | Status: SHIPPED | OUTPATIENT
Start: 2021-01-04 | End: 2021-07-05

## 2021-01-15 ENCOUNTER — TELEPHONE (OUTPATIENT)
Dept: FAMILY MEDICINE CLINIC | Age: 71
End: 2021-01-15

## 2021-01-15 NOTE — TELEPHONE ENCOUNTER
Called and spoke with patient. Advised that Dr. Mcfadden has not issue with her getting in. Face to face apt scheduled for 1/22/21

## 2021-01-15 NOTE — TELEPHONE ENCOUNTER
----- Message from Maggy Lincoln sent at 1/15/2021 12:56 PM EST -----  Regarding: Dr. Marquez Kwon: [phone]  General Message/Vendor Calls    Caller's first and last name: Pt.      Reason for call: Questions regarding the shingles vaccine. Callback required yes/no and why: Yes, discussion. Best contact number(s):[phone]      Details to clarify the request: Requesting your opinion as to whether or not she should receive the shingles vaccine. She has concerns due to 3 auto immune diseases she has.       Maggy Lincoln

## 2021-01-15 NOTE — TELEPHONE ENCOUNTER
----- Message from Tiffani Danna sent at 1/15/2021  1:02 PM EST -----  Regarding: Dr. Villareal Dawley: [phone]  General Message/Vendor Calls    Caller's first and last name: Pt.      Reason for call: Prescription for a breast prosthesis. Callback required yes/no and why: Yes, discussion. Best contact number(s):[phone]      Details to clarify the request: Will the doctor write a prescription for a breast prosthesis? She normally gets this from her gynecologist, but is hoping that, since Dr. Alina Andrea has been so involved with her condition that she can just go through him for her convenience.

## 2021-01-21 ENCOUNTER — TELEPHONE (OUTPATIENT)
Dept: OBGYN CLINIC | Age: 71
End: 2021-01-21

## 2021-01-22 ENCOUNTER — OFFICE VISIT (OUTPATIENT)
Dept: FAMILY MEDICINE CLINIC | Age: 71
End: 2021-01-22
Payer: MEDICARE

## 2021-01-22 VITALS
SYSTOLIC BLOOD PRESSURE: 112 MMHG | HEIGHT: 66 IN | HEART RATE: 97 BPM | BODY MASS INDEX: 25.39 KG/M2 | DIASTOLIC BLOOD PRESSURE: 72 MMHG | OXYGEN SATURATION: 98 % | TEMPERATURE: 97.4 F | WEIGHT: 158 LBS | RESPIRATION RATE: 18 BRPM

## 2021-01-22 DIAGNOSIS — E11.40 TYPE 2 DIABETES MELLITUS WITH DIABETIC NEUROPATHY, WITHOUT LONG-TERM CURRENT USE OF INSULIN (HCC): ICD-10-CM

## 2021-01-22 DIAGNOSIS — Z90.11 H/O RIGHT MASTECTOMY: Primary | ICD-10-CM

## 2021-01-22 PROCEDURE — 3288F FALL RISK ASSESSMENT DOCD: CPT | Performed by: FAMILY MEDICINE

## 2021-01-22 PROCEDURE — 3017F COLORECTAL CA SCREEN DOC REV: CPT | Performed by: FAMILY MEDICINE

## 2021-01-22 PROCEDURE — G8752 SYS BP LESS 140: HCPCS | Performed by: FAMILY MEDICINE

## 2021-01-22 PROCEDURE — G9899 SCRN MAM PERF RSLTS DOC: HCPCS | Performed by: FAMILY MEDICINE

## 2021-01-22 PROCEDURE — 3046F HEMOGLOBIN A1C LEVEL >9.0%: CPT | Performed by: FAMILY MEDICINE

## 2021-01-22 PROCEDURE — 1090F PRES/ABSN URINE INCON ASSESS: CPT | Performed by: FAMILY MEDICINE

## 2021-01-22 PROCEDURE — G8427 DOCREV CUR MEDS BY ELIG CLIN: HCPCS | Performed by: FAMILY MEDICINE

## 2021-01-22 PROCEDURE — G0463 HOSPITAL OUTPT CLINIC VISIT: HCPCS | Performed by: FAMILY MEDICINE

## 2021-01-22 PROCEDURE — G8754 DIAS BP LESS 90: HCPCS | Performed by: FAMILY MEDICINE

## 2021-01-22 PROCEDURE — 1100F PTFALLS ASSESS-DOCD GE2>/YR: CPT | Performed by: FAMILY MEDICINE

## 2021-01-22 PROCEDURE — G8510 SCR DEP NEG, NO PLAN REQD: HCPCS | Performed by: FAMILY MEDICINE

## 2021-01-22 PROCEDURE — G8399 PT W/DXA RESULTS DOCUMENT: HCPCS | Performed by: FAMILY MEDICINE

## 2021-01-22 PROCEDURE — G8536 NO DOC ELDER MAL SCRN: HCPCS | Performed by: FAMILY MEDICINE

## 2021-01-22 PROCEDURE — G8419 CALC BMI OUT NRM PARAM NOF/U: HCPCS | Performed by: FAMILY MEDICINE

## 2021-01-22 PROCEDURE — 2022F DILAT RTA XM EVC RTNOPTHY: CPT | Performed by: FAMILY MEDICINE

## 2021-01-22 PROCEDURE — 99214 OFFICE O/P EST MOD 30 MIN: CPT | Performed by: FAMILY MEDICINE

## 2021-01-22 RX ORDER — FLASH GLUCOSE SENSOR
KIT MISCELLANEOUS
Qty: 2 KIT | Refills: 11 | Status: SHIPPED | OUTPATIENT
Start: 2021-01-22 | End: 2021-12-15

## 2021-01-22 NOTE — PATIENT INSTRUCTIONS
A Healthy Lifestyle: Care Instructions Your Care Instructions A healthy lifestyle can help you feel good, stay at a healthy weight, and have plenty of energy for both work and play. A healthy lifestyle is something you can share with your whole family. A healthy lifestyle also can lower your risk for serious health problems, such as high blood pressure, heart disease, and diabetes. You can follow a few steps listed below to improve your health and the health of your family. Follow-up care is a key part of your treatment and safety. Be sure to make and go to all appointments, and call your doctor if you are having problems. It's also a good idea to know your test results and keep a list of the medicines you take. How can you care for yourself at home? · Do not eat too much sugar, fat, or fast foods. You can still have dessert and treats now and then. The goal is moderation. · Start small to improve your eating habits. Pay attention to portion sizes, drink less juice and soda pop, and eat more fruits and vegetables. ? Eat a healthy amount of food. A 3-ounce serving of meat, for example, is about the size of a deck of cards. Fill the rest of your plate with vegetables and whole grains. ? Limit the amount of soda and sports drinks you have every day. Drink more water when you are thirsty. ? Eat at least 5 servings of fruits and vegetables every day. It may seem like a lot, but it is not hard to reach this goal. A serving or helping is 1 piece of fruit, 1 cup of vegetables, or 2 cups of leafy, raw vegetables. Have an apple or some carrot sticks as an afternoon snack instead of a candy bar.  Try to have fruits and/or vegetables at every meal. 
 · Make exercise part of your daily routine. You may want to start with simple activities, such as walking, bicycling, or slow swimming. Try to be active 30 to 60 minutes every day. You do not need to do all 30 to 60 minutes all at once. For example, you can exercise 3 times a day for 10 or 20 minutes. Moderate exercise is safe for most people, but it is always a good idea to talk to your doctor before starting an exercise program. 
· Keep moving. Arvil Paddy the lawn, work in the garden, or NCLC. Take the stairs instead of the elevator at work. · If you smoke, quit. People who smoke have an increased risk for heart attack, stroke, cancer, and other lung illnesses. Quitting is hard, but there are ways to boost your chance of quitting tobacco for good. ? Use nicotine gum, patches, or lozenges. ? Ask your doctor about stop-smoking programs and medicines. ? Keep trying. In addition to reducing your risk of diseases in the future, you will notice some benefits soon after you stop using tobacco. If you have shortness of breath or asthma symptoms, they will likely get better within a few weeks after you quit. · Limit how much alcohol you drink. Moderate amounts of alcohol (up to 2 drinks a day for men, 1 drink a day for women) are okay. But drinking too much can lead to liver problems, high blood pressure, and other health problems. Family health If you have a family, there are many things you can do together to improve your health. · Eat meals together as a family as often as possible. · Eat healthy foods. This includes fruits, vegetables, lean meats and dairy, and whole grains. · Include your family in your fitness plan. Most people think of activities such as jogging or tennis as the way to fitness, but there are many ways you and your family can be more active. Anything that makes you breathe hard and gets your heart pumping is exercise. Here are some tips: ? Walk to do errands or to take your child to school or the bus. 
? Go for a family bike ride after dinner instead of watching TV. Where can you learn more? Go to http://www.gray.com/ Enter S777 in the search box to learn more about \"A Healthy Lifestyle: Care Instructions. \" Current as of: January 31, 2020               Content Version: 12.6 © 2006-2020 [employer]. Care instructions adapted under license by [employer] (which disclaims liability or warranty for this information). If you have questions about a medical condition or this instruction, always ask your healthcare professional. [address] any warranty or liability for your use of this information.

## 2021-01-22 NOTE — PROGRESS NOTES
Progress Note    she is a 72y.o. year old female who presents for evalution. Subjective:     Pt with history of breast cancer R breast s/p mastectomy in need of swim prostheses for her water therapy. Doing therapy for transverse myelitis and would benefit from a aqua prosthetic breast.    Patient with type 2 diabetes has a freestyle efe, she would like a prescription for this to see if insurance would cover it. She is not on insulin and does not have issues with fluctuating sugars of though her previous hemoglobin A1c was elevated at 8.7. Will give prescription for this as I feel this does help benefit her in her monitoring of her glucose. Reviewed PmHx, RxHx, FmHx, SocHx, AllgHx and updated and dated in the chart. Review of Systems - negative except as listed above in the HPI    Objective:     Vitals:    01/22/21 1554   BP: 112/72   Pulse: 97   Resp: 18   Temp: 97.4 °F (36.3 °C)   TempSrc: Oral   SpO2: 98%   Weight: 158 lb (71.7 kg)   Height: 5' 6\" (1.676 m)       Current Outpatient Medications   Medication Sig    flash glucose sensor (FreeStyle Efe 14 Day Sensor) kit Change every 14 days E11.40 not on insulin    carvediloL (COREG) 3.125 mg tablet TAKE 1 TABLET BY MOUTH TWICE DAILY WITH MEALS    spironolactone (ALDACTONE) 25 mg tablet TAKE 1 TABLET BY MOUTH DAILY    atorvastatin (LIPITOR) 40 mg tablet TAKE 1 TABLET BY MOUTH DAILY    calcium carb/vitamin D3/vit K1 (VIACTIV PO) Take  by mouth.  aspirin delayed-release 81 mg tablet Take 81 mg by mouth daily.  loratadine (Claritin) 10 mg tablet Take 1 Tab by mouth daily for 360 days.  sacubitriL-valsartan (Entresto) 49-51 mg tab tablet Take 1 Tab by mouth two (2) times a day.  Biotin 2,500 mcg cap Take  by mouth.  dapagliflozin (Farxiga) 10 mg tab tablet Take  by mouth daily.  clopidogreL (Plavix) 75 mg tab Take 1 Tab by mouth daily.  linaCLOtide (LINZESS) 145 mcg cap capsule Take  by mouth Daily (before breakfast).  polyethylene glycol (MIRALAX) 17 gram packet Take 17 g by mouth daily.  milnacipran (SAVELLA) 50 mg tablet Take 1 Tab by mouth two (2) times a day.  BUTALBITAL-ASPIRIN-CAFFEINE PO Take 1 Tab by mouth as needed.  baclofen (LIORESAL) 10 mg tablet Take 10 mg by mouth nightly.  bethanechol (URECHOLINE) 5 mg tablet Take 5 mg by mouth two (2) times a day.  fluticasone propionate (FLONASE ALLERGY RELIEF) 50 mcg/actuation nasal spray 2 Sprays by Both Nostrils route every morning.  magnesium oxide (MAG-OX) 400 mg tablet Take 400 mg by mouth every morning.  OTHER Take 2 Tabs by mouth daily. \"Benefiber\"     cranberry fruit extract (CRANBERRY PO) Take 4,200 mg by mouth daily.  ivermectin (SOOLANTRA) 1 % crea Apply  to affected area daily.  CALCIUM PO Take 1 Caplet by mouth daily.  glimepiride (AMARYL) 2 mg tablet Take 2 mg by mouth three (3) times daily. 1 in the morning, 2 at night    ALPHA LIPOIC ACID PO Take 1 Cap by mouth two (2) times a day.  L-Mfolate-B6 Phos-Methyl-B12 (METANX) 3-35-2 mg tab tab Take 1 Tab by mouth two (2) times a day. Indications: neuropathy    OTHER daily. Bulberry    FreeStyle Efe 14 Day Sensor kit U UTD Q 14 DAYS    SITagliptin (JANUVIA) 100 mg tablet Take 100 mg by mouth daily. No current facility-administered medications for this visit. Physical Examination: General appearance - alert, well appearing, and in no distress  Mental status - alert, oriented to person, place, and time      Assessment/ Plan:   Diagnoses and all orders for this visit:    1. H/O right mastectomy    2. Type 2 diabetes mellitus with diabetic neuropathy, without long-term current use of insulin ([facility])  -     flash glucose sensor (FreeStyle Efe 14 Day Sensor) kit; Change every 14 days E11.40 not on insulin     fax to [employer] [phone]  Follow-up and Dispositions    · Return if symptoms worsen or fail to improve.      Patient will schedule follow-up to go over her diabetes and for labs. I have discussed the diagnosis with the patient and the intended plan as seen in the above orders. The patient has received an after-visit summary and questions were answered concerning future plans. Pt conveyed understanding of plan.     Medication Side Effects and Warnings were discussed with patient    An electronic signature was used to authenticate this note  Jan Gann DO

## 2021-01-22 NOTE — PROGRESS NOTES
Chief Complaint   Patient presents with    Follow-up     Patient presents in office today to get an RX for freestyle coty. Also needs a face to face to a prescription for breast prosthesis. No concerns. 1. Have you been to the ER, urgent care clinic since your last visit? Hospitalized since your last visit? No    2. Have you seen or consulted any other health care providers outside of the [address] [place] [address] since your last visit? Include any pap smears or colon screening.  No    Learning Assessment 3/4/2014   PRIMARY LEARNER Patient   HIGHEST LEVEL OF EDUCATION - PRIMARY LEARNER  4 YEARS OF COLLEGE   BARRIERS PRIMARY LEARNER NONE   PRIMARY LANGUAGE ENGLISH   LEARNER PREFERENCE PRIMARY LISTENING   ANSWERED BY self   RELATIONSHIP SELF

## 2021-01-26 ENCOUNTER — DOCUMENTATION ONLY (OUTPATIENT)
Dept: FAMILY MEDICINE CLINIC | Age: 71
End: 2021-01-26

## 2021-01-26 NOTE — PROGRESS NOTES
Script and office notes faxed to [employer]. Fax number [phone] confirmation number [id number].

## 2021-01-28 ENCOUNTER — DOCUMENTATION ONLY (OUTPATIENT)
Dept: FAMILY MEDICINE CLINIC | Age: 71
End: 2021-01-28

## 2021-01-29 ENCOUNTER — DOCUMENTATION ONLY (OUTPATIENT)
Dept: FAMILY MEDICINE CLINIC | Age: 71
End: 2021-01-29

## 2021-01-29 NOTE — PROGRESS NOTES
Written order faxed to [address] [place] [address]. Fax number [phone] confirmation number [id number].

## 2021-02-02 NOTE — PROGRESS NOTES
`          HISTORY OF PRESENTING ILLNESS      Shannon Gaitan is a 72 y.o. female with NICM, LBBB, LVEF 20-25%, DM, hypertension, CAD s/p PCI (6/8/20), history of breast cancer status post chemotherapy, transverse myelitis, NYHA III whose LV function has failed to improve on medical therapy. She underwent BIV ICD in 10/2020. Reports improvement in SOB, energy following her procedure. PAST MEDICAL HISTORY     Past Medical History:   Diagnosis Date    Abnormal MRI, cervical spine 4/3/2017    Abnormal pap 3/2015; 5/2016 2015 Neg pap +HPV; 2016 ASCUS +HPV    Arthritis     osteo-tests for RA were neg    Breast cancer ([address] 3/12/2015    Breast cancer, right breast ([address] 1996    Chronic pain     Diabetes ([address]     Fibromyalgia     Ganglion cyst of wrist     LEFT    Hypercholesterolemia     Hypertension     Ill-defined condition     Prolapsed mitral valve    Memory loss     Per pt.      Murmur, cardiac     Neuropathy     Rosacea     Sarcoidosis     Sarcoidosis     Transverse myelopathy syndrome (HCC)     Unspecified adverse effect of anesthesia     \"SLOW TO WAKE UP, SENSITIVE TO ANESTHESIA, DO NOT COME AROUND FOR 2-3 DAYS\"            PAST SURGICAL HISTORY     Past Surgical History:   Procedure Laterality Date    HX BACK SURGERY  2016    HX CATARACT REMOVAL Bilateral 2014    HX COLPOSCOPY  5/2016    Neg path    HX DILATION AND CURETTAGE  07/03/2019    path-benign    HX MASTECTOMY Right 12/1996    tram flap    HX ORTHOPAEDIC Left 1991    foot surgery    HX RHINOPLASTY N/A 1993    HX SEPTOPLASTY N/A 2013    HX TONSILLECTOMY      HX VASCULAR ACCESS  1997    portacath left chest-removed after chemo    ME EPHYS EVAL PACG CVDFB LDS W/TSTG OF PULSE GEN N/A 10/29/2020    Bi V  ICD [place]/ venogram first performed by Phi Powers MD at [address] [place] [address] CATH LAB    ME INSJ ELTRD CAR TWAN SYS TM INSJ DFB/PM PLS GEN N/A 10/29/2020    LV LEAD PLACEMENT performed by Phi Powers MD at [address] [place] [address] CATH LAB    ID INSJ/RPLCMT PERM DFB W/TRNSVNS LDS 1/DUAL CHMBR N/A 10/29/2020    INSERT ICD BIV MULTI performed by Yvan Lee MD at [address] [facility] CATH LAB    ID RECONSTR NOSE  2005    HOLE IN SEPTUM          ALLERGIES     Allergies   Allergen Reactions    Latex Other (comments)     Patient states it burns around her mouth.  Other Food Other (comments)     Yogurt - stomach cramping    Betadine [Povidone-Iodine] Rash and Itching     Pt states this causes \"extreme\" itching    Codeine Anaphylaxis, Rash, Nausea Only and Other (comments)     HEADACHE  Tolerates tramadol    Dilaudid [Hydromorphone (Bulk)] Anaphylaxis, Itching, Nausea Only and Other (comments)     HALLUCINATIONS  Tolerates tramadol      Hydrocodone Anaphylaxis, Rash, Nausea Only and Vertigo     Facial - eyelid swelling-had to go to ER for reaction, HALLUCINATIONS  Tolerates tramadol      Ditropan [Oxybutynin Chloride] Swelling    Doxycycline Rash     PUSTULAR RASH- TOLERATING TETRACYCLINE    Iodine Other (comments)     Headache    Keflex [Cephalexin] Nausea and Vomiting     Pain in abdomen AND FLU-LIKE SX      Metformin Nausea and Vomiting     Severe abdominal pain      Tape [Adhesive] Other (comments)     Redness/inflammed. Can only use paper tape. CAN USE BAND-AIDS. TOLERATES SURGICAL TAPE USED IN [place].      Trulicity [Dulaglutide] Other (comments)     Abdominal pain     Sulfamethoxazole-Trimethoprim Other (comments)     Cramping/flu-like symptoms          FAMILY HISTORY     Family History   Problem Relation Age of Onset    Heart Disease Mother     Hypertension Mother     COPD Mother     Diabetes Father     Other Son         INOPERABLE BRAIN TUMOR    Gout Son     Celiac Disease Son     Anesth Problems Neg Hx     negative for cardiac disease       SOCIAL HISTORY     Social History     Socioeconomic History    Marital status:      Spouse name: Not on file    Number of children: Not on file    Years of education: Not on file    Highest education level: Not on file   Tobacco Use    Smoking status: Never Smoker    Smokeless tobacco: Never Used    Tobacco comment: Never used vapor or e-cigs    Substance and Sexual Activity    Alcohol use: No    Drug use: No    Sexual activity: Not Currently     Partners: Male     Comment:          MEDICATIONS     Current Outpatient Medications   Medication Sig    flash glucose sensor (FreeStyle Efe 14 Day Sensor) kit Change every 14 days E11.40 not on insulin    carvediloL (COREG) 3.125 mg tablet TAKE 1 TABLET BY MOUTH TWICE DAILY WITH MEALS    spironolactone (ALDACTONE) 25 mg tablet TAKE 1 TABLET BY MOUTH DAILY    atorvastatin (LIPITOR) 40 mg tablet TAKE 1 TABLET BY MOUTH DAILY    calcium carb/vitamin D3/vit K1 (VIACTIV PO) Take  by mouth.  aspirin delayed-release 81 mg tablet Take 81 mg by mouth daily.  loratadine (Claritin) 10 mg tablet Take 1 Tab by mouth daily for 360 days.  sacubitriL-valsartan (Entresto) 49-51 mg tab tablet Take 1 Tab by mouth two (2) times a day.  Biotin 2,500 mcg cap Take  by mouth.  dapagliflozin (Farxiga) 10 mg tab tablet Take  by mouth daily.  clopidogreL (Plavix) 75 mg tab Take 1 Tab by mouth daily.  FreeStyle Efe 14 Day Sensor kit U UTD Q 14 DAYS    linaCLOtide (LINZESS) 145 mcg cap capsule Take  by mouth Daily (before breakfast).  polyethylene glycol (MIRALAX) 17 gram packet Take 17 g by mouth daily.  milnacipran (SAVELLA) 50 mg tablet Take 1 Tab by mouth two (2) times a day.  BUTALBITAL-ASPIRIN-CAFFEINE PO Take 1 Tab by mouth as needed.  baclofen (LIORESAL) 10 mg tablet Take 10 mg by mouth nightly.  bethanechol (URECHOLINE) 5 mg tablet Take 5 mg by mouth two (2) times a day.  fluticasone propionate (FLONASE ALLERGY RELIEF) 50 mcg/actuation nasal spray 2 Sprays by Both Nostrils route every morning.  magnesium oxide (MAG-OX) 400 mg tablet Take 400 mg by mouth every morning.     OTHER Take 2 Tabs by mouth daily. \"Benefiber\"     cranberry fruit extract (CRANBERRY PO) Take 4,200 mg by mouth daily.  ivermectin (SOOLANTRA) 1 % crea Apply  to affected area daily.  glimepiride (AMARYL) 2 mg tablet Take 2 mg by mouth three (3) times daily. 1 in the morning, 2 at night    ALPHA LIPOIC ACID PO Take 1 Cap by mouth two (2) times a day.  L-Mfolate-B6 Phos-Methyl-B12 (METANX) 3-35-2 mg tab tab Take 1 Tab by mouth two (2) times a day. Indications: neuropathy    OTHER daily. Bulberry    SITagliptin (JANUVIA) 100 mg tablet Take 100 mg by mouth daily.  CALCIUM PO Take 1 Caplet by mouth daily. No current facility-administered medications for this visit. I have reviewed the nurses notes, vitals, problem list, allergy list, medical history, family, social history and medications. REVIEW OF SYMPTOMS      General: Pt denies excessive weight gain or loss. Pt is able to conduct ADL's  HEENT: Denies blurred vision, headaches, hearing loss, epistaxis and difficulty swallowing. Respiratory: Denies cough, congestion, shortness of breath, KHALIL, wheezing or stridor. Cardiovascular: Denies precordial pain, palpitations, edema or PND  Gastrointestinal: Denies poor appetite, indigestion, abdominal pain or blood in stool  Genitourinary: Denies hematuria, dysuria, increased urinary frequency  Musculoskeletal: Denies joint pain or swelling from muscles or joints  Neurologic: Denies tremor, paresthesias, headache, or sensory motor disturbance  Psychiatric: Denies confusion, insomnia, depression  Integumentray: Denies rash, itching or ulcers. Hematologic: Denies easy bruising, bleeding       PHYSICAL EXAMINATION      Vitals: see vitals section  General: Well developed, in no acute distress. HEENT: No jaundice, oral mucosa moist, no oral ulcers  Neck: Supple, no stiffness, no lymphadenopathy, supple  Heart:  Normal S1/S2 negative S3 or S4.  Regular, no murmur, gallop or rub, no jugular venous distention  Respiratory: Clear bilaterally x 4, no wheezing or rales  Abdomen:   Soft, non-tender, bowel sounds are active. Extremities:  No edema, normal cap refill, no cyanosis. Musculoskeletal: No clubbing, no deformities  Neuro: A&Ox3, speech clear, gait stable, cooperative, no focal neurologic deficits  Skin: Skin color is normal. No rashes or lesions. Non diaphoretic, moist.  Vascular: 2+ pulses symmetric in all extremities       DIAGNOSTIC DATA      EKG:        LABORATORY DATA      Lab Results   Component Value Date/Time    WBC 4.4 10/29/2020 07:45 AM    HGB 14.1 10/29/2020 07:45 AM    HCT 44.9 10/29/2020 07:45 AM    PLATELET 796 74/29/5803 07:45 AM    MCV 87.5 10/29/2020 07:45 AM      Lab Results   Component Value Date/Time    Sodium 138 10/29/2020 07:45 AM    Potassium 4.5 10/29/2020 07:45 AM    Chloride 104 10/29/2020 07:45 AM    CO2 31 10/29/2020 07:45 AM    Anion gap 3 (L) 10/29/2020 07:45 AM    Glucose 197 (H) 10/29/2020 07:45 AM    BUN 26 (H) 10/29/2020 07:45 AM    Creatinine 0.65 10/29/2020 07:45 AM    BUN/Creatinine ratio 40 (H) 10/29/2020 07:45 AM    GFR est AA >60 10/29/2020 07:45 AM    GFR est non-AA >60 10/29/2020 07:45 AM    Calcium 8.9 10/29/2020 07:45 AM    Bilirubin, total 0.3 02/26/2019 04:01 PM    Alk. phosphatase 110 02/26/2019 04:01 PM    Protein, total 6.5 02/26/2019 04:01 PM    Albumin 4.1 02/26/2019 04:01 PM    Globulin 3.0 11/02/2017 05:04 AM    A-G Ratio 1.7 02/26/2019 04:01 PM    ALT (SGPT) 25 02/26/2019 04:01 PM           ASSESSMENT      1. Cardiomyopathy              A. NICM              B. LVEF 20-25%  2. LBBB  3. Diabetes mellitus  4. Hypertension  5. CAD, native  6. Percutaneous transluminal angioplasty  7. ICD   A. CRTD   B. C/ [address]. Left sided       PLAN     Continue monitoring in device clinic. ICD-10-CM ICD-9-CM    1. Automatic implantable cardiac defibrillator in situ  Z95.810 V45.02    2. Essential hypertension  I10 401.9    3.  Type 2 diabetes mellitus with diabetic neuropathy, without long-term current use of insulin (HCC)  E11.40 250.60      357.2    4. LBBB (left bundle branch block)  I44.7 426.3    5. Coronary artery disease involving native coronary artery of native heart without angina pectoris  I25.10 414.01      No orders of the defined types were placed in this encounter. FOLLOW-UP     1 year      Thank you, Roe Macdonald DO and Dr. Sherman Diallo for allowing me to participate in the care of this extraordinarily pleasant female. Please do not hesitate to contact me for further questions/concerns.          Caren Dumas MD  Cardiac Electrophysiology / Cardiology    [address]  [address] [facility], [facility], [place] [address]  [place]  [phone] / [phone] Fax   [phone] / [phone] Fax

## 2021-02-03 ENCOUNTER — OFFICE VISIT (OUTPATIENT)
Dept: CARDIOLOGY CLINIC | Age: 71
End: 2021-02-03
Payer: MEDICARE

## 2021-02-03 ENCOUNTER — CLINICAL SUPPORT (OUTPATIENT)
Dept: CARDIOLOGY CLINIC | Age: 71
End: 2021-02-03
Payer: MEDICARE

## 2021-02-03 VITALS
OXYGEN SATURATION: 99 % | WEIGHT: 157 LBS | HEART RATE: 70 BPM | HEIGHT: 66 IN | BODY MASS INDEX: 25.23 KG/M2 | DIASTOLIC BLOOD PRESSURE: 66 MMHG | SYSTOLIC BLOOD PRESSURE: 118 MMHG | RESPIRATION RATE: 18 BRPM

## 2021-02-03 DIAGNOSIS — E11.40 TYPE 2 DIABETES MELLITUS WITH DIABETIC NEUROPATHY, WITHOUT LONG-TERM CURRENT USE OF INSULIN (HCC): ICD-10-CM

## 2021-02-03 DIAGNOSIS — I25.10 CORONARY ARTERY DISEASE INVOLVING NATIVE CORONARY ARTERY OF NATIVE HEART WITHOUT ANGINA PECTORIS: ICD-10-CM

## 2021-02-03 DIAGNOSIS — I44.7 LBBB (LEFT BUNDLE BRANCH BLOCK): ICD-10-CM

## 2021-02-03 DIAGNOSIS — Z95.810 AUTOMATIC IMPLANTABLE CARDIAC DEFIBRILLATOR IN SITU: Primary | ICD-10-CM

## 2021-02-03 DIAGNOSIS — I10 ESSENTIAL HYPERTENSION: ICD-10-CM

## 2021-02-03 PROCEDURE — 99215 OFFICE O/P EST HI 40 MIN: CPT | Performed by: INTERNAL MEDICINE

## 2021-02-03 PROCEDURE — 3046F HEMOGLOBIN A1C LEVEL >9.0%: CPT | Performed by: INTERNAL MEDICINE

## 2021-02-03 PROCEDURE — G8754 DIAS BP LESS 90: HCPCS | Performed by: INTERNAL MEDICINE

## 2021-02-03 PROCEDURE — 1100F PTFALLS ASSESS-DOCD GE2>/YR: CPT | Performed by: INTERNAL MEDICINE

## 2021-02-03 PROCEDURE — G8419 CALC BMI OUT NRM PARAM NOF/U: HCPCS | Performed by: INTERNAL MEDICINE

## 2021-02-03 PROCEDURE — G9899 SCRN MAM PERF RSLTS DOC: HCPCS | Performed by: INTERNAL MEDICINE

## 2021-02-03 PROCEDURE — G8752 SYS BP LESS 140: HCPCS | Performed by: INTERNAL MEDICINE

## 2021-02-03 PROCEDURE — 93284 PRGRMG EVAL IMPLANTABLE DFB: CPT | Performed by: INTERNAL MEDICINE

## 2021-02-03 PROCEDURE — 3017F COLORECTAL CA SCREEN DOC REV: CPT | Performed by: INTERNAL MEDICINE

## 2021-02-03 PROCEDURE — G8399 PT W/DXA RESULTS DOCUMENT: HCPCS | Performed by: INTERNAL MEDICINE

## 2021-02-03 PROCEDURE — G8536 NO DOC ELDER MAL SCRN: HCPCS | Performed by: INTERNAL MEDICINE

## 2021-02-03 PROCEDURE — G8427 DOCREV CUR MEDS BY ELIG CLIN: HCPCS | Performed by: INTERNAL MEDICINE

## 2021-02-03 PROCEDURE — G8510 SCR DEP NEG, NO PLAN REQD: HCPCS | Performed by: INTERNAL MEDICINE

## 2021-02-03 PROCEDURE — 3288F FALL RISK ASSESSMENT DOCD: CPT | Performed by: INTERNAL MEDICINE

## 2021-02-03 PROCEDURE — 1090F PRES/ABSN URINE INCON ASSESS: CPT | Performed by: INTERNAL MEDICINE

## 2021-02-03 PROCEDURE — 2022F DILAT RTA XM EVC RTNOPTHY: CPT | Performed by: INTERNAL MEDICINE

## 2021-02-03 PROCEDURE — G0463 HOSPITAL OUTPT CLINIC VISIT: HCPCS | Performed by: INTERNAL MEDICINE

## 2021-02-03 NOTE — PROGRESS NOTES
Room #: 1    States her pacemaker feels like its in her armpit and bothers her. Visit Vitals  /66 (BP 1 Location: Left upper arm, BP Patient Position: Sitting)   Pulse 70   Resp 18   Ht 5' 6\" (1.676 m)   Wt 157 lb (71.2 kg)   SpO2 99%   BMI 25.34 kg/m²         Chest pain:  NO  Shortness of breath:  Intermittent  Edema: NO  Palpitations, skipped beats, rapid heartbeat:  NO  Dizziness:  YES    1. Have you been to the ER, urgent care clinic since your last visit? Hospitalized since your last visit? No    2. Have you seen or consulted any other health care providers outside of the [address] [place] [address] since your last visit? Include any pap smears or colon screening.  No      Refills:  NO

## 2021-03-30 ENCOUNTER — DOCUMENTATION ONLY (OUTPATIENT)
Dept: FAMILY MEDICINE CLINIC | Age: 71
End: 2021-03-30

## 2021-03-30 NOTE — PROGRESS NOTES
Echocardiogram report from [facility] was put on Dr Michele Byrne desk to process.

## 2021-05-27 ENCOUNTER — TELEPHONE (OUTPATIENT)
Dept: CARDIOLOGY CLINIC | Age: 71
End: 2021-05-27

## 2021-05-27 NOTE — TELEPHONE ENCOUNTER
Requested pt let us know where she's being followed for her pacer as her remotes have been auto-transferred out of our system.

## 2021-06-09 ENCOUNTER — TELEPHONE (OUTPATIENT)
Dept: CARDIOLOGY CLINIC | Age: 71
End: 2021-06-09

## 2021-06-09 NOTE — TELEPHONE ENCOUNTER
Called Ms. Rendon to verify where she would like to be followed for her cardiac pacer. States she's following for all pacer related appts with [facility]/Dr. Cindy Mack office including in-clinic& remote transmissions. Informed her all subsequent appts in-clinic, remotes & with Dr. Janet Kaye will be cancelled & she agrees. Appreciative of call.

## 2021-07-06 ENCOUNTER — DOCUMENTATION ONLY (OUTPATIENT)
Dept: FAMILY MEDICINE CLINIC | Age: 71
End: 2021-07-06

## 2021-07-06 RX ORDER — ATORVASTATIN CALCIUM 40 MG/1
TABLET, FILM COATED ORAL
Qty: 90 TABLET | Refills: 1 | Status: SHIPPED | OUTPATIENT
Start: 2021-07-06 | End: 2021-10-27

## 2021-08-04 ENCOUNTER — OFFICE VISIT (OUTPATIENT)
Dept: FAMILY MEDICINE CLINIC | Age: 71
End: 2021-08-04
Payer: MEDICARE

## 2021-08-04 VITALS
SYSTOLIC BLOOD PRESSURE: 106 MMHG | WEIGHT: 156 LBS | TEMPERATURE: 97.5 F | HEART RATE: 67 BPM | DIASTOLIC BLOOD PRESSURE: 67 MMHG | RESPIRATION RATE: 18 BRPM | BODY MASS INDEX: 25.07 KG/M2 | OXYGEN SATURATION: 96 % | HEIGHT: 66 IN

## 2021-08-04 DIAGNOSIS — G37.3 TRANSVERSE MYELOPATHY SYNDROME (HCC): ICD-10-CM

## 2021-08-04 DIAGNOSIS — J43.8 OTHER EMPHYSEMA (HCC): ICD-10-CM

## 2021-08-04 DIAGNOSIS — I50.20 HFREF (HEART FAILURE WITH REDUCED EJECTION FRACTION) (HCC): ICD-10-CM

## 2021-08-04 DIAGNOSIS — I25.10 CORONARY ARTERY DISEASE INVOLVING NATIVE CORONARY ARTERY OF NATIVE HEART WITHOUT ANGINA PECTORIS: ICD-10-CM

## 2021-08-04 DIAGNOSIS — K59.04 CHRONIC IDIOPATHIC CONSTIPATION: ICD-10-CM

## 2021-08-04 DIAGNOSIS — I10 ESSENTIAL HYPERTENSION: ICD-10-CM

## 2021-08-04 DIAGNOSIS — F33.8 SEASONAL AFFECTIVE DISORDER (HCC): ICD-10-CM

## 2021-08-04 DIAGNOSIS — I70.0 ATHEROSCLEROSIS OF AORTA (HCC): ICD-10-CM

## 2021-08-04 DIAGNOSIS — Z00.00 MEDICARE ANNUAL WELLNESS VISIT, SUBSEQUENT: ICD-10-CM

## 2021-08-04 DIAGNOSIS — E11.40 TYPE 2 DIABETES MELLITUS WITH DIABETIC NEUROPATHY, WITHOUT LONG-TERM CURRENT USE OF INSULIN (HCC): Primary | ICD-10-CM

## 2021-08-04 LAB
ALBUMIN SERPL-MCNC: 3.6 G/DL (ref 3.5–5)
ALBUMIN/GLOB SERPL: 1.2 {RATIO} (ref 1.1–2.2)
ALP SERPL-CCNC: 244 U/L (ref 45–117)
ALT SERPL-CCNC: 91 U/L (ref 12–78)
ANION GAP SERPL CALC-SCNC: 4 MMOL/L (ref 5–15)
AST SERPL-CCNC: 73 U/L (ref 15–37)
BILIRUB SERPL-MCNC: 0.5 MG/DL (ref 0.2–1)
BUN SERPL-MCNC: 25 MG/DL (ref 6–20)
BUN/CREAT SERPL: 46 (ref 12–20)
CALCIUM SERPL-MCNC: 9.3 MG/DL (ref 8.5–10.1)
CHLORIDE SERPL-SCNC: 105 MMOL/L (ref 97–108)
CHOLEST SERPL-MCNC: 153 MG/DL
CO2 SERPL-SCNC: 28 MMOL/L (ref 21–32)
CREAT SERPL-MCNC: 0.54 MG/DL (ref 0.55–1.02)
CREAT UR-MCNC: 55.3 MG/DL
EST. AVERAGE GLUCOSE BLD GHB EST-MCNC: 197 MG/DL
GLOBULIN SER CALC-MCNC: 3.1 G/DL (ref 2–4)
GLUCOSE SERPL-MCNC: 265 MG/DL (ref 65–100)
HBA1C MFR BLD: 8.5 % (ref 4–5.6)
HDLC SERPL-MCNC: 48 MG/DL
HDLC SERPL: 3.2 {RATIO} (ref 0–5)
LDLC SERPL CALC-MCNC: 62.2 MG/DL (ref 0–100)
MICROALBUMIN UR-MCNC: 1.41 MG/DL
MICROALBUMIN/CREAT UR-RTO: 25 MG/G (ref 0–30)
POTASSIUM SERPL-SCNC: 4.6 MMOL/L (ref 3.5–5.1)
PROT SERPL-MCNC: 6.7 G/DL (ref 6.4–8.2)
SODIUM SERPL-SCNC: 137 MMOL/L (ref 136–145)
TRIGL SERPL-MCNC: 214 MG/DL (ref ?–150)
VLDLC SERPL CALC-MCNC: 42.8 MG/DL

## 2021-08-04 PROCEDURE — G8427 DOCREV CUR MEDS BY ELIG CLIN: HCPCS | Performed by: FAMILY MEDICINE

## 2021-08-04 PROCEDURE — 3288F FALL RISK ASSESSMENT DOCD: CPT | Performed by: FAMILY MEDICINE

## 2021-08-04 PROCEDURE — G8752 SYS BP LESS 140: HCPCS | Performed by: FAMILY MEDICINE

## 2021-08-04 PROCEDURE — G8536 NO DOC ELDER MAL SCRN: HCPCS | Performed by: FAMILY MEDICINE

## 2021-08-04 PROCEDURE — G8510 SCR DEP NEG, NO PLAN REQD: HCPCS | Performed by: FAMILY MEDICINE

## 2021-08-04 PROCEDURE — G8399 PT W/DXA RESULTS DOCUMENT: HCPCS | Performed by: FAMILY MEDICINE

## 2021-08-04 PROCEDURE — 2022F DILAT RTA XM EVC RTNOPTHY: CPT | Performed by: FAMILY MEDICINE

## 2021-08-04 PROCEDURE — 1090F PRES/ABSN URINE INCON ASSESS: CPT | Performed by: FAMILY MEDICINE

## 2021-08-04 PROCEDURE — G9899 SCRN MAM PERF RSLTS DOC: HCPCS | Performed by: FAMILY MEDICINE

## 2021-08-04 PROCEDURE — 1100F PTFALLS ASSESS-DOCD GE2>/YR: CPT | Performed by: FAMILY MEDICINE

## 2021-08-04 PROCEDURE — G0463 HOSPITAL OUTPT CLINIC VISIT: HCPCS | Performed by: FAMILY MEDICINE

## 2021-08-04 PROCEDURE — G0439 PPPS, SUBSEQ VISIT: HCPCS | Performed by: FAMILY MEDICINE

## 2021-08-04 PROCEDURE — 3046F HEMOGLOBIN A1C LEVEL >9.0%: CPT | Performed by: FAMILY MEDICINE

## 2021-08-04 PROCEDURE — G8419 CALC BMI OUT NRM PARAM NOF/U: HCPCS | Performed by: FAMILY MEDICINE

## 2021-08-04 PROCEDURE — G8754 DIAS BP LESS 90: HCPCS | Performed by: FAMILY MEDICINE

## 2021-08-04 PROCEDURE — 3017F COLORECTAL CA SCREEN DOC REV: CPT | Performed by: FAMILY MEDICINE

## 2021-08-04 PROCEDURE — 99214 OFFICE O/P EST MOD 30 MIN: CPT | Performed by: FAMILY MEDICINE

## 2021-08-04 RX ORDER — FLUTICASONE FUROATE, UMECLIDINIUM BROMIDE AND VILANTEROL TRIFENATATE 100; 62.5; 25 UG/1; UG/1; UG/1
1 POWDER RESPIRATORY (INHALATION) DAILY
COMMUNITY
Start: 2021-08-04 | End: 2021-11-04

## 2021-08-04 RX ORDER — ALCOHOL 2.38 KG/3.79L
1 GEL TOPICAL 2 TIMES DAILY
Qty: 180 CAPSULE | Refills: 5 | Status: SHIPPED | OUTPATIENT
Start: 2021-08-04 | End: 2022-09-08

## 2021-08-04 NOTE — PATIENT INSTRUCTIONS

## 2021-08-04 NOTE — PROGRESS NOTES
Chief Complaint   Patient presents with    Diabetes     Patient presents in office today for diabetes f/u. No concerns. 1. Have you been to the ER, urgent care clinic since your last visit? Hospitalized since your last visit? No    2. Have you seen or consulted any other health care providers outside of the [address] [place] [address] since your last visit? Include any pap smears or colon screening.  No    Learning Assessment 3/4/2014   PRIMARY LEARNER Patient   HIGHEST LEVEL OF EDUCATION - PRIMARY LEARNER  4 YEARS OF COLLEGE   BARRIERS PRIMARY LEARNER NONE   PRIMARY LANGUAGE ENGLISH   LEARNER PREFERENCE PRIMARY LISTENING   ANSWERED BY self   RELATIONSHIP SELF

## 2021-08-04 NOTE — PROGRESS NOTES
Progress Note    she is a 80y.o. year old female who presents for evalution. Subjective:     Pt here for follow up on her DM and has been poorly controlled last A1C was 8.7 last year. Currently following with cardiology for her heart and has a stress test and echo next Tuesday. On lipitor 40 nightly tolerating this fine. Aldactone was stopped by cards due to light headedness. Benoit Hahn is working well for her SAD and fibro. Constipation meds adjusted by GI and she is doing fine. Patient takes a medical food which is not covered by insurance she would like this refilled. Has been having a difficult time getting it refilled by her specialist.  Reviewed PmHx, RxHx, FmHx, SocHx, AllgHx and updated and dated in the chart. Review of Systems - negative except as listed above in the HPI    Objective:     Vitals:    08/04/21 0941   BP: 106/67   Pulse: 67   Resp: 18   Temp: 97.5 °F (36.4 °C)   TempSrc: Oral   SpO2: 96%   Weight: 156 lb (70.8 kg)   Height: 5' 6\" (1.676 m)       Current Outpatient Medications   Medication Sig    linaCLOtide (Linzess) 72 mcg cap capsule Take 1 Capsule by mouth Daily (before breakfast).  levomefolate-B6-meB12-algal oil (Metanx, algal oil,) 3 mg-35 mg-2 mg -90.314 mg cap Take 1 Capsule by mouth two (2) times a day.  fluticasone-umeclidinium-vilanterol (Trelegy Ellipta) 100-62.5-25 mcg inhaler Take 1 Puff by inhalation daily.  atorvastatin (LIPITOR) 40 mg tablet TAKE 1 TABLET BY MOUTH DAILY    carvediloL (COREG) 3.125 mg tablet TAKE 1 TABLET BY MOUTH TWICE DAILY WITH MEALS    clopidogreL (PLAVIX) 75 mg tab TAKE 1 TABLET BY MOUTH DAILY    flash glucose sensor (FreeStyle Efe 14 Day Sensor) kit Change every 14 days E11.40 not on insulin    calcium carb/vitamin D3/vit K1 (VIACTIV PO) Take  by mouth.  aspirin delayed-release 81 mg tablet Take 81 mg by mouth daily.  loratadine (Claritin) 10 mg tablet Take 1 Tab by mouth daily for 360 days.     sacubitriL-valsartan (Entresto) 49-51 mg tab tablet Take 1 Tab by mouth two (2) times a day.  Biotin 2,500 mcg cap Take  by mouth.  dapagliflozin (Farxiga) 10 mg tab tablet Take  by mouth daily.  FreeStyle Efe 14 Day Sensor kit U UTD Q 14 DAYS    milnacipran (SAVELLA) 50 mg tablet Take 1 Tab by mouth two (2) times a day.  BUTALBITAL-ASPIRIN-CAFFEINE PO Take 1 Tab by mouth as needed.  baclofen (LIORESAL) 10 mg tablet Take 10 mg by mouth nightly.  bethanechol (URECHOLINE) 5 mg tablet Take 5 mg by mouth two (2) times a day.  fluticasone propionate (FLONASE ALLERGY RELIEF) 50 mcg/actuation nasal spray 2 Sprays by Both Nostrils route every morning.  magnesium oxide (MAG-OX) 400 mg tablet Take 400 mg by mouth every morning.  cranberry fruit extract (CRANBERRY PO) Take 4,200 mg by mouth daily.  ivermectin (SOOLANTRA) 1 % crea Apply  to affected area daily.  CALCIUM PO Take 1 Caplet by mouth daily.  glimepiride (AMARYL) 2 mg tablet Take 2 mg by mouth three (3) times daily. 1 in the morning, 2 at night    ALPHA LIPOIC ACID PO Take 1 Cap by mouth two (2) times a day.  L-Mfolate-B6 Phos-Methyl-B12 (METANX) 3-35-2 mg tab tab Take 1 Tab by mouth two (2) times a day. Indications: neuropathy    spironolactone (ALDACTONE) 25 mg tablet TAKE 1 TABLET BY MOUTH DAILY (Patient not taking: Reported on 8/4/2021)    OTHER daily. Bulberry (Patient not taking: Reported on 8/4/2021)    polyethylene glycol (MIRALAX) 17 gram packet Take 17 g by mouth daily. (Patient not taking: Reported on 8/4/2021)    SITagliptin (JANUVIA) 100 mg tablet Take 100 mg by mouth daily. (Patient not taking: Reported on 8/4/2021)    OTHER Take 2 Tabs by mouth daily. \"Benefiber\"  (Patient not taking: Reported on 8/4/2021)     No current facility-administered medications for this visit.        Physical Examination: General appearance - alert, well appearing, and in no distress  Mental status - alert, oriented to person, place, and time  Chest - clear to auscultation, no wheezes, rales or rhonchi, symmetric air entry  Heart - normal rate, regular rhythm, normal S1, S2, no murmurs, rubs, clicks or gallops      Assessment/ Plan:   Diagnoses and all orders for this visit:    1. Type 2 diabetes mellitus with diabetic neuropathy, without long-term current use of insulin (HCC)  -     LIPID PANEL; Future  -     METABOLIC PANEL, COMPREHENSIVE; Future  -     MICROALBUMIN, UR, RAND W/ MICROALB/CREAT RATIO; Future  -     HEMOGLOBIN A1C WITH EAG; Future  -     levomefolate-B6-meB12-algal oil (Metanx, algal oil,) 3 mg-35 mg-2 mg -90.314 mg cap; Take 1 Capsule by mouth two (2) times a day. 2. Chronic idiopathic constipation  Doing better on new regimen by GI  3. Transverse myelopathy syndrome (HCC)  Stable  4. Other emphysema ([facility]  On Trelegy through pulmonary  5. HFrEF (heart failure with reduced ejection fraction) ([facility]  Followed by Dr. Donte Juarez  6. Seasonal affective disorder ([facility] [facility]  Tamika Childers works well for this in addition to her fibromyalgia  7. Atherosclerosis of aorta (HCC)  -     LIPID PANEL; Future  -     METABOLIC PANEL, COMPREHENSIVE; Future    8. Essential hypertension  -     METABOLIC PANEL, COMPREHENSIVE; Future    9. Coronary artery disease involving native coronary artery of native heart without angina pectoris  -     LIPID PANEL; Future  -     METABOLIC PANEL, COMPREHENSIVE; Future    10. Medicare annual wellness visit, subsequent       Follow-up and Dispositions    · Return in about 3 months (around 11/4/2021), or if symptoms worsen or fail to improve. I have discussed the diagnosis with the patient and the intended plan as seen in the above orders. The patient has received an after-visit summary and questions were answered concerning future plans. Pt conveyed understanding of plan.     Medication Side Effects and Warnings were discussed with patient    An electronic signature was used to authenticate this note  Kayren Gosselin H Lobb, DO    This is the Subsequent Medicare Annual Wellness Exam, performed 12 months or more after the Initial AWV or the last Subsequent AWV    I have reviewed the patient's medical history in detail and updated the computerized patient record. Assessment/Plan   Education and counseling provided:  Are appropriate based on today's review and evaluation    1. Type 2 diabetes mellitus with diabetic neuropathy, without long-term current use of insulin (HCC)  -     LIPID PANEL; Future  -     METABOLIC PANEL, COMPREHENSIVE; Future  -     MICROALBUMIN, UR, RAND W/ MICROALB/CREAT RATIO; Future  -     HEMOGLOBIN A1C WITH EAG; Future  -     levomefolate-B6-meB12-algal oil (Metanx, algal oil,) 3 mg-35 mg-2 mg -90.314 mg cap; Take 1 Capsule by mouth two (2) times a day., Normal, Disp-180 Capsule, R-5  2. Chronic idiopathic constipation  3. Transverse myelopathy syndrome (HCC)  4. Other emphysema (HCC)  5. HFrEF (heart failure with reduced ejection fraction) ([facility]  6. [facility]al affective disorder ([facility]  7. Atherosclerosis of aorta (HCC)  -     LIPID PANEL; Future  -     METABOLIC PANEL, COMPREHENSIVE; Future  8. Essential hypertension  -     METABOLIC PANEL, COMPREHENSIVE; Future  9. Coronary artery disease involving native coronary artery of native heart without angina pectoris  -     LIPID PANEL; Future  -     METABOLIC PANEL, COMPREHENSIVE; Future  10. Medicare annual wellness visit, subsequent       Depression Risk Factor Screening     3 most recent PHQ Screens 8/4/2021   PHQ Not Done -   Little interest or pleasure in doing things Not at all   Feeling down, depressed, irritable, or hopeless Not at all   Total Score PHQ 2 0       Alcohol Risk Screen    Do you average more than 1 drink per night or more than 7 drinks a week:  No    On any one occasion in the past three months have you have had more than 3 drinks containing alcohol:  No        Functional Ability and Level of Safety    Hearing: Hearing is good. Activities of Daily Living: The home contains: no safety equipment. Patient does total self care      Ambulation: uses rollator     Fall Risk:  Fall Risk Assessment, last 12 mths 2/3/2021   Able to walk? Yes   Fall in past 12 months? 1   Do you feel unsteady? 1   Are you worried about falling 1   Is TUG test greater than 12 seconds? 0   Is the gait abnormal? 1   Number of falls in past 12 months 2   Fall with injury?  1      Abuse Screen:  Patient is not abused       Cognitive Screening    Has your family/caregiver stated any concerns about your memory: no         Health Maintenance Due     Health Maintenance Due   Topic Date Due    COVID-19 Vaccine (1) Never done    Shingrix Vaccine Age 50> (1 of 2) Never done    Foot Exam Q1  12/14/2017    MICROALBUMIN Q1  12/19/2018    Colorectal Cancer Screening Combo  09/01/2021       Patient Care Team   Patient Care Team:  Yan Benitez DO as PCP - General (Family Medicine)  Yan Benitez DO as PCP - [address] [place] [address] Provider  Melissa Knight MD as Physician (Obstetrics & Gynecology)  Palak Barnard RN as [address] [place] [address] (Family Medicine)  Gloria Chen MD as Physician (Pulmonary Disease)  Donald Atwood MD as Physician (Cardiology)    History     Patient Active Problem List   Diagnosis Code    Diabetes mellitus with neuropathy ([address] E11.40    Postmenopausal bleeding N95.0    Fibromyalgia M79.7    HTN (hypertension) I10    Scoliosis M41.9    ACP (advance care planning) Z71.89    Left-sided weakness R53.1    Transverse myelopathy syndrome ([address] G37.3    Abnormal MRI, cervical spine R93.7    Other emphysema ([address] J43.8    Atherosclerosis of aorta ([address] I70.0    History of breast cancer Z85.3    Coronary artery disease involving native coronary artery of native heart without angina pectoris I25.10    LBBB (left bundle branch block) I44.7    Chronic heart failure with preserved ejection fraction (HCC) I50.32    Myocardiopathy ([address] I42.9    Systolic CHF, chronic (HCC) I50.22    Pacemaker Z95.0     Past Medical History:   Diagnosis Date    Abnormal MRI, cervical spine 4/3/2017    Abnormal pap 3/2015; 5/2016 2015 Neg pap +HPV; 2016 ASCUS +HPV    Arthritis     osteo-tests for RA were neg    Breast cancer ([facility] 3/12/2015    Breast cancer, right breast ([facility] 1996    Chronic pain     Diabetes ([facility]     Fibromyalgia     Ganglion cyst of wrist     LEFT    Hypercholesterolemia     Hypertension     Ill-defined condition     Prolapsed mitral valve    Memory loss     Per pt.  Murmur, cardiac     Neuropathy     Rosacea     Sarcoidosis     Sarcoidosis     Transverse myelopathy syndrome (HCC)     Unspecified adverse effect of anesthesia     \"SLOW TO WAKE UP, SENSITIVE TO ANESTHESIA, DO NOT COME AROUND FOR 2-3 DAYS\"       Past Surgical History:   Procedure Laterality Date    HX BACK SURGERY  2016    HX CATARACT REMOVAL Bilateral 2014    HX COLPOSCOPY  5/2016    Neg path    HX DILATION AND CURETTAGE  07/03/2019    path-benign    HX MASTECTOMY Right 12/1996    tram flap    HX ORTHOPAEDIC Left 1991    foot surgery    HX RHINOPLASTY N/A 1993    HX SEPTOPLASTY N/A 2013    HX TONSILLECTOMY      HX VASCULAR ACCESS  1997    portacath left chest-removed after chemo    MI EPHYS EVAL PACG CVDFB LDS W/TSTG OF PULSE GEN N/A 10/29/2020    Bi V  ICD [place]/ venogram first performed by Roma Yoo MD at [address] CATH LAB    MI INSJ ELTRD CAR TWAN SYS TM INSJ DFB/PM PLS GEN N/A 10/29/2020    LV LEAD PLACEMENT performed by Roma Yoo MD at [address] CATH LAB    MI INSJ/RPLCMT PERM DFB W/TRNSVNS LDS 1/DUAL CHMBR N/A 10/29/2020    INSERT ICD BIV MULTI performed by Roma Yoo MD at [address] CATH LAB    MI RECONSTR NOSE  2005    HOLE IN SEPTUM     Current Outpatient Medications   Medication Sig Dispense Refill    linaCLOtide (Linzess) 72 mcg cap capsule Take 1 Capsule by mouth Daily (before breakfast).       levomefolate-B6-meB12-algal oil (Metanx, algal oil,) 3 mg-35 mg-2 mg -90.314 mg cap Take 1 Capsule by mouth two (2) times a day. 180 Capsule 5    fluticasone-umeclidinium-vilanterol (Trelegy Ellipta) 100-62.5-25 mcg inhaler Take 1 Puff by inhalation daily.  atorvastatin (LIPITOR) 40 mg tablet TAKE 1 TABLET BY MOUTH DAILY 90 Tablet 1    carvediloL (COREG) 3.125 mg tablet TAKE 1 TABLET BY MOUTH TWICE DAILY WITH MEALS 180 Tablet 0    clopidogreL (PLAVIX) 75 mg tab TAKE 1 TABLET BY MOUTH DAILY 90 Tab 1    flash glucose sensor (FreeStyle Efe 14 Day Sensor) kit Change every 14 days E11.40 not on insulin 2 Kit 11    calcium carb/vitamin D3/vit K1 (VIACTIV PO) Take  by mouth.  aspirin delayed-release 81 mg tablet Take 81 mg by mouth daily.  loratadine (Claritin) 10 mg tablet Take 1 Tab by mouth daily for 360 days. 30 Tab 11    sacubitriL-valsartan (Entresto) 49-51 mg tab tablet Take 1 Tab by mouth two (2) times a day. 180 Tab 3    Biotin 2,500 mcg cap Take  by mouth.  dapagliflozin (Farxiga) 10 mg tab tablet Take  by mouth daily.  FreeStyle Efe 14 Day Sensor kit U UTD Q 14 DAYS      milnacipran (SAVELLA) 50 mg tablet Take 1 Tab by mouth two (2) times a day. 180 Tab 3    BUTALBITAL-ASPIRIN-CAFFEINE PO Take 1 Tab by mouth as needed.  baclofen (LIORESAL) 10 mg tablet Take 10 mg by mouth nightly.  bethanechol (URECHOLINE) 5 mg tablet Take 5 mg by mouth two (2) times a day.  fluticasone propionate (FLONASE ALLERGY RELIEF) 50 mcg/actuation nasal spray 2 Sprays by Both Nostrils route every morning.  magnesium oxide (MAG-OX) 400 mg tablet Take 400 mg by mouth every morning.  cranberry fruit extract (CRANBERRY PO) Take 4,200 mg by mouth daily.  ivermectin (SOOLANTRA) 1 % crea Apply  to affected area daily.  CALCIUM PO Take 1 Caplet by mouth daily.  glimepiride (AMARYL) 2 mg tablet Take 2 mg by mouth three (3) times daily.  1 in the morning, 2 at night      ALPHA LIPOIC ACID PO Take 1 Cap by mouth two (2) times a day.  L-Mfolate-B6 Phos-Methyl-B12 (METANX) 3-35-2 mg tab tab Take 1 Tab by mouth two (2) times a day. Indications: neuropathy      spironolactone (ALDACTONE) 25 mg tablet TAKE 1 TABLET BY MOUTH DAILY (Patient not taking: Reported on 8/4/2021) 90 Tablet 0    OTHER daily. Bulberry (Patient not taking: Reported on 8/4/2021)      polyethylene glycol (MIRALAX) 17 gram packet Take 17 g by mouth daily. (Patient not taking: Reported on 8/4/2021)      SITagliptin (JANUVIA) 100 mg tablet Take 100 mg by mouth daily. (Patient not taking: Reported on 8/4/2021)      OTHER Take 2 Tabs by mouth daily. \"Benefiber\"  (Patient not taking: Reported on 8/4/2021)       Allergies   Allergen Reactions    Latex Other (comments)     Patient states it burns around her mouth.  Other Food Other (comments)     Yogurt - stomach cramping    Betadine [Povidone-Iodine] Rash and Itching     Pt states this causes \"extreme\" itching    Codeine Anaphylaxis, Rash, Nausea Only and Other (comments)     HEADACHE  Tolerates tramadol    Dilaudid [Hydromorphone (Bulk)] Anaphylaxis, Itching, Nausea Only and Other (comments)     HALLUCINATIONS  Tolerates tramadol      Hydrocodone Anaphylaxis, Rash, Nausea Only and Vertigo     Facial - eyelid swelling-had to go to ER for reaction, HALLUCINATIONS  Tolerates tramadol      Ditropan [Oxybutynin Chloride] Swelling    Doxycycline Rash     PUSTULAR RASH- TOLERATING TETRACYCLINE    Iodine Other (comments)     Headache    Keflex [Cephalexin] Nausea and Vomiting     Pain in abdomen AND FLU-LIKE SX      Metformin Nausea and Vomiting     Severe abdominal pain      Tape [Adhesive] Other (comments)     Redness/inflammed. Can only use paper tape. CAN USE BAND-AIDS. TOLERATES SURGICAL TAPE USED IN [place].      Trulicity [Dulaglutide] Other (comments)     Abdominal pain     Sulfamethoxazole-Trimethoprim Other (comments)     Cramping/flu-like symptoms       Family History   Problem Relation Age of Onset    Heart Disease Mother     Hypertension Mother     COPD Mother     Diabetes Father     Other Son         INOPERABLE BRAIN TUMOR    Gout Son     Celiac Disease Son     Anesth Problems Neg Hx      Social History     Tobacco Use    Smoking status: Never Smoker    Smokeless tobacco: Never Used    Tobacco comment: Never used vapor or e-cigs    Substance Use Topics    Alcohol use: No         Jennifer Mcfadden DO

## 2021-08-05 NOTE — PROGRESS NOTES
Diabetes has proved slightly. Can you please send me a list of what you are taking exactly for your diabetes right now with dosages? Your liver function tests are elevated. If you are using Tylenol frequently or drinking alcohol frequently please stop in at like to recheck this in 2 to 4 weeks.

## 2021-08-06 NOTE — PROGRESS NOTES
Called and spoke with patient in reference to labs. She states that she is not currently on any diabetes medications.  She will also call back to schedule follow up apt

## 2021-08-27 ENCOUNTER — OFFICE VISIT (OUTPATIENT)
Dept: FAMILY MEDICINE CLINIC | Age: 71
End: 2021-08-27
Payer: MEDICARE

## 2021-08-27 VITALS
HEART RATE: 62 BPM | RESPIRATION RATE: 18 BRPM | BODY MASS INDEX: 25.23 KG/M2 | HEIGHT: 66 IN | TEMPERATURE: 98 F | DIASTOLIC BLOOD PRESSURE: 67 MMHG | WEIGHT: 157 LBS | SYSTOLIC BLOOD PRESSURE: 115 MMHG | OXYGEN SATURATION: 96 %

## 2021-08-27 DIAGNOSIS — Z12.11 COLON CANCER SCREENING: ICD-10-CM

## 2021-08-27 DIAGNOSIS — R93.1 DECREASED CARDIAC EJECTION FRACTION: ICD-10-CM

## 2021-08-27 DIAGNOSIS — R79.89 ELEVATED LFTS: Primary | ICD-10-CM

## 2021-08-27 LAB
ALBUMIN SERPL-MCNC: 3.4 G/DL (ref 3.5–5)
ALBUMIN/GLOB SERPL: 1.1 {RATIO} (ref 1.1–2.2)
ALP SERPL-CCNC: 140 U/L (ref 45–117)
ALT SERPL-CCNC: 33 U/L (ref 12–78)
AST SERPL-CCNC: 16 U/L (ref 15–37)
BILIRUB DIRECT SERPL-MCNC: 0.1 MG/DL (ref 0–0.2)
BILIRUB SERPL-MCNC: 0.5 MG/DL (ref 0.2–1)
GLOBULIN SER CALC-MCNC: 3 G/DL (ref 2–4)
PROT SERPL-MCNC: 6.4 G/DL (ref 6.4–8.2)

## 2021-08-27 PROCEDURE — G0463 HOSPITAL OUTPT CLINIC VISIT: HCPCS | Performed by: FAMILY MEDICINE

## 2021-08-27 PROCEDURE — G8427 DOCREV CUR MEDS BY ELIG CLIN: HCPCS | Performed by: FAMILY MEDICINE

## 2021-08-27 PROCEDURE — 1100F PTFALLS ASSESS-DOCD GE2>/YR: CPT | Performed by: FAMILY MEDICINE

## 2021-08-27 PROCEDURE — G8419 CALC BMI OUT NRM PARAM NOF/U: HCPCS | Performed by: FAMILY MEDICINE

## 2021-08-27 PROCEDURE — G9899 SCRN MAM PERF RSLTS DOC: HCPCS | Performed by: FAMILY MEDICINE

## 2021-08-27 PROCEDURE — G8399 PT W/DXA RESULTS DOCUMENT: HCPCS | Performed by: FAMILY MEDICINE

## 2021-08-27 PROCEDURE — G8536 NO DOC ELDER MAL SCRN: HCPCS | Performed by: FAMILY MEDICINE

## 2021-08-27 PROCEDURE — 3017F COLORECTAL CA SCREEN DOC REV: CPT | Performed by: FAMILY MEDICINE

## 2021-08-27 PROCEDURE — G8752 SYS BP LESS 140: HCPCS | Performed by: FAMILY MEDICINE

## 2021-08-27 PROCEDURE — G8754 DIAS BP LESS 90: HCPCS | Performed by: FAMILY MEDICINE

## 2021-08-27 PROCEDURE — 1090F PRES/ABSN URINE INCON ASSESS: CPT | Performed by: FAMILY MEDICINE

## 2021-08-27 PROCEDURE — G8510 SCR DEP NEG, NO PLAN REQD: HCPCS | Performed by: FAMILY MEDICINE

## 2021-08-27 PROCEDURE — 99214 OFFICE O/P EST MOD 30 MIN: CPT | Performed by: FAMILY MEDICINE

## 2021-08-27 PROCEDURE — 3288F FALL RISK ASSESSMENT DOCD: CPT | Performed by: FAMILY MEDICINE

## 2021-08-27 NOTE — PROGRESS NOTES
Chief Complaint   Patient presents with   [address]     Patient presents in office today for 2 week f/u. No concerns. .    1. Have you been to the ER, urgent care clinic since your last visit? Hospitalized since your last visit? No    2. Have you seen or consulted any other health care providers outside of the [address] [place] [address] since your last visit? Include any pap smears or colon screening.  No    Learning Assessment 3/4/2014   PRIMARY LEARNER Patient   HIGHEST LEVEL OF EDUCATION - PRIMARY LEARNER  4 YEARS OF COLLEGE   BARRIERS PRIMARY LEARNER NONE   PRIMARY LANGUAGE ENGLISH   LEARNER PREFERENCE PRIMARY LISTENING   ANSWERED BY self   RELATIONSHIP SELF

## 2021-08-27 NOTE — PROGRESS NOTES
Progress Note    she is a 80y.o. year old female who presents for evalution. Subjective:     She is here for follow-up on her elevated liver function test.  ALT 91 AST 73 alkaline phosphatase 244. Patient denies frequent Tylenol or alcohol use. We have LFTs from a year ago from the hospital and these were normal in June 2020. She is currently taking atorvastatin 40 mg. She is going in for a heart cath 9/9/21 her EF had gone down. Cardiology is concerned about blockages. Reviewed PmHx, RxHx, FmHx, SocHx, AllgHx and updated and dated in the chart. Review of Systems - negative except as listed above in the HPI    Objective:     Vitals:    08/27/21 0843   BP: 115/67   Pulse: 62   Resp: 18   Temp: 98 °F (36.7 °C)   TempSrc: Oral   SpO2: 96%   Weight: 157 lb (71.2 kg)   Height: 5' 6\" (1.676 m)       Current Outpatient Medications   Medication Sig    linaCLOtide (Linzess) 72 mcg cap capsule Take 1 Capsule by mouth Daily (before breakfast).  levomefolate-B6-meB12-algal oil (Metanx, algal oil,) 3 mg-35 mg-2 mg -90.314 mg cap Take 1 Capsule by mouth two (2) times a day.  fluticasone-umeclidinium-vilanterol (Trelegy Ellipta) 100-62.5-25 mcg inhaler Take 1 Puff by inhalation daily.  atorvastatin (LIPITOR) 40 mg tablet TAKE 1 TABLET BY MOUTH DAILY    carvediloL (COREG) 3.125 mg tablet TAKE 1 TABLET BY MOUTH TWICE DAILY WITH MEALS    clopidogreL (PLAVIX) 75 mg tab TAKE 1 TABLET BY MOUTH DAILY    flash glucose sensor (FreeStyle Efe 14 Day Sensor) kit Change every 14 days E11.40 not on insulin    calcium carb/vitamin D3/vit K1 (VIACTIV PO) Take  by mouth.  aspirin delayed-release 81 mg tablet Take 81 mg by mouth daily.  loratadine (Claritin) 10 mg tablet Take 1 Tab by mouth daily for 360 days.  sacubitriL-valsartan (Entresto) 49-51 mg tab tablet Take 1 Tab by mouth two (2) times a day.  Biotin 2,500 mcg cap Take  by mouth.     dapagliflozin (Farxiga) 10 mg tab tablet Take  by mouth daily.  FreeStyle Efe 14 Day Sensor kit U UTD Q 14 DAYS    milnacipran (SAVELLA) 50 mg tablet Take 1 Tab by mouth two (2) times a day.  BUTALBITAL-ASPIRIN-CAFFEINE PO Take 1 Tab by mouth as needed.  baclofen (LIORESAL) 10 mg tablet Take 10 mg by mouth nightly.  bethanechol (URECHOLINE) 5 mg tablet Take 5 mg by mouth two (2) times a day.  fluticasone propionate (FLONASE ALLERGY RELIEF) 50 mcg/actuation nasal spray 2 Sprays by Both Nostrils route every morning.  magnesium oxide (MAG-OX) 400 mg tablet Take 400 mg by mouth every morning.  cranberry fruit extract (CRANBERRY PO) Take 4,200 mg by mouth daily.  ivermectin (SOOLANTRA) 1 % crea Apply  to affected area daily.  CALCIUM PO Take 1 Caplet by mouth daily.  glimepiride (AMARYL) 2 mg tablet Take 2 mg by mouth three (3) times daily. 1 in the morning, 2 at night    ALPHA LIPOIC ACID PO Take 1 Cap by mouth two (2) times a day.  L-Mfolate-B6 Phos-Methyl-B12 (METANX) 3-35-2 mg tab tab Take 1 Tab by mouth two (2) times a day. Indications: neuropathy    spironolactone (ALDACTONE) 25 mg tablet TAKE 1 TABLET BY MOUTH DAILY (Patient not taking: Reported on 8/4/2021)    OTHER daily. Bulberry (Patient not taking: Reported on 8/4/2021)    polyethylene glycol (MIRALAX) 17 gram packet Take 17 g by mouth daily. (Patient not taking: Reported on 8/4/2021)    SITagliptin (JANUVIA) 100 mg tablet Take 100 mg by mouth daily. (Patient not taking: Reported on 8/4/2021)    OTHER Take 2 Tabs by mouth daily. \"Benefiber\"  (Patient not taking: Reported on 8/4/2021)     No current facility-administered medications for this visit. Physical Examination: General appearance - alert, well appearing, and in no distress  Abdomen - soft, nontender, nondistended, no masses or organomegaly      Assessment/ Plan:   Diagnoses and all orders for this visit:    1. Elevated LFTs  -     HEPATIC FUNCTION PANEL; Future    2.  Colon cancer screening  - COLOGUARD TEST (FECAL DNA COLORECTAL CANCER SCREENING)    3. Decreased cardiac ejection fraction  Plan cardiac catheterization 9/9/2021     If LFTs are improving we will put order in plan to recheck in 1 month. If not improved or worsening will order ultrasound of liver. Would also discuss with cardiology decrease statin. Follow-up and Dispositions    · Return if symptoms worsen or fail to improve. I have discussed the diagnosis with the patient and the intended plan as seen in the above orders. The patient has received an after-visit summary and questions were answered concerning future plans. Pt conveyed understanding of plan.     Medication Side Effects and Warnings were discussed with patient    An electronic signature was used to authenticate this note  Giancarlo Diaz, DO

## 2021-08-27 NOTE — PATIENT INSTRUCTIONS
A Healthy Lifestyle: Care Instructions  Your Care Instructions     A healthy lifestyle can help you feel good, stay at a healthy weight, and have plenty of energy for both work and play. A healthy lifestyle is something you can share with your whole family. A healthy lifestyle also can lower your risk for serious health problems, such as high blood pressure, heart disease, and diabetes. You can follow a few steps listed below to improve your health and the health of your family. Follow-up care is a key part of your treatment and safety. Be sure to make and go to all appointments, and call your doctor if you are having problems. It's also a good idea to know your test results and keep a list of the medicines you take. How can you care for yourself at home? · Do not eat too much sugar, fat, or fast foods. You can still have dessert and treats now and then. The goal is moderation. · Start small to improve your eating habits. Pay attention to portion sizes, drink less juice and soda pop, and eat more fruits and vegetables. ? Eat a healthy amount of food. A 3-ounce serving of meat, for example, is about the size of a deck of cards. Fill the rest of your plate with vegetables and whole grains. ? Limit the amount of soda and sports drinks you have every day. Drink more water when you are thirsty. ? Eat plenty of fruits and vegetables every day. Have an apple or some carrot sticks as an afternoon snack instead of a candy bar. Try to have fruits and/or vegetables at every meal.  · Make exercise part of your daily routine. You may want to start with simple activities, such as walking, bicycling, or slow swimming. Try to be active 30 to 60 minutes every day. You do not need to do all 30 to 60 minutes all at once. For example, you can exercise 3 times a day for 10 or 20 minutes.  Moderate exercise is safe for most people, but it is always a good idea to talk to your doctor before starting an exercise program.  · Keep moving. Radha Marts the lawn, work in the garden, or [place]. Take the stairs instead of the elevator at work. · If you smoke, quit. People who smoke have an increased risk for heart attack, stroke, cancer, and other lung illnesses. Quitting is hard, but there are ways to boost your chance of quitting tobacco for good. ? Use nicotine gum, patches, or lozenges. ? Ask your doctor about stop-smoking programs and medicines. ? Keep trying. In addition to reducing your risk of diseases in the future, you will notice some benefits soon after you stop using tobacco. If you have shortness of breath or asthma symptoms, they will likely get better within a few weeks after you quit. · Limit how much alcohol you drink. Moderate amounts of alcohol (up to 2 drinks a day for men, 1 drink a day for women) are okay. But drinking too much can lead to liver problems, high blood pressure, and other health problems. Family health  If you have a family, there are many things you can do together to improve your health. · Eat meals together as a family as often as possible. · Eat healthy foods. This includes fruits, vegetables, lean meats and dairy, and whole grains. · Include your family in your fitness plan. Most people think of activities such as jogging or tennis as the way to fitness, but there are many ways you and your family can be more active. Anything that makes you breathe hard and gets your heart pumping is exercise. Here are some tips:  ? Walk to do errands or to take your child to school or the bus.  ? Go for a family bike ride after dinner instead of watching TV. Where can you learn more? Go to http://www.gray.com/  Enter V398 in the search box to learn more about \"A Healthy Lifestyle: Care Instructions. \"  Current as of: September 23, 2020               Content Version: 12.8  © 6413-5472 [employer].    Care instructions adapted under license by [employer] (which disclaims liability or warranty for this information). If you have questions about a medical condition or this instruction, always ask your healthcare professional. [address] any warranty or liability for your use of this information.

## 2021-08-29 NOTE — PROGRESS NOTES
Your liver function tests are back to normal so we do not need to worry about this. We can recheck this with your next routine set of labs.

## 2021-09-02 NOTE — PROGRESS NOTES
Chief Complaint   Patient presents with    Pressure Behind the Eyes     pt states \"I have reacurring sinus infection\"     1. Have you been to the ER, urgent care clinic since your last visit? Hospitalized since your last visit? No    2. Have you seen or consulted any other health care providers outside of the [address] [place] [address] since your last visit? Include any pap smears or colon screening.  No    Visit Vitals  /76 (BP 1 Location: Left arm, BP Patient Position: Sitting)   Pulse 65   Temp 97.6 °F (36.4 °C) (Oral)   Resp 20   Ht 5' 7\" (1.702 m)   Wt 174 lb 3.2 oz (79 kg)   SpO2 100%   BMI 27.28 kg/m² Yes - the patient is able to be screened

## 2021-09-28 NOTE — PROGRESS NOTES
Vulvar lesion evaluation    Katie Padron is a 80 y.o. female  No LMP recorded. Patient is postmenopausal.who presents with reoccurring vaginal boils  She noticed it several months ago. Located on both labia, denies any pain. The lesion has not shown the following change: none. No new lesions have developed. She reports the following associated symptoms: . She denies the following associated symptoms: itching,pain, redness, drainage, swelling, irritation. Aggravating factors: none. Alleviating factors: none. Past Medical History:   Diagnosis Date    Abnormal MRI, cervical spine 4/3/2017    Abnormal pap 3/2015; 5/2016 2015 Neg pap +HPV; 2016 ASCUS +HPV    Arthritis     osteo-tests for RA were neg    Breast cancer ([address] 3/12/2015    Breast cancer, right breast ([address] 1996    Chronic pain     Diabetes ([address]     Fibromyalgia     Ganglion cyst of wrist     LEFT    Hypercholesterolemia     Hypertension     Ill-defined condition     Prolapsed mitral valve    Memory loss     Per pt.      Murmur, cardiac     Neuropathy     Rosacea     Sarcoidosis     Sarcoidosis     Transverse myelopathy syndrome (HCC)     Unspecified adverse effect of anesthesia     \"SLOW TO WAKE UP, SENSITIVE TO ANESTHESIA, DO NOT COME AROUND FOR 2-3 DAYS\"      Past Surgical History:   Procedure Laterality Date    HX BACK SURGERY  2016    HX CATARACT REMOVAL Bilateral 2014    HX COLPOSCOPY  5/2016    Neg path    HX DILATION AND CURETTAGE  07/03/2019    path-benign    HX MASTECTOMY Right 12/1996    tram flap    HX ORTHOPAEDIC Left 1991    foot surgery    HX RHINOPLASTY N/A 1993    HX SEPTOPLASTY N/A 2013    HX TONSILLECTOMY      HX VASCULAR ACCESS  1997    portacath left chest-removed after chemo    TX EPHYS EVAL PACG CVDFB LDS W/TSTG OF PULSE GEN N/A 10/29/2020    Bi V  ICD [place]/ venogram first performed by Mitchel Roberson MD at [facility] CATH LAB    TX INSJ ELTRD CAR TWAN SYS TM INSJ DFB/PM PLS GEN N/A 10/29/2020    LV LEAD PLACEMENT performed by Ju Wolff MD at [address] CATH LAB    DC INSJ/RPLCMT PERM DFB W/TRNSVNS LDS 1/DUAL [facility]. N/A 10/29/2020    INSERT ICD BIV MULTI performed by Ju Wolff MD at [facility] CATH LAB    DC RECONSTR NOSE  2005    HOLE IN SEPTUM     Social History     Occupational History    Not on file   Tobacco Use    Smoking status: Never Smoker    Smokeless tobacco: Never Used    Tobacco comment: Never used vapor or e-cigs    Substance and Sexual Activity    Alcohol use: No    Drug use: No    Sexual activity: Not Currently     Partners: Male     Comment:      Family History   Problem Relation Age of Onset    Heart Disease Mother     Hypertension Mother     COPD Mother     Diabetes Father     Other Son         INOPERABLE BRAIN TUMOR    Gout Son     Celiac Disease Son     Anesth Problems Neg Hx        Allergies   Allergen Reactions    Latex Other (comments)     Patient states it burns around her mouth.  Other Food Other (comments)     Yogurt - stomach cramping    Betadine [Povidone-Iodine] Rash and Itching     Pt states this causes \"extreme\" itching    Codeine Anaphylaxis, Rash, Nausea Only and Other (comments)     HEADACHE  Tolerates tramadol    Dilaudid [Hydromorphone (Bulk)] Anaphylaxis, Itching, Nausea Only and Other (comments)     HALLUCINATIONS  Tolerates tramadol      Hydrocodone Anaphylaxis, Rash, Nausea Only and Vertigo     Facial - eyelid swelling-had to go to ER for reaction, HALLUCINATIONS  Tolerates tramadol      Ditropan [Oxybutynin Chloride] Swelling    Doxycycline Rash     PUSTULAR RASH- TOLERATING TETRACYCLINE    Iodine Other (comments)     Headache    Keflex [Cephalexin] Nausea and Vomiting     Pain in abdomen AND FLU-LIKE SX      Metformin Nausea and Vomiting     Severe abdominal pain      Tape [Adhesive] Other (comments)     Redness/inflammed. Can only use paper tape. CAN USE BAND-AIDS.  TOLERATES SURGICAL TAPE USED IN [place].  Trulicity [Dulaglutide] Other (comments)     Abdominal pain     Sulfamethoxazole-Trimethoprim Other (comments)     Cramping/flu-like symptoms     Prior to Admission medications    Medication Sig Start Date End Date Taking? Authorizing Provider   linaCLOtide (Linzess) 72 mcg cap capsule Take 1 Capsule by mouth Daily (before breakfast). 8/4/21  Yes Jennifer Mcfadden, DO   xlyuxsgulovb-[id number]-algal oil (Metanx, algal oil,) 3 mg-35 mg-2 mg -90.314 mg cap Take 1 Capsule by mouth two (2) times a day. 8/4/21  Yes Jennifer Mcfadden, DO   fluticasone-umeclidinium-vilanterol (Trelegy Ellipta) 100-62.5-25 mcg inhaler Take 1 Puff by inhalation daily. 8/4/21  Yes Jennifer Mcfadden DO   atorvastatin (LIPITOR) 40 mg tablet TAKE 1 TABLET BY MOUTH DAILY 7/6/21  Yes [place] Maryanne MCCLELLAN NP   carvediloL (COREG) 3.125 mg tablet TAKE 1 TABLET BY MOUTH TWICE DAILY WITH MEALS 7/5/21  Yes Unruly Shah NP   clopidogreL (PLAVIX) 75 mg tab TAKE 1 TABLET BY MOUTH DAILY 4/16/21  Yes Unruly Shah NP   flash glucose sensor (FreeStyle Efe 14 Day Sensor) kit Change every 14 days E11.40 not on insulin 1/22/21  Yes Jennifer Mcfadden DO   calcium carb/vitamin D3/vit K1 (VIACTIV PO) Take  by mouth. Yes Provider, Historical   aspirin delayed-release 81 mg tablet Take 81 mg by mouth daily. Yes Provider, Historical   Biotin 2,500 mcg cap Take  by mouth. Yes Provider, Historical   dapagliflozin (Farxiga) 10 mg tab tablet Take  by mouth daily. Yes Provider, Historical   FreeStyle Efe 14 Day Sensor kit U UTD Q 14 DAYS 3/3/20  Yes Provider, Historical   milnacipran (SAVELLA) 50 mg tablet Take 1 Tab by mouth two (2) times a day. 8/29/19  Yes Jennifer Mcfadden, DO   BUTALBITAL-ASPIRIN-CAFFEINE PO Take 1 Tab by mouth as needed. Yes Provider, Historical   fluticasone propionate (FLONASE ALLERGY RELIEF) 50 mcg/actuation nasal spray 2 Sprays by Both Nostrils route every morning.    Yes Provider, Historical   magnesium oxide (MAG-OX) 400 mg tablet Take 400 mg by mouth every morning. Yes Provider, Historical   cranberry fruit extract (CRANBERRY PO) Take 4,200 mg by mouth daily. Yes Provider, Historical   ivermectin (SOOLANTRA) 1 % crea Apply  to affected area daily. Yes Provider, Historical   CALCIUM PO Take 1 Caplet by mouth daily. Yes Provider, Historical   glimepiride (AMARYL) 2 mg tablet Take 2 mg by mouth three (3) times daily. 1 in the morning, 2 at night   Yes Provider, Historical   L-Mfolate-B6 Phos-Methyl-B12 (METANX) 3-35-2 mg tab tab Take 1 Tab by mouth two (2) times a day. Indications: neuropathy   Yes Provider, Historical   spironolactone (ALDACTONE) 25 mg tablet TAKE 1 TABLET BY MOUTH DAILY  Patient not taking: Reported on 8/4/2021 7/5/21   Melvi Raygoza NP   SITagliptin (JANUVIA) 100 mg tablet Take 100 mg by mouth daily.   Patient not taking: Reported on 8/4/2021    Provider, Historical        Review of Systems: History obtained from the patient  Constitutional: negative for weight loss, fever, night sweats  GI: negative for change in bowel habits, abdominal pain, black or bloody stools  : negative for frequency, dysuria, hematuria, vaginal discharge  MSK: negative for back pain, joint pain, muscle pain  Skin: negative for itching, rash, hives elsewhere  Neuro: negative for dizziness, headache, confusion, weakness  Psych: negative for anxiety, depression, change in mood  Heme/lymph: negative for bleeding, bruising, pallor    Objective:  Visit Vitals  /68   Wt 154 lb 9.6 oz (70.1 kg)   BMI 24.95 kg/m²       Physical Exam:   PHYSICAL EXAMINATION    Constitutional  · Appearance: well-nourished, well developed, alert, in no acute distress    Gastrointestinal  · Abdominal Examination: abdomen non-tender to palpation, normal bowel sounds, no masses present  · Liver and spleen: no hepatomegaly present, spleen not palpable  · Hernias: no hernias identified    Genitourinary  · External Genitalia: normal appearance for age, no discharge present, no tenderness present, no inflammatory lesions present, no masses present,  atrophy present, 2 old scars likely from old boils lateral to perineum on each side  · Perineum: perineum within normal limits, no evidence of trauma, no rashes or skin lesions present  · Anus: anus within normal limits, no hemorrhoids present  · Inguinal Lymph Nodes: no lymphadenopathy present    Skin  · General Inspection: no rash, no lesions identified    Neurologic/Psychiatric  · Mental Status:  · Orientation: grossly oriented to person, place and time  · Mood and Affect: mood normal, affect appropriate    Assessment:   Benign scars likely    Plan:   AE      RTO prn if symptoms persist or worsen. Instructions given to pt. Handouts given to pt.

## 2021-09-29 ENCOUNTER — OFFICE VISIT (OUTPATIENT)
Dept: OBGYN CLINIC | Age: 71
End: 2021-09-29
Payer: MEDICARE

## 2021-09-29 VITALS — SYSTOLIC BLOOD PRESSURE: 122 MMHG | WEIGHT: 154.6 LBS | DIASTOLIC BLOOD PRESSURE: 68 MMHG | BODY MASS INDEX: 24.95 KG/M2

## 2021-09-29 DIAGNOSIS — N90.89 VULVAR LESION: Primary | ICD-10-CM

## 2021-09-29 PROCEDURE — 99212 OFFICE O/P EST SF 10 MIN: CPT | Performed by: OBSTETRICS & GYNECOLOGY

## 2021-09-29 PROCEDURE — 3017F COLORECTAL CA SCREEN DOC REV: CPT | Performed by: OBSTETRICS & GYNECOLOGY

## 2021-09-29 PROCEDURE — 1090F PRES/ABSN URINE INCON ASSESS: CPT | Performed by: OBSTETRICS & GYNECOLOGY

## 2021-09-29 PROCEDURE — G8420 CALC BMI NORM PARAMETERS: HCPCS | Performed by: OBSTETRICS & GYNECOLOGY

## 2021-09-29 PROCEDURE — G8427 DOCREV CUR MEDS BY ELIG CLIN: HCPCS | Performed by: OBSTETRICS & GYNECOLOGY

## 2021-09-29 PROCEDURE — G8536 NO DOC ELDER MAL SCRN: HCPCS | Performed by: OBSTETRICS & GYNECOLOGY

## 2021-09-29 PROCEDURE — G8510 SCR DEP NEG, NO PLAN REQD: HCPCS | Performed by: OBSTETRICS & GYNECOLOGY

## 2021-09-29 PROCEDURE — G8752 SYS BP LESS 140: HCPCS | Performed by: OBSTETRICS & GYNECOLOGY

## 2021-09-29 PROCEDURE — 1101F PT FALLS ASSESS-DOCD LE1/YR: CPT | Performed by: OBSTETRICS & GYNECOLOGY

## 2021-09-29 PROCEDURE — G8754 DIAS BP LESS 90: HCPCS | Performed by: OBSTETRICS & GYNECOLOGY

## 2021-09-29 PROCEDURE — G9899 SCRN MAM PERF RSLTS DOC: HCPCS | Performed by: OBSTETRICS & GYNECOLOGY

## 2021-09-29 PROCEDURE — G8399 PT W/DXA RESULTS DOCUMENT: HCPCS | Performed by: OBSTETRICS & GYNECOLOGY

## 2021-09-30 RX ORDER — CARVEDILOL 3.12 MG/1
TABLET ORAL
Qty: 180 TABLET | Refills: 0 | Status: SHIPPED | OUTPATIENT
Start: 2021-09-30 | End: 2022-01-03

## 2021-09-30 RX ORDER — SPIRONOLACTONE 25 MG/1
TABLET ORAL
Qty: 90 TABLET | Refills: 0 | Status: SHIPPED | OUTPATIENT
Start: 2021-09-30 | End: 2021-11-04

## 2021-10-27 RX ORDER — ATORVASTATIN CALCIUM 40 MG/1
TABLET, FILM COATED ORAL
Qty: 90 TABLET | Refills: 1 | Status: SHIPPED | OUTPATIENT
Start: 2021-10-27

## 2021-10-28 ENCOUNTER — HOSPITAL ENCOUNTER (OUTPATIENT)
Dept: CARDIAC REHAB | Age: 71
Discharge: HOME | End: 2021-10-28
Payer: MEDICARE

## 2021-10-28 VITALS — BODY MASS INDEX: 25.23 KG/M2 | WEIGHT: 157 LBS | HEIGHT: 66 IN

## 2021-10-28 PROCEDURE — 93798 PHYS/QHP OP CAR RHAB W/ECG: CPT

## 2021-10-28 NOTE — CARDIO/PULMONARY
Andrew Cervantes   80 y.o.    presented to cardiac rehab for orientation and exercise tolerance test today with a primary diagnosis of HF class 2-3 . Andrew Cervantes has a history of (Pacemaker, prolapse MV). Cardiac risk factors include smoking/ tobacco exposure, family history, dyslipidemia, diabetes mellitus, hypertension, valvular heart disease. Andrew Cervantes is  and lives with her  Marco Majano in [facility]. She has 3 grown children and 8 grandchildren. PHQ9, depression score, is 2 and this is considered normal. Pt states sh has good support from her family. Patient denied chest pain or SOB during 6 minute exercise test on the [employer] and was in SR few MF PVC's. Exercise prescription developed around patient stated goals, to be supplemented with home exercise recommendations. EF is 20-25%. Education manual given to patient and reviewed.  Patient will attend cardiac rehab 2-3 times/week and education

## 2021-10-28 NOTE — PROGRESS NOTES
Andrew Cervantes is a ,  80 y.o. female WHITE/NON- whose LMP was on  who presents for her annual checkup. She is menopausal and amenorrheic. She is having no significant problems. Hormone Status:    She is not having vasomotor symptoms. The patient is not using HRT. She has not had any vaginal bleeding. She reports no gynecologic symptoms. Sexual history:    She  reports previously being sexually active and has had partner(s) who are Male. Medical conditions:    Since her last annual GYN exam about three or more years ago, she has had the following changes in her health history: none. Surgical history confirmed with patient. has a past surgical history that includes pr reconstr nose (); hx colposcopy (2016); hx vascular access (); hx mastectomy (Right, 1996); hx tonsillectomy; hx cataract removal (Bilateral, ); hx rhinoplasty (N/A, ); hx septoplasty (N/A, ); hx orthopaedic (Left, ); hx back surgery (); hx dilation and curettage (2019); pr ephys eval pacg cvdfb lds w/tstg of pulse gen (N/A, 10/29/2020); pr insj eltrd car hilda sys tm insj dfb/pm pls gen (N/A, 10/29/2020); and pr insj/rplcmt perm dfb w/trnsvns lds 1/dual chmbr (N/A, 10/29/2020). Pap and Mammogram History:    Her most recent Pap smear was 2017 normal/HPV neg    The patient had a recent mammogram 2021 which was negative for malignancy    Breast Cancer History/Substance Abuse:     She does not have a family history of breast cancer. Osteoporosis History:    Family history does not include a first or second degree relative with osteopenia or osteoporosis. A bone density scan was obtained 2016 and revealed osteopenia. She is currently taking calcium and vit D.     Past Medical History:   Diagnosis Date    Abnormal MRI, cervical spine 4/3/2017    Abnormal pap 3/2015; 2016 Neg pap +HPV;  ASCUS +HPV    Arthritis     osteo-tests for RA were neg  Breast cancer ([facility] 3/12/2015    Breast cancer, right breast ([facility] 1996    Chronic pain     Diabetes ([facility]     Fibromyalgia     Ganglion cyst of wrist     LEFT    Hypercholesterolemia     Hypertension     Ill-defined condition     Prolapsed mitral valve    Memory loss     Per pt.  Murmur, cardiac     Neuropathy     Rosacea     Sarcoidosis     Sarcoidosis     Transverse myelopathy syndrome (HCC)     Unspecified adverse effect of anesthesia     \"SLOW TO WAKE UP, SENSITIVE TO ANESTHESIA, DO NOT COME AROUND FOR 2-3 DAYS\"      Past Surgical History:   Procedure Laterality Date    HX BACK SURGERY  2016    HX CATARACT REMOVAL Bilateral 2014    HX COLPOSCOPY  5/2016    Neg path    HX DILATION AND CURETTAGE  07/03/2019    path-benign    HX MASTECTOMY Right 12/1996    tram flap    HX ORTHOPAEDIC Left 1991    foot surgery    HX RHINOPLASTY N/A 1993    HX SEPTOPLASTY N/A 2013    HX TONSILLECTOMY      HX VASCULAR ACCESS  1997    portacath left chest-removed after chemo    ME EPHYS EVAL PACG CVDFB LDS W/TSTG OF PULSE GEN N/A 10/29/2020    Bi V  ICD Dillon/ venogram first performed by Nakita Vuong MD at [address] CATH LAB    ME INSJ ELTRD CAR TWAN SYS TM INSJ DFB/PM PLS GEN N/A 10/29/2020    LV LEAD PLACEMENT performed by Nakita Vuong MD at [address] CATH LAB    ME INSJ/RPLCMT PERM DFB W/TRNSVNS LDS 1/DUAL CHMBR N/A 10/29/2020    INSERT ICD BIV MULTI performed by Nakita Vuong MD at [address] CATH LAB    ME RECONSTR NOSE  2005    HOLE IN SEPTUM     Current Outpatient Medications   Medication Sig Dispense Refill    atorvastatin (LIPITOR) 40 mg tablet TAKE 1 TABLET BY MOUTH DAILY 90 Tablet 1    spironolactone (ALDACTONE) 25 mg tablet TAKE 1 TABLET BY MOUTH DAILY 90 Tablet 0    carvediloL (COREG) 3.125 mg tablet TAKE 1 TABLET BY MOUTH TWICE DAILY WITH MEALS 180 Tablet 0    linaCLOtide (Linzess) 72 mcg cap capsule Take 1 Capsule by mouth Daily (before breakfast).       levomefolate-B6-meB12-algal oil (Metanx, algal oil,) 3 mg-35 mg-2 mg -90.314 mg cap Take 1 Capsule by mouth two (2) times a day. 180 Capsule 5    fluticasone-umeclidinium-vilanterol (Trelegy Ellipta) 100-62.5-25 mcg inhaler Take 1 Puff by inhalation daily.  clopidogreL (PLAVIX) 75 mg tab TAKE 1 TABLET BY MOUTH DAILY 90 Tab 1    flash glucose sensor (FreeStyle Efe 14 Day Sensor) kit Change every 14 days E11.40 not on insulin 2 Kit 11    calcium carb/vitamin D3/vit K1 (VIACTIV PO) Take  by mouth.  aspirin delayed-release 81 mg tablet Take 81 mg by mouth daily.  Biotin 2,500 mcg cap Take  by mouth.  dapagliflozin (Farxiga) 10 mg tab tablet Take  by mouth daily.  FreeStyle Efe 14 Day Sensor kit U UTD Q 14 DAYS      milnacipran (SAVELLA) 50 mg tablet Take 1 Tab by mouth two (2) times a day. 180 Tab 3    fluticasone propionate (FLONASE ALLERGY RELIEF) 50 mcg/actuation nasal spray 2 Sprays by Both Nostrils route every morning.  magnesium oxide (MAG-OX) 400 mg tablet Take 400 mg by mouth every morning.  cranberry fruit extract (CRANBERRY PO) Take 4,200 mg by mouth daily.  ivermectin (SOOLANTRA) 1 % crea Apply  to affected area daily.  CALCIUM PO Take 1 Caplet by mouth daily.  glimepiride (AMARYL) 2 mg tablet Take 2 mg by mouth three (3) times daily. 1 in the morning, 2 at night      L-Mfolate-B6 Phos-Methyl-B12 (METANX) 3-35-2 mg tab tab Take 1 Tab by mouth two (2) times a day. Indications: neuropathy      BUTALBITAL-ASPIRIN-CAFFEINE PO Take 1 Tab by mouth as needed.  (Patient not taking: Reported on 10/29/2021)       Allergies: Latex, Other food, Betadine [povidone-iodine], Codeine, Dilaudid [hydromorphone (bulk)], Hydrocodone, Ditropan [oxybutynin chloride], Doxycycline, Iodine, Keflex [cephalexin], Metformin, Tape [adhesive], Trulicity [dulaglutide], and Sulfamethoxazole-trimethoprim   Social History     Socioeconomic History    Marital status:  Spouse name: Not on file    Number of children: Not on file    Years of education: Not on file    Highest education level: Not on file   Occupational History    Not on file   Tobacco Use    Smoking status: Never Smoker    Smokeless tobacco: Never Used    Tobacco comment: Never used vapor or e-cigs    Substance and Sexual Activity    Alcohol use: No    Drug use: No    Sexual activity: Not Currently     Partners: Male     Comment:    Other Topics Concern    Not on file   Social History Narrative    Not on file     Social Determinants of Health     Financial Resource Strain:     Difficulty of Paying Living Expenses:    Food Insecurity:     Worried About Running Out of Food in the Last Year:     [address] in the Last Year:    Transportation Needs:     Lack of Transportation (Medical):  Lack of Transportation (Non-Medical):    Physical Activity:     Days of Exercise per Week:     Minutes of Exercise per Session:    Stress:     Feeling of Stress :    Social Connections:     Frequency of Communication with Friends and Family:     Frequency of Social Gatherings with Friends and Family:     Attends Synagogue Services:     Active Member of Clubs or Organizations:     Attends Club or Organization Meetings:     Marital Status:    Intimate Partner Violence:     Fear of Current or Ex-Partner:     Emotionally Abused:     Physically Abused:     Sexually Abused: Tobacco History:  reports that she has never smoked. She has never used smokeless tobacco.  Alcohol Abuse:  reports no history of alcohol use. Drug Abuse:  reports no history of drug use.   Patient Active Problem List   Diagnosis Code    Diabetes mellitus with neuropathy ([facility] [address] E11.40    Postmenopausal bleeding N95.0    Fibromyalgia M79.7    HTN (hypertension) I10    Scoliosis M41.9    ACP (advance care planning) Z71.89    Left-sided weakness R53.1    Transverse myelopathy syndrome (HCC) G37.3    Abnormal MRI, cervical spine R93.7    Other emphysema ([facility] [address] J43.8    Atherosclerosis of aorta (HCC) I70.0    History of breast cancer Z85.3    Coronary artery disease involving native coronary artery of native heart without angina pectoris I25.10    LBBB (left bundle branch block) I44.7    Chronic heart failure with preserved ejection fraction (HCC) I50.32    Myocardiopathy (HCC) H56.6    Systolic CHF, chronic (HCC) I50.22    Pacemaker Z95.0       Review of Systems - History obtained from the patient  Constitutional: negative for weight loss, fever, night sweats  HEENT: negative for hearing loss, earache, congestion, snoring, sorethroat  CV: negative for chest pain, palpitations, edema  Resp: negative for cough, shortness of breath, wheezing  GI: negative for change in bowel habits, abdominal pain, black or bloody stools  : negative for frequency, dysuria, hematuria, vaginal discharge  MSK: negative for back pain, joint pain, muscle pain  Breast: negative for breast lumps, nipple discharge, galactorrhea  Skin :negative for itching, rash, hives  Neuro: negative for dizziness, headache, confusion, weakness  Psych: negative for anxiety, depression, change in mood  Heme/lymph: negative for bleeding, bruising, pallor    Physical Exam    Visit Vitals  BP (!) 109/58   Wt 156 lb 3.2 oz (70.9 kg)   BMI 25.21 kg/m²     Constitutional  · Appearance: well-nourished, well developed, alert, in no acute distress    HENT  · Head and Face: appears normal    Neck  · Inspection/Palpation: normal appearance, no masses or tenderness  · Lymph Nodes: no lymphadenopathy present  · Thyroid: gland size normal, nontender, no nodules or masses present on palpation    Chest  · Respiratory Effort: breathing normal  · Auscultation: normal breath sounds    Cardiovascular  · Heart:  · Auscultation: regular rate and rhythm without murmur    Breasts  · Inspection of Breasts: right mastectomy  · Palpation of Breasts and Axillae: no masses present on palpation, no breast tenderness  · Axillary Lymph Nodes: no lymphadenopathy present    Gastrointestinal  · Abdominal Examination: abdomen non-tender to palpation, normal bowel sounds, no masses present  · Liver and spleen: no hepatomegaly present, spleen not palpable  · Hernias: no hernias identified    Genitourinary  · External Genitalia: normal appearance for age with atrophy, no discharge present, no tenderness present, no inflammatory lesions present, no masses present  · Vagina:atrophic vaginal vault with pale epithelium and flattening of rugae, without central or paravaginal defects, no discharge present, no inflammatory lesions present, no masses present  · Bladder: non-tender to palpation  · Urethra: appears normal  · Cervix: normal   · Uterus: normal size, shape and consistency  · Adnexa: no adnexal tenderness present, no adnexal masses present  · Perineum: perineum within normal limits, no evidence of trauma, no rashes or skin lesions present  · Anus: anus within normal limits, no hemorrhoids present  · Inguinal Lymph Nodes: no lymphadenopathy present    Skin  · General Inspection: no rash, no lesions identified    Neurologic/Psychiatric  · Mental Status:  · Orientation: grossly oriented to person, place and time  · Mood and Affect: mood normal, affect appropriate    . Assessment:  Routine gynecologic examination  Her current medical status is satisfactory with no evidence of significant gynecologic issues.     Plan:  Counseled re: diet, exercise, healthy lifestyle  Return for yearly wellness visits  Rec annual mammogram  pap

## 2021-10-29 ENCOUNTER — OFFICE VISIT (OUTPATIENT)
Dept: OBGYN CLINIC | Age: 71
End: 2021-10-29
Payer: MEDICARE

## 2021-10-29 VITALS — DIASTOLIC BLOOD PRESSURE: 58 MMHG | BODY MASS INDEX: 25.21 KG/M2 | WEIGHT: 156.2 LBS | SYSTOLIC BLOOD PRESSURE: 109 MMHG

## 2021-10-29 DIAGNOSIS — Z01.419 ENCOUNTER FOR ROUTINE GYNECOLOGIC EXAMINATION IN MEDICARE PATIENT: Primary | ICD-10-CM

## 2021-10-29 PROCEDURE — G8752 SYS BP LESS 140: HCPCS | Performed by: OBSTETRICS & GYNECOLOGY

## 2021-10-29 PROCEDURE — G9899 SCRN MAM PERF RSLTS DOC: HCPCS | Performed by: OBSTETRICS & GYNECOLOGY

## 2021-10-29 PROCEDURE — G8510 SCR DEP NEG, NO PLAN REQD: HCPCS | Performed by: OBSTETRICS & GYNECOLOGY

## 2021-10-29 PROCEDURE — G8754 DIAS BP LESS 90: HCPCS | Performed by: OBSTETRICS & GYNECOLOGY

## 2021-10-29 PROCEDURE — G8419 CALC BMI OUT NRM PARAM NOF/U: HCPCS | Performed by: OBSTETRICS & GYNECOLOGY

## 2021-10-29 PROCEDURE — 1090F PRES/ABSN URINE INCON ASSESS: CPT | Performed by: OBSTETRICS & GYNECOLOGY

## 2021-10-29 PROCEDURE — 3017F COLORECTAL CA SCREEN DOC REV: CPT | Performed by: OBSTETRICS & GYNECOLOGY

## 2021-10-29 PROCEDURE — 1101F PT FALLS ASSESS-DOCD LE1/YR: CPT | Performed by: OBSTETRICS & GYNECOLOGY

## 2021-10-29 PROCEDURE — G0101 CA SCREEN;PELVIC/BREAST EXAM: HCPCS | Performed by: OBSTETRICS & GYNECOLOGY

## 2021-11-01 ENCOUNTER — HOSPITAL ENCOUNTER (OUTPATIENT)
Dept: CARDIAC REHAB | Age: 71
Discharge: HOME | End: 2021-11-01
Payer: MEDICARE

## 2021-11-01 VITALS — WEIGHT: 159 LBS | BODY MASS INDEX: 25.66 KG/M2

## 2021-11-01 PROCEDURE — 93798 PHYS/QHP OP CAR RHAB W/ECG: CPT

## 2021-11-04 ENCOUNTER — OFFICE VISIT (OUTPATIENT)
Dept: FAMILY MEDICINE CLINIC | Age: 71
End: 2021-11-04
Payer: MEDICARE

## 2021-11-04 VITALS
OXYGEN SATURATION: 96 % | TEMPERATURE: 97.5 F | DIASTOLIC BLOOD PRESSURE: 69 MMHG | BODY MASS INDEX: 25.23 KG/M2 | HEART RATE: 82 BPM | WEIGHT: 157 LBS | HEIGHT: 66 IN | RESPIRATION RATE: 16 BRPM | SYSTOLIC BLOOD PRESSURE: 105 MMHG

## 2021-11-04 DIAGNOSIS — I10 PRIMARY HYPERTENSION: ICD-10-CM

## 2021-11-04 DIAGNOSIS — E11.40 TYPE 2 DIABETES MELLITUS WITH DIABETIC NEUROPATHY, WITHOUT LONG-TERM CURRENT USE OF INSULIN (HCC): Primary | ICD-10-CM

## 2021-11-04 PROCEDURE — G8427 DOCREV CUR MEDS BY ELIG CLIN: HCPCS | Performed by: FAMILY MEDICINE

## 2021-11-04 PROCEDURE — G0463 HOSPITAL OUTPT CLINIC VISIT: HCPCS | Performed by: FAMILY MEDICINE

## 2021-11-04 PROCEDURE — 1101F PT FALLS ASSESS-DOCD LE1/YR: CPT | Performed by: FAMILY MEDICINE

## 2021-11-04 PROCEDURE — G8419 CALC BMI OUT NRM PARAM NOF/U: HCPCS | Performed by: FAMILY MEDICINE

## 2021-11-04 PROCEDURE — 3017F COLORECTAL CA SCREEN DOC REV: CPT | Performed by: FAMILY MEDICINE

## 2021-11-04 PROCEDURE — 3052F HG A1C>EQUAL 8.0%<EQUAL 9.0%: CPT | Performed by: FAMILY MEDICINE

## 2021-11-04 PROCEDURE — G8536 NO DOC ELDER MAL SCRN: HCPCS | Performed by: FAMILY MEDICINE

## 2021-11-04 PROCEDURE — G9899 SCRN MAM PERF RSLTS DOC: HCPCS | Performed by: FAMILY MEDICINE

## 2021-11-04 PROCEDURE — G8510 SCR DEP NEG, NO PLAN REQD: HCPCS | Performed by: FAMILY MEDICINE

## 2021-11-04 PROCEDURE — G8399 PT W/DXA RESULTS DOCUMENT: HCPCS | Performed by: FAMILY MEDICINE

## 2021-11-04 PROCEDURE — 99214 OFFICE O/P EST MOD 30 MIN: CPT | Performed by: FAMILY MEDICINE

## 2021-11-04 PROCEDURE — 1090F PRES/ABSN URINE INCON ASSESS: CPT | Performed by: FAMILY MEDICINE

## 2021-11-04 PROCEDURE — G8752 SYS BP LESS 140: HCPCS | Performed by: FAMILY MEDICINE

## 2021-11-04 PROCEDURE — 2022F DILAT RTA XM EVC RTNOPTHY: CPT | Performed by: FAMILY MEDICINE

## 2021-11-04 PROCEDURE — G8754 DIAS BP LESS 90: HCPCS | Performed by: FAMILY MEDICINE

## 2021-11-04 RX ORDER — TIOTROPIUM BROMIDE AND OLODATEROL 3.124; 2.736 UG/1; UG/1
2 SPRAY, METERED RESPIRATORY (INHALATION) DAILY
COMMUNITY

## 2021-11-04 RX ORDER — REPAGLINIDE 0.5 MG/1
0.5 TABLET ORAL
Qty: 90 TABLET | Refills: 5 | Status: SHIPPED | OUTPATIENT
Start: 2021-11-04 | End: 2022-05-11

## 2021-11-04 NOTE — PROGRESS NOTES
Chief Complaint   Patient presents with    Diabetes     Patient presents in office today for diabetes check. No concerns. 1. Have you been to the ER, urgent care clinic since your last visit? Hospitalized since your last visit? No    2. Have you seen or consulted any other health care providers outside of the [address] [place] [address] since your last visit? Include any pap smears or colon screening.  No    Learning Assessment 3/4/2014   PRIMARY LEARNER Patient   HIGHEST LEVEL OF EDUCATION - PRIMARY LEARNER  4 YEARS OF COLLEGE   BARRIERS PRIMARY LEARNER NONE   PRIMARY LANGUAGE ENGLISH   LEARNER PREFERENCE PRIMARY LISTENING   ANSWERED BY self   RELATIONSHIP SELF

## 2021-11-04 NOTE — PATIENT INSTRUCTIONS
A Healthy Lifestyle: Care Instructions Your Care Instructions A healthy lifestyle can help you feel good, stay at a healthy weight, and have plenty of energy for both work and play. A healthy lifestyle is something you can share with your whole family. A healthy lifestyle also can lower your risk for serious health problems, such as high blood pressure, heart disease, and diabetes. You can follow a few steps listed below to improve your health and the health of your family. Follow-up care is a key part of your treatment and safety. Be sure to make and go to all appointments, and call your doctor if you are having problems. It's also a good idea to know your test results and keep a list of the medicines you take. How can you care for yourself at home? · Do not eat too much sugar, fat, or fast foods. You can still have dessert and treats now and then. The goal is moderation. · Start small to improve your eating habits. Pay attention to portion sizes, drink less juice and soda pop, and eat more fruits and vegetables. ? Eat a healthy amount of food. A 3-ounce serving of meat, for example, is about the size of a deck of cards. Fill the rest of your plate with vegetables and whole grains. ? Limit the amount of soda and sports drinks you have every day. Drink more water when you are thirsty. ? Eat plenty of fruits and vegetables every day. Have an apple or some carrot sticks as an afternoon snack instead of a candy bar. Try to have fruits and/or vegetables at every meal. 
· Make exercise part of your daily routine. You may want to start with simple activities, such as walking, bicycling, or slow swimming. Try to be active 30 to 60 minutes every day. You do not need to do all 30 to 60 minutes all at once. For example, you can exercise 3 times a day for 10 or 20 minutes.  Moderate exercise is safe for most people, but it is always a good idea to talk to your doctor before starting an exercise program. 
· Keep moving. [employer] the lawn, work in the garden, or [employer]. Take the stairs instead of the elevator at work. · If you smoke, quit. People who smoke have an increased risk for heart attack, stroke, cancer, and other lung illnesses. Quitting is hard, but there are ways to boost your chance of quitting tobacco for good. ? Use nicotine gum, patches, or lozenges. ? Ask your doctor about stop-smoking programs and medicines. ? Keep trying. In addition to reducing your risk of diseases in the future, you will notice some benefits soon after you stop using tobacco. If you have shortness of breath or asthma symptoms, they will likely get better within a few weeks after you quit. · Limit how much alcohol you drink. Moderate amounts of alcohol (up to 2 drinks a day for men, 1 drink a day for women) are okay. But drinking too much can lead to liver problems, high blood pressure, and other health problems. Family health If you have a family, there are many things you can do together to improve your health. · Eat meals together as a family as often as possible. · Eat healthy foods. This includes fruits, vegetables, lean meats and dairy, and whole grains. · Include your family in your fitness plan. Most people think of activities such as jogging or tennis as the way to fitness, but there are many ways you and your family can be more active. Anything that makes you breathe hard and gets your heart pumping is exercise. Here are some tips: 
? Walk to do errands or to take your child to school or the bus. 
? Go for a family bike ride after dinner instead of watching TV. Where can you learn more? Go to http://www.gray.com/ Enter L098 in the search box to learn more about \"A Healthy Lifestyle: Care Instructions. \" Current as of: June 16, 2021               Content Version: 13.0 © 1727-3486 [employer]wise[employer]orporated.   
Care instructions adapted under license by [employer] (which disclaims liability or warranty for this information). If you have questions about a medical condition or this instruction, always ask your healthcare professional. [address] any warranty or liability for your use of this information.

## 2021-11-04 NOTE — PROGRESS NOTES
Progress Note    she is a 80y.o. year old female who presents for evalution. Subjective:     Pt here for DM f/u and thinks her A1C will be where it was last time. At 8.5 which was down from 8.7 currently on farxiga. States her fasting glucose is fine 103 and low 100's. States the rest of the day her glucose is up after meals. Her post prandial is elevated. BP remains very well controlled. Was stopped on aldactone due to BP being so well controlled. Had a pacemaker adjustment by cardiology feels significantly better now with energy. Reviewed PmHx, RxHx, FmHx, SocHx, AllgHx and updated and dated in the chart. Review of Systems - negative except as listed above in the HPI    Objective:     Vitals:    11/04/21 0946   BP: 105/69   Pulse: 82   Resp: 16   Temp: 97.5 °F (36.4 °C)   TempSrc: Oral   SpO2: 96%   Weight: 157 lb (71.2 kg)   Height: 5' 6\" (1.676 m)       Current Outpatient Medications   Medication Sig    tiotropium-olodateroL (Stiolto Respimat) 2.5-2.5 mcg/actuation inhaler Take 2 Puffs by inhalation daily.  repaglinide (PRANDIN) 0.5 mg tablet Take 1 Tablet by mouth Before breakfast, lunch, and dinner.  atorvastatin (LIPITOR) 40 mg tablet TAKE 1 TABLET BY MOUTH DAILY    carvediloL (COREG) 3.125 mg tablet TAKE 1 TABLET BY MOUTH TWICE DAILY WITH MEALS    linaCLOtide (Linzess) 72 mcg cap capsule Take 1 Capsule by mouth Daily (before breakfast).  levomefolate-B6-meB12-algal oil (Metanx, algal oil,) 3 mg-35 mg-2 mg -90.314 mg cap Take 1 Capsule by mouth two (2) times a day.  clopidogreL (PLAVIX) 75 mg tab TAKE 1 TABLET BY MOUTH DAILY    flash glucose sensor (FreeStyle Efe 14 Day Sensor) kit Change every 14 days E11.40 not on insulin    calcium carb/vitamin D3/vit K1 (VIACTIV PO) Take  by mouth.  aspirin delayed-release 81 mg tablet Take 81 mg by mouth daily.  Biotin 2,500 mcg cap Take  by mouth.  dapagliflozin (Farxiga) 10 mg tab tablet Take  by mouth daily.     FreeStyle Efe 14 Day Sensor kit U UTD Q 14 DAYS    milnacipran (SAVELLA) 50 mg tablet Take 1 Tab by mouth two (2) times a day.  fluticasone propionate (FLONASE ALLERGY RELIEF) 50 mcg/actuation nasal spray 2 Sprays by Both Nostrils route every morning.  magnesium oxide (MAG-OX) 400 mg tablet Take 400 mg by mouth every morning.  cranberry fruit extract (CRANBERRY PO) Take 4,200 mg by mouth daily.  ivermectin (SOOLANTRA) 1 % crea Apply  to affected area daily.  CALCIUM PO Take 1 Caplet by mouth daily.  glimepiride (AMARYL) 2 mg tablet Take 2 mg by mouth three (3) times daily. 1 in the morning, 2 at night    L-Mfolate-B6 Phos-Methyl-B12 (METANX) 3-35-2 mg tab tab Take 1 Tab by mouth two (2) times a day. Indications: neuropathy     No current facility-administered medications for this visit. Physical Examination: General appearance - alert, well appearing, and in no distress  Chest - clear to auscultation, no wheezes, rales or rhonchi, symmetric air entry  Heart - normal rate, regular rhythm, normal S1, S2, no murmurs, rubs, clicks or gallops      Assessment/ Plan:   Diagnoses and all orders for this visit:    1. Type 2 diabetes mellitus with diabetic neuropathy, without long-term current use of insulin (HCC)  -     HEMOGLOBIN A1C WITH EAG; Future  -     repaglinide (PRANDIN) 0.5 mg tablet; Take 1 Tablet by mouth Before breakfast, lunch, and dinner. 2. Primary hypertension  Well-controlled at goal.     Follow-up and Dispositions    · Return in about 3 months (around 2/4/2022), or if symptoms worsen or fail to improve. I have discussed the diagnosis with the patient and the intended plan as seen in the above orders. The patient has received an after-visit summary and questions were answered concerning future plans. Pt conveyed understanding of plan.     Medication Side Effects and Warnings were discussed with patient    An electronic signature was used to authenticate this note  Edgar Javier, DO

## 2021-11-05 LAB
EST. AVERAGE GLUCOSE BLD GHB EST-MCNC: 203 MG/DL
HBA1C MFR BLD: 8.7 % (ref 4–5.6)

## 2021-11-05 NOTE — PROGRESS NOTES
Your diabetes marker went up slightly to 8.7. Start the Prandin like we discussed and let me know how your sugars are running in a couple of weeks. Just sent me a Berylliumt message. We would recheck the hemoglobin A1c in 3 months.

## 2021-11-06 LAB
CYTOLOGIST CVX/VAG CYTO: ABNORMAL
CYTOLOGY CVX/VAG DOC CYTO: ABNORMAL
CYTOLOGY CVX/VAG DOC THIN PREP: ABNORMAL
CYTOLOGY HISTORY:: ABNORMAL
DX ICD CODE: ABNORMAL
HPV GENOTYPE 18,45: NEGATIVE
HPV I/H RISK 4 DNA CVX QL PROBE+SIG AMP: POSITIVE
HPV16 DNA CVX QL PROBE+SIG AMP: NEGATIVE
Lab: ABNORMAL
OTHER STN SPEC: ABNORMAL
STAT OF ADQ CVX/VAG CYTO-IMP: ABNORMAL

## 2021-11-10 ENCOUNTER — TRANSCRIBE ORDER (OUTPATIENT)
Dept: SCHEDULING | Age: 71
End: 2021-11-10

## 2021-11-10 ENCOUNTER — TELEPHONE (OUTPATIENT)
Dept: FAMILY MEDICINE CLINIC | Age: 71
End: 2021-11-10

## 2021-11-10 DIAGNOSIS — R91.1 PULMONARY NODULE: Primary | ICD-10-CM

## 2021-11-10 NOTE — TELEPHONE ENCOUNTER
Since we do not dispense the med is a question for the pharmacist.  However, she is not on insulin so Medicare therefore will not even consider covering it.

## 2021-11-10 NOTE — TELEPHONE ENCOUNTER
Patient is trying to get free style coty Sensor but her part D will not cover. Her  said that it needs to be filed under Medicare part B & it will be covered.  Any question call patient

## 2021-11-10 NOTE — TELEPHONE ENCOUNTER
Called and spoke with patient. Advised that she would need to contact the pharmacy as they are the ones who run it through the insurance. Also advised that because she is not on insulin medicare likely will not cover at all. Patient verbalized understanding.

## 2021-11-12 ENCOUNTER — HOSPITAL ENCOUNTER (OUTPATIENT)
Dept: CARDIAC REHAB | Age: 71
Discharge: HOME | End: 2021-11-12
Payer: MEDICARE

## 2021-11-12 VITALS — BODY MASS INDEX: 25.34 KG/M2 | WEIGHT: 157 LBS

## 2021-11-12 PROCEDURE — 93798 PHYS/QHP OP CAR RHAB W/ECG: CPT

## 2021-11-18 ENCOUNTER — HOSPITAL ENCOUNTER (OUTPATIENT)
Dept: CARDIAC REHAB | Age: 71
Discharge: HOME | End: 2021-11-18
Payer: MEDICARE

## 2021-11-18 VITALS — WEIGHT: 159 LBS | BODY MASS INDEX: 25.66 KG/M2

## 2021-11-18 PROCEDURE — 93798 PHYS/QHP OP CAR RHAB W/ECG: CPT

## 2021-11-22 ENCOUNTER — HOSPITAL ENCOUNTER (OUTPATIENT)
Dept: CARDIAC REHAB | Age: 71
Discharge: HOME | End: 2021-11-22
Payer: MEDICARE

## 2021-11-22 VITALS — BODY MASS INDEX: 25.82 KG/M2 | WEIGHT: 160 LBS

## 2021-11-22 PROCEDURE — 93798 PHYS/QHP OP CAR RHAB W/ECG: CPT

## 2021-12-07 NOTE — PROGRESS NOTES
[facility]  OFFICE PROCEDURE PROGRESS NOTE        Chart reviewed for the following:   Neha OLIVEIRA, have reviewed the History, Physical and updated the Allergic reactions for [address] performed immediately prior to start of procedure:   Neha OLIVEIRA, have performed the following reviews on Evie Stevens prior to the start of the procedure:            * Patient was identified by name and date of birth   * Agreement on procedure being performed was verified  * Risks and Benefits explained to the patient  * Procedure site verified and marked as necessary  * Patient was positioned for comfort  * Consent was signed and verified     Time: 11:32 AM      Date of procedure: 12/10/2021    Procedure performed by:  Warren Bro MD    How tolerated by patient: tolerated the procedure well with no complications    Post Procedural Pain Scale: 2 - Hurts Little Bit    Comments: none    Procedure Note: Colposcopy    Evie Stevens is a ,  80 y.o. female WHITE/NON- No LMP recorded. Patient is postmenopausal.  She presents for colposcopy for evaluation of a cervical abnormality with a pap smear abnormality consisting of HPV. After being presented with the risks, benefits and alternatives has she signed a consent for the procedure. She states that she understands the need for the procedure and has no further questions. She was informed that she may experience discomfort. Procedure:  She was positioned in the dorsal lithotomy position and a speculum was inserted into the vagina. Dilute acetic acid was applied to the cervix. The colposcope was used to visualize the cervix. Procedure: The transformation zone was completely visualized. Findings: This colposcopy was satisfactory. The procedure was notable for normal epithelium on the cervix. Biopsies were taken from the cervix  At 12, 6 and 9:00. Monsels was applied to the cervix. Endocervical currettage:  An endocervical curettage was performed. Post Procedure Status: The patient tolerated the procedure well with minimal discomfort. She was observed for 10 minutes and released in good condition.

## 2021-12-10 ENCOUNTER — HOSPITAL ENCOUNTER (OUTPATIENT)
Dept: LAB | Age: 71
Discharge: HOME | End: 2021-12-10

## 2021-12-10 ENCOUNTER — HOSPITAL ENCOUNTER (OUTPATIENT)
Dept: CARDIAC REHAB | Age: 71
Discharge: HOME | End: 2021-12-10
Payer: MEDICARE

## 2021-12-10 ENCOUNTER — OFFICE VISIT (OUTPATIENT)
Dept: OBGYN CLINIC | Age: 71
End: 2021-12-10
Payer: MEDICARE

## 2021-12-10 VITALS — WEIGHT: 164 LBS | BODY MASS INDEX: 26.47 KG/M2

## 2021-12-10 VITALS — BODY MASS INDEX: 25.82 KG/M2 | DIASTOLIC BLOOD PRESSURE: 64 MMHG | SYSTOLIC BLOOD PRESSURE: 132 MMHG | WEIGHT: 160 LBS

## 2021-12-10 DIAGNOSIS — R87.810 CERVICAL HIGH RISK HPV (HUMAN PAPILLOMAVIRUS) TEST POSITIVE: Primary | ICD-10-CM

## 2021-12-10 PROCEDURE — 57454 BX/CURETT OF CERVIX W/SCOPE: CPT | Performed by: OBSTETRICS & GYNECOLOGY

## 2021-12-10 PROCEDURE — 93798 PHYS/QHP OP CAR RHAB W/ECG: CPT

## 2021-12-13 ENCOUNTER — HOSPITAL ENCOUNTER (OUTPATIENT)
Dept: CT IMAGING | Age: 71
Discharge: HOME | End: 2021-12-13
Attending: INTERNAL MEDICINE
Payer: MEDICARE

## 2021-12-13 DIAGNOSIS — R91.1 PULMONARY NODULE: ICD-10-CM

## 2021-12-13 PROCEDURE — 71250 CT THORAX DX C-: CPT

## 2021-12-14 ENCOUNTER — TELEPHONE (OUTPATIENT)
Dept: FAMILY MEDICINE CLINIC | Age: 71
End: 2021-12-14

## 2021-12-14 NOTE — TELEPHONE ENCOUNTER
Called and spoke with patient. She states that it needs to be faxed directly to he secondary insurance since her primary won't cover it. She states that her insurance company will then send it to a durable medical equipment company that they deal with. Advised that I would need the fax number of where it needs to go and we can fax it over as a hard script to the insurance company.  She states that she will send you a mychart with the fax number

## 2021-12-14 NOTE — TELEPHONE ENCOUNTER
Patient would like a script for Free [employer] Sensor sent to Medicare Part B/secondary [facility]. I don't even have this in our system as this is not a pharmacy. Patient is very nice but insisting her health ins agent. Patient's phone: [phone]. Please advise.

## 2021-12-15 DIAGNOSIS — E11.40 TYPE 2 DIABETES MELLITUS WITH DIABETIC NEUROPATHY, WITHOUT LONG-TERM CURRENT USE OF INSULIN (HCC): ICD-10-CM

## 2021-12-15 RX ORDER — FLASH GLUCOSE SENSOR
KIT MISCELLANEOUS
Qty: 6 KIT | Refills: 5 | Status: SHIPPED | OUTPATIENT
Start: 2021-12-15

## 2021-12-17 ENCOUNTER — HOSPITAL ENCOUNTER (OUTPATIENT)
Dept: CARDIAC REHAB | Age: 71
Discharge: HOME | End: 2021-12-17
Payer: MEDICARE

## 2021-12-17 VITALS — WEIGHT: 160 LBS | BODY MASS INDEX: 25.82 KG/M2

## 2021-12-17 PROCEDURE — 93798 PHYS/QHP OP CAR RHAB W/ECG: CPT

## 2021-12-22 ENCOUNTER — HOSPITAL ENCOUNTER (OUTPATIENT)
Dept: CARDIAC REHAB | Age: 71
Discharge: HOME | End: 2021-12-22
Payer: MEDICARE

## 2021-12-22 VITALS — WEIGHT: 159 LBS | BODY MASS INDEX: 25.66 KG/M2

## 2021-12-22 PROCEDURE — 93798 PHYS/QHP OP CAR RHAB W/ECG: CPT

## 2021-12-22 NOTE — TELEPHONE ENCOUNTER
Called patient to follow up. She states that when she called her secondary insurance they advised that she needed to initiate an appeal with her primary.  She states that she will call the office and notify us if anything changes

## 2022-01-03 RX ORDER — CARVEDILOL 3.12 MG/1
TABLET ORAL
Qty: 180 TABLET | Refills: 0 | Status: SHIPPED | OUTPATIENT
Start: 2022-01-03 | End: 2022-04-01

## 2022-01-06 ENCOUNTER — DOCUMENTATION ONLY (OUTPATIENT)
Dept: FAMILY MEDICINE CLINIC | Age: 72
End: 2022-01-06

## 2022-01-07 ENCOUNTER — HOSPITAL ENCOUNTER (OUTPATIENT)
Dept: CARDIAC REHAB | Age: 72
Discharge: HOME | End: 2022-01-07
Payer: MEDICARE

## 2022-01-07 VITALS — BODY MASS INDEX: 25.66 KG/M2 | WEIGHT: 159 LBS

## 2022-01-07 PROCEDURE — 93798 PHYS/QHP OP CAR RHAB W/ECG: CPT

## 2022-01-12 ENCOUNTER — DOCUMENTATION ONLY (OUTPATIENT)
Dept: FAMILY MEDICINE CLINIC | Age: 72
End: 2022-01-12

## 2022-01-21 ENCOUNTER — HOSPITAL ENCOUNTER (OUTPATIENT)
Dept: CARDIAC REHAB | Age: 72
Discharge: HOME | End: 2022-01-21
Payer: MEDICARE

## 2022-01-21 VITALS — BODY MASS INDEX: 25.82 KG/M2 | WEIGHT: 160 LBS

## 2022-01-21 PROCEDURE — 93798 PHYS/QHP OP CAR RHAB W/ECG: CPT

## 2022-01-28 ENCOUNTER — HOSPITAL ENCOUNTER (OUTPATIENT)
Dept: CARDIAC REHAB | Age: 72
Discharge: HOME | End: 2022-01-28
Payer: MEDICARE

## 2022-01-28 VITALS — WEIGHT: 158 LBS | BODY MASS INDEX: 25.5 KG/M2

## 2022-01-28 PROCEDURE — 93798 PHYS/QHP OP CAR RHAB W/ECG: CPT

## 2022-02-04 ENCOUNTER — HOSPITAL ENCOUNTER (OUTPATIENT)
Dept: CARDIAC REHAB | Age: 72
Discharge: HOME | End: 2022-02-04
Payer: MEDICARE

## 2022-02-04 VITALS — WEIGHT: 155 LBS | BODY MASS INDEX: 25.02 KG/M2

## 2022-02-04 PROCEDURE — 93798 PHYS/QHP OP CAR RHAB W/ECG: CPT

## 2022-02-04 NOTE — CARDIO/PULMONARY
[facility]:  Pt reported episode of her vision clouding over & feeling like she was going to pass out while on the [employer], when work out was almost done. Pt did not report this episode until the end of her session & she was preparing to depart. VSS. Pt reported that she did not check her FSBG this morning. Had a \"cupcake\" for breakfast, which she is aware is NOT recommended. She had a problem with the sensor in her arm & has ordered a guard to keep it from falling out. Indicated she would be using her sensor again starting tomorrow. Pt reported she would be going out to eat lunch shortly with her . She declined anything to eat. Reported she had Diet Coke in her car. Explained that diet soda would not increase her blood glucose level. Will continue to work with patient on improving her glucose control and understanding how to more effectively manager her diabetes.

## 2022-02-11 ENCOUNTER — HOSPITAL ENCOUNTER (OUTPATIENT)
Dept: CARDIAC REHAB | Age: 72
Discharge: HOME | End: 2022-02-11
Payer: MEDICARE

## 2022-02-11 VITALS — BODY MASS INDEX: 25.66 KG/M2 | WEIGHT: 159 LBS

## 2022-02-11 PROCEDURE — 93798 PHYS/QHP OP CAR RHAB W/ECG: CPT

## 2022-02-15 ENCOUNTER — DOCUMENTATION ONLY (OUTPATIENT)
Dept: FAMILY MEDICINE CLINIC | Age: 72
End: 2022-02-15

## 2022-02-15 ENCOUNTER — TELEPHONE (OUTPATIENT)
Dept: OBGYN CLINIC | Age: 72
End: 2022-02-15

## 2022-02-15 NOTE — TELEPHONE ENCOUNTER
Message left at 12:!7Pm      80year old patient last seen in the office on 10/29/2021     Patient left a message to say that she has left two messages regarding her lab megan bill from 10/29/2021 and has now been sent to collections.         Patient would like a call back asap    Patient can be reached at [phone]  Thank you

## 2022-02-16 ENCOUNTER — DOCUMENTATION ONLY (OUTPATIENT)
Dept: FAMILY MEDICINE CLINIC | Age: 72
End: 2022-02-16

## 2022-02-16 NOTE — PROGRESS NOTES
Order faxed to [facility]. Fax number [phone] confirmation number [phone].

## 2022-02-17 ENCOUNTER — OFFICE VISIT (OUTPATIENT)
Dept: FAMILY MEDICINE CLINIC | Age: 72
End: 2022-02-17
Payer: MEDICARE

## 2022-02-17 VITALS
WEIGHT: 154 LBS | TEMPERATURE: 97.4 F | HEIGHT: 66 IN | RESPIRATION RATE: 16 BRPM | OXYGEN SATURATION: 96 % | BODY MASS INDEX: 24.75 KG/M2 | HEART RATE: 84 BPM | DIASTOLIC BLOOD PRESSURE: 67 MMHG | SYSTOLIC BLOOD PRESSURE: 104 MMHG

## 2022-02-17 DIAGNOSIS — G37.3 TRANSVERSE MYELOPATHY SYNDROME (HCC): ICD-10-CM

## 2022-02-17 DIAGNOSIS — E11.40 TYPE 2 DIABETES MELLITUS WITH DIABETIC NEUROPATHY, WITHOUT LONG-TERM CURRENT USE OF INSULIN (HCC): ICD-10-CM

## 2022-02-17 DIAGNOSIS — J43.8 OTHER EMPHYSEMA (HCC): ICD-10-CM

## 2022-02-17 DIAGNOSIS — I70.0 ATHEROSCLEROSIS OF AORTA (HCC): ICD-10-CM

## 2022-02-17 DIAGNOSIS — I50.20 HFREF (HEART FAILURE WITH REDUCED EJECTION FRACTION) (HCC): ICD-10-CM

## 2022-02-17 DIAGNOSIS — F33.8 SEASONAL AFFECTIVE DISORDER (HCC): ICD-10-CM

## 2022-02-17 DIAGNOSIS — I25.10 CORONARY ARTERY DISEASE INVOLVING NATIVE CORONARY ARTERY OF NATIVE HEART WITHOUT ANGINA PECTORIS: Primary | ICD-10-CM

## 2022-02-17 PROCEDURE — G8427 DOCREV CUR MEDS BY ELIG CLIN: HCPCS | Performed by: FAMILY MEDICINE

## 2022-02-17 PROCEDURE — 3046F HEMOGLOBIN A1C LEVEL >9.0%: CPT | Performed by: FAMILY MEDICINE

## 2022-02-17 PROCEDURE — G8754 DIAS BP LESS 90: HCPCS | Performed by: FAMILY MEDICINE

## 2022-02-17 PROCEDURE — G9899 SCRN MAM PERF RSLTS DOC: HCPCS | Performed by: FAMILY MEDICINE

## 2022-02-17 PROCEDURE — G8420 CALC BMI NORM PARAMETERS: HCPCS | Performed by: FAMILY MEDICINE

## 2022-02-17 PROCEDURE — 2022F DILAT RTA XM EVC RTNOPTHY: CPT | Performed by: FAMILY MEDICINE

## 2022-02-17 PROCEDURE — G8536 NO DOC ELDER MAL SCRN: HCPCS | Performed by: FAMILY MEDICINE

## 2022-02-17 PROCEDURE — G0463 HOSPITAL OUTPT CLINIC VISIT: HCPCS | Performed by: FAMILY MEDICINE

## 2022-02-17 PROCEDURE — 3017F COLORECTAL CA SCREEN DOC REV: CPT | Performed by: FAMILY MEDICINE

## 2022-02-17 PROCEDURE — G8510 SCR DEP NEG, NO PLAN REQD: HCPCS | Performed by: FAMILY MEDICINE

## 2022-02-17 PROCEDURE — 1101F PT FALLS ASSESS-DOCD LE1/YR: CPT | Performed by: FAMILY MEDICINE

## 2022-02-17 PROCEDURE — 1090F PRES/ABSN URINE INCON ASSESS: CPT | Performed by: FAMILY MEDICINE

## 2022-02-17 PROCEDURE — 99214 OFFICE O/P EST MOD 30 MIN: CPT | Performed by: FAMILY MEDICINE

## 2022-02-17 PROCEDURE — G8752 SYS BP LESS 140: HCPCS | Performed by: FAMILY MEDICINE

## 2022-02-17 PROCEDURE — G8399 PT W/DXA RESULTS DOCUMENT: HCPCS | Performed by: FAMILY MEDICINE

## 2022-02-17 RX ORDER — MELATONIN
1000 DAILY
COMMUNITY

## 2022-02-17 NOTE — PATIENT INSTRUCTIONS
A Healthy Lifestyle: Care Instructions  Your Care Instructions     A healthy lifestyle can help you feel good, stay at a healthy weight, and have plenty of energy for both work and play. A healthy lifestyle is something you can share with your whole family. A healthy lifestyle also can lower your risk for serious health problems, such as high blood pressure, heart disease, and diabetes. You can follow a few steps listed below to improve your health and the health of your family. Follow-up care is a key part of your treatment and safety. Be sure to make and go to all appointments, and call your doctor if you are having problems. It's also a good idea to know your test results and keep a list of the medicines you take. How can you care for yourself at home? · Do not eat too much sugar, fat, or fast foods. You can still have dessert and treats now and then. The goal is moderation. · Start small to improve your eating habits. Pay attention to portion sizes, drink less juice and soda pop, and eat more fruits and vegetables. ? Eat a healthy amount of food. A 3-ounce serving of meat, for example, is about the size of a deck of cards. Fill the rest of your plate with vegetables and whole grains. ? Limit the amount of soda and sports drinks you have every day. Drink more water when you are thirsty. ? Eat plenty of fruits and vegetables every day. Have an apple or some carrot sticks as an afternoon snack instead of a candy bar. Try to have fruits and/or vegetables at every meal.  · Make exercise part of your daily routine. You may want to start with simple activities, such as walking, bicycling, or slow swimming. Try to be active 30 to 60 minutes every day. You do not need to do all 30 to 60 minutes all at once. For example, you can exercise 3 times a day for 10 or 20 minutes.  Moderate exercise is safe for most people, but it is always a good idea to talk to your doctor before starting an exercise program.  · Keep moving. Itzel Enter the lawn, work in the garden, or [employer]. Take the stairs instead of the elevator at work. · If you smoke, quit. People who smoke have an increased risk for heart attack, stroke, cancer, and other lung illnesses. Quitting is hard, but there are ways to boost your chance of quitting tobacco for good. ? Use nicotine gum, patches, or lozenges. ? Ask your doctor about stop-smoking programs and medicines. ? Keep trying. In addition to reducing your risk of diseases in the future, you will notice some benefits soon after you stop using tobacco. If you have shortness of breath or asthma symptoms, they will likely get better within a few weeks after you quit. · Limit how much alcohol you drink. Moderate amounts of alcohol (up to 2 drinks a day for men, 1 drink a day for women) are okay. But drinking too much can lead to liver problems, high blood pressure, and other health problems. Family health  If you have a family, there are many things you can do together to improve your health. · Eat meals together as a family as often as possible. · Eat healthy foods. This includes fruits, vegetables, lean meats and dairy, and whole grains. · Include your family in your fitness plan. Most people think of activities such as jogging or tennis as the way to fitness, but there are many ways you and your family can be more active. Anything that makes you breathe hard and gets your heart pumping is exercise. Here are some tips:  ? Walk to do errands or to take your child to school or the bus.  ? Go for a family bike ride after dinner instead of watching TV. Where can you learn more? Go to http://www.gray.com/  Enter J464 in the search box to learn more about \"A Healthy Lifestyle: Care Instructions. \"  Current as of: June 16, 2021               Content Version: 13.0  © 0366-9912 Healthwise, Incorporated.    Care instructions adapted under license by [employer] (which disclaims liability or warranty for this information). If you have questions about a medical condition or this instruction, always ask your healthcare professional. [address] any warranty or liability for your use of this information.

## 2022-02-17 NOTE — PROGRESS NOTES
Chief Complaint   Patient presents with    Diabetes     Patient presents in office today for diabetes check. No concerns. 1. Have you been to the ER, urgent care clinic since your last visit? Hospitalized since your last visit? No    2. Have you seen or consulted any other health care providers outside of the [address] [place] [address] since your last visit? Include any pap smears or colon screening.  No    Learning Assessment 3/4/2014   PRIMARY LEARNER Patient   HIGHEST LEVEL OF EDUCATION - PRIMARY LEARNER  4 YEARS OF COLLEGE   BARRIERS PRIMARY LEARNER NONE   PRIMARY LANGUAGE ENGLISH   LEARNER PREFERENCE PRIMARY LISTENING   ANSWERED BY self   RELATIONSHIP SELF

## 2022-02-17 NOTE — PROGRESS NOTES
Progress Note    she is a 80y.o. year old female who presents for evalution. Subjective:     Pt here for f/u on her DM and her A1C had gone up to 8.7, due for recheck today. Has had issues with freestyle John Lusher so unsure how her glucose has been doing. She is taking the prandin but does   HLD on statin and has been at goal.  CHF managed by Cardiology. Has pacemaker and defib,. Transverse myelitis stable uses rollator  Reviewed PmHx, RxHx, FmHx, SocHx, AllgHx and updated and dated in the chart. Review of Systems - negative except as listed above in the HPI    Objective:     Vitals:    02/17/22 1025   BP: 104/67   Pulse: 84   Resp: 16   Temp: 97.4 °F (36.3 °C)   TempSrc: Oral   SpO2: 96%   Weight: 154 lb (69.9 kg)   Height: 5' 6\" (1.676 m)       Current Outpatient Medications   Medication Sig    cholecalciferol (VITAMIN D3) (1000 Units /25 mcg) tablet Take 1,000 Units by mouth daily.  carvediloL (COREG) 3.125 mg tablet TAKE 1 TABLET BY MOUTH TWICE DAILY WITH MEALS    flash glucose sensor (FreeStyle Efe 14 Day Sensor) kit Change every 14 days E11.40 not on insulin, has Hyperglycemia. Check BG Qac and HS with sensor    tiotropium-olodateroL (Stiolto Respimat) 2.5-2.5 mcg/actuation inhaler Take 2 Puffs by inhalation daily.  repaglinide (PRANDIN) 0.5 mg tablet Take 1 Tablet by mouth Before breakfast, lunch, and dinner.  atorvastatin (LIPITOR) 40 mg tablet TAKE 1 TABLET BY MOUTH DAILY    linaCLOtide (Linzess) 72 mcg cap capsule Take 1 Capsule by mouth Daily (before breakfast).  levomefolate-B6-meB12-algal oil (Metanx, algal oil,) 3 mg-35 mg-2 mg -90.314 mg cap Take 1 Capsule by mouth two (2) times a day.  clopidogreL (PLAVIX) 75 mg tab TAKE 1 TABLET BY MOUTH DAILY    calcium carb/vitamin D3/vit K1 (VIACTIV PO) Take  by mouth.  aspirin delayed-release 81 mg tablet Take 81 mg by mouth daily.  Biotin 2,500 mcg cap Take  by mouth.     dapagliflozin (Farxiga) 10 mg tab tablet Take by mouth daily.  milnacipran (SAVELLA) 50 mg tablet Take 1 Tab by mouth two (2) times a day.  fluticasone propionate (FLONASE ALLERGY RELIEF) 50 mcg/actuation nasal spray 2 Sprays by Both Nostrils route every morning.  magnesium oxide (MAG-OX) 400 mg tablet Take 400 mg by mouth every morning.  cranberry fruit extract (CRANBERRY PO) Take 4,200 mg by mouth daily.  ivermectin (SOOLANTRA) 1 % crea Apply  to affected area daily.  CALCIUM PO Take 1 Caplet by mouth daily.  glimepiride (AMARYL) 2 mg tablet Take 2 mg by mouth three (3) times daily. 1 in the morning, 2 at night    L-Mfolate-B6 Phos-Methyl-B12 (METANX) 3-35-2 mg tab tab Take 1 Tab by mouth two (2) times a day. Indications: neuropathy     No current facility-administered medications for this visit. Physical Examination: General appearance - alert, well appearing, and in no distress  Chest - clear to auscultation, no wheezes, rales or rhonchi, symmetric air entry  Heart - normal rate, regular rhythm, normal S1, S2, no murmurs, rubs, clicks or gallops      Assessment/ Plan:   Diagnoses and all orders for this visit:    1. Coronary artery disease involving native coronary artery of native heart without angina pectoris  -     LIPID PANEL; Future  -     METABOLIC PANEL, COMPREHENSIVE; Future    2. Transverse myelopathy syndrome (HCC)  stable  3. Other emphysema ([facility]  stable  4. HFrEF (heart failure with reduced ejection fraction) ([facility]  Pacemaker ACD, managed by cardio  5. Seasonal affective disorder ([facility]  stable  6. Type 2 diabetes mellitus with diabetic neuropathy, without long-term current use of insulin (HCC)  -     LIPID PANEL; Future  -     METABOLIC PANEL, COMPREHENSIVE; Future  -     HEMOGLOBIN A1C WITH EAG; Future    7. Atherosclerosis of aorta ([facility]  On statin     Follow-up and Dispositions    · Return in about 3 months (around 5/17/2022), or if symptoms worsen or fail to improve.            I have discussed the diagnosis with the patient and the intended plan as seen in the above orders. The patient has received an after-visit summary and questions were answered concerning future plans. Pt conveyed understanding of plan.     Medication Side Effects and Warnings were discussed with patient    An electronic signature was used to authenticate this note  Ulises Stephens DO

## 2022-02-18 ENCOUNTER — HOSPITAL ENCOUNTER (OUTPATIENT)
Dept: CARDIAC REHAB | Age: 72
Discharge: HOME | End: 2022-02-18
Payer: MEDICARE

## 2022-02-18 VITALS — BODY MASS INDEX: 25.02 KG/M2 | WEIGHT: 155 LBS

## 2022-02-18 LAB
ALBUMIN SERPL-MCNC: 3.6 G/DL (ref 3.5–5)
ALBUMIN/GLOB SERPL: 1.3 {RATIO} (ref 1.1–2.2)
ALP SERPL-CCNC: 148 U/L (ref 45–117)
ALT SERPL-CCNC: 66 U/L (ref 12–78)
ANION GAP SERPL CALC-SCNC: 6 MMOL/L (ref 5–15)
AST SERPL-CCNC: 33 U/L (ref 15–37)
BILIRUB SERPL-MCNC: 0.5 MG/DL (ref 0.2–1)
BUN SERPL-MCNC: 25 MG/DL (ref 6–20)
BUN/CREAT SERPL: 38 (ref 12–20)
CALCIUM SERPL-MCNC: 9.2 MG/DL (ref 8.5–10.1)
CHLORIDE SERPL-SCNC: 104 MMOL/L (ref 97–108)
CHOLEST SERPL-MCNC: 146 MG/DL
CO2 SERPL-SCNC: 27 MMOL/L (ref 21–32)
CREAT SERPL-MCNC: 0.65 MG/DL (ref 0.55–1.02)
EST. AVERAGE GLUCOSE BLD GHB EST-MCNC: 183 MG/DL
GLOBULIN SER CALC-MCNC: 2.7 G/DL (ref 2–4)
GLUCOSE SERPL-MCNC: 274 MG/DL (ref 65–100)
HBA1C MFR BLD: 8 % (ref 4–5.6)
HDLC SERPL-MCNC: 46 MG/DL
HDLC SERPL: 3.2 {RATIO} (ref 0–5)
LDLC SERPL CALC-MCNC: 51.8 MG/DL (ref 0–100)
POTASSIUM SERPL-SCNC: 4.7 MMOL/L (ref 3.5–5.1)
PROT SERPL-MCNC: 6.3 G/DL (ref 6.4–8.2)
SODIUM SERPL-SCNC: 137 MMOL/L (ref 136–145)
TRIGL SERPL-MCNC: 241 MG/DL (ref ?–150)
VLDLC SERPL CALC-MCNC: 48.2 MG/DL

## 2022-02-18 PROCEDURE — 93798 PHYS/QHP OP CAR RHAB W/ECG: CPT

## 2022-02-22 NOTE — PROGRESS NOTES
Prediabetes has improved somewhat 8.0 down from 8.7. Continue with the repaglinide/Prandin. It would probably be a good idea to start a long-acting insulin once daily. Let me know your thoughts on this. Please send me a message.

## 2022-02-25 ENCOUNTER — HOSPITAL ENCOUNTER (OUTPATIENT)
Dept: CARDIAC REHAB | Age: 72
Discharge: HOME | End: 2022-02-25
Payer: MEDICARE

## 2022-02-25 VITALS — WEIGHT: 160 LBS | BODY MASS INDEX: 25.82 KG/M2

## 2022-02-25 PROCEDURE — 93798 PHYS/QHP OP CAR RHAB W/ECG: CPT

## 2022-03-18 PROBLEM — I42.9 MYOCARDIOPATHY (HCC): Status: ACTIVE | Noted: 2020-06-15

## 2022-03-18 PROBLEM — R93.7 ABNORMAL MRI, CERVICAL SPINE: Status: ACTIVE | Noted: 2017-04-03

## 2022-03-18 PROBLEM — G37.3 TRANSVERSE MYELOPATHY SYNDROME (HCC): Status: ACTIVE | Noted: 2017-04-02

## 2022-03-19 PROBLEM — I50.32 CHRONIC HEART FAILURE WITH PRESERVED EJECTION FRACTION (HCC): Status: ACTIVE | Noted: 2020-06-15

## 2022-03-19 PROBLEM — Z95.0 PACEMAKER: Status: ACTIVE | Noted: 2020-10-29

## 2022-03-19 PROBLEM — J43.8 OTHER EMPHYSEMA (HCC): Status: ACTIVE | Noted: 2019-08-29

## 2022-03-19 PROBLEM — I70.0 ATHEROSCLEROSIS OF AORTA (HCC): Status: ACTIVE | Noted: 2019-09-24

## 2022-03-19 PROBLEM — I44.7 LBBB (LEFT BUNDLE BRANCH BLOCK): Status: ACTIVE | Noted: 2020-06-15

## 2022-03-20 PROBLEM — I25.10 CORONARY ARTERY DISEASE INVOLVING NATIVE CORONARY ARTERY OF NATIVE HEART WITHOUT ANGINA PECTORIS: Status: ACTIVE | Noted: 2020-06-15

## 2022-03-20 PROBLEM — Z85.3 HISTORY OF BREAST CANCER: Status: ACTIVE | Noted: 2020-04-09

## 2022-03-20 PROBLEM — R53.1 LEFT-SIDED WEAKNESS: Status: ACTIVE | Noted: 2017-04-02

## 2022-03-20 PROBLEM — I50.22 SYSTOLIC CHF, CHRONIC (HCC): Status: ACTIVE | Noted: 2020-06-15

## 2022-04-01 RX ORDER — CARVEDILOL 3.12 MG/1
3.12 TABLET ORAL 2 TIMES DAILY WITH MEALS
Qty: 180 TABLET | Refills: 0 | Status: SHIPPED | OUTPATIENT
Start: 2022-04-01

## 2022-04-01 NOTE — TELEPHONE ENCOUNTER
Requested Prescriptions     Signed Prescriptions Disp Refills    carvediloL (COREG) 3.125 mg tablet 180 Tablet 0     Sig: Take 1 Tablet by mouth two (2) times daily (with meals). Authorizing Provider: Chuck Carlson     Ordering User: Juan Cruz     Refill per verbal order TONY Mcallister NP. Last visit:  2/3/21  Next visit:  Nusrat Nava, message sent to  to contact the patient to schedule a follow up.

## 2022-04-04 NOTE — TELEPHONE ENCOUNTER
Collette Dome, LPN  Caller: Unspecified (3 days ago,  4:00 AM)  Pt informed me that she is Joleen Anglinrel not sure why the Pharmacy is still sending them to us

## 2022-04-14 ENCOUNTER — DOCUMENTATION ONLY (OUTPATIENT)
Dept: FAMILY MEDICINE CLINIC | Age: 72
End: 2022-04-14

## 2022-04-18 ENCOUNTER — DOCUMENTATION ONLY (OUTPATIENT)
Dept: FAMILY MEDICINE CLINIC | Age: 72
End: 2022-04-18

## 2022-05-11 ENCOUNTER — OFFICE VISIT (OUTPATIENT)
Dept: FAMILY MEDICINE CLINIC | Age: 72
End: 2022-05-11
Payer: MEDICARE

## 2022-05-11 VITALS
WEIGHT: 157 LBS | HEIGHT: 66 IN | TEMPERATURE: 97.7 F | DIASTOLIC BLOOD PRESSURE: 63 MMHG | OXYGEN SATURATION: 100 % | SYSTOLIC BLOOD PRESSURE: 99 MMHG | HEART RATE: 80 BPM | BODY MASS INDEX: 25.23 KG/M2 | RESPIRATION RATE: 15 BRPM

## 2022-05-11 DIAGNOSIS — E11.40 TYPE 2 DIABETES MELLITUS WITH DIABETIC NEUROPATHY, WITHOUT LONG-TERM CURRENT USE OF INSULIN (HCC): Primary | ICD-10-CM

## 2022-05-11 DIAGNOSIS — I10 PRIMARY HYPERTENSION: ICD-10-CM

## 2022-05-11 PROCEDURE — 1090F PRES/ABSN URINE INCON ASSESS: CPT | Performed by: FAMILY MEDICINE

## 2022-05-11 PROCEDURE — G8510 SCR DEP NEG, NO PLAN REQD: HCPCS | Performed by: FAMILY MEDICINE

## 2022-05-11 PROCEDURE — G0463 HOSPITAL OUTPT CLINIC VISIT: HCPCS | Performed by: FAMILY MEDICINE

## 2022-05-11 PROCEDURE — G8536 NO DOC ELDER MAL SCRN: HCPCS | Performed by: FAMILY MEDICINE

## 2022-05-11 PROCEDURE — 3052F HG A1C>EQUAL 8.0%<EQUAL 9.0%: CPT | Performed by: FAMILY MEDICINE

## 2022-05-11 PROCEDURE — G9899 SCRN MAM PERF RSLTS DOC: HCPCS | Performed by: FAMILY MEDICINE

## 2022-05-11 PROCEDURE — G8419 CALC BMI OUT NRM PARAM NOF/U: HCPCS | Performed by: FAMILY MEDICINE

## 2022-05-11 PROCEDURE — 2022F DILAT RTA XM EVC RTNOPTHY: CPT | Performed by: FAMILY MEDICINE

## 2022-05-11 PROCEDURE — G8399 PT W/DXA RESULTS DOCUMENT: HCPCS | Performed by: FAMILY MEDICINE

## 2022-05-11 PROCEDURE — G8427 DOCREV CUR MEDS BY ELIG CLIN: HCPCS | Performed by: FAMILY MEDICINE

## 2022-05-11 PROCEDURE — 99214 OFFICE O/P EST MOD 30 MIN: CPT | Performed by: FAMILY MEDICINE

## 2022-05-11 PROCEDURE — 3017F COLORECTAL CA SCREEN DOC REV: CPT | Performed by: FAMILY MEDICINE

## 2022-05-11 PROCEDURE — G8754 DIAS BP LESS 90: HCPCS | Performed by: FAMILY MEDICINE

## 2022-05-11 PROCEDURE — 1101F PT FALLS ASSESS-DOCD LE1/YR: CPT | Performed by: FAMILY MEDICINE

## 2022-05-11 PROCEDURE — G8752 SYS BP LESS 140: HCPCS | Performed by: FAMILY MEDICINE

## 2022-05-11 RX ORDER — BACLOFEN 10 MG/1
10 TABLET ORAL EVERY EVENING
COMMUNITY

## 2022-05-11 RX ORDER — SACUBITRIL AND VALSARTAN 49; 51 MG/1; MG/1
1-2 TABLET, FILM COATED ORAL 2 TIMES DAILY
COMMUNITY
Start: 2022-05-02

## 2022-05-11 RX ORDER — PERPHENAZINE 16 MG
1 TABLET ORAL 2 TIMES DAILY
COMMUNITY

## 2022-05-11 RX ORDER — BETHANECHOL CHLORIDE 5 MG/1
5 TABLET ORAL 2 TIMES DAILY
COMMUNITY
Start: 2022-03-31

## 2022-05-11 RX ORDER — LORATADINE 10 MG/1
10 TABLET ORAL
COMMUNITY

## 2022-05-11 NOTE — PROGRESS NOTES
Chief Complaint   Patient presents with    Diabetes     white mulberry    Hypertension    Labs     Fasting today     1. Have you been to the ER, urgent care clinic since your last visit? Hospitalized since your last visit? No    2. Have you seen or consulted any other health care providers outside of the [address] [place] [address] since your last visit? Include any pap smears or colon screening. Yes Where: Dr Anali Trent  5/11/2022  Provider:   Hussain:  Diabetes Report Card   1) Have you seen the eye doctor in past year?yes    2) How would you  rate your Diabetic Diet? Fair   3) How well do you take care of your feet? Self  Care/pedicure   4) Do you keep your Primary Care Follow Up Appts? yes    5) Do you know your A1C goal?no    6) Do you take your medications daily? yes    7) Do you check your blood sugars? yes    8) Have you gained weight?no       9) Do you follow an exercise program?yes    10) Can you do better?yes      Lab Results   Component Value Date/Time    Cholesterol, total 146 02/17/2022 11:09 AM    HDL Cholesterol 46 02/17/2022 11:09 AM    LDL, calculated 51.8 02/17/2022 11:09 AM    Triglyceride 241 (H) 02/17/2022 11:09 AM    CHOL/HDL Ratio 3.2 02/17/2022 11:09 AM     Lab Results   Component Value Date/Time    Hemoglobin A1c 8.0 (H) 02/17/2022 11:09 AM    Hemoglobin A1c 8.7 (H) 11/04/2021 10:35 AM    Hemoglobin A1c 8.5 (H) 08/04/2021 04:35 PM    Glucose 274 (H) 02/17/2022 11:09 AM    Glucose (POC) 494 (H) 10/29/2020 04:17 PM    Microalbumin/Creat ratio (mg/g creat) 25 08/04/2021 04:35 PM    Microalbumin,urine random 1.41 08/04/2021 04:35 PM    LDL, calculated 51.8 02/17/2022 11:09 AM    Creatinine (POC) 0.6 07/30/2018 04:11 PM    Creatinine 0.65 02/17/2022 11:09 AM    Hemoglobin A1c, External 7.2 09/23/2020 12:00 AM    Hemoglobin A1c, External 7.7 06/07/2019 12:00 AM      Lab Results   Component Value Date/Time    Microalbumin/Creat ratio (mg/g creat) 25 08/04/2021 04:35 PM    Microalbumin,urine random 1.41 08/04/2021 04:35 PM      Chief Complaint   Patient presents with    Diabetes     white mulberry    Hypertension    Labs     Fasting today     she is a 71y.o. year old female who presents for evaluation. See Diabetic Report Card listed above. Patient Active Problem List    Diagnosis    Pacemaker    Coronary artery disease involving native coronary artery of native heart without angina pectoris    LBBB (left bundle branch block)    Chronic heart failure with preserved ejection fraction (HCC)    Myocardiopathy ([address]    Systolic CHF, chronic ([address]    History of breast cancer    Atherosclerosis of aorta ([address]    Other emphysema ([address]     Seen on cxr thru pulm      Abnormal MRI, cervical spine    Left-sided weakness    Transverse myelopathy syndrome ([address]    ACP (advance care planning)     discussed      Scoliosis    HTN (hypertension)    Fibromyalgia    Postmenopausal bleeding    Diabetes mellitus with neuropathy ([address]       Reviewed PmHx, RxHx, FmHx, SocHx, AllgHx--dated and updated in the chart. Review of Systems - negative except as listed above in the HPI    Objective:     Vitals:    05/11/22 0822   BP: 99/63   Pulse: 80   Resp: 15   Temp: 97.7 °F (36.5 °C)   TempSrc: Oral   SpO2: 100%   Weight: 157 lb (71.2 kg)   Height: 5' 6\" (1.676 m)         Assessment/ Plan:   Diagnoses and all orders for this visit:    1. Type 2 diabetes mellitus with diabetic neuropathy, without long-term current use of insulin ([facility])  -     LIPID PANEL; Future  -     METABOLIC PANEL, COMPREHENSIVE; Future  -     HEMOGLOBIN A1C WITH EAG; Future  -fair control  -not taking prandin  -seeing endo    2. Primary hypertension  -     LIPID PANEL; Future  -     METABOLIC PANEL, COMPREHENSIVE;  Future  -at goal         Lab Results   Component Value Date/Time    Cholesterol, total 146 02/17/2022 11:09 AM    HDL Cholesterol 46 02/17/2022 11:09 AM    LDL, calculated 51.8 02/17/2022 11:09 AM    Triglyceride 241 (H) 02/17/2022 11:09 AM    CHOL/HDL Ratio 3.2 02/17/2022 11:09 AM     Lab Results   Component Value Date/Time    Hemoglobin A1c 8.0 (H) 02/17/2022 11:09 AM    Hemoglobin A1c 8.7 (H) 11/04/2021 10:35 AM    Hemoglobin A1c 8.5 (H) 08/04/2021 04:35 PM    Microalbumin/Creat ratio (mg/g creat) 25 08/04/2021 04:35 PM    Microalbumin,urine random 1.41 08/04/2021 04:35 PM    LDL, calculated 51.8 02/17/2022 11:09 AM    Creatinine (POC) 0.6 07/30/2018 04:11 PM    Creatinine 0.65 02/17/2022 11:09 AM    Hemoglobin A1c, External 7.2 09/23/2020 12:00 AM    Hemoglobin A1c, External 7.7 06/07/2019 12:00 AM          Discussed with patient goal of Diabetes to include:  HgA1C <7, LDL cholesterol <100, Blood pressure <140/80. Discussed with patient diet and weight management and to get regular exercise. Recommend yearly eye exams and daily foot care. The patient understands and agrees with the plan. I have discussed the diagnosis with the patient and the intended plan as seen in the above orders. The patient has received an after-visit summary and questions were answered concerning future plans. Medication Side Effects and Warnings were discussed with patient  Patient Labs were reviewed and or requested  Patient Past Records were reviewed and or requested    Nohemi Reynolds M.D. [id number]    There are no Patient Instructions on file for this visit.

## 2022-05-12 LAB
ALBUMIN SERPL-MCNC: 3.6 G/DL (ref 3.5–5)
ALBUMIN/GLOB SERPL: 1.2 {RATIO} (ref 1.1–2.2)
ALP SERPL-CCNC: 179 U/L (ref 45–117)
ALT SERPL-CCNC: 62 U/L (ref 12–78)
ANION GAP SERPL CALC-SCNC: 3 MMOL/L (ref 5–15)
AST SERPL-CCNC: 35 U/L (ref 15–37)
BILIRUB SERPL-MCNC: 0.4 MG/DL (ref 0.2–1)
BUN SERPL-MCNC: 26 MG/DL (ref 6–20)
BUN/CREAT SERPL: 43 (ref 12–20)
CALCIUM SERPL-MCNC: 9.7 MG/DL (ref 8.5–10.1)
CHLORIDE SERPL-SCNC: 104 MMOL/L (ref 97–108)
CHOLEST SERPL-MCNC: 158 MG/DL
CO2 SERPL-SCNC: 31 MMOL/L (ref 21–32)
CREAT SERPL-MCNC: 0.6 MG/DL (ref 0.55–1.02)
EST. AVERAGE GLUCOSE BLD GHB EST-MCNC: 200 MG/DL
GLOBULIN SER CALC-MCNC: 3.1 G/DL (ref 2–4)
GLUCOSE SERPL-MCNC: 187 MG/DL (ref 65–100)
HBA1C MFR BLD: 8.6 % (ref 4–5.6)
HDLC SERPL-MCNC: 55 MG/DL
HDLC SERPL: 2.9 {RATIO} (ref 0–5)
LDLC SERPL CALC-MCNC: 80.2 MG/DL (ref 0–100)
POTASSIUM SERPL-SCNC: 4.3 MMOL/L (ref 3.5–5.1)
PROT SERPL-MCNC: 6.7 G/DL (ref 6.4–8.2)
SODIUM SERPL-SCNC: 138 MMOL/L (ref 136–145)
TRIGL SERPL-MCNC: 114 MG/DL (ref ?–150)
VLDLC SERPL CALC-MCNC: 22.8 MG/DL

## 2022-05-17 NOTE — PROGRESS NOTES
Please contact patient regarding their mychart resulted labs. They have not reviewed them to date.       Dr. Vannessa Oliver

## 2022-06-09 ENCOUNTER — HOSPITAL ENCOUNTER (OUTPATIENT)
Dept: GENERAL RADIOLOGY | Age: 72
Discharge: HOME | End: 2022-06-09
Payer: MEDICARE

## 2022-06-09 ENCOUNTER — TRANSCRIBE ORDER (OUTPATIENT)
Dept: GENERAL RADIOLOGY | Age: 72
End: 2022-06-09

## 2022-06-09 DIAGNOSIS — J44.1 COPD EXACERBATION (HCC): ICD-10-CM

## 2022-06-09 DIAGNOSIS — J44.1 COPD EXACERBATION (HCC): Primary | ICD-10-CM

## 2022-06-09 PROCEDURE — 71046 X-RAY EXAM CHEST 2 VIEWS: CPT

## 2022-06-20 ENCOUNTER — DOCUMENTATION ONLY (OUTPATIENT)
Dept: FAMILY MEDICINE CLINIC | Age: 72
End: 2022-06-20

## 2022-06-22 ENCOUNTER — DOCUMENTATION ONLY (OUTPATIENT)
Dept: FAMILY MEDICINE CLINIC | Age: 72
End: 2022-06-22

## 2022-06-22 NOTE — PROGRESS NOTES
[facility] POC was signed & faxed to [phone],CO,UWCT placed in scan folder to be scanned to chart.

## 2022-07-08 ENCOUNTER — DOCUMENTATION ONLY (OUTPATIENT)
Dept: FAMILY MEDICINE CLINIC | Age: 72
End: 2022-07-08

## 2022-08-15 NOTE — CARDIO/PULMONARY
[facility]-Discharge Note  Pt participated in [facility] Phase II Cardiac Re[facility] for 15 exercise sessions. Time frame of participation was from 10/28/221-2/25/22. Pt did not return telephone messages on 3/30/22 to schedule additional sessions. Contact made with patient on 4/6/22. Pt stated that she was seeing her doctor this afternoon and would call the department for scheduling. No return correspondence too place for continued participation. Insurance driven discharge date was 7/7/2022. Pt is discharged from [facility].

## 2022-08-24 RX ORDER — NITROFURANTOIN 25; 75 MG/1; MG/1
100 CAPSULE ORAL 2 TIMES DAILY
Qty: 10 CAPSULE | Refills: 0 | Status: SHIPPED | OUTPATIENT
Start: 2022-08-24 | End: 2022-08-29

## 2022-09-07 DIAGNOSIS — E11.40 TYPE 2 DIABETES MELLITUS WITH DIABETIC NEUROPATHY, WITHOUT LONG-TERM CURRENT USE OF INSULIN (HCC): ICD-10-CM

## 2022-09-08 RX ORDER — L-METHYLFOLATE-ALGAE-VIT B12-B6 CAP 3-90.314-2-35 MG 3-90.314-2-35 MG
CAP ORAL
Qty: 180 CAPSULE | Refills: 5 | Status: SHIPPED | OUTPATIENT
Start: 2022-09-08

## 2022-10-30 ENCOUNTER — TRANSCRIBE ORDER (OUTPATIENT)
Dept: SCHEDULING | Age: 72
End: 2022-10-30

## 2022-10-30 DIAGNOSIS — R91.1 PULMONARY NODULE: Primary | ICD-10-CM

## 2022-11-15 ENCOUNTER — HOSPITAL ENCOUNTER (OUTPATIENT)
Dept: CT IMAGING | Age: 72
Discharge: HOME | End: 2022-11-15
Attending: NURSE PRACTITIONER
Payer: MEDICARE

## 2022-11-15 DIAGNOSIS — R91.1 PULMONARY NODULE: ICD-10-CM

## 2022-11-15 PROCEDURE — 71250 CT THORAX DX C-: CPT

## 2022-11-16 NOTE — PROGRESS NOTES
Discharge order noted. Patient medicated according to MD orders. PIV removed. Patient teaching done on post-device care. Patient verbalizes understanding. Patient assisted with dressing and packing as needed. Patient has all belongings, including home medications, packed in belongings. Discharge medications reviewed with patient and appropriate educational materials and side effects teaching were provided. I have reviewed discharge instructions with the patient. The patient verbalized understanding. Leave smart education provided to patient. The patient verbalized understanding. AVS signed and given to patient. Visit Summary for Payal Garcia - Gender: Female - Date of Birth: [id number]  Date: [id number] - Duration: 8 minutes  Patient: Payal Garcia  Provider: Xiomara Francisco PA-C    Patient Contact Information  Address  [address] [place] [address]; [address] [place] [address]  [phone]    Visit Topics  Cold [Added By: Self - 2022-11-16]    Triage Questions   What is your current physical address in the event of a medical emergency? Answer []  Are you allergic to any medications? Answer []  Are you now or could you be pregnant? Answer []  Do you have any immune system compromise or chronic lung   disease? Answer []  Do you have any vulnerable family members in the home (infant, pregnant, cancer, elderly)? Answer []     Conversation Transcripts  [0A][0A] [Notification] You are connected with Xiomara Francisco PA-C, Urgent Care Specialist [0A][Notification] Payal [place]land is located in [place]  [0A][Notification] [facility]  Natalie Townsend  [0A]    Diagnosis  Acute upper respiratory infection, unspecified    Procedures  Value: [id number] Code: CPT-4 UNLISTED E&M SERVICE    Medications Prescribed    No prescriptions ordered    Electronically signed by: Kayla Jimenez(NPI [id number])

## 2022-12-13 ENCOUNTER — VIRTUAL VISIT (OUTPATIENT)
Dept: FAMILY MEDICINE CLINIC | Age: 72
End: 2022-12-13
Payer: MEDICARE

## 2022-12-13 DIAGNOSIS — Z00.00 MEDICARE ANNUAL WELLNESS VISIT, SUBSEQUENT: ICD-10-CM

## 2022-12-13 DIAGNOSIS — G37.3 TRANSVERSE MYELOPATHY SYNDROME (HCC): Primary | ICD-10-CM

## 2022-12-13 PROCEDURE — 1090F PRES/ABSN URINE INCON ASSESS: CPT | Performed by: FAMILY MEDICINE

## 2022-12-13 PROCEDURE — G8536 NO DOC ELDER MAL SCRN: HCPCS | Performed by: FAMILY MEDICINE

## 2022-12-13 PROCEDURE — G8399 PT W/DXA RESULTS DOCUMENT: HCPCS | Performed by: FAMILY MEDICINE

## 2022-12-13 PROCEDURE — G8427 DOCREV CUR MEDS BY ELIG CLIN: HCPCS | Performed by: FAMILY MEDICINE

## 2022-12-13 PROCEDURE — G8510 SCR DEP NEG, NO PLAN REQD: HCPCS | Performed by: FAMILY MEDICINE

## 2022-12-13 PROCEDURE — G9899 SCRN MAM PERF RSLTS DOC: HCPCS | Performed by: FAMILY MEDICINE

## 2022-12-13 PROCEDURE — 3017F COLORECTAL CA SCREEN DOC REV: CPT | Performed by: FAMILY MEDICINE

## 2022-12-13 PROCEDURE — G0463 HOSPITAL OUTPT CLINIC VISIT: HCPCS | Performed by: FAMILY MEDICINE

## 2022-12-13 PROCEDURE — 1123F ACP DISCUSS/DSCN MKR DOCD: CPT | Performed by: FAMILY MEDICINE

## 2022-12-13 PROCEDURE — G8417 CALC BMI ABV UP PARAM F/U: HCPCS | Performed by: FAMILY MEDICINE

## 2022-12-13 PROCEDURE — G0439 PPPS, SUBSEQ VISIT: HCPCS | Performed by: FAMILY MEDICINE

## 2022-12-13 PROCEDURE — 1101F PT FALLS ASSESS-DOCD LE1/YR: CPT | Performed by: FAMILY MEDICINE

## 2022-12-13 PROCEDURE — G8756 NO BP MEASURE DOC: HCPCS | Performed by: FAMILY MEDICINE

## 2022-12-13 PROCEDURE — 99213 OFFICE O/P EST LOW 20 MIN: CPT | Performed by: FAMILY MEDICINE

## 2022-12-13 NOTE — PROGRESS NOTES
Progress Note    she is a 71y.o. year old female who presents for evalution. Subjective:     Pt is requesting PT referral for transverse myelitis. .  She is been working on her walking using a cane and a Rollator when needed. Otherwise doing well. Reviewed PmHx, RxHx, FmHx, SocHx, AllgHx and updated and dated in the chart. Review of Systems - negative except as listed above in the HPI    Objective: There were no vitals filed for this visit. Current Outpatient Medications   Medication Sig    Metanx, algal oil, 3 mg-35 mg-2 mg -90.314 mg cap TAKE 1 CAPSULE BY MOUTH TWO TIMES A DAY    baclofen (LIORESAL) 10 mg tablet Take 10 mg by mouth every evening. bethanechol (URECHOLINE) 5 mg tablet Take 5 mg by mouth two (2) times a day. Entresto 49-51 mg tab tablet Take 1-2 Tablets by mouth two (2) times a day. Take 1 tab in the am, 2 tabs in the pm.    Alpha Lipoic Acid 600 mg cap Take 1 Capsule by mouth two (2) times a day. loratadine (CLARITIN) 10 mg tablet Take 10 mg by mouth. carvediloL (COREG) 3.125 mg tablet Take 1 Tablet by mouth two (2) times daily (with meals). cholecalciferol (VITAMIN D3) (1000 Units /25 mcg) tablet Take 1,000 Units by mouth daily. flash glucose sensor (FreeStyle Efe 14 Day Sensor) kit Change every 14 days E11.40 not on insulin, has Hyperglycemia. Check BG Qac and HS with sensor    tiotropium-olodateroL (Stiolto Respimat) 2.5-2.5 mcg/actuation inhaler Take 2 Puffs by inhalation daily. atorvastatin (LIPITOR) 40 mg tablet TAKE 1 TABLET BY MOUTH DAILY    linaCLOtide (Linzess) 72 mcg cap capsule Take 1 Capsule by mouth Daily (before breakfast). clopidogreL (PLAVIX) 75 mg tab TAKE 1 TABLET BY MOUTH DAILY    calcium carb/vitamin D3/vit K1 (VIACTIV PO) Take  by mouth. aspirin delayed-release 81 mg tablet Take 81 mg by mouth daily. Biotin 2,500 mcg cap Take  by mouth. dapagliflozin (Farxiga) 10 mg tab tablet Take  by mouth daily.     milnacipran (SAVELLA) 50 mg tablet Take 1 Tab by mouth two (2) times a day. fluticasone propionate (FLONASE ALLERGY RELIEF) 50 mcg/actuation nasal spray 2 Sprays by Both Nostrils route every morning.    magnesium oxide (MAG-OX) 400 mg tablet Take 400 mg by mouth every morning. cranberry fruit extract (CRANBERRY PO) Take 4,200 mg by mouth daily. glimepiride (AMARYL) 2 mg tablet Take 2 mg by mouth three (3) times daily. 1 in the morning, 2 at night    L-Mfolate-B6 Phos-Methyl-B12 (METANX) 3-35-2 mg tab tab Take 1 Tab by mouth two (2) times a day. Indications: neuropathy     No current facility-administered medications for this visit. Physical Examination: General appearance - alert, well appearing, and in no distress      Assessment/ Plan:   Diagnoses and all orders for this visit:    1. Transverse myelopathy syndrome (HCC)  -     REFERRAL TO PHYSICAL THERAPY    2. Medicare annual wellness visit, subsequent     Follow-up and Dispositions    Return if symptoms worsen or fail to improve. I have discussed the diagnosis with the patient and the intended plan as seen in the above orders. The patient has received an after-visit summary and questions were answered concerning future plans. Pt conveyed understanding of plan. Medication Side Effects and Warnings were discussed with patient      Jensen Rice is being evaluated by a Virtual Visit (video visit) encounter to address concerns as mentioned above. A caregiver was present when appropriate. Due to this being a TeleHealth encounter (During [facility]- public [facility] emergency), evaluation of the following organ systems was limited: Vitals/Constitutional/EENT/Resp/CV/GI//MS/Neuro/Skin/Heme-Lymph-Imm.   Pursuant to the emergency declaration under the [facility] waiver authority and the [employer] Act, this Virtual Visit was conducted with patient's (and/or legal guardian's) consent, to reduce the patient's risk of exposure to COVID-19 and provide necessary medical care. The patient (and/or legal guardian) has also been advised to contact this office for worsening conditions or problems, and seek emergency medical treatment and/or call 911 if deemed necessary. Patient identification was verified at the start of the visit: Yes    Services were provided through a video synchronous discussion virtually to substitute for in-person clinic visit. Patient and provider were located at their individual homes. --Meredith Acevedo DO on 12/13/2022 at 10:40 AM    This is the Subsequent Medicare Annual Wellness Exam, performed 12 months or more after the Initial AWV or the last Subsequent AWV    I have reviewed the patient's medical history in detail and updated the computerized patient record. Assessment/Plan   Education and counseling provided:  Are appropriate based on today's review and evaluation    1.  Transverse myelopathy syndrome (HCC)  -     REFERRAL TO PHYSICAL THERAPY  2. Medicare annual wellness visit, subsequent       Depression Risk Factor Screening     3 most recent PHQ Screens 12/13/2022   PHQ Not Done -   Little interest or pleasure in doing things Not at all   Feeling down, depressed, irritable, or hopeless Not at all   Total Score PHQ 2 0   Trouble falling or staying asleep, or sleeping too much -   Feeling tired or having little energy -   Poor appetite, weight loss, or overeating -   Feeling bad about yourself - or that you are a failure or have let yourself or your family down -   Trouble concentrating on things such as school, work, reading, or watching TV -   Moving or speaking so slowly that other people could have noticed; or the opposite being so fidgety that others notice -   Thoughts of being better off dead, or hurting yourself in some way -   PHQ 9 Score -   How difficult have these problems made it for you to do your work, take care of your home and get along with others -       Alcohol & Drug Abuse Risk Screen    Do you average more than 1 drink per night or more than 7 drinks a week:  No    On any one occasion in the past three months have you have had more than 3 drinks containing alcohol:  No          Functional Ability and Level of Safety    Hearing: Hearing is good. Activities of Daily Living: The home contains: grab bars  Patient does total self care      Ambulation: with difficulty, uses a cane and walker     Fall Risk:  Fall Risk Assessment, last 12 mths 5/11/2022   Able to walk? Yes   Fall in past 12 months? 0   Do you feel unsteady? 1   Are you worried about falling 1   Is TUG test greater than 12 seconds? 0   Is the gait abnormal? 0   Number of falls in past 12 months -   Fall with injury?  -      Abuse Screen:  Patient is not abused       Cognitive Screening    Has your family/caregiver stated any concerns about your memory: no         Health Maintenance Due     Health Maintenance Due   Topic Date Due    Shingrix Vaccine Age 49> (1 of 2) Never done    Foot Exam Q1  12/14/2017    Pneumococcal 65+ years (2 - PCV) 11/10/2018    Colorectal Cancer Screening Combo  09/01/2021    COVID-19 Vaccine (3 - Booster for Pfizer series) 11/15/2021    Flu Vaccine (1) 08/01/2022    MICROALBUMIN Q1  08/04/2022       Patient Care Team   Patient Care Team:  Anne Concepcion DO as PCP - General (Family Medicine)  Anne Concepcion DO as PCP - [facility]  Ti Damico MD as Physician (Obstetrics & Gynecology)  Evan Martinez RN as [facility] (Family Medicine)  Christian Mason MD as Physician (Pulmonary Disease)  Irene Casanova MD as Physician (Cardiovascular Disease Physician)    History     Patient Active Problem List   Diagnosis Code    Diabetes mellitus with neuropathy ([facility] E11.40    Postmenopausal bleeding N95.0    Fibromyalgia M79.7    HTN (hypertension) I10    Scoliosis M41.9    ACP (advance care planning) Z71.89 Left-sided weakness R53.1    Transverse myelopathy syndrome (HCC) G37.3    Abnormal MRI, cervical spine R93.7    Other emphysema (HCC) J43.8    Atherosclerosis of aorta (HCC) I70.0    History of breast cancer Z85.3    Coronary artery disease involving native coronary artery of native heart without angina pectoris I25.10    LBBB (left bundle branch block) I44.7    Chronic heart failure with preserved ejection fraction (HCC) I50.32    Myocardiopathy (HCC) F21.9    Systolic CHF, chronic ([facility]) I50.22    Pacemaker Z95.0     Past Medical History:   Diagnosis Date    Abnormal MRI, cervical spine 4/3/2017    Abnormal pap 3/2015; 5/2016 2015 Neg pap +HPV; 2016 ASCUS +HPV    Arthritis     osteo-tests for RA were neg    Breast cancer ([address] 3/12/2015    Breast cancer, right breast ([address] 1996    Chronic pain     Diabetes ([address]     Fibromyalgia     Ganglion cyst of wrist     LEFT    Hypercholesterolemia     Hypertension     Ill-defined condition     Prolapsed mitral valve    Memory loss     Per pt.      Murmur, cardiac     Neuropathy     Rosacea     Sarcoidosis     Sarcoidosis     Transverse myelopathy syndrome ([address]     Unspecified adverse effect of anesthesia     \"SLOW TO WAKE UP, SENSITIVE TO ANESTHESIA, DO NOT COME AROUND FOR 2-3 DAYS\"       Past Surgical History:   Procedure Laterality Date    HX BACK SURGERY  2016    HX CATARACT REMOVAL Bilateral 2014    HX COLPOSCOPY  5/2016    Neg path    HX DILATION AND CURETTAGE  07/03/2019    path-benign    HX MASTECTOMY Right 12/1996    tram flap    HX ORTHOPAEDIC Left 1991    foot surgery    HX RHINOPLASTY N/A 1993    HX SEPTOPLASTY N/A 2013    HX TONSILLECTOMY      HX VASCULAR ACCESS  1997    portacath left chest-removed after chemo    VT EPHYS EVAL PACG CVDFB LDS W/TSTG OF PULSE GEN N/A 10/29/2020    Bi V  ICD [place]/ venogram first performed by Evangelina Epley, MD at [address] CATH LAB    VT INSJ ELTRD CAR TWAN SYS TM INSJ DFB/PM PLS GEN N/A 10/29/2020    LV LEAD PLACEMENT performed by Carlos Enrique Ansari MD at [address] [facility] CATH LAB    MS INSJ/RPLCMT PERM DFB W/TRNSVNS LDS 1/DUAL CHMBR N/A 10/29/2020    INSERT ICD BIV MULTI performed by Carlos Enrique Ansari MD at [address] [facility] CATH LAB    MS RECONSTR NOSE  2005    HOLE IN SEPTUM     Current Outpatient Medications   Medication Sig Dispense Refill    Metanx, algal oil, 3 mg-35 mg-2 mg -90.314 mg cap TAKE 1 CAPSULE BY MOUTH TWO TIMES A  Capsule 5    baclofen (LIORESAL) 10 mg tablet Take 10 mg by mouth every evening. bethanechol (URECHOLINE) 5 mg tablet Take 5 mg by mouth two (2) times a day. Entresto 49-51 mg tab tablet Take 1-2 Tablets by mouth two (2) times a day. Take 1 tab in the am, 2 tabs in the pm.      Alpha Lipoic Acid 600 mg cap Take 1 Capsule by mouth two (2) times a day. loratadine (CLARITIN) 10 mg tablet Take 10 mg by mouth. carvediloL (COREG) 3.125 mg tablet Take 1 Tablet by mouth two (2) times daily (with meals). 180 Tablet 0    cholecalciferol (VITAMIN D3) (1000 Units /25 mcg) tablet Take 1,000 Units by mouth daily. flash glucose sensor (FreeStyle Efe 14 Day Sensor) kit Change every 14 days E11.40 not on insulin, has Hyperglycemia. Check BG Qac and HS with sensor 6 Kit 5    tiotropium-olodateroL (Stiolto Respimat) 2.5-2.5 mcg/actuation inhaler Take 2 Puffs by inhalation daily. atorvastatin (LIPITOR) 40 mg tablet TAKE 1 TABLET BY MOUTH DAILY 90 Tablet 1    linaCLOtide (Linzess) 72 mcg cap capsule Take 1 Capsule by mouth Daily (before breakfast). clopidogreL (PLAVIX) 75 mg tab TAKE 1 TABLET BY MOUTH DAILY 90 Tab 1    calcium carb/vitamin D3/vit K1 (VIACTIV PO) Take  by mouth. aspirin delayed-release 81 mg tablet Take 81 mg by mouth daily. Biotin 2,500 mcg cap Take  by mouth. dapagliflozin (Farxiga) 10 mg tab tablet Take  by mouth daily. milnacipran (SAVELLA) 50 mg tablet Take 1 Tab by mouth two (2) times a day.  180 Tab 3    fluticasone propionate (FLONASE ALLERGY RELIEF) 50 mcg/actuation nasal spray 2 Sprays by Both Nostrils route every morning.      magnesium oxide (MAG-OX) 400 mg tablet Take 400 mg by mouth every morning. cranberry fruit extract (CRANBERRY PO) Take 4,200 mg by mouth daily. glimepiride (AMARYL) 2 mg tablet Take 2 mg by mouth three (3) times daily. 1 in the morning, 2 at night      L-Mfolate-B6 Phos-Methyl-B12 (METANX) 3-35-2 mg tab tab Take 1 Tab by mouth two (2) times a day. Indications: neuropathy       Allergies   Allergen Reactions    Latex Other (comments)     Patient states it burns around her mouth. Other Food Other (comments)     Yogurt - stomach cramping    Betadine [Povidone-Iodine] Rash and Itching     Pt states this causes \"extreme\" itching    Codeine Anaphylaxis, Rash, Nausea Only and Other (comments)     HEADACHE  Tolerates tramadol    Dilaudid [Hydromorphone (Bulk)] Anaphylaxis, Itching, Nausea Only and Other (comments)     HALLUCINATIONS  Tolerates tramadol      Hydrocodone Anaphylaxis, Rash, Nausea Only and Vertigo     Facial - eyelid swelling-had to go to ER for reaction, HALLUCINATIONS  Tolerates tramadol      Ditropan [Oxybutynin Chloride] Swelling    Doxycycline Rash     PUSTULAR RASH- TOLERATING TETRACYCLINE    Fentanyl Nausea and Vomiting    Iodine Other (comments)     Headache    Keflex [Cephalexin] Nausea and Vomiting     Pain in abdomen AND FLU-LIKE SX      Metformin Nausea and Vomiting     Severe abdominal pain      Tape [Adhesive] Other (comments)     Redness/inflammed. Can only use paper tape. CAN USE BAND-AIDS. TOLERATES SURGICAL TAPE USED IN [place].      Trulicity [Dulaglutide] Other (comments)     Abdominal pain     Sulfamethoxazole-Trimethoprim Other (comments)     Cramping/flu-like symptoms       Family History   Problem Relation Age of Onset    Heart Disease Mother     Hypertension Mother     COPD Mother     Diabetes Father     Other Son         INOPERABLE BRAIN TUMOR    Gout Son     Celiac Disease Son     Randal Grandchild Problems Neg Hx      Social History     Tobacco Use    Smoking status: Never    Smokeless tobacco: Never    Tobacco comments:     Never used vapor or e-cigs    Substance Use Topics    Alcohol use: No         Jennifer Mcfadden, DO

## 2022-12-13 NOTE — PATIENT INSTRUCTIONS
Medicare Wellness Visit, Female     The best way to live healthy is to have a lifestyle where you eat a well-balanced diet, exercise regularly, limit alcohol use, and quit all forms of tobacco/nicotine, if applicable. Regular preventive services are another way to keep healthy. Preventive services (vaccines, screening tests, monitoring & exams) can help personalize your care plan, which helps you manage your own care. Screening tests can find health problems at the earliest stages, when they are easiest to treat. [facility] follows the current, evidence-based guidelines published by the [facility] ([facility]) when recommending preventive services for our patients. Because we follow these guidelines, sometimes recommendations change over time as research supports it. (For example, mammograms used to be recommended annually. Even though Medicare will still pay for an annual mammogram, the newer guidelines recommend a mammogram every two years for women of average risk). Of course, you and your doctor may decide to screen more often for some diseases, based on your risk and your co-morbidities (chronic disease you are already diagnosed with). Preventive services for you include:  - Medicare offers their members a free annual wellness visit, which is time for you and your primary care provider to discuss and plan for your preventive service needs.  Take advantage of this benefit every year!    -Over the age of 72 should receive the recommended pneumonia vaccines.    -All adults should have a flu vaccine yearly.  -All adults should have a tetanus vaccine every 10 years.   -Over the age 48 should receive the shingles vaccines.        -All adults should be screened once for Hepatitis C.  -All adults age 38-68 who are overweight should have a diabetes screening test once every three years.   -Other screening tests and preventive services for persons with diabetes include: an eye exam to screen for diabetic retinopathy, a kidney function test, a foot exam, and stricter control over your cholesterol.   -Cardiovascular screening for adults with routine risk involves an electrocardiogram (ECG) at intervals determined by your doctor.     -Colorectal cancer screenings should be done for adults age 39-70 with no increased risk factors for colorectal cancer. There are a number of acceptable methods of screening for this type of cancer. Each test has its own benefits and drawbacks. Discuss with your doctor what is most appropriate for you during your annual wellness visit. The different tests include: colonoscopy (considered the best screening method), a fecal occult blood test, a fecal DNA test, and sigmoidoscopy.    -Lung cancer screening is recommended annually with a low dose CT scan for adults between age 54 and 68, who have smoked at least 30 pack years (equivalent of 1 pack per day for 30 days), and who is a current smoker or quit less than 15 years ago.    -A bone mass density test is recommended when a woman turns 65 to screen for osteoporosis. This test is only recommended one time, as a screening. Some providers will use this same test as a disease monitoring tool if you already have osteoporosis. -Breast cancer screenings are recommended every other year for women of normal risk, age 54-69.    -Cervical cancer screenings for women over age 72 are only recommended with certain risk factors.      Here is a list of your current Health Maintenance items (your personalized list of preventive services) with a due date:  Health Maintenance Due   Topic Date Due    Shingles Vaccine (1 of 2) Never done    Diabetic Foot Care  12/14/2017    Pneumococcal Vaccine (2 - PCV) 11/10/2018    Colorectal Screening  09/01/2021    COVID-19 Vaccine (3 - Booster for Pfizer series) 11/15/2021    Yearly Flu Vaccine (1) 08/01/2022    Albumin Urine Test  08/04/2022

## 2022-12-14 ENCOUNTER — DOCUMENTATION ONLY (OUTPATIENT)
Dept: FAMILY MEDICINE CLINIC | Age: 72
End: 2022-12-14

## 2022-12-14 DIAGNOSIS — Z90.11 H/O RIGHT MASTECTOMY: ICD-10-CM

## 2022-12-14 DIAGNOSIS — Z85.3 HISTORY OF BREAST CANCER: Primary | ICD-10-CM

## 2022-12-20 ENCOUNTER — DOCUMENTATION ONLY (OUTPATIENT)
Dept: FAMILY MEDICINE CLINIC | Age: 72
End: 2022-12-20

## 2022-12-21 ENCOUNTER — DOCUMENTATION ONLY (OUTPATIENT)
Dept: FAMILY MEDICINE CLINIC | Age: 72
End: 2022-12-21

## 2023-01-01 NOTE — PROGRESS NOTES
3:25 PM     Spoke with patient to verify arrival time, to remain NPO after midnight, and  for transportation home. Patient verbalized understanding. show

## 2023-01-09 ENCOUNTER — DOCUMENTATION ONLY (OUTPATIENT)
Dept: FAMILY MEDICINE CLINIC | Age: 73
End: 2023-01-09

## 2023-01-11 ENCOUNTER — DOCUMENTATION ONLY (OUTPATIENT)
Dept: FAMILY MEDICINE CLINIC | Age: 73
End: 2023-01-11

## 2023-02-28 ENCOUNTER — VIRTUAL VISIT (OUTPATIENT)
Dept: FAMILY MEDICINE CLINIC | Age: 73
End: 2023-02-28
Payer: MEDICARE

## 2023-02-28 DIAGNOSIS — I70.0 ATHEROSCLEROSIS OF AORTA (HCC): ICD-10-CM

## 2023-02-28 DIAGNOSIS — M25.511 CHRONIC RIGHT SHOULDER PAIN: Primary | ICD-10-CM

## 2023-02-28 DIAGNOSIS — I50.20 HFREF (HEART FAILURE WITH REDUCED EJECTION FRACTION) (HCC): ICD-10-CM

## 2023-02-28 DIAGNOSIS — G37.3 TRANSVERSE MYELOPATHY SYNDROME (HCC): ICD-10-CM

## 2023-02-28 DIAGNOSIS — G89.29 CHRONIC RIGHT SHOULDER PAIN: Primary | ICD-10-CM

## 2023-02-28 DIAGNOSIS — F33.8 SEASONAL AFFECTIVE DISORDER (HCC): ICD-10-CM

## 2023-02-28 DIAGNOSIS — J43.8 OTHER EMPHYSEMA (HCC): ICD-10-CM

## 2023-02-28 DIAGNOSIS — E11.40 TYPE 2 DIABETES MELLITUS WITH DIABETIC NEUROPATHY, WITHOUT LONG-TERM CURRENT USE OF INSULIN (HCC): ICD-10-CM

## 2023-02-28 PROCEDURE — G0463 HOSPITAL OUTPT CLINIC VISIT: HCPCS | Performed by: FAMILY MEDICINE

## 2023-02-28 NOTE — PROGRESS NOTES
Progress Note    she is a 68y.o. year old female who presents for evalution. Subjective:     chronic right shoulder pain currently in physical therapy. She is requesting an x-ray she states that the physical therapist wanted to see if there is anything else going on x-ray which I agree with doing. Patient also transverse myelitis this is stable. Type 2 diabetes with hyperglycemia being managed by Endo seeing a nutritionist tomorrow. Heart failure with reduced ejection fraction last EF was 20 to 25% seeing [facility] cardiology. On Entresto. On atorvastatin 40 mg nightly due for lipid recheck. She is tolerating. Reviewed PmHx, RxHx, FmHx, SocHx, AllgHx and updated and dated in the chart. Review of Systems - negative except as listed above in the HPI    Objective: There were no vitals filed for this visit. Current Outpatient Medications   Medication Sig    OTHER Aqua right mastectomy prosthesis    Metanx, algal oil, 3 mg-35 mg-2 mg -90.314 mg cap TAKE 1 CAPSULE BY MOUTH TWO TIMES A DAY    baclofen (LIORESAL) 10 mg tablet Take 10 mg by mouth every evening. bethanechol (URECHOLINE) 5 mg tablet Take 5 mg by mouth two (2) times a day. Entresto 49-51 mg tab tablet Take 1-2 Tablets by mouth two (2) times a day. Take 1 tab in the am, 2 tabs in the pm.    Alpha Lipoic Acid 600 mg cap Take 1 Capsule by mouth two (2) times a day. loratadine (CLARITIN) 10 mg tablet Take 10 mg by mouth. carvediloL (COREG) 3.125 mg tablet Take 1 Tablet by mouth two (2) times daily (with meals). cholecalciferol (VITAMIN D3) (1000 Units /25 mcg) tablet Take 1,000 Units by mouth daily. flash glucose sensor (FreeStyle Efe 14 Day Sensor) kit Change every 14 days E11.40 not on insulin, has Hyperglycemia. Check BG Qac and HS with sensor    tiotropium-olodateroL (Stiolto Respimat) 2.5-2.5 mcg/actuation inhaler Take 2 Puffs by inhalation daily.     atorvastatin (LIPITOR) 40 mg tablet TAKE 1 TABLET BY MOUTH DAILY    linaCLOtide (Linzess) 72 mcg cap capsule Take 1 Capsule by mouth Daily (before breakfast). clopidogreL (PLAVIX) 75 mg tab TAKE 1 TABLET BY MOUTH DAILY    calcium carb/vitamin D3/vit K1 (VIACTIV PO) Take  by mouth. aspirin delayed-release 81 mg tablet Take 81 mg by mouth daily. Biotin 2,500 mcg cap Take  by mouth. dapagliflozin (Farxiga) 10 mg tab tablet Take  by mouth daily. milnacipran (SAVELLA) 50 mg tablet Take 1 Tab by mouth two (2) times a day. fluticasone propionate (FLONASE ALLERGY RELIEF) 50 mcg/actuation nasal spray 2 Sprays by Both Nostrils route every morning.    magnesium oxide (MAG-OX) 400 mg tablet Take 400 mg by mouth every morning. cranberry fruit extract (CRANBERRY PO) Take 4,200 mg by mouth daily. glimepiride (AMARYL) 2 mg tablet Take 2 mg by mouth three (3) times daily. 1 in the morning, 2 at night    L-Mfolate-B6 Phos-Methyl-B12 (METANX) 3-35-2 mg tab tab Take 1 Tab by mouth two (2) times a day. Indications: neuropathy     No current facility-administered medications for this visit. Physical Examination: General appearance - alert, well appearing, and in no distress  Mental status - alert, oriented to person, place, and time      Assessment/ Plan:   Diagnoses and all orders for this visit:    1. Chronic right shoulder pain  -     XR SHOULDER RT AP/LAT MIN 2 V; Future    2. Seasonal affective disorder ([address]  Therapy 2x a week  3. Transverse myelopathy syndrome (HCC)  stable  4. Other emphysema ([address]  Doing fine, no longer needing inhaler  5. HFrEF (heart failure with reduced ejection fraction) ([address]  Following with cardio on entresto, last EF we have was 20-25%, has appt in spring  6. Type 2 diabetes mellitus with diabetic neuropathy, without long-term current use of insulin ([facility])  Seeing endo  7.  Atherosclerosis of aorta (HCC)  On lipitor  CMP and lipid panel ordered, she will get [facility] at [facility]    Return if symptoms worsen or fail to improve. I have discussed the diagnosis with the patient and the intended plan as seen in the above orders. The patient has received an after-visit summary and questions were answered concerning future plans. Pt conveyed understanding of plan. Medication Side Effects and Warnings were discussed with patient      Titus Rico is being evaluated by a Virtual Visit (video visit) encounter to address concerns as mentioned above. A caregiver was present when appropriate. Due to this being a TeleHealth encounter (During [facility]- public health emergency), evaluation of the following organ systems was limited: Vitals/Constitutional/EENT/Resp/CV/GI//MS/Neuro/Skin/Heme-Lymph-Imm. Pursuant to the emergency declaration under the [address] [place] [address] authority and the [employer] Act, this Virtual Visit was conducted with patient's (and/or legal guardian's) consent, to reduce the patient's risk of exposure to COVID-19 and provide necessary medical care. The patient (and/or legal guardian) has also been advised to contact this office for worsening conditions or problems, and seek emergency medical treatment and/or call 911 if deemed necessary. Patient identification was verified at the start of the visit: Yes    Services were provided through a video synchronous discussion virtually to substitute for in-person clinic visit. Patient and provider were located at their individual homes.   --Antoinette Chavez DO on 2/28/2023 at 2:20 PM

## 2023-02-28 NOTE — PATIENT INSTRUCTIONS

## 2023-03-03 ENCOUNTER — APPOINTMENT (OUTPATIENT)
Dept: GENERAL RADIOLOGY | Age: 73
End: 2023-03-03
Attending: EMERGENCY MEDICINE
Payer: MEDICARE

## 2023-03-03 ENCOUNTER — HOSPITAL ENCOUNTER (OUTPATIENT)
Dept: GENERAL RADIOLOGY | Age: 73
Discharge: HOME | End: 2023-03-03
Payer: MEDICARE

## 2023-03-03 ENCOUNTER — HOSPITAL ENCOUNTER (EMERGENCY)
Age: 73
Discharge: HOME | End: 2023-03-03
Attending: EMERGENCY MEDICINE
Payer: MEDICARE

## 2023-03-03 VITALS
SYSTOLIC BLOOD PRESSURE: 106 MMHG | RESPIRATION RATE: 14 BRPM | BODY MASS INDEX: 25.71 KG/M2 | TEMPERATURE: 97.8 F | DIASTOLIC BLOOD PRESSURE: 65 MMHG | HEART RATE: 83 BPM | HEIGHT: 66 IN | OXYGEN SATURATION: 94 % | WEIGHT: 160 LBS

## 2023-03-03 DIAGNOSIS — M25.511 CHRONIC RIGHT SHOULDER PAIN: ICD-10-CM

## 2023-03-03 DIAGNOSIS — E16.2 HYPOGLYCEMIA: Primary | ICD-10-CM

## 2023-03-03 DIAGNOSIS — G89.29 CHRONIC RIGHT SHOULDER PAIN: ICD-10-CM

## 2023-03-03 LAB
ALBUMIN SERPL-MCNC: 3.3 G/DL (ref 3.5–5)
ALBUMIN/GLOB SERPL: 0.9 (ref 1.1–2.2)
ALP SERPL-CCNC: 143 U/L (ref 45–117)
ALT SERPL-CCNC: 34 U/L (ref 12–78)
ANION GAP SERPL CALC-SCNC: 5 MMOL/L (ref 5–15)
AST SERPL-CCNC: 24 U/L (ref 15–37)
BASOPHILS # BLD: 0.1 K/UL (ref 0–0.1)
BASOPHILS NFR BLD: 1 % (ref 0–1)
BILIRUB SERPL-MCNC: 0.5 MG/DL (ref 0.2–1)
BUN SERPL-MCNC: 23 MG/DL (ref 6–20)
BUN/CREAT SERPL: 25 (ref 12–20)
CALCIUM SERPL-MCNC: 9.5 MG/DL (ref 8.5–10.1)
CHLORIDE SERPL-SCNC: 109 MMOL/L (ref 97–108)
CO2 SERPL-SCNC: 26 MMOL/L (ref 21–32)
COMMENT, HOLDF: NORMAL
CREAT SERPL-MCNC: 0.91 MG/DL (ref 0.55–1.02)
DIFFERENTIAL METHOD BLD: NORMAL
EOSINOPHIL # BLD: 0.1 K/UL (ref 0–0.4)
EOSINOPHIL NFR BLD: 2 % (ref 0–7)
ERYTHROCYTE [DISTWIDTH] IN BLOOD BY AUTOMATED COUNT: 14 % (ref 11.5–14.5)
GLOBULIN SER CALC-MCNC: 3.8 G/DL (ref 2–4)
GLUCOSE BLD STRIP.AUTO-MCNC: 114 MG/DL (ref 65–117)
GLUCOSE BLD STRIP.AUTO-MCNC: 156 MG/DL (ref 65–117)
GLUCOSE SERPL-MCNC: 120 MG/DL (ref 65–100)
HCT VFR BLD AUTO: 43.8 % (ref 35–47)
HGB BLD-MCNC: 14 G/DL (ref 11.5–16)
IMM GRANULOCYTES # BLD AUTO: 0 K/UL (ref 0–0.04)
IMM GRANULOCYTES NFR BLD AUTO: 0 % (ref 0–0.5)
LYMPHOCYTES # BLD: 1.3 K/UL (ref 0.8–3.5)
LYMPHOCYTES NFR BLD: 20 % (ref 12–49)
MCH RBC QN AUTO: 27.7 PG (ref 26–34)
MCHC RBC AUTO-ENTMCNC: 32 G/DL (ref 30–36.5)
MCV RBC AUTO: 86.6 FL (ref 80–99)
MONOCYTES # BLD: 0.5 K/UL (ref 0–1)
MONOCYTES NFR BLD: 8 % (ref 5–13)
NEUTS SEG # BLD: 4.7 K/UL (ref 1.8–8)
NEUTS SEG NFR BLD: 69 % (ref 32–75)
NRBC # BLD: 0 K/UL (ref 0–0.01)
NRBC BLD-RTO: 0 PER 100 WBC
PLATELET # BLD AUTO: 249 K/UL (ref 150–400)
PMV BLD AUTO: 11.2 FL (ref 8.9–12.9)
POTASSIUM SERPL-SCNC: 4.2 MMOL/L (ref 3.5–5.1)
PROT SERPL-MCNC: 7.1 G/DL (ref 6.4–8.2)
RBC # BLD AUTO: 5.06 M/UL (ref 3.8–5.2)
SAMPLES BEING HELD,HOLD: NORMAL
SERVICE CMNT-IMP: ABNORMAL
SERVICE CMNT-IMP: NORMAL
SODIUM SERPL-SCNC: 140 MMOL/L (ref 136–145)
WBC # BLD AUTO: 6.7 K/UL (ref 3.6–11)

## 2023-03-03 PROCEDURE — 71045 X-RAY EXAM CHEST 1 VIEW: CPT

## 2023-03-03 PROCEDURE — 36415 COLL VENOUS BLD VENIPUNCTURE: CPT

## 2023-03-03 PROCEDURE — 80053 COMPREHEN METABOLIC PANEL: CPT

## 2023-03-03 PROCEDURE — 82962 GLUCOSE BLOOD TEST: CPT

## 2023-03-03 PROCEDURE — 99285 EMERGENCY DEPT VISIT HI MDM: CPT

## 2023-03-03 PROCEDURE — 73030 X-RAY EXAM OF SHOULDER: CPT

## 2023-03-03 PROCEDURE — 85025 COMPLETE CBC W/AUTO DIFF WBC: CPT

## 2023-03-03 NOTE — ED TRIAGE NOTES
Pt at this facility for outpatient xray. Pt felt weak rapid called over head. This RN responded. Pt reports she feels her blood glucose is to low. Hx of DM, did take insulin this morning. Pt states did take 3 glucose tablets before staff response. Pt a&ox4   Denies chest pain at this time.

## 2023-03-03 NOTE — ED PROVIDER NOTES
67-year-old female presents from home for an outpatient x-ray. However she became lightheaded and altered while checking in was noted to be listless and lethargic. Patient states that she thinks her blood sugar was low. She took her insulin this morning but admits to not eating very much. Patient adds that she did take 3 glucose tablets when she started to feel like her blood sugar was getting low. Denies any vomiting or diarrhea. No pain at this time. Denies any cough, fever, shortness of breath. Past medical history significant for chronic pain, diabetes, fibromyalgia, hypertension, memory loss, neuropathy, sarcoid, transverse myelopathy. Also patient's problems are we will give her chance       Past Medical History:   Diagnosis Date    Abnormal MRI, cervical spine 4/3/2017    Abnormal pap 3/2015; 5/2016 2015 Neg pap +HPV; 2016 ASCUS +HPV    Arthritis     osteo-tests for RA were neg    Breast cancer ([address] 3/12/2015    Breast cancer, right breast ([address] 1996    Chronic pain     Diabetes ([address]     Fibromyalgia     Ganglion cyst of wrist     LEFT    Hypercholesterolemia     Hypertension     Ill-defined condition     Prolapsed mitral valve    Memory loss     Per pt.      Murmur, cardiac     Neuropathy     Rosacea     Sarcoidosis     Sarcoidosis     Transverse myelopathy syndrome ([address]     Unspecified adverse effect of anesthesia     \"SLOW TO WAKE UP, SENSITIVE TO ANESTHESIA, DO NOT COME AROUND FOR 2-3 DAYS\"        Past Surgical History:   Procedure Laterality Date    HX BACK SURGERY  2016    HX CATARACT REMOVAL Bilateral 2014    HX COLPOSCOPY  5/2016    Neg path    HX DILATION AND CURETTAGE  07/03/2019    path-benign    HX MASTECTOMY Right 12/1996    tram flap    HX ORTHOPAEDIC Left 1991    foot surgery    HX RHINOPLASTY N/A 1993    HX SEPTOPLASTY N/A 2013    HX TONSILLECTOMY      HX VASCULAR ACCESS  1997    portacath left chest-removed after chemo    CT EPHYS EVAL PACG CVDFB LDS W/TSTG OF PULSE GEN N/A 10/29/2020    Bi V  ICD [place]/ venogram first performed by Chavez Dos Santos MD at [facility] CATH LAB    IL INSJ ELTRD CAR TWAN SYS TM INSJ DFB/PM PLS GEN N/A 10/29/2020    LV LEAD PLACEMENT performed by Chavez Dos Santos MD at [facility] CATH LAB    IL INSJ/RPLCMT PERM DFB W/TRNSVNS LDS 1/DUAL CHMBR N/A 10/29/2020    INSERT ICD BIV MULTI performed by Chavez Dos Santos MD at [facility] CATH LAB    IL RHINP PRIM LAT&ALAR CRTLGS&/ELVTN NASAL TI  2005    HOLE IN SEPTUM         Family History:   Problem Relation Age of Onset    Heart Disease Mother     Hypertension Mother     COPD Mother     Diabetes Father     Other Son         INOPERABLE BRAIN TUMOR    Gout Son     Celiac Disease Son     Anesth Problems Neg Hx        Social History     Socioeconomic History    Marital status:      Spouse name: Not on file    Number of children: Not on file    Years of education: Not on file    Highest education level: Not on file   Occupational History    Not on file   Tobacco Use    Smoking status: Never    Smokeless tobacco: Never    Tobacco comments:     Never used vapor or e-cigs    Substance and Sexual Activity    Alcohol use: No    Drug use: No    Sexual activity: Not Currently     Partners: Male     Comment:    Other Topics Concern    Not on file   Social History Narrative    Not on file     Social Determinants of Health     Financial Resource Strain: Low Risk     Difficulty of Paying Living Expenses: Not hard at all   Food Insecurity: No Food Insecurity    Worried About Running Out of Food in the Last Year: Never true    Ran Out of Food in the Last Year: Never true   Transportation Needs: Not on file   Physical Activity: Not on file   Stress: Not on file   Social Connections: Not on file   Intimate Partner Violence: Not on file   Housing Stability: Not on file         ALLERGIES: Latex, Other food, Betadine [povidone-iodine], Codeine, Dilaudid [hydromorphone (bulk)], Hydrocodone, Ditropan [oxybutynin chloride], Doxycycline, Fentanyl, Iodine, Keflex [cephalexin], Metformin, Tape [adhesive], Trulicity [dulaglutide], and Sulfamethoxazole-trimethoprim    Review of Systems   Unable to perform ROS: Mental status change     Vitals:    03/03/23 0932 03/03/23 0937   BP: (!) 94/45    Pulse: 71 68   Resp: 16    Temp: 97.8 °F (36.6 °C)    SpO2: 98%    Weight: 72.6 kg (160 lb)    Height: 5' 6\" (1.676 m)             Physical Exam  Vitals and nursing note reviewed. Constitutional:       General: She is not in acute distress. Appearance: She is well-developed. Comments:  patient awake but lethargic   HENT:      Head: Normocephalic and atraumatic. Eyes:      General: No scleral icterus. Conjunctiva/sclera: Conjunctivae normal.      Pupils: Pupils are equal, round, and reactive to light. Cardiovascular:      Rate and Rhythm: Normal rate. Heart sounds: No murmur heard. Pulmonary:      Effort: Pulmonary effort is normal. No respiratory distress. Abdominal:      General: There is no distension. Musculoskeletal:         General: Normal range of motion. Cervical back: Normal range of motion and neck supple. Skin:     General: Skin is warm and dry. Findings: No rash. Neurological:      Mental Status: She is oriented to person, place, and time. Medical Decision Making  Assessment: Patient presenting with altered mental status in setting of low blood sugar. She given herself glucose tablets before being brought back into the ED. Her initial Accu-Chek was 100. She quickly returned to normal.  Rest of her blood work was unremarkable with no evidence of electrolyte abnormality, renal disease, increased white count. Vital signs of been stable. She ate and had orange juice in the ED and her blood sugars remained stable. I think he stable for discharge home.   I advised her to decrease the amount of insulin she is giving herself at mealtime and to follow-up with her endocrinologist for further instructions on insulin and diabetes management. She can return to the ER with any new or worsening symptoms. Amount and/or Complexity of Data Reviewed  Labs: ordered. Radiology: ordered.            Procedures

## 2023-03-04 ENCOUNTER — HOSPITAL ENCOUNTER (EMERGENCY)
Age: 73
Discharge: HOME | End: 2023-03-04
Attending: STUDENT IN AN ORGANIZED HEALTH CARE EDUCATION/TRAINING PROGRAM
Payer: MEDICARE

## 2023-03-04 ENCOUNTER — APPOINTMENT (OUTPATIENT)
Dept: CT IMAGING | Age: 73
End: 2023-03-04
Attending: STUDENT IN AN ORGANIZED HEALTH CARE EDUCATION/TRAINING PROGRAM
Payer: MEDICARE

## 2023-03-04 ENCOUNTER — APPOINTMENT (OUTPATIENT)
Dept: GENERAL RADIOLOGY | Age: 73
End: 2023-03-04
Attending: STUDENT IN AN ORGANIZED HEALTH CARE EDUCATION/TRAINING PROGRAM
Payer: MEDICARE

## 2023-03-04 VITALS
OXYGEN SATURATION: 99 % | DIASTOLIC BLOOD PRESSURE: 63 MMHG | SYSTOLIC BLOOD PRESSURE: 149 MMHG | RESPIRATION RATE: 18 BRPM | TEMPERATURE: 98.2 F | HEIGHT: 66 IN | BODY MASS INDEX: 25.71 KG/M2 | WEIGHT: 160 LBS | HEART RATE: 76 BPM

## 2023-03-04 DIAGNOSIS — S01.81XA FACIAL LACERATION, INITIAL ENCOUNTER: Primary | ICD-10-CM
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
## 2023-03-04 PROCEDURE — 99284 EMERGENCY DEPT VISIT MOD MDM: CPT

## 2023-03-04 PROCEDURE — 72125 CT NECK SPINE W/O DYE: CPT

## 2023-03-04 PROCEDURE — 70450 CT HEAD/BRAIN W/O DYE: CPT

## 2023-03-04 PROCEDURE — 75810000293 HC SIMP/SUPERF WND  RPR

## 2023-03-04 RX ORDER — METHOCARBAMOL 750 MG/1
750 TABLET, FILM COATED ORAL
Qty: 20 TABLET | Refills: 0 | Status: SHIPPED | OUTPATIENT
Start: 2023-03-04

## 2023-03-05 NOTE — ED TRIAGE NOTES
Patient arrives ambulatory to ED with complaints of left neck to left foot pain, bleeding to left side of forehead after having fall     Patient tripped and fell hitting hard wood floor     Denies taking blood thinners or LOC

## 2023-03-05 NOTE — ED PROVIDER NOTES
76-year-old female with history of HTN, HLD presents to the ED with chief complaint of headache and neck pain following fall. Patient tripped and fell with her head hitting a wooden floor. Denies any loss of consciousness, does report left forehead laceration. In addition has mild left knee and foot pain. Was feeling fine prior to the fall, no dizziness or lightheadedness. No chest pain, difficulty breathing, abdominal pain, numbness, weakness, urinary symptoms, bowel symptoms, leg swelling. Reports last tetanus immunization within the past 10 years. The history is provided by the patient. Fall    Laceration     Neck Pain   Pertinent negatives include no chest pain. Past Medical History:   Diagnosis Date    Abnormal MRI, cervical spine 4/3/2017    Abnormal pap 3/2015; 5/2016 2015 Neg pap +HPV; 2016 ASCUS +HPV    Arthritis     osteo-tests for RA were neg    Breast cancer ([address] 3/12/2015    Breast cancer, right breast ([address] 1996    Chronic pain     Diabetes ([address]     Fibromyalgia     Ganglion cyst of wrist     LEFT    Hypercholesterolemia     Hypertension     Ill-defined condition     Prolapsed mitral valve    Memory loss     Per pt.      Murmur, cardiac     Neuropathy     Rosacea     Sarcoidosis     Sarcoidosis     Transverse myelopathy syndrome ([address]     Unspecified adverse effect of anesthesia     \"SLOW TO WAKE UP, SENSITIVE TO ANESTHESIA, DO NOT COME AROUND FOR 2-3 DAYS\"        Past Surgical History:   Procedure Laterality Date    HX BACK SURGERY  2016    HX CATARACT REMOVAL Bilateral 2014    HX COLPOSCOPY  5/2016    Neg path    HX DILATION AND CURETTAGE  07/03/2019    path-benign    HX MASTECTOMY Right 12/1996    tram flap    HX ORTHOPAEDIC Left 1991    foot surgery    HX RHINOPLASTY N/A 1993    HX SEPTOPLASTY N/A 2013    HX TONSILLECTOMY      HX VASCULAR ACCESS  1997    portacath left chest-removed after chemo    OR EPHYS EVAL PACG CVDFB LDS W/TSTG OF PULSE GEN N/A 10/29/2020    Bi V  ICD [place]/ venogram first performed by Adam Andujar MD at [facility] CATH LAB    VA INSJ ELTRD CAR TWAN SYS TM INSJ DFB/PM PLS GEN N/A 10/29/2020    LV LEAD PLACEMENT performed by Adam Andujar MD at [facility] CATH LAB    VA INSJ/RPLCMT PERM DFB W/TRNSVNS LDS 1/DUAL CHMBR N/A 10/29/2020    INSERT ICD BIV MULTI performed by Adam Andujar MD at [facility] CATH LAB    VA RHINP PRIM LAT&ALAR CRTLGS&/ELVTN NASAL TI  2005    HOLE IN SEPTUM         Family History:   Problem Relation Age of Onset    Heart Disease Mother     Hypertension Mother     COPD Mother     Diabetes Father     Other Son         INOPERABLE BRAIN TUMOR    Gout Son     Celiac Disease Son     Anesth Problems Neg Hx        Social History     Socioeconomic History    Marital status:      Spouse name: Not on file    Number of children: Not on file    Years of education: Not on file    Highest education level: Not on file   Occupational History    Not on file   Tobacco Use    Smoking status: Never    Smokeless tobacco: Never    Tobacco comments:     Never used vapor or e-cigs    Substance and Sexual Activity    Alcohol use: No    Drug use: No    Sexual activity: Not Currently     Partners: Male     Comment:    Other Topics Concern    Not on file   Social History Narrative    Not on file     Social Determinants of Health     Financial Resource Strain: Low Risk     Difficulty of Paying Living Expenses: Not hard at all   Food Insecurity: No Food Insecurity    Worried About Running Out of Food in the Last Year: Never true    Ran Out of Food in the Last Year: Never true   Transportation Needs: Not on file   Physical Activity: Not on file   Stress: Not on file   Social Connections: Not on file   Intimate Partner Violence: Not on file   Housing Stability: Not on file         ALLERGIES: Latex, Other food, Betadine [povidone-iodine], Codeine, Dilaudid [hydromorphone (bulk)], Hydrocodone, Ditropan [oxybutynin chloride], Doxycycline, Fentanyl, Iodine, Keflex [cephalexin], Metformin, Tape [adhesive], Trulicity [dulaglutide], and Sulfamethoxazole-trimethoprim    Review of Systems   Respiratory:  Negative for shortness of breath. Cardiovascular:  Negative for chest pain. Musculoskeletal:  Positive for neck pain. Vitals:    03/04/23 2157 03/04/23 2300 03/04/23 2315 03/04/23 2330   BP: (!) 143/62 (!) 143/54 (!) 141/60 (!) 149/63   Pulse: 85  73 76   Resp: 19   18   Temp: 98.2 °F (36.8 °C)      SpO2: 100%   99%   Weight: 72.6 kg (160 lb)      Height: 5' 6\" (1.676 m)               Physical Exam  Constitutional:       General: She is not in acute distress. Appearance: She is well-developed. HENT:      Head: Normocephalic. Comments: Well approximated 2 cm laceration lateral and superior to the left eye. Mild oozing of blood. Eyes:      General: No scleral icterus. Pupils: Pupils are equal, round, and reactive to light. Neck:      Trachea: No tracheal deviation. Cardiovascular:      Rate and Rhythm: Normal rate and regular rhythm. Heart sounds: No murmur heard. No friction rub. No gallop. Pulmonary:      Effort: Pulmonary effort is normal. No respiratory distress. Breath sounds: Normal breath sounds. No wheezing or rales. Abdominal:      General: Bowel sounds are normal. There is no distension. Palpations: Abdomen is soft. Tenderness: There is no abdominal tenderness. Musculoskeletal:         General: No deformity. Cervical back: Neck supple. Comments: Mild edema of the left knee with full range of motion and no significant tenderness. Neurovascularly intact distally in the left lower extremity, no other tenderness or swelling. Skin:     General: Skin is warm and dry. Neurological:      General: No focal deficit present. Mental Status: She is alert and oriented to person, place, and time. Cranial Nerves: No cranial nerve deficit. Sensory: No sensory deficit.    Psychiatric: Behavior: Behavior normal.        Medical Decision Making  58-year-old female presenting to the ED with mechanical fall, headache, neck pain, left knee pain. Left knee exam is relatively benign. Normal neurologic exam.  Does have well approximated laceration to her face that is amenable to closure with Dermabond. Obtaining a CT of the head and neck to rule out fracture, intracranial hemorrhage. Otherwise suspect concussion and muscle strain versus sprain. Likely discharge if work-up negative. Amount and/or Complexity of Data Reviewed  Radiology: ordered and independent interpretation performed. Details: no bleed on head CT    Risk  Prescription drug management. Procedures    Procedure Note - Wound Repair:    Performed by Syl Duarte MD .     Immediately prior to the procedure, the patient was reevaluated and found suitable for the planned procedure and any planned medications. Immediately prior to the procedure a time out was called to verify the correct patient, procedure, equipment, staff, and marking as appropriate. Tendon/Joint function was Intact. Neurovascular function was Intact. Site prepped with Sterile draping. Anesthesia was no needed. Wound irrigated with copious amounts of normal saline and explored. Wound was located on the left face, measured 2 cm and was linear. Level of complexity was: simple. Wound was closed using Dermabond. Foreign body was not suspected. Foreign body was not found. Procedure was tolerated well. DISCHARGE NOTE:  The patient has been re-evaluated and feeling much better and are stable for discharge. All available radiology and laboratory results have been reviewed with patient and/or available family.   Patient and/or family verbally conveyed their understanding and agreement of the patient's signs, symptoms, diagnosis, treatment and prognosis and additionally agree to follow-up as recommended in the discharge instructions or to return to the Emergency Department should their condition change or worsen prior to their follow-up appointment. All questions have been answered and patient and/or available family express understanding. LABORATORY RESULTS:  No results found for this or any previous visit (from the past 24 hour(s)). IMAGING RESULTS:  CT HEAD WO CONT    Result Date: 3/4/2023  No acute intracranial abnormality. CT SPINE CERV WO CONT    Result Date: 3/4/2023  No acute abnormality. Diffuse degenerative changes was prominent at C5-6 and C6-7. MEDICATIONS GIVEN:  Medications - No data to display    IMPRESSION:  1. Facial laceration, initial encounter    2. Fall from ground level        PLAN:  Follow-up Information       Follow up With Specialties Details Why Contact Info    Nazario Heard, DO Family Medicine In 1 week As needed [address] [facility] [address]  [phone]            Discharge Medication List as of 3/4/2023 11:32 PM        START taking these medications    Details   methocarbamoL (ROBAXIN) 750 mg tablet Take 1 Tablet by mouth four (4) times daily as needed for Muscle Spasm(s) or Pain., Normal, Disp-20 Tablet, R-0           CONTINUE these medications which have NOT CHANGED    Details   OTHER Aqua right mastectomy prosthesis, Print, Disp-1 Each, R-0      Metanx, algal oil, 3 mg-35 mg-2 mg -90.314 mg cap TAKE 1 CAPSULE BY MOUTH TWO TIMES A DAY, Normal, Disp-180 Capsule, R-5      baclofen (LIORESAL) 10 mg tablet Take 10 mg by mouth every evening., Historical Med      bethanechol (URECHOLINE) 5 mg tablet Take 5 mg by mouth two (2) times a day., Historical Med      Entresto 49-51 mg tab tablet Take 1-2 Tablets by mouth two (2) times a day.  Take 1 tab in the am, 2 tabs in the pm., Historical Med, SREEKANTH      Alpha Lipoic Acid 600 mg cap Take 1 Capsule by mouth two (2) times a day., Historical Med      loratadine (CLARITIN) 10 mg tablet Take 10 mg by mouth., Historical Med      carvediloL (COREG) 3.125 mg tablet Take 1 Tablet by mouth two (2) times daily (with meals). , Normal, Disp-180 Tablet, R-0Please contact the office to schedule a follow up for further refills. cholecalciferol (VITAMIN D3) (1000 Units /25 mcg) tablet Take 1,000 Units by mouth daily. , Historical Med      flash glucose sensor (FreeStyle Efe 14 Day Sensor) kit Change every 14 days E11.40 not on insulin, has Hyperglycemia. Check BG Qac and HS with sensor, Print, Disp-6 Kit, R-5      tiotropium-olodateroL (Stiolto Respimat) 2.5-2.5 mcg/actuation inhaler Take 2 Puffs by inhalation daily. , Historical Med      atorvastatin (LIPITOR) 40 mg tablet TAKE 1 TABLET BY MOUTH DAILY, Normal, Disp-90 Tablet, R-1ZERO refills remain on this prescription. Your patient is requesting advance approval of refills for this medication to PREVENT ANY MISSED DOSES      linaCLOtide (Linzess) 72 mcg cap capsule Take 1 Capsule by mouth Daily (before breakfast). , Historical Med      clopidogreL (PLAVIX) 75 mg tab TAKE 1 TABLET BY MOUTH DAILY, Normal, Disp-90 Tab, R-1Please inquire whom she plans to continue with for general Cardiology and defer additional refills to that provider. Her Cardiologist Dr. Stormy Aguirre has changed practices. calcium carb/vitamin D3/vit K1 (VIACTIV PO) Take  by mouth., Historical Med      aspirin delayed-release 81 mg tablet Take 81 mg by mouth daily. , Historical Med      Biotin 2,500 mcg cap Take  by mouth., Historical Med      dapagliflozin (Farxiga) 10 mg tab tablet Take  by mouth daily. , Historical Med      milnacipran (SAVELLA) 50 mg tablet Take 1 Tab by mouth two (2) times a day., Normal, Disp-180 Tab, R-3      fluticasone propionate (FLONASE ALLERGY RELIEF) 50 mcg/actuation nasal spray 2 Sprays by Both Nostrils route every morning., Historical Med      magnesium oxide (MAG-OX) 400 mg tablet Take 400 mg by mouth every morning., Historical Med      cranberry fruit extract (CRANBERRY PO) Take 4,200 mg by mouth daily. , Historical Med      glimepiride (AMARYL) 2 mg tablet Take 2 mg by mouth three (3) times daily. 1 in the morning, 2 at night, Historical Med      L-Mfolate-B6 Phos-Methyl-B12 (METANX) 3-35-2 mg tab tab Take 1 Tab by mouth two (2) times a day.  Indications: neuropathy, Historical Med             Signed By: Amy Sharma MD     March 5, 2023

## 2023-03-10 ENCOUNTER — OFFICE VISIT (OUTPATIENT)
Dept: FAMILY MEDICINE CLINIC | Age: 73
End: 2023-03-10

## 2023-03-10 VITALS
BODY MASS INDEX: 26.36 KG/M2 | RESPIRATION RATE: 16 BRPM | HEART RATE: 91 BPM | HEIGHT: 66 IN | SYSTOLIC BLOOD PRESSURE: 107 MMHG | DIASTOLIC BLOOD PRESSURE: 67 MMHG | WEIGHT: 164 LBS | OXYGEN SATURATION: 98 % | TEMPERATURE: 97.5 F

## 2023-03-10 DIAGNOSIS — I50.32 CHRONIC HEART FAILURE WITH PRESERVED EJECTION FRACTION (HCC): ICD-10-CM

## 2023-03-10 DIAGNOSIS — S00.83XA CONTUSION OF FACE, INITIAL ENCOUNTER: ICD-10-CM

## 2023-03-10 DIAGNOSIS — E11.40 TYPE 2 DIABETES MELLITUS WITH DIABETIC NEUROPATHY, WITHOUT LONG-TERM CURRENT USE OF INSULIN (HCC): ICD-10-CM
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
## 2023-03-10 RX ORDER — INSULIN LISPRO-AABC 100 [IU]/ML
INJECTION, SOLUTION SUBCUTANEOUS
COMMUNITY
Start: 2023-03-10

## 2023-03-10 RX ORDER — SACUBITRIL AND VALSARTAN 49; 51 MG/1; MG/1
1 TABLET, FILM COATED ORAL 2 TIMES DAILY
Qty: 60 TABLET | Refills: 0 | Status: SHIPPED | OUTPATIENT
Start: 2023-03-10

## 2023-03-10 RX ORDER — KETOROLAC TROMETHAMINE 30 MG/ML
30 INJECTION, SOLUTION INTRAMUSCULAR; INTRAVENOUS
Status: COMPLETED | OUTPATIENT
Start: 2023-03-10 | End: 2023-03-10

## 2023-03-10 RX ADMIN — KETOROLAC TROMETHAMINE 30 MG: 30 INJECTION, SOLUTION INTRAMUSCULAR; INTRAVENOUS at 15:44

## 2023-03-10 NOTE — PROGRESS NOTES
Progress Note    she is a 68y.o. year old female who presents for evalution. Subjective:     Pt here with  and she had tripped and fell, unable to catch herself. Hit her face and she had CT head and neck which showed no fracture. No LOC no confusion after hitting her face. She is on plavix. She reports having some low BP and feel symptomatic at times. She is on max dose of Entresto thru cardiology due to [address] last cho on file EF 20-25%. Reviewed PmHx, RxHx, FmHx, SocHx, AllgHx and updated and dated in the chart. Review of Systems - negative except as listed above in the HPI    Objective:     Vitals:    03/10/23 1354   BP: 107/67   Pulse: 91   Resp: 16   Temp: 97.5 °F (36.4 °C)   TempSrc: Oral   SpO2: 98%   Weight: 164 lb (74.4 kg)   Height: 5' 6\" (1.676 m)       Current Outpatient Medications   Medication Sig    Entresto 49-51 mg tab tablet Take 1 Tablet by mouth two (2) times a day. insulin lispro-aabc (Lyumjev KwikPen U-100 Insulin) 100 unit/mL inpn Sliding scale per endo    methocarbamoL (ROBAXIN) 750 mg tablet Take 1 Tablet by mouth four (4) times daily as needed for Muscle Spasm(s) or Pain. OTHER Aqua right mastectomy prosthesis    Metanx, algal oil, 3 mg-35 mg-2 mg -90.314 mg cap TAKE 1 CAPSULE BY MOUTH TWO TIMES A DAY    baclofen (LIORESAL) 10 mg tablet Take 10 mg by mouth every evening. bethanechol (URECHOLINE) 5 mg tablet Take 5 mg by mouth two (2) times a day. Alpha Lipoic Acid 600 mg cap Take 1 Capsule by mouth two (2) times a day. loratadine (CLARITIN) 10 mg tablet Take 10 mg by mouth. carvediloL (COREG) 3.125 mg tablet Take 1 Tablet by mouth two (2) times daily (with meals). cholecalciferol (VITAMIN D3) (1000 Units /25 mcg) tablet Take 1,000 Units by mouth daily. flash glucose sensor (FreeStyle Efe 14 Day Sensor) kit Change every 14 days E11.40 not on insulin, has Hyperglycemia.   Check BG Qac and HS with sensor    tiotropium-olodateroL (Stiolto Respimat) 2.5-2.5 mcg/actuation inhaler Take 2 Puffs by inhalation daily. atorvastatin (LIPITOR) 40 mg tablet TAKE 1 TABLET BY MOUTH DAILY    linaCLOtide (LINZESS) 72 mcg cap capsule Take 1 Capsule by mouth Daily (before breakfast). clopidogreL (PLAVIX) 75 mg tab TAKE 1 TABLET BY MOUTH DAILY    calcium carb/vitamin D3/vit K1 (VIACTIV PO) Take  by mouth. Biotin 2,500 mcg cap Take  by mouth. dapagliflozin (FARXIGA) 10 mg tab tablet Take  by mouth daily. milnacipran (SAVELLA) 50 mg tablet Take 1 Tab by mouth two (2) times a day. fluticasone propionate (FLONASE) 50 mcg/actuation nasal spray 2 Sprays by Both Nostrils route every morning.    magnesium oxide (MAG-OX) 400 mg tablet Take 400 mg by mouth every morning. cranberry fruit extract (CRANBERRY PO) Take 4,200 mg by mouth daily. glimepiride (AMARYL) 2 mg tablet Take 2 mg by mouth three (3) times daily. 1 in the morning, 2 at night    L-Mfolate-B6 Phos-Methyl-B12 (METANX) 3-35-2 mg tab tab Take 1 Tab by mouth two (2) times a day. Indications: neuropathy     Current Facility-Administered Medications   Medication Dose Route Frequency    ketorolac (TORADOL) injection 30 mg  30 mg IntraMUSCular NOW       Physical Examination: General appearance - alert, well appearing, and in no distress  Skin - contusion L side of face, sig bruising no deformity, wound healing well      Assessment/ Plan:   Diagnoses and all orders for this visit:    1. Fall, initial encounter  Doing PT  2. Contusion of face, initial encounter  -     ketorolac (TORADOL) injection 30 mg  Tylenol otherwise, wound healing well  3. Chronic heart failure with preserved ejection fraction (HCC)  -     Entresto 49-51 mg tab tablet; Take 1 Tablet by mouth two (2) times a day. Decrease entresto dose. She will contact cardioogy as well, getting hypotensive episodes with full dose.     4. Type 2 diabetes with hyperglycemia  Refer endo  Diabetes education course  She would like a new endo and for me to manage her DM in the interim. She has been having issues with lows on short acting only and would lik to switch to a basal.  She will schedule an appt with labs and to discuss potential changes to regimen  Follow-up and Dispositions    Return if symptoms worsen or fail to improve. I have discussed the diagnosis with the patient and the intended plan as seen in the above orders. The patient has received an after-visit summary and questions were answered concerning future plans. Pt conveyed understanding of plan.     Medication Side Effects and Warnings were discussed with patient    An electronic signature was used to authenticate this note  Marian Ngo, DO

## 2023-03-10 NOTE — PROGRESS NOTES
Chief Complaint   Patient presents with    Follow-up     [facility] ED     Patient presents in office today for ED follow up from [facility]. Seen on 3/3/23 for low blood sugar and went back on 3/4/23 for facial laceration following a fall. She states that she tripped over her bed. States that she feels like to wound is infected. The ED glued the wound closed. She states that it has started bleeding again. She would like to go over her Xray results as she didn't fully understand on mychart. Also has c/o sciatica on her left side. States that her BP has been dropping at times. Reports that this morning her BP was 95/45. Would like to get a referral for endocrinology and for diabetes education. No other concerns. 1. Have you been to the ER, urgent care clinic since your last visit? Hospitalized since your last visit? Yes When: 3/4/23 Where: [facility] Reason for visit: facial laceration    2. Have you seen or consulted any other health care providers outside of the [address] [place] [address] since your last visit? Include any pap smears or colon screening.  No    Learning Assessment 3/4/2014   PRIMARY LEARNER Patient   HIGHEST LEVEL OF EDUCATION - PRIMARY LEARNER  4 YEARS OF COLLEGE   BARRIERS PRIMARY LEARNER NONE   PRIMARY LANGUAGE ENGLISH   LEARNER PREFERENCE PRIMARY LISTENING   ANSWERED BY self   RELATIONSHIP SELF

## 2023-03-10 NOTE — PATIENT INSTRUCTIONS

## 2023-03-29 ENCOUNTER — OFFICE VISIT (OUTPATIENT)
Dept: FAMILY MEDICINE CLINIC | Age: 73
End: 2023-03-29
Payer: MEDICARE

## 2023-03-29 ENCOUNTER — TELEPHONE (OUTPATIENT)
Dept: FAMILY MEDICINE CLINIC | Age: 73
End: 2023-03-29

## 2023-03-29 VITALS
BODY MASS INDEX: 26.36 KG/M2 | OXYGEN SATURATION: 98 % | WEIGHT: 164 LBS | DIASTOLIC BLOOD PRESSURE: 63 MMHG | HEART RATE: 62 BPM | SYSTOLIC BLOOD PRESSURE: 95 MMHG | RESPIRATION RATE: 16 BRPM | TEMPERATURE: 97.4 F | HEIGHT: 66 IN

## 2023-03-29 DIAGNOSIS — I50.22 CHRONIC SYSTOLIC HEART FAILURE (HCC): ICD-10-CM

## 2023-03-29 DIAGNOSIS — I10 PRIMARY HYPERTENSION: Primary | ICD-10-CM

## 2023-03-29 DIAGNOSIS — E11.40 TYPE 2 DIABETES MELLITUS WITH DIABETIC NEUROPATHY, WITHOUT LONG-TERM CURRENT USE OF INSULIN (HCC): ICD-10-CM

## 2023-03-29 DIAGNOSIS — I25.10 CORONARY ARTERY DISEASE INVOLVING NATIVE CORONARY ARTERY OF NATIVE HEART WITHOUT ANGINA PECTORIS: ICD-10-CM

## 2023-03-29 LAB
CHOLEST SERPL-MCNC: 138 MG/DL
EST. AVERAGE GLUCOSE BLD GHB EST-MCNC: 183 MG/DL
HBA1C MFR BLD: 8 % (ref 4–5.6)
HDLC SERPL-MCNC: 58 MG/DL
HDLC SERPL: 2.4 (ref 0–5)
LDLC SERPL CALC-MCNC: 60.4 MG/DL (ref 0–100)
TRIGL SERPL-MCNC: 98 MG/DL (ref ?–150)
VLDLC SERPL CALC-MCNC: 19.6 MG/DL

## 2023-03-29 PROCEDURE — 99214 OFFICE O/P EST MOD 30 MIN: CPT | Performed by: FAMILY MEDICINE

## 2023-03-29 PROCEDURE — 1090F PRES/ABSN URINE INCON ASSESS: CPT | Performed by: FAMILY MEDICINE

## 2023-03-29 PROCEDURE — 2022F DILAT RTA XM EVC RTNOPTHY: CPT | Performed by: FAMILY MEDICINE

## 2023-03-29 PROCEDURE — 3078F DIAST BP <80 MM HG: CPT | Performed by: FAMILY MEDICINE

## 2023-03-29 PROCEDURE — G9899 SCRN MAM PERF RSLTS DOC: HCPCS | Performed by: FAMILY MEDICINE

## 2023-03-29 PROCEDURE — G8427 DOCREV CUR MEDS BY ELIG CLIN: HCPCS | Performed by: FAMILY MEDICINE

## 2023-03-29 PROCEDURE — 3046F HEMOGLOBIN A1C LEVEL >9.0%: CPT | Performed by: FAMILY MEDICINE

## 2023-03-29 PROCEDURE — 3017F COLORECTAL CA SCREEN DOC REV: CPT | Performed by: FAMILY MEDICINE

## 2023-03-29 PROCEDURE — G8417 CALC BMI ABV UP PARAM F/U: HCPCS | Performed by: FAMILY MEDICINE

## 2023-03-29 PROCEDURE — 3074F SYST BP LT 130 MM HG: CPT | Performed by: FAMILY MEDICINE

## 2023-03-29 PROCEDURE — 1123F ACP DISCUSS/DSCN MKR DOCD: CPT | Performed by: FAMILY MEDICINE

## 2023-03-29 PROCEDURE — 1101F PT FALLS ASSESS-DOCD LE1/YR: CPT | Performed by: FAMILY MEDICINE

## 2023-03-29 PROCEDURE — G8536 NO DOC ELDER MAL SCRN: HCPCS | Performed by: FAMILY MEDICINE

## 2023-03-29 PROCEDURE — G9717 DOC PT DX DEP/BP F/U NT REQ: HCPCS | Performed by: FAMILY MEDICINE

## 2023-03-29 PROCEDURE — G0463 HOSPITAL OUTPT CLINIC VISIT: HCPCS | Performed by: FAMILY MEDICINE

## 2023-03-29 PROCEDURE — G8399 PT W/DXA RESULTS DOCUMENT: HCPCS | Performed by: FAMILY MEDICINE

## 2023-03-29 RX ORDER — SACUBITRIL AND VALSARTAN 24; 26 MG/1; MG/1
1 TABLET, FILM COATED ORAL 2 TIMES DAILY
Qty: 60 TABLET | Refills: 1 | Status: SHIPPED | OUTPATIENT
Start: 2023-03-29

## 2023-03-29 RX ORDER — VERICIGUAT 10 MG/1
10 TABLET, FILM COATED ORAL DAILY
COMMUNITY

## 2023-03-29 RX ORDER — INSULIN GLARGINE 300 U/ML
INJECTION, SOLUTION SUBCUTANEOUS
Qty: 3 ML | Refills: 3 | Status: SHIPPED | OUTPATIENT
Start: 2023-03-29 | End: 2023-03-29 | Stop reason: SDUPTHER

## 2023-03-29 RX ORDER — FUROSEMIDE 20 MG/1
20 TABLET ORAL DAILY
COMMUNITY

## 2023-03-29 RX ORDER — INSULIN GLARGINE 300 U/ML
INJECTION, SOLUTION SUBCUTANEOUS
Qty: 6 ML | Refills: 3 | Status: SHIPPED | OUTPATIENT
Start: 2023-03-29

## 2023-03-29 RX ORDER — PEN NEEDLE, DIABETIC 31 GX3/16"
NEEDLE, DISPOSABLE MISCELLANEOUS
Qty: 200 PEN NEEDLE | Refills: 11 | Status: SHIPPED | OUTPATIENT
Start: 2023-03-29

## 2023-03-29 NOTE — PROGRESS NOTES
Chief Complaint   Patient presents with    Diabetes            Patient presents in office today for diabetes check,  No concerns. 1. Have you been to the ER, urgent care clinic since your last visit? Hospitalized since your last visit? No    2. Have you seen or consulted any other health care providers outside of the [address] [place] [address] since your last visit? Include any pap smears or colon screening.  No      Learning Assessment 3/4/2014   PRIMARY LEARNER Patient   HIGHEST LEVEL OF EDUCATION - PRIMARY LEARNER  4 YEARS OF COLLEGE   BARRIERS PRIMARY LEARNER NONE   PRIMARY LANGUAGE ENGLISH   LEARNER PREFERENCE PRIMARY LISTENING   ANSWERED BY self   RELATIONSHIP SELF

## 2023-03-29 NOTE — PATIENT INSTRUCTIONS

## 2023-03-29 NOTE — PROGRESS NOTES
Progress Note    she is a 68y.o. year old female who presents for evalution. Subjective:     Pt here for follow up on diabetes and not controled. Lab Results   Component Value Date/Time    Hemoglobin A1c 8.6 (H) 05/11/2022 09:05 AM    Hemoglobin A1c (POC) 7.6 08/29/2019 08:54 AM    Hemoglobin A1c, External 7.2 09/23/2020 12:00 AM     Her BP is low and possible s/e of entresto. Will lower dose for now. She is symptomatic with the hypotension at times. She will also be following up with her cardiologist.  She is on [place] as well for her CHF with reduced ejection fraction. Last echocardiogram I have on file showed an EF of 20 to 25%. Patient does have a pacemaker and AICD. Due for lipid panel for CD on statin. Lab Results   Component Value Date/Time    Cholesterol, total 158 05/11/2022 09:05 AM    HDL Cholesterol 55 05/11/2022 09:05 AM    LDL-C, External 76 10/18/2019 12:00 AM    LDL, calculated 80.2 05/11/2022 09:05 AM    VLDL, calculated 22.8 05/11/2022 09:05 AM    Triglyceride 114 05/11/2022 09:05 AM    CHOL/HDL Ratio 2.9 05/11/2022 09:05 AM         Reviewed PmHx, RxHx, FmHx, SocHx, AllgHx and updated and dated in the chart. Review of Systems - negative except as listed above in the HPI    Objective:     Vitals:    03/29/23 0813   BP: 95/63   Pulse: 62   Resp: 16   Temp: 97.4 °F (36.3 °C)   TempSrc: Oral   SpO2: 98%   Weight: 164 lb (74.4 kg)   Height: 5' 6\" (1.676 m)       Current Outpatient Medications   Medication Sig    furosemide (LASIX) 20 mg tablet Take 20 mg by mouth daily. vericiguat (Verquvo) 10 mg tab Take 10 mg by mouth daily. sacubitriL-valsartan (Entresto) 24-26 mg tablet Take 1 Tablet by mouth two (2) times a day. insulin lispro-aabc (Lyumjev KwikPen U-100 Insulin) 100 unit/mL inpn Sliding scale per endo    OTHER Aqua right mastectomy prosthesis    baclofen (LIORESAL) 10 mg tablet Take 10 mg by mouth every evening.     bethanechol (URECHOLINE) 5 mg tablet Take 5 mg by mouth two (2) times a day. Alpha Lipoic Acid 600 mg cap Take 1 Capsule by mouth two (2) times a day. loratadine (CLARITIN) 10 mg tablet Take 10 mg by mouth. carvediloL (COREG) 3.125 mg tablet Take 1 Tablet by mouth two (2) times daily (with meals). flash glucose sensor (FreeStyle Efe 14 Day Sensor) kit Change every 14 days E11.40 not on insulin, has Hyperglycemia. Check BG Qac and HS with sensor    atorvastatin (LIPITOR) 40 mg tablet TAKE 1 TABLET BY MOUTH DAILY    linaCLOtide (LINZESS) 72 mcg cap capsule Take 1 Capsule by mouth Daily (before breakfast). calcium carb/vitamin D3/vit K1 (VIACTIV PO) Take  by mouth. dapagliflozin (FARXIGA) 10 mg tab tablet Take  by mouth daily. milnacipran (SAVELLA) 50 mg tablet Take 1 Tab by mouth two (2) times a day. magnesium oxide (MAG-OX) 400 mg tablet Take 400 mg by mouth every morning. Every other day    cranberry fruit extract (CRANBERRY PO) Take 4,200 mg by mouth daily. glimepiride (AMARYL) 2 mg tablet Take 2 mg by mouth three (3) times daily. 1 in the morning, 2 at night    L-Mfolate-B6 Phos-Methyl-B12 (METANX) 3-35-2 mg tab tab Take 1 Tab by mouth two (2) times a day. Indications: neuropathy    methocarbamoL (ROBAXIN) 750 mg tablet Take 1 Tablet by mouth four (4) times daily as needed for Muscle Spasm(s) or Pain. (Patient not taking: Reported on 3/29/2023)    cholecalciferol (VITAMIN D3) (1000 Units /25 mcg) tablet Take 1,000 Units by mouth daily. (Patient not taking: Reported on 3/29/2023)    tiotropium-olodateroL (Stiolto Respimat) 2.5-2.5 mcg/actuation inhaler Take 2 Puffs by inhalation daily. (Patient not taking: Reported on 3/29/2023)    clopidogreL (PLAVIX) 75 mg tab TAKE 1 TABLET BY MOUTH DAILY (Patient not taking: Reported on 3/29/2023)    Biotin 2,500 mcg cap Take  by mouth. (Patient not taking: Reported on 3/29/2023)    fluticasone propionate (FLONASE) 50 mcg/actuation nasal spray 2 Sprays by Both Nostrils route every morning. (Patient not taking: Reported on 3/29/2023)     No current facility-administered medications for this visit. Physical Examination: General appearance - alert, well appearing, and in no distress  Chest - clear to auscultation, no wheezes, rales or rhonchi, symmetric air entry  Heart - normal rate, regular rhythm, normal S1, S2, no murmurs, rubs, clicks or gallops      Diagnoses and all orders for this visit:    1. Primary hypertension  Too low cut back on entresto  2. Coronary artery disease involving native coronary artery of native heart without angina pectoris  -     LIPID PANEL; Future    3. Type 2 diabetes mellitus with diabetic neuropathy, without long-term current use of insulin (HCC)  -     HEMOGLOBIN A1C WITH EAG; Future  Start Tresiba 20 units daily. Go to 2unit base for short acting, 150-199 2units 200-249 4 units and so on. She will send me a essage in aweek to let me know how her sugars are.    4. Chronic systolic heart failure (HCC)  -     sacubitriL-valsartan (Entresto) 24-26 mg tablet; Take 1 Tablet by mouth two (2) times a day. Discussed with patient that we would like her to be able to take the Entresto at the highest tolerated dose along with [place] and beta-blocker to help with strengthening the heart. We will cut her dose back today due to side effects/hypotension. Follow-up and Dispositions    Return in about 4 months (around 7/29/2023), or if symptoms worsen or fail to improve. I have discussed the diagnosis with the patient and the intended plan as seen in the above orders. The patient has received an after-visit summary and questions were answered concerning future plans. Pt conveyed understanding of plan.     Medication Side Effects and Warnings were discussed with patient    An electronic signature was used to authenticate this note  Jarrett Gee DO

## 2023-03-30 NOTE — PROGRESS NOTES
Diabetes marker is actually improved which is great. Lets start with that long-acting insulin and recheck in 4 months. Cholesterol is well controlled. We can recheck the cholesterol in 6 months.

## 2023-04-18 ENCOUNTER — DOCUMENTATION ONLY (OUTPATIENT)
Dept: FAMILY MEDICINE CLINIC | Age: 73
End: 2023-04-18

## 2023-04-18 NOTE — PROGRESS NOTES
[facility] POC was put on RADHA Darling's desk to process (covering paperwork for Dr Jeannine Garcia)

## 2023-05-01 ENCOUNTER — CLINICAL SUPPORT (OUTPATIENT)
Dept: DIABETES SERVICES | Age: 73
End: 2023-05-01

## 2023-05-01 DIAGNOSIS — E11.40 TYPE 2 DIABETES MELLITUS WITH DIABETIC NEUROPATHY, WITHOUT LONG-TERM CURRENT USE OF INSULIN (HCC): Primary | ICD-10-CM

## 2023-05-20 RX ORDER — CLOPIDOGREL BISULFATE 75 MG/1
1 TABLET ORAL DAILY
COMMUNITY
Start: 2021-04-16

## 2023-05-20 RX ORDER — FLUTICASONE PROPIONATE 50 MCG
2 SPRAY, SUSPENSION (ML) NASAL
COMMUNITY

## 2023-05-20 RX ORDER — FUROSEMIDE 20 MG/1
20 TABLET ORAL DAILY
COMMUNITY

## 2023-05-20 RX ORDER — CARVEDILOL 3.12 MG/1
3.12 TABLET ORAL 2 TIMES DAILY WITH MEALS
COMMUNITY
Start: 2022-04-01

## 2023-05-20 RX ORDER — MAGNESIUM OXIDE 400 MG/1
400 TABLET ORAL
COMMUNITY

## 2023-05-20 RX ORDER — GLIMEPIRIDE 2 MG/1
2 TABLET ORAL 3 TIMES DAILY
COMMUNITY
End: 2023-05-30 | Stop reason: SINTOL

## 2023-05-20 RX ORDER — BACLOFEN 10 MG/1
10 TABLET ORAL EVERY EVENING
COMMUNITY

## 2023-05-20 RX ORDER — BETHANECHOL CHLORIDE 5 MG
5 TABLET ORAL 2 TIMES DAILY
COMMUNITY
Start: 2022-03-31 | End: 2023-06-22

## 2023-05-20 RX ORDER — VERICIGUAT 10 MG/1
10 TABLET, FILM COATED ORAL DAILY
COMMUNITY

## 2023-05-20 RX ORDER — INSULIN LISPRO-AABC 100 [IU]/ML
INJECTION, SOLUTION SUBCUTANEOUS
COMMUNITY
Start: 2023-03-10 | End: 2023-05-30

## 2023-05-20 RX ORDER — PERPHENAZINE 16 MG
1 TABLET ORAL 2 TIMES DAILY
COMMUNITY

## 2023-05-20 RX ORDER — ATORVASTATIN CALCIUM 40 MG/1
1 TABLET, FILM COATED ORAL DAILY
COMMUNITY
Start: 2021-10-27

## 2023-05-20 RX ORDER — METHOCARBAMOL 750 MG/1
750 TABLET, FILM COATED ORAL 4 TIMES DAILY PRN
COMMUNITY
Start: 2023-03-04

## 2023-05-20 RX ORDER — SACUBITRIL AND VALSARTAN 24; 26 MG/1; MG/1
1 TABLET, FILM COATED ORAL 2 TIMES DAILY
COMMUNITY
Start: 2023-03-29 | End: 2023-05-30

## 2023-05-20 RX ORDER — INSULIN GLARGINE 300 U/ML
16 INJECTION, SOLUTION SUBCUTANEOUS
COMMUNITY
Start: 2023-03-29 | End: 2023-07-20 | Stop reason: SDUPTHER

## 2023-05-20 RX ORDER — LORATADINE 10 MG/1
10 TABLET ORAL
COMMUNITY

## 2023-05-30 ENCOUNTER — TELEMEDICINE (OUTPATIENT)
Age: 73
End: 2023-05-30
Payer: MEDICARE

## 2023-05-30 DIAGNOSIS — E16.2 HYPOGLYCEMIA: Primary | ICD-10-CM

## 2023-05-30 DIAGNOSIS — Z79.4 TYPE 2 DIABETES MELLITUS WITH DIABETIC NEUROPATHY, WITH LONG-TERM CURRENT USE OF INSULIN (HCC): ICD-10-CM

## 2023-05-30 DIAGNOSIS — E11.40 TYPE 2 DIABETES MELLITUS WITH DIABETIC NEUROPATHY, WITH LONG-TERM CURRENT USE OF INSULIN (HCC): ICD-10-CM

## 2023-05-30 PROCEDURE — 99214 OFFICE O/P EST MOD 30 MIN: CPT | Performed by: FAMILY MEDICINE

## 2023-05-30 PROCEDURE — 1090F PRES/ABSN URINE INCON ASSESS: CPT | Performed by: FAMILY MEDICINE

## 2023-05-30 PROCEDURE — 3052F HG A1C>EQUAL 8.0%<EQUAL 9.0%: CPT | Performed by: FAMILY MEDICINE

## 2023-05-30 PROCEDURE — 1123F ACP DISCUSS/DSCN MKR DOCD: CPT | Performed by: FAMILY MEDICINE

## 2023-05-30 PROCEDURE — G8399 PT W/DXA RESULTS DOCUMENT: HCPCS | Performed by: FAMILY MEDICINE

## 2023-05-30 PROCEDURE — G8427 DOCREV CUR MEDS BY ELIG CLIN: HCPCS | Performed by: FAMILY MEDICINE

## 2023-05-30 PROCEDURE — 3017F COLORECTAL CA SCREEN DOC REV: CPT | Performed by: FAMILY MEDICINE

## 2023-05-30 PROCEDURE — 1036F TOBACCO NON-USER: CPT | Performed by: FAMILY MEDICINE

## 2023-05-30 PROCEDURE — G8419 CALC BMI OUT NRM PARAM NOF/U: HCPCS | Performed by: FAMILY MEDICINE

## 2023-05-30 PROCEDURE — 2022F DILAT RTA XM EVC RTNOPTHY: CPT | Performed by: FAMILY MEDICINE

## 2023-05-30 RX ORDER — INSULIN LISPRO-AABC 100 [IU]/ML
INJECTION, SOLUTION SUBCUTANEOUS
Qty: 1 ADJUSTABLE DOSE PRE-FILLED PEN SYRINGE | Refills: 0
Start: 2023-05-30

## 2023-05-30 RX ORDER — FLASH GLUCOSE SCANNING READER
EACH MISCELLANEOUS
COMMUNITY
Start: 2021-12-15

## 2023-05-30 SDOH — ECONOMIC STABILITY: FOOD INSECURITY: WITHIN THE PAST 12 MONTHS, YOU WORRIED THAT YOUR FOOD WOULD RUN OUT BEFORE YOU GOT MONEY TO BUY MORE.: NEVER TRUE

## 2023-05-30 SDOH — ECONOMIC STABILITY: FOOD INSECURITY: WITHIN THE PAST 12 MONTHS, THE FOOD YOU BOUGHT JUST DIDN'T LAST AND YOU DIDN'T HAVE MONEY TO GET MORE.: NEVER TRUE

## 2023-05-30 SDOH — ECONOMIC STABILITY: INCOME INSECURITY: HOW HARD IS IT FOR YOU TO PAY FOR THE VERY BASICS LIKE FOOD, HOUSING, MEDICAL CARE, AND HEATING?: NOT HARD AT ALL

## 2023-05-30 SDOH — ECONOMIC STABILITY: HOUSING INSECURITY
IN THE LAST 12 MONTHS, WAS THERE A TIME WHEN YOU DID NOT HAVE A STEADY PLACE TO SLEEP OR SLEPT IN A SHELTER (INCLUDING NOW)?: NO

## 2023-05-30 ASSESSMENT — PATIENT HEALTH QUESTIONNAIRE - PHQ9
5. POOR APPETITE OR OVEREATING: 0
2. FEELING DOWN, DEPRESSED OR HOPELESS: 0
SUM OF ALL RESPONSES TO PHQ QUESTIONS 1-9: 0
SUM OF ALL RESPONSES TO PHQ QUESTIONS 1-9: 0
4. FEELING TIRED OR HAVING LITTLE ENERGY: 0
1. LITTLE INTEREST OR PLEASURE IN DOING THINGS: 0
7. TROUBLE CONCENTRATING ON THINGS, SUCH AS READING THE NEWSPAPER OR WATCHING TELEVISION: 0
6. FEELING BAD ABOUT YOURSELF - OR THAT YOU ARE A FAILURE OR HAVE LET YOURSELF OR YOUR FAMILY DOWN: 0
10. IF YOU CHECKED OFF ANY PROBLEMS, HOW DIFFICULT HAVE THESE PROBLEMS MADE IT FOR YOU TO DO YOUR WORK, TAKE CARE OF THINGS AT HOME, OR GET ALONG WITH OTHER PEOPLE: 0
3. TROUBLE FALLING OR STAYING ASLEEP: 0
9. THOUGHTS THAT YOU WOULD BE BETTER OFF DEAD, OR OF HURTING YOURSELF: 0
SUM OF ALL RESPONSES TO PHQ QUESTIONS 1-9: 0
SUM OF ALL RESPONSES TO PHQ9 QUESTIONS 1 & 2: 0
8. MOVING OR SPEAKING SO SLOWLY THAT OTHER PEOPLE COULD HAVE NOTICED. OR THE OPPOSITE, BEING SO FIGETY OR RESTLESS THAT YOU HAVE BEEN MOVING AROUND A LOT MORE THAN USUAL: 0
SUM OF ALL RESPONSES TO PHQ QUESTIONS 1-9: 0

## 2023-05-30 NOTE — PROGRESS NOTES
Progress Note    she is a 68y.o. year old female who presents for evaluation. Subjective:     Pt was at restaurant yesterday 6pm.  Took her insulin and then started getting shaky and SOB. Had son cut her burger. Looked up and could not understand her son. States glucose was 113 prior to ordering and did 1 unit per 8 carbs, prev was using 1 unit per 6 carbs but was drop;ing her too low. She has been having issues with low sugar at [facility] and had messaged me and I told her to start cutting back long acting. She was also shaking when this episode occurred yesterday. She is still having overnight lows into the 50's. Was at 18u toujeo now taking 16units. She does have a lucian but no one did check her sugar during this episode. Reviewed PmHx, RxHx, FmHx, SocHx, AllgHx and updated and dated in the chart. Review of Systems - negative except as listed above in the HPI    Objective: There were no vitals filed for this visit. Current Outpatient Medications   Medication Sig    CRANBERRY EXTRACT PO Take 4,200 mg by mouth daily    Continuous Blood Gluc  (FREESTYLE LUCIAN 14 DAY READER) EVANGELINA Change every 14 days E11.40 not on insulin, has Hyperglycemia. Check BG Qac and HS with sensor    Insulin Lispro-aabc, 1 U Dial, (LYUMJEV KWIKPEN) 100 UNIT/ML SOPN 1 unit per 10 carbs at mealtime 3x daily.     Alpha-Lipoic Acid 600 MG CAPS Take 1 capsule by mouth 2 times daily    atorvastatin (LIPITOR) 40 MG tablet Take 1 tablet by mouth daily    baclofen (LIORESAL) 10 MG tablet Take 1 tablet by mouth every evening    bethanechol (URECHOLINE) 5 MG tablet Take 1 tablet by mouth 2 times daily    carvedilol (COREG) 3.125 MG tablet Take 1 tablet by mouth 2 times daily (with meals)    vitamin D (CHOLECALCIFEROL) 25 MCG (1000 UT) TABS tablet Take 1 tablet by mouth daily    clopidogrel (PLAVIX) 75 MG tablet Take 1 tablet by mouth daily    dapagliflozin (FARXIGA) 10 MG tablet Take by mouth daily    fluticasone

## 2023-05-30 NOTE — PATIENT INSTRUCTIONS
Patient Education        A Healthy Lifestyle: Care Instructions  A healthy lifestyle can help you feel good, have more energy, and stay at a weight that's healthy for you. You can share a healthy lifestyle with your friends and family. And you can do it on your own. Eat meals with your friends or family. You could try cooking together. Plan activities with other people. Go for a walk with a friend, try a free online fitness class, or join a sports league. Eat a variety of healthy foods. These include fruits, vegetables, whole grains, low-fat dairy, and lean protein. Choose healthy portions of food. You can use the Nutrition Facts label on food packages as a guide. Eat more fruits and vegetables. You could add vegetables to sandwiches or add fruit to cereal.   Drink water when you are thirsty. Limit soda, juice, and sports drinks. Try to exercise most days. Aim for at least 2½ hours of exercise each week. Keep moving. Work in the garden or take your dog on a walk. Use the stairs instead of the elevator. If you use tobacco or nicotine, try to quit. Ask your doctor about programs and medicines to help you quit. Limit alcohol. Men should have no more than 2 drinks a day. Women should have no more than 1. For some people, no alcohol is the best choice. Follow-up care is a key part of your treatment and safety. Be sure to make and go to all appointments, and call your doctor if you are having problems. It's also a good idea to know your test results and keep a list of the medicines you take. Where can you learn more? Go to http://www.khan.com/ and enter U807 to learn more about \"A Healthy Lifestyle: Care Instructions. \"  Current as of: November 14, 2022               Content Version: 13.6  © 0996-2776 [employer], [email or web address]. Care instructions adapted under license by [facility] ([facility]).  If you have questions about a medical condition or this instruction, always ask your

## 2023-06-22 RX ORDER — BETHANECHOL CHLORIDE 5 MG
TABLET ORAL
Qty: 180 TABLET | Refills: 1 | Status: SHIPPED | OUTPATIENT
Start: 2023-06-22

## 2023-06-26 ENCOUNTER — CLINICAL DOCUMENTATION (OUTPATIENT)
Age: 73
End: 2023-06-26

## 2023-06-28 RX ORDER — MILNACIPRAN HYDROCHLORIDE 50 MG/1
TABLET, FILM COATED ORAL
Qty: 180 TABLET | Refills: 3 | Status: SHIPPED | OUTPATIENT
Start: 2023-06-28

## 2023-07-20 ENCOUNTER — OFFICE VISIT (OUTPATIENT)
Age: 73
End: 2023-07-20
Payer: MEDICARE

## 2023-07-20 VITALS
TEMPERATURE: 97.4 F | RESPIRATION RATE: 16 BRPM | DIASTOLIC BLOOD PRESSURE: 66 MMHG | BODY MASS INDEX: 27.64 KG/M2 | WEIGHT: 172 LBS | OXYGEN SATURATION: 94 % | HEIGHT: 66 IN | SYSTOLIC BLOOD PRESSURE: 100 MMHG | HEART RATE: 60 BPM

## 2023-07-20 DIAGNOSIS — Z79.4 TYPE 2 DIABETES MELLITUS WITH DIABETIC NEUROPATHY, WITH LONG-TERM CURRENT USE OF INSULIN (HCC): Primary | ICD-10-CM

## 2023-07-20 DIAGNOSIS — E11.40 TYPE 2 DIABETES MELLITUS WITH DIABETIC NEUROPATHY, WITH LONG-TERM CURRENT USE OF INSULIN (HCC): Primary | ICD-10-CM

## 2023-07-20 PROCEDURE — 2022F DILAT RTA XM EVC RTNOPTHY: CPT | Performed by: FAMILY MEDICINE

## 2023-07-20 PROCEDURE — 3078F DIAST BP <80 MM HG: CPT | Performed by: FAMILY MEDICINE

## 2023-07-20 PROCEDURE — 99214 OFFICE O/P EST MOD 30 MIN: CPT | Performed by: FAMILY MEDICINE

## 2023-07-20 PROCEDURE — 3052F HG A1C>EQUAL 8.0%<EQUAL 9.0%: CPT | Performed by: FAMILY MEDICINE

## 2023-07-20 PROCEDURE — 1123F ACP DISCUSS/DSCN MKR DOCD: CPT | Performed by: FAMILY MEDICINE

## 2023-07-20 PROCEDURE — 3074F SYST BP LT 130 MM HG: CPT | Performed by: FAMILY MEDICINE

## 2023-07-20 PROCEDURE — G8427 DOCREV CUR MEDS BY ELIG CLIN: HCPCS | Performed by: FAMILY MEDICINE

## 2023-07-20 PROCEDURE — 3017F COLORECTAL CA SCREEN DOC REV: CPT | Performed by: FAMILY MEDICINE

## 2023-07-20 PROCEDURE — G8399 PT W/DXA RESULTS DOCUMENT: HCPCS | Performed by: FAMILY MEDICINE

## 2023-07-20 PROCEDURE — 1090F PRES/ABSN URINE INCON ASSESS: CPT | Performed by: FAMILY MEDICINE

## 2023-07-20 PROCEDURE — 1036F TOBACCO NON-USER: CPT | Performed by: FAMILY MEDICINE

## 2023-07-20 PROCEDURE — G8419 CALC BMI OUT NRM PARAM NOF/U: HCPCS | Performed by: FAMILY MEDICINE

## 2023-07-20 RX ORDER — INSULIN GLARGINE 300 U/ML
INJECTION, SOLUTION SUBCUTANEOUS
Qty: 1 ADJUSTABLE DOSE PRE-FILLED PEN SYRINGE | Refills: 5 | Status: SHIPPED | OUTPATIENT
Start: 2023-07-20

## 2023-07-20 RX ORDER — MIDODRINE HYDROCHLORIDE 2.5 MG/1
2.5 TABLET ORAL 3 TIMES DAILY
COMMUNITY

## 2023-07-20 RX ORDER — DOCUSATE SODIUM 100 MG/1
100 CAPSULE, LIQUID FILLED ORAL 2 TIMES DAILY
COMMUNITY

## 2023-07-20 RX ORDER — INSULIN LISPRO-AABC 100 [IU]/ML
INJECTION, SOLUTION SUBCUTANEOUS
Qty: 1 ADJUSTABLE DOSE PRE-FILLED PEN SYRINGE | Refills: 0
Start: 2023-07-20

## 2023-07-20 ASSESSMENT — PATIENT HEALTH QUESTIONNAIRE - PHQ9
SUM OF ALL RESPONSES TO PHQ QUESTIONS 1-9: 0
1. LITTLE INTEREST OR PLEASURE IN DOING THINGS: 0
9. THOUGHTS THAT YOU WOULD BE BETTER OFF DEAD, OR OF HURTING YOURSELF: 0
4. FEELING TIRED OR HAVING LITTLE ENERGY: 0
SUM OF ALL RESPONSES TO PHQ9 QUESTIONS 1 & 2: 0
6. FEELING BAD ABOUT YOURSELF - OR THAT YOU ARE A FAILURE OR HAVE LET YOURSELF OR YOUR FAMILY DOWN: 0
3. TROUBLE FALLING OR STAYING ASLEEP: 0
5. POOR APPETITE OR OVEREATING: 0
2. FEELING DOWN, DEPRESSED OR HOPELESS: 0
SUM OF ALL RESPONSES TO PHQ QUESTIONS 1-9: 0
8. MOVING OR SPEAKING SO SLOWLY THAT OTHER PEOPLE COULD HAVE NOTICED. OR THE OPPOSITE, BEING SO FIGETY OR RESTLESS THAT YOU HAVE BEEN MOVING AROUND A LOT MORE THAN USUAL: 0
SUM OF ALL RESPONSES TO PHQ QUESTIONS 1-9: 0
SUM OF ALL RESPONSES TO PHQ QUESTIONS 1-9: 0
7. TROUBLE CONCENTRATING ON THINGS, SUCH AS READING THE NEWSPAPER OR WATCHING TELEVISION: 0
10. IF YOU CHECKED OFF ANY PROBLEMS, HOW DIFFICULT HAVE THESE PROBLEMS MADE IT FOR YOU TO DO YOUR WORK, TAKE CARE OF THINGS AT HOME, OR GET ALONG WITH OTHER PEOPLE: 0

## 2023-07-20 NOTE — PROGRESS NOTES
Chief Complaint   Patient presents with    Diabetes     Patient presents in office today for diabetes check. States that her sugars have been in the 200s. States that her BP has been as low as 90s/40s. No other concerns. 1. \"Have you been to the ER, urgent care clinic since your last visit? Hospitalized since your last visit? \" No    2. \"Have you seen or consulted any other health care providers outside of the [address] [place] [address] since your last visit? \" No     3. For patients aged 43-73: Has the patient had a colonoscopy / FIT/ Cologuard? No      If the patient is female:    4. For patients aged 43-66: Has the patient had a mammogram within the past 2 years? Yes - no Care Gap present      5. For patients aged 21-65: Has the patient had a pap smear?  NA - based on age or sex

## 2023-07-21 LAB
EST. AVERAGE GLUCOSE BLD GHB EST-MCNC: 148 MG/DL
HBA1C MFR BLD: 6.8 % (ref 4–5.6)

## 2023-08-07 ENCOUNTER — CLINICAL DOCUMENTATION (OUTPATIENT)
Age: 73
End: 2023-08-07

## 2023-08-07 NOTE — PROGRESS NOTES
[facility] mammogram report was put on CHASITY Osuna's desk ( covering paperwork for Dr Isaiah Olvera)

## 2023-08-21 ENCOUNTER — CLINICAL DOCUMENTATION (OUTPATIENT)
Age: 73
End: 2023-08-21

## 2023-08-21 DIAGNOSIS — E11.40 TYPE 2 DIABETES MELLITUS WITH DIABETIC NEUROPATHY, WITH LONG-TERM CURRENT USE OF INSULIN (HCC): ICD-10-CM

## 2023-08-21 DIAGNOSIS — Z79.4 TYPE 2 DIABETES MELLITUS WITH DIABETIC NEUROPATHY, WITH LONG-TERM CURRENT USE OF INSULIN (HCC): ICD-10-CM

## 2023-08-22 RX ORDER — INSULIN GLARGINE 300 U/ML
INJECTION, SOLUTION SUBCUTANEOUS
Qty: 3 ML | Refills: 11 | Status: SHIPPED | OUTPATIENT
Start: 2023-08-22

## 2023-08-24 ENCOUNTER — CLINICAL DOCUMENTATION (OUTPATIENT)
Age: 73
End: 2023-08-24

## 2023-08-29 ENCOUNTER — CLINICAL DOCUMENTATION (OUTPATIENT)
Age: 73
End: 2023-08-29

## 2023-08-31 ENCOUNTER — CLINICAL DOCUMENTATION (OUTPATIENT)
Age: 73
End: 2023-08-31

## 2023-08-31 NOTE — PROGRESS NOTES
Progress note faxed to [facility]. Fax number [phone]. Confirmation number [id number].

## 2023-09-05 NOTE — CONSULTS
[facility] [place]. Scooter Swartz M.D.  [phone]                    GASTROENTEROLOGY CONSULTATION NOTE              NAME:  Darliss Angelucci   :   1950   MRN:   [id number]       Referring Physician:    [facility]      Consult Date:   10/31/2017 8:19 PM    Chief Complaint:    Hematochezia with LLQ pain     History of Present Illness:    Patient is a 78 y.o. who presented to the ER today with bleeding from the rectum. She reports that 2 days ago she had acute onset LLQ pain that was followed by a bowel movement of brown stools, followed by another and subsequently she started to have bright red blood per rectum preceded by lower abdominal cramps. These have persisted until today when she decided to the hospital. She denies any nausea or vomiting, denies any fever or chills. She denies any previous similar episodes. She denies any change in activity of sick contacts, no suspicious food intake. In the ER she was found to have initial CBC within normal, CT scan showing diffuse wall thickening of the descending colon. She was admitted for further evaluation and treatment. She reports she had a colonoscopy by Dr. Charlee Purcell about 5 to 6 years ago. She reports she started taking meloxicam about a month ago for osteoarthritis. PMH:  Past Medical History:   Diagnosis Date    Abnormal MRI, cervical spine 4/3/2017    Abnormal pap 3/2015; 2016 Neg pap +HPV;  ASCUS +HPV    Acute transverse myelitis (HCC)     Arthritis     osteo-tests for RA were neg    Breast cancer ([facility] [address]     right    Chronic pain     Diabetes (HCC)     Fibromyalgia     Ganglion cyst of wrist     LEFT    Hypercholesterolemia     Hypertension     Memory loss     Per pt.      Murmur, cardiac     Neuropathy     Rosacea     Unspecified adverse effect of anesthesia     \"SLOW TO WAKE UP, SENSITIVE TO ANESTHESIA, DO NOT COME AROUND FOR 2-3 DAYS\"        PSH:  Past Surgical History:   Procedure Laterality Date    HX CATARACT REMOVAL Bilateral 2014    HX CERVICAL FUSION      HX COLPOSCOPY  5/2016    Neg path    HX MASTECTOMY  12/96    tram flap    HX ORTHOPAEDIC Left 1990's    foot surgery    HX TONSILLECTOMY      HX VASCULAR ACCESS  1997    portacath left chest-removed after chemo    RECONSTR NOSE  11/2013, 2005    HOLE IN SEPTUM       Allergies: Allergies   Allergen Reactions    Latex Other (comments)     Patient states it burns around her mouth.  Other Food Other (comments)     Yogurt - stomach cramping    Betadine [Povidone-Iodine] Itching     Pt states this causes \"extreme\" itching    Codeine Anaphylaxis, Rash, Nausea Only and Other (comments)     HEADACHE  Tolerates tramadol    Dilaudid [Hydromorphone (Bulk)] Anaphylaxis, Itching, Nausea Only and Other (comments)     HALLUCINATIONS  Tolerates tramadol      Hydrocodone Anaphylaxis, Rash, Nausea Only and Vertigo     Facial - eyelid swelling-had to go to ER for reaction, HALLUCINATIONS  Tolerates tramadol      Ditropan [Oxybutynin Chloride] Swelling    Doxycycline Rash     PUSTULAR RASH- TOLERATING TETRACYCLINE    Keflex [Cephalexin] Nausea and Vomiting     Pain in abdomen AND FLU-LIKE SX      Metformin Nausea and Vomiting     Severe abdominal pain      Tape [Adhesive] Other (comments)     Redness/inflammed. Can only use paper tape. CAN USE BAND-AIDS. TOLERATES SURGICAL TAPE USED IN [place] [facility].  Sulfamethoxazole-Trimethoprim Other (comments)     Cramping/flu-like symptoms       Home Medications:  Prior to Admission Medications   Prescriptions Last Dose Informant Patient Reported? Taking? Alpha Lipoic Acid 600 mg cap 10/28/2017 at pm Self Yes Yes   Sig: Take 1 Cap by mouth two (2) times a day. CALCIUM PO 10/28/2017 at am Self Yes Yes   Sig: Take 1 Caplet by mouth daily. CHROM GENO/BRINDAL BERRY (GARCINIA CAMBOGIA PO) 10/28/2017 at am Self Yes Yes   Sig: Take 1 Tab by mouth daily.    CRANBERRY EXTRACT-VIT C PO 10/28/2017 at am Self Yes Yes   Sig: Take 2 Caps by mouth daily. DOCOSAHEXANOIC ACID/EPA (FISH OIL PO) 10/28/2017 at am Self Yes Yes   Sig: Take 1 Cap by mouth daily. INVOKANA 100 mg tablet 10/29/2017 at am Self No Yes   Sig: Take 1 tablet by mouth  daily before breakfast   L-Mfolate-B6 Phos-Methyl-B12 (METANX) 3-35-2 mg tab tab 10/28/2017 at pm Self Yes Yes   Sig: Take 1 Tab by mouth two (2) times a day. Indications: neuropathy   OTHER,NON-FORMULARY, 10/28/2017 at am Self Yes Yes   Sig: Take 1 Tab by mouth daily. Vitamin D & E   SITagliptin (JANUVIA) 100 mg tablet 10/29/2017 at am Self No Yes   Sig: Take 1 Tab by mouth Daily (before breakfast). TRUETEST TEST STRIPS strip  Self No No   Sig: TEST THREE TIMES DAILY   aspirin 81 mg chewable tablet 10/28/2017 at am Self Yes Yes   Sig: Take 81 mg by mouth daily. baclofen (LIORESAL) 10 mg tablet 10/24/2017 at Unknown time Self Yes Yes   Sig: Take 10 mg by mouth daily as needed. bethanechol (URECHOLINE) 10 mg tablet 10/28/2017 at pm Self Yes Yes   Sig: Take 10 mg by mouth nightly. biotin 10,000 mcg cap 10/28/2017 at am Self Yes Yes   Sig: Take 1 Cap by mouth daily. cinnamon bark (CINNAMON) 500 mg cap 10/28/2017 at am Self Yes Yes   Sig: Take 2 Caps by mouth daily. fluticasone (FLONASE) 50 mcg/actuation nasal spray 10/23/2017 Self Yes Yes   Si Sprays by Both Nostrils route daily as needed for Rhinitis.   gabapentin (NEURONTIN) 300 mg capsule 10/28/2017 at pm Self Yes Yes   Sig: Take 300 mg by mouth three (3) times daily. glimepiride (AMARYL) 2 mg tablet 10/28/2017 at pm Self Yes Yes   Sig: Take 2 mg by mouth three (3) times daily. guar gum (BENEFIBER) packet 10/28/2017 at pm Self Yes Yes   Sig: Take 1 Packet by mouth nightly as needed. ivermectin (SOOLANTRA) 1 % crea 10/28/2017 Self Yes Yes   Sig: by Apply Externally route daily.  Indications: ACNE ROSACEA   lisinopril (PRINIVIL, ZESTRIL) 2.5 mg tablet 10/28/2017 at pm Self Yes Yes   Sig: Take 2.5 mg by mouth nightly. loratadine (CLARITIN) 10 mg tablet 10/28/2017 at pm Self Yes Yes   Sig: Take 10 mg by mouth nightly.   magnesium oxide (MAG-OX) 400 mg tablet 10/28/2017 at am Self Yes Yes   Sig: Take 400 mg by mouth daily. meloxicam (MOBIC) 7.5 mg tablet 10/28/2017 at am Self Yes Yes   Sig: Take 7.5 mg by mouth daily. nystatin (MYCOSTATIN) topical cream  Self No Yes   Sig: Apply  to affected area three (3) times daily. As needed for skin irritation. Use a think layer   pravastatin (PRAVACHOL) 40 mg tablet 10/29/2017 at pm Self No Yes   Sig: TAKE 1 TABLET NIGHTLY (NEEDS APPOINTMENT AS SOON AS POSSIBLE FOR FASTING LABS AND APPOINTMENT)   triamcinolone acetonide (KENALOG) 0.1 % topical cream  Self No Yes   Sig: Apply  to affected area three (3) times daily. As needed for skin irritation, use thin layer.       Facility-Administered Medications: None       Hospital Medications:  Current Facility-Administered Medications   Medication Dose Route Frequency    bethanechol (URECHOLINE) tablet 10 mg  10 mg Oral QHS    gabapentin (NEURONTIN) capsule 300 mg  300 mg Oral TID    [START ON 11/1/2017] fluticasone (FLONASE) 50 mcg/actuation nasal spray 2 Spray  2 Spray Both Nostrils DAILY    [START ON 11/1/2017] ivermectin 1 % cream (Soolantra) (Patient Supplied)   Topical DAILY    lisinopril (PRINIVIL, ZESTRIL) tablet 2.5 mg  2.5 mg Oral QHS    magnesium oxide (MAG-OX) tablet 400 mg  400 mg Oral DAILY    nystatin (MYCOSTATIN) 100,000 unit/gram cream   Topical TID    pravastatin (PRAVACHOL) tablet 40 mg  40 mg Oral DAILY    triamcinolone acetonide (KENALOG) 0.1 % cream   Topical TID    sodium chloride (NS) flush 5-10 mL  5-10 mL IntraVENous Q8H    sodium chloride (NS) flush 5-10 mL  5-10 mL IntraVENous PRN    insulin lispro (HUMALOG) injection   SubCUTAneous QID WITH MEALS    glucose chewable tablet 16 g  4 Tab Oral PRN    dextrose (D50W) injection syrg 12.5-25 g  12.5-25 g IntraVENous PRN    glucagon (GLUCAGEN) injection 1 mg  1 mg IntraMUSCular PRN    0.9% sodium chloride infusion  125 mL/hr IntraVENous CONTINUOUS    pantoprazole (PROTONIX) 40 mg in sodium chloride 0.9 % 10 mL injection  40 mg IntraVENous Q12H    0.9% sodium chloride infusion 250 mL  250 mL IntraVENous PRN    metroNIDAZOLE (FLAGYL) IVPB premix 500 mg  500 mg IntraVENous Q8H    [START ON 11/1/2017] levoFLOXacin (LEVAQUIN) 500 mg in D5W IVPB  500 mg IntraVENous Q24H       Social History:  Social History   Substance Use Topics    Smoking status: Never Smoker    Smokeless tobacco: Never Used      Comment: Never used vapor or e-cigs     Alcohol use No       Family History:  Family History   Problem Relation Age of Onset    Heart Disease Mother     Hypertension Mother     COPD Mother     Diabetes Father     Other Son      INOPERABLE BRAIN TUMOR    Gout Son     Celiac Disease Son     Anesth Problems Neg Hx        Review of Systems:  Constitutional: negative fever, negative chills, negative weight loss  Eyes:   negative visual changes  ENT:   negative sore throat, tongue or lip swelling  Respiratory:  negative cough, negative dyspnea  Cards:  negative for chest pain, palpitations, lower extremity edema  GI:   See HPI  :  negative for frequency, dysuria  Integument:  negative for rash and pruritus  Heme:  negative for easy bruising and gum/nose bleeding  Musculoskel: (+) for myalgias,  back pain and (+) muscle weakness  Neuro: negative for headaches, dizziness, vertigo  Psych:  negative for feelings of anxiety, depression     Objective:   Patient Vitals for the past 8 hrs:   BP Temp Pulse Resp SpO2   10/31/17 1952 159/65 97.8 °F (36.6 °C) 67 17 100 %   10/31/17 1723 146/73 98.1 °F (36.7 °C) 80 18 97 %   10/31/17 1600 161/50 - - - 97 %   10/31/17 1557 - 98.5 °F (36.9 °C) - - -   10/31/17 1516 172/51 - - - 97 %   10/31/17 1339 175/73 - 97 16 -             EXAM:     NEURO-alert, oriented x3, affect appropriate, has history of transverse myelitis, no current acute deficits   HEENT-Head: Normocephalic, no lesions, without obvious abnormality. LUNGS-clear to auscultation bilaterally    COR-regular rate and rhythym     ABD- soft, mild tenderness over the LLQ area. No palpable masses     EXT-no edema    Skin - No rash     Data Review     Recent Labs      10/31/17   1845  10/31/17   1201   WBC   --   9.4   HGB  10.3*  13.4   HCT  33.1*  41.6   PLT   --   238     Recent Labs      10/31/17   1201   NA  141   K  4.0   CL  103   CO2  25   BUN  23*   CREA  0.59   GLU  179*   CA  9.3     Recent Labs      10/31/17   1201   SGOT  18   AP  157*   TP  6.7   ALB  3.1*   GLOB  3.6   LPSE  231     No results for input(s): INR, PTP, APTT in the last 72 hours. No lab exists for component: INREXT    Patient Active Problem List   Diagnosis Code    Diabetes mellitus with neuropathy ([facility] [address] E11.40    Postmenopausal bleeding N95.0    Fibromyalgia M79.7    HTN (hypertension) I10    Breast cancer (HCC) C50.919    Scoliosis M41.9    ACP (advance care planning) Z71.89    Left-sided weakness R53.1    Acute transverse myelitis (HCC) G37.3    Abnormal MRI, cervical spine R93.7       Assessment and Plan:  Acute onset of LLQ pain with bright red blood per rectum and colonic wall thickening around the descending colon on CT scan is consistent with acute colitis. Most likely etiology would be acute ischemic colitis versus infectious process. I had a discussion with her and her  and agreed to proceed with IV fluid hydration, IV antibiotics and clear liquid diet. Will need to obtain stools for culture. Continue to monitor hemoglobin. Will see in am, based on her status a gentle prep and sigmoidoscopy/colonoscopy timing will be decided. Will follow. Thanks for allowing me to participate in the care of this patient.   Signed By: Alexandr Noguera MD     10/31/2017  5:19 PM Azathioprine Counseling:  I discussed with the patient the risks of azathioprine including but not limited to myelosuppression, immunosuppression, hepatotoxicity, lymphoma, and infections.  The patient understands that monitoring is required including baseline LFTs, Creatinine, possible TPMP genotyping and weekly CBCs for the first month and then every 2 weeks thereafter.  The patient verbalized understanding of the proper use and possible adverse effects of azathioprine.  All of the patient's questions and concerns were addressed.

## 2023-09-19 ENCOUNTER — CLINICAL DOCUMENTATION (OUTPATIENT)
Age: 73
End: 2023-09-19

## 2023-09-22 ENCOUNTER — CLINICAL DOCUMENTATION (OUTPATIENT)
Age: 73
End: 2023-09-22

## 2023-09-22 NOTE — PROGRESS NOTES
Office notes faxed to [facility]. Fax number [phone]. Confirmation number [id number].

## 2023-10-06 ENCOUNTER — TRANSCRIBE ORDERS (OUTPATIENT)
Facility: HOSPITAL | Age: 73
End: 2023-10-06

## 2023-10-06 ENCOUNTER — HOSPITAL ENCOUNTER (OUTPATIENT)
Facility: HOSPITAL | Age: 73
End: 2023-10-06
Payer: MEDICARE

## 2023-10-06 DIAGNOSIS — R06.02 SHORTNESS OF BREATH: Primary | ICD-10-CM

## 2023-10-06 DIAGNOSIS — R06.02 SHORTNESS OF BREATH: ICD-10-CM

## 2023-10-06 PROCEDURE — 71046 X-RAY EXAM CHEST 2 VIEWS: CPT

## 2023-11-06 ENCOUNTER — HOSPITAL ENCOUNTER (OUTPATIENT)
Facility: HOSPITAL | Age: 73
Discharge: HOME | End: 2023-11-09
Attending: INTERNAL MEDICINE
Payer: MEDICARE

## 2023-11-06 DIAGNOSIS — R91.8 PULMONARY NODULES: ICD-10-CM

## 2023-11-06 PROCEDURE — 71250 CT THORAX DX C-: CPT

## 2023-11-07 ENCOUNTER — CLINICAL DOCUMENTATION (OUTPATIENT)
Age: 73
End: 2023-11-07

## 2023-11-09 ENCOUNTER — CLINICAL DOCUMENTATION (OUTPATIENT)
Age: 73
End: 2023-11-09

## 2023-11-09 RX ORDER — L-METHYLFOLATE-ALGAE-VIT B12-B6 CAP 3-90.314-2-35 MG 3-90.314-2-35 MG
1 CAP ORAL 2 TIMES DAILY
Qty: 180 CAPSULE | Refills: 3 | Status: SHIPPED | OUTPATIENT
Start: 2023-11-09

## 2023-11-10 ENCOUNTER — CLINICAL DOCUMENTATION (OUTPATIENT)
Age: 73
End: 2023-11-10

## 2023-12-07 ENCOUNTER — CLINICAL DOCUMENTATION (OUTPATIENT)
Age: 73
End: 2023-12-07

## 2024-01-05 ENCOUNTER — CLINICAL DOCUMENTATION (OUTPATIENT)
Age: 74
End: 2024-01-05

## 2024-01-09 ENCOUNTER — TELEMEDICINE (OUTPATIENT)
Age: 74
End: 2024-01-09
Payer: COMMERCIAL

## 2024-01-09 DIAGNOSIS — I25.10 CORONARY ARTERY DISEASE INVOLVING NATIVE CORONARY ARTERY OF NATIVE HEART WITHOUT ANGINA PECTORIS: ICD-10-CM

## 2024-01-09 DIAGNOSIS — F33.8 OTHER RECURRENT DEPRESSIVE DISORDERS (HCC): ICD-10-CM

## 2024-01-09 DIAGNOSIS — I50.22 CHRONIC SYSTOLIC (CONGESTIVE) HEART FAILURE (HCC): ICD-10-CM

## 2024-01-09 DIAGNOSIS — I10 PRIMARY HYPERTENSION: ICD-10-CM

## 2024-01-09 DIAGNOSIS — G37.3 ACUTE TRANSVERSE MYELITIS IN DEMYELINATING DISEASE OF CENTRAL NERVOUS SYSTEM (HCC): ICD-10-CM

## 2024-01-09 DIAGNOSIS — E11.40 TYPE 2 DIABETES MELLITUS WITH DIABETIC NEUROPATHY, WITH LONG-TERM CURRENT USE OF INSULIN (HCC): Primary | ICD-10-CM

## 2024-01-09 DIAGNOSIS — I70.0 ATHEROSCLEROSIS OF AORTA (HCC): ICD-10-CM

## 2024-01-09 DIAGNOSIS — Z79.4 TYPE 2 DIABETES MELLITUS WITH DIABETIC NEUROPATHY, WITH LONG-TERM CURRENT USE OF INSULIN (HCC): Primary | ICD-10-CM

## 2024-01-09 PROCEDURE — 1123F ACP DISCUSS/DSCN MKR DOCD: CPT | Performed by: FAMILY MEDICINE

## 2024-01-09 PROCEDURE — 3017F COLORECTAL CA SCREEN DOC REV: CPT | Performed by: FAMILY MEDICINE

## 2024-01-09 PROCEDURE — 99214 OFFICE O/P EST MOD 30 MIN: CPT | Performed by: FAMILY MEDICINE

## 2024-01-09 PROCEDURE — G9899 SCRN MAM PERF RSLTS DOC: HCPCS | Performed by: FAMILY MEDICINE

## 2024-01-09 PROCEDURE — 1036F TOBACCO NON-USER: CPT | Performed by: FAMILY MEDICINE

## 2024-01-09 PROCEDURE — 1090F PRES/ABSN URINE INCON ASSESS: CPT | Performed by: FAMILY MEDICINE

## 2024-01-09 PROCEDURE — 2022F DILAT RTA XM EVC RTNOPTHY: CPT | Performed by: FAMILY MEDICINE

## 2024-01-09 PROCEDURE — G8427 DOCREV CUR MEDS BY ELIG CLIN: HCPCS | Performed by: FAMILY MEDICINE

## 2024-01-09 PROCEDURE — G8484 FLU IMMUNIZE NO ADMIN: HCPCS | Performed by: FAMILY MEDICINE

## 2024-01-09 PROCEDURE — G8399 PT W/DXA RESULTS DOCUMENT: HCPCS | Performed by: FAMILY MEDICINE

## 2024-01-09 PROCEDURE — G8419 CALC BMI OUT NRM PARAM NOF/U: HCPCS | Performed by: FAMILY MEDICINE

## 2024-01-09 PROCEDURE — 3046F HEMOGLOBIN A1C LEVEL >9.0%: CPT | Performed by: FAMILY MEDICINE

## 2024-01-09 ASSESSMENT — PATIENT HEALTH QUESTIONNAIRE - PHQ9
2. FEELING DOWN, DEPRESSED OR HOPELESS: 0
7. TROUBLE CONCENTRATING ON THINGS, SUCH AS READING THE NEWSPAPER OR WATCHING TELEVISION: 0
SUM OF ALL RESPONSES TO PHQ QUESTIONS 1-9: 0
4. FEELING TIRED OR HAVING LITTLE ENERGY: 0
SUM OF ALL RESPONSES TO PHQ QUESTIONS 1-9: 0
8. MOVING OR SPEAKING SO SLOWLY THAT OTHER PEOPLE COULD HAVE NOTICED. OR THE OPPOSITE, BEING SO FIGETY OR RESTLESS THAT YOU HAVE BEEN MOVING AROUND A LOT MORE THAN USUAL: 0
SUM OF ALL RESPONSES TO PHQ QUESTIONS 1-9: 0
SUM OF ALL RESPONSES TO PHQ QUESTIONS 1-9: 0
6. FEELING BAD ABOUT YOURSELF - OR THAT YOU ARE A FAILURE OR HAVE LET YOURSELF OR YOUR FAMILY DOWN: 0
3. TROUBLE FALLING OR STAYING ASLEEP: 0
9. THOUGHTS THAT YOU WOULD BE BETTER OFF DEAD, OR OF HURTING YOURSELF: 0
5. POOR APPETITE OR OVEREATING: 0
1. LITTLE INTEREST OR PLEASURE IN DOING THINGS: 0
SUM OF ALL RESPONSES TO PHQ9 QUESTIONS 1 & 2: 0
10. IF YOU CHECKED OFF ANY PROBLEMS, HOW DIFFICULT HAVE THESE PROBLEMS MADE IT FOR YOU TO DO YOUR WORK, TAKE CARE OF THINGS AT HOME, OR GET ALONG WITH OTHER PEOPLE: 0

## 2024-01-09 NOTE — PATIENT INSTRUCTIONS

## 2024-01-09 NOTE — PROGRESS NOTES
Hyperglycemia.  Check BG Qac and HS with sensor    Alpha-Lipoic Acid 600 MG CAPS Take 1 capsule by mouth 2 times daily    atorvastatin (LIPITOR) 40 MG tablet Take 1 tablet by mouth daily    baclofen (LIORESAL) 10 MG tablet Take 1 tablet by mouth every evening    carvedilol (COREG) 3.125 MG tablet Take 1 tablet by mouth 2 times daily (with meals)    clopidogrel (PLAVIX) 75 MG tablet Take 1 tablet by mouth daily    dapagliflozin (FARXIGA) 10 MG tablet Take by mouth daily    fluticasone (FLONASE) 50 MCG/ACT nasal spray 2 sprays by Nasal route    furosemide (LASIX) 20 MG tablet Take 1 tablet by mouth daily    linaCLOtide (LINZESS) 72 MCG CAPS capsule Take 1 capsule by mouth every morning (before breakfast)    loratadine (CLARITIN) 10 MG tablet Take 1 tablet by mouth    magnesium oxide (MAG-OX) 400 MG tablet Take 1 tablet by mouth     No current facility-administered medications for this visit.       Physical Examination: General appearance - alert, well appearing, and in no distress  Mental status - alert, oriented to person, place, and time      Assessment/ Plan:   Delma was seen today for diabetes.    Diagnoses and all orders for this visit:    Type 2 diabetes mellitus with diabetic neuropathy, with long-term current use of insulin ([facility])  -     Comprehensive Metabolic Panel; Future  -     Lipid Panel; Future  -     Hemoglobin A1C; Future  -     Microalbumin / Creatinine Urine Ratio; Future  Multiple courses of steroids.  We discussed dosing of insulin if she is to go on steroids again in the future.  Acute transverse myelitis in demyelinating disease of central nervous system (HCC)  -     External Referral To Physical Therapy    Chronic systolic (congestive) heart failure (HCC)  -     External Referral To Physical Therapy    Other recurrent depressive disorders ([facility])  Stable  Atherosclerosis of aorta ([facility])  LDL goal  Primary hypertension  Stable  Coronary artery disease involving native coronary artery of native heart

## 2024-01-16 ENCOUNTER — CLINICAL DOCUMENTATION (OUTPATIENT)
Age: 74
End: 2024-01-16

## 2024-01-17 ENCOUNTER — TELEPHONE (OUTPATIENT)
Age: 74
End: 2024-01-17

## 2024-01-17 ENCOUNTER — CLINICAL DOCUMENTATION (OUTPATIENT)
Age: 74
End: 2024-01-17

## 2024-01-19 ENCOUNTER — HOSPITAL ENCOUNTER (EMERGENCY)
Facility: HOSPITAL | Age: 74
Discharge: HOME | End: 2024-01-19
Attending: EMERGENCY MEDICINE
Payer: MEDICARE

## 2024-01-19 ENCOUNTER — TELEPHONE (OUTPATIENT)
Age: 74
End: 2024-01-19

## 2024-01-19 ENCOUNTER — APPOINTMENT (OUTPATIENT)
Facility: HOSPITAL | Age: 74
End: 2024-01-19
Payer: MEDICARE

## 2024-01-19 VITALS
HEART RATE: 71 BPM | HEIGHT: 66 IN | OXYGEN SATURATION: 100 % | WEIGHT: 167 LBS | BODY MASS INDEX: 26.84 KG/M2 | DIASTOLIC BLOOD PRESSURE: 58 MMHG | TEMPERATURE: 97.4 F | SYSTOLIC BLOOD PRESSURE: 90 MMHG | RESPIRATION RATE: 18 BRPM

## 2024-01-19 DIAGNOSIS — R42 LIGHT HEADEDNESS: Primary | ICD-10-CM

## 2024-01-19 DIAGNOSIS — R06.02 SHORTNESS OF BREATH: ICD-10-CM

## 2024-01-19 LAB
ALBUMIN SERPL-MCNC: 3.4 G/DL (ref 3.5–5)
ALBUMIN/GLOB SERPL: 0.9 (ref 1.1–2.2)
ALP SERPL-CCNC: 98 U/L (ref 45–117)
ALT SERPL-CCNC: 30 U/L (ref 12–78)
ANION GAP SERPL CALC-SCNC: 5 MMOL/L (ref 5–15)
AST SERPL-CCNC: 27 U/L (ref 15–37)
BASOPHILS # BLD: 0 K/UL (ref 0–0.1)
BASOPHILS NFR BLD: 1 % (ref 0–1)
BILIRUB SERPL-MCNC: 0.7 MG/DL (ref 0.2–1)
BUN SERPL-MCNC: 16 MG/DL (ref 6–20)
BUN/CREAT SERPL: 20 (ref 12–20)
CALCIUM SERPL-MCNC: 9.4 MG/DL (ref 8.5–10.1)
CHLORIDE SERPL-SCNC: 104 MMOL/L (ref 97–108)
CO2 SERPL-SCNC: 32 MMOL/L (ref 21–32)
COMMENT:: NORMAL
CREAT SERPL-MCNC: 0.79 MG/DL (ref 0.55–1.02)
DIFFERENTIAL METHOD BLD: ABNORMAL
EKG ATRIAL RATE: 82 BPM
EKG DIAGNOSIS: NORMAL
EKG Q-T INTERVAL: 430 MS
EKG QRS DURATION: 144 MS
EKG QTC CALCULATION (BAZETT): 505 MS
EKG R AXIS: 76 DEGREES
EKG T AXIS: -37 DEGREES
EKG VENTRICULAR RATE: 83 BPM
EOSINOPHIL # BLD: 0.1 K/UL (ref 0–0.4)
EOSINOPHIL NFR BLD: 1 % (ref 0–7)
ERYTHROCYTE [DISTWIDTH] IN BLOOD BY AUTOMATED COUNT: 13.9 % (ref 11.5–14.5)
GLOBULIN SER CALC-MCNC: 3.7 G/DL (ref 2–4)
GLUCOSE SERPL-MCNC: 114 MG/DL (ref 65–100)
HCT VFR BLD AUTO: 47.2 % (ref 35–47)
HGB BLD-MCNC: 14.7 G/DL (ref 11.5–16)
IMM GRANULOCYTES # BLD AUTO: 0 K/UL (ref 0–0.04)
IMM GRANULOCYTES NFR BLD AUTO: 1 % (ref 0–0.5)
LYMPHOCYTES # BLD: 1.1 K/UL (ref 0.8–3.5)
LYMPHOCYTES NFR BLD: 20 % (ref 12–49)
MAGNESIUM SERPL-MCNC: 2.4 MG/DL (ref 1.6–2.4)
MCH RBC QN AUTO: 28.1 PG (ref 26–34)
MCHC RBC AUTO-ENTMCNC: 31.1 G/DL (ref 30–36.5)
MCV RBC AUTO: 90.1 FL (ref 80–99)
MONOCYTES # BLD: 0.5 K/UL (ref 0–1)
MONOCYTES NFR BLD: 8 % (ref 5–13)
NEUTS SEG # BLD: 4.1 K/UL (ref 1.8–8)
NEUTS SEG NFR BLD: 69 % (ref 32–75)
NRBC # BLD: 0 K/UL (ref 0–0.01)
NRBC BLD-RTO: 0 PER 100 WBC
NT PRO BNP: 1812 PG/ML
PLATELET # BLD AUTO: 210 K/UL (ref 150–400)
PMV BLD AUTO: 12.2 FL (ref 8.9–12.9)
POTASSIUM SERPL-SCNC: 4.1 MMOL/L (ref 3.5–5.1)
PROCALCITONIN SERPL-MCNC: <0.05 NG/ML
PROT SERPL-MCNC: 7.1 G/DL (ref 6.4–8.2)
RBC # BLD AUTO: 5.24 M/UL (ref 3.8–5.2)
SODIUM SERPL-SCNC: 141 MMOL/L (ref 136–145)
SPECIMEN HOLD: NORMAL
TROPONIN I SERPL HS-MCNC: 22 NG/L (ref 0–51)
WBC # BLD AUTO: 5.8 K/UL (ref 3.6–11)

## 2024-01-19 PROCEDURE — 83880 ASSAY OF NATRIURETIC PEPTIDE: CPT

## 2024-01-19 PROCEDURE — 93005 ELECTROCARDIOGRAM TRACING: CPT | Performed by: EMERGENCY MEDICINE

## 2024-01-19 PROCEDURE — 99285 EMERGENCY DEPT VISIT HI MDM: CPT

## 2024-01-19 PROCEDURE — 85025 COMPLETE CBC W/AUTO DIFF WBC: CPT

## 2024-01-19 PROCEDURE — 84484 ASSAY OF TROPONIN QUANT: CPT

## 2024-01-19 PROCEDURE — 80053 COMPREHEN METABOLIC PANEL: CPT

## 2024-01-19 PROCEDURE — 84145 PROCALCITONIN (PCT): CPT

## 2024-01-19 PROCEDURE — 71046 X-RAY EXAM CHEST 2 VIEWS: CPT

## 2024-01-19 PROCEDURE — 83735 ASSAY OF MAGNESIUM: CPT

## 2024-01-19 PROCEDURE — 93010 ELECTROCARDIOGRAM REPORT: CPT | Performed by: SPECIALIST

## 2024-01-19 PROCEDURE — 36415 COLL VENOUS BLD VENIPUNCTURE: CPT

## 2024-01-19 ASSESSMENT — PAIN - FUNCTIONAL ASSESSMENT: PAIN_FUNCTIONAL_ASSESSMENT: NONE - DENIES PAIN

## 2024-01-19 ASSESSMENT — ENCOUNTER SYMPTOMS
SORE THROAT: 0
COUGH: 0
VOMITING: 0

## 2024-01-19 NOTE — ED PROVIDER NOTES
[facility] EMERGENCY DEPT  EMERGENCY DEPARTMENT ENCOUNTER      Pt Name: Delma Apple  MRN: [id number]  Birthdate 1950  Date of evaluation: 1/19/2024  Provider: Montrell Gordon MD    CHIEF COMPLAINT       Chief Complaint   Patient presents with    Shortness of Breath         HISTORY OF PRESENT ILLNESS   (Location/Symptom, Timing/Onset, Context/Setting, Quality, Duration, Modifying Factors, Severity)  Note limiting factors.   73-year-old female presents from home via private vehicle with complaint of chest heaviness and lightheadedness.  Patient states she has been sick off and on for the past month.  She has had pneumonia, bronchitis, sinus infection.  She is never really fully recovered over the past several weeks.  Today she is reporting some chest heaviness as well as lightheadedness whenever she bends over or stands up.  Feels like she might faint.  Denies any actual loss of consciousness or falls.  No recent fevers.  She has been taking her medications as prescribed.    According to EMR, has a history of transverse myelopathy, right breast cancer status post mastectomy, sarcoidosis, hypertension, fibromyalgia, diabetes, chronic pain, osteoporosis.  She has a pacemaker in place.    Cardiologist is Dr. Drake  Pulmonologist is Dr. Lipscomb    The history is provided by the patient and medical records.         Review of External Medical Records:     Nursing Notes were reviewed.    REVIEW OF SYSTEMS    (2-9 systems for level 4, 10 or more for level 5)     Review of Systems   Constitutional:  Negative for fatigue.   HENT:  Negative for sore throat.    Eyes:  Negative for visual disturbance.   Respiratory:  Negative for cough.    Cardiovascular:  Negative for palpitations.   Gastrointestinal:  Negative for vomiting.   Genitourinary:  Negative for difficulty urinating.   Musculoskeletal:  Negative for myalgias.   Skin:  Negative for rash.   Neurological:  Negative for weakness.       Except as noted above the  (PROVENTIL;VENTOLIN;PROAIR) 108 (90 Base) MCG/ACT inhaler INHALE 2 PUFFS BY MOUTH FOUR TIMES DAILY AS NEEDED FOR WHEEZING OR SHORTNESS OF BREATHHistorical Med      VERQUVO 5 MG TABS 10 mg, DAWHistorical Med      L-methylfolate-B6-B12 (METANX) 3-90.314-2-35 MG CAPS capsule TAKE 1 CAPSULE BY MOUTH TWO TIMES A DAY, Disp-180 capsule, R-3Normal      Insulin Pen Needle 32G X 6 MM MISC Disp-400 each, R-3, Normal4 shots daily      Insulin Glargine, 2 Unit Dial, (TOUJEO MAX SOLOSTAR) 300 UNIT/ML SOPN ADMINISTER 20 UNITS UNDER THE SKIN DAILY, Disp-3 mL, R-11Normal      midodrine (PROAMATINE) 2.5 MG tablet Take 1 tablet by mouth 3 times dailyHistorical Med      docusate sodium (COLACE) 100 MG capsule Take 1 capsule by mouth 2 times dailyHistorical Med      Insulin Lispro-aabc, 1 U Dial, (LYUMJEV KWIKPEN) 100 UNIT/ML SOPN 1 unit per 6 carbs at mealtime 3x daily., Disp-1 Adjustable Dose Pre-filled Pen Syringe, R-0NO PRINT      SAVELLA 50 MG TABS TAKE 1 TABLET BY MOUTH TWICE DAILY, Disp-180 tablet, R-3ZERO refills remain on this prescription. Your patient is requesting advance approval of refills for this medication to PREVENT ANY MISSED DOSESNormal      bethanechol (URECHOLINE) 5 MG tablet TAKE 1 TABLET BY MOUTH TWICE DAILY, Disp-180 tablet, R-1Normal      CRANBERRY EXTRACT PO Take 4,200 mg by mouth dailyHistorical Med      Continuous Blood Gluc  (FREESTYLE LUCIAN 14 DAY READER) EVANGELINA Change every 14 days E11.40 not on insulin, has Hyperglycemia.  Check BG Qac and HS with sensorHistorical Med      Alpha-Lipoic Acid 600 MG CAPS Take 1 capsule by mouth 2 times dailyHistorical Med      atorvastatin (LIPITOR) 40 MG tablet Take 1 tablet by mouth dailyHistorical Med      baclofen (LIORESAL) 10 MG tablet Take 1 tablet by mouth every eveningHistorical Med      carvedilol (COREG) 3.125 MG tablet Take 1 tablet by mouth 2 times daily (with meals)Historical Med      clopidogrel (PLAVIX) 75 MG tablet Take 1 tablet by mouth

## 2024-01-19 NOTE — TELEPHONE ENCOUNTER
Called and spoke with Mimi. Advised that patient is not interested in the testing. Mimi verbalized understanding and will cancel the order.

## 2024-01-19 NOTE — TELEPHONE ENCOUNTER
Mimi with [facility] called in to follow up on [facility] genetics lab requisition request that was placed on your desk on 11.9.23. Call back number for her is [phone]. Thanks.

## 2024-01-19 NOTE — ED TRIAGE NOTES
Pt to ED c/o shortness of breath, difficulty breathing, and chest pressure. States she recently recovered from pneumonia x 2 weeks ago. 98% on RA, speaking in complete sentences.

## 2024-02-20 ENCOUNTER — CLINICAL DOCUMENTATION (OUTPATIENT)
Age: 74
End: 2024-02-20

## 2024-02-21 ENCOUNTER — CLINICAL DOCUMENTATION (OUTPATIENT)
Age: 74
End: 2024-02-21

## 2024-02-21 NOTE — PROGRESS NOTES
Office notes faxed to [facility]. Fax number [phone]. Confirmation number [id number].

## 2024-03-04 RX ORDER — BETHANECHOL CHLORIDE 5 MG
TABLET ORAL
Qty: 180 TABLET | Refills: 1 | Status: SHIPPED | OUTPATIENT
Start: 2024-03-04

## 2024-03-26 RX ORDER — CLOPIDOGREL BISULFATE 75 MG/1
TABLET ORAL
Qty: 90 TABLET | Refills: 2 | Status: SHIPPED | OUTPATIENT
Start: 2024-03-26

## 2024-03-27 LAB
ALBUMIN SERPL-MCNC: 3.8 G/DL (ref 3.8–4.8)
ALBUMIN/GLOB SERPL: 1.7 {RATIO} (ref 1.2–2.2)
ALP SERPL-CCNC: 121 IU/L (ref 44–121)
ALT SERPL-CCNC: 27 IU/L (ref 0–32)
AST SERPL-CCNC: 31 IU/L (ref 0–40)
BILIRUB SERPL-MCNC: 0.4 MG/DL (ref 0–1.2)
BUN SERPL-MCNC: 20 MG/DL (ref 8–27)
BUN/CREAT SERPL: 28 (ref 12–28)
CALCIUM SERPL-MCNC: 9.3 MG/DL (ref 8.7–10.3)
CHLORIDE SERPL-SCNC: 103 MMOL/L (ref 96–106)
CHOLEST SERPL-MCNC: 167 MG/DL (ref 100–199)
CO2 SERPL-SCNC: 29 MMOL/L (ref 20–29)
CREAT SERPL-MCNC: 0.72 MG/DL (ref 0.57–1)
EGFRCR SERPLBLD CKD-EPI 2021: 88 ML/MIN/1.73
GLOBULIN SER CALC-MCNC: 2.2 G/DL (ref 1.5–4.5)
GLUCOSE SERPL-MCNC: 101 MG/DL (ref 70–99)
HBA1C MFR BLD: 6.9 % (ref 4.8–5.6)
HDLC SERPL-MCNC: 61 MG/DL
LDLC SERPL CALC-MCNC: 89 MG/DL (ref 0–99)
POTASSIUM SERPL-SCNC: 4.2 MMOL/L (ref 3.5–5.2)
PROT SERPL-MCNC: 6 G/DL (ref 6–8.5)
SODIUM SERPL-SCNC: 144 MMOL/L (ref 134–144)
TRIGL SERPL-MCNC: 94 MG/DL (ref 0–149)
VLDLC SERPL CALC-MCNC: 17 MG/DL (ref 5–40)

## 2024-03-28 LAB
ALBUMIN/CREAT UR: 28 MG/G CREAT (ref 0–29)
CREAT UR-MCNC: 56.9 MG/DL
IMP & REVIEW OF LAB RESULTS: NORMAL
Lab: NORMAL
MICROALBUMIN UR-MCNC: 15.7 UG/ML

## 2024-04-23 LAB — LEFT VENTRICULAR EJECTION FRACTION, EXTERNAL: NORMAL

## 2024-05-07 ENCOUNTER — CLINICAL DOCUMENTATION (OUTPATIENT)
Age: 74
End: 2024-05-07

## 2024-05-08 ENCOUNTER — CLINICAL DOCUMENTATION (OUTPATIENT)
Age: 74
End: 2024-05-08

## 2024-05-31 ENCOUNTER — OFFICE VISIT (OUTPATIENT)
Age: 74
End: 2024-05-31
Payer: MEDICARE

## 2024-05-31 VITALS
OXYGEN SATURATION: 97 % | HEART RATE: 79 BPM | HEIGHT: 65 IN | BODY MASS INDEX: 28.66 KG/M2 | TEMPERATURE: 97.5 F | RESPIRATION RATE: 16 BRPM | SYSTOLIC BLOOD PRESSURE: 131 MMHG | DIASTOLIC BLOOD PRESSURE: 77 MMHG | WEIGHT: 172 LBS

## 2024-05-31 DIAGNOSIS — Z79.4 TYPE 2 DIABETES MELLITUS WITH DIABETIC NEUROPATHY, WITH LONG-TERM CURRENT USE OF INSULIN (HCC): ICD-10-CM

## 2024-05-31 DIAGNOSIS — E78.5 HYPERLIPIDEMIA LDL GOAL <70: Primary | ICD-10-CM

## 2024-05-31 DIAGNOSIS — E11.40 TYPE 2 DIABETES MELLITUS WITH DIABETIC NEUROPATHY, WITH LONG-TERM CURRENT USE OF INSULIN (HCC): ICD-10-CM

## 2024-05-31 DIAGNOSIS — I10 PRIMARY HYPERTENSION: ICD-10-CM

## 2024-05-31 DIAGNOSIS — I25.10 CORONARY ARTERY DISEASE INVOLVING NATIVE CORONARY ARTERY OF NATIVE HEART WITHOUT ANGINA PECTORIS: ICD-10-CM

## 2024-05-31 PROCEDURE — G8427 DOCREV CUR MEDS BY ELIG CLIN: HCPCS | Performed by: FAMILY MEDICINE

## 2024-05-31 PROCEDURE — 1036F TOBACCO NON-USER: CPT | Performed by: FAMILY MEDICINE

## 2024-05-31 PROCEDURE — G8399 PT W/DXA RESULTS DOCUMENT: HCPCS | Performed by: FAMILY MEDICINE

## 2024-05-31 PROCEDURE — G8419 CALC BMI OUT NRM PARAM NOF/U: HCPCS | Performed by: FAMILY MEDICINE

## 2024-05-31 PROCEDURE — 3075F SYST BP GE 130 - 139MM HG: CPT | Performed by: FAMILY MEDICINE

## 2024-05-31 PROCEDURE — 2022F DILAT RTA XM EVC RTNOPTHY: CPT | Performed by: FAMILY MEDICINE

## 2024-05-31 PROCEDURE — 1090F PRES/ABSN URINE INCON ASSESS: CPT | Performed by: FAMILY MEDICINE

## 2024-05-31 PROCEDURE — 3044F HG A1C LEVEL LT 7.0%: CPT | Performed by: FAMILY MEDICINE

## 2024-05-31 PROCEDURE — 99214 OFFICE O/P EST MOD 30 MIN: CPT | Performed by: FAMILY MEDICINE

## 2024-05-31 PROCEDURE — 1123F ACP DISCUSS/DSCN MKR DOCD: CPT | Performed by: FAMILY MEDICINE

## 2024-05-31 PROCEDURE — G9899 SCRN MAM PERF RSLTS DOC: HCPCS | Performed by: FAMILY MEDICINE

## 2024-05-31 PROCEDURE — 3017F COLORECTAL CA SCREEN DOC REV: CPT | Performed by: FAMILY MEDICINE

## 2024-05-31 PROCEDURE — 3078F DIAST BP <80 MM HG: CPT | Performed by: FAMILY MEDICINE

## 2024-05-31 SDOH — ECONOMIC STABILITY: FOOD INSECURITY: WITHIN THE PAST 12 MONTHS, THE FOOD YOU BOUGHT JUST DIDN'T LAST AND YOU DIDN'T HAVE MONEY TO GET MORE.: NEVER TRUE

## 2024-05-31 SDOH — ECONOMIC STABILITY: FOOD INSECURITY: WITHIN THE PAST 12 MONTHS, YOU WORRIED THAT YOUR FOOD WOULD RUN OUT BEFORE YOU GOT MONEY TO BUY MORE.: NEVER TRUE

## 2024-05-31 SDOH — ECONOMIC STABILITY: INCOME INSECURITY: HOW HARD IS IT FOR YOU TO PAY FOR THE VERY BASICS LIKE FOOD, HOUSING, MEDICAL CARE, AND HEATING?: NOT HARD AT ALL

## 2024-05-31 NOTE — PATIENT INSTRUCTIONS
professional. [employer] disclaims any warranty or liability for your use of this information.

## 2024-05-31 NOTE — PROGRESS NOTES
Progress Note    she is a 74 y.o. year old female who presents for evaluation.    Subjective:     Pt with well controlled DM and she would like referral to endo again.  LDL not at goal and given her CAD hx discussed I would like her LDL less than 55.  On Lipitor 40 and would add zetia on if needed, she would like to hold off on adjusting the lipitor to 80 (she would speak with her cardiologist about this).  She is fine with adding on Zetia.  She is using the lucian she gets some spikes up into the high 200s after eating.  Discussed checking with a finger prick to see if it is accurate.  Also discussed when she gets a low if she feels fine to double check her finger prick.  Blood pressure controlled on medication.  Reviewed PmHx, RxHx, FmHx, SocHx, AllgHx and updated and dated in the chart.    Review of Systems - negative except as listed above in the HPI    Objective:     Vitals:    05/31/24 1441   BP: 131/77   Site: Left Upper Arm   Position: Sitting   Pulse: 79   Resp: 16   Temp: 97.5 °F (36.4 °C)   TempSrc: Oral   SpO2: 97%   Weight: 78 kg (172 lb)   Height: 1.657 m (5' 5.25\")       Current Outpatient Medications   Medication Sig    Continuous Glucose Sensor (FREESTYLE LUCIAN 2 SENSOR) MISC Check Bg AC and HS on insulin E11.65    Continuous Glucose  (FREESTYLE LUCIAN 2 READER) EVANGELINA Check Bg AC and HS on insulin E11.65    clopidogrel (PLAVIX) 75 MG tablet TAKE 1 TABLET(75 MG) BY MOUTH DAILY    bethanechol (URECHOLINE) 5 MG tablet TAKE 1 TABLET BY MOUTH TWICE DAILY    albuterol sulfate HFA (PROVENTIL;VENTOLIN;PROAIR) 108 (90 Base) MCG/ACT inhaler INHALE 2 PUFFS BY MOUTH FOUR TIMES DAILY AS NEEDED FOR WHEEZING OR SHORTNESS OF BREATH    VERQUVO 5 MG TABS 10 mg    L-methylfolate-B6-B12 (METANX) 3-90.314-2-35 MG CAPS capsule TAKE 1 CAPSULE BY MOUTH TWO TIMES A DAY    Insulin Pen Needle 32G X 6 MM MISC 4 shots daily    Insulin Glargine, 2 Unit Dial, (TOUJEO MAX SOLOSTAR) 300 UNIT/ML SOPN ADMINISTER 20 UNITS

## 2024-05-31 NOTE — PROGRESS NOTES
Chief Complaint   Patient presents with    Diabetes     Patient presents in office today for diabetes check.  No concerns.      \"Have you been to the ER, urgent care clinic since your last visit?  Hospitalized since your last visit?\"    NO    “Have you seen or consulted any other health care providers outside of [facility] since your last visit?”    NO        “Have you had a colorectal cancer screening such as a colonoscopy/FIT/Cologuard?    NO    Date of last Colonoscopy: 9/1/2011  No cologuard on file  No FIT/FOBT on file   No flexible sigmoidoscopy on file         Click Here for Release of Records Request

## 2024-06-01 LAB
ALBUMIN SERPL-MCNC: 3.5 G/DL (ref 3.5–5)
ALBUMIN/GLOB SERPL: 1.2 (ref 1.1–2.2)
ALP SERPL-CCNC: 129 U/L (ref 45–117)
ALT SERPL-CCNC: 45 U/L (ref 12–78)
ANION GAP SERPL CALC-SCNC: 2 MMOL/L (ref 5–15)
AST SERPL-CCNC: 32 U/L (ref 15–37)
BILIRUB SERPL-MCNC: 0.4 MG/DL (ref 0.2–1)
BUN SERPL-MCNC: 16 MG/DL (ref 6–20)
BUN/CREAT SERPL: 25 (ref 12–20)
CALCIUM SERPL-MCNC: 9.6 MG/DL (ref 8.5–10.1)
CHLORIDE SERPL-SCNC: 105 MMOL/L (ref 97–108)
CHOLEST SERPL-MCNC: 155 MG/DL
CO2 SERPL-SCNC: 35 MMOL/L (ref 21–32)
CREAT SERPL-MCNC: 0.64 MG/DL (ref 0.55–1.02)
EST. AVERAGE GLUCOSE BLD GHB EST-MCNC: 146 MG/DL
GLOBULIN SER CALC-MCNC: 2.9 G/DL (ref 2–4)
GLUCOSE SERPL-MCNC: 182 MG/DL (ref 65–100)
HBA1C MFR BLD: 6.7 % (ref 4–5.6)
HDLC SERPL-MCNC: 60 MG/DL
HDLC SERPL: 2.6 (ref 0–5)
LDLC SERPL CALC-MCNC: 75.6 MG/DL (ref 0–100)
POTASSIUM SERPL-SCNC: 5 MMOL/L (ref 3.5–5.1)
PROT SERPL-MCNC: 6.4 G/DL (ref 6.4–8.2)
SODIUM SERPL-SCNC: 142 MMOL/L (ref 136–145)
TRIGL SERPL-MCNC: 97 MG/DL
VLDLC SERPL CALC-MCNC: 19.4 MG/DL

## 2024-06-04 RX ORDER — EZETIMIBE 10 MG/1
10 TABLET ORAL DAILY
Qty: 90 TABLET | Refills: 3 | Status: SHIPPED | OUTPATIENT
Start: 2024-06-04

## 2024-06-28 ENCOUNTER — OFFICE VISIT (OUTPATIENT)
Age: 74
End: 2024-06-28

## 2024-06-28 VITALS
BODY MASS INDEX: 28.26 KG/M2 | HEIGHT: 65 IN | WEIGHT: 169.6 LBS | TEMPERATURE: 98.1 F | HEART RATE: 80 BPM | OXYGEN SATURATION: 98 % | DIASTOLIC BLOOD PRESSURE: 62 MMHG | SYSTOLIC BLOOD PRESSURE: 121 MMHG

## 2024-06-28 DIAGNOSIS — Z79.4 TYPE 2 DIABETES MELLITUS WITH HYPERGLYCEMIA, WITH LONG-TERM CURRENT USE OF INSULIN (HCC): Primary | ICD-10-CM

## 2024-06-28 DIAGNOSIS — E11.65 TYPE 2 DIABETES MELLITUS WITH HYPERGLYCEMIA, WITH LONG-TERM CURRENT USE OF INSULIN (HCC): Primary | ICD-10-CM

## 2024-06-28 NOTE — PROGRESS NOTES
(LINZESS) 72 MCG CAPS capsule Take 1 capsule by mouth every morning (before breakfast)      loratadine (CLARITIN) 10 MG tablet Take 1 tablet by mouth      magnesium oxide (MAG-OX) 400 MG tablet Take 1 tablet by mouth      albuterol sulfate HFA (PROVENTIL;VENTOLIN;PROAIR) 108 (90 Base) MCG/ACT inhaler INHALE 2 PUFFS BY MOUTH FOUR TIMES DAILY AS NEEDED FOR WHEEZING OR SHORTNESS OF BREATH (Patient not taking: Reported on 6/28/2024)       No current facility-administered medications for this visit.        Past Medical History:   Diagnosis Date    Abnormal MRI, cervical spine 4/3/2017    Abnormal pap 3/2015; 5/2016 2015 Neg pap +HPV; 2016 ASCUS +HPV    Arthritis     osteo-tests for RA were neg    Breast cancer (HCC) 3/12/2015    Breast cancer, right breast (HCC) 1996    Chronic pain     Diabetes (HCC)     Fibromyalgia     Ganglion cyst of wrist     LEFT    Hypercholesterolemia     Hypertension     Ill-defined condition     Prolapsed mitral valve    Memory loss     Per pt.     Murmur, cardiac     Neuropathy     Osteoporosis     Rosacea     Sarcoidosis     Sarcoidosis     Transverse myelopathy syndrome (HCC)     Unspecified adverse effect of anesthesia     \"SLOW TO WAKE UP, SENSITIVE TO ANESTHESIA, DO NOT COME AROUND FOR 2-3 DAYS\"         Past Surgical History:   Procedure Laterality Date    BACK SURGERY  2016    CARDIAC SURGERY N/A 10/29/2020    LV LEAD PLACEMENT performed by Johnny Velázquez MD at [facility] CARDIAC CATH LAB    CATARACT REMOVAL Bilateral 2014    COLPOSCOPY  5/2016    Neg path    DILATION AND CURETTAGE OF UTERUS  07/03/2019    path-benign    EPHYS EVAL PACG CVDFB LDS W/TSTG OF PULSE GEN N/A 10/29/2020    Bi V  ICD [place]/ venogram first performed by Johnny Velázquez MD at [facility] CARDIAC CATH LAB    INSJ/RPLCMT PERM DFB W/TRNSVNS LDS 1/DUAL CHMBR N/A 10/29/2020    INSERT ICD BIV MULTI performed by Johnny Velázquez MD at [facility] CARDIAC CATH LAB    MASTECTOMY Right 12/1996    tram flap    ORTHOPEDIC SURGERY Left 1991

## 2024-06-29 LAB — GLUCOSE SERPL-MCNC: 140 MG/DL (ref 65–100)

## 2024-06-30 LAB — C PEPTIDE SERPL-MCNC: 3.6 NG/ML (ref 1.1–4.4)

## 2024-07-08 ENCOUNTER — TELEPHONE (OUTPATIENT)
Age: 74
End: 2024-07-08

## 2024-07-08 RX ORDER — BUTALBITAL, ACETAMINOPHEN AND CAFFEINE 50; 325; 40 MG/1; MG/1; MG/1
1 TABLET ORAL 3 TIMES DAILY PRN
Qty: 60 TABLET | Refills: 0 | Status: SHIPPED | OUTPATIENT
Start: 2024-07-08

## 2024-07-09 ENCOUNTER — TELEPHONE (OUTPATIENT)
Age: 74
End: 2024-07-09

## 2024-07-09 RX ORDER — ATORVASTATIN CALCIUM 40 MG/1
40 TABLET, FILM COATED ORAL DAILY
Qty: 90 TABLET | Refills: 0 | Status: SHIPPED | OUTPATIENT
Start: 2024-07-09

## 2024-07-09 NOTE — TELEPHONE ENCOUNTER
We received a fax refill request for Delma Apple.  Please escribe Atorvastatin to their pharmacy.  The pharmacy is correct in the chart and they are requesting a 90 day supply.

## 2024-07-25 ENCOUNTER — OFFICE VISIT (OUTPATIENT)
Age: 74
End: 2024-07-25
Payer: MEDICARE

## 2024-07-25 VITALS
TEMPERATURE: 97.2 F | HEIGHT: 66 IN | BODY MASS INDEX: 27 KG/M2 | DIASTOLIC BLOOD PRESSURE: 39 MMHG | SYSTOLIC BLOOD PRESSURE: 104 MMHG | HEART RATE: 62 BPM | OXYGEN SATURATION: 90 % | WEIGHT: 168 LBS

## 2024-07-25 DIAGNOSIS — Z79.4 TYPE 2 DIABETES MELLITUS WITH HYPERGLYCEMIA, WITH LONG-TERM CURRENT USE OF INSULIN (HCC): Primary | ICD-10-CM

## 2024-07-25 DIAGNOSIS — E11.65 TYPE 2 DIABETES MELLITUS WITH HYPERGLYCEMIA, WITH LONG-TERM CURRENT USE OF INSULIN (HCC): Primary | ICD-10-CM

## 2024-07-25 PROCEDURE — 1123F ACP DISCUSS/DSCN MKR DOCD: CPT | Performed by: INTERNAL MEDICINE

## 2024-07-25 PROCEDURE — 2022F DILAT RTA XM EVC RTNOPTHY: CPT | Performed by: INTERNAL MEDICINE

## 2024-07-25 PROCEDURE — 1090F PRES/ABSN URINE INCON ASSESS: CPT | Performed by: INTERNAL MEDICINE

## 2024-07-25 PROCEDURE — 1036F TOBACCO NON-USER: CPT | Performed by: INTERNAL MEDICINE

## 2024-07-25 PROCEDURE — G9899 SCRN MAM PERF RSLTS DOC: HCPCS | Performed by: INTERNAL MEDICINE

## 2024-07-25 PROCEDURE — G8399 PT W/DXA RESULTS DOCUMENT: HCPCS | Performed by: INTERNAL MEDICINE

## 2024-07-25 PROCEDURE — 3074F SYST BP LT 130 MM HG: CPT | Performed by: INTERNAL MEDICINE

## 2024-07-25 PROCEDURE — 3017F COLORECTAL CA SCREEN DOC REV: CPT | Performed by: INTERNAL MEDICINE

## 2024-07-25 PROCEDURE — G8419 CALC BMI OUT NRM PARAM NOF/U: HCPCS | Performed by: INTERNAL MEDICINE

## 2024-07-25 PROCEDURE — G8427 DOCREV CUR MEDS BY ELIG CLIN: HCPCS | Performed by: INTERNAL MEDICINE

## 2024-07-25 PROCEDURE — 3044F HG A1C LEVEL LT 7.0%: CPT | Performed by: INTERNAL MEDICINE

## 2024-07-25 PROCEDURE — 99213 OFFICE O/P EST LOW 20 MIN: CPT | Performed by: INTERNAL MEDICINE

## 2024-07-25 PROCEDURE — 3078F DIAST BP <80 MM HG: CPT | Performed by: INTERNAL MEDICINE

## 2024-07-25 PROCEDURE — 95251 CONT GLUC MNTR ANALYSIS I&R: CPT | Performed by: INTERNAL MEDICINE

## 2024-07-25 NOTE — PATIENT INSTRUCTIONS
- continue Toujeo 20 units daily, if glucose levels less than 80s, decrease by 5 units  - continue Farxiga 10 mg daily   - start Tradjenta 5 mg daily     Diabetes general guidelines:   Target glucose  Fastin-130  2 hours after meals: less than 180    Check glucose levels as directed   Have a small snack if glucose is less than 80 at bedtime    Seek urgent care if glucose levels above 300s and not coming down, especially if associated with excessive thirst, urination, confusion or generally feeling unwell.     For Blood Glucose < 70 mg/dl treat with 4 ounces of juice or 4 glucose tablets (15 g). Recheck Blood Glucose in 15 minutes. Repeat until blood glucose is > 70.

## 2024-07-25 NOTE — PROGRESS NOTES
I have reviewed all needed documentation in preparation for visit. Verified patient by name and date of birth  Delma Apple is a 74 y.o. female Diabetes  .    Vitals:    07/25/24 1113 07/25/24 1122   BP: (!) 127/44 (!) 104/39   Pulse: 62    Temp: 97.2 °F (36.2 °C)    SpO2: 90%    Weight: 76.2 kg (168 lb)    Height: 1.676 m (5' 6\")        No LMP recorded. Patient is postmenopausal.         Health Maintenance Review: Patient reminded of \"due or due soon\" health maintenance. I have asked the patient to contact his/her primary care provider (PCP) for follow-up on his/her health maintenance.    \"Have you been to the ER, urgent care clinic since your last visit?  Hospitalized since your last visit?\"    NO    “Have you seen or consulted any other health care providers outside of [facility] since your last visit?”    NO

## 2024-07-25 NOTE — PROGRESS NOTES
Follow up - Diabetes     PCP: Marcos Stratton MD    Chief Complaint   Patient presents with    Diabetes     HPI:  Delma Apple is a 74 y.o. female with  has a past medical history of Abnormal MRI, cervical spine, Abnormal pap, Arthritis, Breast cancer (HCC), Breast cancer, right breast (HCC), Chronic pain, Diabetes (HCC), Fibromyalgia, Ganglion cyst of wrist, Hypercholesterolemia, Hypertension, Ill-defined condition, Memory loss, Murmur, cardiac, Neuropathy, Osteoporosis, Rosacea, Sarcoidosis, Sarcoidosis, Transverse myelopathy syndrome (HCC), and Unspecified adverse effect of anesthesia. Here for follow up of diabetes.     Diagnosed with Type 2 diabetes: 1990s during chemotherapy for breast cancer     Complications:  +CAD, hx of stent placement, CHF   No hx of CVA   No known history of DR, nephropathy, or neuropathy.    No personal or family history of MEN or MTC  No personal history of pancreatitis  No personal or family history of bladder cancer  No known history of thyroid or calcium disorder  No prior hospitalizations for diabetes  No prior foot sores or amputations  +hx of severe hypoglycemia     Current DM medications:  - Toujeo 20 units daily   - Farxiga 10 mg daily     BG trends:  Cgm avg 165  Target 67%  No hypoglycemia     No high sugar beverages   Activity - limited to ADL's     +shortness of breath with exertion chronic   +constipation     Full ROS completed this visit: neg unless specified above     LABS/STUDIES:     Lab Results   Component Value Date/Time    DFO6LRDJ 7.6 08/29/2019 08:54 AM       Lab Results   Component Value Date/Time    LABA1C 6.7 05/31/2024 03:33 PM    LABA1C 6.9 03/26/2024 03:20 PM    LABA1C 6.8 07/20/2023 11:52 AM    QARZ7BNQN 7.2 09/23/2020 12:00 AM    CREATININE 0.64 05/31/2024 03:33 PM    LABGLOM >90 05/31/2024 03:33 PM    LABGLOM 88 03/26/2024 03:20 PM    MALBCR 28 03/26/2024 03:20 PM       Lab Results   Component Value Date/Time    CHOL 155 05/31/2024 03:33 PM    TRIG 97

## 2024-08-02 ENCOUNTER — TELEPHONE (OUTPATIENT)
Age: 74
End: 2024-08-02

## 2024-08-02 NOTE — TELEPHONE ENCOUNTER
----- Message from Jose L Vizcarra sent at 8/2/2024  9:12 AM EDT -----  Regarding: ECC Appointment Request  ECC Appointment Request    Patient needs appointment for ECC Appointment Type: Annual Visit.    Patient Requested Dates(s): Any day  Patient Requested Time: Anytime  Provider Name: Marcos Stratton MD    Reason for Appointment Request: Established Patient - Available appointments did not meet patient need    Additional Information: Patient was notified via message to schedule for her AWV but the earliest available appointment with her PCP, will not be until November when tried to schedule.  --------------------------------------------------------------------------------------------------------------------------    Relationship to Patient: Self     Call Back Information: OK to leave message on [employer]  Preferred Call Back Number: Phone [phone]

## 2024-08-15 ENCOUNTER — TELEPHONE (OUTPATIENT)
Age: 74
End: 2024-08-15

## 2024-08-15 NOTE — TELEPHONE ENCOUNTER
Called and spoke with patient. Advised that she will need to discuss this with Dr. Santiago during her apt next month. She states that she has a rip in her current prosthesis. Sending to Dr. Santiago to see if this is something we can maybe add on virtually

## 2024-08-15 NOTE — TELEPHONE ENCOUNTER
Called and spoke with patient. Advised that we can fit her in tomorrow at 9 either in office or VV. She states that she would prefer to come in office. Apt scheduled for in office tomorrow at 9.

## 2024-08-15 NOTE — TELEPHONE ENCOUNTER
Pt would like to talk with Jeanette about having new prescription for her breast prosthesis she said Dr. Delgado wrote the prescription in 2022

## 2024-08-16 ENCOUNTER — CLINICAL DOCUMENTATION (OUTPATIENT)
Age: 74
End: 2024-08-16

## 2024-08-16 ENCOUNTER — OFFICE VISIT (OUTPATIENT)
Age: 74
End: 2024-08-16
Payer: MEDICARE

## 2024-08-16 VITALS
TEMPERATURE: 97.6 F | SYSTOLIC BLOOD PRESSURE: 121 MMHG | HEART RATE: 80 BPM | BODY MASS INDEX: 26.68 KG/M2 | RESPIRATION RATE: 16 BRPM | OXYGEN SATURATION: 98 % | HEIGHT: 66 IN | WEIGHT: 166 LBS | DIASTOLIC BLOOD PRESSURE: 76 MMHG

## 2024-08-16 DIAGNOSIS — J43.8 OTHER EMPHYSEMA (HCC): ICD-10-CM

## 2024-08-16 DIAGNOSIS — Z85.3 HISTORY OF BREAST CANCER: Primary | ICD-10-CM

## 2024-08-16 PROCEDURE — 3017F COLORECTAL CA SCREEN DOC REV: CPT

## 2024-08-16 PROCEDURE — 99213 OFFICE O/P EST LOW 20 MIN: CPT

## 2024-08-16 PROCEDURE — G8399 PT W/DXA RESULTS DOCUMENT: HCPCS

## 2024-08-16 PROCEDURE — 1123F ACP DISCUSS/DSCN MKR DOCD: CPT

## 2024-08-16 PROCEDURE — 3074F SYST BP LT 130 MM HG: CPT

## 2024-08-16 PROCEDURE — 1036F TOBACCO NON-USER: CPT

## 2024-08-16 PROCEDURE — G8427 DOCREV CUR MEDS BY ELIG CLIN: HCPCS

## 2024-08-16 PROCEDURE — G8419 CALC BMI OUT NRM PARAM NOF/U: HCPCS

## 2024-08-16 PROCEDURE — 1090F PRES/ABSN URINE INCON ASSESS: CPT

## 2024-08-16 PROCEDURE — 3078F DIAST BP <80 MM HG: CPT

## 2024-08-16 PROCEDURE — G9899 SCRN MAM PERF RSLTS DOC: HCPCS

## 2024-08-16 PROCEDURE — 3023F SPIROM DOC REV: CPT

## 2024-08-16 SDOH — ECONOMIC STABILITY: FOOD INSECURITY: WITHIN THE PAST 12 MONTHS, YOU WORRIED THAT YOUR FOOD WOULD RUN OUT BEFORE YOU GOT MONEY TO BUY MORE.: NEVER TRUE

## 2024-08-16 SDOH — ECONOMIC STABILITY: FOOD INSECURITY: WITHIN THE PAST 12 MONTHS, THE FOOD YOU BOUGHT JUST DIDN'T LAST AND YOU DIDN'T HAVE MONEY TO GET MORE.: NEVER TRUE

## 2024-08-16 SDOH — ECONOMIC STABILITY: INCOME INSECURITY: HOW HARD IS IT FOR YOU TO PAY FOR THE VERY BASICS LIKE FOOD, HOUSING, MEDICAL CARE, AND HEATING?: NOT HARD AT ALL

## 2024-08-16 NOTE — ASSESSMENT & PLAN NOTE
Order for new breast prosthesis has been placed today. Her old one split. Otherwise, doing well.

## 2024-08-16 NOTE — PROGRESS NOTES
Chief Complaint   Patient presents with    Breast Cancer     Right sided Mastectomy 1996: Stepping Stones. Prosthetic last ordered 12/2022    Diabetes     Endocrinology: Dr Kelly     \"Have you been to the ER, urgent care clinic since your last visit?  Hospitalized since your last visit?\"    NO    “Have you seen or consulted any other health care providers outside of [facility] since your last visit?”    Endocrinology: Dr Kelly, Cardio, Pulmonology    Have you had a mammogram?”   NO    Date of last Mammogram: 7/31/2023         “Have you had a colorectal cancer screening such as a colonoscopy/FIT/Cologuard?    NO    Date of last Colonoscopy: 9/1/2011  No cologuard on file  No FIT/FOBT on file   No flexible sigmoidoscopy on file         Click Here for Release of Records Request    
No murmur heard.     No friction rub. No gallop.   Pulmonary:      Effort: Pulmonary effort is normal. No respiratory distress.      Breath sounds: Normal breath sounds. No stridor. No wheezing, rhonchi or rales.   Abdominal:      General: Abdomen is flat. Bowel sounds are normal. There is no distension.      Palpations: Abdomen is soft. There is no mass.      Tenderness: There is no abdominal tenderness. There is no guarding.   Musculoskeletal:         General: Normal range of motion.      Cervical back: Normal range of motion.      Right lower leg: No edema.      Left lower leg: No edema.   Lymphadenopathy:      Cervical: No cervical adenopathy.   Skin:     General: Skin is warm and dry.      Findings: No erythema or rash.   Neurological:      General: No focal deficit present.      Mental Status: She is alert and oriented to person, place, and time.      Cranial Nerves: No cranial nerve deficit.   Psychiatric:         Mood and Affect: Mood normal.         Behavior: Behavior normal.         Thought Content: Thought content normal.         Judgment: Judgment normal.            An electronic signature was used to authenticate this note.    --Brian Santiago DO

## 2024-08-16 NOTE — PROGRESS NOTES
Office notes, demographics and Order for right sided breast Prosthetic faxed to:    [facility] [place]. [phone].  Confirmation received.

## 2024-08-26 ENCOUNTER — TELEPHONE (OUTPATIENT)
Age: 74
End: 2024-08-26

## 2024-08-26 DIAGNOSIS — E11.40 TYPE 2 DIABETES MELLITUS WITH DIABETIC NEUROPATHY, WITH LONG-TERM CURRENT USE OF INSULIN (HCC): ICD-10-CM

## 2024-08-26 DIAGNOSIS — Z79.4 TYPE 2 DIABETES MELLITUS WITH DIABETIC NEUROPATHY, WITH LONG-TERM CURRENT USE OF INSULIN (HCC): ICD-10-CM

## 2024-08-26 NOTE — TELEPHONE ENCOUNTER
We received a fax refill request for Delma Apple.  Please escribe Toujeo Max Solostar Pen to their pharmacy.  The pharmacy is correct in the chart and they are requesting a ? day supply.

## 2024-08-27 RX ORDER — INSULIN GLARGINE 300 U/ML
INJECTION, SOLUTION SUBCUTANEOUS
Qty: 3 ML | Refills: 11 | Status: SHIPPED | OUTPATIENT
Start: 2024-08-27

## 2024-09-11 ENCOUNTER — OFFICE VISIT (OUTPATIENT)
Age: 74
End: 2024-09-11
Payer: MEDICARE

## 2024-09-11 VITALS
BODY MASS INDEX: 26.68 KG/M2 | OXYGEN SATURATION: 98 % | HEIGHT: 66 IN | WEIGHT: 166 LBS | HEART RATE: 92 BPM | SYSTOLIC BLOOD PRESSURE: 99 MMHG | RESPIRATION RATE: 18 BRPM | DIASTOLIC BLOOD PRESSURE: 64 MMHG | TEMPERATURE: 97.8 F

## 2024-09-11 DIAGNOSIS — I10 PRIMARY HYPERTENSION: ICD-10-CM

## 2024-09-11 DIAGNOSIS — Z85.3 HISTORY OF BREAST CANCER: ICD-10-CM

## 2024-09-11 DIAGNOSIS — Z79.4 TYPE 2 DIABETES MELLITUS WITH DIABETIC NEUROPATHY, WITH LONG-TERM CURRENT USE OF INSULIN (HCC): ICD-10-CM

## 2024-09-11 DIAGNOSIS — G37.3 TRANSVERSE MYELOPATHY SYNDROME (HCC): ICD-10-CM

## 2024-09-11 DIAGNOSIS — J44.1 COPD WITH ACUTE EXACERBATION (HCC): ICD-10-CM

## 2024-09-11 DIAGNOSIS — Z00.00 MEDICARE ANNUAL WELLNESS VISIT, SUBSEQUENT: Primary | ICD-10-CM

## 2024-09-11 DIAGNOSIS — I25.10 CORONARY ARTERY DISEASE INVOLVING NATIVE CORONARY ARTERY OF NATIVE HEART WITHOUT ANGINA PECTORIS: ICD-10-CM

## 2024-09-11 DIAGNOSIS — Z12.11 ENCOUNTER FOR SCREENING FOR MALIGNANT NEOPLASM OF COLON: ICD-10-CM

## 2024-09-11 DIAGNOSIS — J43.8 OTHER EMPHYSEMA (HCC): ICD-10-CM

## 2024-09-11 DIAGNOSIS — I50.22 CHRONIC SYSTOLIC HEART FAILURE (HCC): ICD-10-CM

## 2024-09-11 DIAGNOSIS — E11.40 TYPE 2 DIABETES MELLITUS WITH DIABETIC NEUROPATHY, WITH LONG-TERM CURRENT USE OF INSULIN (HCC): ICD-10-CM

## 2024-09-11 PROBLEM — I50.32 CHRONIC HEART FAILURE WITH PRESERVED EJECTION FRACTION (HCC): Status: RESOLVED | Noted: 2020-06-15 | Resolved: 2024-09-11

## 2024-09-11 PROBLEM — F33.8 SEASONAL AFFECTIVE DISORDER (HCC): Status: RESOLVED | Noted: 2023-02-28 | Resolved: 2024-09-11

## 2024-09-11 PROCEDURE — 99214 OFFICE O/P EST MOD 30 MIN: CPT

## 2024-09-11 RX ORDER — GUAIFENESIN 600 MG/1
1200 TABLET, EXTENDED RELEASE ORAL 2 TIMES DAILY
Qty: 40 TABLET | Refills: 5 | Status: SHIPPED | OUTPATIENT
Start: 2024-09-11 | End: 2024-11-10

## 2024-09-11 ASSESSMENT — PATIENT HEALTH QUESTIONNAIRE - PHQ9
SUM OF ALL RESPONSES TO PHQ QUESTIONS 1-9: 0
SUM OF ALL RESPONSES TO PHQ QUESTIONS 1-9: 0
2. FEELING DOWN, DEPRESSED OR HOPELESS: NOT AT ALL
SUM OF ALL RESPONSES TO PHQ QUESTIONS 1-9: 0
SUM OF ALL RESPONSES TO PHQ QUESTIONS 1-9: 0
1. LITTLE INTEREST OR PLEASURE IN DOING THINGS: NOT AT ALL
SUM OF ALL RESPONSES TO PHQ9 QUESTIONS 1 & 2: 0

## 2024-09-11 ASSESSMENT — LIFESTYLE VARIABLES
HOW MANY STANDARD DRINKS CONTAINING ALCOHOL DO YOU HAVE ON A TYPICAL DAY: PATIENT DOES NOT DRINK
HOW OFTEN DO YOU HAVE A DRINK CONTAINING ALCOHOL: NEVER

## 2024-09-12 LAB
ALBUMIN SERPL-MCNC: 3.6 G/DL (ref 3.5–5)
ALBUMIN/GLOB SERPL: 1.2 (ref 1.1–2.2)
ALP SERPL-CCNC: 124 U/L (ref 45–117)
ALT SERPL-CCNC: 36 U/L (ref 12–78)
ANION GAP SERPL CALC-SCNC: 5 MMOL/L (ref 2–12)
AST SERPL-CCNC: 34 U/L (ref 15–37)
BASOPHILS # BLD: 0 K/UL (ref 0–0.1)
BASOPHILS NFR BLD: 1 % (ref 0–1)
BILIRUB SERPL-MCNC: 0.5 MG/DL (ref 0.2–1)
BUN SERPL-MCNC: 21 MG/DL (ref 6–20)
BUN/CREAT SERPL: 28 (ref 12–20)
CALCIUM SERPL-MCNC: 9.1 MG/DL (ref 8.5–10.1)
CHLORIDE SERPL-SCNC: 104 MMOL/L (ref 97–108)
CO2 SERPL-SCNC: 30 MMOL/L (ref 21–32)
CREAT SERPL-MCNC: 0.75 MG/DL (ref 0.55–1.02)
DIFFERENTIAL METHOD BLD: ABNORMAL
EOSINOPHIL # BLD: 0 K/UL (ref 0–0.4)
EOSINOPHIL NFR BLD: 0 % (ref 0–7)
ERYTHROCYTE [DISTWIDTH] IN BLOOD BY AUTOMATED COUNT: 14.4 % (ref 11.5–14.5)
EST. AVERAGE GLUCOSE BLD GHB EST-MCNC: 160 MG/DL
GLOBULIN SER CALC-MCNC: 3 G/DL (ref 2–4)
GLUCOSE SERPL-MCNC: 195 MG/DL (ref 65–100)
HBA1C MFR BLD: 7.2 % (ref 4–5.6)
HCT VFR BLD AUTO: 42.1 % (ref 35–47)
HGB BLD-MCNC: 13 G/DL (ref 11.5–16)
IMM GRANULOCYTES # BLD AUTO: 0 K/UL (ref 0–0.04)
IMM GRANULOCYTES NFR BLD AUTO: 0 % (ref 0–0.5)
LYMPHOCYTES # BLD: 0.8 K/UL (ref 0.8–3.5)
LYMPHOCYTES NFR BLD: 17 % (ref 12–49)
MCH RBC QN AUTO: 27.1 PG (ref 26–34)
MCHC RBC AUTO-ENTMCNC: 30.9 G/DL (ref 30–36.5)
MCV RBC AUTO: 87.9 FL (ref 80–99)
MONOCYTES # BLD: 0.7 K/UL (ref 0–1)
MONOCYTES NFR BLD: 14 % (ref 5–13)
NEUTS SEG # BLD: 3.3 K/UL (ref 1.8–8)
NEUTS SEG NFR BLD: 68 % (ref 32–75)
NRBC # BLD: 0 K/UL (ref 0–0.01)
NRBC BLD-RTO: 0 PER 100 WBC
PLATELET # BLD AUTO: 223 K/UL (ref 150–400)
PMV BLD AUTO: 12.4 FL (ref 8.9–12.9)
POTASSIUM SERPL-SCNC: 4.8 MMOL/L (ref 3.5–5.1)
PROT SERPL-MCNC: 6.6 G/DL (ref 6.4–8.2)
RBC # BLD AUTO: 4.79 M/UL (ref 3.8–5.2)
SODIUM SERPL-SCNC: 139 MMOL/L (ref 136–145)
WBC # BLD AUTO: 4.8 K/UL (ref 3.6–11)

## 2024-09-13 ENCOUNTER — TELEPHONE (OUTPATIENT)
Age: 74
End: 2024-09-13

## 2024-09-13 NOTE — TELEPHONE ENCOUNTER
Pt called in and states she tested positive for covid today and wondering if she would be able to get the paxlovid to take? Stated on Wednesday & Thursday she couldn't sleep and had to sit straight up due to not being able to catch her breath. Said she has copd & heart failure and is very worried with her heart beating fast, coughing up a lot of stuff but no fever.

## 2024-09-13 NOTE — TELEPHONE ENCOUNTER
Called and spoke to patient. (\"Tyler\") patient states her Covid symptoms have improved since completing a nebulizer treatment and Prednisone dose pack provided by her Pulmonologist.    Cough persists but patient denies fever, body aches, chest pain or severe SOB.  Recommendations from Dr Wade for self treatment reviewed at great length and patient states she will log on to [facility] to view entire provider message.    Patient states understanding that Dr wade does not recommend or prescribe Paxlovid. It is associated with poor outcomes and numerous side-effects, and it is generally not effective.     Patient states understanding to monitor her symptoms and if she's feeling worse or having more breathing issues, she should go to Urgent Care for further evaluation. If she is feeling okay and can wait until Monday she can call our office to get in with Krystal ZULUAGA.

## 2024-09-24 RX ORDER — BETHANECHOL CHLORIDE 5 MG
TABLET ORAL
Qty: 180 TABLET | Refills: 1 | Status: SHIPPED | OUTPATIENT
Start: 2024-09-24

## 2024-09-24 RX ORDER — ATORVASTATIN CALCIUM 40 MG/1
40 TABLET, FILM COATED ORAL DAILY
Qty: 90 TABLET | Refills: 3 | Status: SHIPPED | OUTPATIENT
Start: 2024-09-24

## 2024-09-26 ENCOUNTER — OFFICE VISIT (OUTPATIENT)
Age: 74
End: 2024-09-26
Payer: MEDICARE

## 2024-09-26 VITALS
SYSTOLIC BLOOD PRESSURE: 104 MMHG | HEART RATE: 88 BPM | TEMPERATURE: 97.5 F | DIASTOLIC BLOOD PRESSURE: 67 MMHG | WEIGHT: 161 LBS | HEIGHT: 66 IN | BODY MASS INDEX: 25.88 KG/M2 | OXYGEN SATURATION: 97 % | RESPIRATION RATE: 18 BRPM

## 2024-09-26 DIAGNOSIS — E11.40 TYPE 2 DIABETES MELLITUS WITH DIABETIC NEUROPATHY, WITH LONG-TERM CURRENT USE OF INSULIN (HCC): ICD-10-CM

## 2024-09-26 DIAGNOSIS — I42.8 OTHER CARDIOMYOPATHY (HCC): ICD-10-CM

## 2024-09-26 DIAGNOSIS — Z79.4 TYPE 2 DIABETES MELLITUS WITH DIABETIC NEUROPATHY, WITH LONG-TERM CURRENT USE OF INSULIN (HCC): ICD-10-CM

## 2024-09-26 DIAGNOSIS — G37.3 TRANSVERSE MYELOPATHY SYNDROME (HCC): Primary | ICD-10-CM

## 2024-09-26 DIAGNOSIS — J44.1 COPD WITH ACUTE EXACERBATION (HCC): ICD-10-CM

## 2024-09-26 DIAGNOSIS — I50.22 CHRONIC SYSTOLIC HEART FAILURE (HCC): ICD-10-CM

## 2024-09-26 DIAGNOSIS — I70.0 ATHEROSCLEROSIS OF AORTA (HCC): ICD-10-CM

## 2024-09-26 DIAGNOSIS — J43.8 OTHER EMPHYSEMA (HCC): ICD-10-CM

## 2024-09-26 PROCEDURE — 99214 OFFICE O/P EST MOD 30 MIN: CPT

## 2024-09-26 SDOH — ECONOMIC STABILITY: FOOD INSECURITY: WITHIN THE PAST 12 MONTHS, YOU WORRIED THAT YOUR FOOD WOULD RUN OUT BEFORE YOU GOT MONEY TO BUY MORE.: NEVER TRUE

## 2024-09-26 SDOH — ECONOMIC STABILITY: INCOME INSECURITY: HOW HARD IS IT FOR YOU TO PAY FOR THE VERY BASICS LIKE FOOD, HOUSING, MEDICAL CARE, AND HEATING?: NOT HARD AT ALL

## 2024-09-26 SDOH — ECONOMIC STABILITY: FOOD INSECURITY: WITHIN THE PAST 12 MONTHS, THE FOOD YOU BOUGHT JUST DIDN'T LAST AND YOU DIDN'T HAVE MONEY TO GET MORE.: NEVER TRUE

## 2024-09-26 ASSESSMENT — PATIENT HEALTH QUESTIONNAIRE - PHQ9
2. FEELING DOWN, DEPRESSED OR HOPELESS: NOT AT ALL
SUM OF ALL RESPONSES TO PHQ QUESTIONS 1-9: 0
SUM OF ALL RESPONSES TO PHQ9 QUESTIONS 1 & 2: 0
SUM OF ALL RESPONSES TO PHQ QUESTIONS 1-9: 0
1. LITTLE INTEREST OR PLEASURE IN DOING THINGS: NOT AT ALL

## 2024-10-01 DIAGNOSIS — R19.5 POSITIVE COLORECTAL CANCER SCREENING USING COLOGUARD TEST: Primary | ICD-10-CM

## 2024-10-01 LAB — NONINV COLON CA DNA+OCC BLD SCRN STL QL: POSITIVE

## 2024-10-09 ENCOUNTER — TELEPHONE (OUTPATIENT)
Age: 74
End: 2024-10-09

## 2024-10-09 NOTE — TELEPHONE ENCOUNTER
Hi,     Pt says, her BGL have been increasing significantly since Dr. Shen prescribed her rx:TOUJEO two months ago. Pt says her last sugar level was 245 this morning and she is concerned about continuing to take this insulin. Pt reports she is feeling sluggish and extremely tired lately. Pt is requesting to speak with a nurse asap.  Please call to advise.     Thank you

## 2024-10-09 NOTE — TELEPHONE ENCOUNTER
Pt called and stated that her BG has been all over the placed and that she was told by her eye doctor that she is in the beginning stage of diabetic retinopathy. She stated she is taking 20 units of Toujeo and taking Tradjenta daily. Pt stated she wants to go back on her meal time insulin.     BG readings  10/9/24- 9:50am- 150  10/9/24- 9:26am- 157- patient had 8oz of coffee with 1 splender and milk.   10/9/24- 8:28am- 129  10/8/24- 9:47pm- 174- baked chicken with BBQ sauce, green beans, baked potatoes, tea  10/8/24- 9:01am- 103  10/7/24- 10:18pm- 252- spaghetti, salad with ranch dressing, and garlic bread.   10/7/24- 5:46pm- 142  10/7/24- 10:48am- 215- half toast with jelly, boil eggs  10/7/24- 8:47am- 107

## 2024-10-09 NOTE — TELEPHONE ENCOUNTER
Informed pt of Dr. Shen's note(These sugars are NOT out of control, they are a little high and I recommend incorporating more activity (walking 15-20 mins after each meal to naturally reduce glucose and avoid eating 2-3 hours before bedtime) Please continue current meds. Short acting insulin will increase risk of hypoglycemia). Pt verbalized understanding with no further questions or concerns at this time.

## 2024-10-14 ENCOUNTER — TELEPHONE (OUTPATIENT)
Age: 74
End: 2024-10-14

## 2024-10-14 RX ORDER — MILNACIPRAN HYDROCHLORIDE 50 MG/1
1 TABLET, FILM COATED ORAL 2 TIMES DAILY
Qty: 180 TABLET | Refills: 3 | Status: SHIPPED | OUTPATIENT
Start: 2024-10-14

## 2024-10-14 NOTE — TELEPHONE ENCOUNTER
Delma Apple needs a refill of Savella.  They have 7 pills/supply left and are requesting a 90 day supply with refills.  Pharmacy has been updated in the chart. Patient was advised or scheduled an appointment for the future and to request refills thru the [facility] or by requesting a refill from their pharmacy in the future.  Patient was also advised to check with their pharmacy for status of when refills are available.    Delma Apple needs a refill of Pen Needles.  They have 3 pills/supply left and are requesting a ? day supply with refills.  Pharmacy has been updated in the chart. Patient was advised or scheduled an appointment for the future and to request refills thru the [facility] or by requesting a refill from their pharmacy in the future.  Patient was also advised to check with their pharmacy for status of when refills are available.

## 2024-10-15 ENCOUNTER — TELEPHONE (OUTPATIENT)
Age: 74
End: 2024-10-15

## 2024-10-15 NOTE — TELEPHONE ENCOUNTER
We received a fax refill request for Delma Apple.  Please escribe Brittany to their pharmacy.  The pharmacy is correct in the chart and they are requesting a 90 day supply.

## 2024-10-29 RX ORDER — BACLOFEN 10 MG/1
10 TABLET ORAL NIGHTLY
Qty: 90 TABLET | Refills: 1 | Status: SHIPPED | OUTPATIENT
Start: 2024-10-29

## 2024-12-04 ENCOUNTER — OFFICE VISIT (OUTPATIENT)
Facility: CLINIC | Age: 74
End: 2024-12-04

## 2024-12-04 VITALS
HEIGHT: 66 IN | HEART RATE: 89 BPM | BODY MASS INDEX: 24.43 KG/M2 | OXYGEN SATURATION: 100 % | TEMPERATURE: 98.3 F | DIASTOLIC BLOOD PRESSURE: 76 MMHG | WEIGHT: 152 LBS | SYSTOLIC BLOOD PRESSURE: 115 MMHG | RESPIRATION RATE: 18 BRPM

## 2024-12-04 DIAGNOSIS — E11.40 TYPE 2 DIABETES MELLITUS WITH DIABETIC NEUROPATHY, WITH LONG-TERM CURRENT USE OF INSULIN (HCC): ICD-10-CM

## 2024-12-04 DIAGNOSIS — Z79.4 TYPE 2 DIABETES MELLITUS WITH DIABETIC NEUROPATHY, WITH LONG-TERM CURRENT USE OF INSULIN (HCC): ICD-10-CM

## 2024-12-04 DIAGNOSIS — Z09 HOSPITAL DISCHARGE FOLLOW-UP: Primary | ICD-10-CM

## 2024-12-04 RX ORDER — BUMETANIDE 2 MG/1
1 TABLET ORAL 2 TIMES DAILY
COMMUNITY
Start: 2024-11-25

## 2024-12-04 SDOH — ECONOMIC STABILITY: FOOD INSECURITY: WITHIN THE PAST 12 MONTHS, THE FOOD YOU BOUGHT JUST DIDN'T LAST AND YOU DIDN'T HAVE MONEY TO GET MORE.: NEVER TRUE

## 2024-12-04 SDOH — ECONOMIC STABILITY: INCOME INSECURITY: HOW HARD IS IT FOR YOU TO PAY FOR THE VERY BASICS LIKE FOOD, HOUSING, MEDICAL CARE, AND HEATING?: NOT HARD AT ALL

## 2024-12-04 SDOH — ECONOMIC STABILITY: FOOD INSECURITY: WITHIN THE PAST 12 MONTHS, YOU WORRIED THAT YOUR FOOD WOULD RUN OUT BEFORE YOU GOT MONEY TO BUY MORE.: NEVER TRUE

## 2024-12-04 ASSESSMENT — PATIENT HEALTH QUESTIONNAIRE - PHQ9
SUM OF ALL RESPONSES TO PHQ QUESTIONS 1-9: 0
1. LITTLE INTEREST OR PLEASURE IN DOING THINGS: NOT AT ALL
SUM OF ALL RESPONSES TO PHQ QUESTIONS 1-9: 0
SUM OF ALL RESPONSES TO PHQ9 QUESTIONS 1 & 2: 0
SUM OF ALL RESPONSES TO PHQ QUESTIONS 1-9: 0
SUM OF ALL RESPONSES TO PHQ QUESTIONS 1-9: 0
2. FEELING DOWN, DEPRESSED OR HOPELESS: NOT AT ALL

## 2024-12-04 NOTE — PROGRESS NOTES
Post-Discharge Transitional Care Follow Up    Delma Apple   YOB: 1950    Date of Office Visit:  12/4/2024  Date of Hospital Admission: 11/25/2024  Date of Hospital Discharge: 11/28/2024    Care management risk score Rising risk (score 2-5) and Complex Care (Scores >=6): No Risk Score On File     Non face to face  following discharge, date last encounter closed (first attempt may have been earlier): *No documented post hospital discharge outreach found in the last 14 days     Call initiated 2 business days of discharge: *No response recorded in the last 14 days    ASSESSMENT/PLAN:   Hospital discharge follow-up  -     AR DISCHARGE MEDS RECONCILED W/ CURRENT OUTPATIENT MED LIST  Type 2 diabetes mellitus with diabetic neuropathy, with long-term current use of insulin (HCC)  -     CBC with Auto Differential; Future  -     Comprehensive Metabolic Panel; Future  -     Hemoglobin A1C; Future    Medical Decision Making: moderate complexity  Return in about 3 months (around 3/4/2025) for chronic check and please schedule her  for 12/10/24 @ 10:30am IN PERSON.    On this date 12/4/2024 I have spent 45 minutes reviewing previous notes, test results and face to face with the patient discussing the diagnosis and importance of compliance with the treatment plan as well as documenting on the day of the visit.       Subjective:   \"Date of admission: 11/22/24 Discharge date: 11/25/24 Discharge diagnosis: Acute on chronic systolic CHF last known EF 20-25%, with small pleural effusion 09/24 status post AICD Elevated troponin- suspect likely secondary demnd supply mismatch ;plateaued CAD w stent 2020 , remains on plavix ( no asa she states) Transverse myelitis with gait instability IDDMII chronic hypotension on midodrine Fibromyalgia Dermal sarcoidosis Hx breast cancer Hospital course: This is a 73 yo lady with HFrEF who presented to the ED for progressive dyspnea over the past few weeks. Admtited to the

## 2024-12-04 NOTE — PROGRESS NOTES
Chief Complaint   Patient presents with    Diabetes    Follow-Up from [facility]: 11/21/24-11/25 Chronic systolic heart failure, VOLUME OVERLOAD    systolic heart failure     Cardiologist: Dr Drake    Depression     Wants to taper Sevella     \"Have you been to the ER, urgent care clinic since your last visit?  Hospitalized since your last visit?\"    [facility]: 11/21/24-11/25     “Have you seen or consulted any other health care providers outside our system since your last visit?”    YES - When: approximately 2 months ago.  Where and Why: Dr Drake.    Have you had a mammogram?”   YES - Where: [facility] to request most recent records if not in the chart    Date of last Mammogram: 7/31/2023       “Have you had a diabetic eye exam?”    YES - Where: [facility] Nurse/CMA to request most recent records if not in the chart     Date of last diabetic eye exam: 7/6/2023

## 2024-12-06 ENCOUNTER — TELEPHONE (OUTPATIENT)
Facility: CLINIC | Age: 74
End: 2024-12-06

## 2024-12-06 ENCOUNTER — TELEPHONE (OUTPATIENT)
Age: 74
End: 2024-12-06

## 2024-12-06 ENCOUNTER — PATIENT MESSAGE (OUTPATIENT)
Facility: CLINIC | Age: 74
End: 2024-12-06

## 2024-12-06 NOTE — TELEPHONE ENCOUNTER
Two patient identifiers used         74 year old patient last seen in the office on 12/10/2021 for problem visit.    Patient calling to complain of cloudy urine, burning and pain with urination for the past 4-5 days. Patient reports also feeling wiped out.    Patient was advised of need to be seen for annual exam.    Patient was encouraged to go to her Pcp or urgent care to be seen for the possible infection.    Dr. Taylor is in surgery today.    Patient will call back for annual exam appointment    Patient verbalized understanding.

## 2024-12-06 NOTE — TELEPHONE ENCOUNTER
Spoke to the patient, she is having urinary symptoms.   Krystal is not here this afternoon, after speaking with Dr. Glynn she was directed to Urgent Care fo further evaluation. Dr. Santiago is out of the office until Tuesday.

## 2024-12-09 ENCOUNTER — TELEPHONE (OUTPATIENT)
Facility: CLINIC | Age: 74
End: 2024-12-09

## 2024-12-09 RX ORDER — CLOPIDOGREL BISULFATE 75 MG/1
75 TABLET ORAL DAILY
Qty: 90 TABLET | Refills: 3 | Status: SHIPPED | OUTPATIENT
Start: 2024-12-09

## 2024-12-09 NOTE — TELEPHONE ENCOUNTER
We received a fax refill request for Delma Apple.  Please escribe Clopidogrel to their pharmacy.  The pharmacy is correct in the chart and they are requesting a 90 day supply.

## 2024-12-10 DIAGNOSIS — N30.01 ACUTE CYSTITIS WITH HEMATURIA: Primary | ICD-10-CM

## 2024-12-10 DIAGNOSIS — Z79.4 TYPE 2 DIABETES MELLITUS WITH DIABETIC NEUROPATHY, WITH LONG-TERM CURRENT USE OF INSULIN (HCC): ICD-10-CM

## 2024-12-10 DIAGNOSIS — E11.40 TYPE 2 DIABETES MELLITUS WITH DIABETIC NEUROPATHY, WITH LONG-TERM CURRENT USE OF INSULIN (HCC): ICD-10-CM

## 2024-12-10 DIAGNOSIS — N30.01 ACUTE CYSTITIS WITH HEMATURIA: ICD-10-CM

## 2024-12-10 LAB
BILIRUBIN, URINE, POC: NEGATIVE
BLOOD URINE, POC: ABNORMAL
GLUCOSE URINE, POC: ABNORMAL
KETONES, URINE, POC: ABNORMAL
LEUKOCYTE ESTERASE, URINE, POC: ABNORMAL
NITRITE, URINE, POC: POSITIVE
PH, URINE, POC: 5 (ref 4.6–8)
PROTEIN,URINE, POC: ABNORMAL
SPECIFIC GRAVITY, URINE, POC: 1.02 (ref 1–1.03)
URINALYSIS CLARITY, POC: ABNORMAL
URINALYSIS COLOR, POC: YELLOW
UROBILINOGEN, POC: ABNORMAL

## 2024-12-10 PROCEDURE — 81003 URINALYSIS AUTO W/O SCOPE: CPT

## 2024-12-10 RX ORDER — NITROFURANTOIN 25; 75 MG/1; MG/1
100 CAPSULE ORAL 2 TIMES DAILY
Qty: 20 CAPSULE | Refills: 0 | Status: SHIPPED | OUTPATIENT
Start: 2024-12-10 | End: 2024-12-20

## 2024-12-10 NOTE — PROGRESS NOTES
UA ordered and dipped, concerning for UTI so will treat with macrobid and send urine out for culture.

## 2024-12-11 LAB
ALBUMIN SERPL-MCNC: 3.8 G/DL (ref 3.5–5)
ALBUMIN/GLOB SERPL: 1.5 (ref 1.1–2.2)
ALP SERPL-CCNC: 114 U/L (ref 45–117)
ALT SERPL-CCNC: 29 U/L (ref 12–78)
ANION GAP SERPL CALC-SCNC: 4 MMOL/L (ref 2–12)
AST SERPL-CCNC: 26 U/L (ref 15–37)
BASOPHILS # BLD: 0 K/UL (ref 0–0.1)
BASOPHILS NFR BLD: 1 % (ref 0–1)
BILIRUB SERPL-MCNC: 0.6 MG/DL (ref 0.2–1)
BUN SERPL-MCNC: 18 MG/DL (ref 6–20)
BUN/CREAT SERPL: 27 (ref 12–20)
CALCIUM SERPL-MCNC: 9.6 MG/DL (ref 8.5–10.1)
CHLORIDE SERPL-SCNC: 103 MMOL/L (ref 97–108)
CO2 SERPL-SCNC: 33 MMOL/L (ref 21–32)
CREAT SERPL-MCNC: 0.66 MG/DL (ref 0.55–1.02)
DIFFERENTIAL METHOD BLD: NORMAL
EOSINOPHIL # BLD: 0.1 K/UL (ref 0–0.4)
EOSINOPHIL NFR BLD: 1 % (ref 0–7)
ERYTHROCYTE [DISTWIDTH] IN BLOOD BY AUTOMATED COUNT: 14.4 % (ref 11.5–14.5)
EST. AVERAGE GLUCOSE BLD GHB EST-MCNC: 163 MG/DL
GLOBULIN SER CALC-MCNC: 2.5 G/DL (ref 2–4)
GLUCOSE SERPL-MCNC: 132 MG/DL (ref 65–100)
HBA1C MFR BLD: 7.3 % (ref 4–5.6)
HCT VFR BLD AUTO: 45.4 % (ref 35–47)
HGB BLD-MCNC: 13.6 G/DL (ref 11.5–16)
IMM GRANULOCYTES # BLD AUTO: 0 K/UL (ref 0–0.04)
IMM GRANULOCYTES NFR BLD AUTO: 0 % (ref 0–0.5)
LYMPHOCYTES # BLD: 0.9 K/UL (ref 0.8–3.5)
LYMPHOCYTES NFR BLD: 16 % (ref 12–49)
MCH RBC QN AUTO: 27.4 PG (ref 26–34)
MCHC RBC AUTO-ENTMCNC: 30 G/DL (ref 30–36.5)
MCV RBC AUTO: 91.3 FL (ref 80–99)
MONOCYTES # BLD: 0.5 K/UL (ref 0–1)
MONOCYTES NFR BLD: 9 % (ref 5–13)
NEUTS SEG # BLD: 4.2 K/UL (ref 1.8–8)
NEUTS SEG NFR BLD: 73 % (ref 32–75)
NRBC # BLD: 0 K/UL (ref 0–0.01)
NRBC BLD-RTO: 0 PER 100 WBC
PLATELET # BLD AUTO: 217 K/UL (ref 150–400)
PMV BLD AUTO: 12 FL (ref 8.9–12.9)
POTASSIUM SERPL-SCNC: 4.5 MMOL/L (ref 3.5–5.1)
PROT SERPL-MCNC: 6.3 G/DL (ref 6.4–8.2)
RBC # BLD AUTO: 4.97 M/UL (ref 3.8–5.2)
SODIUM SERPL-SCNC: 140 MMOL/L (ref 136–145)
WBC # BLD AUTO: 5.7 K/UL (ref 3.6–11)

## 2024-12-12 LAB
BACTERIA SPEC CULT: ABNORMAL
CC UR VC: ABNORMAL
SERVICE CMNT-IMP: ABNORMAL

## 2024-12-31 ENCOUNTER — OFFICE VISIT (OUTPATIENT)
Age: 74
End: 2024-12-31
Payer: MEDICARE

## 2024-12-31 VITALS
HEART RATE: 76 BPM | SYSTOLIC BLOOD PRESSURE: 103 MMHG | RESPIRATION RATE: 16 BRPM | BODY MASS INDEX: 24.88 KG/M2 | WEIGHT: 154.8 LBS | TEMPERATURE: 97.9 F | OXYGEN SATURATION: 96 % | DIASTOLIC BLOOD PRESSURE: 51 MMHG | HEIGHT: 66 IN

## 2024-12-31 DIAGNOSIS — E78.2 MIXED HYPERLIPIDEMIA: ICD-10-CM

## 2024-12-31 DIAGNOSIS — Z79.4 TYPE 2 DIABETES MELLITUS WITH HYPERGLYCEMIA, WITH LONG-TERM CURRENT USE OF INSULIN (HCC): Primary | ICD-10-CM

## 2024-12-31 DIAGNOSIS — E11.65 TYPE 2 DIABETES MELLITUS WITH HYPERGLYCEMIA, WITH LONG-TERM CURRENT USE OF INSULIN (HCC): Primary | ICD-10-CM

## 2024-12-31 PROCEDURE — 1159F MED LIST DOCD IN RCRD: CPT | Performed by: INTERNAL MEDICINE

## 2024-12-31 PROCEDURE — 99214 OFFICE O/P EST MOD 30 MIN: CPT | Performed by: INTERNAL MEDICINE

## 2024-12-31 PROCEDURE — 1160F RVW MEDS BY RX/DR IN RCRD: CPT | Performed by: INTERNAL MEDICINE

## 2024-12-31 PROCEDURE — 1090F PRES/ABSN URINE INCON ASSESS: CPT | Performed by: INTERNAL MEDICINE

## 2024-12-31 PROCEDURE — G9899 SCRN MAM PERF RSLTS DOC: HCPCS | Performed by: INTERNAL MEDICINE

## 2024-12-31 PROCEDURE — 3078F DIAST BP <80 MM HG: CPT | Performed by: INTERNAL MEDICINE

## 2024-12-31 PROCEDURE — 1036F TOBACCO NON-USER: CPT | Performed by: INTERNAL MEDICINE

## 2024-12-31 PROCEDURE — 3074F SYST BP LT 130 MM HG: CPT | Performed by: INTERNAL MEDICINE

## 2024-12-31 PROCEDURE — 1125F AMNT PAIN NOTED PAIN PRSNT: CPT | Performed by: INTERNAL MEDICINE

## 2024-12-31 PROCEDURE — G8399 PT W/DXA RESULTS DOCUMENT: HCPCS | Performed by: INTERNAL MEDICINE

## 2024-12-31 PROCEDURE — 3051F HG A1C>EQUAL 7.0%<8.0%: CPT | Performed by: INTERNAL MEDICINE

## 2024-12-31 PROCEDURE — 3017F COLORECTAL CA SCREEN DOC REV: CPT | Performed by: INTERNAL MEDICINE

## 2024-12-31 PROCEDURE — G8420 CALC BMI NORM PARAMETERS: HCPCS | Performed by: INTERNAL MEDICINE

## 2024-12-31 PROCEDURE — 1123F ACP DISCUSS/DSCN MKR DOCD: CPT | Performed by: INTERNAL MEDICINE

## 2024-12-31 PROCEDURE — G8427 DOCREV CUR MEDS BY ELIG CLIN: HCPCS | Performed by: INTERNAL MEDICINE

## 2024-12-31 PROCEDURE — G8484 FLU IMMUNIZE NO ADMIN: HCPCS | Performed by: INTERNAL MEDICINE

## 2024-12-31 PROCEDURE — 2022F DILAT RTA XM EVC RTNOPTHY: CPT | Performed by: INTERNAL MEDICINE

## 2024-12-31 RX ORDER — POTASSIUM CHLORIDE 1500 MG/1
20 TABLET, EXTENDED RELEASE ORAL DAILY
COMMUNITY
Start: 2024-11-25

## 2024-12-31 RX ORDER — FLUTICASONE FUROATE, UMECLIDINIUM BROMIDE AND VILANTEROL TRIFENATATE 100; 62.5; 25 UG/1; UG/1; UG/1
1 POWDER RESPIRATORY (INHALATION) DAILY
COMMUNITY
Start: 2024-11-26

## 2024-12-31 NOTE — PROGRESS NOTES
Follow up - Diabetes     PCP: Brian Santiago DO    Chief Complaint   Patient presents with    Diabetes     HPI:  Delma Apple is a 74 y.o. female with  has a past medical history of Abnormal MRI, cervical spine, Abnormal pap, Arthritis, Breast cancer (HCC), Breast cancer, right breast (HCC), Chronic pain, Diabetes (HCC), Fibromyalgia, Ganglion cyst of wrist, Hypercholesterolemia, Hypertension, Ill-defined condition, Memory loss, Murmur, cardiac, Neuropathy, Osteoporosis, Rosacea, Sarcoidosis, Sarcoidosis, Transverse myelopathy syndrome (HCC), and Unspecified adverse effect of anesthesia. Here for follow up of diabetes.     Diagnosed with Type 2 diabetes: 1990s during chemotherapy for breast cancer     Complications:  +CAD, hx of stent placement, CHF   No hx of CVA   No known history of DR, nephropathy, or neuropathy.    No personal or family history of MEN or MTC  No personal history of pancreatitis  No personal or family history of bladder cancer  No known history of thyroid or calcium disorder  No prior hospitalizations for diabetes  No prior foot sores or amputations  +hx of severe hypoglycemia     Discharged 11/25/24 -   \"Acute on chronic systolic CHF last known EF 20-25%, with small pleural effusion 09/24 status post AICD Elevated troponin- suspect likely secondary demnd supply mismatch ;plateaued CAD w stent 2020\"    Current DM medications:  Toujeo 20 units daily  Farxiga 10 mg daily   Tradjenta 5 mg daily     BG trends:  Forgot CGM   A1c 7.3%   Glucoses generally between      No high sugar beverages   Activity - limited to ADL's     +shortness of breath with exertion chronic   +constipation     Full ROS completed this visit: neg unless specified above     LABS/STUDIES:     Lab Results   Component Value Date/Time    BYY5VPOG 7.6 08/29/2019 08:54 AM       Lab Results   Component Value Date/Time    LABA1C 7.3 12/10/2024 10:30 AM    LABA1C 7.2 09/11/2024 08:43 AM    LABA1C 6.7 05/31/2024 03:33 PM

## 2024-12-31 NOTE — PROGRESS NOTES
Delma Apple is a 74 y.o. female here for   Chief Complaint   Patient presents with    Diabetes       1. Have you been to the ER, urgent care clinic since your last visit?  Hospitalized since your last visit? - Sept 2024 [facility]-  Covid & and Fluid around heart. Nov 2024 [facility]- Fluid retention around heart.    2. Have you seen or consulted any other health care providers outside of the [facility] since your last visit?  Include any pap smears or colon screening.- no

## 2025-01-07 ENCOUNTER — TELEPHONE (OUTPATIENT)
Facility: CLINIC | Age: 75
End: 2025-01-07

## 2025-01-07 NOTE — TELEPHONE ENCOUNTER
Called and spoke to pt to get a fax number to send prescription off to. Pt was unable to give me the exact fax number but passed on a number for me to call to receive that information.

## 2025-01-10 ENCOUNTER — CLINICAL DOCUMENTATION (OUTPATIENT)
Facility: CLINIC | Age: 75
End: 2025-01-10

## 2025-01-10 ENCOUNTER — TELEPHONE (OUTPATIENT)
Facility: CLINIC | Age: 75
End: 2025-01-10

## 2025-01-10 NOTE — TELEPHONE ENCOUNTER
Pt was informed that her medication   milnacipran HCl (SAVELLA) 50 MG TABS is not covered by her insurance so she has decided to just stop taking it all together then.  Thank You

## 2025-01-10 NOTE — TELEPHONE ENCOUNTER
Received return fax from [employer] requesting detailed information for oxygen concentrator. Review of chart indicates pt is under the care of pulmonary and has been for an extended period of time.   Called pt - verified demographics for privacy precautions. Obtained further details regarding who previously ordered the oxygen concentrator and pt reports she believes it was pulmonary. Pt was encouraged to f/u with pulmonary and request them to complete the required information for the concentrator rental. Pt acknowledged understanding and agrees to plan. No further action needed at this time. Return fax from [employer] placed in scanning.

## 2025-01-30 ENCOUNTER — TELEPHONE (OUTPATIENT)
Age: 75
End: 2025-01-30

## 2025-02-07 NOTE — PROGRESS NOTES
Problem: Falls - Risk of  Goal: *Absence of Falls  Document Grant Fall Risk and appropriate interventions in the flowsheet. Outcome: Progressing Towards Goal  Fall Risk Interventions:  Mobility Interventions: Patient to call before getting OOB         Medication Interventions: Patient to call before getting OOB    Elimination Interventions: Call light in reach    History of Falls Interventions:  Investigate reason for fall, Door open when patient unattended [Fever] : no fever [Vision Problems] : no vision problems [Nasal Discharge] : no nasal discharge [Chest Pain] : no chest pain [Shortness Of Breath] : no shortness of breath [Abdominal Pain] : no abdominal pain

## 2025-02-11 ENCOUNTER — TELEPHONE (OUTPATIENT)
Age: 75
End: 2025-02-11

## 2025-02-11 NOTE — TELEPHONE ENCOUNTER
Telephone Call RE:  Appointment reminder     Outcome:     [x] Patient confirmed appointment   [] Patient rescheduled appointment for    [] Unable to reach  [] Left message              [] Other:       Covered all appt details, including new pt ones, and updated insurance.

## 2025-02-12 ENCOUNTER — TELEPHONE (OUTPATIENT)
Age: 75
End: 2025-02-12

## 2025-02-12 NOTE — TELEPHONE ENCOUNTER
Telephone Call RE:  Appointment reminder     Outcome:     [x] Patient confirmed appointment   [] Patient rescheduled appointment for    [] Unable to reach  [] Left message              [] Other:       Covered all appt details, including new pt ones.

## 2025-02-12 NOTE — PROGRESS NOTES
release tablet, Take 1 tablet by mouth daily, Disp: , Rfl:     TRELEGY ELLIPTA 100-62.5-25 MCG/ACT AEPB inhaler, Inhale 1 puff into the lungs daily, Disp: , Rfl:     Calcium Carb-Cholecalciferol (CHEWABLE CALCIUM/D3 PO), Take by mouth daily Patient doesn't know dose, Disp: , Rfl:     clopidogrel (PLAVIX) 75 MG tablet, Take 1 tablet by mouth daily, Disp: 90 tablet, Rfl: 3    bumetanide (BUMEX) 2 MG tablet, Take 1 tablet by mouth 2 times daily, Disp: , Rfl:     baclofen (LIORESAL) 10 MG tablet, TAKE 1 TABLET BY MOUTH AT BEDTIME, Disp: 90 tablet, Rfl: 1    milnacipran HCl (SAVELLA) 50 MG TABS, Take 1 tablet by mouth 2 times daily, Disp: 180 tablet, Rfl: 3    Insulin Pen Needle 32G X 6 MM MISC, 4 shots daily, Disp: 400 each, Rfl: 3    bethanechol (URECHOLINE) 5 MG tablet, TAKE 1 TABLET BY MOUTH TWICE DAILY, Disp: 180 tablet, Rfl: 1    atorvastatin (LIPITOR) 40 MG tablet, TAKE 1 TABLET BY MOUTH DAILY, Disp: 90 tablet, Rfl: 3    Insulin Glargine, 2 Unit Dial, (TOUJEO MAX SOLOSTAR) 300 UNIT/ML SOPN, ADMINISTER 20 UNITS UNDER THE SKIN DAILY, Disp: 3 mL, Rfl: 11    linagliptin (TRADJENTA) 5 MG tablet, Take 1 tablet by mouth daily, Disp: 90 tablet, Rfl: 3    butalbital-acetaminophen-caffeine (FIORICET, ESGIC) -40 MG per tablet, Take 1 tablet by mouth 3 times daily as needed for Headaches, Disp: 60 tablet, Rfl: 0    Continuous Glucose Sensor (FREESTYLE LUCIAN 2 SENSOR) MISC, Check Bg AC and HS on insulin E11.65, Disp: 6 each, Rfl: 3    Continuous Glucose  (FREESTYLE LUCIAN 2 READER) EVANGELINA, Check Bg AC and HS on insulin E11.65, Disp: 1 each, Rfl: 0    albuterol sulfate HFA (PROVENTIL;VENTOLIN;PROAIR) 108 (90 Base) MCG/ACT inhaler, , Disp: , Rfl:     VERQUVO 5 MG TABS, 10 mg, Disp: , Rfl:     L-methylfolate-B6-B12 (METANX) 3-90.314-2-35 MG CAPS capsule, TAKE 1 CAPSULE BY MOUTH TWO TIMES A DAY, Disp: 180 capsule, Rfl: 3    midodrine (PROAMATINE) 2.5 MG tablet, Take 2 tablets by mouth 3 times daily, Disp: , Rfl:

## 2025-02-13 ENCOUNTER — OFFICE VISIT (OUTPATIENT)
Age: 75
End: 2025-02-13

## 2025-02-13 VITALS
OXYGEN SATURATION: 97 % | TEMPERATURE: 98.7 F | HEIGHT: 66 IN | SYSTOLIC BLOOD PRESSURE: 98 MMHG | BODY MASS INDEX: 24.2 KG/M2 | RESPIRATION RATE: 16 BRPM | DIASTOLIC BLOOD PRESSURE: 52 MMHG | HEART RATE: 79 BPM | WEIGHT: 150.6 LBS

## 2025-02-13 DIAGNOSIS — I50.23 ACUTE ON CHRONIC SYSTOLIC HEART FAILURE (HCC): Primary | ICD-10-CM

## 2025-02-13 DIAGNOSIS — I50.23 ACUTE ON CHRONIC SYSTOLIC HEART FAILURE (HCC): ICD-10-CM

## 2025-02-13 PROBLEM — R93.7 ABNORMAL MRI, CERVICAL SPINE: Status: RESOLVED | Noted: 2017-04-03 | Resolved: 2025-02-13

## 2025-02-13 LAB
DISTANCE WALKED: NORMAL
SPO2: NORMAL

## 2025-02-13 RX ORDER — LOSARTAN POTASSIUM 25 MG/1
12.5 TABLET ORAL DAILY
COMMUNITY
Start: 2025-01-17

## 2025-02-13 RX ORDER — BUMETANIDE 2 MG/1
TABLET ORAL
Qty: 30 TABLET | Refills: 0 | Status: SHIPPED | OUTPATIENT
Start: 2025-02-13

## 2025-02-13 ASSESSMENT — PATIENT HEALTH QUESTIONNAIRE - PHQ9
SUM OF ALL RESPONSES TO PHQ9 QUESTIONS 1 & 2: 0
SUM OF ALL RESPONSES TO PHQ QUESTIONS 1-9: 0
SUM OF ALL RESPONSES TO PHQ QUESTIONS 1-9: 0
1. LITTLE INTEREST OR PLEASURE IN DOING THINGS: NOT AT ALL
SUM OF ALL RESPONSES TO PHQ QUESTIONS 1-9: 0
2. FEELING DOWN, DEPRESSED OR HOPELESS: NOT AT ALL
SUM OF ALL RESPONSES TO PHQ QUESTIONS 1-9: 0

## 2025-02-13 NOTE — PATIENT INSTRUCTIONS
Medication changes:    No medication changes     Please take this to your pharmacy to notify them of the change in medications.     Testing Ordered:    6 minute walk in clinic     Lab work has been drawn today. You will be contacted with any abnormal results requiring changes to your current plan of care.      Please send manual transmission tomorrow morning     Referrals:      Other Recommendations:      - Record blood pressure and heart rate daily before medication and two hours after medication  - Record weight daily upon waking/after using the bathroom.   - Keep a written records of your weights and blood pressure and bring to your next appointment.   - Call the clinic at if you have a weight gain of 3 or more pounds overnight OR 5 or more pounds in one week, new/worsened shortness of breath or swelling, or if your blood pressure begins to consistently run below 90/60 and/or you begin to experience dizziness or lightheadedness. Our office phone number [phone] option 2.  - Ensure you are drinking an adequate amount of water with a goal of 6-8 eight ounce glasses (1.5-2 liters) of fluid daily. Your urine should be clear and light yellow straw colored.       Follow up 1 month titration clinic with NP  with [facility]    Our monthly Heart Failure Support Group is held on the last Wednesday of every month from 5-6pm at [facility]. If you would like to attend, please RSVP to [email or web address]    Thank you for allowing us the privilege of being a part of your healthcare team! Please do not hesitate to contact our office at [phone] option 2 with any questions or concerns.

## 2025-02-14 LAB
MAGNESIUM SERPL-MCNC: 2.6 MG/DL (ref 1.6–2.3)
NT-PROBNP SERPL-MCNC: 2176 PG/ML (ref 0–301)
SPECIMEN STATUS REPORT: NORMAL

## 2025-02-17 NOTE — TELEPHONE ENCOUNTER
New Patient referred by Malaika Myles NP . Patient was given the address to [facility] and directions to clinic.  Patient was advised to arrive 15 minutes early for registration, bring medication bottles, as well as insurance cards and ID. Patient was provided the clinic phone number for any questions, in addition to my extension. Patient is agreeable and understanding of all instructions with no further questions or concerns at this time.    Insurance was verified and patient was scheduled with Dr. Colletti.     Additional records requested from Stockton Mariano and Jacob Bateman CMA    
Patient/Caregiver provided printed discharge information.

## 2025-02-20 ENCOUNTER — TELEPHONE (OUTPATIENT)
Age: 75
End: 2025-02-20

## 2025-02-20 DIAGNOSIS — Z51.81 ENCOUNTER FOR MONITORING DIURETIC THERAPY: ICD-10-CM

## 2025-02-20 DIAGNOSIS — Z79.899 ENCOUNTER FOR MONITORING DIURETIC THERAPY: ICD-10-CM

## 2025-02-20 DIAGNOSIS — I50.23 ACUTE ON CHRONIC SYSTOLIC HEART FAILURE (HCC): Primary | ICD-10-CM

## 2025-02-20 RX ORDER — BUMETANIDE 2 MG/1
4 TABLET ORAL 2 TIMES DAILY
Qty: 120 TABLET | Refills: 0 | Status: SHIPPED | OUTPATIENT
Start: 2025-02-20

## 2025-02-20 NOTE — TELEPHONE ENCOUNTER
Patient called in with concerns for fluid retention:    Today noticed worsened shortness of breath with exertion  Noticing Cough over last couple days (at night, when laying down)  Taking bumex 2mg twice a day in the last week related to worsened leg swelling   Minimal change in weight only up 2lbs in the week    No changes in diet or fluid intake    Patient sent in update heart logic transmission for MD review    Buto-Colletti, Cassandra, DO Macalma, Andrea M, RN  Caller: Unspecified (Today,  1:30 PM)  Please have patient increase Bumex to 4mg BID X 5 days then back down to 2 mg BID. Have her come in for BMP after the 5 days of bumex. Thank you.  Dr. Colletti     Called patient to review above recommendations. She verbalized understanding.   Patient will go to Lab [facility]uan at [facility] next week for repeat labs

## 2025-02-24 ENCOUNTER — TELEPHONE (OUTPATIENT)
Age: 75
End: 2025-02-24

## 2025-02-24 DIAGNOSIS — Z79.899 ENCOUNTER FOR MONITORING DIURETIC THERAPY: ICD-10-CM

## 2025-02-24 DIAGNOSIS — I50.23 ACUTE ON CHRONIC SYSTOLIC HEART FAILURE (HCC): Primary | ICD-10-CM

## 2025-02-24 DIAGNOSIS — Z51.81 ENCOUNTER FOR MONITORING DIURETIC THERAPY: ICD-10-CM

## 2025-02-24 NOTE — TELEPHONE ENCOUNTER
----- Message from HOPE DALY RN sent at 2/20/2025  2:03 PM EST -----  Upated Heart logic transmission  Labs after diuresis (bumex 4mg BIDx 5 days)  Check in on symptoms      Called patient to review symptoms/weights:  Noticed breathing improved starting yesterday  Coughing with some slight improvement  Swelling still present in legs with minimal change  Slight improvement urine output    2/21- 148lb  2/23- 145.8lb, 113/67  2/24- 146lb, 105/61    Update heart logic still trending up (Now 47), impedance with slight improvement. Printed for provider review.    Patient still taking Bumex 4mg BID (through tomorrow) She will go for labs tomorrow at [facility] (orders faxed to [facility])    Will review with Clarisse Rodriguez APRN - González King RN  Caller: Unspecified (Today, 11:01 AM)  I'd like to see what her labs look like tomorrow. If her PBNP remains elevated and her weight is not down on Wednesday she can take a dose of metolazone 2.5mg x 1.  Don't send it in until we see what labs and wt are.    Called patient to review above. Patient verbalized understanding. Updated lab orders (including pnbp) sent to patient via my chart message, she will print and bring to [facility] tomorrow.

## 2025-02-25 DIAGNOSIS — Z51.81 ENCOUNTER FOR MONITORING DIURETIC THERAPY: ICD-10-CM

## 2025-02-25 DIAGNOSIS — Z79.899 ENCOUNTER FOR MONITORING DIURETIC THERAPY: ICD-10-CM

## 2025-02-25 DIAGNOSIS — I50.23 ACUTE ON CHRONIC SYSTOLIC HEART FAILURE (HCC): ICD-10-CM

## 2025-02-26 LAB
BUN SERPL-MCNC: 19 MG/DL (ref 8–27)
BUN/CREAT SERPL: 35 (ref 12–28)
CALCIUM SERPL-MCNC: 9 MG/DL (ref 8.7–10.3)
CHLORIDE SERPL-SCNC: 98 MMOL/L (ref 96–106)
CO2 SERPL-SCNC: 32 MMOL/L (ref 20–29)
CREAT SERPL-MCNC: 0.55 MG/DL (ref 0.57–1)
EGFRCR SERPLBLD CKD-EPI 2021: 96 ML/MIN/1.73
GLUCOSE SERPL-MCNC: 175 MG/DL (ref 70–99)
MAGNESIUM SERPL-MCNC: 2.4 MG/DL (ref 1.6–2.3)
NT-PROBNP SERPL-MCNC: 2398 PG/ML (ref 0–301)
POTASSIUM SERPL-SCNC: 4.3 MMOL/L (ref 3.5–5.2)
SODIUM SERPL-SCNC: 142 MMOL/L (ref 134–144)

## 2025-02-26 NOTE — TELEPHONE ENCOUNTER
Reviewed lab results with patient followed up on HF symptoms.  Patient has completed bumex 4mg twice a day for 5 days.    2/25- 146.8lb  2/26- 146lb, 113/60    Leg swelling a little better  Noted some improvement with shortness of breath Sunday, states it comes and goes  Cough improved if head of bed elevated    Heart logic updated transmission at 47    Per Dr. Colletti-  Please have patient continue to take Bumex 4 mg twice daily x 7 days.  Will have patient take metolazone 2.5 mg 3 times this week. Have patient follow-up in clinic in 1 week and to have labs repeated.     RN called patient regarding above, she is in agreement, and able to teach back instructions. She is agreeable to come in for appt 3/6 at 330 with Dr Colletti. [facility]. She is send a transmission 3/6 the morning of her appointment. She will call back to [facility] if she has any new/worsened symptoms between now and her appointment.

## 2025-02-27 RX ORDER — METOLAZONE 2.5 MG/1
TABLET ORAL
Qty: 3 TABLET | Refills: 0 | Status: SHIPPED | OUTPATIENT
Start: 2025-02-27

## 2025-03-01 ENCOUNTER — TELEPHONE (OUTPATIENT)
Age: 75
End: 2025-03-01

## 2025-03-01 NOTE — TELEPHONE ENCOUNTER
Spoke with patient about diuretic dosing, she took 2.5mg Metolazone yesterday and lost 6lbs but today is dizzy, lightheaded and fatigued and has been incontinent.   Asked patient to hold this evening's bumex and reassess in the morning. If her symptoms resolve she can resume her bumex dosing but will hold metolazone for now. She will call office on Monday with weights.  She states understanding.   HERMILA Lindo - NP

## 2025-03-03 ENCOUNTER — TELEPHONE (OUTPATIENT)
Age: 75
End: 2025-03-03

## 2025-03-03 NOTE — TELEPHONE ENCOUNTER
Pt called to report that she is unable to continue taking  metolazone b/c it made her so lighthearted and weak that she couldn't function.  She lost 6 lbs in one day.  Please call her back at [phone] to advise.

## 2025-03-03 NOTE — TELEPHONE ENCOUNTER
Spoke with patient using two patient identifiers. Patient reports that she took metolazone Friday evening around 3:30pm. She states that from Friday evening into Saturday morning she felt extreme dizziness and lightheadedness. She notes she was having to lean on wall for support. She notes that since then she has felt very week and slept most of the day Saturday and Sunday. She notes today she is feeling almost back to normal but still feeling week. She reports her sob is still noticeable with any exertion but reports she feels this is due to feeling so weak. She notes that swelling has gone down and is almost back to normal she provided logs as follows.     2/28 weight 146.8 lbs bp 109/65 took metolazone Friday at 3:30pm   3/1 weight 142.8 lbs  bp 109/68  3/2 weight  138.6  bp 100/58 took morning bumex 4mg but held afternoon dose   3/3 weight 138.2 bp 105/52 took 4mg morning bumex dose     Patient notes this morning that lightheadedness and dizziness have improved but she is still feeling unsteady when changing positions. Discussed importance of changing positions slowly and using mobility aids at home. Notified patietn that I will follow up with Dr. Colletti and will call back with further recommendations.     Buto-Colletti, Cassandra, DO Eades, Madalyn, RN  Caller: Unspecified (Today, 10:02 AM)  Please have pt go back to Bumex 2 mg BID and let's check her HeartLogic in 1 week.    Thank you    Dr. Colletti      Spoke with patient using two patient identifiers. Reviewed above information per Dr. Colletti. Discussed with patient to send manual transmission before scheduled appt on 3/6. She states understanding of instructions and had no further questions.

## 2025-03-04 ENCOUNTER — TELEPHONE (OUTPATIENT)
Facility: CLINIC | Age: 75
End: 2025-03-04

## 2025-03-04 ENCOUNTER — TELEPHONE (OUTPATIENT)
Age: 75
End: 2025-03-04

## 2025-03-04 SDOH — HEALTH STABILITY: PHYSICAL HEALTH: ON AVERAGE, HOW MANY MINUTES DO YOU ENGAGE IN EXERCISE AT THIS LEVEL?: 0 MIN

## 2025-03-04 SDOH — ECONOMIC STABILITY: FOOD INSECURITY: WITHIN THE PAST 12 MONTHS, YOU WORRIED THAT YOUR FOOD WOULD RUN OUT BEFORE YOU GOT MONEY TO BUY MORE.: NEVER TRUE

## 2025-03-04 SDOH — ECONOMIC STABILITY: FOOD INSECURITY: WITHIN THE PAST 12 MONTHS, THE FOOD YOU BOUGHT JUST DIDN'T LAST AND YOU DIDN'T HAVE MONEY TO GET MORE.: NEVER TRUE

## 2025-03-04 SDOH — HEALTH STABILITY: PHYSICAL HEALTH: ON AVERAGE, HOW MANY DAYS PER WEEK DO YOU ENGAGE IN MODERATE TO STRENUOUS EXERCISE (LIKE A BRISK WALK)?: 0 DAYS

## 2025-03-04 SDOH — ECONOMIC STABILITY: INCOME INSECURITY: IN THE LAST 12 MONTHS, WAS THERE A TIME WHEN YOU WERE NOT ABLE TO PAY THE MORTGAGE OR RENT ON TIME?: NO

## 2025-03-04 SDOH — ECONOMIC STABILITY: TRANSPORTATION INSECURITY
IN THE PAST 12 MONTHS, HAS LACK OF TRANSPORTATION KEPT YOU FROM MEETINGS, WORK, OR FROM GETTING THINGS NEEDED FOR DAILY LIVING?: NO

## 2025-03-04 SDOH — ECONOMIC STABILITY: TRANSPORTATION INSECURITY
IN THE PAST 12 MONTHS, HAS THE LACK OF TRANSPORTATION KEPT YOU FROM MEDICAL APPOINTMENTS OR FROM GETTING MEDICATIONS?: NO

## 2025-03-04 ASSESSMENT — PATIENT HEALTH QUESTIONNAIRE - PHQ9
1. LITTLE INTEREST OR PLEASURE IN DOING THINGS: NOT AT ALL
2. FEELING DOWN, DEPRESSED OR HOPELESS: NOT AT ALL
SUM OF ALL RESPONSES TO PHQ QUESTIONS 1-9: 0

## 2025-03-04 NOTE — TELEPHONE ENCOUNTER
Telephone Call RE:  Appointment reminder     Outcome:     [x] Patient confirmed appointment   [] Patient rescheduled appointment for    [] Unable to reach  [] Left message              [x] Other: Parking, Location, Copay

## 2025-03-05 RX ORDER — BETHANECHOL CHLORIDE 5 MG
TABLET ORAL
Qty: 180 TABLET | Refills: 1 | Status: SHIPPED | OUTPATIENT
Start: 2025-03-05

## 2025-03-12 ENCOUNTER — TELEPHONE (OUTPATIENT)
Age: 75
End: 2025-03-12

## 2025-03-12 ENCOUNTER — OFFICE VISIT (OUTPATIENT)
Age: 75
End: 2025-03-12
Payer: MEDICARE

## 2025-03-12 VITALS
RESPIRATION RATE: 18 BRPM | DIASTOLIC BLOOD PRESSURE: 56 MMHG | BODY MASS INDEX: 24.36 KG/M2 | HEIGHT: 66 IN | WEIGHT: 151.6 LBS | TEMPERATURE: 97.4 F | OXYGEN SATURATION: 99 % | SYSTOLIC BLOOD PRESSURE: 126 MMHG | HEART RATE: 76 BPM

## 2025-03-12 DIAGNOSIS — I50.23 ACUTE ON CHRONIC SYSTOLIC HEART FAILURE (HCC): Primary | ICD-10-CM

## 2025-03-12 DIAGNOSIS — Z79.899 ENCOUNTER FOR MONITORING DIURETIC THERAPY: ICD-10-CM

## 2025-03-12 DIAGNOSIS — Z51.81 ENCOUNTER FOR MONITORING DIURETIC THERAPY: ICD-10-CM

## 2025-03-12 PROCEDURE — 3078F DIAST BP <80 MM HG: CPT | Performed by: NURSE PRACTITIONER

## 2025-03-12 PROCEDURE — 1159F MED LIST DOCD IN RCRD: CPT | Performed by: NURSE PRACTITIONER

## 2025-03-12 PROCEDURE — 99215 OFFICE O/P EST HI 40 MIN: CPT | Performed by: NURSE PRACTITIONER

## 2025-03-12 PROCEDURE — 1123F ACP DISCUSS/DSCN MKR DOCD: CPT | Performed by: NURSE PRACTITIONER

## 2025-03-12 PROCEDURE — 3074F SYST BP LT 130 MM HG: CPT | Performed by: NURSE PRACTITIONER

## 2025-03-12 PROCEDURE — 1126F AMNT PAIN NOTED NONE PRSNT: CPT | Performed by: NURSE PRACTITIONER

## 2025-03-12 ASSESSMENT — PATIENT HEALTH QUESTIONNAIRE - PHQ9
SUM OF ALL RESPONSES TO PHQ QUESTIONS 1-9: 0
2. FEELING DOWN, DEPRESSED OR HOPELESS: NOT AT ALL
SUM OF ALL RESPONSES TO PHQ QUESTIONS 1-9: 0
1. LITTLE INTEREST OR PLEASURE IN DOING THINGS: NOT AT ALL

## 2025-03-12 ASSESSMENT — ENCOUNTER SYMPTOMS
VOMITING: 0
ABDOMINAL DISTENTION: 0
COUGH: 0
SHORTNESS OF BREATH: 1
NAUSEA: 0

## 2025-03-12 NOTE — PROGRESS NOTES
capsule by mouth daily, Disp: , Rfl:     dapagliflozin (FARXIGA) 10 MG tablet, Take by mouth daily, Disp: , Rfl:     fluticasone (FLONASE) 50 MCG/ACT nasal spray, 2 sprays by Nasal route, Disp: , Rfl:     linaCLOtide (LINZESS) 72 MCG CAPS capsule, Take 1 capsule by mouth every morning (before breakfast), Disp: , Rfl:     loratadine (CLARITIN) 10 MG tablet, Take 1 tablet by mouth, Disp: , Rfl:     magnesium oxide (MAG-OX) 400 MG tablet, Take 1 tablet by mouth every other day, Disp: , Rfl:     metOLazone (ZAROXOLYN) 2.5 MG tablet, Take 1 tablet 3 times in 1 week (Thursday, Sunday, Wednesday) (Patient not taking: Reported on 3/12/2025), Disp: 3 tablet, Rfl: 0    Calcium Carb-Cholecalciferol (CHEWABLE CALCIUM/D3 PO), Take by mouth daily Patient doesn't know dose (Patient not taking: Reported on 3/12/2025), Disp: , Rfl:     Thank you for your referral and allowing me to participate in this patient's care.    HERMILA Lindo - NP  [facility]  [facility]  [address], [address]  Phone: [phone]      PATIENT CARE TEAM:  Patient Care Team:  Brian Santiago DO as PCP - General (Family Medicine)  Brian Santiago DO as PCP - Empaneled Provider  Chapis Taylor MD as Physician  Lani Lipscomb MD as Physician  Johnny Velázquez MD as Physician    On this date 3/12/25 , I have spent a total time of  45 minutes personally reviewing new vitals, test results, notes from recent visits, face to face encounter/physical exam of patient with counseling, writing orders, performing medical decision making, and documenting.

## 2025-03-12 NOTE — TELEPHONE ENCOUNTER
Pt was told to call [facility] once she had submitted her reading on her pacemaker. The reading has been submitted.

## 2025-03-12 NOTE — PATIENT INSTRUCTIONS
Medication changes:    Stop Verquvo    Contact [facility] in 2 week (by [employer] -preferable- or telephone) with your daily weights, blood pressures and any symptoms. If you leave a voicemail, please report this information in detail so we can follow up appropriately and efficiently. Be reassured, we will call you back. Phone number is [phone] option 2    Testing Ordered:    none    Referrals:  Please call and schedule your appointment with Sleep Medicine.  You can call one of the numbers listed below.    [address] [place] [address].   [place] [address]   Tel.  [phone]   Fax. [phone]    Other Recommendations:      - Record blood pressure and heart rate daily before medication and two hours after medication  - Record weight daily upon waking/after using the bathroom.   - Keep a written records of your weights and blood pressure and bring to your next appointment.   - Call the clinic at if you have a weight gain of 3 or more pounds overnight OR 5 or more pounds in one week, new/worsened shortness of breath or swelling, or if your blood pressure begins to consistently run below 90/60 and/or you begin to experience dizziness or lightheadedness. Our office phone number [phone] option 2.  - Ensure you are drinking an adequate amount of water with a goal of 6-8 eight ounce glasses (1.5-2 liters) of fluid daily. Your urine should be clear and light yellow straw colored.       Follow up in 4 week (med titration)  with [facility]    Our monthly Heart Failure Support Group is held on the last Wednesday of every month from 5-6pm at [facility]. If you would like to attend, please RSVP to [email or web address]    Thank you for allowing us the privilege of being a part of your healthcare team! Please do not hesitate to contact our office at [phone] option 2 with any questions or concerns.

## 2025-03-18 RX ORDER — LOSARTAN POTASSIUM 25 MG/1
25 TABLET ORAL DAILY
Qty: 30 TABLET | Refills: 2 | Status: SHIPPED | OUTPATIENT
Start: 2025-03-18

## 2025-03-18 RX ORDER — BUMETANIDE 2 MG/1
TABLET ORAL
Qty: 60 TABLET | Refills: 0 | Status: SHIPPED | OUTPATIENT
Start: 2025-03-18

## 2025-03-18 SDOH — HEALTH STABILITY: PHYSICAL HEALTH: ON AVERAGE, HOW MANY MINUTES DO YOU ENGAGE IN EXERCISE AT THIS LEVEL?: 0 MIN

## 2025-03-18 SDOH — HEALTH STABILITY: PHYSICAL HEALTH: ON AVERAGE, HOW MANY DAYS PER WEEK DO YOU ENGAGE IN MODERATE TO STRENUOUS EXERCISE (LIKE A BRISK WALK)?: 0 DAYS

## 2025-03-18 ASSESSMENT — LIFESTYLE VARIABLES
HOW MANY STANDARD DRINKS CONTAINING ALCOHOL DO YOU HAVE ON A TYPICAL DAY: PATIENT DOES NOT DRINK
HOW OFTEN DO YOU HAVE A DRINK CONTAINING ALCOHOL: NEVER
HOW MANY STANDARD DRINKS CONTAINING ALCOHOL DO YOU HAVE ON A TYPICAL DAY: 0
HOW OFTEN DO YOU HAVE A DRINK CONTAINING ALCOHOL: 1
HOW OFTEN DO YOU HAVE SIX OR MORE DRINKS ON ONE OCCASION: 1

## 2025-03-18 ASSESSMENT — PATIENT HEALTH QUESTIONNAIRE - PHQ9
SUM OF ALL RESPONSES TO PHQ QUESTIONS 1-9: 0
SUM OF ALL RESPONSES TO PHQ QUESTIONS 1-9: 0
2. FEELING DOWN, DEPRESSED OR HOPELESS: NOT AT ALL
SUM OF ALL RESPONSES TO PHQ QUESTIONS 1-9: 0
SUM OF ALL RESPONSES TO PHQ QUESTIONS 1-9: 0
1. LITTLE INTEREST OR PLEASURE IN DOING THINGS: NOT AT ALL

## 2025-03-18 NOTE — TELEPHONE ENCOUNTER
Requested Prescriptions     Pending Prescriptions Disp Refills    bumetanide (BUMEX) 2 MG tablet 60 tablet 0     Sig: Take 2 mg by mouth in the morning and 1 mg (0.5 tab) by mouth in the afternoon. May take an additional 1 mg in the afternoon for weight gain 3 or more lbs in 1 day or 5 lbs in 1 week    losartan (COZAAR) 25 MG tablet 30 tablet 2     Sig: Take 1 tablet by mouth daily     Last visit 3/12  Next visit 4/9

## 2025-03-21 ENCOUNTER — PATIENT MESSAGE (OUTPATIENT)
Age: 75
End: 2025-03-21

## 2025-03-21 ENCOUNTER — OFFICE VISIT (OUTPATIENT)
Facility: CLINIC | Age: 75
End: 2025-03-21

## 2025-03-21 VITALS
RESPIRATION RATE: 18 BRPM | SYSTOLIC BLOOD PRESSURE: 109 MMHG | DIASTOLIC BLOOD PRESSURE: 69 MMHG | OXYGEN SATURATION: 99 % | HEIGHT: 66 IN | HEART RATE: 69 BPM | WEIGHT: 149 LBS | TEMPERATURE: 97.6 F | BODY MASS INDEX: 23.95 KG/M2

## 2025-03-21 DIAGNOSIS — M79.7 FIBROMYALGIA: ICD-10-CM

## 2025-03-21 DIAGNOSIS — Z79.4 TYPE 2 DIABETES MELLITUS WITH DIABETIC NEUROPATHY, WITH LONG-TERM CURRENT USE OF INSULIN (HCC): ICD-10-CM

## 2025-03-21 DIAGNOSIS — E78.2 MIXED HYPERLIPIDEMIA: ICD-10-CM

## 2025-03-21 DIAGNOSIS — Z00.00 MEDICARE ANNUAL WELLNESS VISIT, SUBSEQUENT: Primary | ICD-10-CM

## 2025-03-21 DIAGNOSIS — E11.40 TYPE 2 DIABETES MELLITUS WITH DIABETIC NEUROPATHY, WITH LONG-TERM CURRENT USE OF INSULIN (HCC): ICD-10-CM

## 2025-03-21 RX ORDER — DULOXETIN HYDROCHLORIDE 30 MG/1
30 CAPSULE, DELAYED RELEASE ORAL DAILY
Qty: 90 CAPSULE | Refills: 1 | Status: SHIPPED | OUTPATIENT
Start: 2025-03-21

## 2025-03-21 NOTE — PROGRESS NOTES
Medicare Annual Wellness Visit    Delma Apple is here for No chief complaint on file.    Assessment & Plan   Medicare annual wellness visit, subsequent  Mixed hyperlipidemia  Assessment & Plan:  Due for labs, will obtain today.   Orders:  -     Lipid Panel; Future  Type 2 diabetes mellitus with diabetic neuropathy, with long-term current use of insulin (HCC)  Assessment & Plan:  Monitored by specialist  I did speak to her about goal A1c being under 8 given her age.  Her A1c has been below 7 for a year and a half.  I would recommend she peels back on one of her oral medications, she is to speak to endocrinology regarding this.     Will order the urine microalbumin as she is due.   Orders:  -     Albumin/Creatinine Ratio, Urine; Future  Fibromyalgia  Assessment & Plan:  She has done well with Savella in the past.  Unfortunately, not covered by her new insurance.  Will trial Cymbalta since she has never taken it.  Will start at 30mg capsules taken daily.  She can increase to 60mg daily after 4 weeks if needed.   Orders:  -     DULoxetine (CYMBALTA) 30 MG extended release capsule; Take 1 capsule by mouth daily, Disp-90 capsule, R-1Normal     Return in about 3 months (around 6/21/2025) for chronic checkup.     Subjective   The following acute and/or chronic problems were also addressed today:  Problem List Items Addressed This Visit          Endocrine    Diabetes mellitus with neuropathy (HCC)    Monitored by specialist  I did speak to her about goal A1c being under 8 given her age.  Her A1c has been below 7 for a year and a half.  I would recommend she peels back on one of her oral medications, she is to speak to endocrinology regarding this.     Will order the urine microalbumin as she is due.          Relevant Orders    Albumin/Creatinine Ratio, Urine       Other    Fibromyalgia    She has done well with Savella in the past.  Unfortunately, not covered by her new insurance.  Will trial Cymbalta since she has never

## 2025-03-21 NOTE — ASSESSMENT & PLAN NOTE
She has done well with Savella in the past.  Unfortunately, not covered by her new insurance.  Will trial Cymbalta since she has never taken it.  Will start at 30mg capsules taken daily.  She can increase to 60mg daily after 4 weeks if needed.

## 2025-03-21 NOTE — PROGRESS NOTES
Patint here for awv. Not fasting, had coffee and propel this am.     \"Have you been to the ER, urgent care clinic since your last visit?  Hospitalized since your last visit?\"    NO    “Have you seen or consulted any other health care providers outside our system since your last visit?”    NO    Have you had a mammogram?”   NO    Date of last Mammogram: 7/31/2023       “Have you had a diabetic eye exam?”    YES - Where: Dr. David HOROWITZ Nurse/MARVA to request most recent records if not in the chart     Date of last diabetic eye exam: 7/6/2023

## 2025-03-21 NOTE — ASSESSMENT & PLAN NOTE
Monitored by specialist  I did speak to her about goal A1c being under 8 given her age.  Her A1c has been below 7 for a year and a half.  I would recommend she peels back on one of her oral medications, she is to speak to endocrinology regarding this.     Will order the urine microalbumin as she is due.

## 2025-03-22 LAB
CHOLEST SERPL-MCNC: 193 MG/DL (ref 100–199)
HDLC SERPL-MCNC: 53 MG/DL
LDLC SERPL CALC-MCNC: 125 MG/DL (ref 0–99)
TRIGL SERPL-MCNC: 84 MG/DL (ref 0–149)
VLDLC SERPL CALC-MCNC: 15 MG/DL (ref 5–40)

## 2025-03-23 LAB
ALBUMIN/CREAT UR: 35 MG/G CREAT (ref 0–29)
CREAT UR-MCNC: 17.1 MG/DL
IMP & REVIEW OF LAB RESULTS: NORMAL
Lab: NORMAL
MICROALBUMIN UR-MCNC: 6 UG/ML

## 2025-03-24 ENCOUNTER — RESULTS FOLLOW-UP (OUTPATIENT)
Facility: CLINIC | Age: 75
End: 2025-03-24

## 2025-03-26 ENCOUNTER — OFFICE VISIT (OUTPATIENT)
Age: 75
End: 2025-03-26
Payer: MEDICARE

## 2025-03-26 VITALS
HEART RATE: 74 BPM | BODY MASS INDEX: 24.46 KG/M2 | TEMPERATURE: 97.5 F | WEIGHT: 152.2 LBS | SYSTOLIC BLOOD PRESSURE: 117 MMHG | OXYGEN SATURATION: 96 % | HEIGHT: 66 IN | DIASTOLIC BLOOD PRESSURE: 68 MMHG

## 2025-03-26 DIAGNOSIS — E78.2 MIXED HYPERLIPIDEMIA: ICD-10-CM

## 2025-03-26 DIAGNOSIS — E11.65 TYPE 2 DIABETES MELLITUS WITH HYPERGLYCEMIA, WITH LONG-TERM CURRENT USE OF INSULIN (HCC): Primary | ICD-10-CM

## 2025-03-26 DIAGNOSIS — Z79.4 TYPE 2 DIABETES MELLITUS WITH HYPERGLYCEMIA, WITH LONG-TERM CURRENT USE OF INSULIN (HCC): Primary | ICD-10-CM

## 2025-03-26 PROCEDURE — 99214 OFFICE O/P EST MOD 30 MIN: CPT | Performed by: INTERNAL MEDICINE

## 2025-03-26 PROCEDURE — 1126F AMNT PAIN NOTED NONE PRSNT: CPT | Performed by: INTERNAL MEDICINE

## 2025-03-26 PROCEDURE — 3074F SYST BP LT 130 MM HG: CPT | Performed by: INTERNAL MEDICINE

## 2025-03-26 PROCEDURE — 3078F DIAST BP <80 MM HG: CPT | Performed by: INTERNAL MEDICINE

## 2025-03-26 PROCEDURE — 95251 CONT GLUC MNTR ANALYSIS I&R: CPT | Performed by: INTERNAL MEDICINE

## 2025-03-26 PROCEDURE — 1123F ACP DISCUSS/DSCN MKR DOCD: CPT | Performed by: INTERNAL MEDICINE

## 2025-03-26 PROCEDURE — 1160F RVW MEDS BY RX/DR IN RCRD: CPT | Performed by: INTERNAL MEDICINE

## 2025-03-26 PROCEDURE — 1159F MED LIST DOCD IN RCRD: CPT | Performed by: INTERNAL MEDICINE

## 2025-03-26 RX ORDER — MIDODRINE HYDROCHLORIDE 2.5 MG/1
2.5 TABLET ORAL 3 TIMES DAILY
Qty: 90 TABLET | Refills: 0 | Status: SHIPPED | OUTPATIENT
Start: 2025-03-26

## 2025-03-26 NOTE — PROGRESS NOTES
Endocrine follow up     CC:   Chief Complaint   Patient presents with    Diabetes     PCP:Brian Santiago DO  Referring provider: No referring provider defined for this encounter.    HPI  75 y.o. female  has a past medical history of Abnormal MRI, cervical spine, Abnormal pap, Arthritis, Breast cancer (HCC), Breast cancer, right breast (HCC), CAD (coronary artery disease), CHF (congestive heart failure) (HCC), Chronic pain, Diabetes (HCC), Fibromyalgia, Ganglion cyst of wrist, Hypercholesterolemia, Hypertension, Ill-defined condition, Memory loss, Murmur, cardiac, Neuropathy, Osteoporosis, Rosacea, Sarcoidosis, Sarcoidosis, Transverse myelopathy syndrome (HCC), and Unspecified adverse effect of anesthesia. here for follow up.   History of Present Illness  The patient presents for evaluation of type 2 diabetes mellitus, transverse myelitis, and congestive heart failure.    Diabetes Management  - Approximately 2 weeks ago, insulin dosage was reduced from 20 to 18 units due to frequent hypoglycemic episodes, with blood glucose levels often dropping to the low 70s.  - A spike in blood glucose level to 161 was noted this morning, likely due to a missed insulin dose yesterday.  - Dietary habits remain unchanged.  - Current diabetes medications include Toujeo 18 units daily, Tradjenta 5 mg daily, and Farxiga 10 mg daily.  - There is a history of intolerance to metformin.  - The last podiatric visit was 7 years ago, and difficulty in self-trimming toenails is reported.    Transverse Myelitis  - Managed with the use of a walker due to fall risk, balance issues, and significant weakness in one leg.  - A neurologist was consulted in the first week of 05/2017 following hospital discharge, but no follow-up visits have occurred.  - Plans to discuss a potential referral to a neurologist with the primary care physician during the next appointment in 06/2025 are mentioned.    Congestive Heart Failure  - Management includes recent

## 2025-03-26 NOTE — PATIENT INSTRUCTIONS
Stop Toujeo   Continue farxiga and tradjenta  If your fasting glucoses are persistently more than 130s, resume toujeo at 8 units nightly     Diabetes general guidelines:   Target glucose  Fastin-130  2 hours after meals: less than 180    Check glucose levels as directed   Have a small snack if glucose is less than 80 at bedtime    Seek urgent care if glucose levels above 300s and not coming down, especially if associated with excessive thirst, urination, confusion or generally feeling unwell.     For Blood Glucose < 70 mg/dl treat with 4 ounces of juice or 4 glucose tablets (15 g). Recheck Blood Glucose in 15 minutes. Repeat until blood glucose is > 70.

## 2025-03-26 NOTE — PROGRESS NOTES
Delma Apple is a 75 y.o. female here for   Chief Complaint   Patient presents with    Diabetes       1. Have you been to the ER, urgent care clinic since your last visit?  Hospitalized since your last visit? -Pt stated she was in the hospital for fluid on the heart    2. Have you seen or consulted any other health care providers outside of the [facility] since your last visit?  Include any pap smears or colon screening.-no

## 2025-04-08 DIAGNOSIS — Z79.4 TYPE 2 DIABETES MELLITUS WITH HYPERGLYCEMIA, WITH LONG-TERM CURRENT USE OF INSULIN (HCC): Primary | ICD-10-CM

## 2025-04-08 DIAGNOSIS — E11.65 TYPE 2 DIABETES MELLITUS WITH HYPERGLYCEMIA, WITH LONG-TERM CURRENT USE OF INSULIN (HCC): Primary | ICD-10-CM

## 2025-04-08 NOTE — TELEPHONE ENCOUNTER
Requesting santo II sensors and reader  to new pharmacy due to new ins coverage stating we have current on file, confirmed pharmacy

## 2025-04-10 ENCOUNTER — TELEPHONE (OUTPATIENT)
Age: 75
End: 2025-04-10

## 2025-04-14 ENCOUNTER — OFFICE VISIT (OUTPATIENT)
Age: 75
End: 2025-04-14
Payer: MEDICARE

## 2025-04-14 VITALS
HEART RATE: 74 BPM | OXYGEN SATURATION: 97 % | HEIGHT: 66 IN | WEIGHT: 146.4 LBS | SYSTOLIC BLOOD PRESSURE: 114 MMHG | TEMPERATURE: 98.2 F | DIASTOLIC BLOOD PRESSURE: 52 MMHG | BODY MASS INDEX: 23.53 KG/M2 | RESPIRATION RATE: 18 BRPM

## 2025-04-14 DIAGNOSIS — R53.82 CHRONIC FATIGUE: ICD-10-CM

## 2025-04-14 DIAGNOSIS — I50.22 CHRONIC SYSTOLIC HEART FAILURE (HCC): Primary | ICD-10-CM

## 2025-04-14 PROCEDURE — 1123F ACP DISCUSS/DSCN MKR DOCD: CPT | Performed by: NURSE PRACTITIONER

## 2025-04-14 PROCEDURE — 1125F AMNT PAIN NOTED PAIN PRSNT: CPT | Performed by: NURSE PRACTITIONER

## 2025-04-14 PROCEDURE — 1159F MED LIST DOCD IN RCRD: CPT | Performed by: NURSE PRACTITIONER

## 2025-04-14 PROCEDURE — 3078F DIAST BP <80 MM HG: CPT | Performed by: NURSE PRACTITIONER

## 2025-04-14 PROCEDURE — 99214 OFFICE O/P EST MOD 30 MIN: CPT | Performed by: NURSE PRACTITIONER

## 2025-04-14 PROCEDURE — 1160F RVW MEDS BY RX/DR IN RCRD: CPT | Performed by: NURSE PRACTITIONER

## 2025-04-14 PROCEDURE — 3074F SYST BP LT 130 MM HG: CPT | Performed by: NURSE PRACTITIONER

## 2025-04-14 RX ORDER — MIDODRINE HYDROCHLORIDE 2.5 MG/1
2.5 TABLET ORAL 3 TIMES DAILY PRN
Qty: 90 TABLET | Refills: 0 | Status: SHIPPED | OUTPATIENT
Start: 2025-04-14

## 2025-04-14 RX ORDER — BUMETANIDE 2 MG/1
TABLET ORAL
Qty: 135 TABLET | Refills: 1 | Status: SHIPPED | OUTPATIENT
Start: 2025-04-14

## 2025-04-14 RX ORDER — OMEPRAZOLE 10 MG/1
10 CAPSULE, DELAYED RELEASE ORAL DAILY
COMMUNITY

## 2025-04-14 ASSESSMENT — ENCOUNTER SYMPTOMS
COUGH: 0
CHEST TIGHTNESS: 0
ABDOMINAL DISTENTION: 1
SHORTNESS OF BREATH: 0
SORE THROAT: 0
EYE PAIN: 0
ABDOMINAL PAIN: 0
BLOOD IN STOOL: 0

## 2025-04-14 ASSESSMENT — PATIENT HEALTH QUESTIONNAIRE - PHQ9
6. FEELING BAD ABOUT YOURSELF - OR THAT YOU ARE A FAILURE OR HAVE LET YOURSELF OR YOUR FAMILY DOWN: NOT AT ALL
SUM OF ALL RESPONSES TO PHQ QUESTIONS 1-9: 0
3. TROUBLE FALLING OR STAYING ASLEEP: NOT AT ALL
SUM OF ALL RESPONSES TO PHQ QUESTIONS 1-9: 0
1. LITTLE INTEREST OR PLEASURE IN DOING THINGS: NOT AT ALL
9. THOUGHTS THAT YOU WOULD BE BETTER OFF DEAD, OR OF HURTING YOURSELF: NOT AT ALL
8. MOVING OR SPEAKING SO SLOWLY THAT OTHER PEOPLE COULD HAVE NOTICED. OR THE OPPOSITE, BEING SO FIGETY OR RESTLESS THAT YOU HAVE BEEN MOVING AROUND A LOT MORE THAN USUAL: NOT AT ALL
10. IF YOU CHECKED OFF ANY PROBLEMS, HOW DIFFICULT HAVE THESE PROBLEMS MADE IT FOR YOU TO DO YOUR WORK, TAKE CARE OF THINGS AT HOME, OR GET ALONG WITH OTHER PEOPLE: NOT DIFFICULT AT ALL
2. FEELING DOWN, DEPRESSED OR HOPELESS: NOT AT ALL
SUM OF ALL RESPONSES TO PHQ QUESTIONS 1-9: 0
7. TROUBLE CONCENTRATING ON THINGS, SUCH AS READING THE NEWSPAPER OR WATCHING TELEVISION: NOT AT ALL
4. FEELING TIRED OR HAVING LITTLE ENERGY: NOT AT ALL
SUM OF ALL RESPONSES TO PHQ QUESTIONS 1-9: 0
5. POOR APPETITE OR OVEREATING: NOT AT ALL
SUM OF ALL RESPONSES TO PHQ QUESTIONS 1-9: 0
1. LITTLE INTEREST OR PLEASURE IN DOING THINGS: NOT AT ALL
SUM OF ALL RESPONSES TO PHQ QUESTIONS 1-9: 0
2. FEELING DOWN, DEPRESSED OR HOPELESS: NOT AT ALL

## 2025-04-14 NOTE — PATIENT INSTRUCTIONS
Medication changes:    -CHANGE your midodrine to AS NEEDED for systolic blood pressure <100.  You may first decrease to taking twice a day, then once a day, and hopefully, eventually not need it.   You may still take up to three times a day, if you need it.        Please take this to your pharmacy to notify them of the change in medications.         Testing Ordered:    -please obtain your lab work in ~2 months        Other Recommendations:      - Record blood pressure and heart rate daily before medication and two hours after medication  - Record weight daily upon waking/after using the bathroom.   - Keep a written records of your weights and blood pressure and bring to your next appointment.   - Call the clinic at if you have a weight gain of 3 or more pounds overnight OR 5 or more pounds in one week, new/worsened shortness of breath or swelling, or if your blood pressure begins to consistently run below 90/60 and/or you begin to experience dizziness or lightheadedness. Our office phone number [phone] option 2.  - Ensure you are drinking an adequate amount of water with a goal of 6-8 eight ounce glasses (1.5-2 liters) of fluid daily. Your urine should be clear and light yellow straw colored.       Follow up in 3 months with [facility]    Thank you for allowing us the privilege of being a part of your healthcare team! Please do not hesitate to contact our office at [phone] option 2 with any questions or concerns.     We are restarting a monthly heart failure support group, this will be the last Wednesday of every month from 5-6pm at [facility]. If you would like to attend you will need to RSVP to [email or web address]

## 2025-04-14 NOTE — PROGRESS NOTES
ADVANCED HEART FAILURE CENTER  [facility] in [place], [place]  Heart Failure Outpatient Clinic Note    Patient name: Delma Apple  Patient : 1950  Patient MRN: [id number]  Date of service: 25    Primary care physician: Brian Santiago DO  Primary cardiologist: Malaika Myles NP/ Dr. Drake   Primary [facility] cardiologist: [facility]      Chief Complaint   Patient presents with    Edema     In abdomen, minor        ASSESSMENT:  Delma Apple is a 75 y.o. female with a history of ICM, HFrEF 20-25%, LBBB s/p BiV ICD 10/20  Given patient's age she is not a candidate for OHT.  Patient has limited mobility due to her transverse myelitis. Unclear if patient would be a candidate for advanced therapies given her limited functional status. Could consider corcinch trial pending LV diameter, revisit barostim or consider palliative inotropes if she were to decline.   Currently feeling better.        INTERVAL HISTORY:  -VSS  -labs :  K 4.3; creat 0.55; Mg 2.4; pBNP near baseline at 2398;   -weight down 5lbs  -heartlogic 1  -Delma Apple is here for titration follow up.  History of Present Illness    She reports a significant improvement in her overall health status over the past 3 weeks, describing it as the best she has felt in the past 8 years. She was diagnosed with transverse myelitis on 2017. No shortness of breath, chest pain, palpitations, or leg swelling is reported. However, mild abdominal swelling is noted. Dizziness and lightheadedness are denied. Sleep quality has improved since starting Cymbalta, and increased energy levels are reported. Appetite remains normal, although weight loss is noted. She sleeps in an elevated position, and she no longer coughs at night.      She has pain in her R shoulder.       PLAN:  Chronic systolic heart failure:  NYHA II-III  Continue current medical therapy for heart failure:  Beta-blocker: Intolerant due to hypotension, consider once weaned off of

## 2025-04-15 RX ORDER — BUMETANIDE 2 MG/1
TABLET ORAL
Qty: 60 TABLET | OUTPATIENT
Start: 2025-04-15

## 2025-04-30 ENCOUNTER — TELEPHONE (OUTPATIENT)
Facility: CLINIC | Age: 75
End: 2025-04-30

## 2025-04-30 RX ORDER — BACLOFEN 10 MG/1
10 TABLET ORAL NIGHTLY
Qty: 90 TABLET | Refills: 1 | Status: SHIPPED | OUTPATIENT
Start: 2025-04-30

## 2025-04-30 NOTE — TELEPHONE ENCOUNTER
Delma Apple needs a refill of baclofen (LIORESAL) 10 MG tablet .  They have 0 pills/supply left and are requesting a 90 day supply with refills.  Pharmacy has been updated in the chart. Patient was advised or scheduled an appointment for the future and to request refills thru the [employer] Ian or by requesting a refill from their pharmacy in the future.  Patient was also advised to check with their pharmacy for status of when refills are available.    Requesting refill be sent to [facility] on file.     # [phone]

## 2025-06-04 ENCOUNTER — PATIENT MESSAGE (OUTPATIENT)
Facility: CLINIC | Age: 75
End: 2025-06-04

## 2025-06-04 DIAGNOSIS — M79.7 FIBROMYALGIA: ICD-10-CM

## 2025-06-04 RX ORDER — DULOXETIN HYDROCHLORIDE 60 MG/1
60 CAPSULE, DELAYED RELEASE ORAL DAILY
Qty: 90 CAPSULE | Refills: 3 | Status: SHIPPED | OUTPATIENT
Start: 2025-06-04

## 2025-06-09 DIAGNOSIS — R53.82 CHRONIC FATIGUE: ICD-10-CM

## 2025-06-09 DIAGNOSIS — I50.22 CHRONIC SYSTOLIC HEART FAILURE (HCC): ICD-10-CM

## 2025-06-12 ENCOUNTER — OFFICE VISIT (OUTPATIENT)
Facility: CLINIC | Age: 75
End: 2025-06-12
Payer: MEDICARE

## 2025-06-12 VITALS
HEIGHT: 66 IN | RESPIRATION RATE: 16 BRPM | OXYGEN SATURATION: 100 % | DIASTOLIC BLOOD PRESSURE: 58 MMHG | HEART RATE: 72 BPM | SYSTOLIC BLOOD PRESSURE: 103 MMHG | TEMPERATURE: 97.4 F | BODY MASS INDEX: 23.95 KG/M2 | WEIGHT: 149 LBS

## 2025-06-12 DIAGNOSIS — G89.29 CHRONIC LEFT HIP PAIN: ICD-10-CM

## 2025-06-12 DIAGNOSIS — G89.29 CHRONIC RIGHT SHOULDER PAIN: Primary | ICD-10-CM

## 2025-06-12 DIAGNOSIS — M25.552 CHRONIC LEFT HIP PAIN: ICD-10-CM

## 2025-06-12 DIAGNOSIS — M25.511 CHRONIC RIGHT SHOULDER PAIN: Primary | ICD-10-CM

## 2025-06-12 PROCEDURE — 1126F AMNT PAIN NOTED NONE PRSNT: CPT

## 2025-06-12 PROCEDURE — 3074F SYST BP LT 130 MM HG: CPT

## 2025-06-12 PROCEDURE — 1123F ACP DISCUSS/DSCN MKR DOCD: CPT

## 2025-06-12 PROCEDURE — 1160F RVW MEDS BY RX/DR IN RCRD: CPT

## 2025-06-12 PROCEDURE — 3078F DIAST BP <80 MM HG: CPT

## 2025-06-12 PROCEDURE — 99213 OFFICE O/P EST LOW 20 MIN: CPT

## 2025-06-12 PROCEDURE — 1159F MED LIST DOCD IN RCRD: CPT

## 2025-06-12 SDOH — ECONOMIC STABILITY: FOOD INSECURITY: WITHIN THE PAST 12 MONTHS, YOU WORRIED THAT YOUR FOOD WOULD RUN OUT BEFORE YOU GOT MONEY TO BUY MORE.: NEVER TRUE

## 2025-06-12 SDOH — ECONOMIC STABILITY: FOOD INSECURITY: WITHIN THE PAST 12 MONTHS, THE FOOD YOU BOUGHT JUST DIDN'T LAST AND YOU DIDN'T HAVE MONEY TO GET MORE.: NEVER TRUE

## 2025-06-12 ASSESSMENT — PATIENT HEALTH QUESTIONNAIRE - PHQ9
SUM OF ALL RESPONSES TO PHQ QUESTIONS 1-9: 0
2. FEELING DOWN, DEPRESSED OR HOPELESS: NOT AT ALL
1. LITTLE INTEREST OR PLEASURE IN DOING THINGS: NOT AT ALL
SUM OF ALL RESPONSES TO PHQ QUESTIONS 1-9: 0

## 2025-06-12 NOTE — PROGRESS NOTES
Delma Apple (:  1950) is a 75 y.o. female,Established patient, here for evaluation of the following chief complaint(s):  Diabetes and Hypertension (Congestive Heart Failure: EF 15%)         Assessment & Plan  Chronic right shoulder pain  Worsening as of late, conservative measures have thus far failed.  Will refer to PT.  Paper script provided for patient as she wishes to shop around.          Chronic left hip pain  Worsening as of late, conservative measures have thus far failed.  Will refer to PT.  Paper script provided for patient as she wishes to shop around.            Return in about 3 months (around 2025).       Subjective   Patient is in need of PT for some ortho issues. She used to go to [facility] but they seem to no longer exist. She is unsure who she wants to go to this time, will do some research and then decide.         Review of Systems   All other systems reviewed and are negative.         Objective   Physical Exam  Constitutional:       General: She is not in acute distress.     Appearance: Normal appearance. She is not ill-appearing.   HENT:      Head: Normocephalic and atraumatic.      Right Ear: External ear normal.      Left Ear: External ear normal.      Nose: Nose normal.   Eyes:      General: No scleral icterus.        Right eye: No discharge.         Left eye: No discharge.      Extraocular Movements: Extraocular movements intact.      Conjunctiva/sclera: Conjunctivae normal.   Cardiovascular:      Rate and Rhythm: Normal rate and regular rhythm.      Pulses: Normal pulses.      Heart sounds: Normal heart sounds. No murmur heard.     No friction rub. No gallop.   Pulmonary:      Effort: Pulmonary effort is normal. No respiratory distress.      Breath sounds: Normal breath sounds. No stridor. No wheezing, rhonchi or rales.   Chest:      Chest wall: No tenderness.   Neurological:      General: No focal deficit present.      Mental Status: She is alert and oriented to

## 2025-06-12 NOTE — PROGRESS NOTES
Chief Complaint   Patient presents with    Diabetes    Hypertension     Congestive Heart Failure: EF 15%     Have you been to the ER, urgent care clinic since your last visit?  Hospitalized since your last visit?   NO    Have you seen or consulted any other health care providers outside our system since your last visit?   NO    Have you had a mammogram?”   NO    Date of last Mammogram: 7/31/2023       “Have you had a diabetic eye exam?”    NO     Date of last diabetic eye exam: 7/6/2023

## 2025-06-12 NOTE — ASSESSMENT & PLAN NOTE
Worsening as of late, conservative measures have thus far failed.  Will refer to PT.  Paper script provided for patient as she wishes to shop around.

## 2025-06-20 ENCOUNTER — TRANSCRIBE ORDERS (OUTPATIENT)
Facility: HOSPITAL | Age: 75
End: 2025-06-20

## 2025-06-20 ENCOUNTER — HOSPITAL ENCOUNTER (OUTPATIENT)
Facility: HOSPITAL | Age: 75
Discharge: HOME | End: 2025-06-23
Payer: MEDICARE

## 2025-06-20 DIAGNOSIS — R07.9 CHEST PAIN, UNSPECIFIED TYPE: ICD-10-CM

## 2025-06-20 DIAGNOSIS — R07.9 CHEST PAIN, UNSPECIFIED TYPE: Primary | ICD-10-CM

## 2025-06-20 PROCEDURE — 71046 X-RAY EXAM CHEST 2 VIEWS: CPT

## 2025-06-25 ENCOUNTER — OFFICE VISIT (OUTPATIENT)
Age: 75
End: 2025-06-25

## 2025-06-25 ENCOUNTER — RESULTS FOLLOW-UP (OUTPATIENT)
Age: 75
End: 2025-06-25

## 2025-06-25 VITALS
HEIGHT: 66 IN | TEMPERATURE: 97.9 F | SYSTOLIC BLOOD PRESSURE: 115 MMHG | HEART RATE: 70 BPM | DIASTOLIC BLOOD PRESSURE: 63 MMHG | BODY MASS INDEX: 24.27 KG/M2 | WEIGHT: 151 LBS | OXYGEN SATURATION: 96 %

## 2025-06-25 DIAGNOSIS — I50.22 CHRONIC SYSTOLIC HEART FAILURE (HCC): ICD-10-CM

## 2025-06-25 DIAGNOSIS — E11.65 TYPE 2 DIABETES MELLITUS WITH HYPERGLYCEMIA, WITH LONG-TERM CURRENT USE OF INSULIN (HCC): Primary | ICD-10-CM

## 2025-06-25 DIAGNOSIS — E78.2 MIXED HYPERLIPIDEMIA: ICD-10-CM

## 2025-06-25 DIAGNOSIS — Z79.4 TYPE 2 DIABETES MELLITUS WITH HYPERGLYCEMIA, WITH LONG-TERM CURRENT USE OF INSULIN (HCC): Primary | ICD-10-CM

## 2025-06-25 DIAGNOSIS — I95.89 OTHER SPECIFIED HYPOTENSION: ICD-10-CM

## 2025-06-25 DIAGNOSIS — G37.3 TRANSVERSE MYELITIS (HCC): ICD-10-CM

## 2025-06-25 DIAGNOSIS — J44.9 CHRONIC OBSTRUCTIVE PULMONARY DISEASE, UNSPECIFIED COPD TYPE (HCC): ICD-10-CM

## 2025-06-25 DIAGNOSIS — Z85.3 HISTORY OF BREAST CANCER: ICD-10-CM

## 2025-06-25 LAB
25(OH)D3+25(OH)D2 SERPL-MCNC: 20.2 NG/ML (ref 30–100)
ALBUMIN SERPL-MCNC: 4 G/DL (ref 3.8–4.8)
ALP SERPL-CCNC: 145 IU/L (ref 44–121)
ALT SERPL-CCNC: 19 IU/L (ref 0–32)
AST SERPL-CCNC: 23 IU/L (ref 0–40)
BILIRUB SERPL-MCNC: 0.4 MG/DL (ref 0–1.2)
BUN SERPL-MCNC: 19 MG/DL (ref 8–27)
BUN/CREAT SERPL: 28 (ref 12–28)
CALCIUM SERPL-MCNC: 9.2 MG/DL (ref 8.7–10.3)
CHLORIDE SERPL-SCNC: 98 MMOL/L (ref 96–106)
CO2 SERPL-SCNC: 28 MMOL/L (ref 20–29)
CREAT SERPL-MCNC: 0.69 MG/DL (ref 0.57–1)
EGFRCR SERPLBLD CKD-EPI 2021: 91 ML/MIN/1.73
GLOBULIN SER CALC-MCNC: 2 G/DL (ref 1.5–4.5)
GLUCOSE SERPL-MCNC: 249 MG/DL (ref 70–99)
NT-PROBNP SERPL-MCNC: 2804 PG/ML (ref 0–301)
POTASSIUM SERPL-SCNC: 4.6 MMOL/L (ref 3.5–5.2)
PROT SERPL-MCNC: 6 G/DL (ref 6–8.5)
SODIUM SERPL-SCNC: 140 MMOL/L (ref 134–144)

## 2025-06-25 RX ORDER — INSULIN GLARGINE 300 U/ML
15 INJECTION, SOLUTION SUBCUTANEOUS NIGHTLY
Qty: 10 ML | Refills: 2 | Status: SHIPPED | OUTPATIENT
Start: 2025-06-25

## 2025-06-25 NOTE — PROGRESS NOTES
[facility]     Endocrine follow up     CC:   Chief Complaint   Patient presents with    Diabetes     PCP:Brian Santiago DO  Referring provider: Brian Santiago Do  [address] [place] [address]    HPI  75 y.o. female  has a past medical history of Abnormal MRI, cervical spine, Abnormal pap, Arthritis, Breast cancer (HCC), Breast cancer, right breast (HCC), CAD (coronary artery disease), CHF (congestive heart failure) (HCC), Chronic pain, Diabetes (HCC), Fibromyalgia, Ganglion cyst of wrist, Hypercholesterolemia, Hypertension, Ill-defined condition, Memory loss, Murmur, cardiac, Neuropathy, Osteoporosis, Rosacea, Sarcoidosis, Sarcoidosis, Transverse myelopathy syndrome (HCC), and Unspecified adverse effect of anesthesia. here for follow up of   Chief Complaint   Patient presents with    Diabetes     History:  Diagnosed with Type 2 diabetes: 1990s during chemotherapy for breast cancer      Complications:  +CAD, hx of stent placement, CHF   No hx of CVA   No known history of DR, nephropathy, or neuropathy.     No personal or family history of MEN or MTC  No personal history of pancreatitis  No personal or family history of bladder cancer  No known history of thyroid or calcium disorder  No prior hospitalizations for diabetes  No prior foot sores or amputations  +hx of severe hypoglycemia      Discharged 11/25/24 -   \"Acute on chronic systolic CHF last known EF 20-25%, with small pleural effusion 09/24 status post AICD Elevated troponin- suspect likely secondary demnd supply mismatch ;plateaued CAD w stent 2020\"    Transverse Myelitis  - Managed with the use of a walker due to fall risk, balance issues, and significant weakness in one leg.  - A neurologist was consulted in the first week of 05/2017 following hospital discharge     Congestive Heart Failure  - Management includes recent visits to the heart failure clinic.  - Hospitalizations for acute on chronic heart

## 2025-06-25 NOTE — TELEPHONE ENCOUNTER
----- Message from HERMILA Beltran - CNP sent at 6/25/2025  1:25 PM EDT -----  Labs stable except Vitamin D low. Start Vitamin D 50,000 units orally weekly for 6 weeks then 2000 units orally daily after that.     Called patient, [facility], sent my chart    Patient replied on my chart, Vitamin D sent to radha at [facility]

## 2025-06-25 NOTE — PROGRESS NOTES
Delma Apple is a 75 y.o. female here for   Chief Complaint   Patient presents with    Diabetes       1. Have you been to the ER, urgent care clinic since your last visit?  Hospitalized since your last visit? -no    2. Have you seen or consulted any other health care providers outside of the [facility] since your last visit?  Include any pap smears or colon screening.-no

## 2025-06-26 RX ORDER — ERGOCALCIFEROL 1.25 MG/1
50000 CAPSULE, LIQUID FILLED ORAL WEEKLY
Qty: 6 CAPSULE | Refills: 0 | Status: SHIPPED | OUTPATIENT
Start: 2025-06-26

## 2025-06-26 RX ORDER — CHOLECALCIFEROL (VITAMIN D3) 50 MCG
2000 TABLET ORAL DAILY
Qty: 30 TABLET | Refills: 2 | Status: SHIPPED | OUTPATIENT
Start: 2025-08-07

## 2025-07-22 NOTE — PROGRESS NOTES
[facility]  [facility] in [place], [place]  Heart Failure Outpatient Clinic Note    Patient name: Delma Apple  Patient : 1950  Patient MRN: [id number]  Date of service: 25    Primary care physician: Joseluis Marcus FNP  Primary cardiologist: Malaika Myles NP  Primary F cardiologist: Ismael Kirkland MD      CHIEF COMPLAINT:  Follow up for chronic systolic heart failure  Chief Complaint   Patient presents with    Shortness of Breath     W/ exertion    Edema     Legs    Congestive Heart Failure    Follow-up         ASSESSMENT:  Delma Apple is a 75 y.o. female with a history of chronic systolic heart failure due to ischemic cardiomyopathy, stage D. GDMT limited by hypotension. She is a CRT non responder  Not a candidate for transplant due to age. We discussed LVAD and she would be a poor LVAD candidate due to debility related to transverse myelitis, frequent falls, poor tolerance to anesthesia. Not a candidate for Barostim due to NT-proBNP >1600. Not a candidate for CCM due to EF<25%.     PLAN:  Heart failure/Cardiomyopathy:  NYHA III  Continue current medical therapy for heart failure:  Beta-blocker: Had not tolerated previously due to hypotension. Will re evaluate next visit. Weaning Midodrine now  ACE/ARB/ARNi: Continue Losartan 25 mg daily  MRA: Start Spironolactone 12.5 mg daily  SGLT2 inhibitor: Continue Farxiga 10 mg daily  Diuretic: Continue Bumex 2 mg in the morning, 1 mg in the afternoon with an additional 1 mg PRN  I asked patient to reduce Midodrine to PRN for SBP<100  Reinforced low salt diet  Reinforced fluid restriction to 6 x 8oz glasses per day  Recommended 30 minutes of aerobic exercise 5 days weekly  Labs: BMP and proBNP in 2 weeks with addition of Spironolactone. Labs 2025 reviewed.     CAD:  No anginal symptoms  Continue Plavix 75 mg daily  , goal <70. Transition Atorvastatin to Rosuvastatin 40 mg daily    Arrhythmia/ICD:  Follow-up

## 2025-07-23 ENCOUNTER — OFFICE VISIT (OUTPATIENT)
Age: 75
End: 2025-07-23
Payer: MEDICARE

## 2025-07-23 VITALS
WEIGHT: 152 LBS | RESPIRATION RATE: 18 BRPM | DIASTOLIC BLOOD PRESSURE: 56 MMHG | TEMPERATURE: 98.7 F | HEART RATE: 65 BPM | OXYGEN SATURATION: 99 % | SYSTOLIC BLOOD PRESSURE: 102 MMHG | BODY MASS INDEX: 24.43 KG/M2 | HEIGHT: 66 IN

## 2025-07-23 DIAGNOSIS — Z95.810 BIVENTRICULAR ICD (IMPLANTABLE CARDIOVERTER-DEFIBRILLATOR) IN PLACE: ICD-10-CM

## 2025-07-23 DIAGNOSIS — I25.5 ISCHEMIC CARDIOMYOPATHY: ICD-10-CM

## 2025-07-23 DIAGNOSIS — Z79.4 TYPE 2 DIABETES MELLITUS WITH HYPERGLYCEMIA, WITH LONG-TERM CURRENT USE OF INSULIN (HCC): ICD-10-CM

## 2025-07-23 DIAGNOSIS — E11.65 TYPE 2 DIABETES MELLITUS WITH HYPERGLYCEMIA, WITH LONG-TERM CURRENT USE OF INSULIN (HCC): ICD-10-CM

## 2025-07-23 DIAGNOSIS — I50.22 CHRONIC SYSTOLIC HEART FAILURE (HCC): Primary | ICD-10-CM

## 2025-07-23 DIAGNOSIS — I25.10 CORONARY ARTERY DISEASE INVOLVING NATIVE CORONARY ARTERY OF NATIVE HEART WITHOUT ANGINA PECTORIS: ICD-10-CM

## 2025-07-23 PROCEDURE — 99214 OFFICE O/P EST MOD 30 MIN: CPT | Performed by: INTERNAL MEDICINE

## 2025-07-23 PROCEDURE — 1159F MED LIST DOCD IN RCRD: CPT | Performed by: INTERNAL MEDICINE

## 2025-07-23 PROCEDURE — 3078F DIAST BP <80 MM HG: CPT | Performed by: INTERNAL MEDICINE

## 2025-07-23 PROCEDURE — 3074F SYST BP LT 130 MM HG: CPT | Performed by: INTERNAL MEDICINE

## 2025-07-23 PROCEDURE — 1123F ACP DISCUSS/DSCN MKR DOCD: CPT | Performed by: INTERNAL MEDICINE

## 2025-07-23 PROCEDURE — 1126F AMNT PAIN NOTED NONE PRSNT: CPT | Performed by: INTERNAL MEDICINE

## 2025-07-23 RX ORDER — MIDODRINE HYDROCHLORIDE 2.5 MG/1
2.5 TABLET ORAL 2 TIMES DAILY PRN
Qty: 180 TABLET | Refills: 1 | Status: SHIPPED | OUTPATIENT
Start: 2025-07-23

## 2025-07-23 RX ORDER — SPIRONOLACTONE 25 MG/1
12.5 TABLET ORAL DAILY
Qty: 45 TABLET | Refills: 1 | Status: SHIPPED | OUTPATIENT
Start: 2025-07-23

## 2025-07-23 RX ORDER — ROSUVASTATIN CALCIUM 40 MG/1
40 TABLET, COATED ORAL DAILY
Qty: 90 TABLET | Refills: 1 | Status: SHIPPED | OUTPATIENT
Start: 2025-07-23

## 2025-07-23 NOTE — PATIENT INSTRUCTIONS
Medication Changes:    START Spironolactone (Aldactone) 25 mg. Take 0.5 tablet (12.5 mg) daily    CHANGE Midodrine (Proamatine) 2.5 mg. Take 1 tablet 2 times daily as needed for systolic blood pressure (top number) LESS THAN 100    START Rosuvastatin (Crestor) 40 mg. Take 1 table daily.  STOP Atorvastatin      Testing Ordered:    LABS - Obtain in 2 weeks at [facility] of your choice      Referrals:      Other Recommendations:      - Record blood pressure and heart rate 2 times daily: before medication and two hours after medication  - Record weight daily upon waking/after using the bathroom.   - Keep a written records of your weights and blood pressure and bring to your next appointment.   - Call the clinic if you have a weight gain of 3 or more pounds overnight OR 5 or more pounds in one week, new/worsened shortness of breath or swelling, or if your blood pressure begins to consistently run below 90/60 and/or you begin to experience dizziness or lightheadedness. Our office phone number [phone] option 2.  - Drink an adequate amount of water with a goal of 6-8 eight ounce glasses (1.5-2 liters) of fluid daily. Your urine should be clear and light yellow straw colored.       Follow up     3 MONTH FOLLOW UP with [facility]      Thank you for allowing us the privilege of being a part of your healthcare team!  Please do not hesitate to contact our office at [phone] option 2 with any questions or concerns.     Please make sure to have an active My Chart account. Having issues logging in? Contact the [facility] Help Desk at [phone].   - [facility] is the best way to communicate with [facility] Nurses. We can answer your questions, you can report your weight and BP logs and we notify you of any abnormal results requiring changes to your current plan of care.     The monthly Heart Failure Support Group meets the last Wednesday of every month from 5-6pm at [facility]. If you would like to attend,

## 2025-08-06 DIAGNOSIS — I50.22 CHRONIC SYSTOLIC HEART FAILURE (HCC): ICD-10-CM

## 2025-08-08 ENCOUNTER — RESULTS FOLLOW-UP (OUTPATIENT)
Age: 75
End: 2025-08-08

## 2025-08-08 DIAGNOSIS — I50.22 CHRONIC SYSTOLIC HEART FAILURE (HCC): ICD-10-CM

## 2025-08-08 DIAGNOSIS — I50.22 CHRONIC SYSTOLIC HEART FAILURE (HCC): Primary | ICD-10-CM

## 2025-08-08 LAB
BUN SERPL-MCNC: 24 MG/DL (ref 8–27)
BUN/CREAT SERPL: 35 (ref 12–28)
CALCIUM SERPL-MCNC: 9.1 MG/DL (ref 8.7–10.3)
CHLORIDE SERPL-SCNC: 98 MMOL/L (ref 96–106)
CO2 SERPL-SCNC: 30 MMOL/L (ref 20–29)
CREAT SERPL-MCNC: 0.69 MG/DL (ref 0.57–1)
EGFRCR SERPLBLD CKD-EPI 2021: 91 ML/MIN/1.73
GLUCOSE SERPL-MCNC: 341 MG/DL (ref 70–99)
NT-PROBNP SERPL-MCNC: 2240 PG/ML (ref 0–301)
POTASSIUM SERPL-SCNC: 5.4 MMOL/L (ref 3.5–5.2)
SODIUM SERPL-SCNC: 140 MMOL/L (ref 134–144)

## 2025-08-08 RX ORDER — SPIRONOLACTONE 25 MG/1
12.5 TABLET ORAL EVERY OTHER DAY
Qty: 23 TABLET | Refills: 1
Start: 2025-08-08

## 2025-08-21 ENCOUNTER — TELEPHONE (OUTPATIENT)
Age: 75
End: 2025-08-21

## 2025-08-22 ENCOUNTER — TELEPHONE (OUTPATIENT)
Age: 75
End: 2025-08-22

## 2025-08-26 ENCOUNTER — RESULTS FOLLOW-UP (OUTPATIENT)
Age: 75
End: 2025-08-26

## 2025-08-26 ENCOUNTER — TELEPHONE (OUTPATIENT)
Age: 75
End: 2025-08-26

## 2025-08-26 LAB
BUN SERPL-MCNC: 19 MG/DL (ref 8–27)
BUN/CREAT SERPL: 28 (ref 12–28)
CALCIUM SERPL-MCNC: 9.6 MG/DL (ref 8.7–10.3)
CHLORIDE SERPL-SCNC: 101 MMOL/L (ref 96–106)
CO2 SERPL-SCNC: 28 MMOL/L (ref 20–29)
CREAT SERPL-MCNC: 0.68 MG/DL (ref 0.57–1)
EGFRCR SERPLBLD CKD-EPI 2021: 91 ML/MIN/1.73
GLUCOSE SERPL-MCNC: 202 MG/DL (ref 70–99)
POTASSIUM SERPL-SCNC: 5.3 MMOL/L (ref 3.5–5.2)
SODIUM SERPL-SCNC: 142 MMOL/L (ref 134–144)

## 2025-09-03 RX ORDER — BETHANECHOL CHLORIDE 5 MG
5 TABLET ORAL 2 TIMES DAILY
Qty: 180 TABLET | Refills: 1 | Status: SHIPPED | OUTPATIENT
Start: 2025-09-03

## 2025-09-05 RX ORDER — BUMETANIDE 2 MG/1
TABLET ORAL
Qty: 135 TABLET | Refills: 1 | Status: SHIPPED | OUTPATIENT
Start: 2025-09-05

## (undated) DEVICE — PACK,LITHOTOMY,PK I: Brand: MEDLINE

## (undated) DEVICE — LIMB HOLDER, WRIST/ANKLE: Brand: DEROYAL

## (undated) DEVICE — SYRINGE IRRIG 60ML SFT PLIABLE BLB EZ TO GRP 1 HND USE W/

## (undated) DEVICE — 3M™ IOBAN™ 2 ANTIMICROBIAL INCISE DRAPE 6640EZ: Brand: IOBAN™ 2

## (undated) DEVICE — PAD,NON-ADHERENT,3X8,STERILE,LF,1/PK: Brand: MEDLINE

## (undated) DEVICE — RADIFOCUS GLIDEWIRE: Brand: GLIDEWIRE

## (undated) DEVICE — SET SEALS HYSTEROSCOPE DISP -- MYOSURE  EA=10

## (undated) DEVICE — PAD,SANITARY,11 IN,MAXI,N-STRL,IND WRAP: Brand: MEDLINE

## (undated) DEVICE — STRAP,POSITIONING,KNEE/BODY,FOAM,4X60": Brand: MEDLINE

## (undated) DEVICE — ZIP 8I SURGICAL SKIN CLOSURE DEVICE: Brand: ZIP 8I SURGICAL SKIN CLOSURE DEVICE

## (undated) DEVICE — MEDI-TRACE CADENCE ADULT, DEFIBRILLATION ELECTRODE -RTS  (10 PR/PK) - PHYSIO-CONTROL: Brand: MEDI-TRACE CADENCE

## (undated) DEVICE — SUTURE ETHBND EXCEL SZ 2 L30IN NONABSORBABLE GRN L40MM V-37 MX69G

## (undated) DEVICE — SLING ORTHOPEDIC PCH UNIV 19.5X9 IN 2-39 IN ARM W/ FOAM STRP

## (undated) DEVICE — SUTURE STRATAFIX SPRL MCRYL + SZ 3-0 L24IN ABSRB UD PS-2 SXMP1B108

## (undated) DEVICE — STRAP RESTRAIN W3.5XL19IN TECLIN STRRP POS LEG DURING LITH

## (undated) DEVICE — JELLY,LUBE,STERILE,FLIP TOP,TUBE,4-OZ: Brand: MEDLINE

## (undated) DEVICE — MARKER,SKIN,WI/RULER AND LABELS: Brand: MEDLINE

## (undated) DEVICE — INFECTION CONTROL KIT SYS

## (undated) DEVICE — SUTURE STRATAFIX SPRL PDS + SZ 2-0 L9IN ABSRB VLT CT-1 SXPP1B456

## (undated) DEVICE — GUIDEWIRE VASC L182CM DIA0.014IN HYDRPHLC COR EXTRA SUPP

## (undated) DEVICE — TUBE ST FLD CTRL AQUILEX INFLO --

## (undated) DEVICE — SOLUTION IRRIG 3000ML 0.9% SOD CHL FLX CONT 0797208] ICU MEDICAL INC]

## (undated) DEVICE — KIT ANGIO CNTRST ADMIN W O BWL WORLEY

## (undated) DEVICE — GUIDE COR SNUS L135CM DIA0.035IN SHTH L50CM DIA9FR BRAID

## (undated) DEVICE — INTRO SHTH 9FR 13X20CM -- USE ITEM# 341577

## (undated) DEVICE — PACEMAKER PACK: Brand: MEDLINE INDUSTRIES, INC.

## (undated) DEVICE — PLASMABLADE PS200-040 4.0: Brand: PLASMABLADE™

## (undated) DEVICE — Z INACTIVE USE 2527070 DRAPE SURG W40XL44IN UNDERBUTTOCK SMS POLYPR W/ PCH BK DISP

## (undated) DEVICE — PREP PAD BNS: Brand: CONVERTORS

## (undated) DEVICE — DEVICE TISS REMOVAL -- ORDER AS BX     APO CODE = DRP

## (undated) DEVICE — COVER LT HNDL PLAS RIG 1 PER PK

## (undated) DEVICE — INTRODUCER SHTH L62CM OD0.12IN ID0.09IN LAT VEIN RENAL CRV

## (undated) DEVICE — CATHETER DIAG 6FR L110CM INTRO 6FR BLLN DIA9MM 1CC PULM ART

## (undated) DEVICE — DEVICE TISS RETRV 3MMX25.25IN -- MYOSURE

## (undated) DEVICE — REM POLYHESIVE ADULT PATIENT RETURN ELECTRODE: Brand: VALLEYLAB

## (undated) DEVICE — TRAY PREP DRY W/ PREM GLV 2 APPL 6 SPNG 2 UNDPD 1 OVERWRAP

## (undated) DEVICE — TUBE ST FLD AQUILEX OUTFLO --

## (undated) DEVICE — CATH URETH INTMIT ROB 16FR FUN -- CONVERT TO ITEM 179520

## (undated) DEVICE — MARKER SKN RUL VIO STRL

## (undated) DEVICE — GOWN,SIRUS,NONRNF,SETINSLV,XL,20/CS: Brand: MEDLINE

## (undated) DEVICE — STERILE POLYISOPRENE POWDER-FREE SURGICAL GLOVES: Brand: PROTEXIS

## (undated) DEVICE — SPONGE GZ W4XL4IN COT RADPQ HIGHLY ABSRB